# Patient Record
Sex: FEMALE | Race: BLACK OR AFRICAN AMERICAN | NOT HISPANIC OR LATINO | Employment: FULL TIME | URBAN - METROPOLITAN AREA
[De-identification: names, ages, dates, MRNs, and addresses within clinical notes are randomized per-mention and may not be internally consistent; named-entity substitution may affect disease eponyms.]

---

## 2023-04-12 ENCOUNTER — OFFICE VISIT (OUTPATIENT)
Dept: PRIMARY CARE | Facility: CLINIC | Age: 23
End: 2023-04-12
Payer: COMMERCIAL

## 2023-04-12 VITALS
TEMPERATURE: 97.7 F | HEART RATE: 76 BPM | OXYGEN SATURATION: 98 % | DIASTOLIC BLOOD PRESSURE: 70 MMHG | BODY MASS INDEX: 24.07 KG/M2 | SYSTOLIC BLOOD PRESSURE: 110 MMHG | HEIGHT: 64 IN | WEIGHT: 141 LBS | RESPIRATION RATE: 17 BRPM

## 2023-04-12 DIAGNOSIS — F41.9 ANXIETY: ICD-10-CM

## 2023-04-12 DIAGNOSIS — B37.31 VAGINAL YEAST INFECTION: ICD-10-CM

## 2023-04-12 DIAGNOSIS — J45.20 MILD INTERMITTENT ASTHMA, UNSPECIFIED WHETHER COMPLICATED (HHS-HCC): ICD-10-CM

## 2023-04-12 DIAGNOSIS — Z11.3 ROUTINE SCREENING FOR STI (SEXUALLY TRANSMITTED INFECTION): ICD-10-CM

## 2023-04-12 DIAGNOSIS — N63.20 MASS OF LEFT BREAST, UNSPECIFIED QUADRANT: ICD-10-CM

## 2023-04-12 DIAGNOSIS — D64.9 ANEMIA, UNSPECIFIED TYPE: Primary | ICD-10-CM

## 2023-04-12 DIAGNOSIS — E55.9 MILD VITAMIN D DEFICIENCY: ICD-10-CM

## 2023-04-12 DIAGNOSIS — J30.2 SEASONAL ALLERGIES: ICD-10-CM

## 2023-04-12 DIAGNOSIS — R10.84 GENERALIZED ABDOMINAL PAIN: ICD-10-CM

## 2023-04-12 PROBLEM — L70.9 ADULT ACNE: Status: ACTIVE | Noted: 2023-04-12

## 2023-04-12 PROBLEM — K58.9 IRRITABLE BOWEL SYNDROME: Status: ACTIVE | Noted: 2023-04-12

## 2023-04-12 PROBLEM — K21.9 GERD (GASTROESOPHAGEAL REFLUX DISEASE): Status: ACTIVE | Noted: 2023-04-12

## 2023-04-12 PROBLEM — K59.00 CONSTIPATION: Status: ACTIVE | Noted: 2023-04-12

## 2023-04-12 PROBLEM — R51.9 HEADACHE: Status: ACTIVE | Noted: 2023-04-12

## 2023-04-12 PROBLEM — J45.909 ASTHMA (HHS-HCC): Status: ACTIVE | Noted: 2023-04-12

## 2023-04-12 PROBLEM — M62.89 PELVIC FLOOR DYSFUNCTION: Status: ACTIVE | Noted: 2023-04-12

## 2023-04-12 PROBLEM — N92.6 IRREGULAR MENSES: Status: ACTIVE | Noted: 2023-04-12

## 2023-04-12 PROBLEM — N63.0 BREAST NODULE: Status: ACTIVE | Noted: 2023-04-12

## 2023-04-12 PROBLEM — H66.92 LEFT OTITIS MEDIA: Status: ACTIVE | Noted: 2023-04-12

## 2023-04-12 PROBLEM — K59.02 DYSSYNERGIC DEFECATION: Status: ACTIVE | Noted: 2023-04-12

## 2023-04-12 PROBLEM — R11.10 VOMITING: Status: ACTIVE | Noted: 2023-04-12

## 2023-04-12 PROBLEM — N94.10 DYSPAREUNIA, FEMALE: Status: ACTIVE | Noted: 2023-04-12

## 2023-04-12 PROBLEM — N94.6 ADOLESCENT DYSMENORRHEA: Status: ACTIVE | Noted: 2023-04-12

## 2023-04-12 PROBLEM — M54.9 BACK PAIN: Status: ACTIVE | Noted: 2023-04-12

## 2023-04-12 PROBLEM — R10.9 ABDOMINAL PAIN: Status: ACTIVE | Noted: 2023-04-12

## 2023-04-12 PROBLEM — R11.0 DAILY NAUSEA: Status: ACTIVE | Noted: 2023-04-12

## 2023-04-12 LAB
CALCIDIOL (25 OH VITAMIN D3) (NG/ML) IN SER/PLAS: 17 NG/ML
ERYTHROCYTE DISTRIBUTION WIDTH (RATIO) BY AUTOMATED COUNT: 13.7 % (ref 11.5–14.5)
ERYTHROCYTE MEAN CORPUSCULAR HEMOGLOBIN CONCENTRATION (G/DL) BY AUTOMATED: 30.2 G/DL (ref 32–36)
ERYTHROCYTE MEAN CORPUSCULAR VOLUME (FL) BY AUTOMATED COUNT: 86 FL (ref 80–100)
ERYTHROCYTES (10*6/UL) IN BLOOD BY AUTOMATED COUNT: 4.12 X10E12/L (ref 4–5.2)
FERRITIN (UG/LL) IN SER/PLAS: 18 UG/L (ref 8–150)
HEMATOCRIT (%) IN BLOOD BY AUTOMATED COUNT: 35.4 % (ref 36–46)
HEMOGLOBIN (G/DL) IN BLOOD: 10.7 G/DL (ref 12–16)
HIV 1/ 2 AG/AB SCREEN: NONREACTIVE
IRON (UG/DL) IN SER/PLAS: 37 UG/DL (ref 35–150)
IRON BINDING CAPACITY (UG/DL) IN SER/PLAS: 441 UG/DL (ref 240–445)
IRON SATURATION (%) IN SER/PLAS: 8 % (ref 25–45)
LEUKOCYTES (10*3/UL) IN BLOOD BY AUTOMATED COUNT: 7.3 X10E9/L (ref 4.4–11.3)
NRBC (PER 100 WBCS) BY AUTOMATED COUNT: 0 /100 WBC (ref 0–0)
PLATELETS (10*3/UL) IN BLOOD AUTOMATED COUNT: 383 X10E9/L (ref 150–450)
SEDIMENTATION RATE, ERYTHROCYTE: 13 MM/H (ref 0–20)
SYPHILIS TOTAL AB: NONREACTIVE

## 2023-04-12 PROCEDURE — 87491 CHLMYD TRACH DNA AMP PROBE: CPT

## 2023-04-12 PROCEDURE — 99395 PREV VISIT EST AGE 18-39: CPT | Performed by: STUDENT IN AN ORGANIZED HEALTH CARE EDUCATION/TRAINING PROGRAM

## 2023-04-12 PROCEDURE — 85652 RBC SED RATE AUTOMATED: CPT

## 2023-04-12 PROCEDURE — 87389 HIV-1 AG W/HIV-1&-2 AB AG IA: CPT

## 2023-04-12 PROCEDURE — 86780 TREPONEMA PALLIDUM: CPT

## 2023-04-12 PROCEDURE — 87661 TRICHOMONAS VAGINALIS AMPLIF: CPT

## 2023-04-12 PROCEDURE — 83550 IRON BINDING TEST: CPT

## 2023-04-12 PROCEDURE — 82728 ASSAY OF FERRITIN: CPT

## 2023-04-12 PROCEDURE — 36415 COLL VENOUS BLD VENIPUNCTURE: CPT | Performed by: STUDENT IN AN ORGANIZED HEALTH CARE EDUCATION/TRAINING PROGRAM

## 2023-04-12 PROCEDURE — 82306 VITAMIN D 25 HYDROXY: CPT

## 2023-04-12 PROCEDURE — 1036F TOBACCO NON-USER: CPT | Performed by: STUDENT IN AN ORGANIZED HEALTH CARE EDUCATION/TRAINING PROGRAM

## 2023-04-12 PROCEDURE — 87591 N.GONORRHOEAE DNA AMP PROB: CPT

## 2023-04-12 PROCEDURE — 83540 ASSAY OF IRON: CPT

## 2023-04-12 PROCEDURE — 85027 COMPLETE CBC AUTOMATED: CPT

## 2023-04-12 RX ORDER — ONDANSETRON 8 MG/1
8 TABLET, ORALLY DISINTEGRATING ORAL EVERY 4 HOURS PRN
COMMUNITY
Start: 2019-04-12 | End: 2023-12-14

## 2023-04-12 RX ORDER — BENZOYL PEROXIDE 100 MG/ML
LIQUID TOPICAL
COMMUNITY
Start: 2021-08-11 | End: 2023-07-05 | Stop reason: SDUPTHER

## 2023-04-12 RX ORDER — ALBUTEROL SULFATE 90 UG/1
1-2 AEROSOL, METERED RESPIRATORY (INHALATION) EVERY 4 HOURS PRN
COMMUNITY
Start: 2014-10-20 | End: 2023-04-12 | Stop reason: SDUPTHER

## 2023-04-12 RX ORDER — LORATADINE 10 MG/1
1 CAPSULE, LIQUID FILLED ORAL DAILY
COMMUNITY
Start: 2020-06-19 | End: 2023-07-05 | Stop reason: ALTCHOICE

## 2023-04-12 RX ORDER — OMEPRAZOLE 40 MG/1
40 CAPSULE, DELAYED RELEASE ORAL DAILY
COMMUNITY

## 2023-04-12 RX ORDER — VIT C/E/ZN/COPPR/LUTEIN/ZEAXAN 250MG-90MG
25 CAPSULE ORAL DAILY
COMMUNITY
Start: 2020-06-19 | End: 2023-04-12 | Stop reason: ALTCHOICE

## 2023-04-12 RX ORDER — DICYCLOMINE HYDROCHLORIDE 10 MG/1
20 CAPSULE ORAL 3 TIMES DAILY
COMMUNITY

## 2023-04-12 RX ORDER — CHOLECALCIFEROL (VITAMIN D3) 50 MCG
50 TABLET ORAL DAILY
Qty: 90 TABLET | Refills: 3 | Status: SHIPPED | OUTPATIENT
Start: 2023-04-12 | End: 2024-04-06

## 2023-04-12 RX ORDER — FLUTICASONE PROPIONATE 50 MCG
1 SPRAY, SUSPENSION (ML) NASAL NIGHTLY
Qty: 16 G | Refills: 2 | Status: SHIPPED | OUTPATIENT
Start: 2023-04-12 | End: 2023-07-05 | Stop reason: SDUPTHER

## 2023-04-12 RX ORDER — MULTIVITAMIN WITH IRON
1 TABLET ORAL DAILY
Qty: 90 EACH | Refills: 3 | Status: SHIPPED | OUTPATIENT
Start: 2023-04-12

## 2023-04-12 RX ORDER — FLUTICASONE PROPIONATE 50 MCG
1 SPRAY, SUSPENSION (ML) NASAL NIGHTLY
COMMUNITY
Start: 2020-06-19 | End: 2023-04-12 | Stop reason: SDUPTHER

## 2023-04-12 RX ORDER — SYRING-NEEDL,DISP,INSUL,0.3 ML 29 G X1/2"
SYRINGE, EMPTY DISPOSABLE MISCELLANEOUS
COMMUNITY
Start: 2022-06-26

## 2023-04-12 RX ORDER — ALBUTEROL SULFATE 90 UG/1
1-2 AEROSOL, METERED RESPIRATORY (INHALATION) EVERY 4 HOURS PRN
Qty: 18 G | Refills: 2 | Status: SHIPPED | OUTPATIENT
Start: 2023-04-12 | End: 2023-07-05 | Stop reason: SDUPTHER

## 2023-04-12 RX ORDER — ADAPALENE 1 MG/G
GEL TOPICAL
COMMUNITY
Start: 2022-08-31 | End: 2023-07-05 | Stop reason: SDUPTHER

## 2023-04-12 RX ORDER — HYDROXYZINE HYDROCHLORIDE 25 MG/1
25 TABLET, FILM COATED ORAL EVERY 8 HOURS PRN
Qty: 60 TABLET | Refills: 1 | Status: SHIPPED | OUTPATIENT
Start: 2023-04-12 | End: 2023-07-18 | Stop reason: ALTCHOICE

## 2023-04-12 RX ORDER — POLYETHYLENE GLYCOL 3350 17 G/17G
POWDER, FOR SOLUTION ORAL
COMMUNITY
Start: 2020-05-04

## 2023-04-12 RX ORDER — FLUCONAZOLE 150 MG/1
150 TABLET ORAL ONCE
Qty: 1 TABLET | Refills: 0 | Status: SHIPPED | OUTPATIENT
Start: 2023-04-12 | End: 2023-04-12

## 2023-04-12 NOTE — PATIENT INSTRUCTIONS
Thank you for coming in to see us today!    Please get your lab work done and we will be in touch with results.     Follow up with GI and counseling.     I have sent your refills to the pharmacy today, along with the new medication we can try for your anxiety/panic attacks.     Thank you,  Dr. Bryan

## 2023-04-12 NOTE — PROGRESS NOTES
Sejal Duff is a 22 y.o. female seen in Clinic at /[facility] by Dr. Cody Bryan on 04/12/23 for routine care, as well as for management of the following chronic medical conditions: constipation/IBS-C, breast mass. Patient with recent death of young cousin in MVC, has been having increasing anxiety and panic attacks. Seen in ED, given PRN benzo but does not like the way it makes her feel. Has mental health counseling set up for later this month. Will trial PRN hydroxyzine, may consider transition to SSRI long term pending evolution of symptoms/mental health care. Still has not followed up with breast surgery for breast mass seen on imaging in 2021, referral again placed today. Extensive discussion regarding constipation and ongoing GI concerns which are currently predominately managed by pediatric GI team.     #Constipation   - Longstanding history   - Managed by Peds GI at [facility]  - Current regimen includes 4 caps Miralax daily, 10mg Dulcolax daily   - Recently started on Biofeedback therapy--continues   - Per chart review, issues with medication non-compliance     #Breast Mass  - f/u with breast surgery pending; referral again placed     #Acne  - Topical agents prescribed and refilled    #Anemia  - repeat CBC today  - previously prescribed MVI with iron given constipation    #STI Screening   - labs today     #Asthma, intermittent  - Albuterol PRN, refill     Past Medical History: IBS-C, irregular periods, asthma (intermittent)   Past Medical History:   Diagnosis Date    Encounter for screening for infections with a predominantly sexual mode of transmission 03/02/2020    Screening for STD (sexually transmitted disease)    Melena 04/12/2019    Blood in stool    Other specified health status     No pertinent past medical history    Other specified health status     No pertinent past surgical history    Personal history of other (healed) physical injury and trauma     History of sprain of ankle    Unspecified condition  associated with female genital organs and menstrual cycle 03/02/2020    Cervical motion tenderness     Subspecialty Medical Care: GI (peds), GYN, breast surgery   Identified Adult Providers: Dr. Cody Bryan    Past Surgical History: endoscopy/colonoscopy   Past Surgical History:   Procedure Laterality Date    OTHER SURGICAL HISTORY  01/18/2019    No history of surgery     Medications:   Constipation: Miralax, Dulcolax, Magnesium; Linzess (has not started)   Abdominal Pain: Bentyl, Cyclobenzaprine   Nausea: Zofran     Current Outpatient Medications:     adapalene (Differin) 0.1 % gel, APPLY SPARINGLY TO AFFECTED AREA EVERY DAY, Disp: , Rfl:     albuterol 90 mcg/actuation inhaler, Inhale 1-2 puffs every 4 hours if needed for wheezing or shortness of breath., Disp: 18 g, Rfl: 2    benzoyl peroxide (Benzac AC) 10 % external wash, USE WASH TWICE DAILY AS DIRECTED., Disp: , Rfl:     cholecalciferol (Vitamin D3) 50 MCG (2000 UT) tablet, Take 1 tablet (50 mcg) by mouth once daily., Disp: 90 tablet, Rfl: 3    dicyclomine (Bentyl) 10 mg capsule, Take 2 capsules (20 mg) by mouth in the morning and 2 capsules (20 mg) in the evening and 2 capsules (20 mg) before bedtime., Disp: , Rfl:     fluconazole (Diflucan) 150 mg tablet, Take 1 tablet (150 mg) by mouth 1 time for 1 dose., Disp: 1 tablet, Rfl: 0    fluticasone (Flonase) 50 mcg/actuation nasal spray, Administer 1 spray into each nostril once daily at bedtime., Disp: 16 g, Rfl: 2    hydrOXYzine HCL (Atarax) 25 mg tablet, Take 1 tablet (25 mg) by mouth every 8 hours if needed for anxiety., Disp: 60 tablet, Rfl: 1    Lacto no.76-Dopjel-IXV-larch 25B cell-25B cell-50 mg capsule, Take 1 capsule by mouth once daily., Disp: 90 capsule, Rfl: 3    lactose-reduced food (ENSURE PLUS ORAL), Take by mouth., Disp: , Rfl:     loratadine (Claritin Liqui-Gel) 10 mg capsule, Take 1 capsule by mouth once daily., Disp: , Rfl:     magnesium citrate solution, Take by mouth., Disp: , Rfl:      "multivitamin with iron (Daily Multiple Vitamins/Iron) tablet, Take 1 tablet by mouth once daily., Disp: 90 each, Rfl: 3    omeprazole (PriLOSEC) 40 mg DR capsule, Take 1 capsule (40 mg) by mouth once daily., Disp: , Rfl:     ondansetron ODT (Zofran-ODT) 8 mg disintegrating tablet, Take 1 tablet (8 mg) by mouth every 4 hours if needed for nausea or vomiting., Disp: , Rfl:     polyethylene glycol (Glycolax) 17 gram/dose powder, Mix 17gm in 6-8 ounces of Gatorade and administer by mouth.  Repeat every 15 minutes for a total of 10 doses., Disp: , Rfl:   Pharmacy: [facility]     Allergies:   Allergies   Allergen Reactions    House Dust Unknown    Morphine Dizziness    Pollen Extracts Unknown     Immunizations: Tdap 2022-->due 2032 or during each pregnancy; not vaccinated against COVID and refuses flu shot today     Family History: grandmother and grandmother with luminal GI malignancy; grandfather with breast cancer   Family History   Problem Relation Name Age of Onset    Asthma Mother      Colon cancer Mother       Social History:   Home/Living Situation: lives at home alone; feels safe   Education: nursing school   Employment/Work/Vocational: CNA  Activities: works four jobs; used to work out frequently, recommend trying again   Drug Use: wine social drinking; social MJ use; recent increase in alcohol ues   Diet: dietary concerns as above   Sexuality/Contraception/Menstrual History: STD testing today   Suicide/Depression/Anxiety: \"mood always everywhere\" but denies depression, recent anxiety/panic attacks as avbove   Sleep: trouble sleeping     Transition Processes:  Financial/Health Insurance: [facility]  Transportation: [employer]  Voting: encouraged to register to vote   Legal/Guardian: emergency contact Mom [phone]  Contact Information: [phone]    Visit Vitals  /70 (BP Location: Left arm, Patient Position: Sitting)   Pulse 76   Temp 36.5 °C (97.7 °F)   Resp 17   Ht 1.626 m (5' 4\")   Wt 64 kg (141 lb) "   SpO2 98%   BMI 24.20 kg/m²   Smoking Status Never   BSA 1.7 m²      PHYSICAL EXAM:   General: well appearing AA female, AD, pleasant and engaged in encounter    HEENT: NCAT, MMM  CV: RRR, no m/r/g  PULM: CTAB, non-labored respirations   ABD: soft, mild distended, no rebound/guarding, hypoactive bowel sounds   : no suprapubic or CVA tenderness   EXT: WWP, no significant edema   SKIN: no rashes noted   NEURO: A&Ox4, symmetric facies, no gross motor or sensory deficits, normal gait  PSYCH: pleasant mood, appropriate affect     Assessment/Plan    Sejal Duff is a 22 y.o. female seen in Clinic at /[facility] by Dr. Cody Bryan on 04/12/23 for routine care, as well as for management of the following chronic medical conditions: constipation/IBS-C, breast mass. Patient with recent death of young cousin in MVC, has been having increasing anxiety and panic attacks. Seen in ED, given PRN benzo but does not like the way it makes her feel. Has mental health counseling set up for later this month. Will trial PRN hydroxyzine, may consider transition to SSRI long term pending evolution of symptoms/mental health care. Still has not followed up with breast surgery for breast mass seen on imaging in 2021, referral again placed today. Extensive discussion regarding constipation and ongoing GI concerns which are currently predominately managed by pediatric GI team.     #Constipation   - Longstanding history   - Managed by Peds GI at [facility]  - Current regimen includes 4 caps Miralax daily, 10mg Dulcolax daily   - Recently started on Biofeedback therapy--continues   - Per chart review, issues with medication non-compliance     #Breast Mass  - f/u with breast surgery pending; referral again placed     #Acne  - Topical agents prescribed and refilled    #Anemia  - repeat CBC today  - previously prescribed MVI with iron given constipation    #STI Screening   - labs today     #Asthma, intermittent  - Albuterol PRN, refill     #Health  Maintenance   - Counseling regarding alcohol/tobacco/drug use: MJ use, alcohol use   - Depression screen: denies but +anxiety and recent life stressors   - BMI, Lipid, A1C screening and nutritional/exercise counseling: lab screening at last CPE   - Blood Pressure: WNL   - Safe Sexual Practices, STI, HIV screening: no contraception, repeat STI screening today   - Pap Smear (21 years and above): negative testing in fall 2022    #Transition of Care/Young Adult Care  - Health Literacy Assessment: acceptable   - Medications: Active medications reviewed and updated  - Allergies: Reviewed and updated   - Immunizations: Tdap 2022-->2032; patient defers COVID immunization  - Identified Adult or Subspecialty Providers: breast surgery again, labs, medication refills     Cody Bryan MD  Internal Medicine-Pediatrics   [facility] [address], [address]  P: [phone], F: [phone]

## 2023-04-14 LAB
CHLAMYDIA TRACH., AMPLIFIED: NEGATIVE
N. GONORRHEA, AMPLIFIED: NEGATIVE
TRICHOMONAS VAGINALIS: NEGATIVE

## 2023-07-05 ENCOUNTER — OFFICE VISIT (OUTPATIENT)
Dept: PRIMARY CARE | Facility: CLINIC | Age: 23
End: 2023-07-05
Payer: COMMERCIAL

## 2023-07-05 VITALS
BODY MASS INDEX: 24.69 KG/M2 | WEIGHT: 139 LBS | DIASTOLIC BLOOD PRESSURE: 70 MMHG | SYSTOLIC BLOOD PRESSURE: 108 MMHG | OXYGEN SATURATION: 98 % | HEART RATE: 55 BPM

## 2023-07-05 DIAGNOSIS — L70.9 ACNE, UNSPECIFIED ACNE TYPE: ICD-10-CM

## 2023-07-05 DIAGNOSIS — J45.20 MILD INTERMITTENT ASTHMA, UNSPECIFIED WHETHER COMPLICATED (HHS-HCC): ICD-10-CM

## 2023-07-05 DIAGNOSIS — M54.50 LOW BACK PAIN, UNSPECIFIED BACK PAIN LATERALITY, UNSPECIFIED CHRONICITY, UNSPECIFIED WHETHER SCIATICA PRESENT: ICD-10-CM

## 2023-07-05 DIAGNOSIS — D64.9 ANEMIA, UNSPECIFIED TYPE: Primary | ICD-10-CM

## 2023-07-05 DIAGNOSIS — J30.2 SEASONAL ALLERGIES: ICD-10-CM

## 2023-07-05 PROCEDURE — 99213 OFFICE O/P EST LOW 20 MIN: CPT | Performed by: STUDENT IN AN ORGANIZED HEALTH CARE EDUCATION/TRAINING PROGRAM

## 2023-07-05 PROCEDURE — 1036F TOBACCO NON-USER: CPT | Performed by: STUDENT IN AN ORGANIZED HEALTH CARE EDUCATION/TRAINING PROGRAM

## 2023-07-05 RX ORDER — BENZOYL PEROXIDE 100 MG/ML
LIQUID TOPICAL 2 TIMES DAILY
Qty: 142 G | Refills: 3 | Status: SHIPPED | OUTPATIENT
Start: 2023-07-05 | End: 2023-08-04

## 2023-07-05 RX ORDER — TIZANIDINE 2 MG/1
2 TABLET ORAL EVERY 6 HOURS PRN
Qty: 30 TABLET | Refills: 0 | Status: SHIPPED | OUTPATIENT
Start: 2023-07-05 | End: 2024-02-13 | Stop reason: SDUPTHER

## 2023-07-05 RX ORDER — PRUCALOPRIDE 2 MG/1
1 TABLET, FILM COATED ORAL DAILY
COMMUNITY
Start: 2022-11-08 | End: 2024-04-26 | Stop reason: SDUPTHER

## 2023-07-05 RX ORDER — ALBUTEROL SULFATE 90 UG/1
1-2 AEROSOL, METERED RESPIRATORY (INHALATION) EVERY 4 HOURS PRN
Qty: 18 G | Refills: 2 | Status: SHIPPED | OUTPATIENT
Start: 2023-07-05 | End: 2024-04-26 | Stop reason: SDUPTHER

## 2023-07-05 RX ORDER — ADAPALENE 1 MG/G
GEL TOPICAL NIGHTLY
Qty: 15 G | Refills: 3 | Status: SHIPPED | OUTPATIENT
Start: 2023-07-05 | End: 2023-08-04

## 2023-07-05 RX ORDER — LORATADINE 10 MG/1
10 TABLET ORAL DAILY
Qty: 90 TABLET | Refills: 1 | Status: SHIPPED | OUTPATIENT
Start: 2023-07-05 | End: 2024-04-26 | Stop reason: SDUPTHER

## 2023-07-05 RX ORDER — FLUTICASONE PROPIONATE 50 MCG
1 SPRAY, SUSPENSION (ML) NASAL 2 TIMES DAILY
Qty: 16 G | Refills: 2 | Status: SHIPPED | OUTPATIENT
Start: 2023-07-05 | End: 2024-02-28

## 2023-07-05 RX ORDER — MULTIVIT/IRON SULF/FOLIC ACID 15MG-0.4MG
1 TABLET ORAL DAILY
COMMUNITY
Start: 2023-04-12 | End: 2024-02-01 | Stop reason: ALTCHOICE

## 2023-07-05 RX ORDER — SODIUM PHOSPHATE,MONO-DIBASIC 19G-7G/118
ENEMA (ML) RECTAL
COMMUNITY
Start: 2023-04-20

## 2023-07-05 NOTE — PROGRESS NOTES
"Sejal Duff is a 23 y.o. female seen in Clinic at /[facility] by Dr. Cody Bryan on 07/05/23 for routine care, as well as for management of the following chronic medical conditions: constipation/IBS-C, breast mass. Patient presents today with concern for recent back strain and anemia follow up. She continues to struggle with constipation and follows with peds GI. For breast mass, she did follow through recommendations for biopsy which was completed in May 2023. 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 She continues to have a persistent, mild anemia with iron studies from 04/2023 showing this is likely contributor (Ferritin 18, TIBC 441, Iron 37). She is resistant/unwilling to take any formulation of PO iron due to concern for worsening of constipation. Discussed possible IV iron, would like to discuss with hematology. Referral placed today.     #Back Strain  - no point tenderness  - preserved reflexes, strength, sensation   - chronic constipation, no acute changes in bowel/bladder habits  - supportive care; no indication for imaging at this time   - low dose muscle relaxant for supportive care in short term     #Constipation   - Longstanding history   - Managed by Peds GI at [facility]  - Current regimen includes 4 caps Miralax daily, 10mg Dulcolax daily   - Recently started on Biofeedback therapy--continues   - Per chart review, issues with medication non-compliance     #Breast Mass  - f/u with breast surgery post-biopsy   [  ] pursuing excision of lesion in September due to persistent pain     #Acne  - Topical agents prescribed and refilled    #Anemia  - persistent, mild anemia   - refuses any form of PO iron  - last iron studies in 04/2023   - defers hormonal contraception and has in the past; follows with GYN   [  ] heme referral today to discuss IV iron candidacy     #Asthma, " intermittent  - Albuterol PRN, refill     Past Medical History: IBS-C, irregular periods, asthma (intermittent)   Past Medical History:   Diagnosis Date    Encounter for screening for infections with a predominantly sexual mode of transmission 03/02/2020    Screening for STD (sexually transmitted disease)    Melena 04/12/2019    Blood in stool    Other specified health status     No pertinent past medical history    Other specified health status     No pertinent past surgical history    Personal history of other (healed) physical injury and trauma     History of sprain of ankle    Unspecified condition associated with female genital organs and menstrual cycle 03/02/2020    Cervical motion tenderness     Subspecialty Medical Care: GI (peds), GYN, breast surgery   Identified Adult Providers: Dr. Cody Bryan    Past Surgical History: endoscopy/colonoscopy   Past Surgical History:   Procedure Laterality Date    OTHER SURGICAL HISTORY  01/18/2019    No history of surgery     Medications:   Constipation: Miralax, Dulcolax, Magnesium; Linzess (has not started)   Abdominal Pain: Bentyl, Cyclobenzaprine   Nausea: Zofran   Current Outpatient Medications:     L. acidophilus/Bifid. animalis 32 billion cell capsule, Take 1 capsule by mouth once daily., Disp: , Rfl:     linaCLOtide (Linzess) 290 mcg capsule, Take by mouth once daily., Disp: , Rfl:     prucalopride (Motegrity) 2 mg tablet, Take 1 tablet by mouth once daily., Disp: , Rfl:     Ready-To-Use Enema 19-7 gram/118 mL enema enema, ONE ENEMA EVERY OTHER DAY, Disp: , Rfl:     Tab-A-Sonia Multivitamin w-iron 15 mg iron- 400 mcg tablet, Take 1 tablet by mouth once daily., Disp: , Rfl:     adapalene (Differin) 0.1 % gel, Apply topically once daily at bedtime. APPLY SPARINGLY TO AFFECTED AREA EVERY DAY, Disp: 15 g, Rfl: 3    albuterol 90 mcg/actuation inhaler, Inhale 1-2 puffs every 4 hours if needed for wheezing or shortness of breath., Disp: 18 g, Rfl: 2    benzoyl  peroxide (Benzac AC) 10 % external wash, Apply topically 2 times a day. USE WASH TWICE DAILY AS DIRECTED., Disp: 142 g, Rfl: 3    cholecalciferol (Vitamin D3) 50 MCG (2000 UT) tablet, Take 1 tablet (50 mcg) by mouth once daily., Disp: 90 tablet, Rfl: 3    dicyclomine (Bentyl) 10 mg capsule, Take 2 capsules (20 mg) by mouth in the morning and 2 capsules (20 mg) in the evening and 2 capsules (20 mg) before bedtime., Disp: , Rfl:     fluticasone (Flonase) 50 mcg/actuation nasal spray, Administer 1 spray into each nostril 2 times a day., Disp: 16 g, Rfl: 2    Lacto no.23-Hjlomw-WJW-larch 25B cell-25B cell-50 mg capsule, Take 1 capsule by mouth once daily., Disp: 90 capsule, Rfl: 3    lactose-reduced food (ENSURE PLUS ORAL), Take by mouth., Disp: , Rfl:     loratadine (Claritin) 10 mg tablet, Take 1 tablet (10 mg) by mouth once daily., Disp: 90 tablet, Rfl: 1    magnesium citrate solution, Take by mouth., Disp: , Rfl:     multivitamin with iron (Daily Multiple Vitamins/Iron) tablet, Take 1 tablet by mouth once daily., Disp: 90 each, Rfl: 3    omeprazole (PriLOSEC) 40 mg DR capsule, Take 1 capsule (40 mg) by mouth once daily., Disp: , Rfl:     ondansetron ODT (Zofran-ODT) 8 mg disintegrating tablet, Take 1 tablet (8 mg) by mouth every 4 hours if needed for nausea or vomiting., Disp: , Rfl:     polyethylene glycol (Glycolax) 17 gram/dose powder, Mix 17gm in 6-8 ounces of Gatorade and administer by mouth.  Repeat every 15 minutes for a total of 10 doses., Disp: , Rfl:     tiZANidine (Zanaflex) 2 mg tablet, Take 1 tablet (2 mg) by mouth every 6 hours if needed for muscle spasms for up to 10 days., Disp: 30 tablet, Rfl: 0  [facility]     Allergies:   Allergies   Allergen Reactions    Morphine Dizziness    Pollen Extracts Runny nose    House Dust Runny nose     Immunizations: Tdap 2022-->due 2032 or during each pregnancy; not vaccinated against COVID    Family History: grandmother and grandmother with luminal GI  "malignancy; grandfather with breast cancer   Family History   Problem Relation Name Age of Onset    Asthma Mother      Colon cancer Mother       Social History:   Home/Living Situation: lives at home alone; feels safe   Education: nursing school   Employment/Work/Vocational: CNA  Activities: works four jobs; used to work out frequently, recommend trying again   Drug Use: wine social drinking; social MJ use; recent increase in alcohol use   Diet: dietary concerns as above   Sexuality/Contraception/Menstrual History: STD testing 04/2023 negative    Suicide/Depression/Anxiety: \"mood always everywhere\" but denies depression, recent anxiety/panic attack concerns   Sleep: trouble sleeping     Transition Processes:  Financial[facility]Health [facility]: [email or web address]  Transportation: [employer]  Voting: encouraged to register to vote   Legal/Guardian: emergency contact Mom [phone]  Contact Information: [phone]    Visit Vitals  /70   Pulse 55   Wt 63 kg (139 lb)   SpO2 98%   BMI 24.69 kg/m²   Smoking Status Never   BSA 1.67 m²      PHYSICAL EXAM:   General: well appearing AA female, AD, pleasant and engaged in encounter    HEENT: NCAT, MMM  CV: RRR, no m/r/g  PULM: CTAB, non-labored respirations   ABD: soft, mild distended, no rebound/guarding, hypoactive bowel sounds   : no suprapubic or CVA tenderness   EXT: WWP, no significant edema   SKIN: no rashes noted   NEURO: A&Ox4, symmetric facies, no gross motor or sensory deficits, normal gait  PSYCH: pleasant mood, appropriate affect     Assessment/Plan    Sejal Duff is a 23 y.o. female seen in Clinic at /[facility] by Dr. Cody Bryan on 07/05/23 for routine care, as well as for management of the following chronic medical conditions: constipation/IBS-C, breast mass. Patient presents today with concern for recent back strain and anemia follow up. She continues to struggle with constipation and follows with peds GI. For breast mass, she did follow through recommendations for " "biopsy which was completed in May 2023. 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 She continues to have a persistent, mild anemia with iron studies from 04/2023 showing this is likely contributor (Ferritin 18, TIBC 441, Iron 37). She is resistant/unwilling to take any formulation of PO iron due to concern for worsening of constipation. Discussed possible IV iron, would like to discuss with hematology. Referral placed today.     #Back Strain  - no point tenderness  - preserved reflexes, strength, sensation   - chronic constipation, no acute changes in bowel/bladder habits  - supportive care; no indication for imaging at this time   - low dose muscle relaxant for supportive care in short term     #Constipation   - Longstanding history   - Managed by Peds GI at [facility]  - Current regimen includes 4 caps Miralax daily, 10mg Dulcolax daily   - Recently started on Biofeedback therapy--continues   - Per chart review, issues with medication non-compliance     #Breast Mass  - f/u with breast surgery post-biopsy   [  ] pursuing excision of lesion in September due to persistent pain     #Acne  - Topical agents prescribed and refilled    #Anemia  - persistent, mild anemia   - refuses any form of PO iron  - last iron studies in 04/2023   - defers hormonal contraception and has in the past; follows with GYN   [  ] heme referral today to discuss IV iron candidacy     #Asthma, intermittent  - Albuterol PRN, refill     #Health Maintenance   - Counseling regarding alcohol/tobacco/drug use: MJ use, alcohol use   - Depression screen: denies but +anxiety and recent life stressors   - BMI, Lipid, A1C screening and nutritional/exercise counseling: reassuring lipid panel 08/2022  - Blood Pressure: WNL   - Safe Sexual Practices, STI, HIV screening: no contraception, negative testing 04/2023  - Pap Smear (21 " years and above): negative testing in fall 2022 (GYN)    #Transition of Care/Young Adult Care  - Health Literacy Assessment: acceptable   - Medications: Active medications reviewed and updated  - Allergies: Reviewed and updated   - Immunizations: Tdap 2022-->2032; patient defers COVID immunization    Hematology referral today  Medication refills  Follow up with breast surgery   Continue to follow with GI for severe/refractory constipation     Cody Bryan MD  Internal Medicine-Pediatrics   [facility] [address], [address]  P: [phone], F: [phone]

## 2023-07-18 PROBLEM — H10.9 CONJUNCTIVITIS: Status: ACTIVE | Noted: 2023-07-18

## 2023-07-18 PROBLEM — D64.9 ANEMIA: Status: ACTIVE | Noted: 2023-07-18

## 2023-07-18 PROBLEM — K31.84 GASTROPARESIS: Status: ACTIVE | Noted: 2023-07-18

## 2023-07-18 PROBLEM — H11.30 SUBCONJUNCTIVAL HEMORRHAGE: Status: ACTIVE | Noted: 2023-07-18

## 2023-08-03 LAB
CLUE CELLS: PRESENT
DEHYDROEPIANDROSTERONE SULFATE (DHEA-S) (UG/DL) IN SER/: 107 UG/DL (ref 65–395)
ESTIMATED AVERAGE GLUCOSE FOR HBA1C: 105 MG/DL
HEMOGLOBIN A1C/HEMOGLOBIN TOTAL IN BLOOD: 5.3 %
HEPATITIS A VIRUS IGM AB PRESENCE IN SER/PLAS BY IMMUNOASSAY: NONREACTIVE
HEPATITIS B VIRUS CORE IGM AB PRESENCE IN SER/PLAS BY IMMUNOASSY: NONREACTIVE
HEPATITIS B VIRUS SURFACE AG PRESENCE IN SERUM: NONREACTIVE
HEPATITIS C VIRUS AB PRESENCE IN SERUM: NONREACTIVE
HIV 1/ 2 AG/AB SCREEN: NONREACTIVE
NUGENT SCORE: 10
PROLACTIN (UG/L) IN SER/PLAS: 30.8 UG/L (ref 3–20)
SYPHILIS TOTAL AB: NONREACTIVE
TESTOSTERONE FREE (CHAN): NORMAL
TESTOSTERONE,TOTAL,LC-MS/MS: NORMAL
THYROTROPIN (MIU/L) IN SER/PLAS BY DETECTION LIMIT <= 0.05 MIU/L: 0.81 MIU/L (ref 0.44–3.98)
YEAST: ABNORMAL

## 2023-08-17 LAB
TESTOSTERONE FREE (CHAN): 6.7 PG/ML (ref 0.1–6.4)
TESTOSTERONE,TOTAL,LC-MS/MS: 24 NG/DL (ref 2–45)

## 2023-08-22 PROBLEM — R10.84 GENERALIZED ABDOMINAL PAIN: Status: ACTIVE | Noted: 2023-04-12

## 2023-08-22 PROBLEM — N76.0 BACTERIAL VAGINOSIS: Status: ACTIVE | Noted: 2023-08-22

## 2023-08-22 PROBLEM — D24.9 FIBROADENOMA OF BREAST: Status: ACTIVE | Noted: 2023-08-22

## 2023-08-22 PROBLEM — B96.89 BACTERIAL VAGINOSIS: Status: ACTIVE | Noted: 2023-08-22

## 2023-08-22 PROBLEM — N92.0 MENORRHAGIA WITH REGULAR CYCLE: Status: ACTIVE | Noted: 2023-08-22

## 2023-08-22 PROBLEM — R14.0 ABDOMINAL BLOATING: Status: ACTIVE | Noted: 2023-08-22

## 2023-08-22 PROBLEM — K59.09 CHRONIC CONSTIPATION: Status: ACTIVE | Noted: 2023-04-12

## 2023-08-22 RX ORDER — ONDANSETRON 4 MG/1
4 TABLET, ORALLY DISINTEGRATING ORAL EVERY 8 HOURS PRN
COMMUNITY
Start: 2023-08-14

## 2023-09-13 ENCOUNTER — HOSPITAL ENCOUNTER (OUTPATIENT)
Dept: DATA CONVERSION | Facility: HOSPITAL | Age: 23
End: 2023-09-13
Attending: SURGERY | Admitting: SURGERY
Payer: COMMERCIAL

## 2023-09-13 DIAGNOSIS — N62 HYPERTROPHY OF BREAST: ICD-10-CM

## 2023-09-13 DIAGNOSIS — Z80.3 FAMILY HISTORY OF MALIGNANT NEOPLASM OF BREAST: ICD-10-CM

## 2023-09-13 DIAGNOSIS — D24.2 BENIGN NEOPLASM OF LEFT BREAST: ICD-10-CM

## 2023-09-13 DIAGNOSIS — N61.0 MASTITIS WITHOUT ABSCESS: ICD-10-CM

## 2023-09-13 DIAGNOSIS — N64.89 OTHER SPECIFIED DISORDERS OF BREAST: ICD-10-CM

## 2023-09-18 LAB
COMPLETE PATHOLOGY REPORT: NORMAL
CONVERTED CLINICAL DIAGNOSIS-HISTORY: NORMAL
CONVERTED FINAL DIAGNOSIS: NORMAL
CONVERTED FINAL REPORT PDF LINK TO COPY AND PASTE: NORMAL
CONVERTED GROSS DESCRIPTION: NORMAL

## 2023-09-29 VITALS
HEIGHT: 64 IN | BODY MASS INDEX: 25.41 KG/M2 | SYSTOLIC BLOOD PRESSURE: 111 MMHG | RESPIRATION RATE: 16 BRPM | DIASTOLIC BLOOD PRESSURE: 73 MMHG | HEART RATE: 91 BPM | WEIGHT: 148.81 LBS | TEMPERATURE: 97.7 F

## 2023-09-30 NOTE — H&P
"    History of Present Illness:   Pregnant/Lactating:  ·  Are You Pregnant no (1)   ·  Are You Currently Breastfeeding no (1)     History Present Illness:  Reason for surgery: Fibroadenoma   HPI:    Per her office visit:  \"Sejal Duff is a very pleasant 22 year old AA female referred by Berta Bowden CNP for a palpable left breast fibroadenoma, discussion of surgical excision. She first noticed the mass in October 2020. She reports intermittent shooting pain at the  palpable area of concern. She was originally seen in October 2021 and recommended to have an ultrasound guided biopsy. She did not proceed with the biopsy at that time, but after repeat imaging in April 2023 core needle biopsy was performed, yielding  a fibroadenoma. She has family history of breast cancer in her maternal great grandfather, no genetic testing.\"    Patient denies any changes in her health    Allergies:        Allergies:  ·  morphine : Other    Home Medication Review:   Home Medications Reviewed: yes     Impression/Procedure:   ·  Impression and Planned Procedure: left breast palpation guided excisional biopsy       ERAS (Enhanced Recovery After Surgery):  ·  ERAS Patient: no       Vital Signs:  Temperature C: 36.5 degrees C   Temperature F: 97.7 degrees F   Heart Rate: 91 beats per minute   Respiratory Rate: 16 breath per minute   Blood Pressure Systolic: 111 mm/Hg   Blood Pressure Diastolic: 73 mm/Hg     Physical Exam by System:    Constitutional: awake, alert,   Eyes: EOMI, no scleral icterus   Respiratory/Thorax: nonlabored breathing on room  air, no conversational dyspnea, symmetric chest rise   Cardiovascular: hemodynamically stable   Gastrointestinal: abdomen soft, nondistended, non  tender   Musculoskeletal: moving all extremities   Extremities: no peripheral edema present   Neurological: motor and sensation grossly intact   Breast: mobile breast mass at approximately 3:00  position 1 cm from the nipple   Psychological: calm " "mood with congruent behavior   Skin: no rashes or lesions noted     Consent:   COVID-19 Consent:  ·  COVID-19 Risk Consent Surgeon has reviewed key risks related to the risk of [place] COVID-19 and if they contract COVID-19 what the risks are.     Attestation:   Note Completion:  I am a:  Resident/Fellow   Attending Attestation I saw and evaluated the patient.  I personally obtained the key and critical portions of the history and physical exam or was physically present for key and  critical portions performed by the resident/fellow. I reviewed the resident/fellow?s documentation and discussed the patient with the resident/fellow.  I agree with the resident/fellow?s medical decision making as documented in the note.     I personally evaluated the patient on 13-Sep-2023         Electronic Signatures:  Deborah Patterson)  (Signed 15-Sep-2023 13:00)   Authored: Impression/Procedure, Note Completion   Co-Signer: History of Present Illness, Allergies, Home Medication Review, Impression/Procedure, ERAS, Physical Exam, Consent, Note Completion  Simerlink, Mackenzie (MD (Resident))  (Signed 13-Sep-2023 13:41)   Authored: History of Present Illness, Allergies, Home  Medication Review, Impression/Procedure, ERAS, Physical Exam, Consent, Note Completion      Last Updated: 15-Sep-2023 13:00 by Deborah Patterson)    References:  1.  Data Referenced From \"Patient Profile - Preop v3\" 13-Sep-2023 10:46   "

## 2023-10-01 NOTE — OP NOTE
PROCEDURE DETAILS    Preoperative Diagnosis:  Left breast fibroadenoma  Postoperative Diagnosis:  Left breast fibroadenoma  Surgeon: MD Leonardo  Resident/Fellow/Other Assistant: Simerlink, MD PGY3    Procedure:  Palpation guided left breast excisional biopsy   Anesthesia: Kemar  Estimated Blood Loss: 5 cc  Findings: left breast mass consistent with fibroadenoma, excised completely  Specimens(s) Collected: yes,  left breast mass   Complications: none  IV Fluids: 600 cc  Patient Returned To/Condition: PACU/stable         Operative Report:   INDICATIONS FOR PROCEDURE:    Sejal Duff is a 23-year-old female who presented with a palpable mass in her left breast. Diagnostic work up included an ultrasound and core needle biopsy, which yielded a fibroadenoma. We met in surgical consultation, and due to size and associated  symptoms, we agreed on left breast excisional biopsy. Following a detailed discussion regarding risks, benefits, and alternatives, informed consent was obtained, and we agreed to proceed.      DESCRIPTION OF PROCEDURE:    In the preoperative waiting area, the patient and I both identified the palpable left breast mass at 3 o'clock approximately 1 cm from the nipple, and the overlying skin was marked.  The patient was brought to the operating room and positioned supine on the OR table.  Following patient identification and a time-out procedure, SCDs were applied, and antibiotics were administered.  Monitored anesthesia care was initiated.     The operative field was prepped and draped in a standard sterile fashion.  A periareolar incision was chosen to hide the surgical scar in a cosmetic location. 1% lidocaine was injected  subcutaneously.  The skin was incised sharply.  Dissection was carried down through the skin and subcutaneous tissues. Soft tissue flaps were then raised in all directions: superior, inferior, medial, and lateral.  The lesion was located deep to our incision;  therefore, a  tunneling technique was used. After dissection within the breast parenchyma, the palpable mass was isolated. It was well-circumscribed with a fibrous capsule. A marking stitch was placed within the mass for dissection purposes. It was then  widely and circumferentially dissected from the surrounding tissues using electrocautery.  The specimen was excised and labeled as left breast mass. It was marked with a double short stitch superior, double long stitch lateral, and a single stitch anterior.   The specimen was sent to Pathology for permanent section.  The excision cavity was copiously irrigated with warm sterile saline solution.  Hemostasis was achieved.    The specimen measured 4.0 cm x 3.0 cm x 4.0 cm. The tissue surrounding the excision cavity was advanced and secured to each other with a 3-0 Vicryl suture in an interrupted fashion to close the defect. Hemostasis was reconfirmed.  The more superficial  layer of tissue that had been mobilized at the beginning of the procedure was similarly closed in order to create a cosmetic effect. 0.5% Marcaine was injected subcutaneously for analgesia.   The incision was then closed in layers with an interrupted  3-0 Vicryl in the deep dermis followed by a running subcuticular 4-0 Monocryl for the skin. Skin glue, fluffs, and a surgical bra were then applied.      The patient tolerated the procedure well.  There were no immediate complications.  All counts were correct.  Estimated blood loss was 5 cc.  I was present for and performed the entire procedure. The patient was then awakened and transported to the PACU  in stable condition.    Deborah Patterson MD  Breast Surgical Oncology   pager [phone]    Note Recipients:   Cody Bryan MD                        Attestation:   Note Completion:  Attending Attestation I was present for the entire procedure    I am a: Resident/Fellow         Electronic Signatures:  Deborah Patterson)  (Signed 13-Sep-2023 16:09)   Authored:  Post-Operative Note, Chart Review, Note Completion   Co-Signer: Post-Operative Note, Chart Review, Note Completion  Simerlink, Mackenzie (MD (Resident))  (Signed 13-Sep-2023 15:00)   Authored: Post-Operative Note, Chart Review, Note Completion      Last Updated: 13-Sep-2023 16:09 by Deborah Patterson)

## 2023-10-03 ENCOUNTER — APPOINTMENT (OUTPATIENT)
Dept: PEDIATRIC GASTROENTEROLOGY | Facility: CLINIC | Age: 23
End: 2023-10-03
Payer: COMMERCIAL

## 2023-10-03 ENCOUNTER — OFFICE VISIT (OUTPATIENT)
Dept: SURGICAL ONCOLOGY | Facility: HOSPITAL | Age: 23
End: 2023-10-03
Payer: COMMERCIAL

## 2023-10-03 DIAGNOSIS — D24.2 FIBROADENOMA OF LEFT BREAST: Primary | ICD-10-CM

## 2023-10-03 DIAGNOSIS — Z98.890 STATUS POST EXCISIONAL BIOPSY: ICD-10-CM

## 2023-10-03 DIAGNOSIS — N63.20 MASS OF LEFT BREAST, UNSPECIFIED QUADRANT: ICD-10-CM

## 2023-10-03 NOTE — PROGRESS NOTES
Subjective   Patient ID: Sejal Duff is a 23 y.o. female s/p left breast excisional biopsy 9/14/23 for biopsy proven fibroadenoma. Surgical pathology showed fibroadenoma with PASH.     She recovered well from surgery. No current complaints. No signs of  infection.     Objective   General: Otherwise healthy appearing. No acute distress.     HEENT: Conjunctiva well-colored, sclera non-icteric. Neck supple, trachea midline. No lymphadenopathy or thyromegaly.     Cardiovascular: Regular rate and rhythm.    Respiratory: Clear to auscultation bilaterally.     Breast: Exam performed in the sitting and supine positions. Right breast: No obvious abnormalities palpable. No skin or nipple changes. Left breast: 1:00-4:00 well healing periareolar incision.     Abdomen: Soft, non-tender, non-distended.    Extremities: Atraumatic, no edema.     Neurologic: Motor and sensory grossly intact. Alert and oriented.     Lymphatic: No axillary, supraclavicular, or infraclavicular lymphadenopathy bilaterally.       Assessment/Plan   There are no diagnoses linked to this encounter.    You are recovering very well from surgery and your incision is healing well.     Instructions:   1. No activity restrictions. Gradually increase activity with your surgical arm.   2. Please continue to use Tylenol and/or ice as needed for mild discomfort.   3. Scar massage with vitamin E oil or Mederma.     We reviewed your pathology report. This showed fibroadenoma.     All questions were answered, and we are in agreement with the following plan:     1. Please return for annual screening mammograms at age 40.     If you have any questions, concerns, or changes in your breast exam, please call the office. Our breast care team looks forward to seeing you again soon.     Deborah Patterson MD   Breast Surgical Oncology Department of Surgery   [facility]   Office: [phone] (option #2)   Fax: [phone]    DISCLAIMER: Speech  recognition software was used to create portions of this document. While an attempt at proofreading has been made, minor errors in transcription may be present.    Scribe Attestation  By signing my name below, I, Melodie Kerr , Scrdenae   attest that this documentation has been prepared under the direction and in the presence of Deborah Patterson MD.

## 2023-12-14 DIAGNOSIS — R11.0 NAUSEA: ICD-10-CM

## 2023-12-14 RX ORDER — ONDANSETRON 8 MG/1
TABLET, ORALLY DISINTEGRATING ORAL
Qty: 30 TABLET | Refills: 1 | Status: SHIPPED | OUTPATIENT
Start: 2023-12-14 | End: 2024-04-26 | Stop reason: SDUPTHER

## 2024-02-01 ENCOUNTER — OFFICE VISIT (OUTPATIENT)
Dept: OBSTETRICS AND GYNECOLOGY | Facility: CLINIC | Age: 24
End: 2024-02-01
Payer: COMMERCIAL

## 2024-02-01 VITALS
WEIGHT: 141 LBS | DIASTOLIC BLOOD PRESSURE: 73 MMHG | HEIGHT: 63 IN | SYSTOLIC BLOOD PRESSURE: 107 MMHG | BODY MASS INDEX: 24.98 KG/M2

## 2024-02-01 DIAGNOSIS — E28.2 PCOS (POLYCYSTIC OVARIAN SYNDROME): Primary | ICD-10-CM

## 2024-02-01 PROCEDURE — 1036F TOBACCO NON-USER: CPT | Performed by: OBSTETRICS & GYNECOLOGY

## 2024-02-01 PROCEDURE — 99214 OFFICE O/P EST MOD 30 MIN: CPT | Performed by: OBSTETRICS & GYNECOLOGY

## 2024-02-01 RX ORDER — METFORMIN HYDROCHLORIDE 500 MG/1
500 TABLET, EXTENDED RELEASE ORAL
Qty: 30 TABLET | Refills: 11 | Status: SHIPPED | OUTPATIENT
Start: 2024-02-01 | End: 2025-01-31

## 2024-02-01 NOTE — PROGRESS NOTES
Sejal Duff is a 23 y.o. female who presents with a chief complaint of Follow-up (Family planning and medication follow up)      SUBJECTIVE  Patient presents to follow-up her metformin for PCOS.  She is continuing to try to get pregnant but has not been able to.  Her periods are very heavy and irregular.  She was on metformin for this but she ran out of it and never got it refilled.  She and I discussed this at length    Past Medical History:   Diagnosis Date    Encounter for screening for infections with a predominantly sexual mode of transmission 03/02/2020    Screening for STD (sexually transmitted disease)    Melena 04/12/2019    Blood in stool    Other specified health status     No pertinent past medical history    Other specified health status     No pertinent past surgical history    Personal history of other (healed) physical injury and trauma     History of sprain of ankle    Unspecified condition associated with female genital organs and menstrual cycle 03/02/2020    Cervical motion tenderness     Past Surgical History:   Procedure Laterality Date    BREAST SURGERY Left     Mass removal    OTHER SURGICAL HISTORY  01/18/2019    No history of surgery     Social History     Socioeconomic History    Marital status: Single     Spouse name: None    Number of children: None    Years of education: None    Highest education level: None   Occupational History    None   Tobacco Use    Smoking status: Never    Smokeless tobacco: Never   Vaping Use    Vaping Use: Never used   Substance and Sexual Activity    Alcohol use: Yes     Comment: social    Drug use: Never    Sexual activity: Yes   Other Topics Concern    None   Social History Narrative    None     Social Determinants of Health     Financial Resource Strain: Not on file   Food Insecurity: Not on file   Transportation Needs: Not on file   Physical Activity: Not on file   Stress: Not on file   Social Connections: Not on file   Intimate Partner Violence: Not on  "file   Housing Stability: Not on file     Family History   Problem Relation Name Age of Onset    Asthma Mother      Colon cancer Mother         OB History    Para Term  AB Living   0 0 0 0 0 0   SAB IAB Ectopic Multiple Live Births   0 0 0 0 0       OBJECTIVE  Allergies   Allergen Reactions    Morphine Dizziness    Pollen Extracts Runny nose    House Dust Runny nose      (Not in a hospital admission)       Review of Systems  History obtained from the patient  General ROS: negative  Psychological ROS: negative  Gastrointestinal ROS: negative  Musculoskeletal ROS: negative  Physical Exam  General Appearance: awake, alert, oriented, in no acute distress, well developed, well nourished, and in no acute distress  Skin: there are no suspicious lesions or rashes of concern, skin color, texture, turgor are normal; there are no bruises, rashes or lesions.  Head/Face: NCAT  Eyes: No gross abnormalities., PERRL, and EOMI  Neck: neck- supple, no mass, non-tender  Back: no pain to palpation  Abdomen: Soft, non-tender, normal bowel sounds; no bruits, organomegaly or masses.  Extremities: Extremities warm to touch, pink, with no edema.  Musculoskeletal: negative    /73   Ht 1.6 m (5' 3\")   Wt 64 kg (141 lb)   LMP 01/15/2024 (Exact Date)   BMI 24.98 kg/m²    Problem List Items Addressed This Visit    None  Visit Diagnoses       PCOS (polycystic ovarian syndrome)    -  Primary    Relevant Medications    metFORMIN XR (Glucophage-XR) 500 mg 24 hr tablet         Refer to repro endo      "

## 2024-02-07 ENCOUNTER — OFFICE VISIT (OUTPATIENT)
Dept: PEDIATRIC GASTROENTEROLOGY | Facility: CLINIC | Age: 24
End: 2024-02-07
Payer: COMMERCIAL

## 2024-02-07 VITALS
HEART RATE: 99 BPM | RESPIRATION RATE: 18 BRPM | DIASTOLIC BLOOD PRESSURE: 74 MMHG | WEIGHT: 139.4 LBS | HEIGHT: 63 IN | BODY MASS INDEX: 24.7 KG/M2 | SYSTOLIC BLOOD PRESSURE: 109 MMHG

## 2024-02-07 DIAGNOSIS — K63.8219 SMALL INTESTINAL BACTERIAL OVERGROWTH (SIBO): Primary | ICD-10-CM

## 2024-02-07 PROCEDURE — 99214 OFFICE O/P EST MOD 30 MIN: CPT | Performed by: NURSE PRACTITIONER

## 2024-02-07 PROCEDURE — 1036F TOBACCO NON-USER: CPT | Performed by: NURSE PRACTITIONER

## 2024-02-07 NOTE — PATIENT INSTRUCTIONS
IMPRESSION AND PLAN:    Colonic irrigation - we will work on system plus hydrotherapy  Linzess for now, motegrity  Treatment for SIBO (Rifaximin or Metronidazole)  I will look into your new meds        Follow up in 3 months.       CONTACT:  Division of Pediatric Gastroenterology, Hepatology and Nutrition  All results will be on line on My Chart.  Make sure sure you have signed up for My Chart.     Office phone   Office fax   Email [email or web address]     Please note:  After hours and on call [phone] and ask for Pediatric Gastroenterology Fellow on Call  Office visit Scheduling   Radiology Scheduling      I am in clinic M, T, W and may not be able to return call until Thursday.   Phone calls and email to our office are returned by one of our nurses within 48 business hours.  Please call for prescription renewals when you have one week of medication remaining.   Please call if you have trouble with insurance company coverage of any medications we prescribe.      This note was created using voice recognition software. I have made every reasonable attempts to avoid incorrect errors, but this document may contain errors not identified before proof reading and finalizing the document. If the errors change the accuracy of the document, I would appreciate being brought to my attention. Thanks

## 2024-02-07 NOTE — PROGRESS NOTES
Pediatric Gastroenterology Follow Up Office Visit      HPI  Sejal Duffand her caregiver were seen in the [facility]astroenter[facility] Hepatology & Nutrition [facility] in follow-up on 2/7/2024. Sejal Duff is a 23 y.o. year-old  who is being followed by Pediatric Gastroenterology for   Chief Complaint   Patient presents with    Follow-up    Irritable Bowel Syndrome   .  Sejal has irritable bowel syndrome with constipation dominance.  I have known her for several years although it has been sometime since I had seen her.  She did see an adult GI provider since our last visit but is returning to our pediatric clinic today.    She continues to suffer from chronic constipation, abdominal bloating, and GI distress.  At 1 point we discussed anal irrigation using the Peristeen her StayNTouch home systems.  She was unable to get this system due to insurance issues.  She would like to pursue this if it is an option.    In the meantime she has been managing her constipation with Linzess-alternating 145 with to 290.  She feels the 145 dose works better.  She is also using Motegrity 2 mg/day.  She feels this combination has been helpful but she still has not achieved an optimal bowel regimen.    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  One of her coworkers suggested Adipex (phentremin) which she was able to get from a friend.  She is taking this medication once a day.  She states that this has helped dramatically with her bloating, constipation, and GI distress.  Although she does not like the way this medication makes her feel, she would like to continue this medicine because she feels it is helpful.    On this medication she has lost over 25 pounds in the last month.  She states that her last bowel movement was 2 to 3 weeks ago.    Her last cleanout was prior to that.      Per Sejal:  189 pounds  on January 2, 2024   Today she weighs 139 on February 7,2024  This would be a 60  pound decrease in the span of 4 weeks.    Abdominal Pain -yes, frequently.  Nausea - none  Vomiting - none  Reflux/Regurgitation - none  Dysphagia  - none    BM frequency -very infrequent.  She states her last bowel movement was 3 weeks ago.  BM quality BSC  -can be liquid or formed feels like she has to go to the bathroom but is unable to pass stool.  After cleanout her abdominal distention decreases significantly.  BM soiling - none  BM Hematochezia - no  BM Nocturnal - no  Urinary Symptoms - none    No Changes to Family Medical History      Allergies   Allergen Reactions    Morphine Dizziness    Pollen Extracts Runny nose    House Dust Runny nose         Current Outpatient Medications on File Prior to Visit   Medication Sig Dispense Refill    albuterol 90 mcg/actuation inhaler Inhale 1-2 puffs every 4 hours if needed for wheezing or shortness of breath. 18 g 2    cholecalciferol (Vitamin D3) 50 MCG (2000 UT) tablet Take 1 tablet (50 mcg) by mouth once daily. 90 tablet 3    dicyclomine (Bentyl) 10 mg capsule Take 2 capsules (20 mg) by mouth 3 times a day.      fluticasone (Flonase) 50 mcg/actuation nasal spray Administer 1 spray into each nostril 2 times a day. 16 g 2    L. acidophilus/Bifid. animalis 32 billion cell capsule Take 1 capsule by mouth once daily.      Lacto no.93-Thdcep-YXZ-larch 25B cell-25B cell-50 mg capsule Take 1 capsule by mouth once daily. 90 capsule 3    lactose-reduced food (ENSURE PLUS ORAL) Take by mouth.      linaCLOtide (Linzess) 290 mcg capsule Take by mouth once daily.      loratadine (Claritin) 10 mg tablet Take 1 tablet (10 mg) by mouth once daily. 90 tablet 1    magnesium citrate solution Take by mouth.      metFORMIN XR (Glucophage-XR) 500 mg 24 hr tablet Take 1 tablet (500 mg) by mouth once daily with breakfast. Do not crush, chew, or split. 30 tablet 11    multivitamin with iron (Daily Multiple Vitamins/Iron) tablet Take 1 tablet by mouth once daily. 90 each 3    omeprazole  "(PriLOSEC) 40 mg DR capsule Take 1 capsule (40 mg) by mouth once daily.      ondansetron ODT (Zofran-ODT) 4 mg disintegrating tablet Take 1 tablet (4 mg) by mouth every 8 hours if needed for nausea.      ondansetron ODT (Zofran-ODT) 8 mg disintegrating tablet DISSOLVE 1 TABLET BY MOUTH THREE TIMES DAILY AS NEEDED FOR NAUSEA 30 tablet 1    polyethylene glycol (Glycolax) 17 gram/dose powder Mix 17gm in 6-8 ounces of Gatorade and administer by mouth.  Repeat every 15 minutes for a total of 10 doses.      prucalopride (Motegrity) 2 mg tablet Take 1 tablet (2 mg) by mouth once daily.      Ready-To-Use Enema 19-7 gram/118 mL enema enema ONE ENEMA EVERY OTHER DAY      tiZANidine (Zanaflex) 2 mg tablet Take 1 tablet (2 mg) by mouth every 6 hours if needed for muscle spasms for up to 10 days. 30 tablet 0    [DISCONTINUED] Tab-A-Sonia Multivitamin w-iron 15 mg iron- 400 mcg tablet Take 1 tablet by mouth once daily.       No current facility-administered medications on file prior to visit.           PHYSICAL EXAMINATION:  Vital signs : /74 (BP Location: Right arm, Patient Position: Sitting)   Pulse 99   Resp 18   Ht 1.595 m (5' 2.8\")   Wt 63.2 kg (139 lb 6.4 oz)   LMP 01/15/2024 (Exact Date)   BMI 24.86 kg/m²  Facility age limit for growth %nba is 20 years.    Physical Exam  Constitutional:       General: Appear well.   HENT:      Head: Normocephalic.      Right Ear: External ear normal.      Left Ear: External ear normal.      Nose: Nose normal.      Mouth/Throat:      Mouth: Mucous membranes are moist.   Eyes:      Extraocular Movements: Extraocular movements intact.      Conjunctiva/sclera: Conjunctivae normal.   Cardiovascular:      Rate and Rhythm: Normal rate and regular rhythm.      Heart sounds: Normal heart sounds.      Capillary Refill: Capillary refill takes less than 2 seconds.   Pulmonary:      Effort: Respiratory effort is normal.      Breath sounds: Normal breath sounds.   Abdominal:      General: " Abdomen is flat. Bowel sounds are normal. There is minimal distension. There are no masses.      Palpations: Abdomen is soft.      Tenderness: There is some abdominal tenderness.      Gastrostomy tubes: N/A  Anal Rectal:     Not examined   Musculoskeletal:         General: Normal range of motion of all extremities.     Joints: no selling or redness.  Skin:     General: Skin is warm and dry.      No rashes  Neurological:      General: No focal deficit present.      Mental Status: Alert  Psychiatric:         Mood and Affect: Mood normal.               IMPRESSION AND PLAN:  Sejal has a longstanding history of chronic constipation which is likely irritable bowel syndrome with constipation dominance.  She may have an underlying colonic motility disorder which contributes to the extended time in between bowel movements.  She is having some success with Linzess and Motegrity although she has not achieved a regular bowel pattern.  For a while she was using colonic hydrotherapy and this was very helpful.  She has not done this in over a year but would like to return to this method of irrigation until she can get her anal irrigation system.    She would like to try the medication Adipex as additional therapy for her GI distress.  This is not a medication I am familiar with or have ever prescribed.  I will reach out to one of my colleagues who has more experience using this medication.    Colonic irrigation - we will work on system plus hydrotherapy  Linzess for now, motegrity  Treatment for SIBO (Rifaximin or Metronidazole)  I will look into your new meds        CONTACT:  Division of Pediatric Gastroenterology, Hepatology and Nutrition  All results will be on line on My Chart.  Make sure sure you have signed up for My Chart.     Office phone   Office fax   Email [email or web address]     Please note:  After hours and on call [phone] and ask for Pediatric Gastroenterology Fellow on Call  Office  visit Scheduling   Radiology Scheduling      I am in clinic M, T, W and may not be able to return call until Thursday.   Phone calls and email to our office are returned by one of our nurses within 48 business hours.  Please call for prescription renewals when you have one week of medication remaining.   Please call if you have trouble with insurance company coverage of any medications we prescribe.      This note was created using voice recognition software. I have made every reasonable attempts to avoid incorrect errors, but this document may contain errors not identified before proof reading and finalizing the document. If the errors change the accuracy of the document, I would appreciate being brought to my attention. Thanks

## 2024-02-07 NOTE — Clinical Note
She states that she never got her anal irrigation system.  She only got the hand .  This was several years ago and I am not sure what the status of her order is or if it is even still valid.  Is there a way that you can check on this and if it needs to be reordered please reorder it?  Thank you so much.

## 2024-02-07 NOTE — Clinical Note
I also need a printed prescription for colonic hydrotherapy.  I prescribed this for her in the past.  I would like her to have hydrotherapy once per month  until we can get the irrigation system.  You know how to put this in a prescription form

## 2024-02-08 ENCOUNTER — TELEPHONE (OUTPATIENT)
Dept: PEDIATRIC GASTROENTEROLOGY | Facility: HOSPITAL | Age: 24
End: 2024-02-08
Payer: COMMERCIAL

## 2024-02-13 DIAGNOSIS — M54.50 LOW BACK PAIN, UNSPECIFIED BACK PAIN LATERALITY, UNSPECIFIED CHRONICITY, UNSPECIFIED WHETHER SCIATICA PRESENT: ICD-10-CM

## 2024-02-13 RX ORDER — TIZANIDINE 2 MG/1
2 TABLET ORAL EVERY 6 HOURS PRN
Qty: 30 TABLET | Refills: 0 | Status: SHIPPED | OUTPATIENT
Start: 2024-02-13 | End: 2024-04-26 | Stop reason: SDUPTHER

## 2024-02-28 ENCOUNTER — TELEPHONE (OUTPATIENT)
Dept: PEDIATRIC GASTROENTEROLOGY | Facility: HOSPITAL | Age: 24
End: 2024-02-28
Payer: COMMERCIAL

## 2024-02-28 DIAGNOSIS — J30.2 SEASONAL ALLERGIES: ICD-10-CM

## 2024-02-28 RX ORDER — FLUTICASONE PROPIONATE 50 MCG
SPRAY, SUSPENSION (ML) NASAL
Qty: 16 ML | Refills: 2 | Status: SHIPPED | OUTPATIENT
Start: 2024-02-28

## 2024-02-28 NOTE — TELEPHONE ENCOUNTER
Today's Date: 2/28/2024    Procedure to be performed: FBT    Procedure date: 3/6/24    Procedure location: [facility]    Arrival time: 0800    Spoke with: mother       Sick/Covid in the last six weeks: No    Procedure prep: Yes - Via Email    NPO status:     Solids: 3/5/24 after 07:30PM  Clears: 2400 3/6/24    Instruction email sent: Yes

## 2024-03-05 ENCOUNTER — TELEPHONE (OUTPATIENT)
Dept: OPERATING ROOM | Facility: HOSPITAL | Age: 24
End: 2024-03-05
Payer: COMMERCIAL

## 2024-03-05 ENCOUNTER — TELEPHONE (OUTPATIENT)
Dept: PEDIATRIC GASTROENTEROLOGY | Facility: HOSPITAL | Age: 24
End: 2024-03-05
Payer: COMMERCIAL

## 2024-03-05 NOTE — TELEPHONE ENCOUNTER
Patient states she's having a procedure tomorrow and she has additional questions. Please call when available.

## 2024-03-05 NOTE — TELEPHONE ENCOUNTER
24 Hour Appointment Reminder    Today's date: 3/5/2024    Procedure to be performed: FBT    Procedure date: 3/6/24    Procedure location: [facility]    Arrival time: 0800    Patient sick: No    Prep received: Yes - Via Email    NPO status:    Solids: 3/5/24 after 0730  Clears: 2400 3/6/24    Appointment confirmed with: mother

## 2024-03-06 ENCOUNTER — HOSPITAL ENCOUNTER (OUTPATIENT)
Dept: PEDIATRIC GASTROENTEROLOGY | Facility: HOSPITAL | Age: 24
Discharge: HOME | End: 2024-03-06
Payer: COMMERCIAL

## 2024-03-06 DIAGNOSIS — K63.8219 SMALL INTESTINAL BACTERIAL OVERGROWTH (SIBO): ICD-10-CM

## 2024-03-06 PROCEDURE — 91065 BREATH HYDROGEN/METHANE TEST: CPT | Performed by: PEDIATRICS

## 2024-03-06 PROCEDURE — 91065 BREATH HYDROGEN/METHANE TEST: CPT

## 2024-03-06 NOTE — NURSING NOTE
Hydrogen Breath Analysis Consultation Sheet    Referring Provider: Dipika JENSEN Salas, APRN-CNP  [address] [place] [address]    Indication: SIBO (K63.8219)     Symptoms: abdominal pain @ 0815    Age: 23 y.o.  Weight: There were no vitals filed for this visit.  Substrate: Fructose  Dose: 25g    Last Meal: chicken broth and ice chips @ 1830 on 3/5/24  Recent Antibiotics: none    RESULTS:   Time PPM (H2) APPM* (CH4) CO2 Correction   Baseline #1 0810 2 2     Baseline #2        *Challenge Dose Sugar:   15'        30' 0845 3 1     45'        60' 0915 13 3     75'        90' 0945 17 3     105'        120' 1015 8 2     135'        150' 1045 5 2     165'        180' 1115 3 0       Impression:   Late rise in breath hydrogen following ingestion of fructose.  Consistent with dietary fructose intolerance.

## 2024-03-07 ENCOUNTER — TELEPHONE (OUTPATIENT)
Dept: PEDIATRIC GASTROENTEROLOGY | Facility: CLINIC | Age: 24
End: 2024-03-07
Payer: COMMERCIAL

## 2024-03-07 NOTE — TELEPHONE ENCOUNTER
Shanelle from [facility] called about the Peristeen, she needs verification that she has had training when you call hit option 5 then option 1 or you can email her at [email or web address]

## 2024-03-12 ENCOUNTER — TELEPHONE (OUTPATIENT)
Dept: PEDIATRIC GASTROENTEROLOGY | Facility: HOSPITAL | Age: 24
End: 2024-03-12
Payer: COMMERCIAL

## 2024-04-15 ENCOUNTER — NUTRITION (OUTPATIENT)
Dept: PEDIATRIC GASTROENTEROLOGY | Facility: CLINIC | Age: 24
End: 2024-04-15
Payer: COMMERCIAL

## 2024-04-15 VITALS
HEART RATE: 96 BPM | RESPIRATION RATE: 19 BRPM | WEIGHT: 141 LBS | BODY MASS INDEX: 25.14 KG/M2 | DIASTOLIC BLOOD PRESSURE: 75 MMHG | OXYGEN SATURATION: 98 % | TEMPERATURE: 97 F | SYSTOLIC BLOOD PRESSURE: 111 MMHG

## 2024-04-15 NOTE — PROGRESS NOTES
"Nutrition Therapy Education Session.    Review of Nutrition, GI concerns and Elimination per Caregiver(s):  Current diet:  Regular, low lactose  Eliminated- red meat, pork, red-sauces to help with GI upset.   Difficulties with feeding/meals? No.   Current stooling frequency/concerns? Yes. Constipation and backed-up since 8yo   Other GI complaints? Gas and bloating.     Additional Information Discussed:  Recent fructose breath test.  Recent, doing all fruit and water diet.  Took adipex and immediately felt better with bloating, gas ans stooling.  Lost 40# in a few weeks.  Wondering if can do regular colonic visits at  facility?- [facility] and   Still pending peristeen approval. Received items but, device was missing in box.  Yes taking Fe with probiotic and yes taking Vit D with another probiotic  Boost Daily 2-3. Sometimes bothers stomach.  Drinks 1 gallon water daily and regularly uses rtd Pedialyte.    Growth:  Wt Readings from Last 6 Encounters:   04/15/24 64 kg (141 lb)   02/07/24 63.2 kg (139 lb 6.4 oz)   02/01/24 64 kg (141 lb)   08/29/23 67.6 kg (149 lb)   07/13/23 66 kg (145 lb 7 oz)   07/05/23 63 kg (139 lb)      Ht Readings from Last 6 Encounters:   02/07/24 1.595 m (5' 2.8\")   02/01/24 1.6 m (5' 3\")   08/29/23 1.6 m (5' 3\")   07/13/23 1.598 m (5' 2.91\")   04/20/23 1.598 m (5' 2.91\")   04/12/23 1.626 m (5' 4\")     BMI Readings from Last 6 Encounters:   04/15/24 25.14 kg/m²   02/07/24 24.86 kg/m²   02/01/24 24.98 kg/m²   08/29/23 26.39 kg/m²   07/13/23 25.84 kg/m²   07/05/23 24.69 kg/m²        Nutrition Focused Physical Exam:  Deferred today  Lab Results   Component Value Date    VITD25 17 (A) 04/12/2023    FERRITIN 18 04/12/2023      Lab Results   Component Value Date    TIBC 441 04/12/2023    IRON 37 04/12/2023    IRONSAT 8 (L) 04/12/2023     Lab Results   Component Value Date    WBC 6.6 08/14/2023    HGB 10.6 (L) 08/14/2023    HCT 35.1 (L) 08/14/2023    MCV 84 08/14/2023     " 08/14/2023      NUTRITIONALLY SIGNIFICANT MEDICATIONS  Sejal has a current medication list which includes the following prescription(s): albuterol, dicyclomine, fluticasone, l. acidophilus/bifid. animalis, lacto no.75-emqblm-cau-larch, lactose-reduced food, linaclotide, magnesium citrate, metformin xr, multivitamin with iron, omeprazole, ondansetron odt, ondansetron odt, polyethylene glycol, motegrity, loratadine, ready-to-use enema, and tizanidine.     Nutrition Diagnosis:  Food and nutrition related knowledge deficit regarding meeting nutrition needs will following a reduced frutose intake    Nutrition Intervention/Plan:  Diet Instruction Provided & Material/Literature provided: 1. Add dextrose before meals with fructose before modifying diet further. If diet modification becomes necessary- remember the education pc today discusses low not NO intake.  AND fructose intolerance guide provided- list food recommended in each food group and foods to limit (1serving/meal) if triggers symptoms.  Can consider trying OWYN nutrition shakes (not Rx; it is OTC)- dairy and fructose free.  RDN to reach out to Neel re: your questions about continued tx options/plan to help with stooling.  Evaluation of Parent/Caregiver/Patient: Verbalizes understanding. Family was receptive.  Frequency of Care: Follow up not needed at this time. Please call the office with nutrition questions or concerns.

## 2024-04-26 DIAGNOSIS — J45.20 MILD INTERMITTENT ASTHMA, UNSPECIFIED WHETHER COMPLICATED (HHS-HCC): ICD-10-CM

## 2024-04-26 DIAGNOSIS — M54.50 LOW BACK PAIN, UNSPECIFIED BACK PAIN LATERALITY, UNSPECIFIED CHRONICITY, UNSPECIFIED WHETHER SCIATICA PRESENT: ICD-10-CM

## 2024-04-26 DIAGNOSIS — R11.0 NAUSEA: ICD-10-CM

## 2024-04-26 DIAGNOSIS — K59.09 CHRONIC CONSTIPATION: ICD-10-CM

## 2024-04-26 DIAGNOSIS — J30.2 SEASONAL ALLERGIES: ICD-10-CM

## 2024-04-26 RX ORDER — LORATADINE 10 MG/1
10 TABLET ORAL DAILY
Qty: 90 TABLET | Refills: 1 | Status: SHIPPED | OUTPATIENT
Start: 2024-04-26 | End: 2024-10-23

## 2024-04-26 RX ORDER — ONDANSETRON 8 MG/1
8 TABLET, ORALLY DISINTEGRATING ORAL EVERY 8 HOURS PRN
Qty: 30 TABLET | Refills: 0 | Status: SHIPPED | OUTPATIENT
Start: 2024-04-26

## 2024-04-26 RX ORDER — PRUCALOPRIDE 2 MG/1
1 TABLET, FILM COATED ORAL DAILY
Qty: 30 TABLET | Refills: 3 | Status: SHIPPED | OUTPATIENT
Start: 2024-04-26

## 2024-04-26 RX ORDER — ALBUTEROL SULFATE 90 UG/1
1-2 AEROSOL, METERED RESPIRATORY (INHALATION) EVERY 4 HOURS PRN
Qty: 18 G | Refills: 2 | Status: SHIPPED | OUTPATIENT
Start: 2024-04-26

## 2024-04-26 RX ORDER — TIZANIDINE 2 MG/1
2 TABLET ORAL EVERY 6 HOURS PRN
Qty: 30 TABLET | Refills: 0 | Status: SHIPPED | OUTPATIENT
Start: 2024-04-26 | End: 2024-05-06

## 2024-05-26 DIAGNOSIS — L70.9 ACNE, UNSPECIFIED ACNE TYPE: Primary | ICD-10-CM

## 2024-05-28 RX ORDER — BENZOYL PEROXIDE 100 MG/ML
LIQUID TOPICAL
Qty: 148 ML | Refills: 3 | Status: SHIPPED | OUTPATIENT
Start: 2024-05-28

## 2024-06-26 DIAGNOSIS — M54.50 LOW BACK PAIN, UNSPECIFIED BACK PAIN LATERALITY, UNSPECIFIED CHRONICITY, UNSPECIFIED WHETHER SCIATICA PRESENT: ICD-10-CM

## 2024-06-26 RX ORDER — TIZANIDINE 2 MG/1
2 TABLET ORAL EVERY 6 HOURS PRN
Qty: 30 TABLET | Refills: 0 | Status: SHIPPED | OUTPATIENT
Start: 2024-06-26 | End: 2024-07-06

## 2024-07-08 ENCOUNTER — APPOINTMENT (OUTPATIENT)
Dept: OBSTETRICS AND GYNECOLOGY | Facility: CLINIC | Age: 24
End: 2024-07-08
Payer: COMMERCIAL

## 2024-07-08 ENCOUNTER — APPOINTMENT (OUTPATIENT)
Dept: PEDIATRIC GASTROENTEROLOGY | Facility: CLINIC | Age: 24
End: 2024-07-08
Payer: COMMERCIAL

## 2024-07-08 ENCOUNTER — LAB (OUTPATIENT)
Dept: LAB | Facility: LAB | Age: 24
End: 2024-07-08
Payer: COMMERCIAL

## 2024-07-08 VITALS
DIASTOLIC BLOOD PRESSURE: 76 MMHG | RESPIRATION RATE: 20 BRPM | SYSTOLIC BLOOD PRESSURE: 120 MMHG | HEIGHT: 64 IN | WEIGHT: 147.93 LBS | HEART RATE: 108 BPM | BODY MASS INDEX: 25.25 KG/M2

## 2024-07-08 VITALS
WEIGHT: 147.8 LBS | SYSTOLIC BLOOD PRESSURE: 118 MMHG | BODY MASS INDEX: 26.19 KG/M2 | HEIGHT: 63 IN | DIASTOLIC BLOOD PRESSURE: 72 MMHG

## 2024-07-08 DIAGNOSIS — E28.2 PCOS (POLYCYSTIC OVARIAN SYNDROME): Primary | ICD-10-CM

## 2024-07-08 DIAGNOSIS — Z71.1 CONCERN ABOUT STD IN FEMALE WITHOUT DIAGNOSIS: ICD-10-CM

## 2024-07-08 DIAGNOSIS — N89.8 VAGINAL DISCHARGE: ICD-10-CM

## 2024-07-08 DIAGNOSIS — K59.09 CHRONIC CONSTIPATION: Primary | ICD-10-CM

## 2024-07-08 LAB
HAV IGM SER QL: NONREACTIVE
HBV CORE IGM SER QL: NONREACTIVE
HBV SURFACE AG SERPL QL IA: NONREACTIVE
HCV AB SER QL: NONREACTIVE
HIV 1+2 AB+HIV1 P24 AG SERPL QL IA: NONREACTIVE
TREPONEMA PALLIDUM IGG+IGM AB [PRESENCE] IN SERUM OR PLASMA BY IMMUNOASSAY: NONREACTIVE

## 2024-07-08 PROCEDURE — 1036F TOBACCO NON-USER: CPT | Performed by: STUDENT IN AN ORGANIZED HEALTH CARE EDUCATION/TRAINING PROGRAM

## 2024-07-08 PROCEDURE — 87661 TRICHOMONAS VAGINALIS AMPLIF: CPT

## 2024-07-08 PROCEDURE — 87491 CHLMYD TRACH DNA AMP PROBE: CPT

## 2024-07-08 PROCEDURE — 87389 HIV-1 AG W/HIV-1&-2 AB AG IA: CPT

## 2024-07-08 PROCEDURE — 80074 ACUTE HEPATITIS PANEL: CPT

## 2024-07-08 PROCEDURE — 36415 COLL VENOUS BLD VENIPUNCTURE: CPT

## 2024-07-08 PROCEDURE — 99214 OFFICE O/P EST MOD 30 MIN: CPT | Performed by: OBSTETRICS & GYNECOLOGY

## 2024-07-08 PROCEDURE — 87591 N.GONORRHOEAE DNA AMP PROB: CPT

## 2024-07-08 PROCEDURE — 86780 TREPONEMA PALLIDUM: CPT

## 2024-07-08 PROCEDURE — 1036F TOBACCO NON-USER: CPT | Performed by: OBSTETRICS & GYNECOLOGY

## 2024-07-08 PROCEDURE — 99214 OFFICE O/P EST MOD 30 MIN: CPT | Performed by: STUDENT IN AN ORGANIZED HEALTH CARE EDUCATION/TRAINING PROGRAM

## 2024-07-08 PROCEDURE — 87205 SMEAR GRAM STAIN: CPT

## 2024-07-08 NOTE — PROGRESS NOTES
"  Pediatric Gastroenterology, Hepatology & Nutrition  Follow Up Visit  Date: 07/08/24    Historian: Sejal    Chief Complaint:   Chief Complaint   Patient presents with    Follow-up     HPI:  Sejal Duff is a 24 y.o. presenting with IBS-C. Pt has followed with LINDA Marina in the past. March 2024 breath test confirmed fructose intolerance. Pt has also had extensive workup including multiple scopes, inpatient cleanout admission and  medication management for her constipation. She has been refractory to constipation medications.    She most recently was put on motegrity plus linzess about 1.5 years ago. This was helpful at the beginning but \"Now its like I'm immuned to it\"    Dipika Arias and pt have been trying to get peristeen for home irrigations. Insurance did cover it but pt reports only receiving tubes and gloves and not the machine. She called [employer] and they then said they would sent it to the office.     Pt report feeling lots of abdominal pain. Stomach \"is a rock\"    She reports last stool was 2 weeks ago.    She expresses her frustration with not having regular bowel movements and getting the care she needs. She reports the adult GI side was \"not helpful\" and is struggling to find a solution.     Pt also reports she received a letter reporting Dipika Arias NP \"is no longer seeing pt and is retiring.\" We discussed how this is unusual and that Dipika Arias is not retiring at this time and is able to see her.    Review of Systems:  Consitutional: No fever or chills  HENT: No rhinorrhea or sore throat  Respiratory: No cough or wheezing  Cardiovascular: No dizziness or heart palpitations  Gastrointestinal: No n/v/d   Genitourinary: No pain with urination   Musculoskeletal: No body aches or joint swelling  Immunological: Not immunocompromised   Psychiatric: No recent change in mood.    Medications:  albuterol  benzoyl peroxide  dicyclomine  ENSURE PLUS ORAL  fluticasone  L. acidophilus/Bifid. animalis " "capsule  Lacto no.36-Gyfwkt-YSF-larch capsule  linaCLOtide  loratadine  magnesium citrate solution  metFORMIN XR  Motegrity tablet  multivitamin with iron tablet  omeprazole  ondansetron ODT  polyethylene glycol  Ready-To-Use Enema enema  tiZANidine    Allergies:  Allergies   Allergen Reactions    Morphine Dizziness    Pollen Extracts Runny nose    House Dust Runny nose       Histories:  Family History   Problem Relation Name Age of Onset    Asthma Mother      Colon cancer Mother       Past Surgical History:   Procedure Laterality Date    BREAST SURGERY Left     Mass removal    OTHER SURGICAL HISTORY  01/18/2019    No history of surgery      Past Medical History:   Diagnosis Date    Encounter for screening for infections with a predominantly sexual mode of transmission 03/02/2020    Screening for STD (sexually transmitted disease)    Melena 04/12/2019    Blood in stool    Other specified health status     No pertinent past medical history    Other specified health status     No pertinent past surgical history    Personal history of other (healed) physical injury and trauma     History of sprain of ankle    Unspecified condition associated with female genital organs and menstrual cycle 03/02/2020    Cervical motion tenderness      Social History     Tobacco Use    Smoking status: Never    Smokeless tobacco: Never   Vaping Use    Vaping status: Never Used   Substance Use Topics    Alcohol use: Yes     Comment: social    Drug use: Never       Visit Vitals  /76 (BP Location: Right arm, Patient Position: Sitting, BP Cuff Size: Adult)   Pulse 108   Resp 20   Ht 1.62 m (5' 3.78\")   Wt 67.1 kg (147 lb 14.9 oz)   BMI 25.57 kg/m²   OB Status Having periods   Smoking Status Never   BSA 1.74 m²     Physical Exam   Labs & Imaging:    Assessment:  Sejal Duff is a 24 y.o. presenting with IBS-C. Pt has followed with LINDA Marina in the past. March 2024 breath test confirmed fructose intolerance. Pt has also had extensive " "workup including multiple scopes, inpatient cleanout admission and  medication management for her constipation. She has been refractory to constipation medications.    () Pt has not had a stool in 2 weeks. Continues on 145mg linzess and 2mg motegrity. Pt has been trying to get peristeen for home irrigations. Insurance did cover it but pt reports only receiving tubes and gloves and not the machine. She called [facility] and they then said they would sent it to the office.  Pt report feeling lots of abdominal pain. Stomach \"is a rock. She reports last stool was 2 weeks ago.    We discussed obtaining an abdominal xray to assess stool burden and then we will decided next steps or if inpatient admission is needed.    Diagnosis:  1. Chronic constipation        Plan:  - Abdominal xray today  - Continue motegrity and linzess daily  - Start 400mg magnesium daily  - Try  x1 rectal enema    Follow up:  - with Dipika Marina NP    Contact:  - Please mychart or call the pediatric GI office at [facility] if you have any questions or concerns.   - Main [facility] GI Administrative Office: [phone] (my nurse is Lilia, for medical questions or medication refills)  - Fax number: [phone]   - Main Central [phone]  - After Hours/Weekend Phone: [phone]  - [facility]: [phone] (For appointment related questions or formula  ONLY)  - [facility]: [phone] (For appointment related questions or formula  ONLY)    Paula Donahue MD  Pediatric Gastroenterology, Hepatology & Nutrition    "

## 2024-07-08 NOTE — PATIENT INSTRUCTIONS
- abdominal xray today  - Continue motegrity and linzess daily  - Start 400mg magnesium daily  - try x1 rectal enema  - I will call in the next day with plan

## 2024-07-08 NOTE — PROGRESS NOTES
Sejal Duff is a 24 y.o. female who presents with a chief complaint of STI Screening (Full std screening ) and Consult (Would like to talk about referral that was placed for fertility )      SUBJECTIVE  Patient presents complaining of a vaginal discharge.  Its thick and its sticky pressure the patient.  She has a little irritation but it does not itch.  She does not have any odor.  She would like STD testing.  She also has a history of PCOS and now wants to be referred for infertility.  We discussed this at length    Past Medical History:   Diagnosis Date    Encounter for screening for infections with a predominantly sexual mode of transmission 03/02/2020    Screening for STD (sexually transmitted disease)    Melena 04/12/2019    Blood in stool    Other specified health status     No pertinent past medical history    Other specified health status     No pertinent past surgical history    Personal history of other (healed) physical injury and trauma     History of sprain of ankle    Unspecified condition associated with female genital organs and menstrual cycle 03/02/2020    Cervical motion tenderness     Past Surgical History:   Procedure Laterality Date    BREAST SURGERY Left     Mass removal    OTHER SURGICAL HISTORY  01/18/2019    No history of surgery     Social History     Socioeconomic History    Marital status: Single     Spouse name: None    Number of children: None    Years of education: None    Highest education level: None   Occupational History    None   Tobacco Use    Smoking status: Never    Smokeless tobacco: Never   Vaping Use    Vaping status: Never Used   Substance and Sexual Activity    Alcohol use: Yes     Comment: social    Drug use: Never    Sexual activity: Yes   Other Topics Concern    None   Social History Narrative    None     Social Determinants of Health     Financial Resource Strain: Not on File (5/15/2024)    Received from [employer]    Financial Resource Strain     Financial Resource  Strain: 0   Food Insecurity: Not on File (5/15/2024)    Received from [employer]    Food Insecurity     Food: 0   Transportation Needs: Not on File (5/15/2024)    Received from [employer]    Transportation Needs     Transportation: 0   Physical Activity: Not on File (5/15/2024)    Received from [employer]    Physical Activity     Physical Activity: 0   Stress: Not on File (5/15/2024)    Received from [employer]    Stress     Stress: 0   Social Connections: Not on File (5/15/2024)    Received from [employer]    Social Connections     Social Connections and Isolation: 0   Intimate Partner Violence: Not on file   Housing Stability: Not on File (5/15/2024)    Received from [employer]    Housing Stability     Housin     Family History   Problem Relation Name Age of Onset    Asthma Mother      Colon cancer Mother         OB History    Para Term  AB Living   0 0 0 0 0 0   SAB IAB Ectopic Multiple Live Births   0 0 0 0 0       OBJECTIVE  Allergies   Allergen Reactions    Morphine Dizziness    Pollen Extracts Runny nose    House Dust Runny nose      (Not in a hospital admission)       Review of Systems  History obtained from the patient  General ROS: negative  Psychological ROS: negative  Gastrointestinal ROS: negative  Musculoskeletal ROS: negative  Physical Exam  General Appearance: awake, alert, oriented, in no acute distress, well developed, well nourished, and in no acute distress  Skin: there are no suspicious lesions or rashes of concern, skin color, texture, turgor are normal; there are no bruises, rashes or lesions.  Head/Face: NCAT  Eyes: No gross abnormalities., PERRL, and EOMI  Neck: neck- supple, no mass, non-tender  Back: no pain to palpation  Abdomen: Soft, non-tender, normal bowel sounds; no bruits, organomegaly or masses.  Extremities: Extremities warm to touch, pink, with no edema.  Musculoskeletal: negative  Urogen: External genitalia: Normal, Vagina: Normal, Cervix: Normal, and Bimanual exam: Normal    /72    "Ht 1.6 m (5' 3\")   Wt 67 kg (147 lb 12.8 oz)   BMI 26.18 kg/m²    Problem List Items Addressed This Visit    None  Visit Diagnoses       PCOS (polycystic ovarian syndrome)    -  Primary    Relevant Orders    Referral to Reproductive Endocrinology    Concern about STD in female without diagnosis        Relevant Orders    C. Trachomatis / N. Gonorrhoeae, Amplified Detection    Hepatitis Panel, Acute    HIV 1/2 Antigen/Antibody Screen with Reflex to Confirmation    Syphilis Screen with Reflex    Trichomonas vaginalis, Nucleic Acid Detection    Vaginal discharge        Relevant Orders    Vaginitis Gram Stain For Bacterial Vaginosis + Yeast               "

## 2024-07-09 ENCOUNTER — HOSPITAL ENCOUNTER (OUTPATIENT)
Dept: RADIOLOGY | Facility: HOSPITAL | Age: 24
Discharge: HOME | End: 2024-07-09
Payer: COMMERCIAL

## 2024-07-09 DIAGNOSIS — K59.09 CHRONIC CONSTIPATION: ICD-10-CM

## 2024-07-09 LAB
C TRACH RRNA SPEC QL NAA+PROBE: NEGATIVE
N GONORRHOEA DNA SPEC QL PROBE+SIG AMP: NEGATIVE
T VAGINALIS RRNA SPEC QL NAA+PROBE: NEGATIVE

## 2024-07-09 PROCEDURE — 74018 RADEX ABDOMEN 1 VIEW: CPT | Performed by: RADIOLOGY

## 2024-07-09 PROCEDURE — 74018 RADEX ABDOMEN 1 VIEW: CPT

## 2024-07-10 LAB
CLUE CELLS VAG LPF-#/AREA: PRESENT /[LPF]
NUGENT SCORE: 7
YEAST VAG WET PREP-#/AREA: ABNORMAL

## 2024-07-10 RX ORDER — CLINDAMYCIN PHOSPHATE 20 MG/G
1 CREAM VAGINAL NIGHTLY
Qty: 40 G | Refills: 0 | Status: SHIPPED | OUTPATIENT
Start: 2024-07-10 | End: 2024-07-13

## 2024-07-12 ENCOUNTER — TELEPHONE (OUTPATIENT)
Dept: PEDIATRIC GASTROENTEROLOGY | Facility: HOSPITAL | Age: 24
End: 2024-07-12
Payer: COMMERCIAL

## 2024-07-31 ENCOUNTER — APPOINTMENT (OUTPATIENT)
Dept: PEDIATRIC GASTROENTEROLOGY | Facility: CLINIC | Age: 24
End: 2024-07-31
Payer: COMMERCIAL

## 2024-07-31 VITALS
RESPIRATION RATE: 20 BRPM | DIASTOLIC BLOOD PRESSURE: 83 MMHG | SYSTOLIC BLOOD PRESSURE: 115 MMHG | BODY MASS INDEX: 26.6 KG/M2 | HEART RATE: 94 BPM | HEIGHT: 63 IN | WEIGHT: 150.13 LBS

## 2024-07-31 DIAGNOSIS — R10.9 CHRONIC ABDOMINAL PAIN: Primary | ICD-10-CM

## 2024-07-31 DIAGNOSIS — K58.1 IRRITABLE BOWEL SYNDROME WITH CONSTIPATION: ICD-10-CM

## 2024-07-31 DIAGNOSIS — K59.02 DYSSYNERGIC DEFECATION: ICD-10-CM

## 2024-07-31 DIAGNOSIS — K59.09 CONSTIPATION, CHRONIC: ICD-10-CM

## 2024-07-31 DIAGNOSIS — G89.29 CHRONIC ABDOMINAL PAIN: Primary | ICD-10-CM

## 2024-07-31 PROCEDURE — 99214 OFFICE O/P EST MOD 30 MIN: CPT | Performed by: NURSE PRACTITIONER

## 2024-07-31 PROCEDURE — 3008F BODY MASS INDEX DOCD: CPT | Performed by: NURSE PRACTITIONER

## 2024-07-31 NOTE — PROGRESS NOTES
Pediatric Gastroenterology Follow Up Office Visit      HPI  Sejal Duff seen in the [facility] Pediatric Gastroenterology, Hepatology & Nutrition Clinic in follow-up on 7/31/2024. Sejal Duff is a 24 y.o. year-old  who is being followed by Pediatric Gastroenterology for   Chief Complaint   Patient presents with    Follow-up   .  Sejal has irritable bowel syndrome with constipation dominance.    She has not had any BM in several weeks.   She states she has not had a BM since her xray on July 9, 2024. She saw Dr. Paula Donahue on that day (was interested in a weight loss medicine I was not familiar with).     Today she states that her medications have stopped working.   Linzess 145 mcg (she states this strength works better than 290 mcg) and Motegrity 2 mg. These meds had worked for a while and then stopped working.     Main complaints -   She continues to suffer from chronic constipation, abdominal bloating, and GI distress.      Treatment History of   NG Clean outs at [facility]  [place] colonic therapy (outside of [facility])  Peristeen ordered but never received.   4.  Tried anal Rectal biofeedback (had ARM with normal RAIR but Dyssynergic Defecation).   5.  Senna, Bisacodyl, MiraLAX, golytely. Has tried enemas but fluid is not able to go in because of stool.   6.  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  One of her coworkers suggested Adipex (phentremin) which she was able to get from another physician. She stopped taking because she thought it might be making her more constipation.     She is frustrated with her weight (gains a lot of weight with stool).   Body Image   Frustrated there is no answer and the longevity of the situation.     She does have a therapist that she sees regularly to assist with coping strategies.     Abdominal Pain -yes, frequently.  Nausea - none  Vomiting -  none  Reflux/Regurgitation - none  Dysphagia  - none    BM frequency -very infrequent.  She states her last bowel movement was > 4 weeks ago.  BM quality BSC  -can be liquid or formed feels like she has to go to the bathroom but is unable to pass stool.  After cleanout her abdominal distention decreases significantly. She looses pounds.   BM soiling - none  BM Hematochezia - no  BM Nocturnal - no  Urinary Symptoms - none    No Changes to Family Medical History      Allergies   Allergen Reactions    Morphine Dizziness    Pollen Extracts Runny nose    House Dust Runny nose         Current Outpatient Medications on File Prior to Visit   Medication Sig Dispense Refill    albuterol 90 mcg/actuation inhaler Inhale 1-2 puffs every 4 hours if needed for wheezing or shortness of breath. 18 g 2    benzoyl peroxide (Benzac AC) 10 % external wash APPLY TOPICALLY 2 TIMES A DAY. WASH TWICE DAILY AS DIRECTED 148 mL 3    dicyclomine (Bentyl) 10 mg capsule Take 2 capsules (20 mg) by mouth 3 times a day.      fluticasone (Flonase) 50 mcg/actuation nasal spray ADMINISTER 1 SPRAY INTO EACH NOSTRIL 2 TIMES PER DAY 16 mL 2    L. acidophilus/Bifid. animalis 32 billion cell capsule Take 1 capsule by mouth once daily.      Lacto no.73-Larmgm-EOG-larch 25B cell-25B cell-50 mg capsule Take 1 capsule by mouth once daily. 90 capsule 3    lactose-reduced food (ENSURE PLUS ORAL) Take by mouth.      linaCLOtide (Linzess) 145 mcg capsule Take 1 capsule (145 mcg) by mouth once daily in the morning. Take before meals. Do not crush or chew. 30 capsule 3    linaCLOtide (Linzess) 290 mcg capsule Take by mouth once daily.      loratadine (Claritin) 10 mg tablet Take 1 tablet (10 mg) by mouth once daily. 90 tablet 1    magnesium citrate solution Take by mouth.      metFORMIN XR (Glucophage-XR) 500 mg 24 hr tablet Take 1 tablet (500 mg) by mouth once daily with breakfast. Do not crush, chew, or split. 30 tablet 11    multivitamin with iron (Daily Multiple  "Vitamins/Iron) tablet Take 1 tablet by mouth once daily. 90 each 3    omeprazole (PriLOSEC) 40 mg DR capsule Take 1 capsule (40 mg) by mouth once daily.      ondansetron ODT (Zofran-ODT) 8 mg disintegrating tablet Take 1 tablet (8 mg) by mouth every 8 hours if needed for nausea or vomiting. 30 tablet 0    polyethylene glycol (Glycolax) 17 gram/dose powder Mix 17gm in 6-8 ounces of Gatorade and administer by mouth.  Repeat every 15 minutes for a total of 10 doses.      prucalopride (Motegrity) 2 mg tablet Take 1 tablet (2 mg) by mouth once daily. 30 tablet 3    Ready-To-Use Enema 19-7 gram/118 mL enema enema ONE ENEMA EVERY OTHER DAY      tiZANidine (Zanaflex) 2 mg tablet TAKE 1 TABLET (2 MG) BY MOUTH EVERY 6 HOURS IF NEEDED FOR MUSCLE SPASMS FOR UP TO 10 DAYS. 30 tablet 0     No current facility-administered medications on file prior to visit.           PHYSICAL EXAMINATION:  Vital signs : /83 (BP Location: Right arm, Patient Position: Sitting, BP Cuff Size: Adult)   Pulse 94   Resp 20   Ht 1.593 m (5' 2.72\")   Wt 68.1 kg (150 lb 2.1 oz)   BMI 26.84 kg/m²  Facility age limit for growth %nba is 20 years.    Physical Exam  Constitutional:       General: Appear well.   HENT:      Head: Normocephalic.      Right Ear: External ear normal.      Left Ear: External ear normal.      Nose: Nose normal.      Mouth/Throat:      Mouth: Mucous membranes are moist.   Eyes:      Extraocular Movements: Extraocular movements intact.      Conjunctiva/sclera: Conjunctivae normal.   Cardiovascular:      Rate and Rhythm: Normal rate and regular rhythm.      Heart sounds: Normal heart sounds.      Capillary Refill: Capillary refill takes less than 2 seconds.   Pulmonary:      Effort: Respiratory effort is normal.      Breath sounds: Normal breath sounds.   Abdominal:      General: Abdomen is distended a bit. Bowel sounds are normal. There is  distension. There are no masses. There is generalized fullness x 4 quadrants.     " "Palpations: Abdomen is soft.      Tenderness: There is some abdominal tenderness.      Gastrostomy tubes: N/A  Anal Rectal:     Anus appears normal.   Soft stool in rectal vault.   Normal tone, able to \"bear down\" and exert appropriate pressure.   Musculoskeletal:         General: Normal range of motion of all extremities.     Joints: no selling or redness.  Skin:     General: Skin is warm and dry.      No rashes  Neurological:      General: No focal deficit present.      Mental Status: Alert  Psychiatric:         Mood and Affect: Mood normal.         IMPRESSION AND PLAN:  Sejal has a longstanding history of chronic constipation which is likely irritable bowel syndrome with constipation dominance.  She may have an underlying colonic motility disorder which contributes to the extended time in between bowel movements.  She  previously had some success with Linzess and Motegrity although she has not achieved a regular bowel pattern and now it seems to have stopped working.   For a while she was using colonic hydrotherapy and this was very helpful but is no longer available to her. She has not started the Peristeen anal irrigation system.       Moving forward   Needs to get cleaned out.   Sejal states that the only thing that has worked in the past is Linzess 290 mcg twice a day along with some extra MiraLAX. She will try this. She is open to using an enema but she does not think this has been helpful in the past.   Suggested adding Bisacodyl 15 mg twice a day for clean out.   Clear liquids  Has golytely - can use this although she states she has been drinking large quantities of this (several cups) a day without results.   5. Consider admission to . She would have to go to the adult side because of her age. I can reach out to provider who might be able to assist or possibly see her (since most of her care has been through pediatrics).   6. Xray after clean out.     After clean out  Will get Ova and Parasite " test  Refer to [facility] center for formal motility testing  Refer to Dr. Patterson at [facility] (she is interested in GI mapping and other therapies since she seems to be refractory to meds).   Daily irrigation with Peristeen (I gave her number to call). It is approved.   Consider surgical consult for zimmerman appendicectomy.     Follow up in 3-6 months. Reach out sooner with updates.     CONTACT:  Division of Pediatric Gastroenterology, Hepatology and Nutrition  All results will be on line on My Chart.  Make sure sure you have signed up for My Chart.     Office phone   Office fax   Email [email or web address]     Please note:  After hours and on call [phone] and ask for Pediatric Gastroenterology Fellow on Call  Office visit Scheduling   Radiology Scheduling      I am in clinic M, T, W and may not be able to return call until Thursday.   Phone calls and email to our office are returned by one of our nurses within 48 business hours.  Please call for prescription renewals when you have one week of medication remaining.   Please call if you have trouble with insurance company coverage of any medications we prescribe.      This note was created using voice recognition software. I have made every reasonable attempts to avoid incorrect errors, but this document may contain errors not identified before proof reading and finalizing the document. If the errors change the accuracy of the document, I would appreciate being brought to my attention. Thanks

## 2024-07-31 NOTE — PATIENT INSTRUCTIONS
IMPRESSION AND PLAN:  Sejal has a longstanding history of chronic constipation which is likely irritable bowel syndrome with constipation dominance.  She may have an underlying colonic motility disorder which contributes to the extended time in between bowel movements.  She  previously had some success with Linzess and Motegrity although she has not achieved a regular bowel pattern and now it seems to have stopped working.   For a while she was using colonic hydrotherapy and this was very helpful but is no longer available to her. She has not started the Peristeen anal irrigation system.       Moving forward   Needs to get cleaned out.   Sejal states that the only thing that has worked in the past is Linzess 290 mcg twice a day along with some extra MiraLAX. She will try this. She is open to using an enema but she does not think this has been helpful in the past.   Suggested adding Bisacodyl 15 mg twice a day for clean out.   Clear liquids  Has golytely - can use this although she states she has been drinking large quantities of this (several cups) a day without results.   5. Consider admission to . She would have to go to the adult side because of her age. I can reach out to provider who might be able to assist or possibly see her (since most of her care has been through pediatrics).   6. Xray after clean out.     After clean out  Will get Ova and Parasite test  Refer to [facility] for formal motility testing  Refer to Dr. Patterson at [facility] (she is interested in GI mapping and other therapies since she seems to be refractory to meds).   Daily irrigation with Peristeen (I gave her number to call). It is approved.   Consider surgical consult for zimmerman appendicectomy.     Follow up in 3-6 months. Reach out sooner with updates.     CONTACT:  Division of Pediatric Gastroenterology, Hepatology and Nutrition  All results will be on line on My Chart.  Make sure sure you have signed up  for My Chart.     Office phone   Office fax   Email [email or web address]     Please note:  After hours and on call [phone] and ask for Pediatric Gastroenterology Fellow on Call  Office visit Scheduling   Radiology Scheduling      I am in clinic M, T, W and may not be able to return call until Thursday.   Phone calls and email to our office are returned by one of our nurses within 48 business hours.  Please call for prescription renewals when you have one week of medication remaining.   Please call if you have trouble with insurance company coverage of any medications we prescribe.      This note was created using voice recognition software. I have made every reasonable attempts to avoid incorrect errors, but this document may contain errors not identified before proof reading and finalizing the document. If the errors change the accuracy of the document, I would appreciate being brought to my attention. Thanks

## 2024-08-01 ENCOUNTER — TELEPHONE (OUTPATIENT)
Dept: PEDIATRIC GASTROENTEROLOGY | Facility: HOSPITAL | Age: 24
End: 2024-08-01
Payer: COMMERCIAL

## 2024-08-02 ENCOUNTER — LAB (OUTPATIENT)
Dept: LAB | Facility: LAB | Age: 24
End: 2024-08-02
Payer: COMMERCIAL

## 2024-08-02 ENCOUNTER — HOSPITAL ENCOUNTER (OUTPATIENT)
Dept: RADIOLOGY | Facility: HOSPITAL | Age: 24
Discharge: HOME | End: 2024-08-02
Payer: COMMERCIAL

## 2024-08-02 DIAGNOSIS — K59.09 CONSTIPATION, CHRONIC: ICD-10-CM

## 2024-08-02 DIAGNOSIS — G89.29 CHRONIC ABDOMINAL PAIN: ICD-10-CM

## 2024-08-02 DIAGNOSIS — R10.9 CHRONIC ABDOMINAL PAIN: ICD-10-CM

## 2024-08-02 PROCEDURE — 87329 GIARDIA AG IA: CPT

## 2024-08-02 PROCEDURE — 74018 RADEX ABDOMEN 1 VIEW: CPT

## 2024-08-02 PROCEDURE — 87328 CRYPTOSPORIDIUM AG IA: CPT

## 2024-08-06 LAB
CRYPTOSP AG STL QL IA: NEGATIVE
G LAMBLIA AG STL QL IA: NEGATIVE

## 2024-08-07 LAB — O+P STL MICRO: NEGATIVE

## 2024-08-19 DIAGNOSIS — J30.2 SEASONAL ALLERGIES: ICD-10-CM

## 2024-08-19 DIAGNOSIS — M54.50 LOW BACK PAIN, UNSPECIFIED BACK PAIN LATERALITY, UNSPECIFIED CHRONICITY, UNSPECIFIED WHETHER SCIATICA PRESENT: ICD-10-CM

## 2024-08-19 RX ORDER — TIZANIDINE 2 MG/1
2 TABLET ORAL EVERY 6 HOURS PRN
Qty: 30 TABLET | Refills: 0 | Status: SHIPPED | OUTPATIENT
Start: 2024-08-19 | End: 2024-08-29

## 2024-08-19 RX ORDER — LORATADINE 10 MG/1
10 TABLET ORAL DAILY
Qty: 90 TABLET | Refills: 1 | Status: SHIPPED | OUTPATIENT
Start: 2024-08-19 | End: 2025-02-15

## 2024-09-09 ENCOUNTER — APPOINTMENT (OUTPATIENT)
Dept: PEDIATRIC GASTROENTEROLOGY | Facility: CLINIC | Age: 24
End: 2024-09-09
Payer: COMMERCIAL

## 2024-09-24 ENCOUNTER — APPOINTMENT (OUTPATIENT)
Dept: INTEGRATIVE MEDICINE | Facility: CLINIC | Age: 24
End: 2024-09-24
Payer: COMMERCIAL

## 2024-09-24 DIAGNOSIS — E55.9 VITAMIN D DEFICIENCY: Primary | ICD-10-CM

## 2024-09-24 DIAGNOSIS — R53.83 OTHER FATIGUE: ICD-10-CM

## 2024-09-24 DIAGNOSIS — R10.84 GENERALIZED ABDOMINAL PAIN: ICD-10-CM

## 2024-09-24 DIAGNOSIS — R14.0 ABDOMINAL BLOATING: ICD-10-CM

## 2024-09-24 DIAGNOSIS — R11.0 DAILY NAUSEA: ICD-10-CM

## 2024-09-24 DIAGNOSIS — K59.09 CHRONIC CONSTIPATION: ICD-10-CM

## 2024-09-24 DIAGNOSIS — K58.1 IRRITABLE BOWEL SYNDROME WITH CONSTIPATION: ICD-10-CM

## 2024-09-24 DIAGNOSIS — K21.9 GASTROESOPHAGEAL REFLUX DISEASE WITHOUT ESOPHAGITIS: ICD-10-CM

## 2024-09-24 DIAGNOSIS — N94.6 DYSMENORRHEA: ICD-10-CM

## 2024-09-24 DIAGNOSIS — D64.9 ANEMIA, UNSPECIFIED TYPE: ICD-10-CM

## 2024-09-24 DIAGNOSIS — K59.00 CONSTIPATION, UNSPECIFIED CONSTIPATION TYPE: ICD-10-CM

## 2024-09-24 DIAGNOSIS — R10.13 EPIGASTRIC PAIN: ICD-10-CM

## 2024-09-24 PROCEDURE — 99215 OFFICE O/P EST HI 40 MIN: CPT | Performed by: PEDIATRICS

## 2024-09-24 RX ORDER — ASPIRIN 325 MG
50000 TABLET, DELAYED RELEASE (ENTERIC COATED) ORAL WEEKLY
Qty: 4 CAPSULE | Refills: 11 | Status: SHIPPED | OUTPATIENT
Start: 2024-09-24 | End: 2025-09-24

## 2024-09-24 NOTE — ASSESSMENT & PLAN NOTE
1.  Obtain blood testing as ordered today.    2.  Obtain stool testing was ordered today.    3.  We will consider the GI map from [facility] but this is an out of pocket test.    4.  Hold the multivitamin with iron for now.    5.  Begin the Chinese herbal product Jad Don at 3 pills, 3x/day.  These were ordered from [employer] today.   Please  at least 3 bottles.    6.  Please obtain this digestive enzyme from Mercedez Zavaleta MD: [email or web address].  These do need to be taken with meals.  They should be taken as you are eating the foods that they are sitting in the stomach with the meal.  Take 1 pill with small meals and 2 pills with bigger meals.    7.  This colon hydrotherapy provider got rave reviews from another family:  [email or web address]

## 2024-09-24 NOTE — PROGRESS NOTES
Subjective   Patient ID:     I had the pleasure of meeting Susan today who is a 24-year-old young woman presenting on her own.  She has had problems with severe constipation since she was about 7 years old.  She notes digestion was generally normal in the first 5 years of life.  Between 5 and 7 years of age she began to get episodes of rapid stooling and diarrhea after eating or even drinking water.  Around 7 years of age this pattern changed and became a chronic constipation.  She has had a number of hospital admissions for this and notes having tried every mainstream laxative that is available.  She is relatively insensitive to MiraLAX overall.  She has tried magnesium citrate, Dulcolax, and other similar medications.  She has also tried enemas and colon hydrotherapy.  She has adapted her diet considerably and generally eats a very clean diet with no beef, no pork, plenty of fruits and vegetables, 1 to 2 gallons of water per day, no junk food, and minimal to no dairy.  She has tried a probiotic supplement in the past that is a lactobacillus product around 30 billion, but most recently has been using a gummy from [employer].    More recently, she has been tried on Linzess and Motegrity which has been the most successful thing she has found over the past year.  She is experiencing problems, however, and that if she uses it too frequently it seems to lose its efficacy.  She is worried about overusing it as she does not want to lose the 1 tool that has been effective.  A motility study was attempted in the past she notes with a camera, but the camera was never identified and overall no data from that was obtained.  She has had a number of different tests over the years as well which have been unrevealing, however has had a persistently low amylase up until recently.    We reviewed other domains of health and most significantly identify dysmenorrhea with menorrhagia as another prominent feature.  This is also been going on  since her periods have started.  It is possible that hormonal disruption could being playing a role in the bowel motility, although there does not sound to be a cyclic quality to the constipation.  She notes as well that at this point she feels like she has no identifiable signs that she needs to go to the bathroom.  She does not experience the typical rectal cramping that might occur.  Her stomach will get extremely full and she may not go for 1 to 2 weeks at a time without medication intervention.  When it gets extreme, she will have back up into the throat and even has had episodes of vomiting that included blood in stool like substance.  There is no family history of similar to this extreme although she notes her father has many medical issues.    For supplements she uses a vitamin D supplement and a multivitamin with iron.  We discussed holding the multivitamin for now given that iron can sometimes be constipating.    We discussed a plan that involves further inquiry utilizing blood test and stool test, a Chinese herbal strategy with the formula that is for clearing blockage through the system, and the addition of a digestive enzyme with each meal to help break down food.  We will take the steps and connect with gastroenterology as well to inquire about further motility testing.  She has had CTs scans and x-rays done previously which would have ruled out a mass.    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  She often works long shifts but notes she really enjoys it.      Objective   Physical Exam  Constitutional:       General: She is not in acute distress.     Appearance: Normal appearance.   HENT:      Head: Normocephalic and atraumatic.      Nose: Nose normal.   Eyes:      Pupils: Pupils are equal, round, and reactive to light.   Cardiovascular:      Rate and Rhythm: Normal rate and regular rhythm.      Heart sounds: No murmur heard.     No friction rub. No gallop.   Pulmonary:      Effort:  Pulmonary effort is normal.      Breath sounds: Normal breath sounds. No wheezing or rales.   Abdominal:      General: Abdomen is protuberant. Bowel sounds are normal.      Palpations: Abdomen is soft. There is no mass.      Tenderness: There is abdominal tenderness in the epigastric area. There is no rebound. Negative signs include Rodríguez's sign and McBurney's sign.          Comments: The area in red represents an area of feeling especially full and blocked.   Musculoskeletal:         General: Normal range of motion.      Cervical back: Normal range of motion and neck supple.   Skin:     General: Skin is warm and dry.   Neurological:      General: No focal deficit present.      Mental Status: She is alert.      Cranial Nerves: No cranial nerve deficit.      Motor: No weakness.      Gait: Gait normal.   Psychiatric:         Behavior: Behavior normal.         Thought Content: Thought content normal.     Procedure: An introductory acupuncture session was performed today as well but not billed separately.  Points stomach 36, Santiago 14, Santiago 10, Santiago 6, stomach 25, LI 4, yin chaparro were placed with heat lamp to the abdomen.    Assessment/Plan   Problem List Items Addressed This Visit             ICD-10-CM    Generalized abdominal pain R10.84    Chronic constipation K59.09     1.  Obtain blood testing as ordered today.    2.  Obtain stool testing was ordered today.    3.  We will consider the GI map from [facility] but this is an out of pocket test.    4.  Hold the multivitamin with iron for now.    5.  Begin the Chinese herbal product Jad Don at 3 pills, 3x/day.  These were ordered from [employer] today.   Please  at least 3 bottles.    6.  Please obtain this digestive enzyme from Mercedez Zavaleta MD: [email or web address].  These do need to be taken with meals.  They should be taken as you are eating the foods that they are sitting in the stomach with the meal.  Take 1 pill  with small meals and 2 pills with bigger meals.    7.  This colon hydrotherapy provider got rave reviews from another family:  [email or web address]         Daily nausea R11.0    GERD (gastroesophageal reflux disease) K21.9    Irritable bowel syndrome K58.9    Vitamin D deficiency - Primary E55.9    Relevant Medications    cholecalciferol (Vitamin D-3) 50,000 unit capsule    Other Relevant Orders    Vitamin D 25-Hydroxy,Total (for eval of Vitamin D levels)    Anemia D64.9    Relevant Orders    Calprotectin Stool    Comprehensive Metabolic Panel    Celiac Panel    Abdominal bloating R14.0     Other Visit Diagnoses         Codes    Epigastric pain     R10.13    Relevant Orders    Calprotectin Stool    H. pylori antigen, stool    Pancreatic Elastase, Fecal    Comprehensive Metabolic Panel    Amylase    Lipase    Copper, serum    Celiac Panel    Constipation, unspecified constipation type     K59.00    Relevant Orders    Calprotectin Stool    Pancreatic Elastase, Fecal    Amylase    Lipase    Copper, serum    Celiac Panel    Other fatigue     R53.83    Relevant Orders    Ferritin    CBC and Auto Differential    Iron and TIBC    Comprehensive Metabolic Panel    TSH with reflex to Free T4 if abnormal    T3, free    Copper, serum    Dysmenorrhea     N94.6    Relevant Orders    Prolactin level    Estrogens, Total    Progesterone                 Jimmy Patterson MD, LAc 09/24/24 10:31 AM

## 2024-09-26 DIAGNOSIS — K59.09 CHRONIC CONSTIPATION: ICD-10-CM

## 2024-09-26 DIAGNOSIS — K59.09 CONSTIPATION, CHRONIC: Primary | ICD-10-CM

## 2024-10-07 ENCOUNTER — LAB (OUTPATIENT)
Dept: LAB | Facility: LAB | Age: 24
End: 2024-10-07
Payer: COMMERCIAL

## 2024-10-07 DIAGNOSIS — N94.6 DYSMENORRHEA: ICD-10-CM

## 2024-10-07 DIAGNOSIS — E55.9 VITAMIN D DEFICIENCY: ICD-10-CM

## 2024-10-07 DIAGNOSIS — R10.13 EPIGASTRIC PAIN: ICD-10-CM

## 2024-10-07 DIAGNOSIS — K59.00 CONSTIPATION, UNSPECIFIED CONSTIPATION TYPE: ICD-10-CM

## 2024-10-07 DIAGNOSIS — D64.9 ANEMIA, UNSPECIFIED TYPE: ICD-10-CM

## 2024-10-07 DIAGNOSIS — R53.83 OTHER FATIGUE: ICD-10-CM

## 2024-10-07 LAB
ALBUMIN SERPL BCP-MCNC: 4.9 G/DL (ref 3.4–5)
ALP SERPL-CCNC: 68 U/L (ref 33–110)
ALT SERPL W P-5'-P-CCNC: 8 U/L (ref 7–45)
AMYLASE SERPL-CCNC: 51 U/L (ref 29–103)
ANION GAP SERPL CALC-SCNC: 12 MMOL/L (ref 10–20)
AST SERPL W P-5'-P-CCNC: 15 U/L (ref 9–39)
BASOPHILS # BLD AUTO: 0.02 X10*3/UL (ref 0–0.1)
BASOPHILS NFR BLD AUTO: 0.3 %
BILIRUB SERPL-MCNC: 0.4 MG/DL (ref 0–1.2)
BUN SERPL-MCNC: 12 MG/DL (ref 6–23)
CALCIUM SERPL-MCNC: 9.5 MG/DL (ref 8.6–10.3)
CHLORIDE SERPL-SCNC: 102 MMOL/L (ref 98–107)
CO2 SERPL-SCNC: 26 MMOL/L (ref 21–32)
CREAT SERPL-MCNC: 0.67 MG/DL (ref 0.5–1.05)
EGFRCR SERPLBLD CKD-EPI 2021: >90 ML/MIN/1.73M*2
EOSINOPHIL # BLD AUTO: 0.2 X10*3/UL (ref 0–0.7)
EOSINOPHIL NFR BLD AUTO: 2.9 %
ERYTHROCYTE [DISTWIDTH] IN BLOOD BY AUTOMATED COUNT: 13.4 % (ref 11.5–14.5)
GLUCOSE SERPL-MCNC: 84 MG/DL (ref 74–99)
HCT VFR BLD AUTO: 38.4 % (ref 36–46)
HGB BLD-MCNC: 11.9 G/DL (ref 12–16)
IMM GRANULOCYTES # BLD AUTO: 0.01 X10*3/UL (ref 0–0.7)
IMM GRANULOCYTES NFR BLD AUTO: 0.1 % (ref 0–0.9)
IRON SATN MFR SERPL: 18 % (ref 25–45)
IRON SERPL-MCNC: 79 UG/DL (ref 35–150)
LIPASE SERPL-CCNC: 7 U/L (ref 9–82)
LYMPHOCYTES # BLD AUTO: 2.38 X10*3/UL (ref 1.2–4.8)
LYMPHOCYTES NFR BLD AUTO: 34.4 %
MCH RBC QN AUTO: 25.7 PG (ref 26–34)
MCHC RBC AUTO-ENTMCNC: 31 G/DL (ref 32–36)
MCV RBC AUTO: 83 FL (ref 80–100)
MONOCYTES # BLD AUTO: 0.6 X10*3/UL (ref 0.1–1)
MONOCYTES NFR BLD AUTO: 8.7 %
NEUTROPHILS # BLD AUTO: 3.71 X10*3/UL (ref 1.2–7.7)
NEUTROPHILS NFR BLD AUTO: 53.6 %
NRBC BLD-RTO: 0 /100 WBCS (ref 0–0)
PLATELET # BLD AUTO: 403 X10*3/UL (ref 150–450)
POTASSIUM SERPL-SCNC: 4 MMOL/L (ref 3.5–5.3)
PROGEST SERPL-MCNC: 0.9 NG/ML
PROT SERPL-MCNC: 7.8 G/DL (ref 6.4–8.2)
RBC # BLD AUTO: 4.63 X10*6/UL (ref 4–5.2)
SODIUM SERPL-SCNC: 136 MMOL/L (ref 136–145)
TIBC SERPL-MCNC: 431 UG/DL (ref 240–445)
TSH SERPL-ACNC: 1.23 MIU/L (ref 0.44–3.98)
UIBC SERPL-MCNC: 352 UG/DL (ref 110–370)
WBC # BLD AUTO: 6.9 X10*3/UL (ref 4.4–11.3)

## 2024-10-07 PROCEDURE — 84144 ASSAY OF PROGESTERONE: CPT

## 2024-10-07 PROCEDURE — 83690 ASSAY OF LIPASE: CPT

## 2024-10-07 PROCEDURE — 87449 NOS EACH ORGANISM AG IA: CPT

## 2024-10-07 PROCEDURE — 82672 ASSAY OF ESTROGEN: CPT

## 2024-10-07 PROCEDURE — 84481 FREE ASSAY (FT-3): CPT

## 2024-10-07 PROCEDURE — 85025 COMPLETE CBC W/AUTO DIFF WBC: CPT

## 2024-10-07 PROCEDURE — 82150 ASSAY OF AMYLASE: CPT

## 2024-10-07 PROCEDURE — 83516 IMMUNOASSAY NONANTIBODY: CPT

## 2024-10-07 PROCEDURE — 83993 ASSAY FOR CALPROTECTIN FECAL: CPT

## 2024-10-07 PROCEDURE — 82306 VITAMIN D 25 HYDROXY: CPT

## 2024-10-07 PROCEDURE — 84146 ASSAY OF PROLACTIN: CPT

## 2024-10-07 PROCEDURE — 83550 IRON BINDING TEST: CPT

## 2024-10-07 PROCEDURE — 82653 EL-1 FECAL QUANTITATIVE: CPT

## 2024-10-07 PROCEDURE — 80053 COMPREHEN METABOLIC PANEL: CPT

## 2024-10-07 PROCEDURE — 84443 ASSAY THYROID STIM HORMONE: CPT

## 2024-10-07 PROCEDURE — 82728 ASSAY OF FERRITIN: CPT

## 2024-10-07 PROCEDURE — 83540 ASSAY OF IRON: CPT

## 2024-10-07 PROCEDURE — 36415 COLL VENOUS BLD VENIPUNCTURE: CPT

## 2024-10-07 PROCEDURE — 82525 ASSAY OF COPPER: CPT

## 2024-10-08 LAB
25(OH)D3 SERPL-MCNC: 21 NG/ML (ref 30–100)
FERRITIN SERPL-MCNC: 29 NG/ML (ref 8–150)
GLIADIN PEPTIDE IGA SER IA-ACNC: 8.7 U/ML
PROLACTIN SERPL-MCNC: 25.6 UG/L (ref 3–20)
T3FREE SERPL-MCNC: 3.4 PG/ML (ref 2.3–4.2)
TTG IGA SER IA-ACNC: <1 U/ML

## 2024-10-09 LAB
CALPROTECTIN STL-MCNT: 102 UG/G
COPPER SERPL-MCNC: 151.2 UG/DL (ref 80–155)
ELASTASE PANC STL-MCNT: >800 UG/G
GLIADIN PEPTIDE IGG SER IA-ACNC: <0.56 FLU (ref 0–4.99)
H PYLORI AG STL QL IA: NEGATIVE
TTG IGG SER IA-ACNC: <0.82 FLU (ref 0–4.99)

## 2024-10-12 LAB — ESTROGEN SERPL-MCNC: 80 PG/ML

## 2024-10-13 ENCOUNTER — APPOINTMENT (OUTPATIENT)
Dept: RADIOLOGY | Facility: HOSPITAL | Age: 24
End: 2024-10-13
Payer: COMMERCIAL

## 2024-10-13 ENCOUNTER — HOSPITAL ENCOUNTER (EMERGENCY)
Facility: HOSPITAL | Age: 24
Discharge: OTHER INSTITUTION | End: 2024-10-13
Attending: EMERGENCY MEDICINE
Payer: COMMERCIAL

## 2024-10-13 VITALS
HEART RATE: 80 BPM | TEMPERATURE: 97.3 F | DIASTOLIC BLOOD PRESSURE: 77 MMHG | SYSTOLIC BLOOD PRESSURE: 120 MMHG | HEIGHT: 63 IN | BODY MASS INDEX: 25.27 KG/M2 | RESPIRATION RATE: 16 BRPM | OXYGEN SATURATION: 96 % | WEIGHT: 142.64 LBS

## 2024-10-13 DIAGNOSIS — G93.89 BRAIN MASS: Primary | ICD-10-CM

## 2024-10-13 PROCEDURE — 2550000001 HC RX 255 CONTRASTS: Performed by: EMERGENCY MEDICINE

## 2024-10-13 PROCEDURE — 99285 EMERGENCY DEPT VISIT HI MDM: CPT

## 2024-10-13 PROCEDURE — 70450 CT HEAD/BRAIN W/O DYE: CPT | Performed by: RADIOLOGY

## 2024-10-13 PROCEDURE — 2500000004 HC RX 250 GENERAL PHARMACY W/ HCPCS (ALT 636 FOR OP/ED): Performed by: EMERGENCY MEDICINE

## 2024-10-13 PROCEDURE — 70553 MRI BRAIN STEM W/O & W/DYE: CPT | Performed by: RADIOLOGY

## 2024-10-13 PROCEDURE — A9575 INJ GADOTERATE MEGLUMI 0.1ML: HCPCS | Performed by: EMERGENCY MEDICINE

## 2024-10-13 PROCEDURE — 99285 EMERGENCY DEPT VISIT HI MDM: CPT | Performed by: EMERGENCY MEDICINE

## 2024-10-13 PROCEDURE — 96361 HYDRATE IV INFUSION ADD-ON: CPT

## 2024-10-13 PROCEDURE — 70450 CT HEAD/BRAIN W/O DYE: CPT

## 2024-10-13 PROCEDURE — 96374 THER/PROPH/DIAG INJ IV PUSH: CPT

## 2024-10-13 PROCEDURE — 70553 MRI BRAIN STEM W/O & W/DYE: CPT

## 2024-10-13 RX ORDER — KETOROLAC TROMETHAMINE 30 MG/ML
30 INJECTION, SOLUTION INTRAMUSCULAR; INTRAVENOUS ONCE
Status: COMPLETED | OUTPATIENT
Start: 2024-10-13 | End: 2024-10-13

## 2024-10-13 RX ORDER — LORAZEPAM 2 MG/ML
2 INJECTION INTRAMUSCULAR ONCE
Status: DISCONTINUED | OUTPATIENT
Start: 2024-10-13 | End: 2024-10-13 | Stop reason: HOSPADM

## 2024-10-13 RX ORDER — KETOROLAC TROMETHAMINE 30 MG/ML
15 INJECTION, SOLUTION INTRAMUSCULAR; INTRAVENOUS ONCE
Status: COMPLETED | OUTPATIENT
Start: 2024-10-13 | End: 2024-10-13

## 2024-10-13 RX ORDER — METOCLOPRAMIDE HYDROCHLORIDE 5 MG/ML
10 INJECTION INTRAMUSCULAR; INTRAVENOUS ONCE
Status: DISCONTINUED | OUTPATIENT
Start: 2024-10-13 | End: 2024-10-13 | Stop reason: HOSPADM

## 2024-10-13 RX ORDER — GADOTERATE MEGLUMINE 376.9 MG/ML
12 INJECTION INTRAVENOUS
Status: COMPLETED | OUTPATIENT
Start: 2024-10-13 | End: 2024-10-13

## 2024-10-13 RX ORDER — DIPHENHYDRAMINE HYDROCHLORIDE 50 MG/ML
25 INJECTION INTRAMUSCULAR; INTRAVENOUS ONCE
Status: DISCONTINUED | OUTPATIENT
Start: 2024-10-13 | End: 2024-10-13 | Stop reason: HOSPADM

## 2024-10-13 ASSESSMENT — COLUMBIA-SUICIDE SEVERITY RATING SCALE - C-SSRS

## 2024-10-13 ASSESSMENT — PAIN DESCRIPTION - LOCATION: LOCATION: HEAD

## 2024-10-13 ASSESSMENT — PAIN SCALES - GENERAL
PAINLEVEL_OUTOF10: 7
PAINLEVEL_OUTOF10: 5 - MODERATE PAIN
PAINLEVEL_OUTOF10: 5 - MODERATE PAIN

## 2024-10-13 ASSESSMENT — PAIN - FUNCTIONAL ASSESSMENT
PAIN_FUNCTIONAL_ASSESSMENT: 0-10
PAIN_FUNCTIONAL_ASSESSMENT: 0-10

## 2024-10-13 NOTE — ED PROVIDER NOTES
HPI   Chief Complaint   Patient presents with    Headache     C/o headache x4 days. States she is lightheaded, +light sensitivity. Denies fever or chills. States she is feeling dizzy       HPI    This is a 24-year-old female presents with severe migraine for the past few days.  She says that pain is located behind her eyes, across her forehead, and radiates down the side of her head towards her neck bilaterally.  She says this is associated with lightheadedness, nausea and sensitivity to light.  She also says that she has a history of migraines that typically do not last this long and are not as severe as her current migraine.  She went to urgent care gave her a shot of Toradol and recommended presenting to the ED for CT of her head.  She denies vomiting, fever, chills, neck rigidity.    Patient History   Past Medical History:   Diagnosis Date    Encounter for screening for infections with a predominantly sexual mode of transmission 03/02/2020    Screening for STD (sexually transmitted disease)    Melena 04/12/2019    Blood in stool    Other specified health status     No pertinent past medical history    Other specified health status     No pertinent past surgical history    Personal history of other (healed) physical injury and trauma     History of sprain of ankle    Unspecified condition associated with female genital organs and menstrual cycle 03/02/2020    Cervical motion tenderness     Past Surgical History:   Procedure Laterality Date    BREAST SURGERY Left     Mass removal    OTHER SURGICAL HISTORY  01/18/2019    No history of surgery     Family History   Problem Relation Name Age of Onset    Asthma Mother      Colon cancer Mother       Social History     Tobacco Use    Smoking status: Never    Smokeless tobacco: Never   Vaping Use    Vaping status: Never Used   Substance Use Topics    Alcohol use: Yes     Comment: social    Drug use: Yes     Types: Marijuana       Physical Exam   ED Triage Vitals [10/13/24  1306]   Temperature Heart Rate Respirations BP   36.3 °C (97.3 °F) (!) 103 18 124/71      Pulse Ox Temp src Heart Rate Source Patient Position   96 % -- Monitor Sitting      BP Location FiO2 (%)     Right arm --       Physical Exam  General:  Pleasant and cooperative. No apparent distress.  HEENT:  Normocephalic, atraumatic  Chest:  Clear to auscultation bilaterally. No wheezes, rales, or rhonchi.  CV:  Regular rate and rhythm. No murmurs    Abdomen: Abdomen is soft, non-tender, non-distended. BS +   Extremities:  No lower extremity edema or cyanosis.   Neurological:  AAOx3. No focal deficits.  Skin:  Warm and dry.    ED Course & MDM   ED Course as of 10/13/24 1941   Sun Oct 13, 2024   1429 Patient presents emergency department headache.  She has been going on for few days.  She is not meningitic or encephalopathic.  Her examination is reassuring.  Migraine abortive medications were ordered.  Patient received Toradol and then demanded that her IV be taken out.  She underwent CT imaging.  Plan will be for reevaluation and likely discharge. [MK]   1523 CT does demonstrate a mass.  I did review this with radiology.  Plan will be for MRI. [MK]      ED Course User Index  [MK] Den Rand MD           No data recorded     Erlinda Coma Scale Score: 15 (10/13/24 1421 : Anant Robertson RN)                   Medical Decision Making    The ED patient's upper only remarkable for mildly elevated heart rate of 103, otherwise normal.  She was given a 1 L bolus of normal saline, Toradol. She refused Reglan and benadryl. CTH showed Midline masses measuring 16 mm. MRI was ordered STAT which showed Cystic/necrotic rim enhancing lesion centered within the hypothalamus  measuring up the 2.4 cm.  Additional nonenhancing FLAIR hyperintense lesion within the medial left temporal lobe measuring 1.1 x 0.9 cm. Given multifocal appearance, findings may represent primary neoplasm such as an optic pathway glioma or less likely  metastatic disease.  Neurosurgery was consulted who recommended transfer to [facility] for workup of new brain mass          Keenan Candelario DO  Resident  10/13/24 1958

## 2024-10-13 NOTE — ED TRIAGE NOTES
C/o headache x4 days. States she is lightheaded, +light sensitivity. Denies fever or chills. States she is feeling dizzy

## 2024-10-14 ENCOUNTER — CLINICAL SUPPORT (OUTPATIENT)
Dept: EMERGENCY MEDICINE | Facility: HOSPITAL | Age: 24
End: 2024-10-14
Payer: COMMERCIAL

## 2024-10-14 ENCOUNTER — APPOINTMENT (OUTPATIENT)
Dept: RADIOLOGY | Facility: HOSPITAL | Age: 24
End: 2024-10-14
Payer: COMMERCIAL

## 2024-10-14 ENCOUNTER — HOSPITAL ENCOUNTER (INPATIENT)
Facility: HOSPITAL | Age: 24
LOS: 5 days | Discharge: HOME | End: 2024-10-19
Attending: EMERGENCY MEDICINE | Admitting: NEUROLOGICAL SURGERY
Payer: COMMERCIAL

## 2024-10-14 DIAGNOSIS — K59.09 CHRONIC CONSTIPATION: ICD-10-CM

## 2024-10-14 DIAGNOSIS — G93.89 BRAIN MASS: Primary | ICD-10-CM

## 2024-10-14 DIAGNOSIS — G89.18 POSTOPERATIVE PAIN: ICD-10-CM

## 2024-10-14 DIAGNOSIS — R51.9 GENERALIZED HEADACHES: ICD-10-CM

## 2024-10-14 DIAGNOSIS — R11.0 NAUSEA: ICD-10-CM

## 2024-10-14 DIAGNOSIS — J30.2 SEASONAL ALLERGIES: ICD-10-CM

## 2024-10-14 LAB
ABO GROUP (TYPE) IN BLOOD: NORMAL
ALBUMIN SERPL BCP-MCNC: 3.6 G/DL (ref 3.4–5)
ALP SERPL-CCNC: 57 U/L (ref 33–110)
ALT SERPL W P-5'-P-CCNC: 6 U/L (ref 7–45)
ANION GAP SERPL CALC-SCNC: 9 MMOL/L (ref 10–20)
ANTIBODY SCREEN: NORMAL
APPEARANCE UR: CLEAR
AST SERPL W P-5'-P-CCNC: 12 U/L (ref 9–39)
BASOPHILS # BLD AUTO: 0.04 X10*3/UL (ref 0–0.1)
BASOPHILS NFR BLD AUTO: 0.6 %
BILIRUB SERPL-MCNC: 0.3 MG/DL (ref 0–1.2)
BILIRUB UR STRIP.AUTO-MCNC: NEGATIVE MG/DL
BUN SERPL-MCNC: 10 MG/DL (ref 6–23)
CALCIUM SERPL-MCNC: 8.8 MG/DL (ref 8.6–10.6)
CHLORIDE SERPL-SCNC: 110 MMOL/L (ref 98–107)
CO2 SERPL-SCNC: 25 MMOL/L (ref 21–32)
COLOR UR: NORMAL
CREAT SERPL-MCNC: 0.52 MG/DL (ref 0.5–1.05)
EGFRCR SERPLBLD CKD-EPI 2021: >90 ML/MIN/1.73M*2
EOSINOPHIL # BLD AUTO: 0.38 X10*3/UL (ref 0–0.7)
EOSINOPHIL NFR BLD AUTO: 5.3 %
ERYTHROCYTE [DISTWIDTH] IN BLOOD BY AUTOMATED COUNT: 13.2 % (ref 11.5–14.5)
EST. AVERAGE GLUCOSE BLD GHB EST-MCNC: 103 MG/DL
FSH SERPL-ACNC: 5.9 IU/L
GLUCOSE SERPL-MCNC: 115 MG/DL (ref 74–99)
GLUCOSE UR STRIP.AUTO-MCNC: NORMAL MG/DL
HBA1C MFR BLD: 5.2 %
HCT VFR BLD AUTO: 33.4 % (ref 36–46)
HGB BLD-MCNC: 10.2 G/DL (ref 12–16)
HOLD SPECIMEN: NORMAL
IMM GRANULOCYTES # BLD AUTO: 0.02 X10*3/UL (ref 0–0.7)
IMM GRANULOCYTES NFR BLD AUTO: 0.3 % (ref 0–0.9)
INR PPP: 1.1 (ref 0.9–1.1)
KETONES UR STRIP.AUTO-MCNC: NEGATIVE MG/DL
LEUKOCYTE ESTERASE UR QL STRIP.AUTO: NEGATIVE
LH SERPL-ACNC: 7.7 IU/L
LYMPHOCYTES # BLD AUTO: 2.85 X10*3/UL (ref 1.2–4.8)
LYMPHOCYTES NFR BLD AUTO: 39.5 %
MCH RBC QN AUTO: 26 PG (ref 26–34)
MCHC RBC AUTO-ENTMCNC: 30.5 G/DL (ref 32–36)
MCV RBC AUTO: 85 FL (ref 80–100)
MONOCYTES # BLD AUTO: 0.7 X10*3/UL (ref 0.1–1)
MONOCYTES NFR BLD AUTO: 9.7 %
MUCOUS THREADS #/AREA URNS AUTO: NORMAL /LPF
NEUTROPHILS # BLD AUTO: 3.23 X10*3/UL (ref 1.2–7.7)
NEUTROPHILS NFR BLD AUTO: 44.6 %
NITRITE UR QL STRIP.AUTO: NEGATIVE
NRBC BLD-RTO: 0 /100 WBCS (ref 0–0)
PH UR STRIP.AUTO: 6.5 [PH]
PLATELET # BLD AUTO: 319 X10*3/UL (ref 150–450)
POTASSIUM SERPL-SCNC: 3.6 MMOL/L (ref 3.5–5.3)
PREGNANCY TEST URINE, POC: NEGATIVE
PROLACTIN SERPL-MCNC: 34.1 UG/L (ref 3–20)
PROT SERPL-MCNC: 6.2 G/DL (ref 6.4–8.2)
PROT UR STRIP.AUTO-MCNC: NORMAL MG/DL
PROTHROMBIN TIME: 12.1 SECONDS (ref 9.8–12.8)
RBC # BLD AUTO: 3.93 X10*6/UL (ref 4–5.2)
RBC # UR STRIP.AUTO: NEGATIVE /UL
RBC #/AREA URNS AUTO: NORMAL /HPF
RH FACTOR (ANTIGEN D): NORMAL
SODIUM SERPL-SCNC: 140 MMOL/L (ref 136–145)
SP GR UR STRIP.AUTO: 1.03
SQUAMOUS #/AREA URNS AUTO: NORMAL /HPF
TSH SERPL-ACNC: 1.2 MIU/L (ref 0.44–3.98)
UROBILINOGEN UR STRIP.AUTO-MCNC: NORMAL MG/DL
WBC # BLD AUTO: 7.2 X10*3/UL (ref 4.4–11.3)
WBC #/AREA URNS AUTO: NORMAL /HPF

## 2024-10-14 PROCEDURE — 71045 X-RAY EXAM CHEST 1 VIEW: CPT

## 2024-10-14 PROCEDURE — 36415 COLL VENOUS BLD VENIPUNCTURE: CPT | Performed by: EMERGENCY MEDICINE

## 2024-10-14 PROCEDURE — 81001 URINALYSIS AUTO W/SCOPE: CPT

## 2024-10-14 PROCEDURE — 85610 PROTHROMBIN TIME: CPT | Performed by: EMERGENCY MEDICINE

## 2024-10-14 PROCEDURE — 83036 HEMOGLOBIN GLYCOSYLATED A1C: CPT | Performed by: STUDENT IN AN ORGANIZED HEALTH CARE EDUCATION/TRAINING PROGRAM

## 2024-10-14 PROCEDURE — 36415 COLL VENOUS BLD VENIPUNCTURE: CPT | Performed by: STUDENT IN AN ORGANIZED HEALTH CARE EDUCATION/TRAINING PROGRAM

## 2024-10-14 PROCEDURE — 84305 ASSAY OF SOMATOMEDIN: CPT | Performed by: STUDENT IN AN ORGANIZED HEALTH CARE EDUCATION/TRAINING PROGRAM

## 2024-10-14 PROCEDURE — 1200000002 HC GENERAL ROOM WITH TELEMETRY DAILY

## 2024-10-14 PROCEDURE — 83001 ASSAY OF GONADOTROPIN (FSH): CPT | Performed by: STUDENT IN AN ORGANIZED HEALTH CARE EDUCATION/TRAINING PROGRAM

## 2024-10-14 PROCEDURE — 85025 COMPLETE CBC W/AUTO DIFF WBC: CPT | Performed by: EMERGENCY MEDICINE

## 2024-10-14 PROCEDURE — 86850 RBC ANTIBODY SCREEN: CPT | Performed by: EMERGENCY MEDICINE

## 2024-10-14 PROCEDURE — 71045 X-RAY EXAM CHEST 1 VIEW: CPT | Performed by: RADIOLOGY

## 2024-10-14 PROCEDURE — 93005 ELECTROCARDIOGRAM TRACING: CPT

## 2024-10-14 PROCEDURE — 84443 ASSAY THYROID STIM HORMONE: CPT | Performed by: STUDENT IN AN ORGANIZED HEALTH CARE EDUCATION/TRAINING PROGRAM

## 2024-10-14 PROCEDURE — 99223 1ST HOSP IP/OBS HIGH 75: CPT | Performed by: NEUROLOGICAL SURGERY

## 2024-10-14 PROCEDURE — 82105 ALPHA-FETOPROTEIN SERUM: CPT | Performed by: STUDENT IN AN ORGANIZED HEALTH CARE EDUCATION/TRAINING PROGRAM

## 2024-10-14 PROCEDURE — 82533 TOTAL CORTISOL: CPT | Performed by: STUDENT IN AN ORGANIZED HEALTH CARE EDUCATION/TRAINING PROGRAM

## 2024-10-14 PROCEDURE — 80053 COMPREHEN METABOLIC PANEL: CPT | Performed by: EMERGENCY MEDICINE

## 2024-10-14 PROCEDURE — 2500000004 HC RX 250 GENERAL PHARMACY W/ HCPCS (ALT 636 FOR OP/ED): Performed by: EMERGENCY MEDICINE

## 2024-10-14 PROCEDURE — 84146 ASSAY OF PROLACTIN: CPT | Performed by: STUDENT IN AN ORGANIZED HEALTH CARE EDUCATION/TRAINING PROGRAM

## 2024-10-14 PROCEDURE — 83002 ASSAY OF GONADOTROPIN (LH): CPT | Performed by: STUDENT IN AN ORGANIZED HEALTH CARE EDUCATION/TRAINING PROGRAM

## 2024-10-14 PROCEDURE — 2500000001 HC RX 250 WO HCPCS SELF ADMINISTERED DRUGS (ALT 637 FOR MEDICARE OP)

## 2024-10-14 PROCEDURE — 2500000004 HC RX 250 GENERAL PHARMACY W/ HCPCS (ALT 636 FOR OP/ED): Performed by: PHYSICIAN ASSISTANT

## 2024-10-14 PROCEDURE — 86901 BLOOD TYPING SEROLOGIC RH(D): CPT | Performed by: EMERGENCY MEDICINE

## 2024-10-14 PROCEDURE — 81025 URINE PREGNANCY TEST: CPT

## 2024-10-14 RX ORDER — HYDROMORPHONE HYDROCHLORIDE 1 MG/ML
0.2 INJECTION, SOLUTION INTRAMUSCULAR; INTRAVENOUS; SUBCUTANEOUS ONCE
Status: COMPLETED | OUTPATIENT
Start: 2024-10-14 | End: 2024-10-14

## 2024-10-14 RX ORDER — ACETAMINOPHEN 325 MG/1
650 TABLET ORAL EVERY 4 HOURS PRN
Status: DISCONTINUED | OUTPATIENT
Start: 2024-10-14 | End: 2024-10-19 | Stop reason: HOSPADM

## 2024-10-14 RX ORDER — ACETAMINOPHEN 650 MG/1
650 SUPPOSITORY RECTAL EVERY 4 HOURS PRN
Status: DISCONTINUED | OUTPATIENT
Start: 2024-10-14 | End: 2024-10-19 | Stop reason: HOSPADM

## 2024-10-14 RX ORDER — PANTOPRAZOLE SODIUM 40 MG/1
40 TABLET, DELAYED RELEASE ORAL
Status: DISCONTINUED | OUTPATIENT
Start: 2024-10-14 | End: 2024-10-19 | Stop reason: HOSPADM

## 2024-10-14 RX ORDER — ALBUTEROL SULFATE 90 UG/1
1-2 INHALANT RESPIRATORY (INHALATION) EVERY 4 HOURS PRN
Status: DISCONTINUED | OUTPATIENT
Start: 2024-10-14 | End: 2024-10-19 | Stop reason: HOSPADM

## 2024-10-14 RX ORDER — HYDROMORPHONE HYDROCHLORIDE 1 MG/ML
0.5 INJECTION, SOLUTION INTRAMUSCULAR; INTRAVENOUS; SUBCUTANEOUS ONCE
Status: COMPLETED | OUTPATIENT
Start: 2024-10-14 | End: 2024-10-14

## 2024-10-14 RX ORDER — POLYETHYLENE GLYCOL 3350 17 G/17G
17 POWDER, FOR SOLUTION ORAL DAILY
Status: DISCONTINUED | OUTPATIENT
Start: 2024-10-14 | End: 2024-10-19 | Stop reason: HOSPADM

## 2024-10-14 RX ORDER — ACETAMINOPHEN 160 MG/5ML
650 SOLUTION ORAL EVERY 4 HOURS PRN
Status: DISCONTINUED | OUTPATIENT
Start: 2024-10-14 | End: 2024-10-19 | Stop reason: HOSPADM

## 2024-10-14 RX ORDER — HYDRALAZINE HYDROCHLORIDE 20 MG/ML
10 INJECTION INTRAMUSCULAR; INTRAVENOUS
Status: DISCONTINUED | OUTPATIENT
Start: 2024-10-14 | End: 2024-10-19 | Stop reason: HOSPADM

## 2024-10-14 RX ORDER — OXYCODONE HYDROCHLORIDE 5 MG/1
5 TABLET ORAL EVERY 6 HOURS PRN
Status: DISCONTINUED | OUTPATIENT
Start: 2024-10-14 | End: 2024-10-15

## 2024-10-14 RX ORDER — OXYCODONE HYDROCHLORIDE 5 MG/1
10 TABLET ORAL EVERY 6 HOURS PRN
Status: DISCONTINUED | OUTPATIENT
Start: 2024-10-14 | End: 2024-10-16

## 2024-10-14 RX ORDER — METRONIDAZOLE 500 MG/1
500 TABLET ORAL 3 TIMES DAILY
COMMUNITY

## 2024-10-14 RX ORDER — CETIRIZINE HYDROCHLORIDE 10 MG/1
10 TABLET ORAL DAILY
Status: DISCONTINUED | OUTPATIENT
Start: 2024-10-14 | End: 2024-10-19 | Stop reason: HOSPADM

## 2024-10-14 RX ORDER — LABETALOL HYDROCHLORIDE 5 MG/ML
10 INJECTION, SOLUTION INTRAVENOUS EVERY 10 MIN PRN
Status: DISCONTINUED | OUTPATIENT
Start: 2024-10-14 | End: 2024-10-19 | Stop reason: HOSPADM

## 2024-10-14 RX ORDER — FLUTICASONE PROPIONATE 50 MCG
1 SPRAY, SUSPENSION (ML) NASAL DAILY
Status: DISCONTINUED | OUTPATIENT
Start: 2024-10-14 | End: 2024-10-19 | Stop reason: HOSPADM

## 2024-10-14 ASSESSMENT — PAIN DESCRIPTION - ORIENTATION
ORIENTATION: MID
ORIENTATION: MID

## 2024-10-14 ASSESSMENT — PAIN - FUNCTIONAL ASSESSMENT
PAIN_FUNCTIONAL_ASSESSMENT: 0-10

## 2024-10-14 ASSESSMENT — PAIN DESCRIPTION - FREQUENCY
FREQUENCY: CONSTANT/CONTINUOUS
FREQUENCY: CONSTANT/CONTINUOUS

## 2024-10-14 ASSESSMENT — PAIN SCALES - GENERAL
PAINLEVEL_OUTOF10: 8
PAINLEVEL_OUTOF10: 0 - NO PAIN
PAINLEVEL_OUTOF10: 5 - MODERATE PAIN
PAINLEVEL_OUTOF10: 5 - MODERATE PAIN
PAINLEVEL_OUTOF10: 9
PAINLEVEL_OUTOF10: 8
PAINLEVEL_OUTOF10: 9

## 2024-10-14 ASSESSMENT — PAIN DESCRIPTION - PROGRESSION: CLINICAL_PROGRESSION: GRADUALLY WORSENING

## 2024-10-14 ASSESSMENT — PAIN DESCRIPTION - LOCATION
LOCATION: HEAD

## 2024-10-14 ASSESSMENT — PAIN DESCRIPTION - DESCRIPTORS
DESCRIPTORS: ACHING
DESCRIPTORS: ACHING

## 2024-10-14 ASSESSMENT — PAIN DESCRIPTION - ONSET
ONSET: GRADUAL
ONSET: GRADUAL

## 2024-10-14 ASSESSMENT — PAIN DESCRIPTION - PAIN TYPE
TYPE: ACUTE PAIN

## 2024-10-14 NOTE — CONSULTS
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 Ophthalmology consulted to rule out optic nerve glioma.     She initially presented to [facility] complaining of migraines. MRI brain w and wo contrast showed necrotic/rim enhancing brain lesion in hypothalamus and FLAIR hyperintense lesion in the medial left temporal lobe which is suspicious for optic pathway glioma.    Patient reports that she does not have any vision complaints at this time. She wears glasses and states left eye has always been worse than her right eye. She endorses intermittent bilateral monocular diplopia and denies sudden vision loss, eye pain, flashes or floaters.     ROS: Patient denies pain, redness, discharge, decreased vision, double vision, blind spots, flashes, or floaters. All other systems have been reviewed and are negative.    PMHx: please refer to admission HPI  Medications: please refer to medication reconciliation  Allergies: please refer to patient allergy list  Past Ocular History: as per above HPI  Family History: reviewed and noncontributory to chief ophthalmic complaint  Orientation: Alert and oriented x3, appropriate mood and behavior    Examination:     Base Eye Exam       Visual Acuity (Snellen - Linear)         Right Left    Near sc 20/20-2 20/25-2 phni              Tonometry (Tonopen, 1:55 PM)         Right Left    Pressure 13 14              Pupils         Dark Light Shape React APD    Right 4 2 Round Brisk None    Left 4 2 Round Brisk None              Visual Fields         Left Right     Full Full              Extraocular Movement         Right Left     Full Full              Dilation       Both eyes: 1% Mydriacyl & 2.5% Panda  @ 1:55 PM                  Additional Tests       Color         Right Left    Ishihara 10/14 0/14                   Slit Lamp and Fundus Exam       External Exam         Right Left    External Normal Normal              Slit Lamp Exam         Right Left    Lids/Lashes Normal Normal    Conjunctiva/Sclera White and quiet White and quiet    Cornea trace inferior SPK trace inferior SPK    Anterior Chamber Deep and quiet Deep and quiet    Iris Round and reactive Round and reactive    Lens Clear Clear    Anterior Vitreous Normal Normal              Fundus Exam         Right Left    Disc slight temporal pallor slight temporal pallor    C/D Ratio 0.1 0.1    Macula Normal Normal    Vessels Normal Normal    Periphery Normal Normal                    Imaging:  10/13/24 MRI brain w and wo:  IMPRESSION:  Cystic/necrotic rim enhancing lesion centered within the hypothalamus  measuring up the 2.4 cm resulting in partial effacement of the neural  foramina and slight prominence of the lateral ventricles.      Additional nonenhancing FLAIR hyperintense lesion within the medial  left temporal lobe measuring 1.1 x 0.9 cm. Given multifocal  appearance, findings may represent primary neoplasm such as an optic  pathway glioma or less likely metastatic disease. Suprasellar lesions  such as craniopharyngioma and germinoma are also thought to be less  likely. Infection can not be ruled out as diffusion-weighted imaging  is nondiagnostic secondary to susceptibility from patient's braces.      10/13/24 CT head wo:  IMPRESSION:  Midline masses described above measuring 16 mm. Differential  diagnosis included. Urgent MRI is warranted.  ADDENDUM:  Given the midline location of this finding, the possibility of a  midline aneurysm from  anterior communicating artery is also a  consideration. Post-contrast study or stat MRI examination would be  of value.    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 Ophthalmology consulted to rule out optic nerve glioma.     The examination is concerning for optic pathway glioma. Her visual acuity and visual fields are reassuring. She does however have significantly decreased color vision of the left side; on fundus exam there is questionable slight pallor temporally in both nerves. There is otherwise no fundus evidence of an optic nerve glioma. We agree with plans for biopsy.     Plan  - Agree with biopsy per neuro-surgery on 10/15/24  - Please page ophthalmology with any acute concerns    Discussed with neuro-ophthalmology attending Dr. Den Fajardo.    Anant Aguilar MD  Ophthalmology PGY-3      Ophthalmology Adult Pager - [phone]  Ophthalmology Pediatrics Pager - [phone]    For adult follow-up appointments, call: [phone]  For pediatric follow-up appointments, call: [phone]      NOTE: This note is not finalized until attending reviews and signs.

## 2024-10-14 NOTE — HOSPITAL COURSE
Sejal Duff is a 24 year old female with a past history of anemia, asthma GERD, IBS, PCOS and breast fibroadenoma s/p L breast excisional biopsy who presented to the [facility] ED 10/14 with HA.   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 Patient admitted to the neurosurgery service for further workup/management.     10/15 s/p R stereotactic brain bx (prelim glial cells w/ luigi fibers vs craniopharyngioma), CT head with postop changes      PT/OT evaluated patient and recommended no needs     On the day of discharge, the pt was tolerating a diet, pain was controlled on PO pain medication, and they were ambulating and voiding spontaneously. They were discharged in stable condition with detailed instructions and scheduled outpatient follow up.

## 2024-10-14 NOTE — PROGRESS NOTES
Pt signed out to me by Dr. Rand pending MRI brain and reassessment.  Pt treated with reglan and additional toradol for her headache.  MRI with necrotic/rim enhancing lesion suspicious for brain mass.  Discussed with NSGY Dr. Wing who recommends transfer to [facility] for NSGY evaluation.  Discussed with ED Dr. Aldrich at [facility] who accepts pt.  Updated pt and mother at bedside about results of MRI and recommendation for transfer and pt agreeable.  Initial plan to transport via ALS but pt then decided she wanted to go by private vehicle (her mother will drive her to [facility]).  Pt remains hemodynamically stable and without focal neurologic deficits.

## 2024-10-14 NOTE — ED TRIAGE NOTES
ANDRES HANSON is a 24y old F who presents from [facility] for an MRI. See details below from [facility]   Expand All Collapse All       HPI       Chief Complaint   Patient presents with    Headache       C/o headache x4 days. States she is lightheaded, +light sensitivity. Denies fever or chills. States she is feeling dizzy         HPI     This is a 24-year-old female presents with severe migraine for the past few days.  She says that pain is located behind her eyes, across her forehead, and radiates down the side of her head towards her neck bilaterally.  She says this is associated with lightheadedness, nausea and sensitivity to light.  She also says that she has a history of migraines that typically do not last this long and are not as severe as her current migraine.  She went to urgent care gave her a shot of Toradol and recommended presenting to the ED for CT of her head.  She denies vomiting, fever, chills, neck rigidity.           Patient History  Medical History        Past Medical History:   Diagnosis Date    Encounter for screening for infections with a predominantly sexual mode of transmission 03/02/2020     Screening for STD (sexually transmitted disease)    Melena 04/12/2019     Blood in stool    Other specified health status       No pertinent past medical history    Other specified health status       No pertinent past surgical history    Personal history of other (healed) physical injury and trauma       History of sprain of ankle    Unspecified condition associated with female genital organs and menstrual cycle 03/02/2020     Cervical motion tenderness         Surgical History         Past Surgical History:   Procedure Laterality Date    BREAST SURGERY Left       Mass removal    OTHER SURGICAL HISTORY   01/18/2019     No history of surgery         Family History          Family History   Problem Relation Name Age of Onset    Asthma Mother        Colon cancer Mother             Social History   Social  History            Tobacco Use    Smoking status: Never    Smokeless tobacco: Never   Vaping Use    Vaping status: Never Used   Substance Use Topics    Alcohol use: Yes       Comment: social    Drug use: Yes       Types: Marijuana                  Physical Exam        ED Triage Vitals [10/13/24 1306]   Temperature Heart Rate Respirations BP   36.3 °C (97.3 °F) (!) 103 18 124/71       Pulse Ox Temp src Heart Rate Source Patient Position   96 % -- Monitor Sitting       BP Location FiO2 (%)       Right arm --          Physical Exam  General:  Pleasant and cooperative. No apparent distress.  HEENT:  Normocephalic, atraumatic  Chest:  Clear to auscultation bilaterally. No wheezes, rales, or rhonchi.  CV:  Regular rate and rhythm. No murmurs    Abdomen: Abdomen is soft, non-tender, non-distended. BS +   Extremities:  No lower extremity edema or cyanosis.   Neurological:  AAOx3. No focal deficits.  Skin:  Warm and dry.           ED Course & MDM      ED Course as of 10/13/24 1941   Sun Oct 13, 2024   6769 Patient presents emergency department headache.  She has been going on for few days.  She is not meningitic or encephalopathic.  Her examination is reassuring.  Migraine abortive medications were ordered.  Patient received Toradol and then demanded that her IV be taken out.  She underwent CT imaging.  Plan will be for reevaluation and likely discharge. [MK]   1523 CT does demonstrate a mass.  I did review this with radiology.  Plan will be for MRI

## 2024-10-14 NOTE — PROGRESS NOTES
Pharmacy Medication History Review    Sejal Duff is a 24 y.o. female admitted for Brain mass. Pharmacy reviewed the patient's [id number] medications and allergies for accuracy.    Medications ADDED:  Metronidazole 500mg  Medications CHANGED:  none  Medications REMOVED:   Dicyclomine 10mg  Acidophilus   Lactose-reduced food (Ensure)  Magnesium citrate  Polyethylene glycol  Enema    The list below reflects the updated PTA list.   Prior to Admission Medications   Prescriptions Last Dose Informant   Lacto no.37-Euziye-PRB-larch 25B cell-25B cell-50 mg capsule Not Taking Self   Sig: Take 1 capsule by mouth once daily.   Patient not taking: Reported on 10/14/2024   albuterol 90 mcg/actuation inhaler Past Month Self   Sig: Inhale 1-2 puffs every 4 hours if needed for wheezing or shortness of breath.   benzoyl peroxide (Benzac AC) 10 % external wash 10/13/2024 Self   Sig: APPLY TOPICALLY 2 TIMES A DAY. WASH TWICE DAILY AS DIRECTED   cholecalciferol (Vitamin D-3) 50,000 unit capsule Past Week Self   Sig: Take 1 capsule (50,000 Units) by mouth 1 (one) time per week.   fluticasone (Flonase) 50 mcg/actuation nasal spray Not Taking Self   Sig: ADMINISTER 1 SPRAY INTO EACH NOSTRIL 2 TIMES PER DAY   Patient not taking: Reported on 10/14/2024   linaCLOtide (Linzess) 145 mcg capsule 10/14/2024 Self   Sig: Take 1 capsule (145 mcg) by mouth once daily in the morning. Take before meals. Do not crush or chew.   loratadine (Claritin) 10 mg tablet Past Week Self   Sig: Take 1 tablet (10 mg) by mouth once daily.   metFORMIN XR (Glucophage-XR) 500 mg 24 hr tablet Not Taking Self   Sig: Take 1 tablet (500 mg) by mouth once daily with breakfast. Do not crush, chew, or split.   Patient not taking: Reported on 10/14/2024   metroNIDAZOLE (Flagyl) 500 mg tablet  Self   Sig: Take 1 tablet (500 mg) by mouth 3 times a day.  Patient has not started taking yet.   multivitamin with iron (Daily Multiple Vitamins/Iron) tablet Past Week Self  "  Sig: Take 1 tablet by mouth once daily.   omeprazole (PriLOSEC) 40 mg DR capsule Past Month Self   Sig: Take 1 capsule (40 mg) by mouth once daily.   ondansetron ODT (Zofran-ODT) 8 mg disintegrating tablet 10/13/2024 Self   Sig: Take 1 tablet (8 mg) by mouth every 8 hours if needed for nausea or vomiting.   prucalopride (Motegrity) 2 mg tablet 10/14/2024 Self   Sig: Take 1 tablet (2 mg) by mouth once daily.   tiZANidine (Zanaflex) 2 mg tablet Past Month Self   Sig: Take 1 tablet (2 mg) by mouth every 6 hours if needed for muscle spasms for up to 10 days.      Facility-Administered Medications: None        The list below reflects the updated allergy list. Please review each documented allergy for additional clarification and justification.  Allergies  Reviewed by Jacinto Moss on 10/14/2024        Severity Reactions Comments    Morphine High Dizziness     Pollen Extracts Medium Runny nose     House Dust Low Runny nose             Patient declines M2B at discharge.     Sources:   -Patient interview, good historian   -Outpatient pharmacy dispense history  -OARRS    Additional Comments:  none      JACINTO MOSS  Pharmacy Technician  10/14/24     Secure Chat preferred   If no response call [phone] or [employer] \"Med Rec\"     "

## 2024-10-14 NOTE — ED PROVIDER NOTES
Emergency Department Provider Note        History of Present Illness     History provided by: Patient  Limitations to History: None  External Records Reviewed with Brief Summary:  [facility] ED note 10/13/24 where patient presented for severe migraine for a few days. CT demonstrated midline masses. Urgent MRI showed cystic/necrotic rim enhancing lesion centered within the hypothalamus and additional nonenhancing FLAIR hyperintense lesion within the medial left temporal lobe.     HPI:  Sejal Duff is a 24 y.o. female presenting for headache for a few weeks and evaluation by neurosurgery after a mass identified on MRI today. States she was seen today at [facility] for a severe migraine that has been intractable with pain medications. Was told that she has a brain mass on imaging and needs to come see neurosurgery at  [facility] to be evaluated and admitted. Reports she has a constant headache that is not relieved with any pain medications. Moves across her head. Endorses phonophobia and photophobia and some lightheadedness. Also has some blurry vision that she did not have previously. Denies fevers, N/V, abdominal pain, CP, SOB, and neck rigidity.     Physical Exam   Triage vitals:  T 36.8 °C (98.2 °F)  HR 87  /84  RR 18  O2 98 % None (Room air)    General: Awake, alert, in no acute distress  Eyes: Gaze conjugate.  No scleral icterus or injection  HENT: Normo-cephalic, atraumatic. No stridor. PEERL. EOM intact. Sensitive to light on exam.   CV: Regular rate, regular rhythm. Radial pulses 2+ bilaterally  Resp: Breathing non-labored, speaking in full sentences.  Clear to auscultation bilaterally  GI: Soft, non-distended, non-tender. No rebound or guarding.  MSK/Extremities: No gross bony deformities. Moving all extremities  Skin: Warm. Appropriate color  Neuro: Alert. Oriented. Face symmetric. Speech is fluent.  Gross strength and sensation intact in b/l UE and LEs. CNs II-XII intact.   Psych: Appropriate mood  and affect    Medical Decision Making & ED Course   Medical Decision Makin y.o. female presenting with intractable headache and 2 brain lesions noted on MRI on 10/13. Hemodynamically stable and with normal neurologic exam. Consulted neurosurgery for further management of patient including admission. Ordered preop labs including type/screen, CMP, CBC, PT/INR, urinalysis, and urine pregnancy test. UA negative and CMP and CBC with no major electrolyte derangements, stable anemia, and no leukocytosis. Per neurosurgery, will be admitted with tele with plan for OR. Given Dilaudid for pain control.     Given the confirmed finding on MRI, low concern for other causes of headache such as meningitis and migraine. Patient is stable on re-evaluation and is accepted for admission.   ----    Differential diagnoses considered include but are not limited to: primary neoplasm, infection, migraine      Social Determinants of Health which Significantly Impact Care: None identified     Chronic conditions affecting the patient's care: As documented above in [facility]    The patient was discussed with the following consultants/services:  Neurosurgery    Care Considerations: As documented above in [facility]    ED Course:  ED Course as of 10/14/24 0438   Mon Oct 14, 2024   0142 ED Attending Documentation: This patient was seen by the resident physician.  I have seen and examined the patient, agree with the workup, evaluation, management and diagnosis. I reviewed and edited the above documentation where necessary. On my evaluation of the patient: 25yo female who presents to the ED with headache that has been going on for a few weeks, unable to get relief. CT shows mass, MRI shows mass and transferred here for eval. Pt is neuro intact, nsgy to eval.     Marco Benitez MD  EM Attending Physician   [RG]      ED Course User Index  [RG] Marco Benitez MD         Diagnoses as of 10/14/24 0438   Brain mass     Disposition   As a result of their workup,  the patient will require admission to the hospital.  The patient was informed of her diagnosis.  The patient was given the opportunity to ask questions and I answered them. The patient agreed to be admitted to the hospital.    Procedures   Procedures    Patient seen and discussed with ED attending physician.    Juana Song, MS4  Emergency Medicine      Juana Song  10/14/24 0738       Juana Song  10/14/24 0739

## 2024-10-14 NOTE — H&P
History Of Present Illness  Sejal Duff is a 24 y.o. 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    Patient had headache for a few weeks that was intractable despite OTC therapies. Reports some blurry vision.      Patient's imaging was personally reviewed and notable for MRI 2.4cm peripherally enhancing third ventricular necrotic mass w/ nonenhancing FLAIR hyperintense lesion in medial L temporal lobe    Past Medical History  She has a past medical history of Encounter for screening for infections with a predominantly sexual mode of transmission (03/02/2020), Melena (04/12/2019), Other specified health status, Other specified health status, Personal history of other (healed) physical injury and trauma, and Unspecified condition associated with female genital organs and menstrual cycle (03/02/2020).    Surgical History  She has a past surgical history that includes Other surgical history (01/18/2019) and Breast surgery (Left).     Social History  She reports that she has never smoked. She has never used smokeless tobacco. She reports current alcohol use. She reports current drug use. Drug: Marijuana.     Allergies  Morphine, Pollen extracts, and House dust    Medications  (Not in a hospital admission)      Review of Systems  10 point ROS is obtained and negative except the ones mentioned in the HPI      Physical Exam  HENT:      Head: Normocephalic.      Nose: Nose normal.   Eyes:      Pupils: Pupils are equal, round, and reactive to light.   Cardiovascular:      Pulses: Normal pulses.   Pulmonary:      Effort: Pulmonary effort is normal.   Abdominal:      General: Abdomen is flat.   Musculoskeletal:         General: No swelling.      Cervical back: Normal range of motion.   Skin:     General: Skin is warm.   Neurological:      Mental Status: She is alert.      Comments: A&Ox3  Cranial Nerves II-XII: PERRL,  "EOMI, Face symmetric, Facial SILT, Palate/Tongue midline and symmetric, shoulder shrugs symmetric, hearing intact to finger rubs bilaterally  Motor: BUE 5/5 BLE 5/5  Sensation: SILT throughout all extremities   Psychiatric:         Mood and Affect: Mood normal.       Last Recorded Vitals  Blood pressure 120/74, pulse 80, temperature 36.3 °C (97.4 °F), resp. rate 15, height 1.6 m (5' 3\"), weight 65.8 kg (145 lb), last menstrual period 09/29/2024, SpO2 100%.    Relevant Results  Lab Results   Component Value Date    WBC 7.2 10/14/2024    HGB 10.2 (L) 10/14/2024    HCT 33.4 (L) 10/14/2024    MCV 85 10/14/2024     10/14/2024     Lab Results   Component Value Date    GLUCOSE 115 (H) 10/14/2024    CALCIUM 8.8 10/14/2024     10/14/2024    K 3.6 10/14/2024    CO2 25 10/14/2024     (H) 10/14/2024    BUN 10 10/14/2024    CREATININE 0.52 10/14/2024     Assessment/Plan   Assessment & Plan  Brain mass      Sejal Duff is a 24 y.o. 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

## 2024-10-15 ENCOUNTER — ANESTHESIA EVENT (OUTPATIENT)
Dept: OPERATING ROOM | Facility: HOSPITAL | Age: 24
End: 2024-10-15
Payer: COMMERCIAL

## 2024-10-15 ENCOUNTER — ANESTHESIA (OUTPATIENT)
Dept: OPERATING ROOM | Facility: HOSPITAL | Age: 24
End: 2024-10-15
Payer: COMMERCIAL

## 2024-10-15 LAB
APPEARANCE CSF: CLEAR
ATRIAL RATE: 85 BPM
BASOPHILS NFR CSF MANUAL: 0 %
BLASTS CSF MANUAL: 0 %
COLOR CSF: COLORLESS
COLOR SPUN CSF: COLORLESS
EOSINOPHIL NFR CSF MANUAL: 0 %
GLUCOSE BLD MANUAL STRIP-MCNC: 103 MG/DL (ref 74–99)
GLUCOSE CSF-MCNC: 61 MG/DL (ref 40–70)
IGF-I SERPL-MCNC: 224 NG/ML (ref 102–317)
IGF-I Z-SCORE SERPL: 0.4
IMM GRANULOCYTES NFR CSF: 0 %
LYMPHOCYTES NFR CSF MANUAL: 53 % (ref 28–96)
MONOS+MACROS NFR CSF MANUAL: 0 % (ref 16–56)
NEUTS SEG NFR CSF MANUAL: 47 % (ref 0–5)
OTHER CELLS NFR CSF MANUAL: 0 %
P AXIS: 73 DEGREES
P OFFSET: 196 MS
P ONSET: 147 MS
PLASMA CELLS NFR CSF MICRO: 0 %
PR INTERVAL: 146 MS
PROT CSF-MCNC: 17 MG/DL (ref 15–45)
Q ONSET: 220 MS
QRS COUNT: 14 BEATS
QRS DURATION: 82 MS
QT INTERVAL: 348 MS
QTC CALCULATION(BAZETT): 414 MS
QTC FREDERICIA: 390 MS
R AXIS: 74 DEGREES
RBC # CSF AUTO: 2000 /UL (ref 0–5)
T AXIS: 68 DEGREES
T OFFSET: 394 MS
TOTAL CELLS COUNTED CSF: 17
TUBE # CSF: ABNORMAL
VENTRICULAR RATE: 85 BPM
WBC # CSF AUTO: 2 /UL (ref 1–5)

## 2024-10-15 PROCEDURE — S0109 METHADONE ORAL 5MG: HCPCS

## 2024-10-15 PROCEDURE — 88112 CYTOPATH CELL ENHANCE TECH: CPT | Performed by: STUDENT IN AN ORGANIZED HEALTH CARE EDUCATION/TRAINING PROGRAM

## 2024-10-15 PROCEDURE — 88341 IMHCHEM/IMCYTCHM EA ADD ANTB: CPT | Performed by: PATHOLOGY

## 2024-10-15 PROCEDURE — 89051 BODY FLUID CELL COUNT: CPT | Performed by: STUDENT IN AN ORGANIZED HEALTH CARE EDUCATION/TRAINING PROGRAM

## 2024-10-15 PROCEDURE — 7100000002 HC RECOVERY ROOM TIME - EACH INCREMENTAL 1 MINUTE: Performed by: NEUROLOGICAL SURGERY

## 2024-10-15 PROCEDURE — 88307 TISSUE EXAM BY PATHOLOGIST: CPT | Mod: TC,SUR | Performed by: EMERGENCY MEDICINE

## 2024-10-15 PROCEDURE — 3700000002 HC GENERAL ANESTHESIA TIME - EACH INCREMENTAL 1 MINUTE: Performed by: NEUROLOGICAL SURGERY

## 2024-10-15 PROCEDURE — 2500000001 HC RX 250 WO HCPCS SELF ADMINISTERED DRUGS (ALT 637 FOR MEDICARE OP)

## 2024-10-15 PROCEDURE — 84157 ASSAY OF PROTEIN OTHER: CPT | Performed by: STUDENT IN AN ORGANIZED HEALTH CARE EDUCATION/TRAINING PROGRAM

## 2024-10-15 PROCEDURE — 2500000002 HC RX 250 W HCPCS SELF ADMINISTERED DRUGS (ALT 637 FOR MEDICARE OP, ALT 636 FOR OP/ED)

## 2024-10-15 PROCEDURE — 2500000004 HC RX 250 GENERAL PHARMACY W/ HCPCS (ALT 636 FOR OP/ED): Performed by: NEUROLOGICAL SURGERY

## 2024-10-15 PROCEDURE — 88342 IMHCHEM/IMCYTCHM 1ST ANTB: CPT | Performed by: PATHOLOGY

## 2024-10-15 PROCEDURE — 3700000001 HC GENERAL ANESTHESIA TIME - INITIAL BASE CHARGE: Performed by: NEUROLOGICAL SURGERY

## 2024-10-15 PROCEDURE — 88331 PATH CONSLTJ SURG 1 BLK 1SPC: CPT | Mod: TC,SUR | Performed by: PATHOLOGY

## 2024-10-15 PROCEDURE — 82945 GLUCOSE OTHER FLUID: CPT | Performed by: STUDENT IN AN ORGANIZED HEALTH CARE EDUCATION/TRAINING PROGRAM

## 2024-10-15 PROCEDURE — 2020000001 HC ICU ROOM DAILY

## 2024-10-15 PROCEDURE — 3600000010 HC OR TIME - EACH INCREMENTAL 1 MINUTE - PROCEDURE LEVEL FIVE: Performed by: NEUROLOGICAL SURGERY

## 2024-10-15 PROCEDURE — 2500000001 HC RX 250 WO HCPCS SELF ADMINISTERED DRUGS (ALT 637 FOR MEDICARE OP): Performed by: NEUROLOGICAL SURGERY

## 2024-10-15 PROCEDURE — 00B63ZX EXCISION OF CEREBRAL VENTRICLE, PERCUTANEOUS APPROACH, DIAGNOSTIC: ICD-10-PCS | Performed by: NEUROLOGICAL SURGERY

## 2024-10-15 PROCEDURE — 2500000004 HC RX 250 GENERAL PHARMACY W/ HCPCS (ALT 636 FOR OP/ED)

## 2024-10-15 PROCEDURE — 7100000001 HC RECOVERY ROOM TIME - INITIAL BASE CHARGE: Performed by: NEUROLOGICAL SURGERY

## 2024-10-15 PROCEDURE — 3600000005 HC OR TIME - INITIAL BASE CHARGE - PROCEDURE LEVEL FIVE: Performed by: NEUROLOGICAL SURGERY

## 2024-10-15 PROCEDURE — 2780000003 HC OR 278 NO HCPCS: Performed by: NEUROLOGICAL SURGERY

## 2024-10-15 PROCEDURE — 8E09XBZ COMPUTER ASSISTED PROCEDURE OF HEAD AND NECK REGION: ICD-10-PCS | Performed by: NEUROLOGICAL SURGERY

## 2024-10-15 PROCEDURE — 88112 CYTOPATH CELL ENHANCE TECH: CPT | Mod: TC,MCY | Performed by: STUDENT IN AN ORGANIZED HEALTH CARE EDUCATION/TRAINING PROGRAM

## 2024-10-15 PROCEDURE — 88307 TISSUE EXAM BY PATHOLOGIST: CPT | Performed by: PATHOLOGY

## 2024-10-15 PROCEDURE — 00963ZX DRAINAGE OF CEREBRAL VENTRICLE, PERCUTANEOUS APPROACH, DIAGNOSTIC: ICD-10-PCS | Performed by: NEUROLOGICAL SURGERY

## 2024-10-15 PROCEDURE — 2500000004 HC RX 250 GENERAL PHARMACY W/ HCPCS (ALT 636 FOR OP/ED): Performed by: STUDENT IN AN ORGANIZED HEALTH CARE EDUCATION/TRAINING PROGRAM

## 2024-10-15 PROCEDURE — 2500000005 HC RX 250 GENERAL PHARMACY W/O HCPCS: Performed by: NEUROLOGICAL SURGERY

## 2024-10-15 PROCEDURE — 82947 ASSAY GLUCOSE BLOOD QUANT: CPT

## 2024-10-15 PROCEDURE — 61750 INCISE SKULL/BRAIN BIOPSY: CPT | Performed by: NEUROLOGICAL SURGERY

## 2024-10-15 PROCEDURE — 2720000007 HC OR 272 NO HCPCS: Performed by: NEUROLOGICAL SURGERY

## 2024-10-15 RX ORDER — ALBUTEROL SULFATE 0.83 MG/ML
2.5 SOLUTION RESPIRATORY (INHALATION) ONCE AS NEEDED
Status: DISCONTINUED | OUTPATIENT
Start: 2024-10-15 | End: 2024-10-15 | Stop reason: HOSPADM

## 2024-10-15 RX ORDER — OXYCODONE HYDROCHLORIDE 5 MG/1
5 TABLET ORAL EVERY 4 HOURS PRN
Status: DISCONTINUED | OUTPATIENT
Start: 2024-10-15 | End: 2024-10-19 | Stop reason: HOSPADM

## 2024-10-15 RX ORDER — DROPERIDOL 2.5 MG/ML
0.62 INJECTION, SOLUTION INTRAMUSCULAR; INTRAVENOUS ONCE AS NEEDED
Status: DISCONTINUED | OUTPATIENT
Start: 2024-10-15 | End: 2024-10-15 | Stop reason: HOSPADM

## 2024-10-15 RX ORDER — METOPROLOL TARTRATE 1 MG/ML
INJECTION, SOLUTION INTRAVENOUS AS NEEDED
Status: DISCONTINUED | OUTPATIENT
Start: 2024-10-15 | End: 2024-10-15

## 2024-10-15 RX ORDER — METHADONE HYDROCHLORIDE 10 MG/1
TABLET ORAL AS NEEDED
Status: DISCONTINUED | OUTPATIENT
Start: 2024-10-15 | End: 2024-10-15

## 2024-10-15 RX ORDER — SODIUM CHLORIDE, SODIUM LACTATE, POTASSIUM CHLORIDE, CALCIUM CHLORIDE 600; 310; 30; 20 MG/100ML; MG/100ML; MG/100ML; MG/100ML
100 INJECTION, SOLUTION INTRAVENOUS CONTINUOUS
Status: DISCONTINUED | OUTPATIENT
Start: 2024-10-15 | End: 2024-10-15 | Stop reason: HOSPADM

## 2024-10-15 RX ORDER — BACITRACIN 500 [USP'U]/G
OINTMENT TOPICAL AS NEEDED
Status: DISCONTINUED | OUTPATIENT
Start: 2024-10-15 | End: 2024-10-15 | Stop reason: HOSPADM

## 2024-10-15 RX ORDER — ONDANSETRON HYDROCHLORIDE 2 MG/ML
4 INJECTION, SOLUTION INTRAVENOUS ONCE AS NEEDED
Status: DISCONTINUED | OUTPATIENT
Start: 2024-10-15 | End: 2024-10-15 | Stop reason: HOSPADM

## 2024-10-15 RX ORDER — HYDROMORPHONE HYDROCHLORIDE 1 MG/ML
0.5 INJECTION, SOLUTION INTRAMUSCULAR; INTRAVENOUS; SUBCUTANEOUS EVERY 5 MIN PRN
Status: DISCONTINUED | OUTPATIENT
Start: 2024-10-15 | End: 2024-10-15 | Stop reason: HOSPADM

## 2024-10-15 RX ORDER — ACETAMINOPHEN 325 MG/1
TABLET ORAL AS NEEDED
Status: DISCONTINUED | OUTPATIENT
Start: 2024-10-15 | End: 2024-10-15

## 2024-10-15 RX ORDER — MIDAZOLAM HYDROCHLORIDE 1 MG/ML
INJECTION INTRAMUSCULAR; INTRAVENOUS AS NEEDED
Status: DISCONTINUED | OUTPATIENT
Start: 2024-10-15 | End: 2024-10-15

## 2024-10-15 RX ORDER — LIDOCAINE HYDROCHLORIDE AND EPINEPHRINE 5; 5 MG/ML; UG/ML
INJECTION, SOLUTION INFILTRATION; PERINEURAL AS NEEDED
Status: DISCONTINUED | OUTPATIENT
Start: 2024-10-15 | End: 2024-10-15 | Stop reason: HOSPADM

## 2024-10-15 RX ORDER — TALC
6 POWDER (GRAM) TOPICAL NIGHTLY PRN
Status: DISCONTINUED | OUTPATIENT
Start: 2024-10-15 | End: 2024-10-19 | Stop reason: HOSPADM

## 2024-10-15 RX ORDER — ESMOLOL HYDROCHLORIDE 10 MG/ML
INJECTION INTRAVENOUS AS NEEDED
Status: DISCONTINUED | OUTPATIENT
Start: 2024-10-15 | End: 2024-10-15

## 2024-10-15 RX ORDER — ACETAMINOPHEN 325 MG/1
650 TABLET ORAL EVERY 4 HOURS PRN
Status: DISCONTINUED | OUTPATIENT
Start: 2024-10-15 | End: 2024-10-15 | Stop reason: HOSPADM

## 2024-10-15 RX ORDER — CEFAZOLIN 1 G/1
INJECTION, POWDER, FOR SOLUTION INTRAVENOUS AS NEEDED
Status: DISCONTINUED | OUTPATIENT
Start: 2024-10-15 | End: 2024-10-15

## 2024-10-15 RX ORDER — ROCURONIUM BROMIDE 10 MG/ML
INJECTION, SOLUTION INTRAVENOUS AS NEEDED
Status: DISCONTINUED | OUTPATIENT
Start: 2024-10-15 | End: 2024-10-15

## 2024-10-15 RX ORDER — LIDOCAINE HCL/PF 100 MG/5ML
SYRINGE (ML) INTRAVENOUS AS NEEDED
Status: DISCONTINUED | OUTPATIENT
Start: 2024-10-15 | End: 2024-10-15

## 2024-10-15 RX ORDER — FENTANYL CITRATE 50 UG/ML
INJECTION, SOLUTION INTRAMUSCULAR; INTRAVENOUS AS NEEDED
Status: DISCONTINUED | OUTPATIENT
Start: 2024-10-15 | End: 2024-10-15

## 2024-10-15 RX ORDER — OXYCODONE HYDROCHLORIDE 5 MG/1
10 TABLET ORAL EVERY 4 HOURS PRN
Status: DISCONTINUED | OUTPATIENT
Start: 2024-10-15 | End: 2024-10-15 | Stop reason: HOSPADM

## 2024-10-15 RX ORDER — GABAPENTIN 300 MG/1
CAPSULE ORAL AS NEEDED
Status: DISCONTINUED | OUTPATIENT
Start: 2024-10-15 | End: 2024-10-15

## 2024-10-15 RX ORDER — ONDANSETRON HYDROCHLORIDE 2 MG/ML
INJECTION, SOLUTION INTRAVENOUS AS NEEDED
Status: DISCONTINUED | OUTPATIENT
Start: 2024-10-15 | End: 2024-10-15

## 2024-10-15 RX ORDER — OXYCODONE HYDROCHLORIDE 5 MG/1
5 TABLET ORAL EVERY 4 HOURS PRN
Status: DISCONTINUED | OUTPATIENT
Start: 2024-10-15 | End: 2024-10-15 | Stop reason: HOSPADM

## 2024-10-15 RX ORDER — HYDROMORPHONE HYDROCHLORIDE 1 MG/ML
0.2 INJECTION, SOLUTION INTRAMUSCULAR; INTRAVENOUS; SUBCUTANEOUS EVERY 5 MIN PRN
Status: DISCONTINUED | OUTPATIENT
Start: 2024-10-15 | End: 2024-10-15 | Stop reason: HOSPADM

## 2024-10-15 RX ORDER — SODIUM CHLORIDE, SODIUM LACTATE, POTASSIUM CHLORIDE, CALCIUM CHLORIDE 600; 310; 30; 20 MG/100ML; MG/100ML; MG/100ML; MG/100ML
INJECTION, SOLUTION INTRAVENOUS CONTINUOUS PRN
Status: DISCONTINUED | OUTPATIENT
Start: 2024-10-15 | End: 2024-10-15

## 2024-10-15 RX ORDER — PROPOFOL 10 MG/ML
INJECTION, EMULSION INTRAVENOUS CONTINUOUS PRN
Status: DISCONTINUED | OUTPATIENT
Start: 2024-10-15 | End: 2024-10-15

## 2024-10-15 SDOH — SOCIAL STABILITY: SOCIAL INSECURITY: HAVE YOU HAD ANY THOUGHTS OF HARMING ANYONE ELSE?: NO

## 2024-10-15 SDOH — SOCIAL STABILITY: SOCIAL INSECURITY: DOES ANYONE TRY TO KEEP YOU FROM HAVING/CONTACTING OTHER FRIENDS OR DOING THINGS OUTSIDE YOUR HOME?: NO

## 2024-10-15 SDOH — SOCIAL STABILITY: SOCIAL INSECURITY: ABUSE: ADULT

## 2024-10-15 SDOH — SOCIAL STABILITY: SOCIAL INSECURITY: HAS ANYONE EVER THREATENED TO HURT YOUR FAMILY OR YOUR PETS?: NO

## 2024-10-15 SDOH — SOCIAL STABILITY: SOCIAL INSECURITY: ARE YOU OR HAVE YOU BEEN THREATENED OR ABUSED PHYSICALLY, EMOTIONALLY, OR SEXUALLY BY ANYONE?: NO

## 2024-10-15 SDOH — SOCIAL STABILITY: SOCIAL INSECURITY: DO YOU FEEL UNSAFE GOING BACK TO THE PLACE WHERE YOU ARE LIVING?: NO

## 2024-10-15 SDOH — SOCIAL STABILITY: SOCIAL INSECURITY: HAVE YOU HAD THOUGHTS OF HARMING ANYONE ELSE?: NO

## 2024-10-15 SDOH — SOCIAL STABILITY: SOCIAL INSECURITY: DO YOU FEEL ANYONE HAS EXPLOITED OR TAKEN ADVANTAGE OF YOU FINANCIALLY OR OF YOUR PERSONAL PROPERTY?: NO

## 2024-10-15 SDOH — SOCIAL STABILITY: SOCIAL INSECURITY: ARE THERE ANY APPARENT SIGNS OF INJURIES/BEHAVIORS THAT COULD BE RELATED TO ABUSE/NEGLECT?: NO

## 2024-10-15 ASSESSMENT — ACTIVITIES OF DAILY LIVING (ADL)
FEEDING YOURSELF: INDEPENDENT
WALKS IN HOME: INDEPENDENT
DRESSING YOURSELF: INDEPENDENT
ADEQUATE_TO_COMPLETE_ADL: YES
BATHING: INDEPENDENT
GROOMING: INDEPENDENT
HEARING - LEFT EAR: FUNCTIONAL
TOILETING: INDEPENDENT
PATIENT'S MEMORY ADEQUATE TO SAFELY COMPLETE DAILY ACTIVITIES?: YES
JUDGMENT_ADEQUATE_SAFELY_COMPLETE_DAILY_ACTIVITIES: YES
HEARING - RIGHT EAR: FUNCTIONAL

## 2024-10-15 ASSESSMENT — COGNITIVE AND FUNCTIONAL STATUS - GENERAL
DAILY ACTIVITIY SCORE: 24
PATIENT BASELINE BEDBOUND: NO
DAILY ACTIVITIY SCORE: 24
MOBILITY SCORE: 24
MOBILITY SCORE: 24

## 2024-10-15 ASSESSMENT — PATIENT HEALTH QUESTIONNAIRE - PHQ9
2. FEELING DOWN, DEPRESSED OR HOPELESS: NOT AT ALL
1. LITTLE INTEREST OR PLEASURE IN DOING THINGS: NOT AT ALL
SUM OF ALL RESPONSES TO PHQ9 QUESTIONS 1 & 2: 0

## 2024-10-15 ASSESSMENT — LIFESTYLE VARIABLES
PRESCIPTION_ABUSE_PAST_12_MONTHS: NO
HOW MANY STANDARD DRINKS CONTAINING ALCOHOL DO YOU HAVE ON A TYPICAL DAY: PATIENT DOES NOT DRINK
HOW OFTEN DO YOU HAVE 6 OR MORE DRINKS ON ONE OCCASION: NEVER
HOW OFTEN DO YOU HAVE A DRINK CONTAINING ALCOHOL: NEVER
SUBSTANCE_ABUSE_PAST_12_MONTHS: NO
AUDIT-C TOTAL SCORE: 0
SKIP TO QUESTIONS 9-10: 1
AUDIT-C TOTAL SCORE: 0

## 2024-10-15 ASSESSMENT — PAIN - FUNCTIONAL ASSESSMENT
PAIN_FUNCTIONAL_ASSESSMENT: 0-10

## 2024-10-15 ASSESSMENT — PAIN SCALES - GENERAL
PAINLEVEL_OUTOF10: 10 - WORST POSSIBLE PAIN
PAINLEVEL_OUTOF10: 0 - NO PAIN
PAIN_LEVEL: 0
PAINLEVEL_OUTOF10: 8
PAINLEVEL_OUTOF10: 8
PAINLEVEL_OUTOF10: 0 - NO PAIN
PAINLEVEL_OUTOF10: 0 - NO PAIN
PAINLEVEL_OUTOF10: 10 - WORST POSSIBLE PAIN

## 2024-10-15 ASSESSMENT — PAIN DESCRIPTION - LOCATION: LOCATION: HEAD

## 2024-10-15 NOTE — ANESTHESIA PROCEDURE NOTES
Peripheral IV  Date/Time: 10/15/2024 2:50 PM      Placement  Needle size: 18 G  Laterality: left  Location: wrist  Local anesthetic: none  Site prep: alcohol  Technique: anatomical landmarks  Attempts: 1

## 2024-10-15 NOTE — ASSESSMENT & PLAN NOTE
Sejal Duff is a 24 y.o. 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

## 2024-10-15 NOTE — ANESTHESIA POSTPROCEDURE EVALUATION
Patient: Sejal Duff    Procedure Summary       Date: 10/15/24 Room / Location: [facility] OR 24 / Virtual [facility] OR    Anesthesia Start: 1434 Anesthesia Stop: 1712    Procedure: Stereotactic biopsy of third ventricular mass Diagnosis:       Brain mass      (Brain mass [G93.89])    Surgeons: Den Segal MD Responsible Provider: Domenic Garcia MD    Anesthesia Type: general ASA Status: 3            Anesthesia Type: general    Vitals Value Taken Time   /67 10/15/24 1845   Temp 36 °C (96.8 °F) 10/15/24 1714   Pulse 94 10/15/24 1858   Resp 13 10/15/24 1858   SpO2 93 % 10/15/24 1858   Vitals shown include unfiled device data.    Anesthesia Post Evaluation    Patient location during evaluation: PACU  Patient participation: complete - patient participated  Level of consciousness: awake  Pain score: 0  Pain management: adequate  Airway patency: patent  Cardiovascular status: acceptable  Respiratory status: acceptable  Hydration status: acceptable  Postoperative Nausea and Vomiting: none        There were no known notable events for this encounter.

## 2024-10-15 NOTE — PROGRESS NOTES
"Sejal Duff is a 24 y.o. female on day 1 of admission presenting with Brain mass.    Subjective   No acute events overnight, patient nervous about surgery       Objective     Physical Exam  Awake, oriented x 3  5/5 x4    Last Recorded Vitals  Blood pressure 109/72, pulse 101, temperature 36.7 °C (98.1 °F), resp. rate 18, height 1.6 m (5' 3\"), weight 65.8 kg (145 lb), last menstrual period 09/29/2024, SpO2 99%.  Intake/Output last 3 Shifts:  No intake/output data recorded.    Relevant Results                              Assessment/Plan   Assessment & Plan  Brain mass  Sejal Duff is a 24 y.o. 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

## 2024-10-15 NOTE — ANESTHESIA PROCEDURE NOTES
Airway  Date/Time: 10/15/2024 2:47 PM  Urgency: elective    Airway not difficult    Staffing  Performed: CRNA   Authorized by: Den Alvarez MD PhD    Performed by: JASVIR Haddad-CRNA, ESTIVEN  Patient location during procedure: OR    Indications and Patient Condition  Indications for airway management: anesthesia and airway protection  Spontaneous Ventilation: absent  Sedation level: deep  Preoxygenated: yes  Patient position: sniffing  Mask difficulty assessment: 1 - vent by mask  Planned trial extubation    Final Airway Details  Final airway type: endotracheal airway      Successful airway: ETT  Cuffed: yes   Successful intubation technique: direct laryngoscopy  Facilitating devices/methods: intubating stylet  Endotracheal tube insertion site: oral  Blade: Farzaneh  Blade size: #3  ETT size (mm): 7.0  Cormack-Lehane Classification: grade IIa - partial view of glottis  Placement verified by: chest auscultation and capnometry   Cuff volume (mL): 7  Measured from: lips  ETT to lips (cm): 22  Number of attempts at approach: 1

## 2024-10-15 NOTE — OP NOTE
Stereotactic biopsy of third ventricular mass Operative Note     Date: 10/14/2024 - 10/15/2024  OR Location: [facility] OR    Name: Sejal Duff, : 2000, Age: 24 y.o., MRN: [id number], Sex: female    Diagnosis  Pre-op Diagnosis      * Brain mass [G93.89] Post-op Diagnosis     * Brain mass [G93.89]     Procedures  Right stereotactic biopsy of third ventricular mass  Aspiration of cerebrospinal fluid from ventricular puncture  Use of neuronavigation/stereotaxy    Surgeons      * Den Segal - Primary    Resident/Fellow/Other Assistant:  Surgeons and Role:     * Angie Wright MD - Resident - Assisting     * Jose Matthews MD - Resident - Assisting    Procedure Summary  Anesthesia: Anesthesia type not filed in the log.  ASA: III  Anesthesia Staff: Anesthesiologist: Domenic Garcia MD; Den Alvarez MD PhD  CRNA: JASVIR Haddad-CRNA, DNP  Estimated Blood Loss: 5mL  Intra-op Medications:   Administrations occurring from 1250 to 1555 on 10/15/24:   Medication Name Total Dose   albuterol 90 mcg/actuation inhaler 1-2 puff Cannot be calculated   cetirizine (ZyrTEC) tablet 10 mg Cannot be calculated   fluticasone (Flonase) nasal spray 1 spray Cannot be calculated   hydrALAZINE (Apresoline) injection 10 mg Cannot be calculated   labetaloL (Normodyne,Trandate) injection 10 mg Cannot be calculated   melatonin tablet 6 mg Cannot be calculated   oxyCODONE (Roxicodone) immediate release tablet 10 mg Cannot be calculated   oxyCODONE (Roxicodone) immediate release tablet 5 mg Cannot be calculated   pantoprazole (ProtoNix) EC tablet 40 mg Cannot be calculated   polyethylene glycol (Glycolax, Miralax) packet 17 g Cannot be calculated              Anesthesia Record               Intraprocedure I/O Totals       None           Specimen:   ID Type Source Tests Collected by Time   1 : 1) ventricular biopsy Tissue BRAIN BIOPSY SURGICAL PATHOLOGY EXAM Den Segal MD 10/15/2024 1553   2 : superior Tissue BRAIN  BIOPSY SURGICAL PATHOLOGY EXAM Den Segal MD 10/15/2024 6386   A : CSF Fluid CEREBROSPINAL FLUID STERILE FLUID CULTURE/SMEAR Den Segal MD 10/15/2024 8999        Staff:   Circulator: Braeden Young Person: Pepper      Findings: preliminary pathology: hypercellularity with luigi fibers    Indications: Sejal Duff is an 24 y.o. female who is having surgery for Brain mass [G93.89].     The patient was seen in the preoperative area. The risks, benefits, complications, treatment options, non-operative alternatives, expected recovery and outcomes were discussed with the patient. The possibilities of reaction to medication, pulmonary aspiration, injury to surrounding structures, bleeding, recurrent infection, the need for additional procedures, failure to diagnose a condition, and creating a complication requiring transfusion or operation were discussed with the patient. The patient concurred with the proposed plan, giving informed consent.  The site of surgery was properly noted/marked if necessary per policy. The patient has been actively warmed in preoperative area. Preoperative antibiotics have been ordered and given within 1 hours of incision. Venous thrombosis prophylaxis have been ordered including bilateral sequential compression devices    Procedure Details:     The patient was brought back from preop to OR. A safety huddle was performed and anesthesia was induced. Patient was pinned and neuronavigation was registered with acceptable range of error. The right scalp was cleansed, prepped and draped in usual sterile fashion. Local anesthetic was used to infiltrate skin over incision.    The biopsy arm was attached to the argueta head singh and was set to the proper trajectory planned prior to incision.  A 0.5cm incision was made down to skull using a skin knife and bovey electrocautery over the entry point for planned trajectory. The Vertex arm ([employer]) was set to the appropriate location, a  reducing tube was placed and the drill was used to perforate skull. The dura was coagulated and opened with bovey stick.    The navigation-guided biopsy needle (Colatris) was passed to the pre-measured depth, and multiple samples were collected.    These were sent for both frozen and permanent pathology. Preliminary results showed Kaya fibers with mild hypercellularity. After frozen pathology was resulted, we collected 3 more total cores at a more superficial aspect of the lesion.    Finally, given that the biopsy was transventricular, we retracted our stereotactic needle until it was intraventricular; we collected 10cc of CSF.    Copious irrigation was used in biopsy needle and scalp incision. Skin was closed with absorbable monofilament.    Patient was turned over to anesthesia for extubation with no immediate complications. All counts were correct at the end of the case.    Complications:  None; patient tolerated the procedure well.    Disposition: ICU - extubated and stable.  Condition: stable       Additional Details: none    Attending Attestation: I was present for the critical portions of the procedure.    Den Segal  Phone Number: [phone]

## 2024-10-15 NOTE — CARE PLAN
The patient's goals for the shift include      The clinical goals for the shift include pain control    Problem: Pain - Adult  Goal: Verbalizes/displays adequate comfort level or baseline comfort level  Outcome: Progressing     Problem: Safety - Adult  Goal: Free from fall injury  Outcome: Progressing     Problem: Discharge Planning  Goal: Discharge to home or other facility with appropriate resources  Outcome: Progressing     Problem: Chronic Conditions and Co-morbidities  Goal: Patient's chronic conditions and co-morbidity symptoms are monitored and maintained or improved  Outcome: Progressing

## 2024-10-15 NOTE — ANESTHESIA PREPROCEDURE EVALUATION
Patient: Sejal Duff    Procedure Information       Date/Time: 10/15/24 1250    Procedure: Stereotactic biopsy of third ventricular mass - ASPIRATOR SONOPET    Location: [facility] OR 24 / [facility] OR    Surgeons: Den Segal MD            Relevant Problems   Anesthesia (within normal limits)      Pulmonary   (+) Asthma      GI   (+) GERD (gastroesophageal reflux disease)   (+) Irritable bowel syndrome      Hematology   (+) Anemia      ID   (+) Bacterial vaginosis      GYN   (+) Menorrhagia with regular cycle       Clinical information reviewed:   Tobacco  Allergies  Meds  Problems  Med Hx  Surg Hx   Fam Hx  Soc   Hx        NPO Detail:  NPO/Void Status  Date of Last Liquid: 10/14/24  Date of Last Solid: 10/14/24  Last Intake Type: Food         Physical Exam    Airway  Mallampati: II  TM distance: >3 FB  Neck ROM: full     Cardiovascular - normal exam     Dental - normal exam     Pulmonary - normal exam  Breath sounds clear to auscultation     Abdominal - normal exam             Anesthesia Plan    History of general anesthesia?: yes  History of complications of general anesthesia?: no    ASA 3     general     intravenous induction   Postoperative administration of opioids is intended.  Trial extubation is planned.  Anesthetic plan and risks discussed with patient.    Plan discussed with CRNA.

## 2024-10-15 NOTE — PROGRESS NOTES
Attempted to  meet patient but she was in the OR. I will follow up with patient tomorrow. Carline Leonardo RN

## 2024-10-16 ENCOUNTER — APPOINTMENT (OUTPATIENT)
Dept: RADIOLOGY | Facility: HOSPITAL | Age: 24
End: 2024-10-16
Payer: COMMERCIAL

## 2024-10-16 LAB
AFP SERPL-MCNC: <4 NG/ML (ref 0–9)
ALBUMIN SERPL BCP-MCNC: 4.6 G/DL (ref 3.4–5)
ANION GAP SERPL CALC-SCNC: 13 MMOL/L (ref 10–20)
BASOPHILS # BLD AUTO: 0.01 X10*3/UL (ref 0–0.1)
BASOPHILS NFR BLD AUTO: 0.1 %
BUN SERPL-MCNC: 10 MG/DL (ref 6–23)
CA-I BLD-SCNC: 1.25 MMOL/L (ref 1.1–1.33)
CALCIUM SERPL-MCNC: 9.6 MG/DL (ref 8.6–10.6)
CHLORIDE SERPL-SCNC: 103 MMOL/L (ref 98–107)
CO2 SERPL-SCNC: 27 MMOL/L (ref 21–32)
CORTIS SERPL-MCNC: 7.4 UG/DL (ref 2.5–20)
CREAT SERPL-MCNC: 0.54 MG/DL (ref 0.5–1.05)
EGFRCR SERPLBLD CKD-EPI 2021: >90 ML/MIN/1.73M*2
EOSINOPHIL # BLD AUTO: 0 X10*3/UL (ref 0–0.7)
EOSINOPHIL NFR BLD AUTO: 0 %
ERYTHROCYTE [DISTWIDTH] IN BLOOD BY AUTOMATED COUNT: 13.3 % (ref 11.5–14.5)
GLUCOSE SERPL-MCNC: 111 MG/DL (ref 74–99)
HCT VFR BLD AUTO: 39.1 % (ref 36–46)
HGB BLD-MCNC: 11.8 G/DL (ref 12–16)
IMM GRANULOCYTES # BLD AUTO: 0.04 X10*3/UL (ref 0–0.7)
IMM GRANULOCYTES NFR BLD AUTO: 0.4 % (ref 0–0.9)
LABORATORY COMMENT REPORT: NORMAL
LABORATORY COMMENT REPORT: NORMAL
LDH SERPL L TO P-CCNC: 190 U/L (ref 84–246)
LYMPHOCYTES # BLD AUTO: 0.94 X10*3/UL (ref 1.2–4.8)
LYMPHOCYTES NFR BLD AUTO: 9.4 %
MAGNESIUM SERPL-MCNC: 2.01 MG/DL (ref 1.6–2.4)
MCH RBC QN AUTO: 25.3 PG (ref 26–34)
MCHC RBC AUTO-ENTMCNC: 30.2 G/DL (ref 32–36)
MCV RBC AUTO: 84 FL (ref 80–100)
MONOCYTES # BLD AUTO: 0.3 X10*3/UL (ref 0.1–1)
MONOCYTES NFR BLD AUTO: 3 %
NEUTROPHILS # BLD AUTO: 8.72 X10*3/UL (ref 1.2–7.7)
NEUTROPHILS NFR BLD AUTO: 87.1 %
NRBC BLD-RTO: 0 /100 WBCS (ref 0–0)
PATH REPORT.FINAL DX SPEC: NORMAL
PATH REPORT.GROSS SPEC: NORMAL
PATH REPORT.RELEVANT HX SPEC: NORMAL
PATH REPORT.TOTAL CANCER: NORMAL
PHOSPHATE SERPL-MCNC: 4.8 MG/DL (ref 2.5–4.9)
PLATELET # BLD AUTO: 423 X10*3/UL (ref 150–450)
POTASSIUM SERPL-SCNC: 4.7 MMOL/L (ref 3.5–5.3)
RBC # BLD AUTO: 4.66 X10*6/UL (ref 4–5.2)
SODIUM SERPL-SCNC: 138 MMOL/L (ref 136–145)
WBC # BLD AUTO: 10 X10*3/UL (ref 4.4–11.3)

## 2024-10-16 PROCEDURE — 2500000004 HC RX 250 GENERAL PHARMACY W/ HCPCS (ALT 636 FOR OP/ED): Performed by: NURSE PRACTITIONER

## 2024-10-16 PROCEDURE — 1100000001 HC PRIVATE ROOM DAILY

## 2024-10-16 PROCEDURE — 85025 COMPLETE CBC W/AUTO DIFF WBC: CPT | Performed by: REGISTERED NURSE

## 2024-10-16 PROCEDURE — 80069 RENAL FUNCTION PANEL: CPT | Performed by: REGISTERED NURSE

## 2024-10-16 PROCEDURE — 70450 CT HEAD/BRAIN W/O DYE: CPT | Performed by: RADIOLOGY

## 2024-10-16 PROCEDURE — 2500000001 HC RX 250 WO HCPCS SELF ADMINISTERED DRUGS (ALT 637 FOR MEDICARE OP)

## 2024-10-16 PROCEDURE — 2500000001 HC RX 250 WO HCPCS SELF ADMINISTERED DRUGS (ALT 637 FOR MEDICARE OP): Performed by: REGISTERED NURSE

## 2024-10-16 PROCEDURE — 2500000004 HC RX 250 GENERAL PHARMACY W/ HCPCS (ALT 636 FOR OP/ED): Performed by: REGISTERED NURSE

## 2024-10-16 PROCEDURE — 99252 IP/OBS CONSLTJ NEW/EST SF 35: CPT | Performed by: PSYCHIATRY & NEUROLOGY

## 2024-10-16 PROCEDURE — 70450 CT HEAD/BRAIN W/O DYE: CPT

## 2024-10-16 PROCEDURE — 36415 COLL VENOUS BLD VENIPUNCTURE: CPT | Performed by: REGISTERED NURSE

## 2024-10-16 PROCEDURE — 82330 ASSAY OF CALCIUM: CPT | Performed by: REGISTERED NURSE

## 2024-10-16 PROCEDURE — 83615 LACTATE (LD) (LDH) ENZYME: CPT | Performed by: STUDENT IN AN ORGANIZED HEALTH CARE EDUCATION/TRAINING PROGRAM

## 2024-10-16 PROCEDURE — 83735 ASSAY OF MAGNESIUM: CPT | Performed by: REGISTERED NURSE

## 2024-10-16 PROCEDURE — 2500000004 HC RX 250 GENERAL PHARMACY W/ HCPCS (ALT 636 FOR OP/ED)

## 2024-10-16 RX ORDER — ACETAMINOPHEN 325 MG/1
650 TABLET ORAL EVERY 4 HOURS PRN
Status: CANCELLED
Start: 2024-10-16

## 2024-10-16 RX ORDER — ONDANSETRON HYDROCHLORIDE 2 MG/ML
4 INJECTION, SOLUTION INTRAVENOUS EVERY 8 HOURS PRN
Status: DISCONTINUED | OUTPATIENT
Start: 2024-10-16 | End: 2024-10-19 | Stop reason: HOSPADM

## 2024-10-16 RX ORDER — ONDANSETRON 4 MG/1
4 TABLET, FILM COATED ORAL EVERY 8 HOURS PRN
Status: DISCONTINUED | OUTPATIENT
Start: 2024-10-16 | End: 2024-10-19 | Stop reason: HOSPADM

## 2024-10-16 RX ORDER — HEPARIN SODIUM 5000 [USP'U]/ML
5000 INJECTION, SOLUTION INTRAVENOUS; SUBCUTANEOUS EVERY 8 HOURS
Status: DISCONTINUED | OUTPATIENT
Start: 2024-10-17 | End: 2024-10-19 | Stop reason: HOSPADM

## 2024-10-16 RX ORDER — ONDANSETRON HYDROCHLORIDE 2 MG/ML
INJECTION, SOLUTION INTRAVENOUS
Status: COMPLETED
Start: 2024-10-16 | End: 2024-10-16

## 2024-10-16 RX ORDER — HYDROMORPHONE HYDROCHLORIDE 1 MG/ML
0.2 INJECTION, SOLUTION INTRAMUSCULAR; INTRAVENOUS; SUBCUTANEOUS
Status: DISCONTINUED | OUTPATIENT
Start: 2024-10-16 | End: 2024-10-19 | Stop reason: HOSPADM

## 2024-10-16 RX ORDER — OXYCODONE HYDROCHLORIDE 5 MG/1
10 TABLET ORAL EVERY 4 HOURS PRN
Status: DISCONTINUED | OUTPATIENT
Start: 2024-10-16 | End: 2024-10-19 | Stop reason: HOSPADM

## 2024-10-16 ASSESSMENT — PAIN - FUNCTIONAL ASSESSMENT
PAIN_FUNCTIONAL_ASSESSMENT: 0-10
PAIN_FUNCTIONAL_ASSESSMENT: 0-10

## 2024-10-16 ASSESSMENT — PAIN DESCRIPTION - LOCATION
LOCATION: HEAD

## 2024-10-16 ASSESSMENT — COGNITIVE AND FUNCTIONAL STATUS - GENERAL
MOVING TO AND FROM BED TO CHAIR: A LITTLE
STANDING UP FROM CHAIR USING ARMS: A LITTLE
WALKING IN HOSPITAL ROOM: A LITTLE
CLIMB 3 TO 5 STEPS WITH RAILING: A LITTLE
MOBILITY SCORE: 20

## 2024-10-16 ASSESSMENT — PAIN SCALES - GENERAL
PAINLEVEL_OUTOF10: 10 - WORST POSSIBLE PAIN
PAINLEVEL_OUTOF10: 7
PAINLEVEL_OUTOF10: 4
PAINLEVEL_OUTOF10: 8
PAINLEVEL_OUTOF10: 9
PAINLEVEL_OUTOF10: 7
PAINLEVEL_OUTOF10: 10 - WORST POSSIBLE PAIN
PAINLEVEL_OUTOF10: 8
PAINLEVEL_OUTOF10: 8

## 2024-10-16 NOTE — CONSULTS
"Name: Sejal Duff  MRN: [id number]  Encounter Date: 10/16/2024  PCP: Melba Graham MD  Heme-Onc: None    Reason for consult: brain mass  Attending Provider: Dr. Segal    Hematology/ Oncology Consult Note      History of Present Illness   Sejal Duff is a 24 y.o. 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     Patient seen with aunt at bedside. She notes continued severe headache and nausea. She states she developed a severe headache 3-4 days prior to presentation but was having mild headaches over the past month. She denies FH of brain cancer but does note \"cancer runs in the family\".     Past Medical history     Past Medical History:   Diagnosis Date    Encounter for screening for infections with a predominantly sexual mode of transmission 03/02/2020    Screening for STD (sexually transmitted disease)    Melena 04/12/2019    Blood in stool    Other specified health status     No pertinent past medical history    Other specified health status     No pertinent past surgical history    Personal history of other (healed) physical injury and trauma     History of sprain of ankle    Unspecified condition associated with female genital organs and menstrual cycle 03/02/2020    Cervical motion tenderness         Past Surgical History     Past Surgical History:   Procedure Laterality Date    BREAST SURGERY Left     Mass removal    OTHER SURGICAL HISTORY  01/18/2019    No history of surgery         Family History      Family History   Problem Relation Name Age of Onset    Asthma Mother      Colon cancer Mother           Social History     Social History     Socioeconomic History    Marital status: Single   Tobacco Use    Smoking status: Never    Smokeless tobacco: Never   Vaping Use    Vaping status: " "Never Used   Substance and Sexual Activity    Alcohol use: Yes     Comment: social    Drug use: Yes     Types: Marijuana    Sexual activity: Yes     Social Drivers of Health     Financial Resource Strain: Not on File (5/15/2024)    Received from [facility]    Financial Resource Strain     Financial Resource Strain: 0   Food Insecurity: Not on File (5/15/2024)    Received from [facility]    Food Insecurity     Food: 0   Transportation Needs: Not on File (5/15/2024)    Received from [facility]    Transportation Needs     Transportation: 0   Physical Activity: Not on File (5/15/2024)    Received from [facility]    Physical Activity     Physical Activity: 0   Stress: Not on File (5/15/2024)    Received from [facility]    Stress     Stress: 0   Social Connections: Not on File (5/15/2024)    Received from [facility]    Social Connections     Social Connections and Isolation: 0   Housing Stability: Not on File (5/15/2024)    Received from [facility]    Housing Stability     Housin         Allergies     Allergies   Allergen Reactions    Morphine Dizziness    Pollen Extracts Runny nose    House Dust Runny nose       Medications   cetirizine, 10 mg, Daily  fluticasone, 1 spray, Daily  pantoprazole, 40 mg, Daily before breakfast  polyethylene glycol, 17 g, Daily         acetaminophen, 650 mg, q4h PRN   Or  acetaminophen, 650 mg, q4h PRN   Or  acetaminophen, 650 mg, q4h PRN  albuterol, 1-2 puff, q4h PRN  hydrALAZINE, 10 mg, q20 min PRN  HYDROmorphone, 0.2 mg, q3h PRN  labetaloL, 10 mg, q10 min PRN  melatonin, 6 mg, Nightly PRN  ondansetron, 4 mg, q8h PRN   Or  ondansetron, 4 mg, q8h PRN  oxyCODONE, 10 mg, q4h PRN  oxyCODONE, 5 mg, q4h PRN        Review of Systems   Review of Systems   10 pt ROS reviewed and negative aside from above    Physical Exam   Blood pressure 126/85, pulse (!) 114, temperature 36.7 °C (98.1 °F), temperature source Temporal, resp. rate 20, height 1.6 m (5' 3\"), weight 65.8 kg (145 lb), " last menstrual period 2024, SpO2 99%.    ECO    Detailed exam deferred given severe headache     Gen: awake, uncomfortable from headache   CV: sinus tachycardia on telemetry   Pulm: no dyspnea or hypoxia   Skin: hyperpigmented lesion on right antecubital fossa   Neuro: A&Ox3, moves all 4 extremities spontaneously, no dysarthria     Labs     Lab Results   Component Value Date    GLUCOSE 111 (H) 10/16/2024    CALCIUM 9.6 10/16/2024     10/16/2024    K 4.7 10/16/2024    CO2 27 10/16/2024     10/16/2024    BUN 10 10/16/2024    CREATININE 0.54 10/16/2024       Lab Results   Component Value Date    WBC 10.0 10/16/2024    HGB 11.8 (L) 10/16/2024    HCT 39.1 10/16/2024    MCV 84 10/16/2024     10/16/2024       Lab Results   Component Value Date    ALT 6 (L) 10/14/2024    AST 12 10/14/2024    ALKPHOS 57 10/14/2024    BILITOT 0.3 10/14/2024       LH/FSH normal  Estrogen/progesterone normal  Prolactin elevated 34.1  IGF 1 normal  A1c 5.2  Urine beta HCG negative     10/15/24:  A. CEREBROSPINAL FLUID- CSF:  - No malignant cells identified.  - Few scattered lymphocytes, neutrophils and red cells present, suggestive of peripheral blood contamination.    Imaging   MRI brain 10/13/24:   IMPRESSION:  Cystic/necrotic rim enhancing lesion centered within the hypothalamus  measuring up the 2.4 cm resulting in partial effacement of the neural  foramina and slight prominence of the lateral ventricles.      Additional nonenhancing FLAIR hyperintense lesion within the medial  left temporal lobe measuring 1.1 x 0.9 cm. Given multifocal  appearance, findings may represent primary neoplasm such as an optic  pathway glioma or less likely metastatic disease. Suprasellar lesions  such as craniopharyngioma and germinoma are also thought to be less  likely. Infection can not be ruled out as diffusion-weighted imaging  is nondiagnostic secondary to susceptibility from patient's braces.    Assessment/Plan     Sejal  Omega is a 24 y.o. 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     Recommendations:  - f/u final pathology  - please send LDH and AFP as germ cell tumour is on the differential (ordered)   - please send AM and random cortisol to complete hypopituitarism work-up (ordered)  - will arrange f/u outpatient to discuss final pathology and next steps     Thank you for this consult, we will sign-off. Patient seen and discussed with attending physician, Dr. Aguilar, who agrees with the above.     Andrew Clayton MD  Hematology-Oncology Fellow, PGY5  Hematology Consult Pager: [phone]  Oncology Consult Pager: [phone]         ATTENDING ATTESTATION     I have seen and examined the patient with Dr. Clayton, reviewed labs and imaging and discussed plan of care.   Ms. Duff developed progressively worsening headaches and was found to have a hypothalamic mass. She is status post biopsy. Final pathology pending.     Limited exam as she is still having quite bad headaches. Will defer to primary team for management. Discussed that we will fu in neuro-oncology clinic after discharge to discuss final pathology and next steps for treatment.       Migue Aguilar MD  Neuro-Oncology

## 2024-10-16 NOTE — PROGRESS NOTES
[facility]  NEUROSCIENCE INTENSIVE CARE UNIT  DAILY PROGRESS NOTE       Patient Name: Sejal Duff   MRN: [id number]     Admit Date: 10/14/2024     : 2000 AGE: 24 y.o. GENDER: female        Subjective    24 y.o. female with PMH anemia, asthma, GERD, IBS, PCOS, 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 Admitted 10/14/2024 with Brain mass after presenting with headache.  CTH with third ventricular mass w/ calcifications. MRI with 2.4cm peripherally enhancing third ventricular mass with nonenhancing FLAIR hyperintense lesion in medial L temporal lobe. 10/15 s/p stereotactic biopsy of third ventricular mass. Admitted to NSU for close neuro monitoring.          Objective   VITALS (24H):  Temp:  [36 °C (96.8 °F)-36.7 °C (98.1 °F)] 36.7 °C (98.1 °F)  Heart Rate:  [] 95  Resp:  [12-24] 24  BP: (102-134)/(65-88) 127/83  INTAKE/OUTPUT:  Intake/Output Summary (Last 24 hours) at 10/15/2024 2144  Last data filed at 10/15/2024 1800  Gross per 24 hour   Intake 810 ml   Output 5 ml   Net 805 ml     VENT SETTINGS:        PHYSICAL EXAM:  NEURO:  - Awake, Alert, oriented x4, follows commands  - EOS, PERRL, EOMI, VFF  - GILLIL[place] 5/5 strength, no drift, no ataxia  - SILT   CV:  - RRR on telemetry, NSR  RESP:  - Regular, unlabored  - On RA  :  - Female external cath  GI:  - Abdomen NT/ND, soft  SKIN:  - Intact    MEDICATIONS:  Scheduled: PRN: Continuous:   cetirizine, 10 mg, oral, Daily  fluticasone, 1 spray, Each Nostril, Daily  pantoprazole, 40 mg, oral, Daily before breakfast  polyethylene glycol, 17 g, oral, Daily     PRN medications: acetaminophen **OR** acetaminophen **OR** acetaminophen, albuterol, hydrALAZINE, labetaloL, melatonin, oxyCODONE, oxyCODONE       LAB RESULTS:  Results from last 72 hours   Lab Units 10/14/24  0331   GLUCOSE mg/dL 115*   SODIUM mmol/L 140   POTASSIUM mmol/L 3.6   CHLORIDE mmol/L 110*   CO2 mmol/L 25   ANION GAP mmol/L 9*   BUN mg/dL 10   CREATININE mg/dL 0.52   EGFR  mL/min/1.73m*2 >90   CALCIUM mg/dL 8.8   ALBUMIN g/dL 3.6      Results from last 72 hours   Lab Units 10/14/24  0331   WBC AUTO x10*3/uL 7.2   NRBC AUTO /100 WBCs 0.0   RBC AUTO x10*6/uL 3.93*   HEMOGLOBIN g/dL 10.2*   HEMATOCRIT % 33.4*   MCV fL 85   MCH pg 26.0   MCHC g/dL 30.5*   RDW % 13.2   PLATELETS AUTO x10*3/uL 319      Results from last 72 hours   Lab Units 10/14/24  0331   WBC AUTO x10*3/uL 7.2   NRBC AUTO /100 WBCs 0.0   RBC AUTO x10*6/uL 3.93*   HEMOGLOBIN g/dL 10.2*   HEMATOCRIT % 33.4*   MCV fL 85   MCH pg 26.0   MCHC g/dL 30.5*   RDW % 13.2   PLATELETS AUTO x10*3/uL 319   NEUTROS PCT AUTO % 44.6   IG PCT AUTO % 0.3   LYMPHS PCT AUTO % 39.5   MONOS PCT AUTO % 9.7   EOS PCT AUTO % 5.3   BASOS PCT AUTO % 0.6   NEUTROS ABS x10*3/uL 3.23   IG AUTO x10*3/uL 0.02   LYMPHS ABS AUTO x10*3/uL 2.85   MONOS ABS AUTO x10*3/uL 0.70   EOS ABS AUTO x10*3/uL 0.38   BASOS ABS AUTO x10*3/uL 0.04      Results from last 72 hours   Lab Units 10/14/24  0331   PROTIME seconds 12.1   INR  1.1        IMAGING RESULTS:  XR chest 1 view   Final Result   1. No acute cardiopulmonary process.        MACRO:   None.        Signed by: Yue Vanessa 10/14/2024 12:43 PM   Dictation workstation:   [id number]      MR brain presurgical perfusion and fingerprinting    (Results Pending)         Assessment/Plan    NEURO:  #third ventricular mass s/p stereotactic biopsy of third ventricular mass  Assessment:  - Neurologically:   - Awake, Alert, oriented x4, follows commands  - EOS, PERRL, EOMI, VFF  - GILLIL[place] 5/5 strength, no drift, no ataxia  - SILT   Plan:  - NSU  - Neuro Checks: Q1H  - Pain: acetaminophen PRN and oxycodone PRN  - Nausea: ondansetron  - PT/OT/SLP    CARDIOVASCULAR:  Assessment:  - SR  Plan:  - Continue to monitor on telemetry  - BP goal: SBP < 160    --> PRN: Labetalol and Hydralazine     RESPIRATORY:  #Asthma  Assessment:  - On RA  Plan:  - Continuous pulse oximetry   - O2 PRN to maintain SpO2 > 94%, wean as tolerated  -  Incentive spirometry while awake  - Home inhalers as needed     RENAL/:  Assessment:  Results from last 72 hours   Lab Units 10/14/24  0331   BUN mg/dL 10   CREATININE mg/dL 0.52   Plan:  - Monitor with daily RFP  -  Monitor UOP    FEN/GI:  #GERD  Assessment:  - Last BM: PTA  Plan:  - Monitor and replace electrolytes per protocol  - Diet: Adult diet Regular    - Bowel Regimen: Miralax QD  - c/w home PPI    ENDOCRINE:  Assessment:  Results from last 7 days   Lab Units 10/15/24  1933 10/14/24  0331   POCT GLUCOSE mg/dL 103*  --    GLUCOSE mg/dL  --  115*   Plan:  - Accuchecks & ISS PRN   - Hypoglycemia protocol    HEME/ONC:  #Anemia, breast fibroadenoma s/p L breast excisional biopsy  Assessment:  Results from last 7 days   Lab Units 10/14/24  0331   HEMOGLOBIN g/dL 10.2*   HEMATOCRIT % 33.4*   PLATELETS AUTO x10*3/uL 319   Plan:  - Continue to monitor with daily CBC and Coag panel    INFECTIOUS DISEASE:  Assessment:  Results from last 7 days   Lab Units 10/14/24  0331   WBC AUTO x10*3/uL 7.2    - Temp (24hrs), Av.2 °C (97.2 °F), Min:36 °C (96.8 °F), Max:36.7 °C (98.1 °F)  Plan:  - Continue to monitor for s/sx of infection  - Pan culture for temperature > 38.4 C    MUSCULOSKELETAL:  - No acute issues    SKIN:  - No acute issues  - Turns and skin care per NSU protocol    ACCESS:  - PIVs    PROPHYLAXIS:  - DVT Ppx: SCDs and chem ppx POD2  - GI Ppx: Pantoprazole    RESTRAINTS:  Not indicated/Patient does not meet criteria for restraints    Yen Amaya APRN-CNP  Neuroscience Intensive Care       Total critical care time of 60 minutes, with > 50% of time spent in direct contact with patient/family for education, counseling and coordination of care.

## 2024-10-16 NOTE — PROGRESS NOTES
"Sejal Duff is a 24 y.o. female on day 2 of admission presenting with Brain mass.    Subjective   No acute events overnight    Objective     Physical Exam  Awake, oriented x 3  EOMI, FS, TM  BUE 5/5  BLE 5/5  SILT  Incision c/d/i    Last Recorded Vitals  Blood pressure 112/67, pulse 97, temperature 36.7 °C (98.1 °F), temperature source Temporal, resp. rate 10, height 1.6 m (5' 3\"), weight 65.8 kg (145 lb), last menstrual period 09/29/2024, SpO2 97%.  Intake/Output last 3 Shifts:  I/O last 3 completed shifts:  In: 810 (12.3 mL/kg) [P.O.:10; I.V.:800 (12.2 mL/kg)]  Out: 5 (0.1 mL/kg) [Blood:5]  Weight: 65.8 kg     Relevant Results  Lab Results   Component Value Date    WBC 10.0 10/16/2024    HGB 11.8 (L) 10/16/2024    HCT 39.1 10/16/2024    MCV 84 10/16/2024     10/16/2024     Lab Results   Component Value Date    GLUCOSE 111 (H) 10/16/2024    CALCIUM 9.6 10/16/2024     10/16/2024    K 4.7 10/16/2024    CO2 27 10/16/2024     10/16/2024    BUN 10 10/16/2024    CREATININE 0.54 10/16/2024       Assessment/Plan   Assessment & Plan  Brain mass  Sejal Duff is a 24 y.o. female h/o anemia, asthma GERD, IBS, PCOS, breast fibroadenoma s/p L breast excisional biopsy, p/w HAx 3d, CTH third ventricular mass w/ calcifications, MRI 2.4cm peripherally enhancing third ventricular necrotic mass w/ nonenhancing FLAIR hyperintense lesion in medial L temporal lobe   10/16 s/p R stereotactic brain bx (prelim glial cells w/ luigi fibers vs craniopharyngioma), CTH POC    floor   Will reach out to ophthalmology for homegoing recs and follow up  Will arrange neuro-onc follow up  Will arrange 2 week outpatient follow up   PTOT  SCD, hold dvt ppx      Eliseo Obrien MD      "

## 2024-10-17 LAB
ALBUMIN SERPL BCP-MCNC: 4.8 G/DL (ref 3.4–5)
ANION GAP SERPL CALC-SCNC: 14 MMOL/L (ref 10–20)
BASOPHILS # BLD AUTO: 0.02 X10*3/UL (ref 0–0.1)
BASOPHILS NFR BLD AUTO: 0.2 %
BUN SERPL-MCNC: 14 MG/DL (ref 6–23)
CA-I BLD-SCNC: 1.23 MMOL/L (ref 1.1–1.33)
CALCIUM SERPL-MCNC: 10.3 MG/DL (ref 8.6–10.6)
CHLORIDE SERPL-SCNC: 103 MMOL/L (ref 98–107)
CO2 SERPL-SCNC: 26 MMOL/L (ref 21–32)
CORTIS AM PEAK SERPL-MSCNC: 3.2 UG/DL (ref 5–20)
CREAT SERPL-MCNC: 0.69 MG/DL (ref 0.5–1.05)
EGFRCR SERPLBLD CKD-EPI 2021: >90 ML/MIN/1.73M*2
EOSINOPHIL # BLD AUTO: 0 X10*3/UL (ref 0–0.7)
EOSINOPHIL NFR BLD AUTO: 0 %
ERYTHROCYTE [DISTWIDTH] IN BLOOD BY AUTOMATED COUNT: 13.6 % (ref 11.5–14.5)
GLUCOSE SERPL-MCNC: 99 MG/DL (ref 74–99)
HCT VFR BLD AUTO: 38.5 % (ref 36–46)
HGB BLD-MCNC: 11.7 G/DL (ref 12–16)
IMM GRANULOCYTES # BLD AUTO: 0.06 X10*3/UL (ref 0–0.7)
IMM GRANULOCYTES NFR BLD AUTO: 0.7 % (ref 0–0.9)
LYMPHOCYTES # BLD AUTO: 1.16 X10*3/UL (ref 1.2–4.8)
LYMPHOCYTES NFR BLD AUTO: 13.2 %
MAGNESIUM SERPL-MCNC: 2.07 MG/DL (ref 1.6–2.4)
MCH RBC QN AUTO: 25.5 PG (ref 26–34)
MCHC RBC AUTO-ENTMCNC: 30.4 G/DL (ref 32–36)
MCV RBC AUTO: 84 FL (ref 80–100)
MONOCYTES # BLD AUTO: 0.25 X10*3/UL (ref 0.1–1)
MONOCYTES NFR BLD AUTO: 2.9 %
NEUTROPHILS # BLD AUTO: 7.28 X10*3/UL (ref 1.2–7.7)
NEUTROPHILS NFR BLD AUTO: 83 %
NRBC BLD-RTO: 0 /100 WBCS (ref 0–0)
PHOSPHATE SERPL-MCNC: 4.2 MG/DL (ref 2.5–4.9)
PLATELET # BLD AUTO: 387 X10*3/UL (ref 150–450)
POTASSIUM SERPL-SCNC: 4.1 MMOL/L (ref 3.5–5.3)
RBC # BLD AUTO: 4.58 X10*6/UL (ref 4–5.2)
SODIUM SERPL-SCNC: 139 MMOL/L (ref 136–145)
WBC # BLD AUTO: 8.8 X10*3/UL (ref 4.4–11.3)

## 2024-10-17 PROCEDURE — 2500000001 HC RX 250 WO HCPCS SELF ADMINISTERED DRUGS (ALT 637 FOR MEDICARE OP)

## 2024-10-17 PROCEDURE — 36415 COLL VENOUS BLD VENIPUNCTURE: CPT

## 2024-10-17 PROCEDURE — 82330 ASSAY OF CALCIUM: CPT

## 2024-10-17 PROCEDURE — 2500000004 HC RX 250 GENERAL PHARMACY W/ HCPCS (ALT 636 FOR OP/ED)

## 2024-10-17 PROCEDURE — 83735 ASSAY OF MAGNESIUM: CPT

## 2024-10-17 PROCEDURE — 97530 THERAPEUTIC ACTIVITIES: CPT | Mod: GP

## 2024-10-17 PROCEDURE — 80069 RENAL FUNCTION PANEL: CPT

## 2024-10-17 PROCEDURE — 2500000001 HC RX 250 WO HCPCS SELF ADMINISTERED DRUGS (ALT 637 FOR MEDICARE OP): Performed by: STUDENT IN AN ORGANIZED HEALTH CARE EDUCATION/TRAINING PROGRAM

## 2024-10-17 PROCEDURE — 97161 PT EVAL LOW COMPLEX 20 MIN: CPT | Mod: GP

## 2024-10-17 PROCEDURE — 85025 COMPLETE CBC W/AUTO DIFF WBC: CPT

## 2024-10-17 PROCEDURE — 1100000001 HC PRIVATE ROOM DAILY

## 2024-10-17 PROCEDURE — 82533 TOTAL CORTISOL: CPT

## 2024-10-17 PROCEDURE — 2500000004 HC RX 250 GENERAL PHARMACY W/ HCPCS (ALT 636 FOR OP/ED): Performed by: NURSE PRACTITIONER

## 2024-10-17 PROCEDURE — 97165 OT EVAL LOW COMPLEX 30 MIN: CPT | Mod: GO

## 2024-10-17 RX ORDER — CALCIUM CARBONATE 200(500)MG
500 TABLET,CHEWABLE ORAL 4 TIMES DAILY PRN
Status: DISCONTINUED | OUTPATIENT
Start: 2024-10-17 | End: 2024-10-19 | Stop reason: HOSPADM

## 2024-10-17 RX ORDER — KETOROLAC TROMETHAMINE 30 MG/ML
30 INJECTION, SOLUTION INTRAMUSCULAR; INTRAVENOUS ONCE
Status: COMPLETED | OUTPATIENT
Start: 2024-10-17 | End: 2024-10-17

## 2024-10-17 RX ORDER — MAGNESIUM SULFATE 1 G/100ML
1 INJECTION INTRAVENOUS ONCE
Status: COMPLETED | OUTPATIENT
Start: 2024-10-18 | End: 2024-10-18

## 2024-10-17 RX ORDER — MAGNESIUM SULFATE 1 G/100ML
1 INJECTION INTRAVENOUS ONCE
Status: DISCONTINUED | OUTPATIENT
Start: 2024-10-17 | End: 2024-10-17

## 2024-10-17 RX ORDER — KETOROLAC TROMETHAMINE 30 MG/ML
15 INJECTION, SOLUTION INTRAMUSCULAR; INTRAVENOUS ONCE
Status: COMPLETED | OUTPATIENT
Start: 2024-10-17 | End: 2024-10-17

## 2024-10-17 RX ORDER — DIPHENHYDRAMINE HYDROCHLORIDE 50 MG/ML
25 INJECTION INTRAMUSCULAR; INTRAVENOUS ONCE
Status: COMPLETED | OUTPATIENT
Start: 2024-10-17 | End: 2024-10-17

## 2024-10-17 RX ORDER — DIPHENHYDRAMINE HYDROCHLORIDE 50 MG/ML
12.5 INJECTION INTRAMUSCULAR; INTRAVENOUS ONCE
Status: COMPLETED | OUTPATIENT
Start: 2024-10-17 | End: 2024-10-17

## 2024-10-17 ASSESSMENT — COGNITIVE AND FUNCTIONAL STATUS - GENERAL
MOBILITY SCORE: 24
DAILY ACTIVITIY SCORE: 24
MOBILITY SCORE: 24
DAILY ACTIVITIY SCORE: 24

## 2024-10-17 ASSESSMENT — PAIN SCALES - GENERAL
PAINLEVEL_OUTOF10: 8
PAINLEVEL_OUTOF10: 7
PAINLEVEL_OUTOF10: 9
PAINLEVEL_OUTOF10: 9
PAINLEVEL_OUTOF10: 10 - WORST POSSIBLE PAIN
PAINLEVEL_OUTOF10: 7
PAINLEVEL_OUTOF10: 10 - WORST POSSIBLE PAIN
PAINLEVEL_OUTOF10: 10 - WORST POSSIBLE PAIN

## 2024-10-17 ASSESSMENT — PAIN DESCRIPTION - LOCATION
LOCATION: HEAD

## 2024-10-17 ASSESSMENT — PAIN SCALES - PAIN ASSESSMENT IN ADVANCED DEMENTIA (PAINAD)
TOTALSCORE: MEDICATION (SEE MAR)
TOTALSCORE: MEDICATION (SEE MAR)

## 2024-10-17 ASSESSMENT — PAIN - FUNCTIONAL ASSESSMENT
PAIN_FUNCTIONAL_ASSESSMENT: 0-10

## 2024-10-17 ASSESSMENT — PAIN DESCRIPTION - ORIENTATION
ORIENTATION: RIGHT;LEFT
ORIENTATION: RIGHT;LEFT

## 2024-10-17 NOTE — SIGNIFICANT EVENT
.Rapid Response Nurse Note: [] Rapid Response [x] RADAR alert: Score 6    Pager time: [phone]  Arrival time: 755  Event end time: 820  Location: T4    Rapid response initiated by:  [] Rapid response RN [] Family [] Nursing Supervisor [] Physician   [x] RADAR auto page [] Sepsis auto-page [] RN [] RT   [] NP/PA [] Other:     Primary reason for call:   [] BAT [] New CPAP/BiPAP [] Bleeding [] Change in mental status   [] Chest pain [] Code blue [] FiO2 >/= 50% [] HR </= 40 bpm   [] HR >/= 130 bpm [] Hyperglycemia [] Hypoglycemia [x] RADAR    [] RR </= 8 bpm [] RR >/= 30 bpm [] SBP </= 90 mmHg [] SpO2 < 90%   [] Seizure [] Sepsis [] Shorness of breath  [] Staff concern: see comments     Interventions:  [x] None [] ABG/VBG [] Assist w/ICU transfer [] BAT paged    [] Bag mask [] Blood [] Cardioversion [] Code Blue   [] Code blue for intubation [] Code status changed [] Chest x-ray [] EKG   [] IV fluid/bolus [] KUB x-ray [] Labs/cultures [] Medication   [] Nebulizer treatment [] NIPPV (CPAP/BiPAP) [] Oxygen [] Oral airway   [] Peripheral IV [] Palliative care consult [] CT/MRI [] Sepsis protocol    [] Suctioned [] Other:     Outcome:  [] Coded and  [] Code blue for intubation [] Coded and transferred to ICU []  on division   [] Remained on division (no change) [] Remained on division + additional monitoring [] Remained in ED [] Transferred to ED   [] Transferred to ICU [] Transferred to inpatient status [] Transferred for interventions (procedure) [] Transferred to ICU stepdown    [] Transferred to surgery [] Transferred to telemetry [] Sepsis protocol [] STEMI protocol   [] Stroke protocol       Additional Comments: RADAR received: 30.2 20 96/65 94%. Patient resting in bed; temp rechecked 37.0.  Notified nurse of temp recheck and RADAR VS; discussed patient.  No concerns at this time. Encouraged to call with concerns.

## 2024-10-17 NOTE — CARE PLAN
The patient's goals for the shift include  pain management    The clinical goals for the shift include Patient will remain safe and free from injury overnight.    Over the shift, the patient was safe and had a stable exam. Patient continues to endorse high pain levels.  Problem: Pain - Adult  Goal: Verbalizes/displays adequate comfort level or baseline comfort level  Outcome: Progressing     Problem: Safety - Adult  Goal: Free from fall injury  Outcome: Progressing     Problem: Discharge Planning  Goal: Discharge to home or other facility with appropriate resources  Outcome: Progressing     Problem: Chronic Conditions and Co-morbidities  Goal: Patient's chronic conditions and co-morbidity symptoms are monitored and maintained or improved  Outcome: Progressing

## 2024-10-17 NOTE — PROGRESS NOTES
Care Transitions Progress Note:  Plan per medical team: pod 2 Brain Biopsy PT:OT NN  Team Members Present: NP: MD: TCC: SCOTTN: PIPER  Status: inpatient  Payor: [employer]  Barriers: none  Adod: 1-2 days

## 2024-10-17 NOTE — PROGRESS NOTES
Occupational Therapy    Evaluation    Patient Name: Sejal Duff  MRN: [id number]  Today's Date: 10/17/2024  Time Calculation  Start Time: 0941  Stop Time: 0949  Time Calculation (min): 8 min    Assessment  IP OT Assessment  OT Assessment: Pt presented with good static and dynamic standing balance, pt completed functional ambulation and transfers independently, B UE strenght and AROM appeared WFL. No visual deficits noted. No skille OT services needed upon discharge.  Prognosis: Good  Barriers to Discharge: None  Evaluation/Treatment Tolerance: Patient tolerated treatment well  Medical Staff Made Aware: Yes  End of Session Communication: Bedside nurse  End of Session Patient Position:  (sitting at the side of the bed at the start and end of the session.)  Plan:  No Skilled OT: At baseline function  OT Frequency: OT eval only  OT Discharge Recommendations: No further acute OT, No OT needed after discharge  OT Recommended Transfer Status: Independent  OT - OK to Discharge: Yes    Subjective   Current Problem:  1. Brain mass  Case Request Operating Room: Stereotactic biopsy of third ventricular mass    Case Request Operating Room: Stereotactic biopsy of third ventricular mass    Surgical Pathology Exam    Surgical Pathology Exam    Glucose, CSF    Protein, CSF    Non-gynecologic cytology    Tumor Board Request    Referral to Hematology and Oncology    CANCELED: Case Request Operating Room: Right Craniotomy for tumor biopsy, resection with possible external ventricular drain placement    CANCELED: Case Request Operating Room: Right Craniotomy for tumor biopsy, resection with possible external ventricular drain placement    CANCELED: Case Request Operating Room: Stereotactic brain biopsy of third ventricular mass    CANCELED: Case Request Operating Room: Stereotactic brain biopsy of third ventricular mass        General:  Reason for Referral: 3rd ventricle mass s/p biopsy  Past Medical History Relevant to Rehab: Anemia,  asthma GERD, IBS, PCOS, 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 Presenting with several days of HAs  Prior to Session Communication: Bedside nurse  Patient Position Received:  (sitting at the side of the bed upon OT arrival.)  Family/Caregiver Present: Yes  Caregiver Feedback: mother present   Precautions:  Medical Precautions: Fall precautions    Pain:  Pain Assessment  Pain Assessment: 0-10  0-10 (Numeric) Pain Score: 10 - Worst possible pain  Pain Type: Acute pain  Pain Location: Head  Pain Interventions:  (RN was notified)        Objective   Cognition:  Overall Cognitive Status: Within Functional Limits  Arousal/Alertness: Appropriate responses to stimuli  Orientation Level: Oriented X4  Insight: Within function limits           Home Living:  Type of Home: House  Lives With: Parent(s)  Home Adaptive Equipment: None  Home Layout: Multi-level, Bed/bath upstairs, Stairs to alternate level with rails  Alternate Level Stairs-Number of Steps: 12  Home Access: Stairs to enter with rails  Entrance Stairs-Number of Steps: 4   Prior Function:  Level of [place]: Independent with ADLs and functional transfers  Ambulatory Assistance: Independent  Vocational:  (works as a STNA)  Hand Dominance: Right     ADL:  Eating Assistance: Independent  Grooming Assistance: Independent  UE Dressing Assistance: Independent  UE Dressing Deficit:  (anticipate)  LE Dressing Assistance: Independent  Toileting Assistance with Device: Independent  Toileting Deficit:  (anticipate)  Activity Tolerance:  Endurance: Endurance does not limit participation in activity  Balance:  Dynamic Sitting Balance  Dynamic Sitting-Balance Support: No upper extremity supported  Dynamic Sitting-Level of Assistance: Independent  Dynamic Standing Balance  Dynamic Standing-Balance Support: No upper extremity supported  Dynamic Standing-Level of Assistance: Independent  Static Sitting Balance  Static Sitting-Balance Support: No upper extremity  supported  Static Sitting-Level of Assistance: Independent  Static Standing Balance  Static Standing-Balance Support: No upper extremity supported  Static Standing-Level of Assistance: Independent  Bed Mobility/Transfers: Bed Mobility  Bed Mobility: No   and Transfers  Transfer: Yes  Transfer 1  Transfer From 1: Bed to  Transfer to 1: Stand  Technique 1: Sit to stand  Transfer Level of Assistance 1: Independent  Transfers 2  Transfer From 2: Stand to  Transfer to 2: Bed  Technique 2: Stand to sit  Transfer Level of Assistance 2: Independent     Vision: Vision - Basic Assessment  Current Vision: No visual deficits   and Vision - Complex Assessment  Ocular Range of Motion: Within Functional Limits  Sensation:  Light Touch: No apparent deficits    Perception:  Inattention/Neglect: Appears intact  Coordination:  Movements are Fluid and Coordinated: Yes   Hand Function:  Hand Function  Gross Grasp: Functional  Coordination: Functional  Extremities: RUE   RUE : Within Functional Limits, LUE   LUE: Within Functional Limits,  , and      Outcome Measures: [facility] Daily Activity  Putting on and taking off regular lower body clothing: None  Bathing (including washing, rinsing, drying): None  Putting on and taking off regular upper body clothing: None  Toileting, which includes using toilet, bedpan or urinal: None  Taking care of personal grooming such as brushing teeth: None  Eating Meals: None  Daily Activity - Total Score: 24         ,     OT Adult Other Outcome Measures  4AT: negative    Education Documentation  Body Mechanics, taught by Rober Giron OT at 10/17/2024  3:17 PM.  Learner: Patient  Readiness: Acceptance  Method: Explanation  Response: Verbalizes Understanding    ADL Training, taught by Rober Giron OT at 10/17/2024  3:17 PM.  Learner: Patient  Readiness: Acceptance  Method: Explanation  Response: Verbalizes Understanding    Education Comments  No comments found.        10/17/24 at 3:18 PM   Rober Giron  OTR/OTD  Rehab Office: [phone]

## 2024-10-17 NOTE — PROGRESS NOTES
Physical Therapy    Physical Therapy Evaluation & Treatment    Patient Name: Sejal Duff  MRN: [id number]  Department: [place] [id number]  Room: [address] [place] [address]  Today's Date: 10/17/2024   Time Calculation  Start Time: 0840  Stop Time: 0906  Time Calculation (min): 26 min    Assessment/Plan   PT Assessment  PT Assessment Results:  (Ind with mobility)  Evaluation/Treatment Tolerance: Patient tolerated treatment well  Medical Staff Made Aware: Yes  Strengths: Attitude of self, Premorbid level of function  End of Session Communication: Bedside nurse  Assessment Comment: 25 yo female typ Ind with mobility.  Pt is s/p biopsy of 3rd ventricla mass.  Pt progressing from close supv to Ind during session for community distance ambulation and stair navigation.  Does not have continued PT needs while in hospital.  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 Presenting with several days of HAs  Family/Caregiver Present: Yes  Caregiver Feedback: mother present  Prior to Session Communication: Bedside nurse  Patient Position Received: Bed, 3 rail up, Alarm off, not on at start of session  General Comment: Supine.  Pt pleasant, cooperative and agreeable to PT.  Needing to use restroom and then agreeable to ambulate in hallway.  Able to ambulate in [place] and navigate stairs without difficulty.  Tolerated well  Home Living:  Home Living  Type of Home: House  Lives  With: Parent(s) (Mother and 16 yo brother)  Home Adaptive Equipment: None  Home Layout: Multi-level, Bed/bath upstairs, Stairs to alternate level with rails  Alternate Level Stairs-Number of Steps: 12  Home Access: Stairs to enter with rails  Entrance Stairs-Number of Steps: 4  Prior Level of Function:  Prior Function Per Pt/Caregiver Report  Level of [place]: Independent with ADLs and functional transfers  Ambulatory Assistance: Independent (community ambulator with no AD)  Vocational:  (STNA)  Precautions:  Precautions  Medical Precautions: Fall precautions    Objective   Pain:  Pain Assessment  Pain Assessment:  (No reports of pain during visit)  Cognition:  Cognition  Overall Cognitive Status: Within Functional Limits  Arousal/Alertness: Appropriate responses to stimuli  Orientation Level: Oriented X4  Following Commands: Follows all commands and directions without difficulty  Safety Judgment: Good awareness of safety precautions  Impulsive: Within functional limits    General Assessments:    Activity Tolerance  Endurance: Endurance does not limit participation in activity    Sensation  Light Touch: No apparent deficits    Strength  Strength Comments: BLE 5/5  Strength  Strength Comments: BLE 5/5    Perception  Inattention/Neglect: Appears intact      Coordination  Movements are Fluid and Coordinated: Yes    Postural Control  Postural Control: Within Functional Limits    Static Sitting Balance  Static Sitting-Level of Assistance: Independent  Dynamic Sitting Balance  Dynamic Sitting-Level of Assistance: Independent    Static Standing Balance  Static Standing-Level of Assistance: Independent  Dynamic Standing Balance  Dynamic Standing-Level of Assistance: Independent  Functional Assessments:     Bed Mobility  Bed Mobility: Yes  Bed Mobility 1  Bed Mobility 1: Supine to sitting, Sitting to supine  Level of Assistance 1: Independent    Transfers  Transfer: Yes  Transfer 1  Transfer From 1: Sit to, Stand  to  Transfer to 1: Sit  Transfer Level of Assistance 1: Independent  Transfers 2  Transfer From 2: Stand to, Toilet to  Transfer to 2: Stand  Transfer Level of Assistance 2: Independent    Ambulation/Gait Training  Ambulation/Gait Training Performed: Yes  Ambulation/Gait Training 1  Surface 1: Level tile  Device 1: No device  Assistance 1: Independent (Close supv with VCs progressing to Ind)  Quality of Gait 1:  (Minimal lateral sway and path deviation to start, and then WFL)  Comments/Distance (ft) 1: 15 feet, 2x 150 feet, seated breaks    Stairs  Stairs: Yes  Stairs  Rails 1: Left  Device 1: Railing  Assistance 1: Independent (First time close supv, 2nd time Ind)  Comment/Number of Steps 1: up/dn 4 stairs, 2 trials, reciprocal    Extremity/Trunk Assessments:    RLE   RLE : Within Functional Limits  LLE   LLE : Within Functional Limits  Treatments:    Bed Mobility  Bed Mobility: Yes  Bed Mobility 1  Bed Mobility 1: Supine to sitting, Sitting to supine  Level of Assistance 1: Independent    Ambulation/Gait Training  Ambulation/Gait Training Performed: Yes  Ambulation/Gait Training 1  Surface 1: Level tile  Device 1: No device  Assistance 1: Independent (Close supv with VCs progressing to Ind)  Quality of Gait 1:  (Minimal lateral sway and path deviation to start, and then WFL)  Comments/Distance (ft) 1: 15 feet, 2x 150 feet, seated breaks  Transfers  Transfer: Yes  Transfer 1  Transfer From 1: Sit to, Stand to  Transfer to 1: Sit  Transfer Level of Assistance 1: Independent  Transfers 2  Transfer From 2: Stand to, Toilet to  Transfer to 2: Stand  Transfer Level of Assistance 2: Independent    Stairs  Stairs: Yes  Stairs  Rails 1: Left  Device 1: Railing  Assistance 1: Independent (First time close supv, 2nd time Ind)  Comment/Number of Steps 1: up/dn 4 stairs, 2 trials, reciprocal    Outcome Measures:    [facility] Basic Mobility  Turning from your back to your side while in a flat bed without using bedrails:  None  Moving from lying on your back to sitting on the side of a flat bed without using bedrails: None  Moving to and from bed to chair (including a wheelchair): None  Standing up from a chair using your arms (e.g. wheelchair or bedside chair): None  To walk in hospital room: None  Climbing 3-5 steps with railing: None  Basic Mobility - Total Score: 24    Encounter Problems       Encounter Problems (Active)       Pain - Adult              Education Documentation  Mobility Training, taught by Den Hoskins PT at 10/17/2024 10:08 AM.  Learner: Patient  Readiness: Eager  Method: Explanation  Response: Verbalizes Understanding, Demonstrated Understanding    Education Comments  No comments found.

## 2024-10-17 NOTE — DOCUMENTATION CLARIFICATION NOTE
"    PATIENT:               ANDRES HANSON  ACCT #:                  [id number]  MRN:                       [id number]  :                       2000  ADMIT DATE:       10/14/2024 12:43 AM  DISCH DATE:  RESPONDING PROVIDER #:        [id number]          PROVIDER RESPONSE TEXT:    Midline shift or mass effect without brain compression    CDI QUERY TEXT:    Clarification    Instruction:    Based on your assessment of the patient and the clinical information, please provide the requested documentation by clicking on the appropriate radio button and enter any additional information if prompted.    Question: Please further clarify if there is a diagnosis related to the clinical information    When answering this query, please exercise your independent professional judgment. The fact that a question is being asked, does not imply that any particular answer is desired or expected.    The patient's clinical indicators include:  Clinical Information: 24 y.o. female presenting for headache found to have a brain mass  PMHX Gerd, IBS. PCOS    Clinical Indicators: 10/13 1839 MRI \"This demonstrates intrinsic T2 hyperintensity which slightly saturates on FLAIR  imaging. This results in partial effacement of the foramina Miranda.\"  \"Cystic/necrotic rim enhancing lesion centered within the hypothalamus  measuring up the 2.4 cm resulting in partial effacement of the neural  foramina and slight prominence of the lateral ventricles.\"    10/14 HP by Dr. 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

## 2024-10-17 NOTE — PROGRESS NOTES
"Sejal Duff is a 24 y.o. female on day 3 of admission presenting with Brain mass.    Subjective   No acute events overnight, continues to have headache, unchanged from day prior, some nausea, ordered migraine cocktail    Objective     Physical Exam  Awake, oriented x 3  EOMI, FS, TM  BUE 5/5  BLE 5/5  SILT  Incision c/d/i    Last Recorded Vitals  Blood pressure 115/80, pulse 106, temperature 36.6 °C (97.9 °F), resp. rate 20, height 1.6 m (5' 3\"), weight 65.8 kg (145 lb), last menstrual period 09/29/2024, SpO2 98%.  Intake/Output last 3 Shifts:  I/O last 3 completed shifts:  In: 810 (12.3 mL/kg) [P.O.:10; I.V.:800 (12.2 mL/kg)]  Out: 1405 (21.4 mL/kg) [Urine:1400 (0.6 mL/kg/hr); Blood:5]  Weight: 65.8 kg     Relevant Results  Lab Results   Component Value Date    WBC 10.0 10/16/2024    HGB 11.8 (L) 10/16/2024    HCT 39.1 10/16/2024    MCV 84 10/16/2024     10/16/2024     Lab Results   Component Value Date    GLUCOSE 111 (H) 10/16/2024    CALCIUM 9.6 10/16/2024     10/16/2024    K 4.7 10/16/2024    CO2 27 10/16/2024     10/16/2024    BUN 10 10/16/2024    CREATININE 0.54 10/16/2024       Assessment/Plan   Assessment & Plan  Brain mass  Sejal Duff is a 24 y.o. 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

## 2024-10-18 PROCEDURE — 2500000001 HC RX 250 WO HCPCS SELF ADMINISTERED DRUGS (ALT 637 FOR MEDICARE OP): Performed by: NURSE PRACTITIONER

## 2024-10-18 PROCEDURE — 2500000001 HC RX 250 WO HCPCS SELF ADMINISTERED DRUGS (ALT 637 FOR MEDICARE OP)

## 2024-10-18 PROCEDURE — 99239 HOSP IP/OBS DSCHRG MGMT >30: CPT | Performed by: NURSE PRACTITIONER

## 2024-10-18 PROCEDURE — 1100000001 HC PRIVATE ROOM DAILY

## 2024-10-18 PROCEDURE — 2500000001 HC RX 250 WO HCPCS SELF ADMINISTERED DRUGS (ALT 637 FOR MEDICARE OP): Performed by: STUDENT IN AN ORGANIZED HEALTH CARE EDUCATION/TRAINING PROGRAM

## 2024-10-18 PROCEDURE — 2500000004 HC RX 250 GENERAL PHARMACY W/ HCPCS (ALT 636 FOR OP/ED)

## 2024-10-18 PROCEDURE — 2500000004 HC RX 250 GENERAL PHARMACY W/ HCPCS (ALT 636 FOR OP/ED): Performed by: NURSE PRACTITIONER

## 2024-10-18 RX ORDER — CAFFEINE 200 MG
200 TABLET ORAL EVERY 6 HOURS PRN
Qty: 30 TABLET | Refills: 0 | Status: SHIPPED | OUTPATIENT
Start: 2024-10-18

## 2024-10-18 RX ORDER — CAFFEINE 200 MG
200 TABLET ORAL ONCE
Status: COMPLETED | OUTPATIENT
Start: 2024-10-18 | End: 2024-10-18

## 2024-10-18 RX ORDER — DEXAMETHASONE 1 MG/1
2 TABLET ORAL 2 TIMES DAILY
Qty: 56 TABLET | Refills: 0 | Status: SHIPPED | OUTPATIENT
Start: 2024-10-18 | End: 2024-11-01

## 2024-10-18 RX ORDER — ACETAMINOPHEN 325 MG/1
650 TABLET ORAL EVERY 4 HOURS PRN
Start: 2024-10-18

## 2024-10-18 RX ORDER — KETOROLAC TROMETHAMINE 30 MG/ML
15 INJECTION, SOLUTION INTRAMUSCULAR; INTRAVENOUS ONCE
Status: COMPLETED | OUTPATIENT
Start: 2024-10-18 | End: 2024-10-18

## 2024-10-18 RX ORDER — ONDANSETRON 4 MG/1
4 TABLET, FILM COATED ORAL EVERY 6 HOURS PRN
Qty: 30 TABLET | Refills: 1 | Status: SHIPPED | OUTPATIENT
Start: 2024-10-18

## 2024-10-18 RX ORDER — MAGNESIUM SULFATE 1 G/100ML
1 INJECTION INTRAVENOUS ONCE
Status: COMPLETED | OUTPATIENT
Start: 2024-10-18 | End: 2024-10-18

## 2024-10-18 RX ORDER — AMOXICILLIN 250 MG
1 CAPSULE ORAL DAILY
Qty: 7 TABLET | Refills: 0 | Status: SHIPPED | OUTPATIENT
Start: 2024-10-18 | End: 2024-10-25

## 2024-10-18 RX ORDER — OXYCODONE HYDROCHLORIDE 5 MG/1
5 TABLET ORAL EVERY 6 HOURS PRN
Qty: 28 TABLET | Refills: 0 | Status: SHIPPED | OUTPATIENT
Start: 2024-10-18 | End: 2024-10-25

## 2024-10-18 RX ORDER — DIPHENHYDRAMINE HYDROCHLORIDE 50 MG/ML
12.5 INJECTION INTRAMUSCULAR; INTRAVENOUS ONCE
Status: COMPLETED | OUTPATIENT
Start: 2024-10-18 | End: 2024-10-18

## 2024-10-18 RX ORDER — DIPHENHYDRAMINE HYDROCHLORIDE 50 MG/ML
25 INJECTION INTRAMUSCULAR; INTRAVENOUS ONCE
Status: COMPLETED | OUTPATIENT
Start: 2024-10-18 | End: 2024-10-18

## 2024-10-18 ASSESSMENT — COGNITIVE AND FUNCTIONAL STATUS - GENERAL
MOBILITY SCORE: 24
DAILY ACTIVITIY SCORE: 24

## 2024-10-18 ASSESSMENT — PAIN SCALES - GENERAL
PAINLEVEL_OUTOF10: 9
PAINLEVEL_OUTOF10: 9
PAINLEVEL_OUTOF10: 10 - WORST POSSIBLE PAIN
PAINLEVEL_OUTOF10: 9
PAINLEVEL_OUTOF10: 8

## 2024-10-18 ASSESSMENT — PAIN DESCRIPTION - LOCATION: LOCATION: HEAD

## 2024-10-18 ASSESSMENT — PAIN DESCRIPTION - ORIENTATION: ORIENTATION: RIGHT;LEFT

## 2024-10-18 NOTE — DISCHARGE SUMMARY
Discharge Diagnosis  Brain mass    Issues Requiring Follow-Up  Pathology results    Test Results Pending At Discharge  Pending Labs       Order Current Status    Surgical Pathology Exam In process            Hospital Course  Sejal Duff is a 24 year old female with a past history of anemia, asthma GERD, IBS, PCOS and breast fibroadenoma s/p L breast excisional biopsy who presented to the [facility] ED 10/14 with HA.   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 Patient admitted to the neurosurgery service for further workup/management.     10/15 s/p R stereotactic brain bx (prelim glial cells w/ luigi fibers vs craniopharyngioma), CT head with postop changes      PT/OT evaluated patient and recommended no needs     On the day of discharge, the pt was tolerating a diet, pain was controlled on PO pain medication, and they were ambulating and voiding spontaneously. They were discharged in stable condition with detailed instructions and scheduled outpatient follow up.      Pertinent Physical Exam At Time of Discharge  Physical Exam  General: in bed, awake, no distress.  C/O head pressure/headache   HEENT:  right frontal scalp biopsy site clean, dry, intact with absorbable suture; MAREN; EOMI; tongue midline  Neuro: Alert and oriented x3; GILLILAND's; extremities 5/5 strength to all extremities; sensation intact  Cardiac: regular rate and rhythm  Resp: respirations easy and regular  Abd: BS +x4, soft, non-tender, non-distended  Extr: no edema; pulses palpable  Skin: warm, dry, intact.  Incision CDI   Psych: appropriate and cooperative   Home Medications     Medication List      START taking these medications     acetaminophen 325 mg tablet; Commonly known as: Tylenol; Take 2 tablets   (650 mg) by mouth every 4 hours if needed for mild pain (1 - 3) or   headaches.   caffeine 200 mg; Take 1 tablet (200 mg) by mouth  every 6 hours if needed   (For Heaqdaches).   dexAMETHasone 1 mg tablet; Commonly known as: Decadron; Take 2 tablets   (2 mg) by mouth 2 times a day for 14 days.   ondansetron 4 mg tablet; Commonly known as: Zofran; Take 1 tablet (4 mg)   by mouth every 6 hours if needed for nausea or vomiting.   oxyCODONE 5 mg immediate release tablet; Commonly known as: Roxicodone;   Take 1 tablet (5 mg) by mouth every 6 hours if needed for moderate pain (4   - 6) or severe pain (7 - 10) for up to 7 days.   sennosides-docusate sodium 8.6-50 mg tablet; Commonly known as:   Mariola-Colace; Take 1 tablet by mouth once daily for 7 days. Take while   taking pain medication oxycodone to prevent constipation     CONTINUE taking these medications     albuterol 90 mcg/actuation inhaler; Inhale 1-2 puffs every 4 hours if   needed for wheezing or shortness of breath.   benzoyl peroxide 10 % external wash; Commonly known as: Benzac AC; APPLY   TOPICALLY 2 TIMES A DAY. WASH TWICE DAILY AS DIRECTED   cholecalciferol 50,000 unit capsule; Commonly known as: Vitamin D-3;   Take 1 capsule (50,000 Units) by mouth 1 (one) time per week.   linaCLOtide 145 mcg capsule; Commonly known as: Linzess; Take 1 capsule   (145 mcg) by mouth once daily in the morning. Take before meals. Do not   crush or chew.   loratadine 10 mg tablet; Commonly known as: Claritin; Take 1 tablet (10   mg) by mouth once daily.   metroNIDAZOLE 500 mg tablet; Commonly known as: Flagyl   Motegrity 2 mg tablet; Generic drug: prucalopride; Take 1 tablet (2 mg)   by mouth once daily.   multivitamin with iron tablet; Commonly known as: Daily Multiple   Vitamins/Iron; Take 1 tablet by mouth once daily.   omeprazole 40 mg DR capsule; Commonly known as: PriLOSEC     STOP taking these medications     fluticasone 50 mcg/actuation nasal spray; Commonly known as: Flonase   ondansetron ODT 8 mg disintegrating tablet; Commonly known as:   Zofran-ODT   tiZANidine 2 mg tablet; Commonly known as:  Zanaflex     ASK your doctor about these medications     Lacto no.60-Lrrxai-ARS-larch 25B cell-25B cell-50 mg capsule; Take 1   capsule by mouth once daily.   metFORMIN  mg 24 hr tablet; Commonly known as: Glucophage-XR; Take   1 tablet (500 mg) by mouth once daily with breakfast. Do not crush, chew,   or split.   I spent a total of 35 minutes with the patient and family and greater than 50% was spent in counseling and coordination of care.     Outpatient Follow-Up  Future Appointments   Date Time Provider Department Center   10/22/2024 11:00 AM Migue Aguilar MD [id number] [facility]   10/30/2024  2:00 PM Den Segal MD [id number] Academic   11/11/2024  7:45 AM Munira Porter APRN-CNP [id number] [facility]       Nori Ramirez APRN-CNP

## 2024-10-18 NOTE — CARE PLAN
Transitional Care Coordinator Note: Patient discussed with medical team, per medical team patient is not medically ready. Discharge dispo: [facility]. ADOD 1-2 Days  Cassie Wick RN  Transitional Care Coordinator

## 2024-10-19 VITALS
BODY MASS INDEX: 25.69 KG/M2 | OXYGEN SATURATION: 98 % | SYSTOLIC BLOOD PRESSURE: 116 MMHG | HEIGHT: 63 IN | WEIGHT: 145 LBS | RESPIRATION RATE: 18 BRPM | HEART RATE: 80 BPM | DIASTOLIC BLOOD PRESSURE: 79 MMHG | TEMPERATURE: 96.4 F

## 2024-10-19 PROCEDURE — 2500000002 HC RX 250 W HCPCS SELF ADMINISTERED DRUGS (ALT 637 FOR MEDICARE OP, ALT 636 FOR OP/ED)

## 2024-10-19 PROCEDURE — 2500000001 HC RX 250 WO HCPCS SELF ADMINISTERED DRUGS (ALT 637 FOR MEDICARE OP)

## 2024-10-19 PROCEDURE — 2500000004 HC RX 250 GENERAL PHARMACY W/ HCPCS (ALT 636 FOR OP/ED)

## 2024-10-19 ASSESSMENT — PAIN DESCRIPTION - ORIENTATION
ORIENTATION: RIGHT;LEFT

## 2024-10-19 ASSESSMENT — PAIN DESCRIPTION - LOCATION
LOCATION: HEAD

## 2024-10-19 ASSESSMENT — PAIN SCALES - GENERAL
PAINLEVEL_OUTOF10: 8
PAINLEVEL_OUTOF10: 8
PAINLEVEL_OUTOF10: 0 - NO PAIN
PAINLEVEL_OUTOF10: 10 - WORST POSSIBLE PAIN
PAINLEVEL_OUTOF10: 8

## 2024-10-19 ASSESSMENT — COGNITIVE AND FUNCTIONAL STATUS - GENERAL
MOBILITY SCORE: 24
DAILY ACTIVITIY SCORE: 24

## 2024-10-19 ASSESSMENT — PAIN - FUNCTIONAL ASSESSMENT: PAIN_FUNCTIONAL_ASSESSMENT: 0-10

## 2024-10-19 NOTE — PROGRESS NOTES
"Sejal Duff is a 24 y.o. female on day 5 of admission presenting with Brain mass.    Subjective   No acute events overnight, continues to have headache, again requested migraine cocktail. Is nervous about being discharged and then having to come back because of severe headache and dizziness on feet    Objective     Physical Exam  Awake, oriented x 3  EOMI, FS, TM  BUE 5/5  BLE 5/5  SILT  Incision c/d/i    Last Recorded Vitals  Blood pressure 114/74, pulse 82, temperature 36.2 °C (97.2 °F), temperature source Temporal, resp. rate 18, height 1.6 m (5' 3\"), weight 65.8 kg (145 lb), last menstrual period 09/29/2024, SpO2 97%.  Intake/Output last 3 Shifts:  No intake/output data recorded.    Relevant Results  Lab Results   Component Value Date    WBC 8.8 10/17/2024    HGB 11.7 (L) 10/17/2024    HCT 38.5 10/17/2024    MCV 84 10/17/2024     10/17/2024     Lab Results   Component Value Date    GLUCOSE 99 10/17/2024    CALCIUM 10.3 10/17/2024     10/17/2024    K 4.1 10/17/2024    CO2 26 10/17/2024     10/17/2024    BUN 14 10/17/2024    CREATININE 0.69 10/17/2024       Assessment/Plan   Assessment & Plan  Brain mass  Sejal Duff is a 24 y.o. 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

## 2024-10-19 NOTE — DISCHARGE SUMMARY
Discharge Diagnosis  Brain mass    Issues Requiring Follow-Up  Post-op follow up    Test Results Pending At Discharge  Pending Labs       Order Current Status    Surgical Pathology Exam In process            Hospital Course  Sejal Duff is a 24 year old female with a past history of anemia, asthma GERD, IBS, PCOS and breast fibroadenoma s/p L breast excisional biopsy who presented to the [facility] ED 10/14 with HA.   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 Patient admitted to the neurosurgery service for further workup/management.     10/15 s/p R stereotactic brain bx (prelim glial cells w/ luigi fibers vs craniopharyngioma), CT head with postop changes      PT/OT evaluated patient and recommended no needs     On the day of discharge, the pt was tolerating a diet, pain was controlled on PO pain medication, and they were ambulating and voiding spontaneously. They were discharged in stable condition with detailed instructions and scheduled outpatient follow up.      Pertinent Physical Exam At Time of Discharge  Physical Exam  Awake, oriented x 3  EOMI, FS, TM  BUE 5/5  BLE 5/5  SILT  Incision c/d/I    Home Medications     Medication List      START taking these medications     acetaminophen 325 mg tablet; Commonly known as: Tylenol; Take 2 tablets   (650 mg) by mouth every 4 hours if needed for mild pain (1 - 3) or   headaches.   caffeine 200 mg; Take 1 tablet (200 mg) by mouth every 6 hours if needed   (For Heaqdaches).   dexAMETHasone 1 mg tablet; Commonly known as: Decadron; Take 2 tablets   (2 mg) by mouth 2 times a day for 14 days.   ondansetron 4 mg tablet; Commonly known as: Zofran; Take 1 tablet (4 mg)   by mouth every 6 hours if needed for nausea or vomiting.   oxyCODONE 5 mg immediate release tablet; Commonly known as: Roxicodone;   Take 1 tablet (5 mg) by mouth every 6 hours if needed  for moderate pain (4   - 6) or severe pain (7 - 10) for up to 7 days.   sennosides-docusate sodium 8.6-50 mg tablet; Commonly known as:   Mariola-Colace; Take 1 tablet by mouth once daily for 7 days. Take while   taking pain medication oxycodone to prevent constipation     CONTINUE taking these medications     albuterol 90 mcg/actuation inhaler; Inhale 1-2 puffs every 4 hours if   needed for wheezing or shortness of breath.   benzoyl peroxide 10 % external wash; Commonly known as: Benzac AC; APPLY   TOPICALLY 2 TIMES A DAY. WASH TWICE DAILY AS DIRECTED   cholecalciferol 50,000 unit capsule; Commonly known as: Vitamin D-3;   Take 1 capsule (50,000 Units) by mouth 1 (one) time per week.   linaCLOtide 145 mcg capsule; Commonly known as: Linzess; Take 1 capsule   (145 mcg) by mouth once daily in the morning. Take before meals. Do not   crush or chew.   loratadine 10 mg tablet; Commonly known as: Claritin; Take 1 tablet (10   mg) by mouth once daily.   metroNIDAZOLE 500 mg tablet; Commonly known as: Flagyl   Motegrity 2 mg tablet; Generic drug: prucalopride; Take 1 tablet (2 mg)   by mouth once daily.   multivitamin with iron tablet; Commonly known as: Daily Multiple   Vitamins/Iron; Take 1 tablet by mouth once daily.   omeprazole 40 mg DR capsule; Commonly known as: PriLOSEC     STOP taking these medications     fluticasone 50 mcg/actuation nasal spray; Commonly known as: Flonase   ondansetron ODT 8 mg disintegrating tablet; Commonly known as:   Zofran-ODT   tiZANidine 2 mg tablet; Commonly known as: Zanaflex     ASK your doctor about these medications     Lacto no.61-Nwiwet-LZK-larch 25B cell-25B cell-50 mg capsule; Take 1   capsule by mouth once daily.   metFORMIN  mg 24 hr tablet; Commonly known as: Glucophage-XR; Take   1 tablet (500 mg) by mouth once daily with breakfast. Do not crush, chew,   or split.       Outpatient Follow-Up  Future Appointments   Date Time Provider Department Center   10/22/2024 11:00 AM  Migue Aguilar MD [id number] Academic   10/30/2024  2:00 PM Den Segal MD [id number] Academic   11/11/2024  7:45 AM JASVIR [facility] [id number] [facility]       Angie Wright MD

## 2024-10-19 NOTE — CARE PLAN
The patient's goals for the shift include      The clinical goals for the shift include Patient will remainfee from falls and injury.    Over the shift, the patient did make progress toward the following goals.

## 2024-10-19 NOTE — NURSING NOTE
Ms. Duff discharged at this time.  All belonging are with patient. Discharge instructions reviewed and IV taken out. Ride will be here at 315p.

## 2024-10-21 ENCOUNTER — PATIENT OUTREACH (OUTPATIENT)
Dept: HEMATOLOGY/ONCOLOGY | Facility: HOSPITAL | Age: 24
End: 2024-10-21
Payer: COMMERCIAL

## 2024-10-21 ENCOUNTER — PATIENT OUTREACH (OUTPATIENT)
Dept: CARE COORDINATION | Facility: CLINIC | Age: 24
End: 2024-10-21
Payer: COMMERCIAL

## 2024-10-21 DIAGNOSIS — G93.89 BRAIN MASS: Primary | ICD-10-CM

## 2024-10-21 NOTE — PROGRESS NOTES
Outreach call to patient to support a smooth transition of care from recent admission.  Phone disconnected.   No Cherry RN [facility]  [phone]

## 2024-10-21 NOTE — PROGRESS NOTES
Rescheduling NPV per Dr. Aguilar.   Informed Sejal that Dr. Aguilar is waiting for her pathology results and would like to move her appointment to next week.   Sejal is agreeable to an appointment on Tuesday Oct 29th at 9am.

## 2024-10-22 ENCOUNTER — APPOINTMENT (OUTPATIENT)
Dept: HEMATOLOGY/ONCOLOGY | Facility: HOSPITAL | Age: 24
End: 2024-10-22
Payer: COMMERCIAL

## 2024-10-23 ENCOUNTER — APPOINTMENT (OUTPATIENT)
Dept: HEMATOLOGY/ONCOLOGY | Facility: HOSPITAL | Age: 24
End: 2024-10-23
Payer: COMMERCIAL

## 2024-10-23 NOTE — TUMOR BOARD NOTE
CNS Tumor Board Recommendations       Patient was presented by Dr. Den Segal at our CNS Tumor Board on 10/23/24 which included representatives from Radiation oncology, Surgical oncology, Neuro-oncology, Pathology, Radiology, Research, Neurosurgery, Social Work (Neurosurgery).     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     Procedure: R stereotactic brain biopsy 10/15/2024   Pathology: Final pending; likely to be a glioma, areas of necrosis noted.      The CNS Tumor Board tumor board considered available treatment options and made the following recommendations: Radiation oncology referral. Add back to CNS TB once pathology is completed.     Clinical Trial Status: N/A     National site-specific guidelines were discussed with respect to the case.

## 2024-10-28 ENCOUNTER — TELEPHONE (OUTPATIENT)
Dept: HEMATOLOGY/ONCOLOGY | Facility: HOSPITAL | Age: 24
End: 2024-10-28
Payer: COMMERCIAL

## 2024-10-28 DIAGNOSIS — E55.9 VITAMIN D DEFICIENCY: Primary | ICD-10-CM

## 2024-10-28 RX ORDER — ERGOCALCIFEROL 1.25 MG/1
1.25 CAPSULE ORAL WEEKLY
Qty: 4 CAPSULE | Refills: 11 | Status: SHIPPED | OUTPATIENT
Start: 2024-10-28 | End: 2025-10-28

## 2024-10-29 ENCOUNTER — SOCIAL WORK (OUTPATIENT)
Dept: CASE MANAGEMENT | Facility: HOSPITAL | Age: 24
End: 2024-10-29

## 2024-10-29 ENCOUNTER — OFFICE VISIT (OUTPATIENT)
Dept: HEMATOLOGY/ONCOLOGY | Facility: HOSPITAL | Age: 24
End: 2024-10-29
Payer: COMMERCIAL

## 2024-10-29 VITALS
SYSTOLIC BLOOD PRESSURE: 111 MMHG | OXYGEN SATURATION: 100 % | DIASTOLIC BLOOD PRESSURE: 77 MMHG | RESPIRATION RATE: 16 BRPM | BODY MASS INDEX: 25.58 KG/M2 | WEIGHT: 144.4 LBS | HEART RATE: 104 BPM | TEMPERATURE: 96.1 F

## 2024-10-29 DIAGNOSIS — R51.9 NONINTRACTABLE HEADACHE, UNSPECIFIED CHRONICITY PATTERN, UNSPECIFIED HEADACHE TYPE: ICD-10-CM

## 2024-10-29 DIAGNOSIS — G93.89 BRAIN MASS: Primary | ICD-10-CM

## 2024-10-29 DIAGNOSIS — K58.1 IRRITABLE BOWEL SYNDROME WITH CONSTIPATION: ICD-10-CM

## 2024-10-29 DIAGNOSIS — R11.2 NAUSEA AND VOMITING, UNSPECIFIED VOMITING TYPE: ICD-10-CM

## 2024-10-29 PROCEDURE — 99215 OFFICE O/P EST HI 40 MIN: CPT | Performed by: PSYCHIATRY & NEUROLOGY

## 2024-10-29 PROCEDURE — 99417 PROLNG OP E/M EACH 15 MIN: CPT | Performed by: PSYCHIATRY & NEUROLOGY

## 2024-10-29 ASSESSMENT — PAIN SCALES - GENERAL: PAINLEVEL_OUTOF10: 0-NO PAIN

## 2024-10-30 ENCOUNTER — OFFICE VISIT (OUTPATIENT)
Dept: NEUROSURGERY | Facility: HOSPITAL | Age: 24
End: 2024-10-30
Payer: COMMERCIAL

## 2024-10-30 ENCOUNTER — APPOINTMENT (OUTPATIENT)
Dept: NEUROSURGERY | Facility: HOSPITAL | Age: 24
End: 2024-10-30
Payer: COMMERCIAL

## 2024-10-30 VITALS
HEART RATE: 104 BPM | TEMPERATURE: 97.9 F | SYSTOLIC BLOOD PRESSURE: 129 MMHG | DIASTOLIC BLOOD PRESSURE: 66 MMHG | OXYGEN SATURATION: 100 % | BODY MASS INDEX: 26.13 KG/M2 | WEIGHT: 147.5 LBS

## 2024-10-30 DIAGNOSIS — G93.89 BRAIN MASS: Primary | ICD-10-CM

## 2024-10-30 PROCEDURE — 99211 OFF/OP EST MAY X REQ PHY/QHP: CPT | Performed by: NEUROLOGICAL SURGERY

## 2024-10-30 ASSESSMENT — PAIN SCALES - GENERAL: PAINLEVEL_OUTOF10: 0-NO PAIN

## 2024-11-04 ENCOUNTER — TELEPHONE (OUTPATIENT)
Dept: PEDIATRIC GASTROENTEROLOGY | Facility: CLINIC | Age: 24
End: 2024-11-04
Payer: COMMERCIAL

## 2024-11-04 ENCOUNTER — PATIENT OUTREACH (OUTPATIENT)
Dept: CARE COORDINATION | Facility: CLINIC | Age: 24
End: 2024-11-04
Payer: COMMERCIAL

## 2024-11-04 DIAGNOSIS — G93.89 BRAIN MASS: Primary | ICD-10-CM

## 2024-11-04 NOTE — PROGRESS NOTES
Outreach call to fu on PCP visit after dc, phone disconnected.  No Cherry RN [facility]  [phone]

## 2024-11-05 LAB
LAB AP ASR DISCLAIMER: NORMAL
LABORATORY COMMENT REPORT: NORMAL
Lab: NORMAL
PATH REPORT.FINAL DX SPEC: NORMAL
PATH REPORT.GROSS SPEC: NORMAL
PATH REPORT.RELEVANT HX SPEC: NORMAL
PATH REPORT.TOTAL CANCER: NORMAL

## 2024-11-06 ENCOUNTER — APPOINTMENT (OUTPATIENT)
Dept: OPHTHALMOLOGY | Facility: CLINIC | Age: 24
End: 2024-11-06
Payer: COMMERCIAL

## 2024-11-06 ENCOUNTER — TUMOR BOARD CONFERENCE (OUTPATIENT)
Dept: HEMATOLOGY/ONCOLOGY | Facility: HOSPITAL | Age: 24
End: 2024-11-06
Payer: COMMERCIAL

## 2024-11-06 ENCOUNTER — APPOINTMENT (OUTPATIENT)
Dept: NEUROSURGERY | Facility: HOSPITAL | Age: 24
End: 2024-11-06
Payer: COMMERCIAL

## 2024-11-06 DIAGNOSIS — G93.89 MASS OF HYPOTHALAMUS: Primary | ICD-10-CM

## 2024-11-06 PROCEDURE — 92133 CPTRZD OPH DX IMG PST SGM ON: CPT | Performed by: PSYCHIATRY & NEUROLOGY

## 2024-11-06 PROCEDURE — 99205 OFFICE O/P NEW HI 60 MIN: CPT | Performed by: PSYCHIATRY & NEUROLOGY

## 2024-11-06 PROCEDURE — 92083 EXTENDED VISUAL FIELD XM: CPT | Performed by: PSYCHIATRY & NEUROLOGY

## 2024-11-06 ASSESSMENT — ENCOUNTER SYMPTOMS
ALLERGIC/IMMUNOLOGIC NEGATIVE: 0
CONSTITUTIONAL NEGATIVE: 0
EYES NEGATIVE: 1
ENDOCRINE NEGATIVE: 0
NEUROLOGICAL NEGATIVE: 0
HEMATOLOGIC/LYMPHATIC NEGATIVE: 0
GASTROINTESTINAL NEGATIVE: 0
CARDIOVASCULAR NEGATIVE: 0
RESPIRATORY NEGATIVE: 0
MUSCULOSKELETAL NEGATIVE: 0
PSYCHIATRIC NEGATIVE: 0

## 2024-11-06 ASSESSMENT — SLIT LAMP EXAM - LIDS
COMMENTS: NORMAL
COMMENTS: NORMAL

## 2024-11-06 ASSESSMENT — VISUAL ACUITY
OS_PH_SC: 20/60
OS_PH_SC+: +1
OD_SC: 20/25
METHOD: SNELLEN - LINEAR
OD_SC+: -3
OS_SC: 20/70

## 2024-11-06 ASSESSMENT — TONOMETRY
OD_IOP_MMHG: 17
IOP_METHOD: TONOPEN
OS_IOP_MMHG: 20

## 2024-11-06 ASSESSMENT — CUP TO DISC RATIO
OS_RATIO: 0.1
OD_RATIO: 0.1

## 2024-11-06 ASSESSMENT — EXTERNAL EXAM - RIGHT EYE: OD_EXAM: NORMAL

## 2024-11-06 ASSESSMENT — EXTERNAL EXAM - LEFT EYE: OS_EXAM: NORMAL

## 2024-11-06 NOTE — TUMOR BOARD NOTE
CNS Tumor Board Recommendations       Patient was presented by Dr. Migue Aguilar at our CNS Tumor Board on 11/06/2024 which included representatives from Radiation oncology, Surgical oncology, Neuro-oncology, Pathology, Radiology, Research, Neurosurgery, Social Work (Neurosurgery).     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 CSF analysis negative.      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 Sellar imaging. Continue following with neuro-oncology. Radiation oncology referral.     Clinical Trial Status: N/A     National site-specific guidelines were discussed with respect to the case.

## 2024-11-06 NOTE — PROGRESS NOTES
"Assessment and Plan    10/16/2024 CT head without contrast, which I personally reviewed, shows a small amount of air in the right frontal horn.  10/13/2024 MRI brain with contrast, which I personally reviewed, shows a T2 hyperintense mass at the hypothalamus with peripheral enhancement.  10/13/2024 CT head without contrast, which I personally reviewed, shows a hypodense mass of the hypothalamus.    10/15/2024 pathology \" A.  B.  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  See note\"    10/15/2024 cerebrospinal fluid (CSF) RBC 2000, WBC 2, protein 17 & glucose 61. Cytology negative.    Lab Results   Component Value Date/Time    SEDRATE 13 04/12/2023 0958    SEDRATE 35 (H) 08/31/2022 0957    SEDRATE 11 09/06/2019 1450    SEDRATE 27 (H) 07/31/2019 1939    CRP 0.30 08/31/2022 0957    CRP 0.32 02/11/2020 0158    CRP 0.56 09/06/2019 1450    CRP 0.29 07/31/2019 1939    CRP 0.40 01/18/2019 1651     Lab Results   Component Value Date/Time    COPPER 151.2 10/07/2024 1729     07/11/2018 syphilis IgG non-reactive (NR).    11/06/2024 OCT RNFL OD 74 with T thinning & OS 65 with T & borderline N & S thinning.    11/06/2024 HVF 24-2 left junctional scotoma OD fovea 35, FL 17/18, FP 1%, FN 9%, temporal depression MD -4.62 & OS fovea 29, FL 15/17, FP 7%, FN 5%, temporal and central depression MD -3.86.    This 24 year-old woman with a history of third ventricular / hypothalamus mass status post biopsy 10/15/2024, acne, PCOS, IBS, GERD presents for evaluation of visual fields.    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 Treatment is directed toward the cause, the mass. Full pathology is pending. I will monitor visual fields as an assessment of tumor burden and possibly of treatment response.    Plan    Treatment with neurosurgery and neuro-oncology.    Follow up in 2-3 months with HVF & OCT. (Dilated 11/6/2024)  "

## 2024-11-08 ENCOUNTER — OFFICE VISIT (OUTPATIENT)
Dept: HEMATOLOGY/ONCOLOGY | Facility: CLINIC | Age: 24
End: 2024-11-08
Payer: COMMERCIAL

## 2024-11-08 VITALS
HEART RATE: 89 BPM | HEIGHT: 63 IN | TEMPERATURE: 96.6 F | RESPIRATION RATE: 16 BRPM | DIASTOLIC BLOOD PRESSURE: 73 MMHG | OXYGEN SATURATION: 98 % | WEIGHT: 148.1 LBS | BODY MASS INDEX: 26.24 KG/M2 | SYSTOLIC BLOOD PRESSURE: 106 MMHG

## 2024-11-08 DIAGNOSIS — G93.89 BRAIN MASS: ICD-10-CM

## 2024-11-08 DIAGNOSIS — C71.9 PILOCYTIC ASTROCYTOMA (MULTI): Primary | ICD-10-CM

## 2024-11-08 PROCEDURE — 99417 PROLNG OP E/M EACH 15 MIN: CPT | Performed by: PSYCHIATRY & NEUROLOGY

## 2024-11-08 PROCEDURE — 3008F BODY MASS INDEX DOCD: CPT | Performed by: PSYCHIATRY & NEUROLOGY

## 2024-11-08 PROCEDURE — 99215 OFFICE O/P EST HI 40 MIN: CPT | Performed by: PSYCHIATRY & NEUROLOGY

## 2024-11-08 ASSESSMENT — PAIN SCALES - GENERAL: PAINLEVEL_OUTOF10: 0-NO PAIN

## 2024-11-08 NOTE — PROGRESS NOTES
[facility]  Neuro-Oncology Clinic        Patient ID: Sejal Duff  Referring Physician: Migue Aguilar MD  [id number] [place] [address]  Primary Care Provider: Melba Graham MD      Subjective      HPI  Sejal Duff is a 24 y.o. 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     Interval History:  10/29/24: Today she reports some slight headaches but not worsening.  She is currently on dexamethasone 2 mg twice daily.  She takes the morning dose around 9 or 10 AM and the evening dose before bedtime.  She takes ondansetron about 1 hour prior to the dexamethasone.  She endorses significant issues with vomiting after taking the dexamethasone as well as decreased appetite.  She is able to drink enough fluids.  She states that she tried taking 8 mg of ondansetron prior to the dexamethasone but this did not help either.  She states that the dexamethasone makes her very jittery.  She has not taken dexamethasone for the last 2 days.  She continues to have constipation which is her baseline where she does not have a bowel movement for weeks.  She has been referred by her gastroenterologist to integrative medicine for further help with bowel movements.  Otherwise denies any worsening of vision or new gait or balance issues.  11/8/24: Continues to have some headaches but not worse. She has decreased dexamethasone to 1mg bid on her own and she denies any worsening of her headaches. Denies new balance or strength issues.     Oncology History   Pilocytic astrocytoma (Multi)   10/15/2024 Surgery    Biopsy of hypothalamic lesion by Dr. Segal at [facility]      10/15/2024 Pathology     A.  B.  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  See note     Note:  Microscopic examination for parts A and B reveal a low-grade glial neoplasm immunoreactive with OLIG-2 and GFAP.  Neurofilament immunohistochemical stain highlights neuropil and scattered neuronal bodies.  ATRX and [id number] immunohistochemical stains are retained.  IDH1 and BRAF immunohistochemical stains are negative.  GLIOSEQ studies reveal a BRAF-V600E mutation.  This finding in conjunction with the morphology are most consistent with a pilocytic astrocytoma.  However, a ganglioglioma among other low-grade glial/glial neuronal neoplasms, cannot be completely ruled out.  Clinical correlation is recommended         Review of Systems - Oncology   10 point ROS is negative except for that mentioned in the HPI    Family History   Problem Relation Name Age of Onset    Asthma Mother      Colon cancer Mother         Sejal Duff  reports that she has never smoked. She has never used smokeless tobacco.  She  reports that she does not currently use alcohol.  She  reports current drug use. Drug: Marijuana.    PHYSICAL EXAM  Objective   BSA: 1.73 meters squared  /73 (BP Location: Left arm, Patient Position: Sitting)   Pulse 89   Temp 35.9 °C (96.6 °F) (Temporal)   Resp 16   Ht 1.6 m (5' 2.99\")   Wt 67.2 kg (148 lb 1.6 oz)   LMP 09/29/2024   SpO2 98%   BMI 26.24 kg/m²   Karnofsky Score: 90 - Able to carry on normal activity; minor signs or symptoms of disease     Gen: Awake and alert; in no acute distress  CVS/Pulm: No dyspnea noted  Abdomen: Soft nontender nondistended    Neuro  Mental Status: Awake and alert.  Oriented to self and situation.  Able to recall history well.  CN: Extraocular muscles intact.  Visual fields intact.  No facial asymmetry noted.  Midline palate elevation and tongue protrusion.  Motor: No pronator drift noted.  5/5 strength throughout  Sensation: Intact to light touch  Gait: Normal gait.  No ataxia noted.  " Tandem gait intact    LABS  CSF Cytology 10/15/24  A. CEREBROSPINAL FLUID- CSF:  - No malignant cells identified.  - Few scattered lymphocytes, neutrophils and red cells present, suggestive of peripheral blood contamination.        IMAGING  === 10/13/24 ===    MR BRAIN W AND WO CONTRAST    - Impression -  Cystic/necrotic rim enhancing lesion centered within the hypothalamus  measuring up the 2.4 cm resulting in partial effacement of the neural  foramina and slight prominence of the lateral ventricles.    Additional nonenhancing FLAIR hyperintense lesion within the medial  left temporal lobe measuring 1.1 x 0.9 cm. Given multifocal  appearance, findings may represent primary neoplasm such as an optic  pathway glioma or less likely metastatic disease. Suprasellar lesions  such as craniopharyngioma and germinoma are also thought to be less  likely. Infection can not be ruled out as diffusion-weighted imaging  is nondiagnostic secondary to susceptibility from patient's braces.    Signed by: Waleska Tamayo 10/13/2024 7:08 PM  Dictation workstation:   [id number]      ASSESSMENT  Sejal Duff is a 24 y.o. 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  This is WHO grade 1 tumor and any other circumstances of that have been gross total resection we would have simply monitored.  However due to his location the hypothalamus that is causing some neurological symptoms and I suspect even some endocrinological symptoms.  Due to it having a BRAF V600 E mutation, we can try and attempt to decrease the size through medications targeting this pathway.  - Would recommend dabrafenib and  trametinib.  - Sejal wanted to know if there was any way that this could be surgically removed so she would not have to go through any sort of treatment.  Discussed that due to the location I suspect that the question is not that it can be done but but she will have to deal with postoperatively.  There is a high likelihood she might need to go on complete hormone replacement therapy.  However if she has these questions I am more than happy to have her see Dr. Segal, her neurosurgeon, to personally discuss the risks and benefits of surgery.  - Sejal also wanted to know how long the treatment would be for.  Discussed that the medications targeting this pathway are not to kill the cells but to stop the pathway and so if we were to stop the medications completely there is always a chance it could regrow.  As such I would focus initially on trying to make the lesion smaller and I suspect this might take 6 months to a year.  -Will also plan on MRI of the whole spine to assess for any drop metastasis  -scheduled to see АЛЕКСАНДР in the coming weeks     #Cerebral edema/steroid  - Is having nausea and vomiting issues after taking the dexamethasone.   -Is able to take dexamethasone 1 mg twice daily but feels is not really improving the situation  - Discussed that ideally I do not want her to go off of the steroids to prevent worsening of her headaches but if she is not getting any benefit we can try weaning off but would recommend going back on if her headaches worsen  - For now decrease dexamethasone to 1 mg daily for 5 days and then stop completely so long as she is not having worsening of her headaches    #Nausea/vomiting  - Could be due to dexamethasone but feel that her IBS is also playing a role.  - Instructed to take ondansetron 8 mg about 1 hour prior to taking dexamethasone doses.  - If this does not help the vomiting then we will consider prescribing Phenergan  - Will also coordinate with GI and integrative medicine  colleagues were already seeing her to see if there is other suggestions for her nausea and vomiting episodes.    #IBS  -continue fu with GI and integrative medicine teams     #Social  - She would like to return to work if possible.  Instructed her to discuss this with neurosurgery team regarding restrictions for returning to work  - She works as a nurse assistant    I personally spent 60 minutes today, exclusive of procedures, providing care for this patient, including preparation, face to face time, documentation and other services such as review of medical records, diagnostic result, patient education, counseling, coordination of care as specified in the encounter.     Migue Aguilar MD  Neuro-Oncology

## 2024-11-08 NOTE — PATIENT INSTRUCTIONS
Please follow up with neuro-surgery to discuss further risks for surgery.  We have ordered an MRI of your spine to be completed in 2 weeks.  You will follow up with Dr. Aguilar a few days after the MRI is complete so we have the results back to review with you.  Please review information on the medication that was discussed today in your visit to try and shrink the tumor called Dabrafenib (Tafinlar).   Any questions please call our office at [phone].  Please decrease your dexamethasone to 1 mg daily for 5 days and then stop completely per Dr. Aguilar.

## 2024-11-11 ENCOUNTER — CONSULT (OUTPATIENT)
Dept: ENDOCRINOLOGY | Facility: CLINIC | Age: 24
End: 2024-11-11
Payer: COMMERCIAL

## 2024-11-11 VITALS
BODY MASS INDEX: 26.07 KG/M2 | DIASTOLIC BLOOD PRESSURE: 81 MMHG | SYSTOLIC BLOOD PRESSURE: 116 MMHG | HEART RATE: 102 BPM | HEIGHT: 63 IN | WEIGHT: 147.13 LBS

## 2024-11-11 DIAGNOSIS — Z31.41 FERTILITY TESTING: ICD-10-CM

## 2024-11-11 DIAGNOSIS — Z31.62 ENCOUNTER FOR FERTILITY PRESERVATION COUNSELING PRIOR TO CANCER THERAPY: Primary | ICD-10-CM

## 2024-11-11 PROCEDURE — 99214 OFFICE O/P EST MOD 30 MIN: CPT

## 2024-11-11 RX ORDER — PHENTERMINE HYDROCHLORIDE 37.5 MG/1
37.5 TABLET ORAL
COMMUNITY

## 2024-11-11 RX ORDER — VILAZODONE HYDROCHLORIDE 10 MG/1
10 TABLET ORAL DAILY
COMMUNITY

## 2024-11-11 ASSESSMENT — COLUMBIA-SUICIDE SEVERITY RATING SCALE - C-SSRS
1. IN THE PAST MONTH, HAVE YOU WISHED YOU WERE DEAD OR WISHED YOU COULD GO TO SLEEP AND NOT WAKE UP?: NO
2. HAVE YOU ACTUALLY HAD ANY THOUGHTS OF KILLING YOURSELF?: NO
6. HAVE YOU EVER DONE ANYTHING, STARTED TO DO ANYTHING, OR PREPARED TO DO ANYTHING TO END YOUR LIFE?: NO

## 2024-11-11 ASSESSMENT — PATIENT HEALTH QUESTIONNAIRE - PHQ9
SUM OF ALL RESPONSES TO PHQ9 QUESTIONS 1 AND 2: 0
1. LITTLE INTEREST OR PLEASURE IN DOING THINGS: NOT AT ALL
2. FEELING DOWN, DEPRESSED OR HOPELESS: NOT AT ALL

## 2024-11-11 ASSESSMENT — PAIN SCALES - GENERAL: PAINLEVEL_OUTOF10: 0-NO PAIN

## 2024-11-11 NOTE — PROGRESS NOTES
Visit Type: In Person    NEW FERTILITY PATIENT VISIT    Referred by:      Accompanied today by:       Sejal Duff is a 24 y.o.  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 Needs to start oncology treatment asap,  needs MRI of your spine to be completed in next week, 2024. Plan: meds to shrink the tumor called Dabrafenib (Tafinlar) & awaiting follow up with surgeon to discuss chemotherapy, radiation, or surgery.    Oncology PLAN     #Hypothalamic lesion  - Final pathology pending.  Awaiting NGS and potentially methylation results.  This will determine next treatment steps.  -scheduled to see АЛЕКСАНДР in the coming weeks      #Cerebral edema/steroid  - Is having nausea and vomiting issues after taking the dexamethasone.  Has not taken dexamethasone for the past 2 days.  - Instructed to take 1 mg dexamethasone at 9 AM and 1 mg at noon.  If there is no worsening of headaches then should decrease to 1 mg in a.m. and 1 week and if continues to remain asymptomatic stop 1 week after that.     #Nausea/vomiting  - Could be due to dexamethasone but feel that her IBS is also playing a role.  - Instructed to take ondansetron 8 mg about 1 hour prior to taking dexamethasone doses.  - If this does not help the vomiting then we will consider prescribing Phenergan  - Will also coordinate with GI and integrative medicine colleagues were already seeing her to see if there is other suggestions for her nausea and vomiting episodes.     #IBS  -continue fu with GI and integrative medicine teams      #Social  - She would like to return to work if possible.  Instructed her to discuss this with neurosurgery team regarding restrictions for returning to work  - She  works as a nurse assistant      CNS Tumor Board Recommendations         Patient was presented by Dr. Migue Aguilar at our CNS Tumor Board on 11/06/2024 which included representatives from Radiation oncology, Surgical oncology, Neuro-oncology, Pathology, Radiology, Research, Neurosurgery, Social Work (Neurosurgery).     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 CSF analysis negative.      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 Sellar imaging. Continue following with neuro-oncology. Radiation oncology referral.      Clinical Trial Status: N/A      National site-specific guidelines were discussed with respect to the case.      IMAGING  === 10/13/24 ===     MR BRAIN W AND WO CONTRAST     - Impression -  Cystic/necrotic rim enhancing lesion centered within the hypothalamus  measuring up the 2.4 cm resulting in partial effacement of the neural  foramina and slight prominence of the lateral ventricles.     Additional nonenhancing FLAIR hyperintense lesion within the medial  left temporal lobe measuring 1.1 x 0.9 cm. Given multifocal  appearance, findings may represent primary neoplasm such as an optic  pathway glioma or less likely metastatic disease. Suprasellar lesions  such as craniopharyngioma and germinoma are also thought to be less  likely. Infection can not be ruled out as diffusion-weighted imaging  is nondiagnostic secondary to susceptibility from patient's braces.       Have you had any concerns about your fertility treatments so far?     What are you  goals for today's visit?     What causes of infertility have been identified on your workup so far?     Past Infertility Treatments:      Please summarize your fertility treatments to date.       As far as you are aware, do you have insurance coverage for fertility diagnostic testing and/or fertility treatments?      PRIOR EVALUATION / TREATMENT: none    Hysterosalpingogram: no  Saline Infused Sonography: no  GYN Pelvic Ultrasound: no      Prior Labs  Lab Results    Date Done      AMH: No results found for requested labs within last 1825 days. No results found for requested labs within last 1825 days.   TSH: 1.20 (Ref range: 0.44 - 3.98 mIU/L) 10/14/2024   PRL: 34.1 (H; Ref range: 3.0 - 20.0 ug/L) 10/14/2024   Testosterone: No results found for requested labs within last 1825 days. No results found for requested labs within last 1825 days.   DHEAS: 107 (Ref range: 65 - 395 ug/dL) 8/3/2023   FSH: 5.9 (Ref range: IU/L) 10/14/2024   17 OHP: No results found for requested labs within last 1825 days. No results found for requested labs within last 1825 days.   HgbA1c: 5.2 (Ref range: See comment %) 10/14/2024   Hepatitis B surface antigen: Nonreactive (Ref range: Nonreactive) 7/8/2024   Hepatitis C antibody: Nonreactive (Ref range: Nonreactive) 7/8/2024   HIV ½ Antigen Antibody screen with reflex: Nonreactive (Ref range: Nonreactive) 7/8/2024   Syphilis screening with reflex: No results found for requested labs within last 1825 days. 7/8/2024   GC: Negative (Ref range: Negative) 7/8/2024   CT: Negative (Ref range: Negative) 7/8/2024   Type and Screen: O 10/14/2024   Rh: POS 10/14/2024   Antibody: NEG (Ref range: ) 10-   Rubella: No results found for requested labs within last 1825 days. No results found for requested labs within last 1825 days.   Varicella: No results found for requested labs within last 1825 days. No results found for requested labs within last 1825 days.   Hemoglobin: No results found for  requested labs within last 1825 days. No results found for requested labs within last 1825 days.   Hematocrit: No results found for requested labs within last 1825 days. No results found for requested labs within last 1825 days.   Creatinine: 0.69 (Ref range: 0.50 - 1.05 mg/dL) 10/17/2024   AST:12 (Ref range: 9 - 39 U/L) 10/14/2024   ALT:6 (L; Ref range: 7 - 45 U/L): 10/14/2024     Relationship Status:  single    Have you ever been pregnant?  no  How many times have you been pregnant?    Have you ever had a miscarriage?    How many times have you had a miscarriage?       OB Hx:      OB History          0    Para   0    Term   0       0    AB   0    Living   0         SAB   0    IAB   0    Ectopic   0    Multiple   0    Live Births   0                 GYN HISTORY   Have you ever been diagnosed with a sexually transmitted disease? no   Have you ever had Pelvic Inflammatory Disease?  no  Have you had an abnormal PAP smear?    Date & Result of last PAP smear: 10-5-2022- negative       Lab Results   Component Value Date    FINALINTERP  10/15/2024       A. CEREBROSPINAL FLUID- CSF:  - No malignant cells identified.  - Few scattered lymphocytes, neutrophils and red cells present, suggestive of peripheral blood contamination.                Have you ever had an abnormal Mammogram?  NA  Date & result of your last mammogram:  -  Do you have pelvic pain?  no  How many times per week do you have intercourse?  NA  Do you have pain with intercourse?  no  Do you use lubricants with intercourse?    Do you have pain with bowel movements?      no        Do you have pain with a full bladder?  no  MENSTRUAL HISTORY  LMP:  2024  Menarche:  12  Contraception:  OCPs & Depo provera, nexplanon  Cycle length:  irregular, 30-60 days  Describe your bleedin-5 days  Dysmenorrhea:  yes     ENDOCRINE/INFERTILITY HISTORY  Duration of infertility:  NA  Coital Activity/week:  NA  Nipple Discharge:  left breast mass removed  1-2 yrs ago- benign   Vision changes:  yes  Headaches:  yes  Excess hair growth:  no  Excessive hair loss:  yes  Acne:  yes  Oily skin:  yes  Recent weight change  Weight gain:  yes  Weight loss: no   Exercise more than 3 times a week:  yes    PMH  Past Medical History:   Diagnosis Date    Encounter for screening for infections with a predominantly sexual mode of transmission 03/02/2020    Screening for STD (sexually transmitted disease)    Melena 04/12/2019    Blood in stool    Other specified health status     No pertinent past medical history    Other specified health status     No pertinent past surgical history    Personal history of other (healed) physical injury and trauma     History of sprain of ankle    Unspecified condition associated with female genital organs and menstrual cycle 03/02/2020    Cervical motion tenderness        MEDICATIONS  Current Outpatient Medications on File Prior to Visit   Medication Sig Dispense Refill    acetaminophen (Tylenol) 325 mg tablet Take 2 tablets (650 mg) by mouth every 4 hours if needed for mild pain (1 - 3) or headaches.      albuterol 90 mcg/actuation inhaler Inhale 1-2 puffs every 4 hours if needed for wheezing or shortness of breath. 18 g 2    benzoyl peroxide (Benzac AC) 10 % external wash APPLY TOPICALLY 2 TIMES A DAY. WASH TWICE DAILY AS DIRECTED 148 mL 3    caffeine 200 mg Take 1 tablet (200 mg) by mouth every 6 hours if needed (For [place]). 30 tablet 0    cholecalciferol (Vitamin D-3) 50,000 unit capsule Take 1 capsule (50,000 Units) by mouth 1 (one) time per week. 4 capsule 11    ergocalciferol (Vitamin D-2) 1.25 MG (70305 UT) capsule Take 1 capsule (1,250 mcg) by mouth 1 (one) time per week. 4 capsule 11    Lacto no.28-Cmvzwk-AIV-larch 25B cell-25B cell-50 mg capsule Take 1 capsule by mouth once daily. 90 capsule 3    linaCLOtide (Linzess) 145 mcg capsule Take 1 capsule (145 mcg) by mouth once daily in the morning. Take before meals. Do not crush or chew.  30 capsule 3    loratadine (Claritin) 10 mg tablet Take 1 tablet (10 mg) by mouth once daily. 90 tablet 1    metFORMIN XR (Glucophage-XR) 500 mg 24 hr tablet Take 1 tablet (500 mg) by mouth once daily with breakfast. Do not crush, chew, or split. 30 tablet 11    metroNIDAZOLE (Flagyl) 500 mg tablet Take 1 tablet (500 mg) by mouth 3 times a day.      multivitamin with iron (Daily Multiple Vitamins/Iron) tablet Take 1 tablet by mouth once daily. 90 each 3    omeprazole (PriLOSEC) 40 mg DR capsule Take 1 capsule (40 mg) by mouth once daily.      ondansetron (Zofran) 4 mg tablet Take 1 tablet (4 mg) by mouth every 6 hours if needed for nausea or vomiting. 30 tablet 1    prucalopride (Motegrity) 2 mg tablet Take 1 tablet (2 mg) by mouth once daily. 30 tablet 3    dexAMETHasone (Decadron) 1 mg tablet Take 2 tablets (2 mg) by mouth 2 times a day for 14 days. 56 tablet 0     No current facility-administered medications on file prior to visit.        PSH  Past Surgical History:   Procedure Laterality Date    BREAST SURGERY Left     Mass removal    OTHER SURGICAL HISTORY  2019    No history of surgery        PSYCH HISTORY  Have you ever been diagnosed with a mental health Issue?  yes  Have you ever been hospitalized for a mental health disorder?  no     SOCIAL HISTORY  Social History     Tobacco Use    Smoking status: Never     Passive exposure: Never    Smokeless tobacco: Never   Vaping Use    Vaping status: Never Used   Substance Use Topics    Alcohol use: Yes     Comment: social    Drug use: Yes     Types: Marijuana     Occupation:  [employer]  Have you ever been incarcerated?    Do you have a history of domestic violence? no    Do you feel safe at home?  yes  Do you have a history of any negative sexual experience such as incest or rape? no      PARTNER HISTORY  Partner Name:  NA  Partner :    Partner email:    Occupation:    Prior fertility history:    PMH:    PSH:    Smoking:   Alcohol Use:    Drug Use:   "  Medications:    Injuries:    STD:    Please select all that are applicable:    SA:    SA Results:      FAMILY HISTORY  Family History   Problem Relation Name Age of Onset    Asthma Mother      Colon cancer Mother         CANCER HISTORY    Breast: Mat GG grandfather  Ovarian:  no  Colon:  Mat GF  Endometrial:  no    FAMILY VTE HISTORY  Family History of Blood Clots:  no    GENETIC HISTORY  Ethnic Background  Patient:    Partner:  no  Genetic Disease in Family  Patient:  no  Partner:  NA  Birth Defects in Family  Patient:  no  Partner:  NA    Genetic screening performed previously:  none     BMI:   BMI Readings from Last 1 Encounters:   24 26.06 kg/m²     VITALS:  /81   Pulse 102   Ht 1.6 m (5' 3\")   Wt 66.7 kg (147 lb 2 oz)   LMP 2024   BMI 26.06 kg/m²     ASSESSMENT   24 y.o.  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    Partner SA: NA    COUNSELING  We discussed causes of infertility including hormonal, egg quality issues, structural problems such as endometriosis, adhesions, or tubal problems, uterine factors such as polyps or fibroids, and sperm issues. Reviewed evaluation of such as well. We discussed various methods for achieving pregnancy in some detail including, ovulation induction, insemination, superovulation and IVF.    We discussed AMH testing and the patient's AMH level, if applicable.  AMH is considered favorable if >1 however this test has many limitations including poor predictive rates of pregnancy. In randomized control trials such as \"Time to conceive\" pts with low AMH levels (<0.7) has similar " "cumulative pregnancy rates compared with women that had normal AMH levels.  In the \"AMIGOS\" study, AMH did not predict pregnancy rates following OS/IUI for unexplained infertility. However,  AMH is a marker that is helpful to predict how a patient may respond or oocyte yields with FSH and ART treatments, but again there is conflicting data about how this affects pregnancy rates with ART.    We discussed options for fertility preservation including oocyte cryopreservation and embryo cryopreservation, and discussed advances in cryopreservation specifically the optimization of vitrification to enhance oocyte freezing and enhance the likelihood of pregnancies after thaw.  Discussed with patient the clinical data that currently exists about efficacy of oocyte cryopreservation as a strategy for fertility preservation and that this is an evolving area. At present several concepts have been demonstrated. Increased maternal age likely impacts total egg yield and likelihood of pregnancy after thaw. Offspring outcomes appear to be comparable for children conceived after IVF and oocyte freezing/thaw/IVF but research in this area is evolving and much more outcomes data exists for children conceived after embryo cryo than oocyte cryo. An upper limit on duration of time that oocytes can be frozen and still result in a pregnancy has not been defined. There is no guarantee for a pregnancy with any amount of oocytes cryopreserved. Reviewed nature of stimulation, injectable hormones used, and monitoring process and the process of oocyte retrieval as an outpatient surgical procedure to harvest oocytes. Risks of this procedure include ovarian hyperstimulation syndrome, anesthesia risks during egg retrieval. Reviewed need to consult with financial specialists in our office to delineate coverage and costs.    Routine Testing  Fertility Center  STDs Within 1 year   Genetic carrier Waiver/Completed   T&S Within 1 year   AMH Within 1 year "   TSH Within 1 year   Rubella/Varicella Within 5 years     PLAN  AMH  Pelvic ultrasound  T&S done   STDs done 7-2024  Schedule egg preservation onco fertility Consults  Chart to primary nurse for care coordination and patient check list/education  Enroll in Engaged MD  Take prenatal vitamins, vitamin D 2000 IUs daily  Discussed that pap and mammogram must be updated per ACOG guidelines before treatment can begin  Discussed that treatment cannot proceed until checklist items are complete   Additional testing for BMI < 18 or > 40: NA    MD Completion:  Ectopic Risk: No  Medically Complex: Yes      Munira Porter CNP 11/11/24 8:50 AM

## 2024-11-12 NOTE — PROGRESS NOTES
[facility]   Neurosurgery    Diagnosis  Diagnoses and all orders for this visit:  Brain mass      Patient Discussion/Summary  Sejal previously presented with severe headaches and was found to have a third ventricular mass.  We performed a stereotactic biopsy, and preliminary pathology was consistent with glial cells and Kaya fibers.  She was then discharged pending a final diagnosis.    She was seen in the interim, at which time she was neurologically and systemically well.    Her pathology did take some time to return, but is consistent with a low-grade neoplasm with a BRAF-V600E mutation.  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 Fortunately, this means that she will likely have a good response to a BRAF inhibitor to shrink the tumor, which can then be followed by radiation therapy.    Sejal was interested in discussing more definitive treatment in the form of surgical resection.  We explained that, although this is certainly feasible, the location of this tumor within the third ventricle and in close proximity to the hypothalamus would likely mean that we could transiently or permanently damage her hypothalamic function, leading to lifelong hormone replacement therapy.  As such, we would not recommend this as a first-line therapy.    After thorough discussion, she agreed with our reasoning.  We will reach out to the oncology team to inform them of her decision.    We will follow her progress closely in our tumor board discussions.      History of Present Illness  Chief Complaint: No chief complaint on file.        HPI: Sejal Duff is a 24 y.o. female with a past history of anemia, asthma GERD, IBS, PCOS and breast fibroadenoma s/p L breast excisional biopsy who presented to the [facility] ED 10/14 with HA. CTH parasellar mass with calcifications, MRI 2.4cm peripherally enhancing necrotic mass with non-enhancing FLAIR hyperintense lesion in medial L temporal lobe. 10/15 s/p  R stereotactic brain bx preliminary pathology was consistent with glial cells and Kaya fibers. At our last office visit her final pathology was not available. Patient presents today to discuss final pathology and discuss surgery plans.       ROS: As noted in HPI.      Previous History  Past Medical History:   Diagnosis Date    Encounter for screening for infections with a predominantly sexual mode of transmission 03/02/2020    Screening for STD (sexually transmitted disease)    Melena 04/12/2019    Blood in stool    Other specified health status     No pertinent past medical history    Other specified health status     No pertinent past surgical history    Personal history of other (healed) physical injury and trauma     History of sprain of ankle    Unspecified condition associated with female genital organs and menstrual cycle 03/02/2020    Cervical motion tenderness     Past Surgical History:   Procedure Laterality Date    BREAST SURGERY Left     Mass removal    OTHER SURGICAL HISTORY  01/18/2019    No history of surgery     Social History     Tobacco Use    Smoking status: Never     Passive exposure: Never    Smokeless tobacco: Never   Vaping Use    Vaping status: Never Used   Substance Use Topics    Alcohol use: Yes     Comment: social    Drug use: Yes     Types: Marijuana     Family History   Problem Relation Name Age of Onset    Asthma Mother      Colon cancer Mother       Allergies   Allergen Reactions    Morphine Dizziness    Pollen Extracts Runny nose    House Dust Runny nose     Current Outpatient Medications   Medication Instructions    acetaminophen (TYLENOL) 650 mg, oral, Every 4 hours PRN    albuterol 90 mcg/actuation inhaler 1-2 puffs, inhalation, Every 4 hours PRN    benzoyl peroxide (Benzac AC) 10 % external wash APPLY TOPICALLY 2 TIMES A DAY. WASH TWICE DAILY AS DIRECTED    caffeine 200 mg, oral, Every 6 hours PRN    cholecalciferol (VITAMIN D-3) 50,000 Units, oral, Weekly    dexAMETHasone  (DECADRON) 2 mg, oral, 2 times daily    ergocalciferol (VITAMIN D-2) 1,250 mcg, oral, Weekly    Lacto no.08-[place]-RNE-larch 25B cell-25B cell-50 mg capsule 1 capsule, oral, Daily    linaCLOtide (LINZESS) 145 mcg, oral, Daily before breakfast, Do not crush or chew.    loratadine (CLARITIN) 10 mg, oral, Daily    metFORMIN XR (GLUCOPHAGE-XR) 500 mg, oral, Daily with breakfast, Do not crush, chew, or split.    metroNIDAZOLE (FLAGYL) 500 mg, 3 times daily    Motegrity 2 mg, oral, Daily    multivitamin with iron (Daily Multiple Vitamins/Iron) tablet 1 tablet, oral, Daily    omeprazole (PRILOSEC) 40 mg, Daily    ondansetron (ZOFRAN) 4 mg, oral, Every 6 hours PRN    phentermine (ADIPEX-P) 37.5 mg, oral, Daily before breakfast    vilazodone (VIIBRYD) 10 mg, oral, Daily         Vitals  There were no vitals taken for this visit.

## 2024-11-13 ENCOUNTER — TELEMEDICINE (OUTPATIENT)
Dept: NEUROSURGERY | Facility: HOSPITAL | Age: 24
End: 2024-11-13
Payer: COMMERCIAL

## 2024-11-13 DIAGNOSIS — G93.89 BRAIN MASS: Primary | ICD-10-CM

## 2024-11-13 PROCEDURE — 99211 OFF/OP EST MAY X REQ PHY/QHP: CPT | Performed by: NEUROLOGICAL SURGERY

## 2024-11-14 ENCOUNTER — ANCILLARY PROCEDURE (OUTPATIENT)
Dept: ENDOCRINOLOGY | Facility: CLINIC | Age: 24
End: 2024-11-14
Payer: COMMERCIAL

## 2024-11-14 ENCOUNTER — OFFICE VISIT (OUTPATIENT)
Dept: ENDOCRINOLOGY | Facility: CLINIC | Age: 24
End: 2024-11-14
Payer: COMMERCIAL

## 2024-11-14 VITALS
HEIGHT: 63 IN | WEIGHT: 149.13 LBS | DIASTOLIC BLOOD PRESSURE: 80 MMHG | SYSTOLIC BLOOD PRESSURE: 121 MMHG | BODY MASS INDEX: 26.42 KG/M2 | HEART RATE: 97 BPM

## 2024-11-14 DIAGNOSIS — Z31.41 FERTILITY TESTING: ICD-10-CM

## 2024-11-14 DIAGNOSIS — N91.4 SECONDARY OLIGOMENORRHEA: ICD-10-CM

## 2024-11-14 DIAGNOSIS — F41.9 ANXIETY: Primary | ICD-10-CM

## 2024-11-14 PROCEDURE — 99213 OFFICE O/P EST LOW 20 MIN: CPT | Performed by: OBSTETRICS & GYNECOLOGY

## 2024-11-14 PROCEDURE — 76830 TRANSVAGINAL US NON-OB: CPT | Performed by: STUDENT IN AN ORGANIZED HEALTH CARE EDUCATION/TRAINING PROGRAM

## 2024-11-14 PROCEDURE — 76830 TRANSVAGINAL US NON-OB: CPT

## 2024-11-14 PROCEDURE — 83516 IMMUNOASSAY NONANTIBODY: CPT

## 2024-11-14 RX ORDER — ALPRAZOLAM 0.5 MG/1
0.5 TABLET ORAL 3 TIMES DAILY PRN
Qty: 5 TABLET | Refills: 0 | Status: SHIPPED | OUTPATIENT
Start: 2024-11-14 | End: 2024-11-14 | Stop reason: WASHOUT

## 2024-11-14 ASSESSMENT — PAIN SCALES - GENERAL: PAINLEVEL_OUTOF10: 0-NO PAIN

## 2024-11-14 NOTE — PROGRESS NOTES
Visit Type: In Person    Interval history: Here to discuss egg freezing +/- possible diagnosis of PCOS (made by Dr Yang before her tumor was diagnosed).   + acne, hair changes, weight gain, issues with her bowels  Has concerns about hormone exposure and the development of her tumor.   She does not want to delay treatment of her tumor. They are recommending not to have surgery.   Her periods have always been irregular and difficult.     2023  ORDERING CLINICIAN:  DORITA YANG     TECHNIQUE:  Transabdominal and transvaginal grayscale, color and spectral Doppler  ultrasound of the pelvis.     FINDINGS:  UTERUS:  The uterus is normal size, measuring 6.2 x 3.6 x 4.3 cm. There are no  leiomyomas.     ENDOMETRIUM:  Normal thickness, measuring 16 mm.     RIGHT ADNEXA:  Normal size, measuring 2.9 x 1.5 x 1.8 cm. Normal arterial and venous  spectral Doppler waveforms.     LEFT ADNEXA:  Normal size, measuring 2.1 x 1.5 x 1.8 cm. Normal arterial and venous  spectral Doppler waveforms.     CUL DE SAC:  No pelvic free fluid.      Impression   No acute pelvic pathology.       Past History Reviewed and Affirmed Below:  Visit Type: In Person    NEW FERTILITY PATIENT VISIT    Referred by:      Accompanied today by:       Sejal Duff is a 24 y.o.  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 Needs to start oncology treatment asap,  needs MRI of your spine to be completed in next week, 2024. Plan: meds to shrink the tumor called Dabrafenib (Tafinlar) & awaiting follow up with surgeon to discuss chemotherapy, radiation, or surgery.    Oncology PLAN     #Hypothalamic lesion  - Final pathology pending.  Awaiting NGS and potentially methylation  results.  This will determine next treatment steps.  -scheduled to see АЛЕКСАНДР in the coming weeks      #Cerebral edema/steroid  - Is having nausea and vomiting issues after taking the dexamethasone.  Has not taken dexamethasone for the past 2 days.  - Instructed to take 1 mg dexamethasone at 9 AM and 1 mg at noon.  If there is no worsening of headaches then should decrease to 1 mg in a.m. and 1 week and if continues to remain asymptomatic stop 1 week after that.     #Nausea/vomiting  - Could be due to dexamethasone but feel that her IBS is also playing a role.  - Instructed to take ondansetron 8 mg about 1 hour prior to taking dexamethasone doses.  - If this does not help the vomiting then we will consider prescribing Phenergan  - Will also coordinate with GI and integrative medicine colleagues were already seeing her to see if there is other suggestions for her nausea and vomiting episodes.     #IBS  -continue fu with GI and integrative medicine teams      #Social  - She would like to return to work if possible.  Instructed her to discuss this with neurosurgery team regarding restrictions for returning to work  - She works as a nurse assistant      CNS Tumor Board Recommendations         Patient was presented by Dr. Migue Aguilar at our CNS Tumor Board on 11/06/2024 which included representatives from Radiation oncology, Surgical oncology, Neuro-oncology, Pathology, Radiology, Research, Neurosurgery, Social Work (Neurosurgery).     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 CSF analysis negative.      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 Sellar imaging. Continue following with neuro-oncology. Radiation oncology referral.      Clinical Trial Status: N/A      National site-specific guidelines were discussed with respect to the case.      IMAGING  === 10/13/24 ===     MR BRAIN W AND WO CONTRAST     - Impression -  Cystic/necrotic rim enhancing lesion centered within the hypothalamus  measuring up the 2.4 cm resulting in partial effacement of the neural  foramina and slight prominence of the lateral ventricles.     Additional nonenhancing FLAIR hyperintense lesion within the medial  left temporal lobe measuring 1.1 x 0.9 cm. Given multifocal  appearance, findings may represent primary neoplasm such as an optic  pathway glioma or less likely metastatic disease. Suprasellar lesions  such as craniopharyngioma and germinoma are also thought to be less  likely. Infection can not be ruled out as diffusion-weighted imaging  is nondiagnostic secondary to susceptibility from patient's braces.       Have you had any concerns about your fertility treatments so far?     What are you goals for today's visit?     What causes of infertility have been identified on your workup so far?     Past Infertility Treatments:      Please summarize your fertility treatments to date.       As far as you are aware, do you have insurance coverage for fertility diagnostic testing and/or fertility treatments?      PRIOR EVALUATION / TREATMENT: none    Hysterosalpingogram: no  Saline Infused Sonography: no  GYN Pelvic Ultrasound: no      Prior Labs  Lab Results    Date Done      AMH: No results found for requested labs within last 1825 days. No results found for requested labs within last 1825 days.   TSH: 1.20 (Ref range: 0.44 - 3.98 mIU/L) 10/14/2024   PRL: 34.1 (H; Ref range: 3.0 - 20.0 ug/L) 10/14/2024   Testosterone: No results found for requested labs within last 1825 days. No results found for  requested labs within last 1825 days.   DHEAS: 107 (Ref range: 65 - 395 ug/dL) 8/3/2023   FSH: 5.9 (Ref range: IU/L) 10/14/2024   17 OHP: No results found for requested labs within last 1825 days. No results found for requested labs within last 1825 days.   HgbA1c: 5.2 (Ref range: See comment %) 10/14/2024   Hepatitis B surface antigen: Nonreactive (Ref range: Nonreactive) 2024   Hepatitis C antibody: Nonreactive (Ref range: Nonreactive) 2024   HIV ½ Antigen Antibody screen with reflex: Nonreactive (Ref range: Nonreactive) 2024   Syphilis screening with reflex: No results found for requested labs within last 1825 days. 2024   GC: Negative (Ref range: Negative) 2024   CT: Negative (Ref range: Negative) 2024   Type and Screen: O 10/14/2024   Rh: POS 10/14/2024   Antibody: NEG (Ref range: ) 10-   Rubella: No results found for requested labs within last 1825 days. No results found for requested labs within last 1825 days.   Varicella: No results found for requested labs within last 1825 days. No results found for requested labs within last 1825 days.   Hemoglobin: No results found for requested labs within last 1825 days. No results found for requested labs within last 1825 days.   Hematocrit: No results found for requested labs within last 1825 days. No results found for requested labs within last 1825 days.   Creatinine: 0.69 (Ref range: 0.50 - 1.05 mg/dL) 10/17/2024   AST:12 (Ref range: 9 - 39 U/L) 10/14/2024   ALT:6 (L; Ref range: 7 - 45 U/L): 10/14/2024     Relationship Status:  single    Have you ever been pregnant?  no  How many times have you been pregnant?    Have you ever had a miscarriage?    How many times have you had a miscarriage?       OB Hx:      OB History          0    Para   0    Term   0       0    AB   0    Living   0         SAB   0    IAB   0    Ectopic   0    Multiple   0    Live Births   0                 GYN HISTORY   Have you ever been  diagnosed with a sexually transmitted disease? no   Have you ever had Pelvic Inflammatory Disease?  no  Have you had an abnormal PAP smear?    Date & Result of last PAP smear: 10-5-2022- negative       Lab Results   Component Value Date    FINALINTERP  10/15/2024       A. CEREBROSPINAL FLUID- CSF:  - No malignant cells identified.  - Few scattered lymphocytes, neutrophils and red cells present, suggestive of peripheral blood contamination.                Have you ever had an abnormal Mammogram?  NA  Date & result of your last mammogram:  -  Do you have pelvic pain?  no  How many times per week do you have intercourse?  NA  Do you have pain with intercourse?  no  Do you use lubricants with intercourse?    Do you have pain with bowel movements?      no        Do you have pain with a full bladder?  no  MENSTRUAL HISTORY  LMP:  2024  Menarche:  12  Contraception:  OCPs & Depo provera, nexplanon  Cycle length:  irregular, 30-60 days  Describe your bleedin-5 days  Dysmenorrhea:  yes     ENDOCRINE/INFERTILITY HISTORY  Duration of infertility:  NA  Coital Activity/week:  NA  Nipple Discharge:  left breast mass removed 1-2 yrs ago- benign   Vision changes:  yes  Headaches:  yes  Excess hair growth:  no  Excessive hair loss:  yes  Acne:  yes  Oily skin:  yes  Recent weight change  Weight gain:  yes  Weight loss: no   Exercise more than 3 times a week:  yes    PMH  Past Medical History:   Diagnosis Date    Encounter for screening for infections with a predominantly sexual mode of transmission 2020    Screening for STD (sexually transmitted disease)    Melena 2019    Blood in stool    Other specified health status     No pertinent past medical history    Other specified health status     No pertinent past surgical history    Personal history of other (healed) physical injury and trauma     History of sprain of ankle    Unspecified condition associated with female genital organs and menstrual cycle  03/02/2020    Cervical motion tenderness        MEDICATIONS  Current Outpatient Medications on File Prior to Visit   Medication Sig Dispense Refill    acetaminophen (Tylenol) 325 mg tablet Take 2 tablets (650 mg) by mouth every 4 hours if needed for mild pain (1 - 3) or headaches.      albuterol 90 mcg/actuation inhaler Inhale 1-2 puffs every 4 hours if needed for wheezing or shortness of breath. 18 g 2    benzoyl peroxide (Benzac AC) 10 % external wash APPLY TOPICALLY 2 TIMES A DAY. WASH TWICE DAILY AS DIRECTED 148 mL 3    caffeine 200 mg Take 1 tablet (200 mg) by mouth every 6 hours if needed (For [place]). 30 tablet 0    cholecalciferol (Vitamin D-3) 50,000 unit capsule Take 1 capsule (50,000 Units) by mouth 1 (one) time per week. 4 capsule 11    dexAMETHasone (Decadron) 1 mg tablet Take 2 tablets (2 mg) by mouth 2 times a day for 14 days. 56 tablet 0    ergocalciferol (Vitamin D-2) 1.25 MG (68129 UT) capsule Take 1 capsule (1,250 mcg) by mouth 1 (one) time per week. 4 capsule 11    Lacto no.61-Opmmet-XPE-larch 25B cell-25B cell-50 mg capsule Take 1 capsule by mouth once daily. 90 capsule 3    linaCLOtide (Linzess) 145 mcg capsule Take 1 capsule (145 mcg) by mouth once daily in the morning. Take before meals. Do not crush or chew. 30 capsule 3    loratadine (Claritin) 10 mg tablet Take 1 tablet (10 mg) by mouth once daily. 90 tablet 1    metFORMIN XR (Glucophage-XR) 500 mg 24 hr tablet Take 1 tablet (500 mg) by mouth once daily with breakfast. Do not crush, chew, or split. 30 tablet 11    metroNIDAZOLE (Flagyl) 500 mg tablet Take 1 tablet (500 mg) by mouth 3 times a day.      multivitamin with iron (Daily Multiple Vitamins/Iron) tablet Take 1 tablet by mouth once daily. 90 each 3    omeprazole (PriLOSEC) 40 mg DR capsule Take 1 capsule (40 mg) by mouth once daily.      ondansetron (Zofran) 4 mg tablet Take 1 tablet (4 mg) by mouth every 6 hours if needed for nausea or vomiting. 30 tablet 1    phentermine  (Adipex-P) 37.5 mg tablet Take 1 tablet (37.5 mg) by mouth once daily in the morning. Take before meals.      prucalopride (Motegrity) 2 mg tablet Take 1 tablet (2 mg) by mouth once daily. 30 tablet 3    vilazodone (Viibryd) 10 mg tablet Take 1 tablet (10 mg) by mouth once daily.       No current facility-administered medications on file prior to visit.        PSH  Past Surgical History:   Procedure Laterality Date    BREAST SURGERY Left     Mass removal    OTHER SURGICAL HISTORY  2019    No history of surgery        PSYCH HISTORY  Have you ever been diagnosed with a mental health Issue?  yes  Have you ever been hospitalized for a mental health disorder?  no     SOCIAL HISTORY  Social History     Tobacco Use    Smoking status: Never     Passive exposure: Never    Smokeless tobacco: Never   Vaping Use    Vaping status: Never Used   Substance Use Topics    Alcohol use: Yes     Comment: social    Drug use: Yes     Types: Marijuana     Occupation:  [employer]  Have you ever been incarcerated?    Do you have a history of domestic violence? no    Do you feel safe at home?  yes  Do you have a history of any negative sexual experience such as incest or rape? no      PARTNER HISTORY  Partner Name:  NA  Partner :    Partner email:    Occupation:    Prior fertility history:    PMH:    PSH:    Smoking:   Alcohol Use:    Drug Use:    Medications:    Injuries:    STD:    Please select all that are applicable:    SA:    SA Results:      FAMILY HISTORY  Family History   Problem Relation Name Age of Onset    Asthma Mother      Colon cancer Mother         CANCER HISTORY    Breast: Mat GG grandfather  Ovarian:  no  Colon:  Mat GF  Endometrial:  no    FAMILY VTE HISTORY  Family History of Blood Clots:  no    GENETIC HISTORY  Ethnic Background  Patient:    Partner:  no  Genetic Disease in Family  Patient:  no  Partner:  NA  Birth Defects in Family  Patient:  no  Partner:  NA    Genetic screening performed previously:   "none     BMI:   BMI Readings from Last 1 Encounters:   24 26.42 kg/m²     VITALS:  /80   Pulse 97   Ht 1.6 m (5' 3\")   Wt 67.6 kg (149 lb 2 oz)   BMI 26.42 kg/m²     ASSESSMENT   24 y.o.  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    Partner SA: NA    COUNSELING  We discussed causes of infertility including hormonal, egg quality issues, structural problems such as endometriosis, adhesions, or tubal problems, uterine factors such as polyps or fibroids, and sperm issues. Reviewed evaluation of such as well. We discussed various methods for achieving pregnancy in some detail including, ovulation induction, insemination, superovulation and IVF.    We discussed AMH testing and the patient's AMH level, if applicable.  AMH is considered favorable if >1 however this test has many limitations including poor predictive rates of pregnancy. In randomized control trials such as \"Time to conceive\" pts with low AMH levels (<0.7) has similar cumulative pregnancy rates compared with women that had normal AMH levels.  In the \"AMIGOS\" study, AMH did not predict pregnancy rates following OS/IUI for unexplained infertility. However,  AMH is a marker that is helpful to predict how a patient may respond or oocyte yields with FSH and ART treatments, but again there is conflicting data about how this affects pregnancy rates with ART.    We discussed options for fertility preservation including oocyte cryopreservation and embryo cryopreservation, and discussed advances in cryopreservation specifically the optimization of vitrification to enhance oocyte freezing and " enhance the likelihood of pregnancies after thaw.  Discussed with patient the clinical data that currently exists about efficacy of oocyte cryopreservation as a strategy for fertility preservation and that this is an evolving area. At present several concepts have been demonstrated. Increased maternal age likely impacts total egg yield and likelihood of pregnancy after thaw. Offspring outcomes appear to be comparable for children conceived after IVF and oocyte freezing/thaw/IVF but research in this area is evolving and much more outcomes data exists for children conceived after embryo cryo than oocyte cryo. An upper limit on duration of time that oocytes can be frozen and still result in a pregnancy has not been defined. There is no guarantee for a pregnancy with any amount of oocytes cryopreserved. Reviewed nature of stimulation, injectable hormones used, and monitoring process and the process of oocyte retrieval as an outpatient surgical procedure to harvest oocytes. Risks of this procedure include ovarian hyperstimulation syndrome, anesthesia risks during egg retrieval. Reviewed need to consult with financial specialists in our office to delineate coverage and costs.    Routine Testing  Fertility Center  STDs Within 1 year   Genetic carrier Waiver/Completed   T&S Within 1 year   AMH Within 1 year   TSH Within 1 year   Rubella/Varicella Within 5 years     PLAN  AMH  Pelvic ultrasound  T&S done   STDs done 7-2024  Schedule egg preservation onco fertility Consults  Chart to primary nurse for care coordination and patient check list/education  Enroll in Engaged MD  Take prenatal vitamins, vitamin D 2000 IUs daily  Discussed that pap and mammogram must be updated per ACOG guidelines before treatment can begin  Discussed that treatment cannot proceed until checklist items are complete   Additional testing for BMI < 18 or > 40: NA    MD Completion:  Ectopic Risk: No  Medically Complex: Yes  Munira Porter, ROSA  11/11/24 8:50 AM       FOLLOW UP   Ultrasound ordered- ovaries appear PCOS, lining is 16mm indicating estrogen exposure  AMH ordered  Will touch base with her oncologists to review planned agents and whether egg freezing is needed.   Apryl Cheatham 11/14/24 2:25 PM     Addendum: Spoke with her neuro-oncologist Dr Freeman regarding planned therapy with dabrafenib and trametinib. Data on ovarian reserve and these agents is limited. She should not conceive on these agents and a washout period will be needed prior to conception. Given her urgent desire to pursue therapy, we can start these right away. Dr. Freeman would like assessment of her HPO axis given location of the tumor- will order FSH, LH, E2 and prog. Called patient to review the above.   Apryl Cheatham 11/15/24 12:03 PM

## 2024-11-15 ENCOUNTER — TELEPHONE (OUTPATIENT)
Dept: HEMATOLOGY/ONCOLOGY | Facility: CLINIC | Age: 24
End: 2024-11-15
Payer: COMMERCIAL

## 2024-11-15 NOTE — TELEPHONE ENCOUNTER
Attempted to call patient to see if she could do a telehealth visit on Monday 11/18 afternoon with Dr. Aguilar to discuss future plans, no answer, voice message left to call our office back

## 2024-11-18 ENCOUNTER — PATIENT OUTREACH (OUTPATIENT)
Dept: CARE COORDINATION | Facility: CLINIC | Age: 24
End: 2024-11-18
Payer: COMMERCIAL

## 2024-11-18 NOTE — PROGRESS NOTES
Outreach call to patient to follow up after 30 days of hospital discharge. Spoke with the pt she said she is doing ok and she has a fu appt tomorrow.  No Cherry RN [facility] [place]  [phone]

## 2024-11-19 ENCOUNTER — OFFICE VISIT (OUTPATIENT)
Dept: HEMATOLOGY/ONCOLOGY | Facility: HOSPITAL | Age: 24
End: 2024-11-19
Payer: COMMERCIAL

## 2024-11-19 VITALS
TEMPERATURE: 97.3 F | DIASTOLIC BLOOD PRESSURE: 71 MMHG | BODY MASS INDEX: 27.62 KG/M2 | HEART RATE: 92 BPM | WEIGHT: 155.9 LBS | RESPIRATION RATE: 18 BRPM | SYSTOLIC BLOOD PRESSURE: 130 MMHG | OXYGEN SATURATION: 100 %

## 2024-11-19 DIAGNOSIS — C71.9 PILOCYTIC ASTROCYTOMA (MULTI): Primary | ICD-10-CM

## 2024-11-19 DIAGNOSIS — R11.0 NAUSEA: ICD-10-CM

## 2024-11-19 LAB — MIS SERPL-MCNC: 5.21 NG/ML (ref 0.4–16.02)

## 2024-11-19 PROCEDURE — 99215 OFFICE O/P EST HI 40 MIN: CPT | Performed by: PSYCHIATRY & NEUROLOGY

## 2024-11-19 PROCEDURE — 99417 PROLNG OP E/M EACH 15 MIN: CPT | Performed by: PSYCHIATRY & NEUROLOGY

## 2024-11-19 RX ORDER — DEXAMETHASONE 1 MG/1
1 TABLET ORAL 2 TIMES DAILY
Qty: 60 TABLET | Refills: 2 | Status: SHIPPED | OUTPATIENT
Start: 2024-11-19 | End: 2024-11-29

## 2024-11-19 RX ORDER — ONDANSETRON 4 MG/1
4 TABLET, FILM COATED ORAL EVERY 6 HOURS PRN
Qty: 30 TABLET | Refills: 1 | Status: SHIPPED | OUTPATIENT
Start: 2024-11-19 | End: 2024-12-19

## 2024-11-19 ASSESSMENT — PAIN SCALES - GENERAL: PAINLEVEL_OUTOF10: 8

## 2024-11-19 NOTE — PATIENT INSTRUCTIONS
Complete echocardiogram and ophthalmology visits. A consult was also placed to onco-fertility/young adult.    Follow up with Dr. Aguilar 2 weeks after starting your medication, obtain labs prior to this visit.

## 2024-11-19 NOTE — Clinical Note
Date: 2024  RE:  Sejal Duff  :  2000      To Whom It May Concern:    Our patient, Sejal, has been under our care and now may return back to work without restrictions.    Their return to work date is:  ***    If you have questions concerning this patient's immediate care, please feel free to contact our office at [phone].    Sincerely,      Malini Diaz RN  Supervising Provider: {[facility] Providers:[id number]}

## 2024-11-19 NOTE — PROGRESS NOTES
Red Chemo bag provided to patient and contents reviewed. Education reviewed for dabrafenib oral chemo. Pt verbalized understanding, no further needs at this time.

## 2024-11-19 NOTE — LETTER
To Whom it May Concern:     Darby Duff accompanied her daughter during her hospital stay from 10/13/24-10/19/24.     Thank you,      Migue Aguilar MD

## 2024-11-20 ENCOUNTER — TELEPHONE (OUTPATIENT)
Dept: HEMATOLOGY/ONCOLOGY | Facility: HOSPITAL | Age: 24
End: 2024-11-20
Payer: COMMERCIAL

## 2024-11-20 ENCOUNTER — TELEPHONE (OUTPATIENT)
Dept: HEMATOLOGY/ONCOLOGY | Facility: CLINIC | Age: 24
End: 2024-11-20
Payer: COMMERCIAL

## 2024-11-20 ENCOUNTER — SPECIALTY PHARMACY (OUTPATIENT)
Dept: PHARMACY | Facility: CLINIC | Age: 24
End: 2024-11-20

## 2024-11-20 LAB
AP SUMMARY REPORT: NORMAL
SCAN RESULT: NORMAL

## 2024-11-20 NOTE — TELEPHONE ENCOUNTER
Called patient to discuss new targeted oral medication ordered by Dr. Aguilar, we discussed possible side effects especially fevers/nausea/vomiting, we discussed using zofran for nausea, tylenol for fevers, if these are not helping patient should call the office, we discussed her echocardiogram that is scheduled for next Friday, patient instructed to call the office when she receives her medications via mail delivery, patient was mailed information about dabrafenib and trametinib as well as the  Specialty pharmacy contact information, patient has complaints of a headache, advised I will discuss with Dr. Aguilar, patient verbalizes understanding, patient concerned her medications from her gastric bypass might interfere with her new medications, Felipe PAVON pharmacist reviewed and did not find any significant interactions

## 2024-11-20 NOTE — TELEPHONE ENCOUNTER
Completed [id number] for insurance approval of patient's spine MRIs on 11/22/24. 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 MRIs approved.    Authorization valid for the following MRIs through 1/21/2025  Lumbar: [id number]  Cervical: [id number]  Thoracic: [id number]    17 minutes spent on call.

## 2024-11-21 ENCOUNTER — SPECIALTY PHARMACY (OUTPATIENT)
Dept: PHARMACY | Facility: CLINIC | Age: 24
End: 2024-11-21

## 2024-11-22 ENCOUNTER — APPOINTMENT (OUTPATIENT)
Dept: RADIOLOGY | Facility: CLINIC | Age: 24
End: 2024-11-22
Payer: COMMERCIAL

## 2024-11-25 ENCOUNTER — TELEPHONE (OUTPATIENT)
Dept: HEMATOLOGY/ONCOLOGY | Facility: HOSPITAL | Age: 24
End: 2024-11-25

## 2024-11-25 ENCOUNTER — TELEMEDICINE (OUTPATIENT)
Dept: HEMATOLOGY/ONCOLOGY | Facility: HOSPITAL | Age: 24
End: 2024-11-25
Payer: COMMERCIAL

## 2024-11-25 DIAGNOSIS — K58.1 IRRITABLE BOWEL SYNDROME WITH CONSTIPATION: ICD-10-CM

## 2024-11-25 DIAGNOSIS — C71.9 PILOCYTIC ASTROCYTOMA (MULTI): Primary | ICD-10-CM

## 2024-11-25 DIAGNOSIS — Z91.89 AT RISK FOR INFERTILITY: ICD-10-CM

## 2024-11-25 DIAGNOSIS — Z51.5 ENCOUNTER FOR PALLIATIVE CARE: ICD-10-CM

## 2024-11-25 PROCEDURE — 99205 OFFICE O/P NEW HI 60 MIN: CPT | Performed by: NURSE PRACTITIONER

## 2024-11-25 ASSESSMENT — ENCOUNTER SYMPTOMS
ABDOMINAL DISTENTION: 1
SLEEP DISTURBANCE: 0
HEADACHES: 1
NERVOUS/ANXIOUS: 0
FATIGUE: 0
DEPRESSION: 0
CONSTIPATION: 1
NAUSEA: 1
DECREASED CONCENTRATION: 0
APPETITE CHANGE: 1

## 2024-11-25 NOTE — PROGRESS NOTES
Patient ID: Sejal Duff is a 24 y.o. female.  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     Current treatment plan: curative intent trametinib+dabrafenib oral therapies followed by radiation therapy    Virtual or Telephone Consent  An interactive audio and video telecommunication system which permits real time communications between the patient (HOME) and provider at [facility] was utilized to provide this telehealth service.   Verbal consent was requested and obtained from patient on this date, 11/25/24  for a telehealth visit.  Subjective    Sejal Duff is a 24 y.o. 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     Presents today for initial young adult oncology consultation. Relays above history of cancer and plan to treat with oral chemotherapies beginning upon receipt from specialty pharmacy. Relays her goal to have surgery in the future to remove cancer if chemotherapy has reduced its size.      Relays understanding of diagnosis, treatment plan and goal of therapy. Treating oncology team has recommended 8 months of oral therapy followed by repeat imaging and possible radiation therapy for disease control/cure. She is interested in undergoing surgical removal rather than radiation therapy as definitive treatment for her cancer.     Feels adjusted to her  cancer diagnosis - denies feeling overwhelmed by information. Relays that her cancer diagnosis and outcome is out of her control and she feels very pragmatic in her approach. Denies history of anxiety or depression, denies feeling of excessive worry, denies decrease in focus or concentration, still finding enjoyment in activities. Has large family that is very supportive.     Physically, she reports ongoing side effects related to IBS and chronic constipation. Currently following with peds GI and on two agents linzess and motegrity without great relief. Has been dealing with chronic constipation since age 7, and continues to go weeks to months without bowel movement, at times so severe she must have NG suction d/t vomiting stool. Notes decreased PO intake, abdominal fullness, and weight gain d/t stool retention. Does impact quality of life. Does eat well-balanced diet, has omitted meat, eats fruits/vegetables and whole grains. Does exercise at gym 2-3x/day - mainly abdominal exercises aimed at eliminating feces.    She has never been pregnant, is uncertain about future family building. She has met with [facility] regarding fertility preservation and she is not interested in freezing oocytes. She relays long history of irregular menses, experiences a period every 1-2 months. She also reports acne prone skin, but denies hirsutism, or significant weight changes. Has been told that she has PCOS but doesn't know what that means other than she may not be able to conceive on her own. She is currently sexually active but not in a romantic relationship. She currently does not use anything to prevent pregnancy.     Currently the patient is working light duty d/t recent diagnosis. Per patient she does not have any work restrictions from neuro-surgery. 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 Does not qualify for short term disability at work. Is interested in social security - did meet  with a  from [facility] but did not understand options. Concerned about finances - rent is $1300/mo and car payment is $300/mo, she has SNAP benefits for food assistance. Per  her SSI would only be $600/mo.     Strong family history, colon cancer MGM, and MGF, MGGF breast cancer, MGGM, cancer of unk type. Per patient she also has several great uncles and aunts with cancer on her mothers side.   Social History: Grew up in [place]. Has 20 1/2 siblings from her father, and 1 1/2 sibling from her mother. Currently lives alone in [place]. Has worked as a [employer] since age 15, currently works full-time in an inpatient/rehab unit. She enjoys shopping. Never smoker, does not vape. Does use ETOH and marijuana.     Review of Systems   Constitutional:  Positive for appetite change. Negative for fatigue.   Gastrointestinal:  Positive for abdominal distention, constipation and nausea.   Genitourinary:  Positive for menstrual problem. Negative for vaginal bleeding.    Neurological:  Positive for headaches.   Psychiatric/Behavioral:  Negative for decreased concentration, depression and sleep disturbance. The patient is not nervous/anxious.      Objective   BSA: There is no height or weight on file to calculate BSA.  There were no vitals taken for this visit. No VS or weight recorded due to the telehealth nature of this visit.     Family History   Problem Relation Name Age of Onset    Asthma Mother      Colon cancer Mother       Oncology History   Pilocytic astrocytoma (Multi)   11/8/2024 Initial Diagnosis    Pilocytic astrocytoma (Multi)     12/3/2024 -  Chemotherapy    Dabrafenib / Trametinib, 28 Day Cycles       Sejal Duff  reports that she has never smoked. She has never been exposed to tobacco smoke. She has never used smokeless tobacco.  She  reports current alcohol use.  She  reports current drug use. Drug: Marijuana.    Physical Exam  Constitutional:       General: She is not in acute distress.      Appearance: Normal appearance. She is not ill-appearing.   Neurological:      Mental Status: She is alert.   Psychiatric:         Mood and Affect: Mood normal.         Behavior: Behavior normal.         Thought Content: Thought content normal.         Judgment: Judgment normal.     Performance Status:  Asymptomatic    Assessment/Plan    Sejal Duff is a 24 y.o. F with a recent diagnosis of astrocytoma with future plans for oral systemic therapy and possible radiation therapy.     #Psychosocial: young adult with cancer  - Discussed resources available including psychiatry, psychology, social work, spiritual care, and expressive therapies  - Connected to young adult oncology program, will receive monthly social programming invitations  - Consider providing community resources for peer support/connection in future  - Will provide patient with website to apply for social security benefits if interested  - Will look into additional financial grants/assistance for patient - specifically as it relates to rent/transportation assistance  - Has SNAP benefits - identified food insecurity, referral to [employer]  - Connected to [facility] - will reconnect    #Sexual dysfunction/contraception:   - We discussed the seriousness of getting pregnant while receiving oral chemotherapy due to the harmful effects this could have on the  fetus, and on her cancer treatment given the decreased availability of medical  services.   - She is not interested in use of hormonal or non hormonal contraceptives - including IUDs out of concern for their role in developing brain cancer  - Discussed that she must prevent pregnancy with use of barrier method every time she engages in penetrative intercourse  - We discussed that small amounts of chemotherapy may be found in her body secretions including vaginal secretions and she should use a barrier form of contraception such as a male or female condom to protect her partner  from chemotherapy exposure.    #Risk for infertility:  - Has previously been seen by colleagues in [facility]  - Discussed natural age related decline in fertility and menopause that occurs as a result of ovarian aging, and that cancer treatments can accellerate that progression and diminish ovarian reserve.  - Individuals may experience varying degrees of short or long term ovarian dysfunction and amenorrhea, and that this is dependent upon type of cancer treatment, cumulative dose, and patients age.   - Loss of ovarian function may be permanent or temporary.  - Risk to fertility is UNKNOWN based on cancer treatment of Dabrafenib/Trametinib - there is little research about the effects of these agents on ovarian reserve  - Declined fertility preservation prior to initiation of chemotherapy  - Reviewed baseline hormone function testing AMH 5.2 on 11/14/24, E2, FSH/LH WNL  - Discussed a plan to monitor hormone function for s/s of hypogonadism    #Polycystic ovary syndrome PCOS: with features of oligomenorrhea, acne, +10 <10mm follicles on bilateral ovaries per US, and elevated AMH  - Will discuss pathophysiology with patient at future visit  - Consider referral to Dr. Griffiths for comprehensive PCOS management     #Chronic constipation/IBS: chronic, lifelong constipation with minimal improvement despite multiple interventions  - Follows with pediatric gastroenterology  - Has previously had NG clean outs at [facility], hydrocolonic therapy, rectal biofeedback, senna, bisacodyl, miralax, golytely, enemas, suppositories, probiotics, and acupuncture  - Currently on linzess and motegrity without significant improvement  - Has been seen in past by adult GI while admitted for consultation but never followed up outpatient - consider re-referral    #Diet/Exercise/Healthy Lifestyle:  - Discussed research demonstrating consumption of a healthy, plant based diet not only decreases cancer risk, but also has been found to improve survival in  cancer patients who partake in a healthy diet.   - We reviewed the American Cancer Society guidelines for a healthy diet including mostly plant based foods  with incorporation of plant/lean animal proteins and healthy fats such as the mediterranean diet.   - Patient provided with a copy of ACS guidelines for healthy diet  - Discussed research that demonstrates decreased cancer risk and improved survival in cancer patients who exercise and stay active throughout diagnosis and treatment.  - Encouraged patient to continue gentle exercise as tolerated  - Encouraged patient to continue to abstain from alcohol, tobacco, and recreational drugs    #Genetic Risk: young adult with cancer and personal family history of cancer  - Discussed rationale for genetic risk consultation consider placing referral in future    The amount of time that I spent with the patient:  Prep: 10  Time spend directly with patient: 45  Coordinating care: 5  Documentation: 10  Other time:    Total time spent on encounter: 70                   JASVIR Corado-CNP

## 2024-11-25 NOTE — TELEPHONE ENCOUNTER
Rn calling to see if Sejal would be available for an appointment with Dr. Aguilar tomorrow 11/26 to discuss alternative treatment options. She is able to see him at 1030 am.   Appointment is scheduled.

## 2024-11-26 ENCOUNTER — LAB (OUTPATIENT)
Dept: LAB | Facility: HOSPITAL | Age: 24
End: 2024-11-26
Payer: COMMERCIAL

## 2024-11-26 ENCOUNTER — OFFICE VISIT (OUTPATIENT)
Dept: HEMATOLOGY/ONCOLOGY | Facility: HOSPITAL | Age: 24
End: 2024-11-26
Payer: COMMERCIAL

## 2024-11-26 ENCOUNTER — TELEPHONE (OUTPATIENT)
Dept: PALLIATIVE MEDICINE | Facility: HOSPITAL | Age: 24
End: 2024-11-26
Payer: COMMERCIAL

## 2024-11-26 VITALS
HEART RATE: 111 BPM | SYSTOLIC BLOOD PRESSURE: 132 MMHG | BODY MASS INDEX: 27.28 KG/M2 | RESPIRATION RATE: 16 BRPM | OXYGEN SATURATION: 100 % | TEMPERATURE: 97.2 F | DIASTOLIC BLOOD PRESSURE: 76 MMHG | WEIGHT: 154 LBS

## 2024-11-26 DIAGNOSIS — K59.09 CHRONIC CONSTIPATION: ICD-10-CM

## 2024-11-26 DIAGNOSIS — G93.89 BRAIN MASS: Primary | ICD-10-CM

## 2024-11-26 DIAGNOSIS — C71.9 PILOCYTIC ASTROCYTOMA (MULTI): ICD-10-CM

## 2024-11-26 DIAGNOSIS — N91.4 SECONDARY OLIGOMENORRHEA: ICD-10-CM

## 2024-11-26 DIAGNOSIS — G93.89 BRAIN MASS: ICD-10-CM

## 2024-11-26 LAB
ALBUMIN SERPL BCP-MCNC: 4.4 G/DL (ref 3.4–5)
ALP SERPL-CCNC: 62 U/L (ref 33–110)
ALT SERPL W P-5'-P-CCNC: 10 U/L (ref 7–45)
ANION GAP SERPL CALC-SCNC: 10 MMOL/L (ref 10–20)
AST SERPL W P-5'-P-CCNC: 14 U/L (ref 9–39)
B-HCG SERPL-ACNC: <3 MIU/ML
BASOPHILS # BLD AUTO: 0.03 X10*3/UL (ref 0–0.1)
BASOPHILS NFR BLD AUTO: 0.4 %
BILIRUB SERPL-MCNC: 0.4 MG/DL (ref 0–1.2)
BUN SERPL-MCNC: 11 MG/DL (ref 6–23)
CALCIUM SERPL-MCNC: 9.7 MG/DL (ref 8.6–10.3)
CHLORIDE SERPL-SCNC: 104 MMOL/L (ref 98–107)
CO2 SERPL-SCNC: 30 MMOL/L (ref 21–32)
CREAT SERPL-MCNC: 0.61 MG/DL (ref 0.5–1.05)
EGFRCR SERPLBLD CKD-EPI 2021: >90 ML/MIN/1.73M*2
EOSINOPHIL # BLD AUTO: 0.09 X10*3/UL (ref 0–0.7)
EOSINOPHIL NFR BLD AUTO: 1.1 %
ERYTHROCYTE [DISTWIDTH] IN BLOOD BY AUTOMATED COUNT: 15 % (ref 11.5–14.5)
ESTRADIOL SERPL-MCNC: 90 PG/ML
FSH SERPL-ACNC: 2 IU/L
GLUCOSE SERPL-MCNC: 106 MG/DL (ref 74–99)
HCT VFR BLD AUTO: 39.6 % (ref 36–46)
HGB BLD-MCNC: 12.3 G/DL (ref 12–16)
IMM GRANULOCYTES # BLD AUTO: 0.03 X10*3/UL (ref 0–0.7)
IMM GRANULOCYTES NFR BLD AUTO: 0.4 % (ref 0–0.9)
LH SERPL-ACNC: 1.8 IU/L
LYMPHOCYTES # BLD AUTO: 2.32 X10*3/UL (ref 1.2–4.8)
LYMPHOCYTES NFR BLD AUTO: 27.1 %
MCH RBC QN AUTO: 26.2 PG (ref 26–34)
MCHC RBC AUTO-ENTMCNC: 31.1 G/DL (ref 32–36)
MCV RBC AUTO: 84 FL (ref 80–100)
MONOCYTES # BLD AUTO: 0.67 X10*3/UL (ref 0.1–1)
MONOCYTES NFR BLD AUTO: 7.8 %
NEUTROPHILS # BLD AUTO: 5.43 X10*3/UL (ref 1.2–7.7)
NEUTROPHILS NFR BLD AUTO: 63.2 %
NRBC BLD-RTO: 0 /100 WBCS (ref 0–0)
PLATELET # BLD AUTO: 390 X10*3/UL (ref 150–450)
POTASSIUM SERPL-SCNC: 4 MMOL/L (ref 3.5–5.3)
PROGEST SERPL-MCNC: 3.2 NG/ML
PROT SERPL-MCNC: 8 G/DL (ref 6.4–8.2)
RBC # BLD AUTO: 4.69 X10*6/UL (ref 4–5.2)
SODIUM SERPL-SCNC: 140 MMOL/L (ref 136–145)
WBC # BLD AUTO: 8.6 X10*3/UL (ref 4.4–11.3)

## 2024-11-26 PROCEDURE — 84702 CHORIONIC GONADOTROPIN TEST: CPT

## 2024-11-26 PROCEDURE — 82670 ASSAY OF TOTAL ESTRADIOL: CPT

## 2024-11-26 PROCEDURE — 84144 ASSAY OF PROGESTERONE: CPT

## 2024-11-26 PROCEDURE — 85025 COMPLETE CBC W/AUTO DIFF WBC: CPT

## 2024-11-26 PROCEDURE — 99215 OFFICE O/P EST HI 40 MIN: CPT | Performed by: PSYCHIATRY & NEUROLOGY

## 2024-11-26 PROCEDURE — 84075 ASSAY ALKALINE PHOSPHATASE: CPT

## 2024-11-26 PROCEDURE — 83002 ASSAY OF GONADOTROPIN (LH): CPT

## 2024-11-26 PROCEDURE — 83001 ASSAY OF GONADOTROPIN (FSH): CPT

## 2024-11-26 PROCEDURE — 36415 COLL VENOUS BLD VENIPUNCTURE: CPT

## 2024-11-26 RX ORDER — ONDANSETRON 4 MG/1
4 TABLET, ORALLY DISINTEGRATING ORAL EVERY 8 HOURS PRN
Qty: 30 TABLET | Refills: 0 | Status: SHIPPED | OUTPATIENT
Start: 2024-11-26 | End: 2024-12-26

## 2024-11-26 RX ORDER — OXYCODONE HYDROCHLORIDE 5 MG/1
5 TABLET ORAL EVERY 6 HOURS PRN
Qty: 15 TABLET | Refills: 0 | Status: SHIPPED | OUTPATIENT
Start: 2024-11-26 | End: 2024-12-03

## 2024-11-26 ASSESSMENT — PAIN SCALES - GENERAL: PAINLEVEL_OUTOF10: 10-WORST PAIN EVER

## 2024-11-26 NOTE — TELEPHONE ENCOUNTER
Patient contacted after referral made to supportive oncology by Deborah DOWD.  Patient has a history of pilocytic astrocytoma.  Patient agreed to an appointment with Amy Calloway NP on 12/11 at 10 am at [facility].

## 2024-11-26 NOTE — PROGRESS NOTES
Dr. Aguilar discussed another treatment option, tovorafenib, today with patient. Goal of treatment to shrink tumor, improve symptoms. Patient agreed to proceed with tovorafenib. Discontinued dabrafenib/trametinib Treatment Plan in Epic -- patient never started this, never received the medication.    Built Tovorafenib Treatment Plan. Dr. Aguilar signed/released cycle 1 supply to [facility]. Patient was advised to NOT start the chemo until we instruct her to do so. [facility] team will provide chemo education to patient. Once we know chemo delivery date, we will arrange labs, follow-up, etc.    Tovorafenib treatment:  Recommended dosage based on BSA is 380 mg/m2  Max recommended dosage is 600 mg PO once weekly w/ or w/o food until disease progression or intolerable toxicity.  For this patient: 380 mg/m2 x 1.76 m2 = 668.8 mg, so her dose will be 600 mg once weekly  Length of treatment proposed to patient: 6 months  Labs prior to treatment initiation, 2 weeks after treatment starts, and then with the start of each cycle to monitor for toxicities  MRI brain w/wo contrast every 2 months/after 2 cycles to assess treatment response    Additionally, sent oxycodone and ODT Zofran to patient's pharmacy. Placed urgent supportive oncology referral for ongoing supportive therapy management.

## 2024-11-28 ENCOUNTER — HOSPITAL ENCOUNTER (INPATIENT)
Facility: HOSPITAL | Age: 24
End: 2024-11-28
Attending: EMERGENCY MEDICINE | Admitting: NEUROLOGICAL SURGERY
Payer: COMMERCIAL

## 2024-11-28 ENCOUNTER — APPOINTMENT (OUTPATIENT)
Dept: RADIOLOGY | Facility: HOSPITAL | Age: 24
End: 2024-11-28
Payer: COMMERCIAL

## 2024-11-28 DIAGNOSIS — G91.1 OBSTRUCTIVE HYDROCEPHALUS (MULTI): ICD-10-CM

## 2024-11-28 DIAGNOSIS — Z98.2 S/P VP SHUNT: ICD-10-CM

## 2024-11-28 DIAGNOSIS — Z74.09 IMPAIRED FUNCTIONAL MOBILITY AND ENDURANCE: ICD-10-CM

## 2024-11-28 DIAGNOSIS — G89.18 ACUTE POST-OPERATIVE PAIN: ICD-10-CM

## 2024-11-28 DIAGNOSIS — C71.9 PILOCYTIC ASTROCYTOMA (MULTI): ICD-10-CM

## 2024-11-28 DIAGNOSIS — G89.18 POSTOPERATIVE PAIN: ICD-10-CM

## 2024-11-28 DIAGNOSIS — Z74.09 IMPAIRED MOBILITY: ICD-10-CM

## 2024-11-28 DIAGNOSIS — G43.919 INTRACTABLE MIGRAINE WITHOUT STATUS MIGRAINOSUS, UNSPECIFIED MIGRAINE TYPE: ICD-10-CM

## 2024-11-28 DIAGNOSIS — G91.9 HYDROCEPHALUS: Primary | ICD-10-CM

## 2024-11-28 LAB
ABO GROUP (TYPE) IN BLOOD: NORMAL
ALBUMIN SERPL BCP-MCNC: 4.3 G/DL (ref 3.4–5)
ALP SERPL-CCNC: 58 U/L (ref 33–110)
ALT SERPL W P-5'-P-CCNC: 12 U/L (ref 7–45)
ANION GAP SERPL CALC-SCNC: 12 MMOL/L (ref 10–20)
ANTIBODY SCREEN: NORMAL
APPEARANCE UR: CLEAR
APTT PPP: 32 SECONDS (ref 27–38)
AST SERPL W P-5'-P-CCNC: 14 U/L (ref 9–39)
BASOPHILS # BLD AUTO: 0.02 X10*3/UL (ref 0–0.1)
BASOPHILS NFR BLD AUTO: 0.3 %
BILIRUB SERPL-MCNC: 0.4 MG/DL (ref 0–1.2)
BILIRUB UR STRIP.AUTO-MCNC: NEGATIVE MG/DL
BUN SERPL-MCNC: 8 MG/DL (ref 6–23)
CALCIUM SERPL-MCNC: 9.3 MG/DL (ref 8.6–10.6)
CHLORIDE SERPL-SCNC: 103 MMOL/L (ref 98–107)
CO2 SERPL-SCNC: 26 MMOL/L (ref 21–32)
COLOR UR: NORMAL
CREAT SERPL-MCNC: 0.6 MG/DL (ref 0.5–1.05)
EGFRCR SERPLBLD CKD-EPI 2021: >90 ML/MIN/1.73M*2
EOSINOPHIL # BLD AUTO: 0.09 X10*3/UL (ref 0–0.7)
EOSINOPHIL NFR BLD AUTO: 1.1 %
ERYTHROCYTE [DISTWIDTH] IN BLOOD BY AUTOMATED COUNT: 14.6 % (ref 11.5–14.5)
GLUCOSE SERPL-MCNC: 95 MG/DL (ref 74–99)
GLUCOSE UR STRIP.AUTO-MCNC: NORMAL MG/DL
HCT VFR BLD AUTO: 35.1 % (ref 36–46)
HGB BLD-MCNC: 11.4 G/DL (ref 12–16)
IMM GRANULOCYTES # BLD AUTO: 0.02 X10*3/UL (ref 0–0.7)
IMM GRANULOCYTES NFR BLD AUTO: 0.3 % (ref 0–0.9)
INR PPP: 1.1 (ref 0.9–1.1)
KETONES UR STRIP.AUTO-MCNC: NEGATIVE MG/DL
LEUKOCYTE ESTERASE UR QL STRIP.AUTO: NEGATIVE
LYMPHOCYTES # BLD AUTO: 2.17 X10*3/UL (ref 1.2–4.8)
LYMPHOCYTES NFR BLD AUTO: 27.7 %
MCH RBC QN AUTO: 25.9 PG (ref 26–34)
MCHC RBC AUTO-ENTMCNC: 32.5 G/DL (ref 32–36)
MCV RBC AUTO: 80 FL (ref 80–100)
MONOCYTES # BLD AUTO: 0.69 X10*3/UL (ref 0.1–1)
MONOCYTES NFR BLD AUTO: 8.8 %
NEUTROPHILS # BLD AUTO: 4.85 X10*3/UL (ref 1.2–7.7)
NEUTROPHILS NFR BLD AUTO: 61.8 %
NITRITE UR QL STRIP.AUTO: NEGATIVE
NRBC BLD-RTO: 0 /100 WBCS (ref 0–0)
PH UR STRIP.AUTO: 6.5 [PH]
PLATELET # BLD AUTO: 326 X10*3/UL (ref 150–450)
POTASSIUM SERPL-SCNC: 3.6 MMOL/L (ref 3.5–5.3)
PROT SERPL-MCNC: 7.1 G/DL (ref 6.4–8.2)
PROT UR STRIP.AUTO-MCNC: NEGATIVE MG/DL
PROTHROMBIN TIME: 12.1 SECONDS (ref 9.8–12.8)
RBC # BLD AUTO: 4.41 X10*6/UL (ref 4–5.2)
RBC # UR STRIP.AUTO: NEGATIVE /UL
RH FACTOR (ANTIGEN D): NORMAL
SODIUM SERPL-SCNC: 137 MMOL/L (ref 136–145)
SP GR UR STRIP.AUTO: 1.01
UROBILINOGEN UR STRIP.AUTO-MCNC: NORMAL MG/DL
WBC # BLD AUTO: 7.8 X10*3/UL (ref 4.4–11.3)

## 2024-11-28 PROCEDURE — 85730 THROMBOPLASTIN TIME PARTIAL: CPT

## 2024-11-28 PROCEDURE — 99285 EMERGENCY DEPT VISIT HI MDM: CPT | Mod: 25

## 2024-11-28 PROCEDURE — 70450 CT HEAD/BRAIN W/O DYE: CPT

## 2024-11-28 PROCEDURE — 96376 TX/PRO/DX INJ SAME DRUG ADON: CPT

## 2024-11-28 PROCEDURE — 36415 COLL VENOUS BLD VENIPUNCTURE: CPT

## 2024-11-28 PROCEDURE — 2500000004 HC RX 250 GENERAL PHARMACY W/ HCPCS (ALT 636 FOR OP/ED): Mod: SE

## 2024-11-28 PROCEDURE — 81003 URINALYSIS AUTO W/O SCOPE: CPT

## 2024-11-28 PROCEDURE — 70553 MRI BRAIN STEM W/O & W/DYE: CPT | Performed by: STUDENT IN AN ORGANIZED HEALTH CARE EDUCATION/TRAINING PROGRAM

## 2024-11-28 PROCEDURE — 2500000001 HC RX 250 WO HCPCS SELF ADMINISTERED DRUGS (ALT 637 FOR MEDICARE OP)

## 2024-11-28 PROCEDURE — 71045 X-RAY EXAM CHEST 1 VIEW: CPT

## 2024-11-28 PROCEDURE — 80053 COMPREHEN METABOLIC PANEL: CPT

## 2024-11-28 PROCEDURE — 86901 BLOOD TYPING SEROLOGIC RH(D): CPT

## 2024-11-28 PROCEDURE — 2500000004 HC RX 250 GENERAL PHARMACY W/ HCPCS (ALT 636 FOR OP/ED): Mod: SE | Performed by: EMERGENCY MEDICINE

## 2024-11-28 PROCEDURE — 99285 EMERGENCY DEPT VISIT HI MDM: CPT | Performed by: EMERGENCY MEDICINE

## 2024-11-28 PROCEDURE — A9575 INJ GADOTERATE MEGLUMI 0.1ML: HCPCS | Mod: SE | Performed by: EMERGENCY MEDICINE

## 2024-11-28 PROCEDURE — 2550000001 HC RX 255 CONTRASTS: Mod: SE | Performed by: EMERGENCY MEDICINE

## 2024-11-28 PROCEDURE — 2060000001 HC INTERMEDIATE ICU ROOM DAILY

## 2024-11-28 PROCEDURE — 85025 COMPLETE CBC W/AUTO DIFF WBC: CPT

## 2024-11-28 PROCEDURE — 80069 RENAL FUNCTION PANEL: CPT | Mod: CCI

## 2024-11-28 PROCEDURE — 96374 THER/PROPH/DIAG INJ IV PUSH: CPT

## 2024-11-28 PROCEDURE — 96375 TX/PRO/DX INJ NEW DRUG ADDON: CPT

## 2024-11-28 PROCEDURE — 93005 ELECTROCARDIOGRAM TRACING: CPT

## 2024-11-28 PROCEDURE — 2500000005 HC RX 250 GENERAL PHARMACY W/O HCPCS

## 2024-11-28 PROCEDURE — 2500000004 HC RX 250 GENERAL PHARMACY W/ HCPCS (ALT 636 FOR OP/ED)

## 2024-11-28 PROCEDURE — 84702 CHORIONIC GONADOTROPIN TEST: CPT

## 2024-11-28 PROCEDURE — 70553 MRI BRAIN STEM W/O & W/DYE: CPT

## 2024-11-28 RX ORDER — CETIRIZINE HYDROCHLORIDE 10 MG/1
10 TABLET ORAL DAILY
Status: DISCONTINUED | OUTPATIENT
Start: 2024-11-28 | End: 2024-12-02

## 2024-11-28 RX ORDER — LORAZEPAM 2 MG/ML
1 INJECTION INTRAMUSCULAR ONCE
Status: COMPLETED | OUTPATIENT
Start: 2024-11-28 | End: 2024-11-28

## 2024-11-28 RX ORDER — GADOTERATE MEGLUMINE 376.9 MG/ML
14 INJECTION INTRAVENOUS
Status: COMPLETED | OUTPATIENT
Start: 2024-11-28 | End: 2024-11-28

## 2024-11-28 RX ORDER — TALC
6 POWDER (GRAM) TOPICAL NIGHTLY PRN
Status: DISCONTINUED | OUTPATIENT
Start: 2024-11-28 | End: 2024-12-06 | Stop reason: HOSPADM

## 2024-11-28 RX ORDER — ALBUTEROL SULFATE 90 UG/1
1-2 INHALANT RESPIRATORY (INHALATION) EVERY 4 HOURS PRN
Status: DISCONTINUED | OUTPATIENT
Start: 2024-11-28 | End: 2024-12-06 | Stop reason: HOSPADM

## 2024-11-28 RX ORDER — ACETAMINOPHEN 325 MG/1
650 TABLET ORAL EVERY 6 HOURS SCHEDULED
Status: DISCONTINUED | OUTPATIENT
Start: 2024-11-28 | End: 2024-12-03

## 2024-11-28 RX ORDER — HEPARIN SODIUM 5000 [USP'U]/ML
5000 INJECTION, SOLUTION INTRAVENOUS; SUBCUTANEOUS EVERY 8 HOURS SCHEDULED
Status: DISPENSED | OUTPATIENT
Start: 2024-11-29 | End: 2024-11-30

## 2024-11-28 RX ORDER — SODIUM CHLORIDE 9 MG/ML
75 INJECTION, SOLUTION INTRAVENOUS CONTINUOUS
Status: DISCONTINUED | OUTPATIENT
Start: 2024-11-28 | End: 2024-12-02

## 2024-11-28 RX ORDER — POLYETHYLENE GLYCOL 3350 17 G/17G
17 POWDER, FOR SOLUTION ORAL 2 TIMES DAILY
Status: DISCONTINUED | OUTPATIENT
Start: 2024-11-28 | End: 2024-12-06 | Stop reason: HOSPADM

## 2024-11-28 RX ORDER — OXYCODONE HYDROCHLORIDE 5 MG/1
5 TABLET ORAL EVERY 4 HOURS PRN
Status: DISCONTINUED | OUTPATIENT
Start: 2024-11-28 | End: 2024-12-03

## 2024-11-28 RX ORDER — ONDANSETRON HYDROCHLORIDE 2 MG/ML
4 INJECTION, SOLUTION INTRAVENOUS EVERY 8 HOURS PRN
Status: DISCONTINUED | OUTPATIENT
Start: 2024-11-28 | End: 2024-12-05

## 2024-11-28 RX ORDER — METOCLOPRAMIDE HYDROCHLORIDE 5 MG/ML
10 INJECTION INTRAMUSCULAR; INTRAVENOUS ONCE
Status: DISCONTINUED | OUTPATIENT
Start: 2024-11-28 | End: 2024-11-28

## 2024-11-28 RX ORDER — DEXAMETHASONE 1 MG/1
1 TABLET ORAL 2 TIMES DAILY
Status: DISCONTINUED | OUTPATIENT
Start: 2024-11-28 | End: 2024-12-06 | Stop reason: HOSPADM

## 2024-11-28 RX ORDER — LORAZEPAM 0.5 MG/1
1 TABLET ORAL ONCE
Status: DISCONTINUED | OUTPATIENT
Start: 2024-11-28 | End: 2024-11-28

## 2024-11-28 RX ORDER — PANTOPRAZOLE SODIUM 40 MG/1
40 TABLET, DELAYED RELEASE ORAL
Status: DISCONTINUED | OUTPATIENT
Start: 2024-11-29 | End: 2024-12-06 | Stop reason: HOSPADM

## 2024-11-28 RX ORDER — HYDROMORPHONE HYDROCHLORIDE 1 MG/ML
0.5 INJECTION, SOLUTION INTRAMUSCULAR; INTRAVENOUS; SUBCUTANEOUS ONCE
Status: COMPLETED | OUTPATIENT
Start: 2024-11-28 | End: 2024-11-28

## 2024-11-28 RX ORDER — HYDROMORPHONE HYDROCHLORIDE 1 MG/ML
0.2 INJECTION, SOLUTION INTRAMUSCULAR; INTRAVENOUS; SUBCUTANEOUS
Status: DISCONTINUED | OUTPATIENT
Start: 2024-11-28 | End: 2024-12-02

## 2024-11-28 RX ORDER — HYDROMORPHONE HYDROCHLORIDE 1 MG/ML
0.5 INJECTION, SOLUTION INTRAMUSCULAR; INTRAVENOUS; SUBCUTANEOUS ONCE
Status: DISCONTINUED | OUTPATIENT
Start: 2024-11-28 | End: 2024-11-28

## 2024-11-28 RX ORDER — ONDANSETRON HYDROCHLORIDE 2 MG/ML
4 INJECTION, SOLUTION INTRAVENOUS ONCE
Status: COMPLETED | OUTPATIENT
Start: 2024-11-28 | End: 2024-11-28

## 2024-11-28 RX ORDER — AMOXICILLIN 250 MG
2 CAPSULE ORAL 2 TIMES DAILY
Status: DISCONTINUED | OUTPATIENT
Start: 2024-11-28 | End: 2024-12-06 | Stop reason: HOSPADM

## 2024-11-28 RX ORDER — NALOXONE HYDROCHLORIDE 0.4 MG/ML
0.2 INJECTION, SOLUTION INTRAMUSCULAR; INTRAVENOUS; SUBCUTANEOUS EVERY 5 MIN PRN
Status: DISCONTINUED | OUTPATIENT
Start: 2024-11-28 | End: 2024-12-06 | Stop reason: HOSPADM

## 2024-11-28 RX ORDER — HYDROMORPHONE HYDROCHLORIDE 1 MG/ML
1 INJECTION, SOLUTION INTRAMUSCULAR; INTRAVENOUS; SUBCUTANEOUS ONCE
Status: COMPLETED | OUTPATIENT
Start: 2024-11-28 | End: 2024-11-28

## 2024-11-28 RX ORDER — ONDANSETRON 4 MG/1
4 TABLET, FILM COATED ORAL EVERY 8 HOURS PRN
Status: DISCONTINUED | OUTPATIENT
Start: 2024-11-28 | End: 2024-12-06 | Stop reason: HOSPADM

## 2024-11-28 RX ORDER — DIPHENHYDRAMINE HYDROCHLORIDE 50 MG/ML
25 INJECTION INTRAMUSCULAR; INTRAVENOUS ONCE
Status: DISCONTINUED | OUTPATIENT
Start: 2024-11-28 | End: 2024-11-28

## 2024-11-28 RX ORDER — OXYCODONE HYDROCHLORIDE 5 MG/1
10 TABLET ORAL EVERY 4 HOURS PRN
Status: DISCONTINUED | OUTPATIENT
Start: 2024-11-28 | End: 2024-12-03

## 2024-11-28 RX ADMIN — OXYCODONE HYDROCHLORIDE 10 MG: 5 TABLET ORAL at 20:16

## 2024-11-28 RX ADMIN — GADOTERATE MEGLUMINE 14 ML: 376.9 INJECTION INTRAVENOUS at 14:38

## 2024-11-28 RX ADMIN — DEXAMETHASONE 1 MG: 2 TABLET ORAL at 20:16

## 2024-11-28 RX ADMIN — SENNOSIDES AND DOCUSATE SODIUM 2 TABLET: 50; 8.6 TABLET ORAL at 20:16

## 2024-11-28 RX ADMIN — HYDROMORPHONE HYDROCHLORIDE 0.2 MG: 1 INJECTION, SOLUTION INTRAMUSCULAR; INTRAVENOUS; SUBCUTANEOUS at 21:33

## 2024-11-28 RX ADMIN — LORAZEPAM 1 MG: 2 INJECTION INTRAMUSCULAR; INTRAVENOUS at 13:59

## 2024-11-28 RX ADMIN — ACETAMINOPHEN 650 MG: 325 TABLET, FILM COATED ORAL at 20:16

## 2024-11-28 RX ADMIN — MELATONIN 6 MG: 3 TAB ORAL at 21:33

## 2024-11-28 RX ADMIN — HYDROMORPHONE HYDROCHLORIDE 0.5 MG: 1 INJECTION, SOLUTION INTRAMUSCULAR; INTRAVENOUS; SUBCUTANEOUS at 14:13

## 2024-11-28 RX ADMIN — POLYETHYLENE GLYCOL 3350 17 G: 17 POWDER, FOR SOLUTION ORAL at 20:16

## 2024-11-28 RX ADMIN — ONDANSETRON 4 MG: 2 INJECTION INTRAMUSCULAR; INTRAVENOUS at 17:29

## 2024-11-28 RX ADMIN — SODIUM CHLORIDE 75 ML/HR: 9 INJECTION, SOLUTION INTRAVENOUS at 23:00

## 2024-11-28 RX ADMIN — HYDROMORPHONE HYDROCHLORIDE 1 MG: 1 INJECTION, SOLUTION INTRAMUSCULAR; INTRAVENOUS; SUBCUTANEOUS at 17:29

## 2024-11-28 ASSESSMENT — PAIN DESCRIPTION - LOCATION
LOCATION: HEAD

## 2024-11-28 ASSESSMENT — PAIN DESCRIPTION - PROGRESSION
CLINICAL_PROGRESSION: NOT CHANGED
CLINICAL_PROGRESSION: NOT CHANGED

## 2024-11-28 ASSESSMENT — PAIN SCALES - GENERAL
PAINLEVEL_OUTOF10: 10 - WORST POSSIBLE PAIN
PAINLEVEL_OUTOF10: 5 - MODERATE PAIN
PAINLEVEL_OUTOF10: 10 - WORST POSSIBLE PAIN
PAINLEVEL_OUTOF10: 5 - MODERATE PAIN
PAINLEVEL_OUTOF10: 10 - WORST POSSIBLE PAIN
PAINLEVEL_OUTOF10: 10 - WORST POSSIBLE PAIN
PAINLEVEL_OUTOF10: 5 - MODERATE PAIN

## 2024-11-28 ASSESSMENT — COLUMBIA-SUICIDE SEVERITY RATING SCALE - C-SSRS
2. HAVE YOU ACTUALLY HAD ANY THOUGHTS OF KILLING YOURSELF?: NO
1. IN THE PAST MONTH, HAVE YOU WISHED YOU WERE DEAD OR WISHED YOU COULD GO TO SLEEP AND NOT WAKE UP?: NO
6. HAVE YOU EVER DONE ANYTHING, STARTED TO DO ANYTHING, OR PREPARED TO DO ANYTHING TO END YOUR LIFE?: NO

## 2024-11-28 ASSESSMENT — LIFESTYLE VARIABLES
TOTAL SCORE: 0
HAVE YOU EVER FELT YOU SHOULD CUT DOWN ON YOUR DRINKING: NO
EVER HAD A DRINK FIRST THING IN THE MORNING TO STEADY YOUR NERVES TO GET RID OF A HANGOVER: NO
HAVE PEOPLE ANNOYED YOU BY CRITICIZING YOUR DRINKING: NO
EVER FELT BAD OR GUILTY ABOUT YOUR DRINKING: NO

## 2024-11-28 ASSESSMENT — PAIN - FUNCTIONAL ASSESSMENT
PAIN_FUNCTIONAL_ASSESSMENT: 0-10
PAIN_FUNCTIONAL_ASSESSMENT: 0-10

## 2024-11-28 NOTE — ED TRIAGE NOTES
Pt arrived from home via EMS. C/O headache, palpitations, and sensitivity to light.  Pt reports hx of brain tumor and stated she has been taking her oxycodone without relief of headache. Pt is A/O x4, VSS, Ambulatory, NAD.

## 2024-11-28 NOTE — H&P
History Of Present Illness  Sejal Duff is a 24 y.o. female Sejal Duff is a 24 y.o. female h/o anemia, asthma GERD, IBS, PCOS, breast fibroadenoma s/p L breast excisional biopsy, intially p/w HAx 3d, CTH third ventricular mass w/ calcifications, MRI 2.4cm peripherally enhancing third ventricular necrotic mass w/ nonenhancing FLAIR hyperintense lesion in medial L temporal lobe,10/16 s/p R stereotactic brain bx (prelim glial cells w/ liugi fibers vs craniopharyngioma), 11/28 p/w worsening HA, light sensitivity. Patient reports worsening headaches over the last 3 nights which have made it impossible to sleep. She has been taking oxycodone and tylenol without improvement. She has been weaning of the dexamethasone but states it never helped her headache. She has not noticed significant changes in her vision in the past 3 days more than her typical blurry vision.      Patient's imaging was personally reviewed and notable for CTH incr vents, MRI incr ventricular mass 2.7x2.5cm, ventriculomegaly, trace R ventricular GRE    Past Medical History  She has a past medical history of Encounter for screening for infections with a predominantly sexual mode of transmission (03/02/2020), Melena (04/12/2019), Other specified health status, Other specified health status, Personal history of other (healed) physical injury and trauma, and Unspecified condition associated with female genital organs and menstrual cycle (03/02/2020).    Surgical History  She has a past surgical history that includes Other surgical history (01/18/2019) and Breast surgery (Left).     Social History  She reports that she has never smoked. She has never been exposed to tobacco smoke. She has never used smokeless tobacco. She reports current alcohol use. She reports current drug use. Drug: Marijuana.     Allergies  Morphine, Pollen extracts, and House dust    Medications  (Not in a hospital admission)      Review of Systems  10 point ROS is obtained and  "negative except the ones mentioned in the HPI    Physical Exam  Constitutional:       General: She is not in acute distress.     Appearance: She is not toxic-appearing.   HENT:      Head: Normocephalic.      Nose: Nose normal.   Eyes:      Pupils: Pupils are equal, round, and reactive to light.   Cardiovascular:      Pulses: Normal pulses.   Pulmonary:      Effort: Pulmonary effort is normal.   Abdominal:      General: Abdomen is flat.   Musculoskeletal:         General: No swelling.      Cervical back: Normal range of motion.   Skin:     General: Skin is warm.      Comments: R small cranial incision c/d/I    Neurological:      Comments: A&Ox3  Cranial Nerves II-XII: OU3R, EOMI, Face symmetric, Facial SILT, Palate/Tongue midline and symmetric, shoulder shrugs symmetric, hearing intact to finger rubs bilaterally  Motor: 5/5, no dysmetria on finger to nose, no pronator drift  Sensation: SILT throughout all extremities  DTRS: 2+ Throughout, No Hoffmans or Clonus   Psychiatric:         Mood and Affect: Mood normal.         Last Recorded Vitals  Blood pressure 113/76, pulse 91, temperature 37 °C (98.6 °F), temperature source Oral, resp. rate 16, height 1.6 m (5' 3\"), weight 70.3 kg (155 lb), SpO2 97%.    Relevant Results  CT head wo IV contrast 10/16/2024    Narrative  Interpreted By:  Will Baker and Sheng Max  STUDY:  CT HEAD WO IV CONTRAST;  10/16/2024 5:35 am    INDICATION:  Signs/Symptoms:s/p brain biopsy.      COMPARISON:  CT head and MR brain 10/13/2024    ACCESSION NUMBER(S):  [id number]    ORDERING CLINICIAN:  LOUANN STEPHENS    TECHNIQUE:  Noncontrast axial CT scan of head was performed. Angled reformats in  brain and bone windows were generated. The images were reviewed in  bone, brain, blood and soft tissue windows.    FINDINGS:  There is similar appearance of hypodense lesion centered within the  hypothalamus approximating 2.3 x 1.8 x 1.4 cm in size (series 2,  image 15) (series 7, image 47). There is " a right frontal approach  biopsy tract with expected blood products, trace edema, and  pneumocephalus extending to this lesion. There is interval  development of small focus of pneumocephalus within the right lateral  ventricle frontal horn. Increased density within the periphery of  this mass correlates to regions of susceptibility artifact on  gradient echo T2 sequences on MRI which can represent blood products  versus calcification. This mass partially effaces the 3rd ventricle  with similar mild disproportionate enlargement of portions of the  lateral ventricles. The grey-white differentiation is otherwise  intact. No midline shift. The basal cisterns are within normal  limits. No extra-axial fluid collection. Visualized paranasal sinuses  and mastoids are clear.    Impression  1. Expected postoperative change status post biopsy of hypothalamic  lesion. No significant intracranial hemorrhage, interval mass effect,  or CT apparent acute infarct.    I personally reviewed the images/study and I agree with the findings  as stated by Dr. Zachary Brizuela. This study was interpreted at [place], [place].    MACRO:  None    Signed by: Will Baker 10/16/2024 9:52 AM  Dictation workstation:   [id number]      Assessment/Plan   Assessment & Plan      Sejal Duff is a 24 y.o. female h/o anemia, asthma GERD, IBS, PCOS, breast fibroadenoma s/p L breast excisional biopsy, intially p/w HAx 3d, CTH third ventricular mass w/ calcifications, MRI 2.4cm peripherally enhancing third ventricular necrotic mass w/ nonenhancing FLAIR hyperintense lesion in medial L temporal lobe,10/16 s/p R stereotactic brain bx (prelim glial cells w/ luigi fibers vs craniopharyngioma), 11/28 p/w worsening HA, light sensitivity, CTH incr vents, MRI incr ventricular mass 2.7x2.5cm, ventriculomegaly, trace R ventricular GRE    Admit to floor under neurosurgery  Ophthalmology consult to evaluate for  papilledema  Obtain CBC, RFP, coag, T&S, UA, EKG, CXR  OR planning  Restart home meds  PTOT  SCDs, SQH      Eliseo Obrien MD  Note authored by resident on neurosurgery team, with all questions or to contact team please page at [phone]

## 2024-11-28 NOTE — ED PROVIDER NOTES
Emergency Department Provider Note        History of Present Illness     History provided by: Patient  Limitations to History: None  External Records Reviewed with Brief Summary: Discharge Summary from 10/19/24 which showed patient scented to the ED with a headache, found to have a brain mass on CT head and MRI.  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  She was discharged with outpatient scheduled.    HPI:  Sejal Duff is a 24 y.o. female past medical history significant for dysmenorrhea, asthma, anemia, chronic constipation, GERD, gastroparesis, recent diagnosis of pilocytic astrocytoma last month, presenting to the ED with worsening headaches.  Patient reports she has been having headaches over the past few months, prior to her brain mass diagnosis, however in the past 3 days the pain has been unbearable.  She has been using her oxycodone she has extreme sensitivity to light.  Patient describes the pain as worse across her forehead and behind her eyes.  She endorses associated nausea, however denies emesis.  Patient has been having subjective fevers and chills.  No chest pain or shortness of breath.  No abdominal pain.  No urinary symptoms or diarrhea constipation.    Physical Exam   Triage vitals:  T 37 °C (98.6 °F)  HR 97  /82  RR 16  O2 96 % None (Room air)    General: Awake, alert, wearing an eye mask to block the light.  Eyes: Gaze conjugate.  No scleral icterus or injection  HENT: Normo-cephalic, atraumatic. No stridor  CV: Regular rate, regular rhythm. Radial pulses 2+ bilaterally  Resp: Breathing non-labored, speaking in full sentences.  Clear to auscultation bilaterally  GI: Soft, non-distended, non-tender. No rebound or guarding.  MSK/Extremities: No gross bony deformities. Moving all extremities  Skin: Warm. Appropriate color  Neuro: Alert. Oriented. Face symmetric. Speech is fluent.  5/5 strength in bilateral upper and lower extremities.  Gross   sensation intact in b/l UE and Les.  Psych: Appropriate mood and affect    Medical Decision Making & ED Course   Medical Decision Makin y.o. female with past medical history is notable Compazine in the ED with worsening headache.  Vital signs are stable on arrival.  On exam, patient is wearing an eye mask due to her light sensitivity.  Otherwise, her neuroexam is grossly unremarkable.  At this point, there is concern for worsening mass with possible mass effect.  Will obtain MRI for further evaluation.  Contacted neurosurgery, they requested a CT in addition to the MRI.  Pain was treated.    MRI revealing new obstructive hydrocephalus as a result of enlargement of the known mass.  Ophthalmology was consulted.    Patient was admitted to neurosurgery service in stable condition, pending ophthalmology recs.    ----      Differential diagnoses considered include but are not limited to: Enlarging tumor, intracranial hemorrhage, migraine     Social Determinants of Health which Significantly Impact Care: None identified     EKG Independent Interpretation: EKG not obtained    Independent Result Review and Interpretation: Relevant laboratory and radiographic results were reviewed and independently interpreted by myself.  As necessary, they are commented on in the ED Course.    Chronic conditions affecting the patient's care: As documented above in [facility]    The patient was discussed with the following consultants/services:  Neurosurgery    Care Considerations: As documented above in [facility]    ED Course:  ED Course as of 24 1846   Thu 2024   1448 Comprehensive metabolic panel  Unremarkable. [NM]   1448 CBC and Auto Differential(!)  No leukocytosis, CBC unremarkable. [NM]   1518 MR brain w and wo IV contrast      IMPRESSION:  1. Interval enlargement hypothalamic-based necrotic mass (2.7 x 2.6 x  2.5 cm vs 2.3 x 2 x 2.4 cm) resulting in new acute obstructive  hydrocephalus at the level of the foramina of [place]ro  causing  significantly increased dilatation of the lateral ventricles (right >  left) and new interstitial edema in the periventricular white matter.  Urgent neurosurgical consultation recommended.      2. New diffuse bilateral sulcal effacement and significantly  increased downward mass effect on the optic chiasm, bilateral ventral  medial thalami, and to a lesser extent the ventral midbrain.      3. Slight increased size of nonenhancing T2/FLAIR hyperintense lesion  in the medial left temporal lobe measuring 1.3 cm x 0.8 cm.       [NM]   1741 CT head wo IV contrast volumetric surgical planning      FINDINGS:  The current noncontrast CT study again demonstrates a large nodular  mass centered along the midline extending caudally into the  suprasellar region and effacing a majority of the 3rd ventricle with  partial effacement of the adjacent inferior lateral ventricles left  greater than right better defined on the recent MRI of the brain with  and without gadolinium dated 11/20/2024. There are small  calcifications noted along the margin of the midline mass lesion.      There is again evidence of abnormal dilatation of the lateral  ventricles with effacement of the surrounding sulci of the cerebral  hemispheres as well as partial effacement of the basilar cisterns  concerning for underlying obstructive hydrocephalus. There are mild  periventricular white matter changes again noted surrounding the  lateral ventricles suspicious for a component of transependymal edema.      The 4th ventricle remains nondilated.      A small caliber right frontal jeffrey hole is again noted.      There is a minimal mucosal thickening noted within the inferior left  maxillary sinus. The remaining paranasal sinuses and mastoid air  cells are clear.   [NM]      ED Course User Index  [NM] Keila Reyes DO     Disposition   As a result of their workup, the patient will require admission to the hospital.  The patient was informed of her  diagnosis.  The patient was given the opportunity to ask questions and I answered them. The patient agreed to be admitted to the hospital.    Procedures   Procedures    This was a shared visit with an ED attending.  The patient was seen and discussed with the ED attending    Keila Reyes DO  Emergency Medicine     Keila Reyes DO  Resident  11/28/24 3917

## 2024-11-29 ENCOUNTER — APPOINTMENT (OUTPATIENT)
Dept: CARDIOLOGY | Facility: HOSPITAL | Age: 24
End: 2024-11-29
Payer: COMMERCIAL

## 2024-11-29 ENCOUNTER — CLINICAL SUPPORT (OUTPATIENT)
Dept: EMERGENCY MEDICINE | Facility: HOSPITAL | Age: 24
End: 2024-11-29
Payer: COMMERCIAL

## 2024-11-29 LAB
ALBUMIN SERPL BCP-MCNC: 4.1 G/DL (ref 3.4–5)
ANION GAP SERPL CALC-SCNC: 16 MMOL/L (ref 10–20)
ATRIAL RATE: 102 BPM
B-HCG SERPL-ACNC: <3 MIU/ML
BUN SERPL-MCNC: 8 MG/DL (ref 6–23)
CALCIUM SERPL-MCNC: 9.3 MG/DL (ref 8.6–10.6)
CHLORIDE SERPL-SCNC: 105 MMOL/L (ref 98–107)
CO2 SERPL-SCNC: 22 MMOL/L (ref 21–32)
CREAT SERPL-MCNC: 0.56 MG/DL (ref 0.5–1.05)
EGFRCR SERPLBLD CKD-EPI 2021: >90 ML/MIN/1.73M*2
ERYTHROCYTE [DISTWIDTH] IN BLOOD BY AUTOMATED COUNT: 14.8 % (ref 11.5–14.5)
GLUCOSE BLD MANUAL STRIP-MCNC: 118 MG/DL (ref 74–99)
GLUCOSE BLD MANUAL STRIP-MCNC: 124 MG/DL (ref 74–99)
GLUCOSE SERPL-MCNC: 91 MG/DL (ref 74–99)
HCT VFR BLD AUTO: 32.9 % (ref 36–46)
HGB BLD-MCNC: 10.2 G/DL (ref 12–16)
MCH RBC QN AUTO: 26.4 PG (ref 26–34)
MCHC RBC AUTO-ENTMCNC: 31 G/DL (ref 32–36)
MCV RBC AUTO: 85 FL (ref 80–100)
NRBC BLD-RTO: 0 /100 WBCS (ref 0–0)
P AXIS: 61 DEGREES
P OFFSET: 196 MS
P ONSET: 149 MS
PHOSPHATE SERPL-MCNC: 4.1 MG/DL (ref 2.5–4.9)
PLATELET # BLD AUTO: 312 X10*3/UL (ref 150–450)
POTASSIUM SERPL-SCNC: 4 MMOL/L (ref 3.5–5.3)
PR INTERVAL: 142 MS
Q ONSET: 220 MS
QRS COUNT: 17 BEATS
QRS DURATION: 74 MS
QT INTERVAL: 326 MS
QTC CALCULATION(BAZETT): 424 MS
QTC FREDERICIA: 389 MS
R AXIS: 76 DEGREES
RBC # BLD AUTO: 3.87 X10*6/UL (ref 4–5.2)
SODIUM SERPL-SCNC: 139 MMOL/L (ref 136–145)
T AXIS: 31 DEGREES
T OFFSET: 383 MS
VENTRICULAR RATE: 102 BPM
WBC # BLD AUTO: 8.3 X10*3/UL (ref 4.4–11.3)

## 2024-11-29 PROCEDURE — 2500000004 HC RX 250 GENERAL PHARMACY W/ HCPCS (ALT 636 FOR OP/ED)

## 2024-11-29 PROCEDURE — 36415 COLL VENOUS BLD VENIPUNCTURE: CPT

## 2024-11-29 PROCEDURE — 2020000001 HC ICU ROOM DAILY

## 2024-11-29 PROCEDURE — 82947 ASSAY GLUCOSE BLOOD QUANT: CPT

## 2024-11-29 PROCEDURE — 93010 ELECTROCARDIOGRAM REPORT: CPT | Performed by: INTERNAL MEDICINE

## 2024-11-29 PROCEDURE — 93005 ELECTROCARDIOGRAM TRACING: CPT

## 2024-11-29 PROCEDURE — 2500000004 HC RX 250 GENERAL PHARMACY W/ HCPCS (ALT 636 FOR OP/ED): Performed by: STUDENT IN AN ORGANIZED HEALTH CARE EDUCATION/TRAINING PROGRAM

## 2024-11-29 PROCEDURE — 85027 COMPLETE CBC AUTOMATED: CPT

## 2024-11-29 PROCEDURE — 2500000005 HC RX 250 GENERAL PHARMACY W/O HCPCS

## 2024-11-29 PROCEDURE — 2500000001 HC RX 250 WO HCPCS SELF ADMINISTERED DRUGS (ALT 637 FOR MEDICARE OP)

## 2024-11-29 RX ORDER — DIPHENHYDRAMINE HYDROCHLORIDE 50 MG/ML
25 INJECTION INTRAMUSCULAR; INTRAVENOUS EVERY 6 HOURS PRN
Status: COMPLETED | OUTPATIENT
Start: 2024-11-29 | End: 2024-11-30

## 2024-11-29 RX ORDER — DIPHENHYDRAMINE HYDROCHLORIDE 50 MG/ML
25 INJECTION INTRAMUSCULAR; INTRAVENOUS ONCE
Status: COMPLETED | OUTPATIENT
Start: 2024-11-29 | End: 2024-11-29

## 2024-11-29 RX ORDER — ACETAMINOPHEN 325 MG/1
975 TABLET ORAL EVERY 6 HOURS PRN
Status: COMPLETED | OUTPATIENT
Start: 2024-11-29 | End: 2024-11-30

## 2024-11-29 RX ORDER — MAGNESIUM SULFATE HEPTAHYDRATE 40 MG/ML
2 INJECTION, SOLUTION INTRAVENOUS EVERY 6 HOURS PRN
Status: COMPLETED | OUTPATIENT
Start: 2024-11-29 | End: 2024-11-30

## 2024-11-29 RX ADMIN — OXYCODONE HYDROCHLORIDE 10 MG: 5 TABLET ORAL at 20:16

## 2024-11-29 RX ADMIN — MAGNESIUM SULFATE HEPTAHYDRATE 2 G: 40 INJECTION, SOLUTION INTRAVENOUS at 21:45

## 2024-11-29 RX ADMIN — ONDANSETRON 4 MG: 2 INJECTION INTRAMUSCULAR; INTRAVENOUS at 18:17

## 2024-11-29 RX ADMIN — PROMETHAZINE HYDROCHLORIDE 25 MG: 25 INJECTION INTRAMUSCULAR; INTRAVENOUS at 21:45

## 2024-11-29 RX ADMIN — MELATONIN 6 MG: 3 TAB ORAL at 22:04

## 2024-11-29 RX ADMIN — OXYCODONE HYDROCHLORIDE 10 MG: 5 TABLET ORAL at 13:59

## 2024-11-29 RX ADMIN — OXYCODONE HYDROCHLORIDE 10 MG: 5 TABLET ORAL at 05:19

## 2024-11-29 RX ADMIN — ONDANSETRON HYDROCHLORIDE 4 MG: 4 TABLET, FILM COATED ORAL at 01:17

## 2024-11-29 RX ADMIN — DEXAMETHASONE 1 MG: 2 TABLET ORAL at 08:56

## 2024-11-29 RX ADMIN — DEXAMETHASONE SODIUM PHOSPHATE 4 MG: 4 INJECTION, SOLUTION INTRA-ARTICULAR; INTRALESIONAL; INTRAMUSCULAR; INTRAVENOUS; SOFT TISSUE at 03:20

## 2024-11-29 RX ADMIN — ACETAMINOPHEN 650 MG: 325 TABLET, FILM COATED ORAL at 01:17

## 2024-11-29 RX ADMIN — SODIUM CHLORIDE 75 ML/HR: 9 INJECTION, SOLUTION INTRAVENOUS at 04:46

## 2024-11-29 RX ADMIN — DEXAMETHASONE 1 MG: 2 TABLET ORAL at 21:44

## 2024-11-29 RX ADMIN — DIPHENHYDRAMINE HYDROCHLORIDE 25 MG: 50 INJECTION INTRAMUSCULAR; INTRAVENOUS at 21:46

## 2024-11-29 RX ADMIN — SODIUM CHLORIDE 1000 ML: 9 INJECTION, SOLUTION INTRAVENOUS at 03:20

## 2024-11-29 RX ADMIN — ACETAMINOPHEN 650 MG: 325 TABLET, FILM COATED ORAL at 20:15

## 2024-11-29 RX ADMIN — PANTOPRAZOLE SODIUM 40 MG: 40 TABLET, DELAYED RELEASE ORAL at 08:56

## 2024-11-29 RX ADMIN — ACETAMINOPHEN 650 MG: 325 TABLET, FILM COATED ORAL at 08:56

## 2024-11-29 RX ADMIN — MELATONIN 6 MG: 3 TAB ORAL at 03:29

## 2024-11-29 RX ADMIN — OXYCODONE HYDROCHLORIDE 10 MG: 5 TABLET ORAL at 08:59

## 2024-11-29 RX ADMIN — ACETAMINOPHEN 650 MG: 325 TABLET, FILM COATED ORAL at 13:58

## 2024-11-29 RX ADMIN — DIPHENHYDRAMINE HYDROCHLORIDE 25 MG: 50 INJECTION INTRAMUSCULAR; INTRAVENOUS at 03:20

## 2024-11-29 RX ADMIN — OXYCODONE HYDROCHLORIDE 10 MG: 5 TABLET ORAL at 01:17

## 2024-11-29 ASSESSMENT — PAIN - FUNCTIONAL ASSESSMENT
PAIN_FUNCTIONAL_ASSESSMENT: 0-10

## 2024-11-29 ASSESSMENT — PAIN SCALES - GENERAL
PAINLEVEL_OUTOF10: 10 - WORST POSSIBLE PAIN
PAINLEVEL_OUTOF10: 8
PAINLEVEL_OUTOF10: 10 - WORST POSSIBLE PAIN
PAINLEVEL_OUTOF10: 9
PAINLEVEL_OUTOF10: 9
PAINLEVEL_OUTOF10: 8

## 2024-11-29 ASSESSMENT — PAIN DESCRIPTION - LOCATION
LOCATION: HEAD

## 2024-11-29 NOTE — HOSPITAL COURSE
Sejal Duff is a 24 y.o. 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 Patient returned to the [facility] ED 11/28 with worsening HA, light sensitivity, CTH incr vents, MRI incr ventricular mass 2.7x2.5cm, ventriculomegaly, trace R ventricular GRE. Patient admitted to neurosurgery service for further management.     11/28 Ophthalmology evaluation without papilledema.  12/1 s/p R VPS (certas at 5).   12/2 CTH with catheter in position, decreased vents   12/4 CTH decreased vents but persistent ventriculomegly; Certas dialed to 4  12/5 CTH decreased vents.  Continued headaches Neurology consulted--> likely due to underlying structural mass and recent surgical interventions.  Started on scheduled oral headache cocktail meds of Robaxin, Tylenol, reglan and Benadryl  12/6 Ophthalmology consulted for c/o vision changes--> normal exam, no disc edema  Neurology--> HA's migrainous in nature, has had prior and now worse in setting of underlying structural mass and recent VPS surgery.  Started on preventative therapy with amitriptyline 12.5 mg bedtime for 1 week and increase to 25mg if tolerates and abortive therapy with Rizatriptan PRN    PT/OT eval with no needs    Patient discharged in stable condition with detailed instructions and scheduled outpatient follow up.

## 2024-11-29 NOTE — NURSING NOTE
Patient states wanting to shower.  Messaged team and NSGY who placed order for patient to leave unit for shower.  Nursing supervisor notified and stated patient could shower on 3rd floor shower outside CTICU.    1630  NSGY placed order for patient to shower. RN present with patient for shower providing privacy outside door.    1700- Patient and RN returned to unit.   1706- NSGY upgraded patient to ICU Status.       1711-Patient asking to have at home bowel regimen ordered.  RN noting patient was having trouble with work finding and stuttering.  Patient quickly returned to baseline and RN briefly left to discuss having medications ordered With NSU team.    1715- When this RN returned to NSU bed 3 patient was not in room or bathroom.  Patient was found outside unit in visitor bathroom with door locked.  RN calmly asked patient unlock the door explaining safety concerns with patient not being on ICU monitor and RN not being able to attend to patient if door is locked in the event patient has a fall or LOC.  Patient agreed to unlock.  RN noting patient gown was soiled with emesis however refused to leave hallway bathroom    RN called unit and asked for nursing assistance  and to notify NSGY of patient vomiting in bathroom outside unit.  Patient is now becoming increasingly irritated.  Patient has been calm and cooperative throughout shift.  However family states this is normal behavior for patient.  Patient agreed to Return to room however will not be put on monitor d/t IBS and walking around room.  NSGY aware.  No new orders at this time will continue to monitor.

## 2024-11-29 NOTE — CONSULTS
Reason For Consult  Papilledema rule out    History Of Present Illness  Sejal Duff is a 24 y.o. 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     Patient reports only ocular complaint is photosensitivity accompanied by headaches over the past 3 days. Denies any visual complaints including eye pain, diplopia, flashes, floaters, or sudden vision loss. Her vision is the same in both eyes. No TVOs, pulsatile tinnitus, n/v.      Past Medical History  She has a past medical history of Encounter for screening for infections with a predominantly sexual mode of transmission (03/02/2020), Melena (04/12/2019), Other specified health status, Other specified health status, Personal history of other (healed) physical injury and trauma, and Unspecified condition associated with female genital organs and menstrual cycle (03/02/2020).    Physical Exam  Base Eye Exam       Visual Acuity (Snellen - Linear)         Right Left    Near sc 20/20 20/50 PHNI              Tonometry (Tonopen, 7:41 PM)         Right Left    Pressure 20 19              Pupils         Dark Light Shape React APD    Right 5 3 Round Brisk -    Left 5 3 Round Brisk -              Visual Fields (Counting fingers)         Left Right     Full Full              Extraocular Movement         Right Left     Full, Ortho Full, Ortho              Neuro/Psych       Oriented x3: Yes              Dilation       1% Mydriacyl & 2.5% Panda  @ 7:40 PM                  Additional Tests       Color         Right Left    Ishihara 14/14 1/14                  Slit Lamp and Fundus Exam       External Exam         Right Left    External Normal Normal              Slit Lamp Exam         Right Left    Lids/Lashes Normal Normal    Conjunctiva/Sclera White and quiet White and quiet    Cornea Clear Clear    Anterior  "Chamber Deep and quiet Deep and quiet    Iris Round and reactive Round and reactive    Lens Clear Clear    Anterior Vitreous Normal Normal              Fundus Exam         Right Left    Disc Temporal pallor Temporal pallor    C/D Ratio 0.1 0.1    Macula Normal Normal    Vessels Normal Normal    Periphery Normal Normal                     Last Recorded Vitals  Blood pressure 109/81, pulse (!) 104, temperature 37 °C (98.6 °F), temperature source Oral, resp. rate 16, height 1.6 m (5' 3\"), weight 70.3 kg (155 lb), SpO2 98%.    Relevant Results  11/28/24 MRI brain with and without contrast:  Images personally reviewed. Hypothalamic mass with ventriculomegaly, roughly 2.5x2.5x2.5mm axially, which is an apparent increase from 10/13/24 imaging. There is compression of the optic chiasm which was demonstrated on previous MRI.      Assessment/Plan     Ms. Sejal Duff is a 25 yo female with known history of hypothalamic mass (bx diagnosed low-grade glial neoplasm) and worsening headache and photophobia over the past 3 days. Patient is known to neuro-ophthalmology attending Dr. 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 Patient is being admitted to NSGY service with OR planning for interval mass enlargement seen on imaging this admission.     #Papilledema rule out  #Stable eye exam  #History of optic chiasmal compression  - No papilledema on today's exam, patient does have known history of decreased visual acuity and color vision OS, temporal nerve pallor OU, both of which were documented when she saw Dr. Fajardo 11/6/24 and are from longstanding optic chiasmal compression   - Please note that the absence of papilledema does not rule out elevated ICP  - If warranted, further workup should be performed to evaluate for elevated ICP  - Rest of workup per primary " team (neurosurgery)  - Patient to follow up with Dr. Fajardo as scheduled 2/6/24.    Odin Munson MD  PGY2 - Ophthalmology     Discussed with neuro-ophthalmologist Dr. Fajardo.    Ophthalmology Adult Pager - [phone]  Ophthalmology Pediatrics Pager - [phone]    For adult follow-up appointments, call: [phone]  For pediatric follow-up appointments, call: [phone]    NOTE: This note is not finalized until attending reviews and signs.

## 2024-11-29 NOTE — PROGRESS NOTES
Social Work Discharge Planning note:    -Patient discussed during interdisciplinary rounds.   -Team members present: Fellow, PT and OT, and SW  -Plan per medical team: Pt is not medically ready for discharge. Pt is now ARIANA status, and is planned for  shunt placement this coming Monday.   -Payer: [facility]  -Status: Inpatient  -Discharge disposition: SW met with Pt to further discuss discharge planning. Pt reported that she plans to return home at discharge with her parents to assist as needed. SW will continue to follow for discharge planning  -Support to Pt/family: Pt requested a work excuse letter (which has been provided). Pt reported no other issues or concerns at this time. SW will continue to follow for emotional/incidental support to Pt/family.    -Potential Barriers: none  -Anticipated Date of Discharge:  12/3/24    NIGEL Fuentes, LSW

## 2024-11-29 NOTE — PROGRESS NOTES
"Sejal Duff is a 24 y.o. female on day 1 of admission presenting with Hydrocephalus.    Subjective   Continues to have severe headache    Objective     Physical Exam  Awake Ox3  BUE 5/5  BLE 5/5  SILT     Last Recorded Vitals  Blood pressure 116/77, pulse 87, temperature 36.3 °C (97.3 °F), resp. rate 12, height 1.6 m (5' 3\"), weight 70.3 kg (155 lb), SpO2 100%.  Intake/Output last 3 Shifts:  I/O last 3 completed shifts:  In: 1467.8 (20.9 mL/kg) [I.V.:468.8 (6.7 mL/kg); IV Piggyback:999]  Out: - (0 mL/kg)   Weight: 70.3 kg     Relevant Results  Lab Results   Component Value Date    WBC 8.3 11/29/2024    HGB 10.2 (L) 11/29/2024    HCT 32.9 (L) 11/29/2024    MCV 85 11/29/2024     11/29/2024     Lab Results   Component Value Date    GLUCOSE 95 11/28/2024    GLUCOSE 91 11/28/2024    CALCIUM 9.3 11/28/2024    CALCIUM 9.3 11/28/2024     11/28/2024     11/28/2024    K 3.6 11/28/2024    K 4.0 11/28/2024    CO2 26 11/28/2024    CO2 22 11/28/2024     11/28/2024     11/28/2024    BUN 8 11/28/2024    BUN 8 11/28/2024    CREATININE 0.60 11/28/2024    CREATININE 0.56 11/28/2024     Assessment/Plan   Assessment & Plan  Hydrocephalus    Sejal Duff is a 24 y.o. female h/o anemia, asthma GERD, IBS, PCOS, breast fibroadenoma s/p L breast excisional biopsy, intially p/w HAx 3d, CTH third ventricular mass w/ calcifications, MRI 2.4cm peripherally enhancing third ventricular necrotic mass w/ nonenhancing FLAIR hyperintense lesion in medial L temporal lobe,10/16 s/p R stereotactic brain bx (prelim glial cells w/ luigi fibers vs craniopharyngioma), 11/28 p/w worsening HA, light sensitivity, CTH incr vents, MRI incr ventricular mass 2.7x2.5cm, ventriculomegaly, trace R ventricular GRE     ARIANA  Q2 neuro checks  Appreciate ophtho recs  OR planning for parietal shunt placement  Con't dex 1q12   Pain management   PTOT  SCD, SQH            Eliseo Obrien MD      "

## 2024-11-30 LAB
ALBUMIN SERPL BCP-MCNC: 3.7 G/DL (ref 3.4–5)
ANION GAP SERPL CALC-SCNC: 13 MMOL/L (ref 10–20)
BASOPHILS # BLD AUTO: 0.03 X10*3/UL (ref 0–0.1)
BASOPHILS NFR BLD AUTO: 0.2 %
BUN SERPL-MCNC: 11 MG/DL (ref 6–23)
CA-I BLD-SCNC: 1.18 MMOL/L (ref 1.1–1.33)
CALCIUM SERPL-MCNC: 8.7 MG/DL (ref 8.6–10.6)
CHLORIDE SERPL-SCNC: 109 MMOL/L (ref 98–107)
CO2 SERPL-SCNC: 23 MMOL/L (ref 21–32)
CREAT SERPL-MCNC: 0.64 MG/DL (ref 0.5–1.05)
EGFRCR SERPLBLD CKD-EPI 2021: >90 ML/MIN/1.73M*2
EOSINOPHIL # BLD AUTO: 0.02 X10*3/UL (ref 0–0.7)
EOSINOPHIL NFR BLD AUTO: 0.1 %
ERYTHROCYTE [DISTWIDTH] IN BLOOD BY AUTOMATED COUNT: 14.8 % (ref 11.5–14.5)
GLUCOSE SERPL-MCNC: 125 MG/DL (ref 74–99)
HCT VFR BLD AUTO: 30.6 % (ref 36–46)
HGB BLD-MCNC: 10.2 G/DL (ref 12–16)
IMM GRANULOCYTES # BLD AUTO: 0.05 X10*3/UL (ref 0–0.7)
IMM GRANULOCYTES NFR BLD AUTO: 0.4 % (ref 0–0.9)
LYMPHOCYTES # BLD AUTO: 1.91 X10*3/UL (ref 1.2–4.8)
LYMPHOCYTES NFR BLD AUTO: 14.1 %
MAGNESIUM SERPL-MCNC: 2.31 MG/DL (ref 1.6–2.4)
MCH RBC QN AUTO: 26.6 PG (ref 26–34)
MCHC RBC AUTO-ENTMCNC: 33.3 G/DL (ref 32–36)
MCV RBC AUTO: 80 FL (ref 80–100)
MONOCYTES # BLD AUTO: 0.98 X10*3/UL (ref 0.1–1)
MONOCYTES NFR BLD AUTO: 7.3 %
NEUTROPHILS # BLD AUTO: 10.51 X10*3/UL (ref 1.2–7.7)
NEUTROPHILS NFR BLD AUTO: 77.9 %
NRBC BLD-RTO: 0 /100 WBCS (ref 0–0)
PHOSPHATE SERPL-MCNC: 3.7 MG/DL (ref 2.5–4.9)
PLATELET # BLD AUTO: 272 X10*3/UL (ref 150–450)
POTASSIUM SERPL-SCNC: 4.1 MMOL/L (ref 3.5–5.3)
RBC # BLD AUTO: 3.83 X10*6/UL (ref 4–5.2)
SODIUM SERPL-SCNC: 141 MMOL/L (ref 136–145)
WBC # BLD AUTO: 13.5 X10*3/UL (ref 4.4–11.3)

## 2024-11-30 PROCEDURE — 2500000005 HC RX 250 GENERAL PHARMACY W/O HCPCS

## 2024-11-30 PROCEDURE — 2500000004 HC RX 250 GENERAL PHARMACY W/ HCPCS (ALT 636 FOR OP/ED)

## 2024-11-30 PROCEDURE — 2020000001 HC ICU ROOM DAILY

## 2024-11-30 PROCEDURE — 86900 BLOOD TYPING SEROLOGIC ABO: CPT

## 2024-11-30 PROCEDURE — 2500000001 HC RX 250 WO HCPCS SELF ADMINISTERED DRUGS (ALT 637 FOR MEDICARE OP)

## 2024-11-30 PROCEDURE — 36415 COLL VENOUS BLD VENIPUNCTURE: CPT | Performed by: STUDENT IN AN ORGANIZED HEALTH CARE EDUCATION/TRAINING PROGRAM

## 2024-11-30 PROCEDURE — 82330 ASSAY OF CALCIUM: CPT | Performed by: STUDENT IN AN ORGANIZED HEALTH CARE EDUCATION/TRAINING PROGRAM

## 2024-11-30 PROCEDURE — 83735 ASSAY OF MAGNESIUM: CPT | Performed by: STUDENT IN AN ORGANIZED HEALTH CARE EDUCATION/TRAINING PROGRAM

## 2024-11-30 PROCEDURE — 85025 COMPLETE CBC W/AUTO DIFF WBC: CPT | Performed by: STUDENT IN AN ORGANIZED HEALTH CARE EDUCATION/TRAINING PROGRAM

## 2024-11-30 PROCEDURE — 2500000004 HC RX 250 GENERAL PHARMACY W/ HCPCS (ALT 636 FOR OP/ED): Performed by: STUDENT IN AN ORGANIZED HEALTH CARE EDUCATION/TRAINING PROGRAM

## 2024-11-30 PROCEDURE — 80069 RENAL FUNCTION PANEL: CPT | Performed by: STUDENT IN AN ORGANIZED HEALTH CARE EDUCATION/TRAINING PROGRAM

## 2024-11-30 PROCEDURE — 2500000001 HC RX 250 WO HCPCS SELF ADMINISTERED DRUGS (ALT 637 FOR MEDICARE OP): Performed by: STUDENT IN AN ORGANIZED HEALTH CARE EDUCATION/TRAINING PROGRAM

## 2024-11-30 PROCEDURE — 99291 CRITICAL CARE FIRST HOUR: CPT | Performed by: STUDENT IN AN ORGANIZED HEALTH CARE EDUCATION/TRAINING PROGRAM

## 2024-11-30 PROCEDURE — 85730 THROMBOPLASTIN TIME PARTIAL: CPT

## 2024-11-30 RX ORDER — CALCIUM CARBONATE 200(500)MG
1000 TABLET,CHEWABLE ORAL 3 TIMES DAILY PRN
Status: DISCONTINUED | OUTPATIENT
Start: 2024-11-30 | End: 2024-12-06 | Stop reason: HOSPADM

## 2024-11-30 RX ORDER — TIZANIDINE 4 MG/1
4 TABLET ORAL EVERY 8 HOURS PRN
Status: DISCONTINUED | OUTPATIENT
Start: 2024-11-30 | End: 2024-12-02

## 2024-11-30 RX ADMIN — ACETAMINOPHEN 650 MG: 325 TABLET, FILM COATED ORAL at 20:16

## 2024-11-30 RX ADMIN — SODIUM CHLORIDE 75 ML/HR: 9 INJECTION, SOLUTION INTRAVENOUS at 23:07

## 2024-11-30 RX ADMIN — ACETAMINOPHEN 975 MG: 325 TABLET ORAL at 03:56

## 2024-11-30 RX ADMIN — ACETAMINOPHEN 975 MG: 325 TABLET ORAL at 13:41

## 2024-11-30 RX ADMIN — OXYCODONE HYDROCHLORIDE 10 MG: 5 TABLET ORAL at 03:56

## 2024-11-30 RX ADMIN — OXYCODONE HYDROCHLORIDE 10 MG: 5 TABLET ORAL at 09:19

## 2024-11-30 RX ADMIN — MAGNESIUM SULFATE HEPTAHYDRATE 2 G: 40 INJECTION, SOLUTION INTRAVENOUS at 04:48

## 2024-11-30 RX ADMIN — ACETAMINOPHEN 650 MG: 325 TABLET, FILM COATED ORAL at 09:19

## 2024-11-30 RX ADMIN — MELATONIN 6 MG: 3 TAB ORAL at 22:24

## 2024-11-30 RX ADMIN — POLYETHYLENE GLYCOL 3350 17 G: 17 POWDER, FOR SOLUTION ORAL at 20:20

## 2024-11-30 RX ADMIN — OXYCODONE HYDROCHLORIDE 10 MG: 5 TABLET ORAL at 20:16

## 2024-11-30 RX ADMIN — DIPHENHYDRAMINE HYDROCHLORIDE 25 MG: 50 INJECTION INTRAMUSCULAR; INTRAVENOUS at 04:48

## 2024-11-30 RX ADMIN — OXYCODONE HYDROCHLORIDE 10 MG: 5 TABLET ORAL at 13:41

## 2024-11-30 RX ADMIN — DEXAMETHASONE 1 MG: 2 TABLET ORAL at 11:28

## 2024-11-30 RX ADMIN — LINACLOTIDE 145 MCG: 145 CAPSULE, GELATIN COATED ORAL at 09:20

## 2024-11-30 RX ADMIN — CALCIUM CARBONATE (ANTACID) CHEW TAB 500 MG 1000 MG: 500 CHEW TAB at 20:32

## 2024-11-30 RX ADMIN — DEXAMETHASONE 1 MG: 2 TABLET ORAL at 20:15

## 2024-11-30 RX ADMIN — HYDROMORPHONE HYDROCHLORIDE 0.2 MG: 1 INJECTION, SOLUTION INTRAMUSCULAR; INTRAVENOUS; SUBCUTANEOUS at 23:08

## 2024-11-30 ASSESSMENT — COGNITIVE AND FUNCTIONAL STATUS - GENERAL
MOBILITY SCORE: 24
DAILY ACTIVITIY SCORE: 24

## 2024-11-30 ASSESSMENT — PAIN SCALES - GENERAL
PAINLEVEL_OUTOF10: 7
PAINLEVEL_OUTOF10: 9
PAINLEVEL_OUTOF10: 10 - WORST POSSIBLE PAIN
PAINLEVEL_OUTOF10: 0 - NO PAIN
PAINLEVEL_OUTOF10: 9
PAINLEVEL_OUTOF10: 9

## 2024-11-30 ASSESSMENT — PAIN DESCRIPTION - LOCATION
LOCATION: HEAD

## 2024-11-30 ASSESSMENT — PAIN - FUNCTIONAL ASSESSMENT
PAIN_FUNCTIONAL_ASSESSMENT: 0-10

## 2024-11-30 ASSESSMENT — PAIN SCALES - PAIN ASSESSMENT IN ADVANCED DEMENTIA (PAINAD): TOTALSCORE: MEDICATION (SEE MAR)

## 2024-11-30 NOTE — NURSING NOTE
Pt is refusing to go on cardiac monitor due to frequent trips to the bathroom. Pt agreeable once she is ready to go to sleep. Will try again later.

## 2024-11-30 NOTE — PROGRESS NOTES
"Sejal Duff is a 24 y.o. female on day 2 of admission presenting with Hydrocephalus.    Subjective   No acute events overnight       Objective     Physical Exam  Physical Exam  Awake Ox3  BUE 5/5  BLE 5/5  SILT   Last Recorded Vitals  Blood pressure 109/73, pulse 86, temperature 36.5 °C (97.7 °F), temperature source Temporal, resp. rate 17, height 1.6 m (5' 3\"), weight 70.3 kg (155 lb), SpO2 99%.  Intake/Output last 3 Shifts:  I/O last 3 completed shifts:  In: 2274 (32.3 mL/kg) [I.V.:1275 (18.1 mL/kg); IV Piggyback:999]  Out: 5 (0.1 mL/kg) [Urine:5 (0 mL/kg/hr)]  Weight: 70.3 kg     Relevant Results            This patient currently has cardiac telemetry ordered; if you would like to modify or discontinue the telemetry order, click here to go to the orders activity to modify/discontinue the order.                 Assessment/Plan   Assessment & Plan  Hydrocephalus    Sejal Duff is a 24 y.o. female h/o anemia, asthma GERD, IBS, PCOS, breast fibroadenoma s/p L breast excisional biopsy, intially p/w HAx 3d, CTH third ventricular mass w/ calcifications, MRI 2.4cm peripherally enhancing third ventricular necrotic mass w/ nonenhancing FLAIR hyperintense lesion in medial L temporal lobe,10/16 s/p R stereotactic brain bx (prelim glial cells w/ luigi fibers vs craniopharyngioma), 11/28 p/w worsening HA, light sensitivity, CTH incr vents, MRI incr ventricular mass 2.7x2.5cm, ventriculomegaly, trace R ventricular GRE      NSU through weekend  OR Sun 12/1 for  shunt placement  Npo at midnight  Appreciate ophtho recs- no papilledema  Con't dex 1q12   Pain management   PTOT  SCD, SQH POD 2           Yany Verdugo MD      "

## 2024-11-30 NOTE — CARE PLAN
Problem: Skin  Goal: Participates in plan/prevention/treatment measures  Outcome: Progressing  Flowsheets (Taken 11/30/2024 0022)  Participates in plan/prevention/treatment measures: Discuss with provider PT/OT consult  Goal: Prevent/manage excess moisture  Outcome: Progressing  Flowsheets (Taken 11/30/2024 0022)  Prevent/manage excess moisture:   Monitor for/manage infection if present   Cleanse incontinence/protect with barrier cream  Goal: Prevent/minimize sheer/friction injuries  Outcome: Progressing  Flowsheets (Taken 11/30/2024 0022)  Prevent/minimize sheer/friction injuries:   Complete micro-shifts as needed if patient unable. Adjust patient position to relieve pressure points, not a full turn   Increase activity/out of bed for meals   HOB 30 degrees or less   Turn/reposition every 2 hours/use positioning/transfer devices  Goal: Promote/optimize nutrition  Outcome: Progressing  Flowsheets (Taken 11/30/2024 0022)  Promote/optimize nutrition: Monitor/record intake including meals  Goal: Promote skin healing  Outcome: Progressing  Flowsheets (Taken 11/30/2024 0022)  Promote skin healing:   Assess skin/pad under line(s)/device(s)   Turn/reposition every 2 hours/use positioning/transfer devices   Ensure correct size (line/device) and apply per  instructions     Problem: Pain - Adult  Goal: Verbalizes/displays adequate comfort level or baseline comfort level  Outcome: Progressing     Problem: Safety - Adult  Goal: Free from fall injury  Outcome: Progressing     Problem: Discharge Planning  Goal: Discharge to home or other facility with appropriate resources  Outcome: Progressing     Problem: Chronic Conditions and Co-morbidities  Goal: Patient's chronic conditions and co-morbidity symptoms are monitored and maintained or improved  Outcome: Progressing

## 2024-11-30 NOTE — PROGRESS NOTES
[facility]  NEUROSCIENCE INTENSIVE CARE UNIT  DAILY PROGRESS NOTE       Patient Name: Sejal Duff   MRN: [id number]     Admit Date: 2024     : 2000 AGE: 24 y.o. GENDER: female        Subjective      Significant Events:  - arrived on 2024  - admitted to [facility]  -  patient with behavior change and ?decreased responsiveness that resolved by time she was in NSU    Interval Events: patient remains stable     Objective   VITALS (24H):  Temp:  [36.3 °C (97.3 °F)-36.9 °C (98.4 °F)] 36.5 °C (97.7 °F)  Heart Rate:  [] 86  Resp:  [12-22] 17  BP: (109-125)/(73-85) 109/73  INTAKE/OUTPUT:  Intake/Output Summary (Last 24 hours) at 2024 0259  Last data filed at 2024 2145  Gross per 24 hour   Intake 2324 ml   Output 5 ml   Net 2319 ml     VENT SETTINGS:        PHYSICAL EXAM:  NEURO:  - Alert, oriented x3 follows commands. Conversational. mildly depressed mood. No acute distress  - EOS, PERRL, EOMI, VFF  - GILL[place] 5/5 strength, no drift, no ataxia  CV:  - RRR on telemetry, NSR  RESP:  - No signs of resp distress and Lungs clear to ascultation  - Oxygen: RA    :  - External catheter in place  GI:  - Abdomen NT/ND, soft  SKIN:  - Intact    MEDICATIONS:  Scheduled: PRN: Continuous:   acetaminophen, 650 mg, oral, q6h [facility]  cetirizine, 10 mg, oral, Daily  dexAMETHasone, 1 mg, oral, BID  heparin (porcine), 5,000 Units, subcutaneous, q8h [facility]  linaCLOtide, 145 mcg, oral, Daily before breakfast  pantoprazole, 40 mg, oral, Daily before breakfast  polyethylene glycol, 17 g, oral, BID  sennosides-docusate sodium, 2 tablet, oral, BID  sodium chloride, 1,000 mL, intravenous, Once  [Held by provider] tovorafenib, 600 mg, oral, Every      PRN medications: magnesium sulfate **AND** promethazine **AND** acetaminophen **AND** diphenhydrAMINE **AND** sodium chloride, albuterol, HYDROmorphone, melatonin, naloxone, ondansetron **OR** ondansetron, oxyCODONE, oxyCODONE, prucalopride sodium chloride  0.9%, 75 mL/hr, Last Rate: 75 mL/hr (11/29/24 0515)         LAB RESULTS:  Results from last 72 hours   Lab Units 11/28/24  1345   GLUCOSE mg/dL 91  95   SODIUM mmol/L 139  137   POTASSIUM mmol/L 4.0  3.6   CHLORIDE mmol/L 105  103   CO2 mmol/L 22  26   ANION GAP mmol/L 16  12   BUN mg/dL 8  8   CREATININE mg/dL 0.56  0.60   EGFR mL/min/1.73m*2 >90  >90   CALCIUM mg/dL 9.3  9.3   PHOSPHORUS mg/dL 4.1   ALBUMIN g/dL 4.1  4.3      Results from last 72 hours   Lab Units 11/29/24  0449 11/28/24  1345   WBC AUTO x10*3/uL 8.3 7.8   NRBC AUTO /100 WBCs 0.0 0.0   RBC AUTO x10*6/uL 3.87* 4.41   HEMOGLOBIN g/dL 10.2* 11.4*   HEMATOCRIT % 32.9* 35.1*   MCV fL 85 80   MCH pg 26.4 25.9*   MCHC g/dL 31.0* 32.5   RDW % 14.8* 14.6*   PLATELETS AUTO x10*3/uL 312 326        IMAGING RESULTS:  XR chest 1 view   Final Result   1.  No evidence of acute cardiopulmonary process.        I personally reviewed the images/study and I agree with the findings   as stated by Daniela Molina DO, PGY-3. This study was interpreted   at [facility], [place], [place].        MACRO:   None        Signed by: Evan Finkelstein 11/29/2024 12:37 AM   Dictation workstation:   [id number]      CT head wo IV contrast volumetric surgical planning   Final Result   Noncontrast volumetric CT performed for surgical planning purposes   with findings as described above.        MACRO:   None.        Signed by: Den Parry 11/28/2024 5:24 PM   Dictation workstation:   [id number]      MR brain w and wo IV contrast   Final Result   Addendum (preliminary) 1 of 1   Interpreted By:  Saravanan Flores,    ADDENDUM:   As described in the body of the report, there also new susceptibility   artifact in the dependent portion of the right lateral ventricle   concerning for blood products.        Signed by: Saravanan Flores 11/28/2024 3:18 PM        -------- ORIGINAL REPORT --------   Dictation workstation:   [id number]      Final    1. Interval enlargement hypothalamic-based necrotic mass (2.7 x 2.6 x   2.5 cm vs 2.3 x 2 x 2.4 cm) resulting in new acute obstructive   hydrocephalus at the level of the foramina of [place]ro causing   significantly increased dilatation of the lateral ventricles (right >   left) and new interstitial edema in the periventricular white matter.   Urgent neurosurgical consultation recommended.        2. New diffuse bilateral sulcal effacement and significantly   increased downward mass effect on the optic chiasm, bilateral ventral   medial thalami, and to a lesser extent the ventral midbrain.        3. Slight increased size of nonenhancing T2/FLAIR hyperintense lesion   in the medial left temporal lobe measuring 1.3 cm x 0.8 cm.        MACRO:   Saravanan Flores discussed the significance and urgency of this   critical finding by telephone with EVONNE ANGELES and DR RADER   (neurosurgery) on 11/28/2024 at 3:09 pm.  (**-RCF-**) Findings:  See   findings.        Signed by: Saravanan Flores 11/28/2024 3:15 PM   Dictation workstation:   [id number]             Assessment/Plan    Sejal Duff is a 24 y.o. female arrived on 11/28/2024  w/ pmx anemia, asthma GERD, IBS, PCOS, breast fibroadenoma s/p L breast excisional biopsy, intially p/w HAx 3d, CTH third ventricular mass w/ calcifications. MRI 10/2024 showed 2.4cm peripherally enhancing third ventricular necrotic mass w/ nonenhancing FLAIR hyperintense lesion in medial L temporal lobe,10/16 s/p R stereotactic brain bx (prelim glial cells w/ luigi fibers vs craniopharyngioma).     Patient returned to the hospital on 11/28 p/w worsening HA, light sensitivity. 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  Assessment:  -Neurologically intact though some question of odd behaviors/disinhibition  -Worsening  intracranial mass leading to obstructive hydrocephalus now requiring a shunt  -Admitted to NSU for frequent neurochecks and evaluation for EVD  Plan:  - NSU  - Neuro Checks: Q1H  - Headache cocktail PRN  -- Tylenol, Benadryl, Phenergan, magnesium, IV fluid  -- already on steroids  - Nausea: ondansetron and metoclopramide    CARDIOVASCULAR:  #Tachycardia, mild  Assessment:  - reactive In setting of acute pain    Plan:  - Continue to monitor on telemetry  - BP goal: SBP < 160     RESPIRATORY:  #no acute concerns  Assessment:  -on RA  Plan:  - Continuous pulse oximetry   - O2 PRN to maintain SpO2 > 94%, wean as tolerated    RENAL/:  #No acute concerns  Assessment:  - Baseline BUN/Cr: 0.6  Results from last 72 hours   Lab Units 24  1345   BUN mg/dL 8  8   CREATININE mg/dL 0.56  0.60       Plan:  - Monitor with daily RFP    FEN/GI:  #No acute concerns  Assessment:  - Last BM Date: 24    Plan:  - Monitor and replace electrolytes per protocol  - IVF: NS @ 75 mL/hr - will discontinue once taking adequate PO  - Diet: regular   - Bowel Regimen: Docusate-Senna PRN    ENDOCRINE:  #No acute concerns  Assessment:  Results from last 7 days   Lab Units 24  0800 24  0402 24  1345 24  1201   POCT GLUCOSE mg/dL 118* 124*  --   --    GLUCOSE mg/dL  --   --  91  95 106*   SODIUM mmol/L  --   --  139  137 140        Plan:  - Accuchecks & ISS PRN     HEMATOLOGY:  #anemia, normocytic, chronic  Assessment:  - Baseline Hgb: 10.5  - Baseline Plts: ~350  Results from last 7 days   Lab Units 24  0449 24  1345 24  1201   HEMOGLOBIN g/dL 10.2* 11.4* 12.3   HEMATOCRIT % 32.9* 35.1* 39.6   PLATELETS AUTO x10*3/uL 312 326 390     Plan:  - Continue to monitor with daily CBC and Coag panel    INFECTIOUS DISEASE:  #No acute concerns  Assessment:  Results from last 7 days   Lab Units 24  0449 24  1345 24  1201   WBC AUTO x10*3/uL 8.3 7.8 8.6    - Temp (24hrs), Av.5 °C  (97.7 °F), Min:36.3 °C (97.3 °F), Max:36.9 °C (98.4 °F)     Plan:  - Continue to monitor for s/sx of infection  - Pan culture for temperature > 38.4 C    MUSCULOSKELETAL:  - No acute issues    SKIN:  - No acute issues  - Turns and skin care per NSU protocol    ACCESS:  -PIV    PROPHYLAXIS:  - DVT Ppx: SCDs and SQH  - GI Ppx: Pantoprazole - while on steroid    RESTRAINTS:  Not indicated/Patient does not meet criteria for restraints    Raoul Thorpe MD  Neuroscience Intensive Care       Plan of care to be discussed with neurocritical care attending

## 2024-12-01 ENCOUNTER — ANESTHESIA (OUTPATIENT)
Dept: OPERATING ROOM | Facility: HOSPITAL | Age: 24
End: 2024-12-01
Payer: COMMERCIAL

## 2024-12-01 ENCOUNTER — ANESTHESIA EVENT (OUTPATIENT)
Dept: OPERATING ROOM | Facility: HOSPITAL | Age: 24
End: 2024-12-01
Payer: COMMERCIAL

## 2024-12-01 VITALS
WEIGHT: 170.19 LBS | HEIGHT: 63 IN | OXYGEN SATURATION: 100 % | DIASTOLIC BLOOD PRESSURE: 75 MMHG | RESPIRATION RATE: 14 BRPM | BODY MASS INDEX: 30.16 KG/M2 | SYSTOLIC BLOOD PRESSURE: 111 MMHG | HEART RATE: 96 BPM | TEMPERATURE: 96.4 F

## 2024-12-01 LAB
ABO GROUP (TYPE) IN BLOOD: NORMAL
ALBUMIN SERPL BCP-MCNC: 3.7 G/DL (ref 3.4–5)
ANION GAP SERPL CALC-SCNC: 10 MMOL/L (ref 10–20)
ANTIBODY SCREEN: NORMAL
APTT PPP: 30 SECONDS (ref 27–38)
BASOPHILS # BLD AUTO: 0.05 X10*3/UL (ref 0–0.1)
BASOPHILS NFR BLD AUTO: 0.5 %
BUN SERPL-MCNC: 15 MG/DL (ref 6–23)
CA-I BLD-SCNC: 1.22 MMOL/L (ref 1.1–1.33)
CALCIUM SERPL-MCNC: 8.8 MG/DL (ref 8.6–10.6)
CHLORIDE SERPL-SCNC: 108 MMOL/L (ref 98–107)
CO2 SERPL-SCNC: 27 MMOL/L (ref 21–32)
CREAT SERPL-MCNC: 0.58 MG/DL (ref 0.5–1.05)
EGFRCR SERPLBLD CKD-EPI 2021: >90 ML/MIN/1.73M*2
EOSINOPHIL # BLD AUTO: 0.1 X10*3/UL (ref 0–0.7)
EOSINOPHIL NFR BLD AUTO: 0.9 %
ERYTHROCYTE [DISTWIDTH] IN BLOOD BY AUTOMATED COUNT: 15.1 % (ref 11.5–14.5)
GLUCOSE SERPL-MCNC: 108 MG/DL (ref 74–99)
HCT VFR BLD AUTO: 32 % (ref 36–46)
HGB BLD-MCNC: 9.7 G/DL (ref 12–16)
HOLD SPECIMEN: NORMAL
IMM GRANULOCYTES # BLD AUTO: 0.05 X10*3/UL (ref 0–0.7)
IMM GRANULOCYTES NFR BLD AUTO: 0.5 % (ref 0–0.9)
INR PPP: 1.1 (ref 0.9–1.1)
LYMPHOCYTES # BLD AUTO: 2.31 X10*3/UL (ref 1.2–4.8)
LYMPHOCYTES NFR BLD AUTO: 21.7 %
MAGNESIUM SERPL-MCNC: 2.02 MG/DL (ref 1.6–2.4)
MCH RBC QN AUTO: 25.9 PG (ref 26–34)
MCHC RBC AUTO-ENTMCNC: 30.3 G/DL (ref 32–36)
MCV RBC AUTO: 85 FL (ref 80–100)
MONOCYTES # BLD AUTO: 0.92 X10*3/UL (ref 0.1–1)
MONOCYTES NFR BLD AUTO: 8.6 %
NEUTROPHILS # BLD AUTO: 7.21 X10*3/UL (ref 1.2–7.7)
NEUTROPHILS NFR BLD AUTO: 67.8 %
NRBC BLD-RTO: 0 /100 WBCS (ref 0–0)
PHOSPHATE SERPL-MCNC: 3.7 MG/DL (ref 2.5–4.9)
PLATELET # BLD AUTO: 332 X10*3/UL (ref 150–450)
POTASSIUM SERPL-SCNC: 4 MMOL/L (ref 3.5–5.3)
PROTHROMBIN TIME: 12.3 SECONDS (ref 9.8–12.8)
RBC # BLD AUTO: 3.75 X10*6/UL (ref 4–5.2)
RH FACTOR (ANTIGEN D): NORMAL
SODIUM SERPL-SCNC: 141 MMOL/L (ref 136–145)
WBC # BLD AUTO: 10.6 X10*3/UL (ref 4.4–11.3)

## 2024-12-01 PROCEDURE — 2720000007 HC OR 272 NO HCPCS: Performed by: NEUROLOGICAL SURGERY

## 2024-12-01 PROCEDURE — A62223 PR CREATE SHUNT:VENTRIC-PERITONEAL: Performed by: ANESTHESIOLOGY

## 2024-12-01 PROCEDURE — 2780000003 HC OR 278 NO HCPCS: Performed by: NEUROLOGICAL SURGERY

## 2024-12-01 PROCEDURE — 2500000004 HC RX 250 GENERAL PHARMACY W/ HCPCS (ALT 636 FOR OP/ED): Performed by: ANESTHESIOLOGY

## 2024-12-01 PROCEDURE — 2500000004 HC RX 250 GENERAL PHARMACY W/ HCPCS (ALT 636 FOR OP/ED): Performed by: STUDENT IN AN ORGANIZED HEALTH CARE EDUCATION/TRAINING PROGRAM

## 2024-12-01 PROCEDURE — C1889 IMPLANT/INSERT DEVICE, NOC: HCPCS | Performed by: NEUROLOGICAL SURGERY

## 2024-12-01 PROCEDURE — 2500000004 HC RX 250 GENERAL PHARMACY W/ HCPCS (ALT 636 FOR OP/ED)

## 2024-12-01 PROCEDURE — 3600000009 HC OR TIME - EACH INCREMENTAL 1 MINUTE - PROCEDURE LEVEL FOUR: Performed by: NEUROLOGICAL SURGERY

## 2024-12-01 PROCEDURE — 3600000004 HC OR TIME - INITIAL BASE CHARGE - PROCEDURE LEVEL FOUR: Performed by: NEUROLOGICAL SURGERY

## 2024-12-01 PROCEDURE — 2500000001 HC RX 250 WO HCPCS SELF ADMINISTERED DRUGS (ALT 637 FOR MEDICARE OP)

## 2024-12-01 PROCEDURE — 00163J6 BYPASS CEREBRAL VENTRICLE TO PERITONEAL CAVITY WITH SYNTHETIC SUBSTITUTE, PERCUTANEOUS APPROACH: ICD-10-PCS | Performed by: NEUROLOGICAL SURGERY

## 2024-12-01 PROCEDURE — 3700000001 HC GENERAL ANESTHESIA TIME - INITIAL BASE CHARGE: Performed by: NEUROLOGICAL SURGERY

## 2024-12-01 PROCEDURE — A62223 PR CREATE SHUNT:VENTRIC-PERITONEAL

## 2024-12-01 PROCEDURE — 2500000001 HC RX 250 WO HCPCS SELF ADMINISTERED DRUGS (ALT 637 FOR MEDICARE OP): Performed by: STUDENT IN AN ORGANIZED HEALTH CARE EDUCATION/TRAINING PROGRAM

## 2024-12-01 PROCEDURE — 2500000004 HC RX 250 GENERAL PHARMACY W/ HCPCS (ALT 636 FOR OP/ED): Performed by: NEUROLOGICAL SURGERY

## 2024-12-01 PROCEDURE — C1894 INTRO/SHEATH, NON-LASER: HCPCS | Performed by: NEUROLOGICAL SURGERY

## 2024-12-01 PROCEDURE — 62223 ESTABLISH BRAIN CAVITY SHUNT: CPT | Performed by: NEUROLOGICAL SURGERY

## 2024-12-01 PROCEDURE — 0WJG4ZZ INSPECTION OF PERITONEAL CAVITY, PERCUTANEOUS ENDOSCOPIC APPROACH: ICD-10-PCS | Performed by: NEUROLOGICAL SURGERY

## 2024-12-01 PROCEDURE — 2500000005 HC RX 250 GENERAL PHARMACY W/O HCPCS: Performed by: STUDENT IN AN ORGANIZED HEALTH CARE EDUCATION/TRAINING PROGRAM

## 2024-12-01 PROCEDURE — 1200000002 HC GENERAL ROOM WITH TELEMETRY DAILY

## 2024-12-01 PROCEDURE — 61781 SCAN PROC CRANIAL INTRA: CPT | Performed by: NEUROLOGICAL SURGERY

## 2024-12-01 PROCEDURE — 3700000002 HC GENERAL ANESTHESIA TIME - EACH INCREMENTAL 1 MINUTE: Performed by: NEUROLOGICAL SURGERY

## 2024-12-01 PROCEDURE — 2500000005 HC RX 250 GENERAL PHARMACY W/O HCPCS: Performed by: NEUROLOGICAL SURGERY

## 2024-12-01 PROCEDURE — 2500000002 HC RX 250 W HCPCS SELF ADMINISTERED DRUGS (ALT 637 FOR MEDICARE OP, ALT 636 FOR OP/ED): Performed by: STUDENT IN AN ORGANIZED HEALTH CARE EDUCATION/TRAINING PROGRAM

## 2024-12-01 DEVICE — RESERVOIR, RICKHAM, LARGE: Type: IMPLANTABLE DEVICE | Site: SKULL | Status: FUNCTIONAL

## 2024-12-01 DEVICE — IMPLANTABLE DEVICE: Type: IMPLANTABLE DEVICE | Site: SKULL | Status: FUNCTIONAL

## 2024-12-01 DEVICE — VALVE, CERTAS PLUS, W/O SIPHONGUARD: Type: IMPLANTABLE DEVICE | Site: SKULL | Status: FUNCTIONAL

## 2024-12-01 RX ORDER — SODIUM CHLORIDE, SODIUM LACTATE, POTASSIUM CHLORIDE, CALCIUM CHLORIDE 600; 310; 30; 20 MG/100ML; MG/100ML; MG/100ML; MG/100ML
75 INJECTION, SOLUTION INTRAVENOUS CONTINUOUS
Status: DISCONTINUED | OUTPATIENT
Start: 2024-12-01 | End: 2024-12-02

## 2024-12-01 RX ORDER — POLYMYXIN B 500000 [USP'U]/1
INJECTION, POWDER, LYOPHILIZED, FOR SOLUTION INTRAMUSCULAR; INTRATHECAL; INTRAVENOUS; OPHTHALMIC AS NEEDED
Status: DISCONTINUED | OUTPATIENT
Start: 2024-12-01 | End: 2024-12-01 | Stop reason: HOSPADM

## 2024-12-01 RX ORDER — DEXAMETHASONE 2 MG/1
1 TABLET ORAL ONCE
Status: COMPLETED | OUTPATIENT
Start: 2024-12-01 | End: 2024-12-01

## 2024-12-01 RX ORDER — PROPOFOL 10 MG/ML
INJECTION, EMULSION INTRAVENOUS AS NEEDED
Status: DISCONTINUED | OUTPATIENT
Start: 2024-12-01 | End: 2024-12-01

## 2024-12-01 RX ORDER — FENTANYL CITRATE 50 UG/ML
50 INJECTION, SOLUTION INTRAMUSCULAR; INTRAVENOUS EVERY 5 MIN PRN
Status: DISCONTINUED | OUTPATIENT
Start: 2024-12-01 | End: 2024-12-02

## 2024-12-01 RX ORDER — MAGNESIUM SULFATE HEPTAHYDRATE 40 MG/ML
2 INJECTION, SOLUTION INTRAVENOUS EVERY 6 HOURS PRN
Status: COMPLETED | OUTPATIENT
Start: 2024-12-01 | End: 2024-12-02

## 2024-12-01 RX ORDER — MIDAZOLAM HYDROCHLORIDE 1 MG/ML
INJECTION INTRAMUSCULAR; INTRAVENOUS AS NEEDED
Status: DISCONTINUED | OUTPATIENT
Start: 2024-12-01 | End: 2024-12-01

## 2024-12-01 RX ORDER — ACETAMINOPHEN 325 MG/1
975 TABLET ORAL EVERY 6 HOURS PRN
Status: COMPLETED | OUTPATIENT
Start: 2024-12-01 | End: 2024-12-02

## 2024-12-01 RX ORDER — ONDANSETRON HYDROCHLORIDE 2 MG/ML
4 INJECTION, SOLUTION INTRAVENOUS ONCE AS NEEDED
Status: DISCONTINUED | OUTPATIENT
Start: 2024-12-01 | End: 2024-12-06 | Stop reason: HOSPADM

## 2024-12-01 RX ORDER — ONDANSETRON HYDROCHLORIDE 2 MG/ML
INJECTION, SOLUTION INTRAVENOUS AS NEEDED
Status: DISCONTINUED | OUTPATIENT
Start: 2024-12-01 | End: 2024-12-01

## 2024-12-01 RX ORDER — CYCLOBENZAPRINE HCL 10 MG
10 TABLET ORAL 3 TIMES DAILY PRN
Status: DISCONTINUED | OUTPATIENT
Start: 2024-12-01 | End: 2024-12-02

## 2024-12-01 RX ORDER — ESMOLOL HYDROCHLORIDE 10 MG/ML
INJECTION INTRAVENOUS AS NEEDED
Status: DISCONTINUED | OUTPATIENT
Start: 2024-12-01 | End: 2024-12-01

## 2024-12-01 RX ORDER — CEFAZOLIN 1 G/1
INJECTION, POWDER, FOR SOLUTION INTRAVENOUS AS NEEDED
Status: DISCONTINUED | OUTPATIENT
Start: 2024-12-01 | End: 2024-12-01

## 2024-12-01 RX ORDER — LABETALOL HYDROCHLORIDE 5 MG/ML
5 INJECTION, SOLUTION INTRAVENOUS ONCE AS NEEDED
Status: DISCONTINUED | OUTPATIENT
Start: 2024-12-01 | End: 2024-12-06 | Stop reason: HOSPADM

## 2024-12-01 RX ORDER — MEPERIDINE HYDROCHLORIDE 25 MG/ML
12.5 INJECTION INTRAMUSCULAR; INTRAVENOUS; SUBCUTANEOUS EVERY 10 MIN PRN
Status: DISCONTINUED | OUTPATIENT
Start: 2024-12-01 | End: 2024-12-06 | Stop reason: HOSPADM

## 2024-12-01 RX ORDER — FENTANYL CITRATE 50 UG/ML
INJECTION, SOLUTION INTRAMUSCULAR; INTRAVENOUS AS NEEDED
Status: DISCONTINUED | OUTPATIENT
Start: 2024-12-01 | End: 2024-12-01

## 2024-12-01 RX ORDER — LIDOCAINE HYDROCHLORIDE 20 MG/ML
INJECTION, SOLUTION INFILTRATION; PERINEURAL AS NEEDED
Status: DISCONTINUED | OUTPATIENT
Start: 2024-12-01 | End: 2024-12-01

## 2024-12-01 RX ORDER — HYDROMORPHONE HYDROCHLORIDE 1 MG/ML
INJECTION, SOLUTION INTRAMUSCULAR; INTRAVENOUS; SUBCUTANEOUS AS NEEDED
Status: DISCONTINUED | OUTPATIENT
Start: 2024-12-01 | End: 2024-12-01

## 2024-12-01 RX ORDER — LIDOCAINE HYDROCHLORIDE 10 MG/ML
0.1 INJECTION, SOLUTION INFILTRATION; PERINEURAL ONCE
Status: DISCONTINUED | OUTPATIENT
Start: 2024-12-01 | End: 2024-12-02

## 2024-12-01 RX ORDER — ACETAMINOPHEN 325 MG/1
650 TABLET ORAL EVERY 4 HOURS PRN
Status: DISCONTINUED | OUTPATIENT
Start: 2024-12-01 | End: 2024-12-03

## 2024-12-01 RX ORDER — DIPHENHYDRAMINE HYDROCHLORIDE 50 MG/ML
25 INJECTION INTRAMUSCULAR; INTRAVENOUS EVERY 6 HOURS PRN
Status: COMPLETED | OUTPATIENT
Start: 2024-12-01 | End: 2024-12-02

## 2024-12-01 RX ORDER — ROCURONIUM BROMIDE 10 MG/ML
INJECTION, SOLUTION INTRAVENOUS AS NEEDED
Status: DISCONTINUED | OUTPATIENT
Start: 2024-12-01 | End: 2024-12-01

## 2024-12-01 RX ORDER — GENTAMICIN 40 MG/ML
INJECTION, SOLUTION INTRAMUSCULAR; INTRAVENOUS AS NEEDED
Status: DISCONTINUED | OUTPATIENT
Start: 2024-12-01 | End: 2024-12-01 | Stop reason: HOSPADM

## 2024-12-01 RX ORDER — HYDROMORPHONE HYDROCHLORIDE 1 MG/ML
0.2 INJECTION, SOLUTION INTRAMUSCULAR; INTRAVENOUS; SUBCUTANEOUS EVERY 5 MIN PRN
Status: DISCONTINUED | OUTPATIENT
Start: 2024-12-01 | End: 2024-12-02

## 2024-12-01 RX ORDER — DIPHENHYDRAMINE HYDROCHLORIDE 50 MG/ML
25 INJECTION INTRAMUSCULAR; INTRAVENOUS ONCE
Status: DISCONTINUED | OUTPATIENT
Start: 2024-12-01 | End: 2024-12-01

## 2024-12-01 RX ORDER — METOCLOPRAMIDE HYDROCHLORIDE 5 MG/ML
10 INJECTION INTRAMUSCULAR; INTRAVENOUS ONCE AS NEEDED
Status: DISCONTINUED | OUTPATIENT
Start: 2024-12-01 | End: 2024-12-05

## 2024-12-01 RX ORDER — LIDOCAINE HYDROCHLORIDE AND EPINEPHRINE 5; 5 MG/ML; UG/ML
INJECTION, SOLUTION INFILTRATION; PERINEURAL AS NEEDED
Status: DISCONTINUED | OUTPATIENT
Start: 2024-12-01 | End: 2024-12-01 | Stop reason: HOSPADM

## 2024-12-01 RX ORDER — SODIUM CHLORIDE 0.9 G/100ML
IRRIGANT IRRIGATION AS NEEDED
Status: DISCONTINUED | OUTPATIENT
Start: 2024-12-01 | End: 2024-12-01 | Stop reason: HOSPADM

## 2024-12-01 RX ORDER — LIDOCAINE 560 MG/1
1 PATCH PERCUTANEOUS; TOPICAL; TRANSDERMAL DAILY
Status: DISCONTINUED | OUTPATIENT
Start: 2024-12-01 | End: 2024-12-06 | Stop reason: HOSPADM

## 2024-12-01 RX ADMIN — ESMOLOL HYDROCHLORIDE 20 MG: 10 INJECTION, SOLUTION INTRAVENOUS at 09:51

## 2024-12-01 RX ADMIN — ACETAMINOPHEN 650 MG: 325 TABLET, FILM COATED ORAL at 14:37

## 2024-12-01 RX ADMIN — DIPHENHYDRAMINE HYDROCHLORIDE 25 MG: 50 INJECTION INTRAMUSCULAR; INTRAVENOUS at 20:36

## 2024-12-01 RX ADMIN — OXYCODONE HYDROCHLORIDE 10 MG: 5 TABLET ORAL at 01:17

## 2024-12-01 RX ADMIN — HYDROMORPHONE HYDROCHLORIDE 0.2 MG: 1 INJECTION, SOLUTION INTRAMUSCULAR; INTRAVENOUS; SUBCUTANEOUS at 11:55

## 2024-12-01 RX ADMIN — SUGAMMADEX 200 MG: 100 INJECTION, SOLUTION INTRAVENOUS at 10:26

## 2024-12-01 RX ADMIN — OXYCODONE HYDROCHLORIDE 10 MG: 5 TABLET ORAL at 18:23

## 2024-12-01 RX ADMIN — ROCURONIUM BROMIDE 10 MG: 10 INJECTION INTRAVENOUS at 09:52

## 2024-12-01 RX ADMIN — ESMOLOL HYDROCHLORIDE 30 MG: 10 INJECTION, SOLUTION INTRAVENOUS at 09:39

## 2024-12-01 RX ADMIN — PROMETHAZINE HYDROCHLORIDE 25 MG: 25 INJECTION INTRAMUSCULAR; INTRAVENOUS at 20:37

## 2024-12-01 RX ADMIN — LIDOCAINE 1 PATCH: 4 PATCH TOPICAL at 15:23

## 2024-12-01 RX ADMIN — PROPOFOL 100 MG: 10 INJECTION, EMULSION INTRAVENOUS at 10:18

## 2024-12-01 RX ADMIN — PROPOFOL 50 MG: 10 INJECTION, EMULSION INTRAVENOUS at 09:34

## 2024-12-01 RX ADMIN — HYDROMORPHONE HYDROCHLORIDE 0.2 MG: 1 INJECTION, SOLUTION INTRAMUSCULAR; INTRAVENOUS; SUBCUTANEOUS at 10:00

## 2024-12-01 RX ADMIN — ROCURONIUM BROMIDE 10 MG: 10 INJECTION INTRAVENOUS at 09:10

## 2024-12-01 RX ADMIN — DEXAMETHASONE 1 MG: 2 TABLET ORAL at 20:40

## 2024-12-01 RX ADMIN — SODIUM CHLORIDE 1000 ML: 9 INJECTION, SOLUTION INTRAVENOUS at 20:35

## 2024-12-01 RX ADMIN — HYDROMORPHONE HYDROCHLORIDE 0.2 MG: 1 INJECTION, SOLUTION INTRAMUSCULAR; INTRAVENOUS; SUBCUTANEOUS at 11:15

## 2024-12-01 RX ADMIN — FENTANYL CITRATE 50 MCG: 50 INJECTION, SOLUTION INTRAMUSCULAR; INTRAVENOUS at 09:26

## 2024-12-01 RX ADMIN — SODIUM CHLORIDE 75 ML/HR: 9 INJECTION, SOLUTION INTRAVENOUS at 15:36

## 2024-12-01 RX ADMIN — CEFAZOLIN 2 G: 1 INJECTION, POWDER, FOR SOLUTION INTRAMUSCULAR; INTRAVENOUS at 08:42

## 2024-12-01 RX ADMIN — DEXAMETHASONE 1 MG: 2 TABLET ORAL at 14:37

## 2024-12-01 RX ADMIN — HYDROMORPHONE HYDROCHLORIDE 0.2 MG: 1 INJECTION, SOLUTION INTRAMUSCULAR; INTRAVENOUS; SUBCUTANEOUS at 05:07

## 2024-12-01 RX ADMIN — FENTANYL CITRATE 50 MCG: 50 INJECTION, SOLUTION INTRAMUSCULAR; INTRAVENOUS at 08:15

## 2024-12-01 RX ADMIN — HYDROMORPHONE HYDROCHLORIDE 0.2 MG: 1 INJECTION, SOLUTION INTRAMUSCULAR; INTRAVENOUS; SUBCUTANEOUS at 15:23

## 2024-12-01 RX ADMIN — LIDOCAINE HYDROCHLORIDE 80 MG: 20 INJECTION, SOLUTION INFILTRATION; PERINEURAL at 08:15

## 2024-12-01 RX ADMIN — ACETAMINOPHEN 975 MG: 325 TABLET ORAL at 20:36

## 2024-12-01 RX ADMIN — CYCLOBENZAPRINE 10 MG: 10 TABLET, FILM COATED ORAL at 15:23

## 2024-12-01 RX ADMIN — ACETAMINOPHEN 650 MG: 325 TABLET, FILM COATED ORAL at 01:17

## 2024-12-01 RX ADMIN — DEXAMETHASONE SODIUM PHOSPHATE 4 MG: 4 INJECTION, SOLUTION INTRA-ARTICULAR; INTRALESIONAL; INTRAMUSCULAR; INTRAVENOUS; SOFT TISSUE at 08:15

## 2024-12-01 RX ADMIN — HYDROMORPHONE HYDROCHLORIDE 0.2 MG: 1 INJECTION, SOLUTION INTRAMUSCULAR; INTRAVENOUS; SUBCUTANEOUS at 10:17

## 2024-12-01 RX ADMIN — MIDAZOLAM HYDROCHLORIDE 2 MG: 1 INJECTION, SOLUTION INTRAMUSCULAR; INTRAVENOUS at 08:10

## 2024-12-01 RX ADMIN — ONDANSETRON 4 MG: 2 INJECTION, SOLUTION INTRAMUSCULAR; INTRAVENOUS at 10:11

## 2024-12-01 RX ADMIN — PROPOFOL 150 MG: 10 INJECTION, EMULSION INTRAVENOUS at 08:15

## 2024-12-01 RX ADMIN — TIZANIDINE 4 MG: 4 TABLET ORAL at 01:17

## 2024-12-01 RX ADMIN — PROPOFOL 50 MG: 10 INJECTION, EMULSION INTRAVENOUS at 09:51

## 2024-12-01 RX ADMIN — ROCURONIUM BROMIDE 10 MG: 10 INJECTION INTRAVENOUS at 09:33

## 2024-12-01 RX ADMIN — SODIUM CHLORIDE: 0.9 INJECTION, SOLUTION INTRAVENOUS at 08:06

## 2024-12-01 RX ADMIN — OXYCODONE HYDROCHLORIDE 10 MG: 5 TABLET ORAL at 12:32

## 2024-12-01 RX ADMIN — MAGNESIUM SULFATE HEPTAHYDRATE 2 G: 40 INJECTION, SOLUTION INTRAVENOUS at 20:36

## 2024-12-01 RX ADMIN — ROCURONIUM BROMIDE 50 MG: 10 INJECTION INTRAVENOUS at 08:15

## 2024-12-01 ASSESSMENT — PAIN SCALES - GENERAL
PAINLEVEL_OUTOF10: 0 - NO PAIN
PAIN_LEVEL: 2
PAINLEVEL_OUTOF10: 10 - WORST POSSIBLE PAIN
PAINLEVEL_OUTOF10: 9
PAINLEVEL_OUTOF10: 7
PAINLEVEL_OUTOF10: 3
PAINLEVEL_OUTOF10: 8
PAINLEVEL_OUTOF10: 9
PAINLEVEL_OUTOF10: 10 - WORST POSSIBLE PAIN
PAINLEVEL_OUTOF10: 5 - MODERATE PAIN
PAINLEVEL_OUTOF10: 8
PAINLEVEL_OUTOF10: 10 - WORST POSSIBLE PAIN

## 2024-12-01 ASSESSMENT — PAIN DESCRIPTION - DESCRIPTORS
DESCRIPTORS: ACHING

## 2024-12-01 ASSESSMENT — PAIN - FUNCTIONAL ASSESSMENT
PAIN_FUNCTIONAL_ASSESSMENT: 0-10

## 2024-12-01 ASSESSMENT — PAIN DESCRIPTION - LOCATION: LOCATION: HEAD

## 2024-12-01 NOTE — ANESTHESIA POSTPROCEDURE EVALUATION
Patient: Sejal Duff    Procedure Summary       Date: 12/01/24 Room / Location: [facility] OR 24 / Virtual [facility] [place] OR    Anesthesia Start: 0806 Anesthesia Stop: 1053    Procedure: Creation Shunt Ventricular Peritoneal (Right) Diagnosis:       Hydrocephalus      (Hydrocephalus [G91.9])    Surgeons: Paras Yap MD Responsible Provider: Mervin Cloud MD    Anesthesia Type: general ASA Status: 2            Anesthesia Type: general    Vitals Value Taken Time   /58 12/01/24 1055   Temp 36.5 12/01/24 1055   Pulse 92 12/01/24 1055   Resp 21 12/01/24 1055   SpO2 100 % 12/01/24 1055   Vitals shown include unfiled device data.    Anesthesia Post Evaluation    Patient location during evaluation: ICU  Patient participation: complete - patient participated  Level of consciousness: responsive to light touch  Pain score: 2  Pain management: adequate  Airway patency: patent  Cardiovascular status: acceptable  Respiratory status: acceptable  Hydration status: acceptable  Postoperative Nausea and Vomiting: none      There were no known notable events for this encounter.

## 2024-12-01 NOTE — ANESTHESIA PROCEDURE NOTES
Peripheral IV  Date/Time: 12/1/2024 8:35 AM  Inserted by: Mervin Cloud MD    Placement  Needle size: 18 G  Laterality: left  Location: forearm  Local anesthetic: injectable  Site prep: alcohol  Technique: anatomical landmarks  Attempts: 1

## 2024-12-01 NOTE — ANESTHESIA PREPROCEDURE EVALUATION
Patient: Sejal Duff    Procedure Information       Anesthesia Start Date/Time: 12/01/24 0806    Procedure: Creation Shunt Ventricular Peritoneal (Right)    Location: [facility] OR 24 / Virtual [facility] OR    Surgeons: Paras Yap MD            Relevant Problems   Pulmonary   (+) Asthma      GI   (+) GERD (gastroesophageal reflux disease)   (+) Irritable bowel syndrome      Hematology   (+) Anemia      ID   (+) Bacterial vaginosis      GYN   (+) Menorrhagia with regular cycle       Clinical information reviewed:    Allergies                NPO Detail:  No data recorded     Physical Exam    Airway  Mallampati: II  TM distance: >3 FB     Cardiovascular - normal exam  Rhythm: regular  Rate: normal     Dental - normal exam     Pulmonary - normal exam  Breath sounds clear to auscultation     Abdominal - normal exam         Anesthesia Plan    History of general anesthesia?: yes  History of complications of general anesthesia?: no    ASA 2     general     intravenous and inhalational induction   Postoperative administration of opioids is intended.  Trial extubation is planned.  Anesthetic plan and risks discussed with patient.  Use of blood products discussed with patient who consented to blood products.    Plan discussed with CRNA.

## 2024-12-01 NOTE — ANESTHESIA PROCEDURE NOTES
Airway  Date/Time: 12/1/2024 8:18 AM  Urgency: elective    Airway not difficult    Staffing  Performed: CRNA   Authorized by: Mervin Cloud MD    Performed by: JASVIR Figueroa-KAYCEE  Patient location during procedure: OR    Indications and Patient Condition  Indications for airway management: anesthesia  Sedation level: deep  Preoxygenated: yes  Patient position: sniffing  Mask difficulty assessment: 1 - vent by mask    Final Airway Details  Final airway type: endotracheal airway      Successful airway: ETT     Successful intubation technique: direct laryngoscopy  Endotracheal tube insertion site: oral  Blade: Farzaneh  Blade size: #3  ETT size (mm): 7.0  Cormack-Lehane Classification: grade I - full view of glottis  Placement verified by: chest auscultation and capnometry   Measured from: lips  ETT to lips (cm): 22  Number of attempts at approach: 1

## 2024-12-01 NOTE — PROGRESS NOTES
"Sejal Duff is a 24 y.o. female on day 3 of admission presenting with Hydrocephalus.    Subjective   No acute events overnight       Objective     Physical Exam  Physical Exam  Awake Ox3  BUE 5/5  BLE 5/5  SILT   Last Recorded Vitals  Blood pressure 104/71, pulse 69, temperature 36.8 °C (98.2 °F), temperature source Temporal, resp. rate 17, height 1.6 m (5' 3\"), weight 77.2 kg (170 lb 3.1 oz), SpO2 100%.  Intake/Output last 3 Shifts:  I/O last 3 completed shifts:  In: 2406.3 (31.2 mL/kg) [P.O.:1500; I.V.:906.3 (11.7 mL/kg)]  Out: 305 (4 mL/kg) [Urine:305 (0.1 mL/kg/hr)]  Weight: 77.2 kg     Relevant Results            This patient currently has cardiac telemetry ordered; if you would like to modify or discontinue the telemetry order, click here to go to the orders activity to modify/discontinue the order.    Results from last 72 hours   Lab Units 11/30/24 2258 11/30/24 0444   GLUCOSE mg/dL 108* 125*   SODIUM mmol/L 141 141   POTASSIUM mmol/L 4.0 4.1   CHLORIDE mmol/L 108* 109*   CO2 mmol/L 27 23   ANION GAP mmol/L 10 13   BUN mg/dL 15 11   CREATININE mg/dL 0.58 0.64   EGFR mL/min/1.73m*2 >90 >90   CALCIUM mg/dL 8.8 8.7   PHOSPHORUS mg/dL 3.7 3.7   ALBUMIN g/dL 3.7 3.7   MAGNESIUM mg/dL 2.02 2.31   POCT CALCIUM IONIZED (MMOL/L) IN BLOOD mmol/L 1.22 1.18      Results from last 72 hours   Lab Units 11/30/24 2258 11/30/24  0444   WBC AUTO x10*3/uL 10.6 13.5*   NRBC AUTO /100 WBCs 0.0 0.0   RBC AUTO x10*6/uL 3.75* 3.83*   HEMOGLOBIN g/dL 9.7* 10.2*   HEMATOCRIT % 32.0* 30.6*   MCV fL 85 80   MCH pg 25.9* 26.6   MCHC g/dL 30.3* 33.3   RDW % 15.1* 14.8*   PLATELETS AUTO x10*3/uL 332 272                   Assessment/Plan   Assessment & Plan  Hydrocephalus    Sejal Duff is a 24 y.o. female h/o anemia, asthma GERD, IBS, PCOS, breast fibroadenoma s/p L breast excisional biopsy, intially p/w HAx 3d, CTH third ventricular mass w/ calcifications, MRI 2.4cm peripherally enhancing third ventricular necrotic mass w/ " nonenhancing FLAIR hyperintense lesion in medial L temporal lobe,10/16 s/p R stereotactic brain bx (prelim glial cells w/ luigi fibers vs craniopharyngioma), 11/28 p/w worsening HA, light sensitivity, CTH incr vents, MRI incr ventricular mass 2.7x2.5cm, ventriculomegaly, trace R ventricular GRE      NSU through weekend  OR Sun 12/1 for  shunt placement  Npo at midnight  Appreciate ophtho recs- no papilledema  Con't dex 1q12   Pain management   PTOT  SCD, ([facility])        Archie Van MD

## 2024-12-02 ENCOUNTER — APPOINTMENT (OUTPATIENT)
Dept: RADIOLOGY | Facility: HOSPITAL | Age: 24
End: 2024-12-02
Payer: COMMERCIAL

## 2024-12-02 LAB
ALBUMIN SERPL BCP-MCNC: 3.4 G/DL (ref 3.4–5)
ANION GAP SERPL CALC-SCNC: 10 MMOL/L (ref 10–20)
BASOPHILS # BLD AUTO: 0.02 X10*3/UL (ref 0–0.1)
BASOPHILS NFR BLD AUTO: 0.2 %
BUN SERPL-MCNC: 8 MG/DL (ref 6–23)
CA-I BLD-SCNC: 1.2 MMOL/L (ref 1.1–1.33)
CALCIUM SERPL-MCNC: 8 MG/DL (ref 8.6–10.6)
CHLORIDE SERPL-SCNC: 110 MMOL/L (ref 98–107)
CO2 SERPL-SCNC: 24 MMOL/L (ref 21–32)
CREAT SERPL-MCNC: 0.41 MG/DL (ref 0.5–1.05)
EGFRCR SERPLBLD CKD-EPI 2021: >90 ML/MIN/1.73M*2
EOSINOPHIL # BLD AUTO: 0.06 X10*3/UL (ref 0–0.7)
EOSINOPHIL NFR BLD AUTO: 0.5 %
ERYTHROCYTE [DISTWIDTH] IN BLOOD BY AUTOMATED COUNT: 15.1 % (ref 11.5–14.5)
GLUCOSE SERPL-MCNC: 100 MG/DL (ref 74–99)
HCT VFR BLD AUTO: 29.1 % (ref 36–46)
HGB BLD-MCNC: 9.1 G/DL (ref 12–16)
IMM GRANULOCYTES # BLD AUTO: 0.02 X10*3/UL (ref 0–0.7)
IMM GRANULOCYTES NFR BLD AUTO: 0.2 % (ref 0–0.9)
LYMPHOCYTES # BLD AUTO: 1.88 X10*3/UL (ref 1.2–4.8)
LYMPHOCYTES NFR BLD AUTO: 17 %
MAGNESIUM SERPL-MCNC: 2.05 MG/DL (ref 1.6–2.4)
MCH RBC QN AUTO: 26.8 PG (ref 26–34)
MCHC RBC AUTO-ENTMCNC: 31.3 G/DL (ref 32–36)
MCV RBC AUTO: 86 FL (ref 80–100)
MONOCYTES # BLD AUTO: 1.03 X10*3/UL (ref 0.1–1)
MONOCYTES NFR BLD AUTO: 9.3 %
NEUTROPHILS # BLD AUTO: 8.04 X10*3/UL (ref 1.2–7.7)
NEUTROPHILS NFR BLD AUTO: 72.8 %
NRBC BLD-RTO: 0 /100 WBCS (ref 0–0)
PHOSPHATE SERPL-MCNC: 4 MG/DL (ref 2.5–4.9)
PLATELET # BLD AUTO: 294 X10*3/UL (ref 150–450)
POTASSIUM SERPL-SCNC: 3.9 MMOL/L (ref 3.5–5.3)
RBC # BLD AUTO: 3.39 X10*6/UL (ref 4–5.2)
SODIUM SERPL-SCNC: 140 MMOL/L (ref 136–145)
WBC # BLD AUTO: 11.1 X10*3/UL (ref 4.4–11.3)

## 2024-12-02 PROCEDURE — 36415 COLL VENOUS BLD VENIPUNCTURE: CPT | Performed by: STUDENT IN AN ORGANIZED HEALTH CARE EDUCATION/TRAINING PROGRAM

## 2024-12-02 PROCEDURE — 71045 X-RAY EXAM CHEST 1 VIEW: CPT

## 2024-12-02 PROCEDURE — 82330 ASSAY OF CALCIUM: CPT | Performed by: STUDENT IN AN ORGANIZED HEALTH CARE EDUCATION/TRAINING PROGRAM

## 2024-12-02 PROCEDURE — 70450 CT HEAD/BRAIN W/O DYE: CPT | Performed by: RADIOLOGY

## 2024-12-02 PROCEDURE — 2500000001 HC RX 250 WO HCPCS SELF ADMINISTERED DRUGS (ALT 637 FOR MEDICARE OP): Performed by: STUDENT IN AN ORGANIZED HEALTH CARE EDUCATION/TRAINING PROGRAM

## 2024-12-02 PROCEDURE — 2500000001 HC RX 250 WO HCPCS SELF ADMINISTERED DRUGS (ALT 637 FOR MEDICARE OP): Performed by: PHYSICIAN ASSISTANT

## 2024-12-02 PROCEDURE — 71045 X-RAY EXAM CHEST 1 VIEW: CPT | Performed by: RADIOLOGY

## 2024-12-02 PROCEDURE — 85025 COMPLETE CBC W/AUTO DIFF WBC: CPT | Performed by: STUDENT IN AN ORGANIZED HEALTH CARE EDUCATION/TRAINING PROGRAM

## 2024-12-02 PROCEDURE — 97161 PT EVAL LOW COMPLEX 20 MIN: CPT | Mod: GP

## 2024-12-02 PROCEDURE — 80069 RENAL FUNCTION PANEL: CPT | Performed by: STUDENT IN AN ORGANIZED HEALTH CARE EDUCATION/TRAINING PROGRAM

## 2024-12-02 PROCEDURE — 2500000004 HC RX 250 GENERAL PHARMACY W/ HCPCS (ALT 636 FOR OP/ED)

## 2024-12-02 PROCEDURE — 83735 ASSAY OF MAGNESIUM: CPT | Performed by: STUDENT IN AN ORGANIZED HEALTH CARE EDUCATION/TRAINING PROGRAM

## 2024-12-02 PROCEDURE — 74018 RADEX ABDOMEN 1 VIEW: CPT | Performed by: RADIOLOGY

## 2024-12-02 PROCEDURE — 2500000002 HC RX 250 W HCPCS SELF ADMINISTERED DRUGS (ALT 637 FOR MEDICARE OP, ALT 636 FOR OP/ED): Performed by: STUDENT IN AN ORGANIZED HEALTH CARE EDUCATION/TRAINING PROGRAM

## 2024-12-02 PROCEDURE — 74018 RADEX ABDOMEN 1 VIEW: CPT

## 2024-12-02 PROCEDURE — 2500000004 HC RX 250 GENERAL PHARMACY W/ HCPCS (ALT 636 FOR OP/ED): Performed by: STUDENT IN AN ORGANIZED HEALTH CARE EDUCATION/TRAINING PROGRAM

## 2024-12-02 PROCEDURE — 2500000001 HC RX 250 WO HCPCS SELF ADMINISTERED DRUGS (ALT 637 FOR MEDICARE OP)

## 2024-12-02 PROCEDURE — 97165 OT EVAL LOW COMPLEX 30 MIN: CPT | Mod: GO | Performed by: OCCUPATIONAL THERAPIST

## 2024-12-02 PROCEDURE — 1100000001 HC PRIVATE ROOM DAILY

## 2024-12-02 PROCEDURE — 70450 CT HEAD/BRAIN W/O DYE: CPT

## 2024-12-02 PROCEDURE — 2500000005 HC RX 250 GENERAL PHARMACY W/O HCPCS: Performed by: STUDENT IN AN ORGANIZED HEALTH CARE EDUCATION/TRAINING PROGRAM

## 2024-12-02 RX ORDER — POLYETHYLENE GLYCOL 3350 17 G/17G
17 POWDER, FOR SOLUTION ORAL 2 TIMES DAILY
Qty: 14 PACKET | Refills: 0 | Status: CANCELLED | OUTPATIENT
Start: 2024-12-02 | End: 2024-12-09

## 2024-12-02 RX ORDER — CYCLOBENZAPRINE HCL 5 MG
5 TABLET ORAL 3 TIMES DAILY PRN
Qty: 21 TABLET | Refills: 0 | Status: CANCELLED | OUTPATIENT
Start: 2024-12-02 | End: 2024-12-09

## 2024-12-02 RX ORDER — CETIRIZINE HYDROCHLORIDE 10 MG/1
10 TABLET ORAL DAILY PRN
Status: DISCONTINUED | OUTPATIENT
Start: 2024-12-02 | End: 2024-12-06 | Stop reason: HOSPADM

## 2024-12-02 RX ORDER — ACETAMINOPHEN 325 MG/1
650 TABLET ORAL EVERY 6 HOURS PRN
Qty: 30 TABLET | Refills: 0 | Status: CANCELLED | OUTPATIENT
Start: 2024-12-02 | End: 2024-12-09

## 2024-12-02 RX ORDER — CYCLOBENZAPRINE HCL 10 MG
5 TABLET ORAL 3 TIMES DAILY
Status: DISCONTINUED | OUTPATIENT
Start: 2024-12-02 | End: 2024-12-03

## 2024-12-02 RX ADMIN — MAGNESIUM SULFATE HEPTAHYDRATE 2 G: 40 INJECTION, SOLUTION INTRAVENOUS at 14:13

## 2024-12-02 RX ADMIN — DEXAMETHASONE 1 MG: 2 TABLET ORAL at 20:39

## 2024-12-02 RX ADMIN — ACETAMINOPHEN 650 MG: 325 TABLET, FILM COATED ORAL at 08:01

## 2024-12-02 RX ADMIN — OXYCODONE HYDROCHLORIDE 10 MG: 5 TABLET ORAL at 20:39

## 2024-12-02 RX ADMIN — ACETAMINOPHEN 650 MG: 325 TABLET, FILM COATED ORAL at 01:22

## 2024-12-02 RX ADMIN — OXYCODONE HYDROCHLORIDE 10 MG: 5 TABLET ORAL at 08:01

## 2024-12-02 RX ADMIN — DIPHENHYDRAMINE HYDROCHLORIDE 25 MG: 50 INJECTION INTRAMUSCULAR; INTRAVENOUS at 14:12

## 2024-12-02 RX ADMIN — OXYCODONE HYDROCHLORIDE 10 MG: 5 TABLET ORAL at 00:10

## 2024-12-02 RX ADMIN — ACETAMINOPHEN 975 MG: 325 TABLET ORAL at 14:12

## 2024-12-02 RX ADMIN — LIDOCAINE 1 PATCH: 4 PATCH TOPICAL at 08:03

## 2024-12-02 RX ADMIN — DEXAMETHASONE 1 MG: 2 TABLET ORAL at 08:02

## 2024-12-02 RX ADMIN — CYCLOBENZAPRINE 10 MG: 10 TABLET, FILM COATED ORAL at 00:10

## 2024-12-02 RX ADMIN — CYCLOBENZAPRINE 5 MG: 10 TABLET, FILM COATED ORAL at 20:39

## 2024-12-02 RX ADMIN — ACETAMINOPHEN 650 MG: 325 TABLET, FILM COATED ORAL at 20:39

## 2024-12-02 RX ADMIN — PANTOPRAZOLE SODIUM 40 MG: 40 TABLET, DELAYED RELEASE ORAL at 08:12

## 2024-12-02 RX ADMIN — CYCLOBENZAPRINE 5 MG: 10 TABLET, FILM COATED ORAL at 14:54

## 2024-12-02 RX ADMIN — OXYCODONE HYDROCHLORIDE 10 MG: 5 TABLET ORAL at 13:18

## 2024-12-02 RX ADMIN — CYCLOBENZAPRINE 10 MG: 10 TABLET, FILM COATED ORAL at 08:02

## 2024-12-02 RX ADMIN — HYDROMORPHONE HYDROCHLORIDE 0.2 MG: 1 INJECTION, SOLUTION INTRAMUSCULAR; INTRAVENOUS; SUBCUTANEOUS at 01:22

## 2024-12-02 RX ADMIN — PROMETHAZINE HYDROCHLORIDE 25 MG: 25 INJECTION INTRAMUSCULAR; INTRAVENOUS at 14:14

## 2024-12-02 RX ADMIN — TIZANIDINE 4 MG: 4 TABLET ORAL at 01:23

## 2024-12-02 ASSESSMENT — PAIN SCALES - GENERAL
PAINLEVEL_OUTOF10: 8
PAINLEVEL_OUTOF10: 9
PAINLEVEL_OUTOF10: 10 - WORST POSSIBLE PAIN
PAINLEVEL_OUTOF10: 10 - WORST POSSIBLE PAIN
PAINLEVEL_OUTOF10: 9
PAINLEVEL_OUTOF10: 8
PAINLEVEL_OUTOF10: 9
PAINLEVEL_OUTOF10: 9
PAINLEVEL_OUTOF10: 10 - WORST POSSIBLE PAIN

## 2024-12-02 ASSESSMENT — PAIN - FUNCTIONAL ASSESSMENT
PAIN_FUNCTIONAL_ASSESSMENT: 0-10

## 2024-12-02 ASSESSMENT — COGNITIVE AND FUNCTIONAL STATUS - GENERAL
TOILETING: A LITTLE
TURNING FROM BACK TO SIDE WHILE IN FLAT BAD: A LITTLE
CLIMB 3 TO 5 STEPS WITH RAILING: A LITTLE
WALKING IN HOSPITAL ROOM: A LITTLE
DAILY ACTIVITIY SCORE: 19
STANDING UP FROM CHAIR USING ARMS: A LITTLE
PERSONAL GROOMING: A LITTLE
MOVING TO AND FROM BED TO CHAIR: A LITTLE
DRESSING REGULAR UPPER BODY CLOTHING: A LITTLE
HELP NEEDED FOR BATHING: A LITTLE
DRESSING REGULAR LOWER BODY CLOTHING: A LITTLE
MOBILITY SCORE: 19

## 2024-12-02 ASSESSMENT — ACTIVITIES OF DAILY LIVING (ADL)
ADL_ASSISTANCE: INDEPENDENT
ADL_ASSISTANCE: INDEPENDENT
ADLS_ADDRESSED: NO
BATHING_ASSISTANCE: STAND BY

## 2024-12-02 ASSESSMENT — PAIN DESCRIPTION - DESCRIPTORS: DESCRIPTORS: ACHING;DISCOMFORT;HEADACHE

## 2024-12-02 ASSESSMENT — PAIN DESCRIPTION - LOCATION
LOCATION: HEAD

## 2024-12-02 NOTE — PROGRESS NOTES
[facility]  Neuro-Oncology Clinic        Patient ID: Sejal Duff  Referring Physician: No referring provider defined for this encounter.  Primary Care Provider: Melba Graham MD      Subjective      HPI  Sejal Duff is a 24 y.o. 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     Interval History:  10/29/24: Today she reports some slight headaches but not worsening.  She is currently on dexamethasone 2 mg twice daily.  She takes the morning dose around 9 or 10 AM and the evening dose before bedtime.  She takes ondansetron about 1 hour prior to the dexamethasone.  She endorses significant issues with vomiting after taking the dexamethasone as well as decreased appetite.  She is able to drink enough fluids.  She states that she tried taking 8 mg of ondansetron prior to the dexamethasone but this did not help either.  She states that the dexamethasone makes her very jittery.  She has not taken dexamethasone for the last 2 days.  She continues to have constipation which is her baseline where she does not have a bowel movement for weeks.  She has been referred by her gastroenterologist to integrative medicine for further help with bowel movements.  Otherwise denies any worsening of vision or new gait or balance issues.  11/8/24: Continues to have some headaches but not worse. She has decreased dexamethasone to 1mg bid on her own and she denies any worsening of her headaches. Denies new balance or strength issues.   11/19/2024: Her headaches are about the same intensity.  Denies new nausea/vomiting episodes.  Denies new gait or balance changes.  Denies new vision changes.   11/26/2024: Sejal states her headaches are worse.  She has not been able to sleep much.   Denies new vision problems.  Denies new weakness or gait balance/issues.  Denies new episodes of nausea or vomiting.  She is upset at the MRI spine being canceled recently.    Oncology History   Pilocytic astrocytoma (Multi)   10/15/2024 Surgery    Biopsy of hypothalamic lesion by Dr. Segal at [facility]      10/15/2024 Pathology     A.  B.  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  See note     Note:  Microscopic examination for parts A and B reveal a low-grade glial neoplasm immunoreactive with OLIG-2 and GFAP.  Neurofilament immunohistochemical stain highlights neuropil and scattered neuronal bodies.  ATRX and [id number] immunohistochemical stains are retained.  IDH1 and BRAF immunohistochemical stains are negative.  GLIOSEQ studies reveal a BRAF-V600E mutation.  This finding in conjunction with the morphology are most consistent with a pilocytic astrocytoma.  However, a ganglioglioma among other low-grade glial/glial neuronal neoplasms, cannot be completely ruled out.  Clinical correlation is recommended         Review of Systems - Oncology   10 point ROS is negative except for that mentioned in the HPI    Family History   Problem Relation Name Age of Onset    Asthma Mother      Colon cancer Mother         Sejal Duff  reports that she has never smoked. She has never been exposed to tobacco smoke. She has never used smokeless tobacco.  She  reports current alcohol use.  She  reports current drug use. Drug: Marijuana.    PHYSICAL EXAM  Objective   BSA: 1.76 meters squared  /76 (BP Location: Left arm, Patient Position: Sitting, BP Cuff Size: Adult)   Pulse (!) 111   Temp 36.2 °C (97.2 °F) (Temporal)   Resp 16   Wt 69.9 kg (154 lb)   SpO2 100%   BMI 27.28 kg/m²   Karnofsky Score: 90 - Able to carry on normal activity; minor signs or symptoms of disease     Gen: Awake and alert; in no acute distress  CVS/Pulm: No dyspnea noted  Abdomen:  Soft nontender nondistended    Neuro  Mental Status: Awake and alert.  Oriented to self and situation.  Able to recall history well.  CN: Extraocular muscles intact.  Visual fields intact.  No facial asymmetry noted.  Midline palate elevation and tongue protrusion.  Motor: No pronator drift noted.  5/5 strength throughout  Sensation: Intact to light touch  Gait: Normal gait.  No ataxia noted.  Tandem gait intact    LABS  CSF Cytology 10/15/24  A. CEREBROSPINAL FLUID- CSF:  - No malignant cells identified.  - Few scattered lymphocytes, neutrophils and red cells present, suggestive of peripheral blood contamination.        IMAGING  === 10/13/24 ===    MR BRAIN W AND WO CONTRAST    - Impression -  Cystic/necrotic rim enhancing lesion centered within the hypothalamus  measuring up the 2.4 cm resulting in partial effacement of the neural  foramina and slight prominence of the lateral ventricles.    Additional nonenhancing FLAIR hyperintense lesion within the medial  left temporal lobe measuring 1.1 x 0.9 cm. Given multifocal  appearance, findings may represent primary neoplasm such as an optic  pathway glioma or less likely metastatic disease. Suprasellar lesions  such as craniopharyngioma and germinoma are also thought to be less  likely. Infection can not be ruled out as diffusion-weighted imaging  is nondiagnostic secondary to susceptibility from patient's braces.    Signed by: Waleska Tamayo 10/13/2024 7:08 PM  Dictation workstation:   [id number]      ASSESSMENT  Sejal Duff is a 24 y.o. 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  This is WHO grade 1 tumor and  any other circumstances of that have been gross total resection we would have simply monitored.  However due to his location the hypothalamus that is causing some neurological symptoms and I suspect even some endocrinological symptoms.  Due to it having a BRAF V600 E mutation, we can try and attempt to decrease the size through medications targeting this pathway.  - Sejal wanted to know if there was any way that this could be surgically removed so she would not have to go through any sort of treatment.  Discussed that due to the location I suspect that the question is not that it can be done but but she will have to deal with postoperatively.  There is a high likelihood she might need to go on complete hormone replacement therapy.    She met with Dr. Segal (neurosurgery) to discuss the possibility of surgical removal of this lesion.  They discussed the risks and benefits involved including the possible postsurgical consequences discussed above.  As such we will proceed with medical intervention initially.  -Apologized for the cancellation of the MRI spine.  We had obtained authorization from the insurance company but scheduling team did cancel it nonetheless.  We have reached out to the scheduling team to inform them of this error and to try to get her rescheduled as soon as possible.    -We had initially discussed starting dabrafenib/trametinib to target the lesion since it has a BRAF mutation.  Though we can start this at the recent neuro-oncology conference results were presented on the use of tovorafenib in pediatric and young adult patients with low-grade glioma who have the JUAN CARLOS mutation.  This showed a faster decrease in tumor size than with other agents.  As such I would like to recommend trying to get this for her since we had discussed her trying to come off of this medication as soon as possible and considering a smaller radiation field posttreatment.  Will also discussed with the neurosurgery team if  surgical intervention will become safer with a smaller lesion.  Additionally, having the tumor shrink faster and being on the medications for a shorter period of time will hopefully minimize side effects and maybe improve her current symptoms; especially her headaches.  - Patient gives consent to trying the new medication.  Will work on getting approval as soon as possible.      #Cerebral edema/steroid/headaches  - Is having nausea and vomiting issues after taking the dexamethasone.   - Initially plan was to decrease dexamethasone to 1 mg daily for 5 days and then stop completely so long as she is not having worsening of her headaches but she states she is continuing the dexamethasone for now.  -I feel that her worsening of headaches could be from increased edema or even increase in tumor size.  Until we can get the medication started I feel the best way to control this would be to increase her dexamethasone dose to a much higher level.  I recommend increasing it to at least 6 mg once and then 4 mg daily and going higher if there is only a small improvement.  - Sejal feels that it will just make her sick and she is not open to trying this increase.  Discussed that if the headaches do get worse and especially if she starts having nausea and vomiting she should come to the emergency room as another option would be to try to admit her and do IV dexamethasone which she might be able to tolerate better.  -For now we will prescribe oxycodone 5 mg immediate release for the next few days  -Referral placed to supportive oncology for assistance with getting her headaches under better control and also finding better ways to refer her to tolerate the dexamethasone at higher doses.    #Nausea/vomiting  - Could be due to dexamethasone but feel that her IBS is also playing a role.  - Instructed to take ondansetron 8 mg about 1 hour prior to taking dexamethasone doses.  - If this does not help the vomiting then we will consider  prescribing Phenergan  - Will also coordinate with GI and integrative medicine colleagues were already seeing her to see if there is other suggestions for her nausea and vomiting episodes.    #IBS  -continue fu with GI and integrative medicine teams     #Social  - She would like to return to work if possible.  Instructed her to discuss this with neurosurgery team regarding restrictions for returning to work  - She works as a nurse assistant  -Referral also placed to onco-fertility/young adult clinic.  Discussed with Sejal that I feel she would really benefit from trying to process through everything she is going through currently in this clinic is a great resource for that.  -Has met with [facility] colleagues regarding fertility preservation.  At this time she is not interested and on discussion with Dr. Cheatham, with this type of medication there is a lower risk of fertility issues.    I personally spent 50 minutes today, exclusive of procedures, providing care for this patient, including preparation, face to face time, documentation and other services such as review of medical records, diagnostic result, patient education, counseling, coordination of care as specified in the encounter.     Migue Aguilar MD  Neuro-Oncology

## 2024-12-02 NOTE — PROGRESS NOTES
Occupational Therapy    Occupational Therapy    Evaluation    Patient Name: Sejal Duff  MRN: [id number]  Today's Date: 12/2/2024  Room: 03/03  Time Calculation  Start Time: 1558  Stop Time: 1613  Time Calculation (min): 15 min    Assessment  IP OT Assessment  OT Assessment: Patient is a 24 year old female admitted to hospital with worsening headache and light sensitivity.  Patient persents with impaired ADL, functional transfer, bed mobility, functional mobility, activity tolerance, balance, light sensitivity and mild cognitive deficits. Patient would benefit from skilled OT serivces while in hospital and post discharge.  Prognosis: Good  Barriers to Discharge: Inaccessible home environment  Medical Staff Made Aware: Yes  End of Session Communication: Bedside nurse  End of Session Patient Position: Bed, 3 rail up, Alarm off, not on at start of session  Plan:  Inpatient Plan  Treatment Interventions: ADL retraining, Functional transfer training, UE strengthening/ROM, Endurance training, Cognitive reorientation, Patient/family training, Compensatory technique education (functional mobiity training, functional bed mobiity training)  OT Frequency: 3 times per week  OT Discharge Recommendations: Low intensity level of continued care  OT Recommended Transfer Status: Stand by assist, Assist of 1  OT - OK to Discharge: Yes  OT Assessment  Prognosis: Good  Barriers to Discharge: Inaccessible home environment  Medical Staff Made Aware: Yes  Strengths: Ability to acquire knowledge, Premorbid level of function    Subjective   Current Problem:  1. Hydrocephalus  Case Request Operating Room: Creation Shunt Ventricular Peritoneal    Case Request Operating Room: Creation Shunt Ventricular Peritoneal      2. Pilocytic astrocytoma (Multi)  Case Request Operating Room: Right parietal,  Shunt Ventricular Peritoneal    Case Request Operating Room: Right parietal,  Shunt Ventricular Peritoneal      3. Obstructive hydrocephalus (Multi)  [G91.1]        4. Impaired mobility          General:  Reason for Referral: 11/28 p/w worsening HA, light sensitivity, CTH incr vents, MRI incr ventricular mass 2.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 10/16 s/p R stereotactic brain biopsy  Patient Position Received: Bed, 3 rail up, Alarm off, not on at start of session  Caregiver Feedback: Patient's father present and sleeping on cot.   Precautions:  Hearing/Visual Limitations: WFL  Medical Precautions: Fall precautions  Precautions Comment: SBP <180  Vital Signs:  Vital Signs (Past 2hrs)        Date/Time Vitals Session Patient Position Pulse Resp SpO2 BP MAP (mmHg)    12/02/24 1558 Pre OT  --  101  19  100 %  114/78  --     12/02/24 1613 Post OT  --  103  17  99 %  101/84  --                   Pain:  Pain Assessment  Pain Assessment: 0-10  0-10 (Numeric) Pain Score: 9  Pain Type: Acute pain  Pain Location: Head  Pain Interventions:  (repositioning, nurse notified)  Response to Interventions: No change in pain           Objective   Cognition:  Overall Cognitive Status: Within Functional Limits  Orientation Level: Oriented X4  Following Commands: Follows all commands and directions without difficulty  Attention: Exceptions to WFL  Sustained Attention: Impaired (mild)  Memory: Exceptions to WFL  Working Memory: Impaired (mild)     4AT  Alertness    Normal (fully alert, but not agitated, throughout assessment) 0          xMild sleepiness for <10 seconds after waking, then normal 0  Clearly abnormal 4  Score: 0    AMT4  Age, date of birth, place (name of the hospital or building), current year  xNo mistakes 0  1 mistake 1  2 or more mistakes/untestable 2  Score: 0    Attention  Months of the year backwards Achieves   7 months or  more correctly 0  xStarts but scores <7 months / refuses to start 1  Untestable (cannot start because unwell, drowsy, inattentive) 2  Score: 1    Acute change or fluctuating course  Evidence of significant change or fluctuation in: alertness, cognition, other mental function  (eg. paranoia, hallucinations) arising over the last 2 weeks and still evident in last 24hrs  xNo 0  Yes 4  Score: 0    Total score: 1  4 or above: possible delirium +/- cognitive impairment  1-3: possible cognitive impairment  0: delirium or severe cognitive impairment unlikely (but  delirium still possible if [4] information incomplete)       Home Living:  Type of Home: House  Lives With: Alone (Brother and mother are nearby and can assist of needed.)  Home Adaptive Equipment: None  Home Layout: Stairs to alternate level with rails  Alternate Level Stairs-Number of Steps: 12  Home Access: Stairs to enter with rails  Entrance Stairs-Number of Steps: 4  Bathroom Shower/Tub: Tub/shower unit  Bathroom Equipment: None   Prior Function:  Level of Knott: Independent with ADLs and functional transfers, Independent with homemaking with ambulation  ADL Assistance: Independent  Homemaking Assistance: Independent  Ambulatory Assistance: Independent  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.)       ADL:  Eating Assistance: Independent  Grooming Assistance: Stand by (in standing)  Bathing Assistance: Stand by (anticipated)  UE Dressing Assistance: Stand by (anticipated)  LE Dressing Assistance: Stand by  Toileting Assistance with Device: Stand by  Activity Tolerance:  Endurance: Tolerates 10 - 20 min exercise with multiple rests  Early Mobility/Exercise Safety Screen: Proceed with mobilization - No exclusion criteria met  Balance:  Dynamic Sitting Balance  Dynamic Sitting-Level of Assistance: Close supervision  Dynamic Standing Balance  Dynamic Standing-Level of Assistance: Close supervision  Static Sitting  Balance  Static Sitting-Level of Assistance: Close supervision  Static Standing Balance  Static Standing-Level of Assistance: Close supervision  Bed Mobility/Transfers: Bed Mobility  Bed Mobility: Yes  Bed Mobility 1  Bed Mobility 1: Supine to sitting, Sitting to supine  Level of Assistance 1: Close supervision  Bed Mobility Comments 1: elevated HOB   and Transfers  Transfer: Yes  Transfer 1  Transfer From 1: Sit to, Stand to  Transfer to 1: Stand, Sit  Transfer Device 1:  (no device)  Transfer Level of Assistance 1: Close supervision  Transfers 2  Transfer From 2: Bed to, Toilet to  Transfer to 2: Toilet, Bed  Transfer Device 2:  (no device)  Transfer Level of Assistance 2: Close supervision       Vision: Vision - Basic Assessment  Current Vision: Wears glasses all the time    Sensation:  Light Touch: No apparent deficits       Coordination:  Movements are Fluid and Coordinated: Yes     Extremities: RUE   RUE : Within Functional Limits, LUE   LUE: Within Functional Limits,  , and      Outcome Measures: [facility] Daily Activity  Putting on and taking off regular lower body clothing: A little  Bathing (including washing, rinsing, drying): A little  Putting on and taking off regular upper body clothing: A little  Toileting, which includes using toilet, bedpan or urinal: A little  Taking care of personal grooming such as brushing teeth: A little  Eating Meals: None  Daily Activity - Total Score: 19        ICU Mobility Screen  Early Mobility/Exercise Safety Screen: Proceed with mobilization - No exclusion criteria met,          Education Documentation  Body Mechanics, taught by Mari Talavera OT at 12/2/2024  5:03 PM.  Learner: Patient  Readiness: Acceptance  Method: Explanation  Response: Verbalizes Understanding, Needs Reinforcement    Precautions, taught by Mari Talavera OT at 12/2/2024  5:03 PM.  Learner: Patient  Readiness: Acceptance  Method: Explanation  Response: Verbalizes Understanding, Needs  Reinforcement    Education Comments  No comments found.        Goals:     Encounter Problems       Encounter Problems (Active)       ADLs       Patient will perform UB and LB bathing  with independent level of assistance  (Progressing)       Start:  12/02/24    Expected End:  12/16/24            Patient with complete upper body dressing with independent level of assistance donning and doffing all UE clothes with no adaptive equipment while edge of bed  (Progressing)       Start:  12/02/24    Expected End:  12/16/24            Patient with complete lower body dressing with independent level of assistance donning and doffing all LE clothes  with no adaptive equipment while edge of bed  (Progressing)       Start:  12/02/24    Expected End:  12/16/24            Patient will complete daily grooming tasks  with independent level of assistance and PRN adaptive equipment while standing. (Progressing)       Start:  12/02/24    Expected End:  12/16/24            Patient will complete toileting including hygiene clothing management/hygiene with modified independent level of assistance and grab bars. (Progressing)       Start:  12/02/24    Expected End:  12/16/24               COGNITION/SAFETY       patient will demonstrate improved working memory, attention and independent use of compensatory tools to allow improved ADL performance.  (Progressing)       Start:  12/02/24    Expected End:  12/16/24               MOBILITY       Patient will perform Functional mobility mod  Household distances with independent level of assistance and no device in order to improve safety and functional mobility. (Progressing)       Start:  12/02/24    Expected End:  12/16/24               TRANSFERS       Patient will perform bed mobility independent level of assistance  in order to improve safety and independence with mobility (Progressing)       Start:  12/02/24    Expected End:  12/16/24            Patient will complete functional transfer to all  surfaces with no device with independent level of assistance. (Progressing)       Start:  12/02/24    Expected End:  12/16/24 12/02/24 at 5:04 PM   Mari Talavera, OT   Rehab Office: [phone]

## 2024-12-02 NOTE — PROGRESS NOTES
"Sejal Duff is a 24 y.o. female on day 4 of admission presenting with Hydrocephalus.    Subjective   No acute events overnight       Objective     Physical Exam  Physical Exam  Awake Ox3  BUE 5/5  BLE 5/5  SILT   Last Recorded Vitals  Blood pressure 106/71, pulse 91, temperature 36.5 °C (97.7 °F), temperature source Temporal, resp. rate 18, height 1.6 m (5' 3\"), weight 77.2 kg (170 lb 3.1 oz), SpO2 99%.  Intake/Output last 3 Shifts:  I/O last 3 completed shifts:  In: 3616.3 (46.8 mL/kg) [P.O.:1500; I.V.:1416.3 (18.3 mL/kg); IV Piggyback:700]  Out: 401 (5.2 mL/kg) [Urine:401 (0.1 mL/kg/hr)]  Weight: 77.2 kg     Relevant Results            This patient currently has cardiac telemetry ordered; if you would like to modify or discontinue the telemetry order, click here to go to the orders activity to modify/discontinue the order.    Results from last 72 hours   Lab Units 12/02/24 0135 11/30/24  2258   GLUCOSE mg/dL 100* 108*   SODIUM mmol/L 140 141   POTASSIUM mmol/L 3.9 4.0   CHLORIDE mmol/L 110* 108*   CO2 mmol/L 24 27   ANION GAP mmol/L 10 10   BUN mg/dL 8 15   CREATININE mg/dL 0.41* 0.58   EGFR mL/min/1.73m*2 >90 >90   CALCIUM mg/dL 8.0* 8.8   PHOSPHORUS mg/dL 4.0 3.7   ALBUMIN g/dL 3.4 3.7   MAGNESIUM mg/dL 2.05 2.02   POCT CALCIUM IONIZED (MMOL/L) IN BLOOD mmol/L 1.20 1.22      Results from last 72 hours   Lab Units 12/02/24  0135 11/30/24  2258   WBC AUTO x10*3/uL 11.1 10.6   NRBC AUTO /100 WBCs 0.0 0.0   RBC AUTO x10*6/uL 3.39* 3.75*   HEMOGLOBIN g/dL 9.1* 9.7*   HEMATOCRIT % 29.1* 32.0*   MCV fL 86 85   MCH pg 26.8 25.9*   MCHC g/dL 31.3* 30.3*   RDW % 15.1* 15.1*   PLATELETS AUTO x10*3/uL 294 332                   Assessment/Plan   Assessment & Plan  Hydrocephalus    Sejal Duff is a 24 y.o. female h/o anemia, asthma GERD, IBS, PCOS, breast fibroadenoma s/p L breast excisional biopsy, intially p/w HAx 3d, CTH third ventricular mass w/ calcifications, MRI 2.4cm peripherally enhancing third ventricular " necrotic mass w/ nonenhancing FLAIR hyperintense lesion in medial L temporal lobe,10/16 s/p R stereotactic brain bx (prelim glial cells w/ luigi fibers vs craniopharyngioma), 11/28 p/w worsening HA, light sensitivity, CTH incr vents, MRI incr ventricular mass 2.7x2.5cm, ventriculomegaly, trace R ventricular GRE   12/1 s/p R VPS (Certas at 5)     floor  Con't dex 1q12   Pain management   PTOT  SCD, ([facility])  Dispo when able        Natanael Valenzuela MD

## 2024-12-02 NOTE — CARE PLAN
Problem: Skin  Goal: Promote/optimize nutrition  Outcome: Progressing     Problem: Pain  Goal: Performs ADL's with improved pain control throughout shift  Outcome: Progressing     Problem: Fall/Injury  Goal: Not fall by end of shift  Outcome: Progressing

## 2024-12-02 NOTE — PROGRESS NOTES
Physical Therapy    Physical Therapy Evaluation    Patient Name: Sejal Duff  MRN: [id number]  Today's Date: 12/2/2024   Room: 03/03  Time Calculation  Start Time: 1248  Stop Time: 1314  Time Calculation (min): 26 min    Assessment/Plan   PT Assessment  PT Assessment Results: Decreased endurance, Decreased mobility, Pain  Rehab Prognosis: Excellent  Barriers to Discharge: Pain  Evaluation/Treatment Tolerance: Patient limited by pain  Medical Staff Made Aware: Yes  Strengths: Premorbid level of function  End of Session Communication: Bedside nurse  Assessment Comment: Pt to benefit from ongoing PT services while in acute care to address the above limitations and to prepare pt for timely return to prior level of function. Anticipate no skilled PT aneeds at time of discharge.  End of Session Patient Position: Bed, 3 rail up, Alarm off, not on at start of session  IP OR SWING BED PT PLAN  Inpatient or Swing Bed: Inpatient  PT Plan  Treatment/Interventions: Bed mobility, Transfer training, Gait training, Stair training, Balance training, Neuromuscular re-education, Strengthening, Endurance training, Therapeutic exercise, Therapeutic activity, Home exercise program, Postural re-education  PT Plan: Ongoing PT  PT Frequency: 2 times per week  PT Discharge Recommendations: No PT needed after discharge  Equipment Recommended upon Discharge:  (No equipment needs anticipated)  PT Recommended Transfer Status: Assist x1  PT - OK to Discharge: Yes (When medically ready)      Subjective   General Visit Information:  Reason for Referral: 11/28 p/w worsening HA, light sensitivity, CTH incr vents, MRI incr ventricular mass 2.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 10/16 s/p R stereotactic brain  biopsy  Patient Position Received: Bed, 3 rail up, Alarm off, not on at start of session  Family/Caregiver Present: Yes  Caregiver Feedback: Pt's father asleep in room throughout session.     Home Living:  Home Living  Type of Home: House  Lives With: Alone (Brother and mother are nearby and can assist of needed.)  Home Adaptive Equipment: None  Home Layout: Stairs to alternate level with rails  Alternate Level Stairs-Number of Steps: 12  Home Access: Stairs to enter with rails  Entrance Stairs-Number of Steps: 4  Bathroom Shower/Tub: Tub/shower unit  Bathroom Equipment: None    Prior Level of Function:  Prior Function Per Pt/Caregiver Report  Level of [place]: Independent with ADLs and functional transfers, Independent with homemaking with ambulation  ADL Assistance: Independent  Homemaking Assistance: Independent  Ambulatory Assistance: Independent  Vocational: Full time employment ([employer])    Precautions:  Precautions  Hearing/Visual Limitations: WFL  Medical Precautions: Fall precautions  Precautions Comment: SBP <180    Vital Signs:  Vital Signs (Past 2hrs)        Date/Time Vitals Session Patient Position Pulse Resp SpO2 BP MAP (mmHg)    12/02/24 1200 --  --  85  15  100 %  115/77  89     12/02/24 1248 Pre PT  Lying  93  --  100 %  107/73  84     12/02/24 1252 --  --  --  --  --  --  --     12/02/24 1258 During PT  Sitting  139  --  100 %  113/77  88     12/02/24 1314 Post PT  Lying  94  --  100 %  127/77  89                     Objective   Lines/Tubes/Drains: Tele       Continuous Medications/Drips: n/a       Oxygen: Room air     Pain:  Pain Assessment  Pain Assessment: 0-10  0-10 (Numeric) Pain Score: 10 - Worst possible pain  Pain Type: Acute pain  Pain Location:  (Head + low back/abdomen/menstrual cramps)  Pain Interventions: Heat applied (RN notified)  Response to Interventions: No change in pain    Cognition:  Cognition  Overall Cognitive Status: Within Functional Limits  Orientation Level: Oriented  X4  Insight: Within function limits  Impulsive: Within functional limits  Processing Speed: Within funtional limits      Extremity/Trunk Assessments:  Strength:       RUE   RUE : Within Functional Limits    LUE   LUE: Within Functional Limits    RLE   RLE : Exceptions to WFL  Strength RLE  R Hip Flexion: 3/5  R Knee Flexion: 5/5  R Ankle Dorsiflexion: 5/5  R Ankle Plantar Flexion: 5/5    LLE   LLE : Exceptions to WFL  Strength LLE  L Hip Flexion: 3/5  L Knee Flexion: 5/5  L Ankle Dorsiflexion: 5/5  L Ankle Plantar Flexion: 5/5    General Assessments:         Activity Tolerance  Endurance: Tolerates 10 - 20 min exercise with multiple rests  Early Mobility/Exercise Safety Screen: Proceed with mobilization - No exclusion criteria met  Sensation  Light Touch: No apparent deficits  Coordination  Movements are Fluid and Coordinated: Yes  Finger to Nose: Intact  Heel to Shin: Intact  Static Sitting Balance  Static Sitting-Balance Support: Bilateral upper extremity supported, Feet supported  Static Sitting-Level of Assistance: Distant supervision  Dynamic Sitting Balance  Dynamic Sitting-Balance Support: Bilateral upper extremity supported, Feet supported  Dynamic Sitting-Level of Assistance: Distant supervision  Static Standing Balance  Static Standing-Balance Support: No upper extremity supported  Static Standing-Level of Assistance:  (CGA progressing to close sup)  Dynamic Standing Balance  Dynamic Standing-Balance Support: No upper extremity supported  Dynamic Standing-Level of Assistance:  (CGA progressing to close sup)  Dynamic Standing-Balance: Turning (Single leg stance to don undergarments)    Functional Assessments:  Bed Mobility  Bed Mobility: Yes  Bed Mobility 1  Bed Mobility 1: Supine to sitting  Level of Assistance 1: Minimum assistance (x1)  Bed Mobility Comments 1: HOB 20 deg  Bed Mobility 2  Bed Mobility  2: Sitting to supine  Level of Assistance 2: Close supervision  Bed Mobility Comments 2: HOB 20  deg    Transfers  Transfer: Yes  Transfer 1  Technique 1: Sit to stand, Stand to sit  Transfer Device 1:  (No device)  Transfer Level of Assistance 1:  (CGA progressing to close sup)  Trials/Comments 1: 2x from low surfaces. Cues for pacing.    Ambulation/Gait Training  Ambulation/Gait Training Performed: Yes  Ambulation/Gait Training 1  Surface 1: Level tile  Device 1: No device  Assistance 1:  (CGA progressing to close sup)  Quality of Gait 1: Narrow base of support, Diminished heel strike, Antalgic (No overt sway or LOB)  Comments/Distance (ft) 1: 15 ft + 15 ft in room    Stairs  Stairs: No         Outcome Measures:  [facility] Basic Mobility  Turning from your back to your side while in a flat bed without using bedrails: None  Moving from lying on your back to sitting on the side of a flat bed without using bedrails: A little  Moving to and from bed to chair (including a wheelchair): A little  Standing up from a chair using your arms (e.g. wheelchair or bedside chair): A little  To walk in hospital room: A little  Climbing 3-5 steps with railing: A little  Basic Mobility - Total Score: 19                   FSS-ICU  Ambulation: Walks <50 feet with any assistance x1 or walks any distance with assistance x2 people  Rolling: Complete independence  Sitting: Supervision or set-up only  Transfer Sit-to-Stand: Supervision or set-up only  Transfer Supine-to-Sit: Supervision or set-up only  Total Score: 23    ICU Mobility Screen  Early Mobility/Exercise Safety Screen: Proceed with mobilization - No exclusion criteria met  ICU Mobility Scale: Walking with assistance of 1 person                        Encounter Problems       Encounter Problems (Active)       PT Problem       Patient will perform bed mobility IND to reduce risk of developing decubitus ulcers.        Start:  12/02/24    Expected End:  12/16/24            Patient will ambulate at least 250  ft. MOD-I with LRD to improve tolerance of community distances.        Start:  12/02/24    Expected End:  12/16/24            Patient will perform sit to stand and stand to sit transfers MOD-I with LRD to increase functional strength.         Start:  12/02/24    Expected End:  12/16/24            Patient will ascend and descend >/= 10 steps MOD-I with railing  to facilitate safe navigation of stairs in the home.           Start:  12/02/24    Expected End:  12/16/24            Patient will score >22/30 points on the Functional Gait Assessment to indicate decreased risk of falling.        Start:  12/02/24    Expected End:  12/16/24               Pain - Adult              Education Documentation  Precautions, taught by Sharmin Daniel, PT at 12/2/2024  1:30 PM.  Learner: Patient  Readiness: Acceptance  Method: Explanation  Response: Verbalizes Understanding    Body Mechanics, taught by Sharmin Daniel PT at 12/2/2024  1:30 PM.  Learner: Patient  Readiness: Acceptance  Method: Explanation  Response: Verbalizes Understanding    Mobility Training, taught by Sharmin Daniel PT at 12/2/2024  1:30 PM.  Learner: Patient  Readiness: Acceptance  Method: Explanation  Response: Verbalizes Understanding    Education Comments  No comments found.            12/02/24 at 1:30 PM   Sharmin Daniel, PT   Rehab Office: [phone]

## 2024-12-02 NOTE — PROGRESS NOTES
[facility]  Neuro-Oncology Clinic        Patient ID: Sejal Duff  Referring Physician: No referring provider defined for this encounter.  Primary Care Provider: Melba Graham MD      Subjective      HPI  Sejal Duff is a 24 y.o. 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     Interval History:  10/29/24: Today she reports some slight headaches but not worsening.  She is currently on dexamethasone 2 mg twice daily.  She takes the morning dose around 9 or 10 AM and the evening dose before bedtime.  She takes ondansetron about 1 hour prior to the dexamethasone.  She endorses significant issues with vomiting after taking the dexamethasone as well as decreased appetite.  She is able to drink enough fluids.  She states that she tried taking 8 mg of ondansetron prior to the dexamethasone but this did not help either.  She states that the dexamethasone makes her very jittery.  She has not taken dexamethasone for the last 2 days.  She continues to have constipation which is her baseline where she does not have a bowel movement for weeks.  She has been referred by her gastroenterologist to integrative medicine for further help with bowel movements.  Otherwise denies any worsening of vision or new gait or balance issues.  11/8/24: Continues to have some headaches but not worse. She has decreased dexamethasone to 1mg bid on her own and she denies any worsening of her headaches. Denies new balance or strength issues.   11/19/2024: Her headaches are about the same intensity.  Denies new nausea/vomiting episodes.  Denies new gait or balance changes.  Denies new vision changes.    Oncology History   Pilocytic astrocytoma (Multi)   10/15/2024 Surgery    Biopsy of  hypothalamic lesion by Dr. Segal at [facility]      10/15/2024 Pathology     A.  B.  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  See note     Note:  Microscopic examination for parts A and B reveal a low-grade glial neoplasm immunoreactive with OLIG-2 and GFAP.  Neurofilament immunohistochemical stain highlights neuropil and scattered neuronal bodies.  ATRX and T8A92ml immunohistochemical stains are retained.  IDH1 and BRAF immunohistochemical stains are negative.  GLIOSEQ studies reveal a BRAF-V600E mutation.  This finding in conjunction with the morphology are most consistent with a pilocytic astrocytoma.  However, a ganglioglioma among other low-grade glial/glial neuronal neoplasms, cannot be completely ruled out.  Clinical correlation is recommended         Review of Systems - Oncology   10 point ROS is negative except for that mentioned in the HPI    Family History   Problem Relation Name Age of Onset    Asthma Mother      Colon cancer Mother         Sejal Duff  reports that she has never smoked. She has never been exposed to tobacco smoke. She has never used smokeless tobacco.  She  reports current alcohol use.  She  reports current drug use. Drug: Marijuana.    PHYSICAL EXAM  Objective   BSA: 1.77 meters squared  /71 (BP Location: Left arm, Patient Position: Sitting, BP Cuff Size: Adult)   Pulse 92   Temp 36.3 °C (97.3 °F) (Temporal)   Resp 18   Wt 70.7 kg (155 lb 14.4 oz)   SpO2 100%   BMI 27.62 kg/m²   Karnofsky Score: 90 - Able to carry on normal activity; minor signs or symptoms of disease     Gen: Awake and alert; in no acute distress  CVS/Pulm: No dyspnea noted  Abdomen: Soft nontender nondistended    Neuro  Mental Status: Awake and alert.  Oriented to self and situation.  Able to recall history well.  CN: Extraocular muscles intact.  Visual fields intact.  No facial asymmetry noted.  Midline palate elevation and tongue  protrusion.  Motor: No pronator drift noted.  5/5 strength throughout  Sensation: Intact to light touch  Gait: Normal gait.  No ataxia noted.  Tandem gait intact    LABS  CSF Cytology 10/15/24  A. CEREBROSPINAL FLUID- CSF:  - No malignant cells identified.  - Few scattered lymphocytes, neutrophils and red cells present, suggestive of peripheral blood contamination.        IMAGING  === 10/13/24 ===    MR BRAIN W AND WO CONTRAST    - Impression -  Cystic/necrotic rim enhancing lesion centered within the hypothalamus  measuring up the 2.4 cm resulting in partial effacement of the neural  foramina and slight prominence of the lateral ventricles.    Additional nonenhancing FLAIR hyperintense lesion within the medial  left temporal lobe measuring 1.1 x 0.9 cm. Given multifocal  appearance, findings may represent primary neoplasm such as an optic  pathway glioma or less likely metastatic disease. Suprasellar lesions  such as craniopharyngioma and germinoma are also thought to be less  likely. Infection can not be ruled out as diffusion-weighted imaging  is nondiagnostic secondary to susceptibility from patient's braces.    Signed by: Waleska Tamayo 10/13/2024 7:08 PM  Dictation workstation:   [id number]      ASSESSMENT  Sejal Duff is a 24 y.o. 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  This is WHO grade 1 tumor and any other circumstances of that have been gross total resection we would have simply monitored.  However due to his location the hypothalamus that is causing some neurological symptoms and I suspect even some endocrinological symptoms.  Due to it having  a BRAF V600 E mutation, we can try and attempt to decrease the size through medications targeting this pathway.  - Would recommend dabrafenib and trametinib.  - Sejal wanted to know if there was any way that this could be surgically removed so she would not have to go through any sort of treatment.  Discussed that due to the location I suspect that the question is not that it can be done but but she will have to deal with postoperatively.  There is a high likelihood she might need to go on complete hormone replacement therapy.    She met with Dr. Segal (neurosurgery) to discuss the possibility of surgical removal of this lesion.  They discussed the risks and benefits involved including the possible postsurgical consequences discussed above.  As such we will proceed with medical intervention initially.  -Will obtain echocardiogram for baseline due to the risk associated with with cardiac side effects with trametinib  - Sejal also wanted to know how long the treatment would be for.  Discussed that the medications targeting this pathway are not to kill the cells but to stop the pathway and so if we were to stop the medications completely there is always a chance it could regrow.  As such I would focus initially on trying to make the lesion smaller and I suspect this might take 6 months to a year.  -Will also plan on MRI of the whole spine to assess for any drop metastasis  -Has met with [facility] colleagues regarding fertility preservation.  At this time she is not interested and on discussion with Dr. Cheatham, with this type of medication there is a lower risk of fertility issues.    #Cerebral edema/steroid  - Is having nausea and vomiting issues after taking the dexamethasone.   -Is able to take dexamethasone 1 mg twice daily but feels is not really improving the situation  - Discussed that ideally I do not want her to go off of the steroids to prevent worsening of her headaches but if she is not getting any benefit we can  try weaning off but would recommend going back on if her headaches worsen  - Initially plan was to decrease dexamethasone to 1 mg daily for 5 days and then stop completely so long as she is not having worsening of her headaches but she states she is continuing the dexamethasone for now.    #Nausea/vomiting  - Could be due to dexamethasone but feel that her IBS is also playing a role.  - Instructed to take ondansetron 8 mg about 1 hour prior to taking dexamethasone doses.  - If this does not help the vomiting then we will consider prescribing Phenergan  - Will also coordinate with GI and integrative medicine colleagues were already seeing her to see if there is other suggestions for her nausea and vomiting episodes.    #IBS  -continue fu with GI and integrative medicine teams     #Social  - She would like to return to work if possible.  Instructed her to discuss this with neurosurgery team regarding restrictions for returning to work  - She works as a nurse assistant  -Referral also placed to onco-fertility/young adult clinic.  Discussed with Sejal that I feel she would really benefit from trying to process through everything she is going through currently in this clinic is a great resource for that.    I personally spent 60 minutes today, exclusive of procedures, providing care for this patient, including preparation, face to face time, documentation and other services such as review of medical records, diagnostic result, patient education, counseling, coordination of care as specified in the encounter.     Migue Aguilar MD  Neuro-Oncology

## 2024-12-02 NOTE — OP NOTE
Creation Shunt Ventricular Peritoneal (R) Operative Note     Date: 2024  OR Location: [facility] OR    Name: Sejal Duff, : 2000, Age: 24 y.o., MRN: [id number], Sex: female    Diagnosis  Pre-op Diagnosis      * Hydrocephalus [G91.9] Post-op Diagnosis     * Hydrocephalus [G91.9]     Procedures  Creation Shunt Ventricular Peritoneal  84390 - NV CRTJ SHUNT [id number] TERMINUS      Surgeons      * Paras Yap - Primary    Resident/Fellow/Other Assistant:  Surgeons and Role:     * Prasanth Guerra MD - Resident - Assisting     * Telly Robles MD - Resident - Assisting    Staff:   Circulator: Evie Young Person: Vivian    Anesthesia Staff: Anesthesiologist: Mervin Cloud MD  CRNA: JASVIR [id number]    Procedure Summary  Anesthesia: General  ASA: II  Estimated Blood Loss: 50 mL  Intra-op Medications:   Administrations occurring from 0720 to 1030 on 24:   Medication Name Total Dose   lidocaine-epinephrine (Xylocaine W/EPI) 0.5 %-1:200,000 injection 13 mL   thrombin-recombinant (Recothrom) 5,000 unit topical solution 10,000 Units   polymyxin B injection 500,000 Units   gentamicin (Garamycin) injection 80 mg   sodium chloride 0.9 % irrigation solution 1,000 mL   acetaminophen (Tylenol) tablet 650 mg Cannot be calculated   albuterol 90 mcg/actuation inhaler 1-2 puff Cannot be calculated   calcium carbonate (Tums) chewable tablet 1,000 mg Cannot be calculated   cetirizine (ZyrTEC) tablet 10 mg Cannot be calculated   dexAMETHasone (Decadron) tablet 1 mg Cannot be calculated   HYDROmorphone (Dilaudid) injection 0.2 mg Cannot be calculated   linaCLOtide (Linzess) capsule 145 mcg Cannot be calculated   melatonin tablet 6 mg Cannot be calculated   naloxone (Narcan) injection 0.2 mg Cannot be calculated   oxyCODONE (Roxicodone) immediate release tablet 10 mg Cannot be calculated   oxyCODONE (Roxicodone) immediate release tablet 5 mg Cannot be calculated   pantoprazole (ProtoNix) EC  tablet 40 mg Cannot be calculated   polyethylene glycol (Glycolax, Miralax) packet 17 g Cannot be calculated   prucalopride tablet 2 mg Cannot be calculated   psyllium (Metamucil) 3.4 gram packet 1 packet Cannot be calculated   sennosides-docusate sodium (Mariola-Colace) 8.6-50 mg per tablet 2 tablet Cannot be calculated   tiZANidine (Zanaflex) tablet 4 mg Cannot be calculated   ceFAZolin (Ancef) vial 1 g 2 g   dexAMETHasone (Decadron) injection 4 mg/mL 4 mg   esmolol 10 mg/mL 50 mg   fentaNYL (Sublimaze) injection 50 mcg/mL 100 mcg   HYDROmorphone (Dilaudid) injection 1 mg/mL 0.4 mg   lidocaine (Xylocaine) injection 2 % 80 mg   midazolam PF (Versed) injection 1 mg/mL 2 mg   ondansetron 2 mg/mL 4 mg   propofol (Diprivan) injection 10 mg/mL 350 mg   rocuronium (ZeMuron) 50 mg/5 mL injection 80 mg   NaCl 0.9 % bolus Cannot be calculated   sugammadex (Bridion) 200 mg/2 mL injection 200 mg              Anesthesia Record               Intraprocedure I/O Totals          Intake    NaCl 0.9 % bolus 700.00 mL    Total Intake 700 mL          Specimen: No specimens collected              Drains and/or Catheters: * None in log *    Tourniquet Times:         Implants:  Implants       Type Name Action Serial No.      Neuro Interventional Implant VALVE, CERTAS PLUS, W/O SIPHONGUARD - [id number] Implanted      Neuro Interventional Implant RESERVOIR, RICKHAM, LARGE - [id number] Implanted      Drainage Catheter And Supply PERITONEAL CATHETER Implanted               Findings: good flow of CSF visualized in abdomen    Indications: Sejal Duff is an 24 y.o. female who is having surgery for Hydrocephalus [G91.9].     The patient was seen in the preoperative area. The risks, benefits, complications, treatment options, non-operative alternatives, expected recovery and outcomes were discussed with the patient. The possibilities of reaction to medication, pulmonary aspiration, injury to surrounding structures, bleeding, recurrent infection,  the need for additional procedures, failure to diagnose a condition, and creating a complication requiring transfusion or operation were discussed with the patient. The patient concurred with the proposed plan, giving informed consent.  The site of surgery was properly noted/marked if necessary per policy. The patient has been actively warmed in preoperative area. Preoperative antibiotics have been ordered and given within 1 hours of incision. Venous thrombosis prophylaxis have been ordered including bilateral sequential compression devices    Procedure Details: The patient was brought back from preop to OR. A safety huddle was performed and anesthesia was induced. The right side of the head and abdomen were cleansed, prepped and draped in usual sterile fashion. Local anesthetic was used to infiltrate skin over incision.     A C-shaped incision was placed over the occipital region on the right side of the head. A burrhole was made with acorn; dura was coagulated with bipolar electrocautery.  A pocket was made under the scalp for the shunt valve. Then, cruciate incision was made in dura, and dural leaflets and vijaya were coagulated. Ventricular catheter was passed into ventricle with good return of CSF. Rickham reservoir was connected to proximal catheter.    Then, abdominal access was made with laparoscopic trochar; abdomen was insufflated and intraperitoneal placement was confirmed with laparoscopy. A second abdominal site was accessed with a sharp beveled needle; Guide wire and peel-away sheath was placed in second incision with intraperitoneal placement again confirmed.    Shunt tunneler was passed from cranial incision to abdominal incision; trochar was removed and tubing was passed through the retained tunneling sheath. Valve was attached to this A-tubing and sheath was removed. Rickham was attached to valve. Attachment sites were secured with silk ties; flow into distal tubing was confirmed by pumping shunt  reservoir.    Distal A-tubing was then passed through peel-away sheath and sheath was removed after tubing was confirmed to be intra-abdominal on laparoscopy.    Scalp and postauricular incisions were closed with 2-0 vicryls followed by running 4-0 monocryl. Abdominal incisions closed with inverted 2-0 vicryls and dermabond. All counts were correct at end of case and patient was turned over to anesthesia for extubation.    The use of abdominal laparoscopy for placement of the peritoneal portion of the shunt increased the difficulty of the case by 33%     Complications:  None; patient tolerated the procedure well.    Disposition: PACU - hemodynamically stable.  Condition: stable                 Additional Details: none    Attending Attestation:     Paras Yap  Phone Number: [phone]

## 2024-12-03 DIAGNOSIS — G89.18 ACUTE POST-OPERATIVE PAIN: ICD-10-CM

## 2024-12-03 DIAGNOSIS — Z98.2 S/P VP SHUNT: ICD-10-CM

## 2024-12-03 LAB
ALBUMIN SERPL BCP-MCNC: 3.5 G/DL (ref 3.4–5)
ANION GAP SERPL CALC-SCNC: 9 MMOL/L (ref 10–20)
BASOPHILS # BLD AUTO: 0.02 X10*3/UL (ref 0–0.1)
BASOPHILS NFR BLD AUTO: 0.2 %
BUN SERPL-MCNC: 9 MG/DL (ref 6–23)
CA-I BLD-SCNC: 1.25 MMOL/L (ref 1.1–1.33)
CALCIUM SERPL-MCNC: 8.7 MG/DL (ref 8.6–10.6)
CHLORIDE SERPL-SCNC: 109 MMOL/L (ref 98–107)
CO2 SERPL-SCNC: 28 MMOL/L (ref 21–32)
CREAT SERPL-MCNC: 0.52 MG/DL (ref 0.5–1.05)
EGFRCR SERPLBLD CKD-EPI 2021: >90 ML/MIN/1.73M*2
EOSINOPHIL # BLD AUTO: 0.08 X10*3/UL (ref 0–0.7)
EOSINOPHIL NFR BLD AUTO: 0.9 %
ERYTHROCYTE [DISTWIDTH] IN BLOOD BY AUTOMATED COUNT: 15.1 % (ref 11.5–14.5)
GLUCOSE SERPL-MCNC: 80 MG/DL (ref 74–99)
HCT VFR BLD AUTO: 27.8 % (ref 36–46)
HGB BLD-MCNC: 9 G/DL (ref 12–16)
IMM GRANULOCYTES # BLD AUTO: 0.03 X10*3/UL (ref 0–0.7)
IMM GRANULOCYTES NFR BLD AUTO: 0.3 % (ref 0–0.9)
LYMPHOCYTES # BLD AUTO: 2.56 X10*3/UL (ref 1.2–4.8)
LYMPHOCYTES NFR BLD AUTO: 28.2 %
MAGNESIUM SERPL-MCNC: 1.94 MG/DL (ref 1.6–2.4)
MCH RBC QN AUTO: 26.5 PG (ref 26–34)
MCHC RBC AUTO-ENTMCNC: 32.4 G/DL (ref 32–36)
MCV RBC AUTO: 82 FL (ref 80–100)
MONOCYTES # BLD AUTO: 0.99 X10*3/UL (ref 0.1–1)
MONOCYTES NFR BLD AUTO: 10.9 %
NEUTROPHILS # BLD AUTO: 5.4 X10*3/UL (ref 1.2–7.7)
NEUTROPHILS NFR BLD AUTO: 59.5 %
NRBC BLD-RTO: 0 /100 WBCS (ref 0–0)
PHOSPHATE SERPL-MCNC: 3.8 MG/DL (ref 2.5–4.9)
PLATELET # BLD AUTO: 299 X10*3/UL (ref 150–450)
POTASSIUM SERPL-SCNC: 3.8 MMOL/L (ref 3.5–5.3)
RBC # BLD AUTO: 3.39 X10*6/UL (ref 4–5.2)
SODIUM SERPL-SCNC: 142 MMOL/L (ref 136–145)
WBC # BLD AUTO: 9.1 X10*3/UL (ref 4.4–11.3)

## 2024-12-03 PROCEDURE — 2500000005 HC RX 250 GENERAL PHARMACY W/O HCPCS: Performed by: STUDENT IN AN ORGANIZED HEALTH CARE EDUCATION/TRAINING PROGRAM

## 2024-12-03 PROCEDURE — 1100000001 HC PRIVATE ROOM DAILY

## 2024-12-03 PROCEDURE — 99239 HOSP IP/OBS DSCHRG MGMT >30: CPT | Performed by: PHYSICIAN ASSISTANT

## 2024-12-03 PROCEDURE — 2500000004 HC RX 250 GENERAL PHARMACY W/ HCPCS (ALT 636 FOR OP/ED): Performed by: STUDENT IN AN ORGANIZED HEALTH CARE EDUCATION/TRAINING PROGRAM

## 2024-12-03 PROCEDURE — 2500000001 HC RX 250 WO HCPCS SELF ADMINISTERED DRUGS (ALT 637 FOR MEDICARE OP): Performed by: PHYSICIAN ASSISTANT

## 2024-12-03 PROCEDURE — 2500000001 HC RX 250 WO HCPCS SELF ADMINISTERED DRUGS (ALT 637 FOR MEDICARE OP): Performed by: STUDENT IN AN ORGANIZED HEALTH CARE EDUCATION/TRAINING PROGRAM

## 2024-12-03 PROCEDURE — 83735 ASSAY OF MAGNESIUM: CPT | Performed by: STUDENT IN AN ORGANIZED HEALTH CARE EDUCATION/TRAINING PROGRAM

## 2024-12-03 PROCEDURE — 80069 RENAL FUNCTION PANEL: CPT | Performed by: STUDENT IN AN ORGANIZED HEALTH CARE EDUCATION/TRAINING PROGRAM

## 2024-12-03 PROCEDURE — 82330 ASSAY OF CALCIUM: CPT | Performed by: STUDENT IN AN ORGANIZED HEALTH CARE EDUCATION/TRAINING PROGRAM

## 2024-12-03 PROCEDURE — 2500000004 HC RX 250 GENERAL PHARMACY W/ HCPCS (ALT 636 FOR OP/ED): Performed by: PHYSICIAN ASSISTANT

## 2024-12-03 PROCEDURE — 85025 COMPLETE CBC W/AUTO DIFF WBC: CPT | Performed by: STUDENT IN AN ORGANIZED HEALTH CARE EDUCATION/TRAINING PROGRAM

## 2024-12-03 PROCEDURE — 36415 COLL VENOUS BLD VENIPUNCTURE: CPT | Performed by: STUDENT IN AN ORGANIZED HEALTH CARE EDUCATION/TRAINING PROGRAM

## 2024-12-03 RX ORDER — HYDROCODONE BITARTRATE AND ACETAMINOPHEN 5; 325 MG/1; MG/1
1 TABLET ORAL EVERY 6 HOURS PRN
Status: DISCONTINUED | OUTPATIENT
Start: 2024-12-03 | End: 2024-12-04

## 2024-12-03 RX ORDER — HYDROCODONE BITARTRATE AND ACETAMINOPHEN 10; 325 MG/1; MG/1
1 TABLET ORAL EVERY 6 HOURS PRN
Status: DISCONTINUED | OUTPATIENT
Start: 2024-12-03 | End: 2024-12-04

## 2024-12-03 RX ORDER — POLYETHYLENE GLYCOL 3350 17 G/17G
17 POWDER, FOR SOLUTION ORAL DAILY
Qty: 238 G | Refills: 0 | Status: SHIPPED | OUTPATIENT
Start: 2024-12-03

## 2024-12-03 RX ORDER — DIPHENHYDRAMINE HYDROCHLORIDE 50 MG/ML
25 INJECTION INTRAMUSCULAR; INTRAVENOUS EVERY 6 HOURS PRN
Status: COMPLETED | OUTPATIENT
Start: 2024-12-03 | End: 2024-12-03

## 2024-12-03 RX ORDER — POLYETHYLENE GLYCOL 3350 17 G/17G
17 POWDER, FOR SOLUTION ORAL 2 TIMES DAILY
Qty: 14 PACKET | Refills: 0 | Status: SHIPPED | OUTPATIENT
Start: 2024-12-03 | End: 2024-12-03

## 2024-12-03 RX ORDER — ACETAMINOPHEN 325 MG/1
975 TABLET ORAL EVERY 6 HOURS PRN
Status: COMPLETED | OUTPATIENT
Start: 2024-12-03 | End: 2024-12-04

## 2024-12-03 RX ORDER — ENOXAPARIN SODIUM 100 MG/ML
40 INJECTION SUBCUTANEOUS EVERY 24 HOURS
Status: DISCONTINUED | OUTPATIENT
Start: 2024-12-03 | End: 2024-12-06 | Stop reason: HOSPADM

## 2024-12-03 RX ORDER — MAGNESIUM SULFATE HEPTAHYDRATE 40 MG/ML
2 INJECTION, SOLUTION INTRAVENOUS EVERY 6 HOURS PRN
Status: DISCONTINUED | OUTPATIENT
Start: 2024-12-03 | End: 2024-12-05

## 2024-12-03 RX ORDER — CYCLOBENZAPRINE HCL 5 MG
5 TABLET ORAL 3 TIMES DAILY PRN
Qty: 21 TABLET | Refills: 0 | Status: SHIPPED | OUTPATIENT
Start: 2024-12-03 | End: 2024-12-10

## 2024-12-03 RX ORDER — METHOCARBAMOL 100 MG/ML
1000 INJECTION, SOLUTION INTRAMUSCULAR; INTRAVENOUS EVERY 8 HOURS
Status: DISCONTINUED | OUTPATIENT
Start: 2024-12-03 | End: 2024-12-05

## 2024-12-03 RX ADMIN — DIPHENHYDRAMINE HYDROCHLORIDE 25 MG: 50 INJECTION INTRAMUSCULAR; INTRAVENOUS at 06:38

## 2024-12-03 RX ADMIN — PROMETHAZINE HYDROCHLORIDE 25 MG: 25 INJECTION INTRAMUSCULAR; INTRAVENOUS at 06:38

## 2024-12-03 RX ADMIN — DEXAMETHASONE 1 MG: 2 TABLET ORAL at 09:40

## 2024-12-03 RX ADMIN — LINACLOTIDE 145 MCG: 145 CAPSULE, GELATIN COATED ORAL at 09:40

## 2024-12-03 RX ADMIN — METHOCARBAMOL 1000 MG: 100 INJECTION INTRAMUSCULAR; INTRAVENOUS at 14:02

## 2024-12-03 RX ADMIN — DIPHENHYDRAMINE HYDROCHLORIDE 25 MG: 50 INJECTION INTRAMUSCULAR; INTRAVENOUS at 15:47

## 2024-12-03 RX ADMIN — PANTOPRAZOLE SODIUM 40 MG: 40 TABLET, DELAYED RELEASE ORAL at 06:40

## 2024-12-03 RX ADMIN — DEXAMETHASONE 1 MG: 2 TABLET ORAL at 20:18

## 2024-12-03 RX ADMIN — HYDROCODONE BITARTRATE AND ACETAMINOPHEN 1 TABLET: 5; 325 TABLET ORAL at 20:18

## 2024-12-03 RX ADMIN — PROMETHAZINE HYDROCHLORIDE 25 MG: 25 INJECTION INTRAMUSCULAR; INTRAVENOUS at 15:47

## 2024-12-03 RX ADMIN — ACETAMINOPHEN 650 MG: 325 TABLET, FILM COATED ORAL at 00:59

## 2024-12-03 RX ADMIN — ACETAMINOPHEN 975 MG: 325 TABLET ORAL at 06:39

## 2024-12-03 RX ADMIN — POLYETHYLENE GLYCOL 3350 17 G: 17 POWDER, FOR SOLUTION ORAL at 20:18

## 2024-12-03 RX ADMIN — ACETAMINOPHEN 650 MG: 325 TABLET, FILM COATED ORAL at 09:40

## 2024-12-03 RX ADMIN — MAGNESIUM SULFATE IN WATER 2 G: 40 INJECTION, SOLUTION INTRAVENOUS at 15:47

## 2024-12-03 RX ADMIN — HYDROCODONE BITARTRATE AND ACETAMINOPHEN 1 TABLET: 10; 325 TABLET ORAL at 14:02

## 2024-12-03 RX ADMIN — OXYCODONE HYDROCHLORIDE 10 MG: 5 TABLET ORAL at 00:57

## 2024-12-03 RX ADMIN — OXYCODONE HYDROCHLORIDE 10 MG: 5 TABLET ORAL at 12:17

## 2024-12-03 RX ADMIN — LIDOCAINE 1 PATCH: 4 PATCH TOPICAL at 09:40

## 2024-12-03 RX ADMIN — OXYCODONE HYDROCHLORIDE 10 MG: 5 TABLET ORAL at 06:39

## 2024-12-03 RX ADMIN — CYCLOBENZAPRINE 5 MG: 10 TABLET, FILM COATED ORAL at 09:40

## 2024-12-03 RX ADMIN — CALCIUM CARBONATE (ANTACID) CHEW TAB 500 MG 1000 MG: 500 CHEW TAB at 00:57

## 2024-12-03 RX ADMIN — MELATONIN 6 MG: 3 TAB ORAL at 00:57

## 2024-12-03 RX ADMIN — METHOCARBAMOL 1000 MG: 100 INJECTION INTRAMUSCULAR; INTRAVENOUS at 20:18

## 2024-12-03 ASSESSMENT — PAIN SCALES - GENERAL
PAINLEVEL_OUTOF10: 8
PAINLEVEL_OUTOF10: 7
PAINLEVEL_OUTOF10: 10 - WORST POSSIBLE PAIN
PAINLEVEL_OUTOF10: 7
PAINLEVEL_OUTOF10: 8
PAINLEVEL_OUTOF10: 10 - WORST POSSIBLE PAIN

## 2024-12-03 ASSESSMENT — PAIN - FUNCTIONAL ASSESSMENT
PAIN_FUNCTIONAL_ASSESSMENT: 0-10

## 2024-12-03 ASSESSMENT — ACTIVITIES OF DAILY LIVING (ADL): LACK_OF_TRANSPORTATION: NO

## 2024-12-03 NOTE — PROGRESS NOTES
12/03/24 1200   Discharge Planning   Living Arrangements Alone   Support Systems Parent;Family members   Assistance Needed no   Type of Residence Private residence   Do you have animals or pets at home? No   Who is requesting discharge planning? Provider   Home or Post Acute Services None   Expected Discharge Disposition Home   Does the patient need discharge transport arranged? No   Financial Resource Strain   How hard is it for you to pay for the very basics like food, housing, medical care, and heating? Not hard   Housing Stability   In the last 12 months, was there a time when you were not able to pay the mortgage or rent on time? N   In the past 12 months, how many times have you moved where you were living? 0   At any time in the past 12 months, were you homeless or living in a shelter (including now)? N   Transportation Needs   In the past 12 months, has lack of transportation kept you from medical appointments or from getting medications? no   In the past 12 months, has lack of transportation kept you from meetings, work, or from getting things needed for daily living? No   Patient Choice   Provider Choice list and CMS website (https://medicare.gov/care-compare#search) for post-acute Quality and Resource Measure Data were provided and reviewed with: Patient   Patient / Family choosing to utilize agency / facility established prior to hospitalization No   Stroke Family Assessment   Stroke Family Assessment Needed No   Intensity of Service   Intensity of Service 0-30 min     Assessment Note:  Met with patient and Introduced myself as care coordinator and member of the Care Transitions team for discharge planning. Patient lives at home alone.  Transportation: Patient has ride or can call [employer] her insurance for a ride at time of discharge.  Pharmacy: [facility] in [place].  DME: No  Previous home care: No  Falls: No  PCP: Melba Graham MD  Dialysis: No    Confirmed patients address, phone  number and emergency contact. All questions and concerns addressed. Patient going home no needs.    Transitional Care Coordination Progress Note:  Patient discussed during interdisciplinary rounds.   Team members present: MD and TCC  Plan per Medical/Surgical team:  Treating Hydrocephalus. May be medically ready for discharge today.  Payor: [facility]  Discharge disposition: Home no needs  Potential Barriers: none  ADOD: 12/03/24

## 2024-12-03 NOTE — PROGRESS NOTES
"Sejal Duff is a 24 y.o. female on day 5 of admission presenting with Hydrocephalus.    Subjective   No acute events overnight       Objective     Physical Exam  Physical Exam  Awake Ox3  BUE 5/5  BLE 5/5  SILT   Incision c/d/I    Last Recorded Vitals  Blood pressure 111/70, pulse 100, temperature 36.6 °C (97.9 °F), temperature source Temporal, resp. rate 18, height 1.6 m (5' 3\"), weight 77.2 kg (170 lb 3.1 oz), SpO2 98%.  Intake/Output last 3 Shifts:  I/O last 3 completed shifts:  In: 4278.9 (55.4 mL/kg) [P.O.:320; I.V.:2076.7 (26.9 mL/kg); IV Piggyback:1882.3]  Out: 401 (5.2 mL/kg) [Urine:401 (0.1 mL/kg/hr)]  Weight: 77.2 kg     Relevant Results                 Results from last 72 hours   Lab Units 12/02/24  0135 11/30/24  2258   GLUCOSE mg/dL 100* 108*   SODIUM mmol/L 140 141   POTASSIUM mmol/L 3.9 4.0   CHLORIDE mmol/L 110* 108*   CO2 mmol/L 24 27   ANION GAP mmol/L 10 10   BUN mg/dL 8 15   CREATININE mg/dL 0.41* 0.58   EGFR mL/min/1.73m*2 >90 >90   CALCIUM mg/dL 8.0* 8.8   PHOSPHORUS mg/dL 4.0 3.7   ALBUMIN g/dL 3.4 3.7   MAGNESIUM mg/dL 2.05 2.02   POCT CALCIUM IONIZED (MMOL/L) IN BLOOD mmol/L 1.20 1.22      Results from last 72 hours   Lab Units 12/02/24  0135 11/30/24  2258   WBC AUTO x10*3/uL 11.1 10.6   NRBC AUTO /100 WBCs 0.0 0.0   RBC AUTO x10*6/uL 3.39* 3.75*   HEMOGLOBIN g/dL 9.1* 9.7*   HEMATOCRIT % 29.1* 32.0*   MCV fL 86 85   MCH pg 26.8 25.9*   MCHC g/dL 31.3* 30.3*   RDW % 15.1* 15.1*   PLATELETS AUTO x10*3/uL 294 332                   Assessment/Plan   Assessment & Plan  Hydrocephalus    Sejal Duff is a 24 y.o. 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

## 2024-12-03 NOTE — CARE PLAN
Problem: Skin  Goal: Prevent/manage excess moisture  Outcome: Progressing  Goal: Promote/optimize nutrition  Outcome: Progressing  Goal: Promote skin healing  Outcome: Progressing     Problem: Chronic Conditions and Co-morbidities  Goal: Patient's chronic conditions and co-morbidity symptoms are monitored and maintained or improved  Outcome: Progressing     Problem: Pain  Goal: Turns in bed with improved pain control throughout the shift  Outcome: Progressing  Goal: Free from acute confusion related to pain meds throughout the shift  Outcome: Progressing

## 2024-12-03 NOTE — CARE PLAN
The patient's goals for the shift include      Problem: Skin  Goal: Participates in plan/prevention/treatment measures  Outcome: Progressing  Goal: Prevent/manage excess moisture  Outcome: Progressing  Goal: Prevent/minimize sheer/friction injuries  Outcome: Progressing  Goal: Promote/optimize nutrition  Outcome: Progressing  Goal: Promote skin healing  Outcome: Progressing     Problem: Pain - Adult  Goal: Verbalizes/displays adequate comfort level or baseline comfort level  Outcome: Progressing     Problem: Safety - Adult  Goal: Free from fall injury  Outcome: Progressing     Problem: Discharge Planning  Goal: Discharge to home or other facility with appropriate resources  Outcome: Progressing     Problem: Chronic Conditions and Co-morbidities  Goal: Patient's chronic conditions and co-morbidity symptoms are monitored and maintained or improved  Outcome: Progressing     Problem: Pain  Goal: Takes deep breaths with improved pain control throughout the shift  Outcome: Progressing  Goal: Turns in bed with improved pain control throughout the shift  Outcome: Progressing  Goal: Walks with improved pain control throughout the shift  Outcome: Progressing  Goal: Performs ADL's with improved pain control throughout shift  Outcome: Progressing  Goal: Participates in PT with improved pain control throughout the shift  Outcome: Progressing  Goal: Free from opioid side effects throughout the shift  Outcome: Progressing  Goal: Free from acute confusion related to pain meds throughout the shift  Outcome: Progressing     Problem: Fall/Injury  Goal: Not fall by end of shift  Outcome: Progressing  Goal: Be free from injury by end of the shift  Outcome: Progressing  Goal: Verbalize understanding of personal risk factors for fall in the hospital  Outcome: Progressing  Goal: Verbalize understanding of risk factor reduction measures to prevent injury from fall in the home  Outcome: Progressing  Goal: Use assistive devices by end of the  shift  Outcome: Progressing  Goal: Pace activities to prevent fatigue by end of the shift  Outcome: Progressing     The clinical goals for the shift include pt will remain hemodynamically stable throughout shift

## 2024-12-03 NOTE — DISCHARGE SUMMARY
Discharge Diagnosis  Hydrocephalus    Test Results Pending At Discharge  Pending Labs       No current pending labs.          Hospital Course  Sejal Duff is a 24 y.o. 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 Patient returned to the [facility] ED 11/28 with worsening HA, light sensitivity, CTH incr vents, MRI incr ventricular mass 2.7x2.5cm, ventriculomegaly, trace R ventricular GRE. Patient admitted to neurosurgery service for further management.     11/28 Ophthalmology evaluation without papilledema.  12/1 s/p R VPS (certas at 5).   12/2 CTH with catheter in position.     PT/OT evaluated patient and PT recommended no further therapy needs at discharge and OT recommended low intensity level of continued care for which patient provided with outpatient prescription.     Patient discharged in stable condition with detailed instructions and scheduled outpatient follow up.    Pertinent Physical Exam At Time of Discharge  Constitutional: A&Ox3, calm and cooperative, NAD.  Eyes: PERRL, clear sclera .  ENMT: Moist mucous membranes, no apparent injuries or lesions.  Head/Neck: Cranial incision C/D/I.   Cardiovascular: Normal rate and regular rhythm. 2+ equal pulses of the distal extremities.  Respiratory/Thorax: CTAB, regular respirations on RA. Good symmetric chest expansion.   Gastrointestinal: Abdomen soft, non tender. Abdominal incision C/D/I.   Extremities: BUE 5/5. BLE 5/5. SILT.   Neurological: A&Ox3.   Psychological: Appropriate mood and behavior.   Skin: Warm and dry.     Home Medications     Medication List      START  taking these medications     cyclobenzaprine 5 mg tablet; Commonly known as: Flexeril; Take 1 tablet   (5 mg) by mouth 3 times a day as needed for muscle spasms for up to 7   days.   polyethylene glycol 17 gram packet; Commonly known as: Glycolax,   Miralax; Take 17 g by mouth 2 times a day for 7 days.     CHANGE how you take these medications     tovorafenib 600 mg/week (100 mg x 6) tablet; Take 600 mg by mouth 1   (one) time per week. (Six 100 mg tablets = 600 mg.) Swallow tablets whole   with water. Do not chew, cut, or crush. Tablets may be taken with or   without food.  DO NOT RESUME UNTIL 1 WEEK POST OPERATIVELY (12/8/2024);   Start taking on: December 8, 2024; What changed: additional instructions     CONTINUE taking these medications     albuterol 90 mcg/actuation inhaler; Inhale 1-2 puffs every 4 hours if   needed for wheezing or shortness of breath.   benzoyl peroxide 10 % external wash; Commonly known as: Benzac AC; APPLY   TOPICALLY 2 TIMES A DAY. WASH TWICE DAILY AS DIRECTED   caffeine 200 mg; Take 1 tablet (200 mg) by mouth every 6 hours if needed   (For [place]).   cholecalciferol 50,000 unit capsule; Commonly known as: Vitamin D-3;   Take 1 capsule (50,000 Units) by mouth 1 (one) time per week.   dexAMETHasone 1 mg tablet; Commonly known as: Decadron; Take 1 tablet (1   mg) by mouth 2 times a day for 10 days.   ergocalciferol 1.25 MG (23100 UT) capsule; Commonly known as: Vitamin   D-2; Take 1 capsule (1,250 mcg) by mouth 1 (one) time per week.   Lacto no.37-Cnxzkw-EZB-larch 25B cell-25B cell-50 mg capsule; Take 1   capsule by mouth once daily.   linaCLOtide 145 mcg capsule; Commonly known as: Linzess; Take 1 capsule   (145 mcg) by mouth once daily in the morning. Take before meals. Do not   crush or chew.   loratadine 10 mg tablet; Commonly known as: Claritin; Take 1 tablet (10   mg) by mouth once daily.   metroNIDAZOLE 500 mg tablet; Commonly known as: Flagyl   Motegrity 2 mg tablet; Generic  drug: prucalopride; Take 1 tablet (2 mg)   by mouth once daily.   multivitamin with iron tablet; Commonly known as: Daily Multiple   Vitamins/Iron; Take 1 tablet by mouth once daily.   omeprazole 40 mg DR capsule; Commonly known as: PriLOSEC   ondansetron 4 mg tablet; Commonly known as: Zofran; Take 1 tablet (4 mg)   by mouth every 6 hours if needed for nausea or vomiting.   ondansetron ODT 4 mg disintegrating tablet; Commonly known as:   Zofran-ODT; Dissolve 1 tablet (4 mg) in the mouth every 8 hours if needed   for nausea or vomiting.   oxyCODONE 5 mg immediate release tablet; Commonly known as: Roxicodone;   Take 1 tablet (5 mg) by mouth every 6 hours if needed for severe pain (7 -   10) for up to 7 days.   phentermine 37.5 mg tablet; Commonly known as: Adipex-P   Viibryd 10 mg tablet; Generic drug: vilazodone     STOP taking these medications     acetaminophen 325 mg tablet; Commonly known as: Tylenol   metFORMIN  mg 24 hr tablet; Commonly known as: Glucophage-XR     Outpatient Follow-Up  Future Appointments   Date Time Provider Department Center   12/6/2024  2:50 PM MIN ECHO [id number] [facility]   12/11/2024 10:00 AM JASVIR Macias-CNP [id number] Academic   12/16/2024 10:00 AM Den Segal MD [id number] Academic   2/4/2025  1:30 PM CHLOE CoradoCNP [id number] Academic   2/6/2025  2:30 PM Den Fajardo MD PhD [id number] Academic       Brie Yeboah PA-C    Total face to face time spent with patient/family of >30 minutes, with >50% of the time spent discussing plan of care/management, counseling/educating on disease processes, explaining results of diagnostic testing.

## 2024-12-04 ENCOUNTER — APPOINTMENT (OUTPATIENT)
Dept: RADIOLOGY | Facility: HOSPITAL | Age: 24
End: 2024-12-04
Payer: COMMERCIAL

## 2024-12-04 LAB
ALBUMIN SERPL BCP-MCNC: 3.7 G/DL (ref 3.4–5)
ANION GAP SERPL CALC-SCNC: 10 MMOL/L (ref 10–20)
BASOPHILS # BLD AUTO: 0.03 X10*3/UL (ref 0–0.1)
BASOPHILS NFR BLD AUTO: 0.3 %
BUN SERPL-MCNC: 11 MG/DL (ref 6–23)
CA-I BLD-SCNC: 1.25 MMOL/L (ref 1.1–1.33)
CALCIUM SERPL-MCNC: 9.1 MG/DL (ref 8.6–10.6)
CHLORIDE SERPL-SCNC: 109 MMOL/L (ref 98–107)
CO2 SERPL-SCNC: 28 MMOL/L (ref 21–32)
CREAT SERPL-MCNC: 0.45 MG/DL (ref 0.5–1.05)
EGFRCR SERPLBLD CKD-EPI 2021: >90 ML/MIN/1.73M*2
EOSINOPHIL # BLD AUTO: 0.07 X10*3/UL (ref 0–0.7)
EOSINOPHIL NFR BLD AUTO: 0.8 %
ERYTHROCYTE [DISTWIDTH] IN BLOOD BY AUTOMATED COUNT: 15.3 % (ref 11.5–14.5)
GLUCOSE SERPL-MCNC: 96 MG/DL (ref 74–99)
HCT VFR BLD AUTO: 31 % (ref 36–46)
HGB BLD-MCNC: 9.4 G/DL (ref 12–16)
IMM GRANULOCYTES # BLD AUTO: 0.02 X10*3/UL (ref 0–0.7)
IMM GRANULOCYTES NFR BLD AUTO: 0.2 % (ref 0–0.9)
LYMPHOCYTES # BLD AUTO: 2.29 X10*3/UL (ref 1.2–4.8)
LYMPHOCYTES NFR BLD AUTO: 26.7 %
MAGNESIUM SERPL-MCNC: 2.13 MG/DL (ref 1.6–2.4)
MCH RBC QN AUTO: 26.1 PG (ref 26–34)
MCHC RBC AUTO-ENTMCNC: 30.3 G/DL (ref 32–36)
MCV RBC AUTO: 86 FL (ref 80–100)
MONOCYTES # BLD AUTO: 0.7 X10*3/UL (ref 0.1–1)
MONOCYTES NFR BLD AUTO: 8.2 %
NEUTROPHILS # BLD AUTO: 5.47 X10*3/UL (ref 1.2–7.7)
NEUTROPHILS NFR BLD AUTO: 63.8 %
NRBC BLD-RTO: 0 /100 WBCS (ref 0–0)
PHOSPHATE SERPL-MCNC: 4.2 MG/DL (ref 2.5–4.9)
PLATELET # BLD AUTO: 307 X10*3/UL (ref 150–450)
POTASSIUM SERPL-SCNC: 3.9 MMOL/L (ref 3.5–5.3)
RBC # BLD AUTO: 3.6 X10*6/UL (ref 4–5.2)
SODIUM SERPL-SCNC: 143 MMOL/L (ref 136–145)
WBC # BLD AUTO: 8.6 X10*3/UL (ref 4.4–11.3)

## 2024-12-04 PROCEDURE — 2500000004 HC RX 250 GENERAL PHARMACY W/ HCPCS (ALT 636 FOR OP/ED): Performed by: STUDENT IN AN ORGANIZED HEALTH CARE EDUCATION/TRAINING PROGRAM

## 2024-12-04 PROCEDURE — 70450 CT HEAD/BRAIN W/O DYE: CPT | Performed by: RADIOLOGY

## 2024-12-04 PROCEDURE — 2500000001 HC RX 250 WO HCPCS SELF ADMINISTERED DRUGS (ALT 637 FOR MEDICARE OP): Performed by: STUDENT IN AN ORGANIZED HEALTH CARE EDUCATION/TRAINING PROGRAM

## 2024-12-04 PROCEDURE — 2500000004 HC RX 250 GENERAL PHARMACY W/ HCPCS (ALT 636 FOR OP/ED): Performed by: PHYSICIAN ASSISTANT

## 2024-12-04 PROCEDURE — 97530 THERAPEUTIC ACTIVITIES: CPT | Mod: GP,CQ

## 2024-12-04 PROCEDURE — 1100000001 HC PRIVATE ROOM DAILY

## 2024-12-04 PROCEDURE — 82330 ASSAY OF CALCIUM: CPT | Performed by: STUDENT IN AN ORGANIZED HEALTH CARE EDUCATION/TRAINING PROGRAM

## 2024-12-04 PROCEDURE — 2500000005 HC RX 250 GENERAL PHARMACY W/O HCPCS: Performed by: STUDENT IN AN ORGANIZED HEALTH CARE EDUCATION/TRAINING PROGRAM

## 2024-12-04 PROCEDURE — 70450 CT HEAD/BRAIN W/O DYE: CPT

## 2024-12-04 PROCEDURE — 80069 RENAL FUNCTION PANEL: CPT | Performed by: STUDENT IN AN ORGANIZED HEALTH CARE EDUCATION/TRAINING PROGRAM

## 2024-12-04 PROCEDURE — 2500000001 HC RX 250 WO HCPCS SELF ADMINISTERED DRUGS (ALT 637 FOR MEDICARE OP): Performed by: NURSE PRACTITIONER

## 2024-12-04 PROCEDURE — 36415 COLL VENOUS BLD VENIPUNCTURE: CPT | Performed by: STUDENT IN AN ORGANIZED HEALTH CARE EDUCATION/TRAINING PROGRAM

## 2024-12-04 PROCEDURE — 83735 ASSAY OF MAGNESIUM: CPT | Performed by: STUDENT IN AN ORGANIZED HEALTH CARE EDUCATION/TRAINING PROGRAM

## 2024-12-04 PROCEDURE — 85025 COMPLETE CBC W/AUTO DIFF WBC: CPT | Performed by: STUDENT IN AN ORGANIZED HEALTH CARE EDUCATION/TRAINING PROGRAM

## 2024-12-04 PROCEDURE — 2500000001 HC RX 250 WO HCPCS SELF ADMINISTERED DRUGS (ALT 637 FOR MEDICARE OP): Performed by: PHYSICIAN ASSISTANT

## 2024-12-04 RX ORDER — OXYCODONE HYDROCHLORIDE 5 MG/1
10 TABLET ORAL EVERY 6 HOURS PRN
Status: DISCONTINUED | OUTPATIENT
Start: 2024-12-04 | End: 2024-12-06 | Stop reason: HOSPADM

## 2024-12-04 RX ORDER — METOCLOPRAMIDE HYDROCHLORIDE 5 MG/ML
10 INJECTION INTRAMUSCULAR; INTRAVENOUS ONCE
Status: COMPLETED | OUTPATIENT
Start: 2024-12-04 | End: 2024-12-04

## 2024-12-04 RX ORDER — OXYCODONE HYDROCHLORIDE 5 MG/1
5 TABLET ORAL EVERY 6 HOURS PRN
Status: DISCONTINUED | OUTPATIENT
Start: 2024-12-04 | End: 2024-12-06 | Stop reason: HOSPADM

## 2024-12-04 RX ORDER — DIPHENHYDRAMINE HCL 25 MG
25 CAPSULE ORAL EVERY 6 HOURS PRN
Status: DISCONTINUED | OUTPATIENT
Start: 2024-12-04 | End: 2024-12-05

## 2024-12-04 RX ORDER — DIPHENHYDRAMINE HYDROCHLORIDE 50 MG/ML
25 INJECTION INTRAMUSCULAR; INTRAVENOUS ONCE
Status: COMPLETED | OUTPATIENT
Start: 2024-12-04 | End: 2024-12-04

## 2024-12-04 RX ADMIN — OXYCODONE HYDROCHLORIDE 10 MG: 5 TABLET ORAL at 11:59

## 2024-12-04 RX ADMIN — POLYETHYLENE GLYCOL 3350 17 G: 17 POWDER, FOR SOLUTION ORAL at 09:03

## 2024-12-04 RX ADMIN — DIPHENHYDRAMINE HYDROCHLORIDE 25 MG: 50 INJECTION INTRAMUSCULAR; INTRAVENOUS at 20:58

## 2024-12-04 RX ADMIN — LIDOCAINE 1 PATCH: 4 PATCH TOPICAL at 09:03

## 2024-12-04 RX ADMIN — SENNOSIDES AND DOCUSATE SODIUM 2 TABLET: 50; 8.6 TABLET ORAL at 09:03

## 2024-12-04 RX ADMIN — ACETAMINOPHEN 975 MG: 325 TABLET ORAL at 14:54

## 2024-12-04 RX ADMIN — METHOCARBAMOL 1000 MG: 100 INJECTION INTRAMUSCULAR; INTRAVENOUS at 20:58

## 2024-12-04 RX ADMIN — OXYCODONE HYDROCHLORIDE 10 MG: 5 TABLET ORAL at 17:29

## 2024-12-04 RX ADMIN — PROMETHAZINE HYDROCHLORIDE 25 MG: 25 INJECTION INTRAMUSCULAR; INTRAVENOUS at 04:33

## 2024-12-04 RX ADMIN — DEXAMETHASONE 1 MG: 2 TABLET ORAL at 09:04

## 2024-12-04 RX ADMIN — METOCLOPRAMIDE 10 MG: 5 INJECTION, SOLUTION INTRAMUSCULAR; INTRAVENOUS at 20:57

## 2024-12-04 RX ADMIN — HYDROCODONE BITARTRATE AND ACETAMINOPHEN 1 TABLET: 10; 325 TABLET ORAL at 03:02

## 2024-12-04 RX ADMIN — MELATONIN 6 MG: 3 TAB ORAL at 06:21

## 2024-12-04 RX ADMIN — HYDROCODONE BITARTRATE AND ACETAMINOPHEN 1 TABLET: 5; 325 TABLET ORAL at 06:21

## 2024-12-04 RX ADMIN — PSYLLIUM HUSK 1 PACKET: 3.4 POWDER ORAL at 09:04

## 2024-12-04 RX ADMIN — LINACLOTIDE 145 MCG: 145 CAPSULE, GELATIN COATED ORAL at 09:04

## 2024-12-04 RX ADMIN — DEXAMETHASONE 1 MG: 2 TABLET ORAL at 22:02

## 2024-12-04 RX ADMIN — MELATONIN 6 MG: 3 TAB ORAL at 21:12

## 2024-12-04 RX ADMIN — CALCIUM CARBONATE (ANTACID) CHEW TAB 500 MG 1000 MG: 500 CHEW TAB at 03:02

## 2024-12-04 RX ADMIN — DIPHENHYDRAMINE HYDROCHLORIDE 25 MG: 25 CAPSULE ORAL at 03:02

## 2024-12-04 RX ADMIN — SODIUM CHLORIDE 1000 ML: 9 INJECTION, SOLUTION INTRAVENOUS at 20:58

## 2024-12-04 ASSESSMENT — COGNITIVE AND FUNCTIONAL STATUS - GENERAL
MOBILITY SCORE: 24
DAILY ACTIVITIY SCORE: 24
MOVING TO AND FROM BED TO CHAIR: A LITTLE
DAILY ACTIVITIY SCORE: 24
MOBILITY SCORE: 18
STANDING UP FROM CHAIR USING ARMS: A LITTLE
CLIMB 3 TO 5 STEPS WITH RAILING: A LITTLE
MOVING FROM LYING ON BACK TO SITTING ON SIDE OF FLAT BED WITH BEDRAILS: A LITTLE
WALKING IN HOSPITAL ROOM: A LITTLE
TURNING FROM BACK TO SIDE WHILE IN FLAT BAD: A LITTLE
DAILY ACTIVITIY SCORE: 24
MOBILITY SCORE: 24

## 2024-12-04 ASSESSMENT — PAIN SCALES - GENERAL
PAINLEVEL_OUTOF10: 10 - WORST POSSIBLE PAIN
PAINLEVEL_OUTOF10: 10 - WORST POSSIBLE PAIN
PAINLEVEL_OUTOF10: 6
PAINLEVEL_OUTOF10: 8
PAINLEVEL_OUTOF10: 10 - WORST POSSIBLE PAIN
PAINLEVEL_OUTOF10: 8
PAINLEVEL_OUTOF10: 7

## 2024-12-04 ASSESSMENT — PAIN SCALES - PAIN ASSESSMENT IN ADVANCED DEMENTIA (PAINAD): TOTALSCORE: MEDICATION (SEE MAR)

## 2024-12-04 ASSESSMENT — PAIN - FUNCTIONAL ASSESSMENT
PAIN_FUNCTIONAL_ASSESSMENT: 0-10

## 2024-12-04 NOTE — PROGRESS NOTES
Physical Therapy    Physical Therapy Treatment    Patient Name: Sejal Duff  MRN: [id number]  Department: [place] [address]  Room: 60[facility]76-A  Today's Date: 12/4/2024  Time Calculation  Start Time: 1132  Stop Time: 1149  Time Calculation (min): 17 min         Assessment/Plan   PT Assessment  PT Assessment Results: Pain  Rehab Prognosis: Excellent  Barriers to Discharge: medical  Evaluation/Treatment Tolerance: Patient limited by pain  End of Session Communication: Bedside nurse  Assessment Comment: Pt. is functionally sound - no LOB however slightly limited by pain Pt was willing to do any activity however pt. holding her head in pain. RN aware and addressing.  End of Session Patient Position:  (Sat EOB and requested steffany obregon -)  PT Plan  Inpatient/Swing Bed or Outpatient: Inpatient  PT Plan  Treatment/Interventions: Bed mobility, Transfer training, Gait training, Stair training, Balance training, Neuromuscular re-education, Strengthening, Endurance training, Therapeutic exercise, Therapeutic activity, Home exercise program, Postural re-education  PT Plan: Ongoing PT  PT Frequency: 2 times per week  PT Discharge Recommendations: No PT needed after discharge  Equipment Recommended upon Discharge:  (No equipment needs anticipated)  PT Recommended Transfer Status: Stand by assist  PT - OK to Discharge: Yes (When medically ready)      General Visit Information:   PT  Visit  PT Received On: 12/04/24  General  Reason for Referral: 11/28 p/w worsening HA, light sensitivity, CTH incr vents, MRI incr ventricular mass 2.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 10/16 s/p R stereotactic brain biopsy  Family/Caregiver Present: Yes  Caregiver Feedback: Father in room.  Patient Position  "Received: Bed, 3 rail up, Alarm off, not on at start of session  General Comment: Pt. supine in bed upon arrival. Pt. states that she has pain but willing to participate regardless. Spoke with RN who stated that shej will be getting pt. medication as soon as the MD's put in the orders.    Subjective   Precautions:  Precautions  Hearing/Visual Limitations: WFL  Medical Precautions: Fall precautions    Vital Signs (Past 2hrs)                 Objective   Pain:  Pain Assessment  Pain Assessment: 0-10  0-10 (Numeric) Pain Score:  (Pt states \"im alright\" but holding her head in discomfort especially with bright lights and loud noises.)  Cognition:  Cognition  Orientation Level: Oriented X4  Following Commands: Follows all commands and directions without difficulty  Coordination:  Movements are Fluid and Coordinated: Yes  Finger to Nose: Intact  Postural Control:  Static Sitting Balance  Static Sitting-Balance Support: Feet supported  Static Sitting-Level of Assistance:  (supervision)  Static Standing Balance  Static Standing-Balance Support: No upper extremity supported  Static Standing-Level of Assistance:  (Close sba)  Extremity/Trunk Assessments:                      Activity Tolerance:     Treatments:  Therapeutic Exercise  Therapeutic Exercise Performed: Yes  Therapeutic Exercise Activity 1: Pt. performed standing bilat le ex MArches, Heel Raises x15  and Abd x12         Bed Mobility  Bed Mobility: Yes  Bed Mobility 1  Bed Mobility 1: Supine to sitting  Level of Assistance 1: Close supervision    Ambulation/Gait Training  Ambulation/Gait Training Performed: Yes  Ambulation/Gait Training 1  Surface 1:  (Pt. amb. 200' no device and close sba - Cues to allow bilat ue's to move naturally by sides Pt. complained of increased pain with bright light and loud noises.)       Stairs  Stairs: Yes  Stairs  Rails 1:  (Pt. ascended and descended 3 steps with rail on right ascending and descending with close sba)    Outcome " Measures:  [facility] Basic Mobility  Turning from your back to your side while in a flat bed without using bedrails: A little  Moving from lying on your back to sitting on the side of a flat bed without using bedrails: A little  Moving to and from bed to chair (including a wheelchair): A little  Standing up from a chair using your arms (e.g. wheelchair or bedside chair): A little  To walk in hospital room: A little  Climbing 3-5 steps with railing: A little  Basic Mobility - Total Score: 18    Education Documentation  Mobility Training, taught by Kathy Ac PTA at 12/4/2024 12:01 PM.  Learner: Patient  Readiness: Acceptance  Method: Explanation, Demonstration  Response: Needs Reinforcement    Education Comments  No comments found.        OP EDUCATION:       Encounter Problems       Encounter Problems (Active)       PT Problem       Patient will perform bed mobility IND to reduce risk of developing decubitus ulcers.  (Progressing)       Start:  12/02/24    Expected End:  12/16/24            Patient will ambulate at least 250  ft. MOD-I with LRD to improve tolerance of community distances. (Progressing)       Start:  12/02/24    Expected End:  12/16/24            Patient will perform sit to stand and stand to sit transfers MOD-I with LRD to increase functional strength.   (Progressing)       Start:  12/02/24    Expected End:  12/16/24            Patient will ascend and descend >/= 10 steps MOD-I with railing  to facilitate safe navigation of stairs in the home.     (Progressing)       Start:  12/02/24    Expected End:  12/16/24            Patient will score >22/30 points on the Functional Gait Assessment to indicate decreased risk of falling.        Start:  12/02/24    Expected End:  12/16/24               Pain - Adult

## 2024-12-04 NOTE — CARE PLAN
Problem: Skin  Goal: Participates in plan/prevention/treatment measures  Outcome: Progressing     Problem: Skin  Goal: Prevent/manage excess moisture  Outcome: Progressing   The patient's goals for the shift include      The clinical goals for the shift include pt will maintain hemodynamic stability throughout this shift    No acute events. Shunt settings adjusted per NSGY. Patient reporting headaches throughout shift, medicated as needed. NSGY aware. Currently resting at present.

## 2024-12-04 NOTE — CARE PLAN
The clinical goals for the shift include pt will maintain hemodynamic stability throughout this shift    Over the shift, the patient did not make progress toward the following goals.       Problem: Skin  Goal: Participates in plan/prevention/treatment measures  Outcome: Progressing  Goal: Prevent/manage excess moisture  Outcome: Progressing  Goal: Prevent/minimize sheer/friction injuries  Outcome: Progressing  Goal: Promote/optimize nutrition  Outcome: Progressing  Goal: Promote skin healing  Outcome: Progressing     Problem: Pain - Adult  Goal: Verbalizes/displays adequate comfort level or baseline comfort level  Outcome: Progressing     Problem: Safety - Adult  Goal: Free from fall injury  Outcome: Progressing     Problem: Discharge Planning  Goal: Discharge to home or other facility with appropriate resources  Outcome: Progressing     Problem: Chronic Conditions and Co-morbidities  Goal: Patient's chronic conditions and co-morbidity symptoms are monitored and maintained or improved  Outcome: Progressing     Problem: Pain  Goal: Takes deep breaths with improved pain control throughout the shift  Outcome: Progressing  Goal: Turns in bed with improved pain control throughout the shift  Outcome: Progressing  Goal: Walks with improved pain control throughout the shift  Outcome: Progressing  Goal: Performs ADL's with improved pain control throughout shift  Outcome: Progressing  Goal: Participates in PT with improved pain control throughout the shift  Outcome: Progressing  Goal: Free from opioid side effects throughout the shift  Outcome: Progressing  Goal: Free from acute confusion related to pain meds throughout the shift  Outcome: Progressing     Problem: Fall/Injury  Goal: Not fall by end of shift  Outcome: Progressing  Goal: Be free from injury by end of the shift  Outcome: Progressing  Goal: Verbalize understanding of personal risk factors for fall in the hospital  Outcome: Progressing  Goal: Verbalize  understanding of risk factor reduction measures to prevent injury from fall in the home  Outcome: Progressing  Goal: Use assistive devices by end of the shift  Outcome: Progressing  Goal: Pace activities to prevent fatigue by end of the shift  Outcome: Progressing

## 2024-12-04 NOTE — PROGRESS NOTES
"Sejal Duff is a 24 y.o. female on day 6 of admission presenting with Hydrocephalus.    Subjective   No acute events overnight, patient with persistent headache, stable to preop       Objective     Physical Exam  Physical Exam  Awake Ox3  BUE 5/5  BLE 5/5  SILT   Incision c/d/I    Last Recorded Vitals  Blood pressure 115/71, pulse 96, temperature 36.1 °C (97 °F), temperature source Temporal, resp. rate 16, height 1.6 m (5' 3\"), weight 77.2 kg (170 lb 3.1 oz), SpO2 99%.  Intake/Output last 3 Shifts:  I/O last 3 completed shifts:  In: 646.7 (8.4 mL/kg) [P.O.:320; I.V.:276.7 (3.6 mL/kg); IV Piggyback:50]  Out: - (0 mL/kg)   Weight: 77.2 kg     Relevant Results                 Results from last 72 hours   Lab Units 12/03/24  0716 12/02/24  0135   GLUCOSE mg/dL 80 100*   SODIUM mmol/L 142 140   POTASSIUM mmol/L 3.8 3.9   CHLORIDE mmol/L 109* 110*   CO2 mmol/L 28 24   ANION GAP mmol/L 9* 10   BUN mg/dL 9 8   CREATININE mg/dL 0.52 0.41*   EGFR mL/min/1.73m*2 >90 >90   CALCIUM mg/dL 8.7 8.0*   PHOSPHORUS mg/dL 3.8 4.0   ALBUMIN g/dL 3.5 3.4   MAGNESIUM mg/dL 1.94 2.05   POCT CALCIUM IONIZED (MMOL/L) IN BLOOD mmol/L 1.25 1.20      Results from last 72 hours   Lab Units 12/03/24  0716 12/02/24  0135   WBC AUTO x10*3/uL 9.1 11.1   NRBC AUTO /100 WBCs 0.0 0.0   RBC AUTO x10*6/uL 3.39* 3.39*   HEMOGLOBIN g/dL 9.0* 9.1*   HEMATOCRIT % 27.8* 29.1*   MCV fL 82 86   MCH pg 26.5 26.8   MCHC g/dL 32.4 31.3*   RDW % 15.1* 15.1*   PLATELETS AUTO x10*3/uL 299 294                   Assessment/Plan   Assessment & Plan  Hydrocephalus    Sejal Duff is a 24 y.o. 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

## 2024-12-05 ENCOUNTER — APPOINTMENT (OUTPATIENT)
Dept: RADIOLOGY | Facility: HOSPITAL | Age: 24
End: 2024-12-05
Payer: COMMERCIAL

## 2024-12-05 LAB
ALBUMIN SERPL BCP-MCNC: 3.9 G/DL (ref 3.4–5)
ANION GAP SERPL CALC-SCNC: 13 MMOL/L (ref 10–20)
BASOPHILS # BLD AUTO: 0.02 X10*3/UL (ref 0–0.1)
BASOPHILS NFR BLD AUTO: 0.2 %
BUN SERPL-MCNC: 15 MG/DL (ref 6–23)
CA-I BLD-SCNC: 1.27 MMOL/L (ref 1.1–1.33)
CALCIUM SERPL-MCNC: 9.4 MG/DL (ref 8.6–10.6)
CHLORIDE SERPL-SCNC: 107 MMOL/L (ref 98–107)
CO2 SERPL-SCNC: 25 MMOL/L (ref 21–32)
CREAT SERPL-MCNC: 0.54 MG/DL (ref 0.5–1.05)
EGFRCR SERPLBLD CKD-EPI 2021: >90 ML/MIN/1.73M*2
EOSINOPHIL # BLD AUTO: 0.12 X10*3/UL (ref 0–0.7)
EOSINOPHIL NFR BLD AUTO: 1.4 %
ERYTHROCYTE [DISTWIDTH] IN BLOOD BY AUTOMATED COUNT: 15.2 % (ref 11.5–14.5)
GLUCOSE SERPL-MCNC: 96 MG/DL (ref 74–99)
HCT VFR BLD AUTO: 30.8 % (ref 36–46)
HGB BLD-MCNC: 9.4 G/DL (ref 12–16)
IMM GRANULOCYTES # BLD AUTO: 0.04 X10*3/UL (ref 0–0.7)
IMM GRANULOCYTES NFR BLD AUTO: 0.5 % (ref 0–0.9)
LYMPHOCYTES # BLD AUTO: 2.25 X10*3/UL (ref 1.2–4.8)
LYMPHOCYTES NFR BLD AUTO: 26.1 %
MAGNESIUM SERPL-MCNC: 1.88 MG/DL (ref 1.6–2.4)
MCH RBC QN AUTO: 26.5 PG (ref 26–34)
MCHC RBC AUTO-ENTMCNC: 30.5 G/DL (ref 32–36)
MCV RBC AUTO: 87 FL (ref 80–100)
MONOCYTES # BLD AUTO: 0.58 X10*3/UL (ref 0.1–1)
MONOCYTES NFR BLD AUTO: 6.7 %
NEUTROPHILS # BLD AUTO: 5.62 X10*3/UL (ref 1.2–7.7)
NEUTROPHILS NFR BLD AUTO: 65.1 %
NRBC BLD-RTO: 0 /100 WBCS (ref 0–0)
PHOSPHATE SERPL-MCNC: 4.5 MG/DL (ref 2.5–4.9)
PLATELET # BLD AUTO: 323 X10*3/UL (ref 150–450)
POTASSIUM SERPL-SCNC: 4.2 MMOL/L (ref 3.5–5.3)
RBC # BLD AUTO: 3.55 X10*6/UL (ref 4–5.2)
SODIUM SERPL-SCNC: 141 MMOL/L (ref 136–145)
WBC # BLD AUTO: 8.6 X10*3/UL (ref 4.4–11.3)

## 2024-12-05 PROCEDURE — 2500000001 HC RX 250 WO HCPCS SELF ADMINISTERED DRUGS (ALT 637 FOR MEDICARE OP): Performed by: NURSE PRACTITIONER

## 2024-12-05 PROCEDURE — 2500000004 HC RX 250 GENERAL PHARMACY W/ HCPCS (ALT 636 FOR OP/ED): Performed by: PHYSICIAN ASSISTANT

## 2024-12-05 PROCEDURE — 82330 ASSAY OF CALCIUM: CPT | Performed by: STUDENT IN AN ORGANIZED HEALTH CARE EDUCATION/TRAINING PROGRAM

## 2024-12-05 PROCEDURE — 2500000004 HC RX 250 GENERAL PHARMACY W/ HCPCS (ALT 636 FOR OP/ED): Performed by: STUDENT IN AN ORGANIZED HEALTH CARE EDUCATION/TRAINING PROGRAM

## 2024-12-05 PROCEDURE — 2500000005 HC RX 250 GENERAL PHARMACY W/O HCPCS: Performed by: STUDENT IN AN ORGANIZED HEALTH CARE EDUCATION/TRAINING PROGRAM

## 2024-12-05 PROCEDURE — 70450 CT HEAD/BRAIN W/O DYE: CPT

## 2024-12-05 PROCEDURE — 36415 COLL VENOUS BLD VENIPUNCTURE: CPT | Performed by: STUDENT IN AN ORGANIZED HEALTH CARE EDUCATION/TRAINING PROGRAM

## 2024-12-05 PROCEDURE — 2500000004 HC RX 250 GENERAL PHARMACY W/ HCPCS (ALT 636 FOR OP/ED): Performed by: NURSE PRACTITIONER

## 2024-12-05 PROCEDURE — 2500000001 HC RX 250 WO HCPCS SELF ADMINISTERED DRUGS (ALT 637 FOR MEDICARE OP): Performed by: STUDENT IN AN ORGANIZED HEALTH CARE EDUCATION/TRAINING PROGRAM

## 2024-12-05 PROCEDURE — 85025 COMPLETE CBC W/AUTO DIFF WBC: CPT | Performed by: STUDENT IN AN ORGANIZED HEALTH CARE EDUCATION/TRAINING PROGRAM

## 2024-12-05 PROCEDURE — 70450 CT HEAD/BRAIN W/O DYE: CPT | Performed by: RADIOLOGY

## 2024-12-05 PROCEDURE — 83735 ASSAY OF MAGNESIUM: CPT | Performed by: STUDENT IN AN ORGANIZED HEALTH CARE EDUCATION/TRAINING PROGRAM

## 2024-12-05 PROCEDURE — 1100000001 HC PRIVATE ROOM DAILY

## 2024-12-05 PROCEDURE — 80069 RENAL FUNCTION PANEL: CPT | Performed by: STUDENT IN AN ORGANIZED HEALTH CARE EDUCATION/TRAINING PROGRAM

## 2024-12-05 RX ORDER — ACETAMINOPHEN 325 MG/1
650 TABLET ORAL EVERY 4 HOURS PRN
Status: DISCONTINUED | OUTPATIENT
Start: 2024-12-05 | End: 2024-12-05

## 2024-12-05 RX ORDER — BISACODYL 10 MG/1
10 SUPPOSITORY RECTAL DAILY
Status: DISCONTINUED | OUTPATIENT
Start: 2024-12-05 | End: 2024-12-06 | Stop reason: HOSPADM

## 2024-12-05 RX ORDER — DIPHENHYDRAMINE HCL 25 MG
25 CAPSULE ORAL EVERY 6 HOURS
Status: DISCONTINUED | OUTPATIENT
Start: 2024-12-05 | End: 2024-12-06 | Stop reason: HOSPADM

## 2024-12-05 RX ORDER — MAGNESIUM SULFATE HEPTAHYDRATE 40 MG/ML
2 INJECTION, SOLUTION INTRAVENOUS ONCE
Status: DISCONTINUED | OUTPATIENT
Start: 2024-12-05 | End: 2024-12-05

## 2024-12-05 RX ORDER — ADHESIVE BANDAGE
30 BANDAGE TOPICAL DAILY PRN
Status: DISCONTINUED | OUTPATIENT
Start: 2024-12-05 | End: 2024-12-06 | Stop reason: HOSPADM

## 2024-12-05 RX ORDER — METOCLOPRAMIDE 10 MG/1
10 TABLET ORAL EVERY 6 HOURS SCHEDULED
Status: DISCONTINUED | OUTPATIENT
Start: 2024-12-05 | End: 2024-12-06 | Stop reason: HOSPADM

## 2024-12-05 RX ORDER — BISACODYL 5 MG
5 TABLET, DELAYED RELEASE (ENTERIC COATED) ORAL DAILY PRN
Status: DISCONTINUED | OUTPATIENT
Start: 2024-12-05 | End: 2024-12-06 | Stop reason: HOSPADM

## 2024-12-05 RX ORDER — OXYCODONE HYDROCHLORIDE 5 MG/1
5 TABLET ORAL EVERY 6 HOURS
Status: DISCONTINUED | OUTPATIENT
Start: 2024-12-05 | End: 2024-12-05

## 2024-12-05 RX ORDER — ACETAMINOPHEN 160 MG/5ML
650 SOLUTION ORAL EVERY 4 HOURS PRN
Status: DISCONTINUED | OUTPATIENT
Start: 2024-12-05 | End: 2024-12-05

## 2024-12-05 RX ORDER — ACETAMINOPHEN 325 MG/1
650 TABLET ORAL EVERY 6 HOURS
Status: DISCONTINUED | OUTPATIENT
Start: 2024-12-05 | End: 2024-12-06 | Stop reason: HOSPADM

## 2024-12-05 RX ORDER — METHOCARBAMOL 500 MG/1
500 TABLET, FILM COATED ORAL EVERY 6 HOURS SCHEDULED
Status: DISCONTINUED | OUTPATIENT
Start: 2024-12-05 | End: 2024-12-06 | Stop reason: HOSPADM

## 2024-12-05 RX ORDER — LANOLIN ALCOHOL/MO/W.PET/CERES
400 CREAM (GRAM) TOPICAL DAILY
Status: DISCONTINUED | OUTPATIENT
Start: 2024-12-05 | End: 2024-12-06 | Stop reason: HOSPADM

## 2024-12-05 RX ADMIN — DEXAMETHASONE 1 MG: 2 TABLET ORAL at 08:47

## 2024-12-05 RX ADMIN — ACETAMINOPHEN 650 MG: 325 TABLET ORAL at 20:52

## 2024-12-05 RX ADMIN — OXYCODONE HYDROCHLORIDE 10 MG: 5 TABLET ORAL at 08:48

## 2024-12-05 RX ADMIN — DEXAMETHASONE 1 MG: 2 TABLET ORAL at 20:52

## 2024-12-05 RX ADMIN — DIPHENHYDRAMINE HYDROCHLORIDE 25 MG: 25 CAPSULE ORAL at 20:52

## 2024-12-05 RX ADMIN — PANTOPRAZOLE SODIUM 40 MG: 40 TABLET, DELAYED RELEASE ORAL at 06:18

## 2024-12-05 RX ADMIN — POLYETHYLENE GLYCOL 3350 17 G: 17 POWDER, FOR SOLUTION ORAL at 20:54

## 2024-12-05 RX ADMIN — DIPHENHYDRAMINE HYDROCHLORIDE 25 MG: 25 CAPSULE ORAL at 16:05

## 2024-12-05 RX ADMIN — METHOCARBAMOL TABLETS 500 MG: 500 TABLET, COATED ORAL at 23:34

## 2024-12-05 RX ADMIN — METOCLOPRAMIDE 10 MG: 10 TABLET ORAL at 23:34

## 2024-12-05 RX ADMIN — ACETAMINOPHEN 650 MG: 325 TABLET ORAL at 16:05

## 2024-12-05 RX ADMIN — ACETAMINOPHEN 650 MG: 325 TABLET ORAL at 02:05

## 2024-12-05 RX ADMIN — LINACLOTIDE 145 MCG: 145 CAPSULE, GELATIN COATED ORAL at 06:17

## 2024-12-05 RX ADMIN — MELATONIN 6 MG: 3 TAB ORAL at 23:34

## 2024-12-05 RX ADMIN — LIDOCAINE 1 PATCH: 4 PATCH TOPICAL at 08:48

## 2024-12-05 RX ADMIN — METOCLOPRAMIDE 10 MG: 10 TABLET ORAL at 16:05

## 2024-12-05 RX ADMIN — METHOCARBAMOL TABLETS 500 MG: 500 TABLET, COATED ORAL at 16:04

## 2024-12-05 RX ADMIN — MAGNESIUM OXIDE TAB 400 MG (241.3 MG ELEMENTAL MG) 400 MG: 400 (241.3 MG) TAB at 11:19

## 2024-12-05 SDOH — SOCIAL STABILITY: SOCIAL INSECURITY: HAVE YOU HAD THOUGHTS OF HARMING ANYONE ELSE?: NO

## 2024-12-05 SDOH — ECONOMIC STABILITY: HOUSING INSECURITY: IN THE LAST 12 MONTHS, WAS THERE A TIME WHEN YOU WERE NOT ABLE TO PAY THE MORTGAGE OR RENT ON TIME?: NO

## 2024-12-05 SDOH — SOCIAL STABILITY: SOCIAL INSECURITY: DO YOU FEEL UNSAFE GOING BACK TO THE PLACE WHERE YOU ARE LIVING?: NO

## 2024-12-05 SDOH — ECONOMIC STABILITY: HOUSING INSECURITY: AT ANY TIME IN THE PAST 12 MONTHS, WERE YOU HOMELESS OR LIVING IN A SHELTER (INCLUDING NOW)?: NO

## 2024-12-05 SDOH — SOCIAL STABILITY: SOCIAL INSECURITY: WITHIN THE LAST YEAR, HAVE YOU BEEN AFRAID OF YOUR PARTNER OR EX-PARTNER?: NO

## 2024-12-05 SDOH — ECONOMIC STABILITY: FOOD INSECURITY: WITHIN THE PAST 12 MONTHS, YOU WORRIED THAT YOUR FOOD WOULD RUN OUT BEFORE YOU GOT THE MONEY TO BUY MORE.: NEVER TRUE

## 2024-12-05 SDOH — SOCIAL STABILITY: SOCIAL INSECURITY
WITHIN THE LAST YEAR, HAVE YOU BEEN RAPED OR FORCED TO HAVE ANY KIND OF SEXUAL ACTIVITY BY YOUR PARTNER OR EX-PARTNER?: NO

## 2024-12-05 SDOH — SOCIAL STABILITY: SOCIAL INSECURITY: DO YOU FEEL ANYONE HAS EXPLOITED OR TAKEN ADVANTAGE OF YOU FINANCIALLY OR OF YOUR PERSONAL PROPERTY?: NO

## 2024-12-05 SDOH — ECONOMIC STABILITY: FOOD INSECURITY: HOW HARD IS IT FOR YOU TO PAY FOR THE VERY BASICS LIKE FOOD, HOUSING, MEDICAL CARE, AND HEATING?: NOT HARD AT ALL

## 2024-12-05 SDOH — SOCIAL STABILITY: SOCIAL INSECURITY: ABUSE: ADULT

## 2024-12-05 SDOH — SOCIAL STABILITY: SOCIAL INSECURITY: WITHIN THE LAST YEAR, HAVE YOU BEEN HUMILIATED OR EMOTIONALLY ABUSED IN OTHER WAYS BY YOUR PARTNER OR EX-PARTNER?: NO

## 2024-12-05 SDOH — SOCIAL STABILITY: SOCIAL INSECURITY: ARE THERE ANY APPARENT SIGNS OF INJURIES/BEHAVIORS THAT COULD BE RELATED TO ABUSE/NEGLECT?: NO

## 2024-12-05 SDOH — SOCIAL STABILITY: SOCIAL INSECURITY: HAS ANYONE EVER THREATENED TO HURT YOUR FAMILY OR YOUR PETS?: NO

## 2024-12-05 SDOH — SOCIAL STABILITY: SOCIAL INSECURITY
WITHIN THE LAST YEAR, HAVE YOU BEEN KICKED, HIT, SLAPPED, OR OTHERWISE PHYSICALLY HURT BY YOUR PARTNER OR EX-PARTNER?: NO

## 2024-12-05 SDOH — ECONOMIC STABILITY: INCOME INSECURITY: IN THE PAST 12 MONTHS HAS THE ELECTRIC, GAS, OIL, OR WATER COMPANY THREATENED TO SHUT OFF SERVICES IN YOUR HOME?: NO

## 2024-12-05 SDOH — SOCIAL STABILITY: SOCIAL INSECURITY: HAVE YOU HAD ANY THOUGHTS OF HARMING ANYONE ELSE?: NO

## 2024-12-05 SDOH — ECONOMIC STABILITY: TRANSPORTATION INSECURITY: IN THE PAST 12 MONTHS, HAS LACK OF TRANSPORTATION KEPT YOU FROM MEDICAL APPOINTMENTS OR FROM GETTING MEDICATIONS?: NO

## 2024-12-05 SDOH — ECONOMIC STABILITY: HOUSING INSECURITY: IN THE PAST 12 MONTHS, HOW MANY TIMES HAVE YOU MOVED WHERE YOU WERE LIVING?: 0

## 2024-12-05 SDOH — SOCIAL STABILITY: SOCIAL INSECURITY: ARE YOU OR HAVE YOU BEEN THREATENED OR ABUSED PHYSICALLY, EMOTIONALLY, OR SEXUALLY BY ANYONE?: NO

## 2024-12-05 SDOH — SOCIAL STABILITY: SOCIAL INSECURITY: DOES ANYONE TRY TO KEEP YOU FROM HAVING/CONTACTING OTHER FRIENDS OR DOING THINGS OUTSIDE YOUR HOME?: NO

## 2024-12-05 SDOH — SOCIAL STABILITY: SOCIAL INSECURITY: WERE YOU ABLE TO COMPLETE ALL THE BEHAVIORAL HEALTH SCREENINGS?: YES

## 2024-12-05 SDOH — ECONOMIC STABILITY: FOOD INSECURITY: WITHIN THE PAST 12 MONTHS, THE FOOD YOU BOUGHT JUST DIDN'T LAST AND YOU DIDN'T HAVE MONEY TO GET MORE.: NEVER TRUE

## 2024-12-05 ASSESSMENT — COGNITIVE AND FUNCTIONAL STATUS - GENERAL
PATIENT BASELINE BEDBOUND: NO
MOBILITY SCORE: 24
DAILY ACTIVITIY SCORE: 24
DAILY ACTIVITIY SCORE: 24
MOBILITY SCORE: 24

## 2024-12-05 ASSESSMENT — ACTIVITIES OF DAILY LIVING (ADL)
JUDGMENT_ADEQUATE_SAFELY_COMPLETE_DAILY_ACTIVITIES: YES
ADEQUATE_TO_COMPLETE_ADL: YES
GROOMING: INDEPENDENT
FEEDING YOURSELF: INDEPENDENT
LACK_OF_TRANSPORTATION: NO
DRESSING YOURSELF: INDEPENDENT
HEARING - LEFT EAR: FUNCTIONAL
HEARING - RIGHT EAR: FUNCTIONAL
TOILETING: INDEPENDENT
BATHING: INDEPENDENT
WALKS IN HOME: INDEPENDENT
PATIENT'S MEMORY ADEQUATE TO SAFELY COMPLETE DAILY ACTIVITIES?: YES

## 2024-12-05 ASSESSMENT — PAIN SCALES - GENERAL
PAINLEVEL_OUTOF10: 9
PAINLEVEL_OUTOF10: 10 - WORST POSSIBLE PAIN
PAINLEVEL_OUTOF10: 7
PAINLEVEL_OUTOF10: 7
PAINLEVEL_OUTOF10: 9

## 2024-12-05 ASSESSMENT — PAIN - FUNCTIONAL ASSESSMENT: PAIN_FUNCTIONAL_ASSESSMENT: 0-10

## 2024-12-05 ASSESSMENT — PATIENT HEALTH QUESTIONNAIRE - PHQ9
SUM OF ALL RESPONSES TO PHQ9 QUESTIONS 1 & 2: 0
1. LITTLE INTEREST OR PLEASURE IN DOING THINGS: NOT AT ALL
2. FEELING DOWN, DEPRESSED OR HOPELESS: NOT AT ALL

## 2024-12-05 ASSESSMENT — PAIN DESCRIPTION - LOCATION: LOCATION: HEAD

## 2024-12-05 ASSESSMENT — LIFESTYLE VARIABLES
HOW OFTEN DO YOU HAVE A DRINK CONTAINING ALCOHOL: NEVER
HOW MANY STANDARD DRINKS CONTAINING ALCOHOL DO YOU HAVE ON A TYPICAL DAY: PATIENT DOES NOT DRINK
AUDIT-C TOTAL SCORE: 0
HOW OFTEN DO YOU HAVE 6 OR MORE DRINKS ON ONE OCCASION: NEVER
PRESCIPTION_ABUSE_PAST_12_MONTHS: NO
SKIP TO QUESTIONS 9-10: 1
AUDIT-C TOTAL SCORE: 0
SUBSTANCE_ABUSE_PAST_12_MONTHS: YES

## 2024-12-05 NOTE — PROGRESS NOTES
Sejal Duff is a 24 y.o. female on day 7 of admission presenting with Hydrocephalus.  Transitional Care Coordination Progress Note:  Patient discussed during interdisciplinary rounds.   Team members present: MD and TCC  Plan per Medical/Surgical team: Patient received  shunt placement, still having headaches, Neurology consulted.  Payor: [facility]  Discharge disposition: Home no needs  Potential Barriers: None  ADOD: 12/07/24    STANLEY MONROY RN

## 2024-12-05 NOTE — CARE PLAN
"  Problem: Skin  Goal: Participates in plan/prevention/treatment measures  Outcome: Progressing     Problem: Skin  Goal: Prevent/manage excess moisture  Outcome: Progressing   The patient's goals for the shift include No goal per patient.    The clinical goals for the shift include safety    No acute events. Medicated atc for headaches. Patient refusing bowel regimen, states it is \"normal for her with IBS to not go to the bathroom often.\" Voiding without difficulty. Incision intact. Resting between care.    "

## 2024-12-05 NOTE — CONSULTS
"GENERAL CONSULT NOTE    History of Present Illness: Sejal Duff is a 24 y.o. 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     HPI: Pt with headaches which started around 19yo. These headaches were located behind pt's eyes bilat with radiation into her temples, and describes as pressure pain, \"like the sensation after someone was to look into the sun\". Baseline HA about 5/10 pain, made worse with light, dark room and sleep helped. Pt attributed these headaches to stress, not eating, and birth control (depo inj and OCP). Pt frequently nauseous, but attributes this to IBS and not necessarily related to headaches. States the headaches happen 3/4 times a week historically, never saw neurology for the headaches,     Pt initially admitted mid October with increase in HA severity. 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  Pt sent home on dexamethasone taper.     Since then, headaches were more consistent with baseline prior to her brain mass diagnosis, stable.   However in the past 3 days prior to admission the pain has been unbearable. Pt states current headache started suddenly when she was at a water park and went from a hot tub inside to a cooler pool outside to cool off. Pt states her headaches have started like this in the past with weather changes, but given they did not go away and got worse she came for further evaluation. Besides the increase in pain severity (has been around 8/10) and the fact headache is persistent, this headache is similar to baseline headaches in that it is located in same area behind the eyes and " is characterized as a pressure sensation. Minimal relief with oxycodone and migraine cocktail, last one received 12/4 evening. Better with laying down and being in dark room. Sitting up makes HA worse. Still with photophobia which is increased, denies phonophobia. Denies teraing, eye drooping, no radiation of pain to neck. Nausea has been on and off. Eating okay. No trauma, inciting event besides mentioned above, no sick contacts. In terms of recent medication changes, pt states she started adipex for weight loss about 2 months ago, and there was confusion with her dexamethasone taper. The initial plan was to wean the steroid, however pt reports seeing her oncologist who recommend pt keep taking steroid which pt reports doing prior to admission, but with no relief to headache. Pt reports decreased sensation and numbness on R side of head, no other weakness or sensory issues. Not worst headache of life, no issues with head/neck movement. No vision changes from baseline blurry vision. Patient has been having subjective fevers and chills. Pt reports that since her shunt surgery, the headache has been even worse and is uncomfortable with the pain, pressure, and pt worries how she will function at home.     Axox4, HDS, on arrival. Neuro exam nonfocal besides decreased sensation to light touch on R side of head. Optho consulted for papilledema, which was not present. MRI revealing new obstructive hydrocephalus as a result of enlargement of the known mass with ventriculomegaly.  CT with increase in ventricle size. R  shunt placement done 12/1 with certas 5, 12/2 CTH with catheter in position, anticipated decrease in ventricles post op. Neurosurgery dialed down VPS (certas 5 to 4) to see if headache improves with increased CSF drainage. Repeat CTH 12/5 am:  catheter still in position, with decrease in bifrontal diameter compared to CTH 12/4. No change in previously known calcified mass with mass effect.     ROS: All  systems reviewed and were negative except as above    Home Medications:    Scheduled medications  bisacodyl, 10 mg, rectal, Daily  dexAMETHasone, 1 mg, oral, BID  enoxaparin, 40 mg, subcutaneous, q24h  lidocaine, 1 patch, transdermal, Daily  linaCLOtide, 145 mcg, oral, Daily before breakfast  magnesium sulfate, 2 g, intravenous, Once  methocarbamol, 1,000 mg, intravenous, q8h  pantoprazole, 40 mg, oral, Daily before breakfast  polyethylene glycol, 17 g, oral, BID  psyllium, 1 packet, oral, Daily  sennosides-docusate sodium, 2 tablet, oral, BID  [Held by provider] tovorafenib, 600 mg, oral, Every Sunday      Continuous medications     PRN medications  PRN medications: acetaminophen **OR** acetaminophen, albuterol, bisacodyl, calcium carbonate, cetirizine, diphenhydrAMINE, labetaloL, magnesium hydroxide, magnesium sulfate **AND** [COMPLETED] promethazine **AND** [COMPLETED] acetaminophen **AND** [COMPLETED] diphenhydrAMINE, melatonin, meperidine, metoclopramide, naloxone, ondansetron **OR** ondansetron, ondansetron, oxyCODONE, oxyCODONE, promethazine, prucalopride        Past Medical History:    has a past medical history of Encounter for screening for infections with a predominantly sexual mode of transmission (03/02/2020), Melena (04/12/2019), Other specified health status, Other specified health status, Personal history of other (healed) physical injury and trauma, and Unspecified condition associated with female genital organs and menstrual cycle (03/02/2020).    Past Surgical History:    has a past surgical history that includes Other surgical history (01/18/2019) and Breast surgery (Left).    Allergies:   Allergies   Allergen Reactions    Morphine Dizziness    Pollen Extracts Runny nose    House Dust Runny nose       Family History:   Family History   Problem Relation Name Age of Onset    Asthma Mother      Colon cancer Mother         Past Social History:   Social History     Tobacco Use    Smoking status: Never      Passive exposure: Never    Smokeless tobacco: Never   Vaping Use    Vaping status: Never Used   Substance Use Topics    Alcohol use: Yes     Comment: social    Drug use: Yes     Types: Marijuana       Vitals:   Temp:  [36.2 °C (97.2 °F)-36.7 °C (98.1 °F)] 36.7 °C (98.1 °F)  Heart Rate:  [89-99] 91  Resp:  [16-18] 18  BP: (108-119)/(68-80) 113/80    Physical Exam:   GENERAL: Resting comfortably, some acute distress with loud noises and lights which make headache worse.     MENTAL STATE: Orientation was normal to person, place, situation, date. Recent and remote memory was intact. Attention span and concentration were normal. Language testing was fluent to conversation.  General fund of knowledge was intact.      CRANIAL NERVES:   CN 2 Visual fields full to confrontation.   CN 3, 4, 6 Pupils round, 4 mm in diameter, equally reactive to light. Lids symmetric; no ptosis. EOMs normal alignment, full range with normal saccades, pursuit and convergence. No nystagmus.   CN 5 Facial sensation intact bilaterally to light touch, but with some numbness and decreased sensation on R side of head   CN 7 Normal and symmetric facial strength. Nasolabial folds symmetric.   CN 8 Hearing intact to conversation.  CN 9 Palate elevates symmetrically.   CN 11 Normal strength of shoulder shrug.  CN 12 Tongue midline, with normal bulk and strength; no fasciculations.     MOTOR: Muscle bulk was normal and tone was normal in both upper and lower extremities. No adventitious movements.                     R L  Delt           5 5  Bicep         5 5  Tricep        5 5   Wrist Flex  5 5   Wrist Ext  5 5             5 5    Hip Flex      5 5  Hip Add      5 5  Leg Ext      5 5  Leg Flex     5 5  DF             5 5  PF             5 5     Reflexes: Right/ Left:  Biceps 2/2, brachioradialis 2/2, triceps 2/2, patellar 3/3, ankle 2/2, toes downgoing to plantar stimulation. No clonus, frontal release signs or other pathologic reflexes present.  "    *bilateral cross adductor reflexes and suprapatellar reflexes present      Babinski: toes downgoing to plantar stimulation. Negative Harris. No clonus, frontal release signs or other pathologic reflexes present.     SENSORY: Sensory exam was normal. In both upper and lower extremities, sensation was intact to light touch; sharp/dull.    COORDINATION: Coordination exam was normal. In both upper extremities, finger-nose-finger was intact without dysmetria or overshoot. In both lower extremities, heel-to-shin was intact.      GAIT: deferred     Labs:   Results from last 72 hours   Lab Units 12/05/24  0703 12/04/24  0715 12/03/24  0716   WBC AUTO x10*3/uL 8.6 8.6 9.1   HEMOGLOBIN g/dL 9.4* 9.4* 9.0*   HEMATOCRIT % 30.8* 31.0* 27.8*   PLATELETS AUTO x10*3/uL 323 307 299      Results from last 72 hours   Lab Units 12/05/24  0703 12/04/24  0715 12/03/24  0716   SODIUM mmol/L 141 143 142   POTASSIUM mmol/L 4.2 3.9 3.8   CHLORIDE mmol/L 107 109* 109*   CO2 mmol/L 25 28 28   BUN mg/dL 15 11 9   CREATININE mg/dL 0.54 0.45* 0.52   MAGNESIUM mg/dL 1.88 2.13 1.94   PHOSPHORUS mg/dL 4.5 4.2 3.8   GLUCOSE mg/dL 96 96 80                    No results found for: \"BNP\"    Lab Results   Component Value Date    GLUCOSEU Normal 11/28/2024    BLOODU NEGATIVE 11/28/2024    PROTUR NEGATIVE 11/28/2024    NITRITEU NEGATIVE 11/28/2024    LEUKOCYTESU NEGATIVE 11/28/2024    WBCU 1-5 10/14/2024    RBCU 1-2 10/14/2024         Imaging:  MR brain w and wo IV contrast    Addendum Date: 11/28/2024    Interpreted By:  Saravanan Flores, ADDENDUM: As described in the body of the report, there also new susceptibility artifact in the dependent portion of the right lateral ventricle concerning for blood products.   Signed by: Saravanan Flores 11/28/2024 3:18 PM   -------- ORIGINAL REPORT -------- Dictation workstation:   [id number]    Result Date: 11/28/2024  1. Interval enlargement hypothalamic-based necrotic mass (2.7 x 2.6 x 2.5 cm vs 2.3 x 2 x " 2.4 cm) resulting in new acute obstructive hydrocephalus at the level of the foramina of [place] causing significantly increased dilatation of the lateral ventricles (right > left) and new interstitial edema in the periventricular white matter. Urgent neurosurgical consultation recommended.   2. New diffuse bilateral sulcal effacement and significantly increased downward mass effect on the optic chiasm, bilateral ventral medial thalami, and to a lesser extent the ventral midbrain.   3. Slight increased size of nonenhancing T2/FLAIR hyperintense lesion in the medial left temporal lobe measuring 1.3 cm x 0.8 cm.   MACRO: Saravanan Flores discussed the significance and urgency of this critical finding by telephone with EVONNE ANGELES and DR RADER (neurosurgery) on 11/28/2024 at 3:09 pm.  (**-RCF-**) Findings:  See findings.   Signed by: Saravanan Flores 11/28/2024 3:15 PM Dictation workstation:   [id number]   CT head wo IV contrast    Result Date: 12/5/2024    1. Redemonstration of right posterior parietal approach ventriculostomy shunt catheter. There has been some interval decrease in the bifrontal diameter compared with December 4, 2024. 2. Redemonstration of a predominantly hypodense and partially calcified mass centered in the region of the hypothalamus with associated mass effect, not appreciably changed from the prior study.     MACRO: None   Signed by: Berta Gross 12/5/2024 7:27 AM Dictation workstation:   [id number]    CT head wo IV contrast    Result Date: 12/4/2024  1. Stable positioning of a right parietal approach ventriculostomy catheter with improved symmetry of the lateral ventricles when compared with the prior exam. Otherwise stable bifrontal diameter of 4.2 cm and unchanged partial 3rd ventricular effacement. 2. Unchanged appearance of a hypodense and peripherally hyperdense lesion in the midline at the level of the hypothalamus with local mass effect.   I personally reviewed the  image(s)/study and resident interpretation as stated by Dr. Sis Stevens MD. I agree with the findings as stated. This study was interpreted at [facility], [place], [place].   MACRO: None   Signed by: Arline Munoz 12/4/2024 8:18 AM Dictation workstation:   [id number]    CT head wo IV contrast    Result Date: 12/2/2024  1. Postprocedural changes from interval placement of a right posterior ventriculostomy catheter with decrease in size of the lateral ventricles compared to prior with minimal residual dilatation of the left lateral ventricle. 2. Similar size and appearance of hypoattenuating hypothalamic mass with unchanged mass effect and partial effacement of the 3rd ventricle. Similar hyperattenuation along the margins of the mass corresponding to susceptibility artifact on prior MRI and likely representing hemosiderin deposition and/or mineralization. 3. Punctate foci of hyper attenuation within the right frontal horn likely correspond to hemosiderin deposition from prior biopsy.     I personally reviewed the images/study and I agree with Pepper Gutierrez DO's (radiology resident) findings as stated. This study was interpreted at [facility], [place], [place].   MACRO: None   Signed by: Keith Garcia 12/2/2024 10:53 AM Dictation workstation:   [id number]     Assessment/  Sejal Duff is a 24 y.o. 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 Neurology consulted for persistent headaches. MRI Brain 11/28 compared to 10/13 shows enlarging ventricular mass with evidence of obstruction. On exam she has no deficits. Headaches are described as pressure like periorbital not radiating to the neck, with photophobia and phonophobia improves with  laying down, no conjunctival injection or tearing. Responds well to migraine cocktails, last dose on 12/3.  Overall etiology of the headaches are likely 2/2 to underlying structural mass and recent surgical interventions.      Recommendations:    - Please avoid IV Benadryl, okay for PO or IM  - Step 1 (do all of the following):  - ketorolac 30mg IVP or 30-60mg IM OR tylenol 650 mg PRN    - metoclopramide 10mg IVP over 2min OR ondansetron 8mg IVP  - diphenhydramine 25-50mg PO  - IVF    Step 2 (if Step 1 fails, do all of the following):  - dexamethasone 4-8mg IVP  - valproate sodium 500-1000mg over 20min  - magnesium sulfate 1g IV over 1h  - Give all at the same time if possible    Step 3 (if Step 2 fails, choose sumatriptan OR DHE, and choose an anti-emetic):  - sumatriptan 6mg SQ, repeat in 1h if no response (max 12mg in 24h)  - DHE-45: 0.25mg IVP over 1min or SQ, followed by 1mg IVP over 1min or SQ in 1h if needed; AND pro  - prochlorperazine 10mg IVP over 30s q2-4h PRN  - metoclopramide 10mg IVP over 2min  - ondansetron 4-8mg IVP over 30s    Hayden Munoz   PGY2 psychiatry   Neurology service       I personally saw, examined, and discussed the patient above. I have reviewed and agree with the excellent medical student note above. All edits or corrections have been made directly into the note, including the physical exam and assessment/plan.        Fatoumata Ireland MD  Department of Neurology, PGY-3  General [id number]

## 2024-12-05 NOTE — CARE PLAN
The patient's goals for the shift include No goal per patient.    The clinical goals for the shift include Patient's pain will remain within patient's acceptable limits this shift.      Problem: Skin  Goal: Participates in plan/prevention/treatment measures  Outcome: Progressing  Goal: Prevent/manage excess moisture  Outcome: Progressing  Goal: Prevent/minimize sheer/friction injuries  Outcome: Progressing  Goal: Promote/optimize nutrition  Outcome: Progressing  Goal: Promote skin healing  Outcome: Progressing     Problem: Pain - Adult  Goal: Verbalizes/displays adequate comfort level or baseline comfort level  Outcome: Progressing     Problem: Safety - Adult  Goal: Free from fall injury  Outcome: Progressing     Problem: Chronic Conditions and Co-morbidities  Goal: Patient's chronic conditions and co-morbidity symptoms are monitored and maintained or improved  Outcome: Progressing     Problem: Pain  Goal: Takes deep breaths with improved pain control throughout the shift  Outcome: Progressing  Goal: Turns in bed with improved pain control throughout the shift  Outcome: Progressing  Goal: Walks with improved pain control throughout the shift  Outcome: Progressing  Goal: Performs ADL's with improved pain control throughout shift  Outcome: Progressing  Goal: Participates in PT with improved pain control throughout the shift  Outcome: Progressing  Goal: Free from opioid side effects throughout the shift  Outcome: Progressing  Goal: Free from acute confusion related to pain meds throughout the shift  Outcome: Progressing

## 2024-12-06 ENCOUNTER — APPOINTMENT (OUTPATIENT)
Dept: CARDIOLOGY | Facility: CLINIC | Age: 24
End: 2024-12-06
Payer: COMMERCIAL

## 2024-12-06 ENCOUNTER — PHARMACY VISIT (OUTPATIENT)
Dept: PHARMACY | Facility: CLINIC | Age: 24
End: 2024-12-06
Payer: MEDICAID

## 2024-12-06 VITALS
RESPIRATION RATE: 16 BRPM | SYSTOLIC BLOOD PRESSURE: 109 MMHG | TEMPERATURE: 97.3 F | HEART RATE: 93 BPM | WEIGHT: 170.19 LBS | OXYGEN SATURATION: 98 % | BODY MASS INDEX: 30.16 KG/M2 | DIASTOLIC BLOOD PRESSURE: 70 MMHG | HEIGHT: 63 IN

## 2024-12-06 LAB
ALBUMIN SERPL BCP-MCNC: 4.1 G/DL (ref 3.4–5)
ANION GAP SERPL CALC-SCNC: 15 MMOL/L (ref 10–20)
ATRIAL RATE: 93 BPM
BASOPHILS # BLD AUTO: 0.03 X10*3/UL (ref 0–0.1)
BASOPHILS NFR BLD AUTO: 0.3 %
BUN SERPL-MCNC: 13 MG/DL (ref 6–23)
CALCIUM SERPL-MCNC: 9.6 MG/DL (ref 8.6–10.6)
CHLORIDE SERPL-SCNC: 105 MMOL/L (ref 98–107)
CO2 SERPL-SCNC: 27 MMOL/L (ref 21–32)
CREAT SERPL-MCNC: 0.53 MG/DL (ref 0.5–1.05)
EGFRCR SERPLBLD CKD-EPI 2021: >90 ML/MIN/1.73M*2
EOSINOPHIL # BLD AUTO: 0.07 X10*3/UL (ref 0–0.7)
EOSINOPHIL NFR BLD AUTO: 0.6 %
ERYTHROCYTE [DISTWIDTH] IN BLOOD BY AUTOMATED COUNT: 15.3 % (ref 11.5–14.5)
GLUCOSE SERPL-MCNC: 82 MG/DL (ref 74–99)
HCT VFR BLD AUTO: 31.1 % (ref 36–46)
HGB BLD-MCNC: 9.8 G/DL (ref 12–16)
IMM GRANULOCYTES # BLD AUTO: 0.03 X10*3/UL (ref 0–0.7)
IMM GRANULOCYTES NFR BLD AUTO: 0.3 % (ref 0–0.9)
LYMPHOCYTES # BLD AUTO: 2.9 X10*3/UL (ref 1.2–4.8)
LYMPHOCYTES NFR BLD AUTO: 25.6 %
MCH RBC QN AUTO: 26.1 PG (ref 26–34)
MCHC RBC AUTO-ENTMCNC: 31.5 G/DL (ref 32–36)
MCV RBC AUTO: 83 FL (ref 80–100)
MONOCYTES # BLD AUTO: 0.88 X10*3/UL (ref 0.1–1)
MONOCYTES NFR BLD AUTO: 7.8 %
NEUTROPHILS # BLD AUTO: 7.44 X10*3/UL (ref 1.2–7.7)
NEUTROPHILS NFR BLD AUTO: 65.4 %
NRBC BLD-RTO: 0 /100 WBCS (ref 0–0)
P AXIS: 71 DEGREES
P OFFSET: 203 MS
P ONSET: 152 MS
PHOSPHATE SERPL-MCNC: 4.4 MG/DL (ref 2.5–4.9)
PLATELET # BLD AUTO: 359 X10*3/UL (ref 150–450)
POTASSIUM SERPL-SCNC: 3.4 MMOL/L (ref 3.5–5.3)
PR INTERVAL: 136 MS
Q ONSET: 220 MS
QRS COUNT: 15 BEATS
QRS DURATION: 80 MS
QT INTERVAL: 338 MS
QTC CALCULATION(BAZETT): 420 MS
QTC FREDERICIA: 391 MS
R AXIS: 53 DEGREES
RBC # BLD AUTO: 3.76 X10*6/UL (ref 4–5.2)
SODIUM SERPL-SCNC: 144 MMOL/L (ref 136–145)
T AXIS: 20 DEGREES
T OFFSET: 389 MS
VENTRICULAR RATE: 93 BPM
WBC # BLD AUTO: 11.4 X10*3/UL (ref 4.4–11.3)

## 2024-12-06 PROCEDURE — 2500000005 HC RX 250 GENERAL PHARMACY W/O HCPCS: Performed by: STUDENT IN AN ORGANIZED HEALTH CARE EDUCATION/TRAINING PROGRAM

## 2024-12-06 PROCEDURE — 36415 COLL VENOUS BLD VENIPUNCTURE: CPT | Performed by: STUDENT IN AN ORGANIZED HEALTH CARE EDUCATION/TRAINING PROGRAM

## 2024-12-06 PROCEDURE — RXMED WILLOW AMBULATORY MEDICATION CHARGE

## 2024-12-06 PROCEDURE — 2500000001 HC RX 250 WO HCPCS SELF ADMINISTERED DRUGS (ALT 637 FOR MEDICARE OP): Performed by: NURSE PRACTITIONER

## 2024-12-06 PROCEDURE — 2500000001 HC RX 250 WO HCPCS SELF ADMINISTERED DRUGS (ALT 637 FOR MEDICARE OP): Performed by: STUDENT IN AN ORGANIZED HEALTH CARE EDUCATION/TRAINING PROGRAM

## 2024-12-06 PROCEDURE — 2500000004 HC RX 250 GENERAL PHARMACY W/ HCPCS (ALT 636 FOR OP/ED): Performed by: NURSE PRACTITIONER

## 2024-12-06 PROCEDURE — 99239 HOSP IP/OBS DSCHRG MGMT >30: CPT | Performed by: NURSE PRACTITIONER

## 2024-12-06 PROCEDURE — 85025 COMPLETE CBC W/AUTO DIFF WBC: CPT | Performed by: STUDENT IN AN ORGANIZED HEALTH CARE EDUCATION/TRAINING PROGRAM

## 2024-12-06 PROCEDURE — 97116 GAIT TRAINING THERAPY: CPT | Mod: GP

## 2024-12-06 PROCEDURE — 82565 ASSAY OF CREATININE: CPT | Performed by: STUDENT IN AN ORGANIZED HEALTH CARE EDUCATION/TRAINING PROGRAM

## 2024-12-06 PROCEDURE — 2500000004 HC RX 250 GENERAL PHARMACY W/ HCPCS (ALT 636 FOR OP/ED): Performed by: STUDENT IN AN ORGANIZED HEALTH CARE EDUCATION/TRAINING PROGRAM

## 2024-12-06 PROCEDURE — 99254 IP/OBS CNSLTJ NEW/EST MOD 60: CPT

## 2024-12-06 RX ORDER — RIZATRIPTAN BENZOATE 10 MG/1
10 TABLET ORAL ONCE AS NEEDED
Qty: 9 TABLET | Refills: 0 | Status: SHIPPED | OUTPATIENT
Start: 2024-12-06 | End: 2024-12-11 | Stop reason: SDUPTHER

## 2024-12-06 RX ORDER — SUMATRIPTAN 50 MG/1
50 TABLET, FILM COATED ORAL AS NEEDED
Status: DISCONTINUED | OUTPATIENT
Start: 2024-12-06 | End: 2024-12-06 | Stop reason: HOSPADM

## 2024-12-06 RX ORDER — LIDOCAINE 560 MG/1
1 PATCH PERCUTANEOUS; TOPICAL; TRANSDERMAL DAILY
Qty: 7 PATCH | Refills: 0 | Status: SHIPPED | OUTPATIENT
Start: 2024-12-07 | End: 2024-12-14

## 2024-12-06 RX ORDER — ACETAMINOPHEN 325 MG/1
650 TABLET ORAL EVERY 6 HOURS PRN
Start: 2024-12-06 | End: 2024-12-11 | Stop reason: SDUPTHER

## 2024-12-06 RX ORDER — OXYCODONE HYDROCHLORIDE 5 MG/1
5 TABLET ORAL EVERY 6 HOURS PRN
Qty: 28 TABLET | Refills: 0 | Status: SHIPPED | OUTPATIENT
Start: 2024-12-06 | End: 2024-12-11 | Stop reason: SDUPTHER

## 2024-12-06 RX ORDER — AMITRIPTYLINE HYDROCHLORIDE 25 MG/1
TABLET, FILM COATED ORAL
Qty: 34 TABLET | Refills: 0 | Status: SHIPPED | OUTPATIENT
Start: 2024-12-06 | End: 2025-01-12

## 2024-12-06 RX ORDER — RIZATRIPTAN BENZOATE 5 MG/1
5 TABLET ORAL ONCE AS NEEDED
Qty: 9 TABLET | Refills: 0 | Status: SHIPPED | OUTPATIENT
Start: 2024-12-06 | End: 2024-12-06 | Stop reason: HOSPADM

## 2024-12-06 RX ORDER — AMITRIPTYLINE HYDROCHLORIDE 25 MG/1
12.5 TABLET, FILM COATED ORAL NIGHTLY
Status: DISCONTINUED | OUTPATIENT
Start: 2024-12-06 | End: 2024-12-06 | Stop reason: HOSPADM

## 2024-12-06 RX ORDER — OXYCODONE HYDROCHLORIDE 5 MG/1
10 TABLET ORAL ONCE
Status: COMPLETED | OUTPATIENT
Start: 2024-12-06 | End: 2024-12-06

## 2024-12-06 RX ADMIN — DIPHENHYDRAMINE HYDROCHLORIDE 25 MG: 25 CAPSULE ORAL at 15:16

## 2024-12-06 RX ADMIN — ACETAMINOPHEN 650 MG: 325 TABLET ORAL at 02:59

## 2024-12-06 RX ADMIN — LINACLOTIDE 145 MCG: 145 CAPSULE, GELATIN COATED ORAL at 06:44

## 2024-12-06 RX ADMIN — METHOCARBAMOL TABLETS 500 MG: 500 TABLET, COATED ORAL at 05:15

## 2024-12-06 RX ADMIN — MAGNESIUM OXIDE TAB 400 MG (241.3 MG ELEMENTAL MG) 400 MG: 400 (241.3 MG) TAB at 08:53

## 2024-12-06 RX ADMIN — METHOCARBAMOL TABLETS 500 MG: 500 TABLET, COATED ORAL at 11:13

## 2024-12-06 RX ADMIN — OXYCODONE HYDROCHLORIDE 10 MG: 5 TABLET ORAL at 13:28

## 2024-12-06 RX ADMIN — ACETAMINOPHEN 650 MG: 325 TABLET ORAL at 15:16

## 2024-12-06 RX ADMIN — ACETAMINOPHEN 650 MG: 325 TABLET ORAL at 08:52

## 2024-12-06 RX ADMIN — METOCLOPRAMIDE 10 MG: 10 TABLET ORAL at 11:13

## 2024-12-06 RX ADMIN — DEXAMETHASONE 1 MG: 2 TABLET ORAL at 08:53

## 2024-12-06 RX ADMIN — LIDOCAINE 1 PATCH: 4 PATCH TOPICAL at 08:53

## 2024-12-06 RX ADMIN — OXYCODONE HYDROCHLORIDE 10 MG: 5 TABLET ORAL at 08:59

## 2024-12-06 RX ADMIN — METOCLOPRAMIDE 10 MG: 10 TABLET ORAL at 05:15

## 2024-12-06 RX ADMIN — METOCLOPRAMIDE 10 MG: 10 TABLET ORAL at 16:52

## 2024-12-06 RX ADMIN — PANTOPRAZOLE SODIUM 40 MG: 40 TABLET, DELAYED RELEASE ORAL at 06:44

## 2024-12-06 RX ADMIN — DIPHENHYDRAMINE HYDROCHLORIDE 25 MG: 25 CAPSULE ORAL at 02:59

## 2024-12-06 RX ADMIN — DIPHENHYDRAMINE HYDROCHLORIDE 25 MG: 25 CAPSULE ORAL at 08:52

## 2024-12-06 RX ADMIN — METHOCARBAMOL TABLETS 500 MG: 500 TABLET, COATED ORAL at 16:52

## 2024-12-06 ASSESSMENT — PAIN - FUNCTIONAL ASSESSMENT
PAIN_FUNCTIONAL_ASSESSMENT: 0-10

## 2024-12-06 ASSESSMENT — PAIN SCALES - GENERAL
PAINLEVEL_OUTOF10: 9
PAINLEVEL_OUTOF10: 10 - WORST POSSIBLE PAIN
PAINLEVEL_OUTOF10: 10 - WORST POSSIBLE PAIN
PAINLEVEL_OUTOF10: 8
PAINLEVEL_OUTOF10: 9
PAINLEVEL_OUTOF10: 6
PAINLEVEL_OUTOF10: 8
PAINLEVEL_OUTOF10: 7

## 2024-12-06 ASSESSMENT — COGNITIVE AND FUNCTIONAL STATUS - GENERAL
CLIMB 3 TO 5 STEPS WITH RAILING: A LITTLE
WALKING IN HOSPITAL ROOM: A LITTLE
MOBILITY SCORE: 22

## 2024-12-06 ASSESSMENT — PAIN DESCRIPTION - LOCATION
LOCATION: ABDOMEN
LOCATION: HEAD
LOCATION: NECK
LOCATION: HEAD

## 2024-12-06 ASSESSMENT — PAIN DESCRIPTION - DESCRIPTORS: DESCRIPTORS: ACHING

## 2024-12-06 NOTE — DISCHARGE INSTRUCTIONS
Recommend patient keeps a headache journal to document triggers, frequency and duration of headaches

## 2024-12-06 NOTE — DISCHARGE SUMMARY
Discharge Diagnosis  Hydrocephalus    Issues Requiring Follow-Up  Neurology follow up requested    Test Results Pending At Discharge  Pending Labs       No current pending labs.            Hospital Course  Sejal Duff is a 24 y.o. 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 Patient returned to the [facility] ED 11/28 with worsening HA, light sensitivity, CTH incr vents, MRI incr ventricular mass 2.7x2.5cm, ventriculomegaly, trace R ventricular GRE. Patient admitted to neurosurgery service for further management.     11/28 Ophthalmology evaluation without papilledema.  12/1 s/p R VPS (certas at 5).   12/2 CTH with catheter in position, decreased vents   12/4 CTH decreased vents but persistent ventriculomegly; Certas dialed to 4  12/5 CTH decreased vents.  Continued headaches Neurology consulted--> likely due to underlying structural mass and recent surgical interventions.  Started on scheduled oral headache cocktail meds of Robaxin, Tylenol, reglan and Benadryl  12/6 Ophthalmology consulted for c/o vision changes--> normal exam, no disc edema  Neurology--> HA's migrainous in nature, has had prior and now worse in setting of underlying structural mass and recent VPS surgery.  Started on preventative therapy with amitriptyline 12.5 mg bedtime for 1 week and increase to 25mg if tolerates and abortive therapy with Rizatriptan PRN    PT/OT eval with no needs    Patient discharged in stable condition with detailed instructions and scheduled outpatient follow up.    Pertinent Physical Exam At Time of Discharge  Physical Exam  General:  in bed, awake, c/o headache  HEENT: scalp incision clean, dry, intact; PERRL; EOMI; tongue midline  Neuro: Alert and oriented x3; GILLILAND's; extremities 5/5; sensation intact  Cardiac: regular rate and rhythm  Resp: respirations easy and regular  Abd: BS +x4, soft, non-tender, non-distended; laparoscopic sites clean, dry, intact  Extr: no edema; pulses palpable  Skin: warm, dry, intact.    Psych: appropriate and cooperative   Home Medications     Medication List      START taking these medications     amitriptyline 25 mg tablet; Commonly known as: Elavil; Take 0.5 tablets   (12.5 mg) by mouth once daily at bedtime for 7 days, THEN 1 tablet (25 mg)   once daily at bedtime. Increase to 1 tab (25 mg) at bedtime after 7 days   if tolerates 0.5 tab (12.5 mg).; Start taking on: December 6, 2024   cyclobenzaprine 5 mg tablet; Commonly known as: Flexeril; Take 1 tablet   (5 mg) by mouth 3 times a day as needed for muscle spasms for up to 7   days.   lidocaine 4 % patch; Place 1 patch over 12 hours on the skin once daily   for 7 days. Apply 1 patch to neck Daily for 12 hours, on in morning, off   at bedtime; Start taking on: December 7, 2024   * oxyCODONE 5 mg immediate release tablet; Commonly known as:   Roxicodone; Take 1 tablet (5 mg) by mouth every 6 hours if needed for   moderate pain (4 - 6) or severe pain (7 - 10) for up to 7 days.   polyethylene glycol 17 gram/dose powder; Commonly known as: Glycolax,   Miralax; DISSOLVE 17 GRAMS IN 8 OZ OF FLUID LIQUID DRINK DAILY AS DIRECTED   rizatriptan 10 mg tablet; Commonly known as: Maxalt; Take 1 tablet (10   mg) by mouth 1 time if needed for migraine. May repeat in 2 hours if   unresolved. Do not exceed 30 mg in 24 hours.  * This list has 1 medication(s) that are the same as other medications   prescribed for you. Read the directions carefully, and ask your doctor or   other care provider to review them with you.     CHANGE how you take these medications     acetaminophen 325  mg tablet; Commonly known as: Tylenol; Take 2 tablets   (650 mg) by mouth every 6 hours if needed for mild pain (1 - 3) or   headaches.; What changed: when to take this   tovorafenib 600 mg/week (100 mg x 6) tablet; Take 600 mg by mouth 1   (one) time per week. (Six 100 mg tablets = 600 mg.) Swallow tablets whole   with water. Do not chew, cut, or crush. Tablets may be taken with or   without food.  DO NOT RESUME UNTIL 1 WEEK POST OPERATIVELY (12/8/2024);   Start taking on: December 8, 2024; What changed: additional instructions     CONTINUE taking these medications     albuterol 90 mcg/actuation inhaler; Inhale 1-2 puffs every 4 hours if   needed for wheezing or shortness of breath.   benzoyl peroxide 10 % external wash; Commonly known as: Benzac AC; APPLY   TOPICALLY 2 TIMES A DAY. WASH TWICE DAILY AS DIRECTED   caffeine 200 mg; Take 1 tablet (200 mg) by mouth every 6 hours if needed   (For [place]).   cholecalciferol 50,000 unit capsule; Commonly known as: Vitamin D-3;   Take 1 capsule (50,000 Units) by mouth 1 (one) time per week.   dexAMETHasone 1 mg tablet; Commonly known as: Decadron; Take 1 tablet (1   mg) by mouth 2 times a day for 10 days.   ergocalciferol 1.25 MG (51096 UT) capsule; Commonly known as: Vitamin   D-2; Take 1 capsule (1,250 mcg) by mouth 1 (one) time per week.   Lacto no.26-Wgbkin-LUC-larch 25B cell-25B cell-50 mg capsule; Take 1   capsule by mouth once daily.   linaCLOtide 145 mcg capsule; Commonly known as: Linzess; Take 1 capsule   (145 mcg) by mouth once daily in the morning. Take before meals. Do not   crush or chew.   loratadine 10 mg tablet; Commonly known as: Claritin; Take 1 tablet (10   mg) by mouth once daily.   metroNIDAZOLE 500 mg tablet; Commonly known as: Flagyl   Motegrity 2 mg tablet; Generic drug: prucalopride; Take 1 tablet (2 mg)   by mouth once daily.   multivitamin with iron tablet; Commonly known as: Daily Multiple   Vitamins/Iron; Take 1 tablet by mouth once  daily.   omeprazole 40 mg DR capsule; Commonly known as: PriLOSEC   ondansetron 4 mg tablet; Commonly known as: Zofran; Take 1 tablet (4 mg)   by mouth every 6 hours if needed for nausea or vomiting.   ondansetron ODT 4 mg disintegrating tablet; Commonly known as:   Zofran-ODT; Dissolve 1 tablet (4 mg) in the mouth every 8 hours if needed   for nausea or vomiting.   phentermine 37.5 mg tablet; Commonly known as: Adipex-P   Viibryd 10 mg tablet; Generic drug: vilazodone     STOP taking these medications     metFORMIN  mg 24 hr tablet; Commonly known as: Glucophage-XR     ASK your doctor about these medications     * oxyCODONE 5 mg immediate release tablet; Commonly known as:   Roxicodone; Take 1 tablet (5 mg) by mouth every 6 hours if needed for   severe pain (7 - 10) for up to 7 days.; Ask about: Should I take this   medication?  * This list has 1 medication(s) that are the same as other medications   prescribed for you. Read the directions carefully, and ask your doctor or   other care provider to review them with you.     I spent a total of 35 minutes with the patient and family and greater than 50% was spent in counseling and coordination of care.   Outpatient Follow-Up  Future Appointments   Date Time Provider Department [place]   12/11/2024 10:00 AM CHLOE MaciasCNP [id number] Academic   12/16/2024 10:00 AM Den Segal MD [id number] Academic   2/4/2025  1:30 PM CHLOE CoradoCNP [id number] Academic   2/6/2025  2:30 PM Den Fajardo MD PhD [id number] Academic       JASVIR Harris-CNP

## 2024-12-06 NOTE — CARE PLAN
The patient's goals for the shift include No goal per patient.    The clinical goals for the shift include Patient's pain will remain within patient's acceptable limits this shift.      Problem: Skin  Goal: Participates in plan/prevention/treatment measures  Outcome: Progressing  Goal: Prevent/manage excess moisture  Outcome: Progressing  Goal: Prevent/minimize sheer/friction injuries  Outcome: Progressing  Goal: Promote/optimize nutrition  Outcome: Progressing  Goal: Promote skin healing  Outcome: Progressing     Problem: Pain - Adult  Goal: Verbalizes/displays adequate comfort level or baseline comfort level  Outcome: Progressing     Problem: Safety - Adult  Goal: Free from fall injury  Outcome: Progressing     Problem: Discharge Planning  Goal: Discharge to home or other facility with appropriate resources  Outcome: Progressing     Problem: Chronic Conditions and Co-morbidities  Goal: Patient's chronic conditions and co-morbidity symptoms are monitored and maintained or improved  Outcome: Progressing

## 2024-12-06 NOTE — SIGNIFICANT EVENT
Neurology Update Note:     Patient seen and staffed with neurology attending. Please see initial consult note form 12/5 for more details.    Assessment:   24 y.o. 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 Neurology consulted for persistent headaches. MRI Brain 11/28 compared to 10/13 shows enlarging ventricular mass with evidence of obstruction. On exam she has no deficits. Headaches are described as pressure like periorbital not radiating to the neck, with photophobia and phonophobia improves with laying down, no conjunctival injection or tearing. Responds well to migraine cocktails, last dose on 12/3.      Her headaches are migrainous in nature and she reports prior similar headaches. Suspect migraines are worse in setting of underlying structural mass and recent VPS surgery and shunt adjustment. She was agreeable to trying preventative and abortive migraine medication which can be titrated at neurology follow up.     Recommendations:  - Start Amitriptyline 12.5 mg (0.5 tablet) nightly for one week. If tolerating, can increase to 25 mg (1 tab) nightly  - Start Rizatriptan PRN for abortive therapy  - Recommend patient keeps a headache journal to document triggers, frequency and duration of headaches  - Follow up in neurology resident clinic    Neurology will sign off.     Anastacia Fleming MD  PGY-4 Neurology  Neurology [id number]

## 2024-12-06 NOTE — PROGRESS NOTES
Physical Therapy    Physical Therapy Treatment    Patient Name: Sejal Duff  MRN: [id number]  Department: [place] [address]  Room: 6076/6076-A  Today's Date: 12/6/2024  Time Calculation  Start Time: 1315  Stop Time: 1327  Time Calculation (min): 12 min         Assessment/Plan   PT Assessment  PT Assessment Results: Pain  Rehab Prognosis: Excellent  Barriers to Discharge: none  Evaluation/Treatment Tolerance: Patient tolerated treatment well  Medical Staff Made Aware: Yes  End of Session Communication: Bedside nurse  Assessment Comment: Patient was independent in all observed functional movement and ambulated >200 feet without a walker nor cane. Patient scored full points on ambulatory balance test (4 item DGI), was able to walk 10 steps with her eyes closed, and was able to multiask with gait without loss of balance (reaching while walking, math while walking). Patient too skilled for inpatient PT services at this time, but educated to reach out to nursing/MD team if functional ability changes while admitted. PT to discontinue orders at this time.  End of Session Patient Position: Bed, 3 rail up, Alarm off, not on at start of session  PT Plan  Inpatient/Swing Bed or Outpatient: Inpatient  PT Plan  Treatment/Interventions: Bed mobility, Transfer training, Gait training, Stair training, Balance training, Neuromuscular re-education, Neurodevelopmental intervention, Strengthening, Endurance training, Range of motion, Therapeutic exercise, Therapeutic activity, Home exercise program  PT Plan: Ongoing PT  PT Frequency:  (discharged from PT 12/6)  PT Discharge Recommendations: No PT needed after discharge  Equipment Recommended upon Discharge:  (No equipment needs anticipated)  PT Recommended Transfer Status: Stand by assist  PT - OK to Discharge: Yes      General Visit Information:   PT  Visit  PT Received On: 12/06/24  Response to Previous Treatment: Patient with no complaints from previous session.  General  Reason for Referral:  11/28 p/w worsening HA, light sensitivity, CTH incr vents, MRI incr ventricular mass 2.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 10/16 s/p R stereotactic brain biopsy  Patient Position Received: Bed, 3 rail up, Alarm off, not on at start of session  General Comment: Patient very motivated to work with PT    Subjective   Precautions:  Precautions  Hearing/Visual Limitations: WFL  Medical Precautions: Fall precautions    Vital Signs (Past 2hrs)        Date/Time Vitals Session Patient Position Pulse Resp SpO2 BP MAP (mmHg)    12/06/24 1210 --  --  91  16  99 %  110/71  --                         Objective   Pain:  Pain Assessment  Pain Assessment: 0-10  0-10 (Numeric) Pain Score: 8  Pain Location: Head  Pain Descriptors: Aching  Pain Interventions: Ambulation/increased activity  Cognition:  Cognition  Overall Cognitive Status: Within Functional Limits  Coordination:     Postural Control:       Activity Tolerance:  Activity Tolerance  Endurance: Tolerates 30+ min exercise without fatigue  Treatments:       Bed Mobility 1  Bed Mobility 1: Supine to sitting  Level of Assistance 1: Independent  Bed Mobility 2  Bed Mobility  2: Sitting to supine  Level of Assistance 2: Independent    Ambulation/Gait Training 1  Surface 1: Level tile  Device 1: No device  Comments/Distance (ft) 1: >200 feet with glasses on. 12/12 on 4-item DGI, walked 10 steps with her eyes closed, able to multitask with gait (reaching, math)  Transfer 1  Transfer From 1: Sit to, Stand to  Transfer to 1: Stand, Sit  Technique 1: Sit to stand, Stand to sit  Transfer Level of Assistance 1: Independent  Trials/Comments 1: no assistance needed    Outcome Measures:  [facility] Basic Mobility  Turning from your back to your side  while in a flat bed without using bedrails: None  Moving from lying on your back to sitting on the side of a flat bed without using bedrails: None  Moving to and from bed to chair (including a wheelchair): None  Standing up from a chair using your arms (e.g. wheelchair or bedside chair): None  To walk in hospital room: A little  Climbing 3-5 steps with railing: A little  Basic Mobility - Total Score: 22    Education Documentation  Precautions, taught by Gaston Mireles PT at 12/6/2024  1:34 PM.  Learner: Patient  Readiness: Acceptance  Method: Demonstration, Explanation  Response: Verbalizes Understanding, Demonstrated Understanding  Comment: Goals of PT, ambulatory balance    Body Mechanics, taught by Gaston Mireles PT at 12/6/2024  1:34 PM.  Learner: Patient  Readiness: Acceptance  Method: Demonstration, Explanation  Response: Verbalizes Understanding, Demonstrated Understanding  Comment: Goals of PT, ambulatory balance    Mobility Training, taught by Gaston Mireles PT at 12/6/2024  1:34 PM.  Learner: Patient  Readiness: Acceptance  Method: Demonstration, Explanation  Response: Verbalizes Understanding, Demonstrated Understanding  Comment: Goals of PT, ambulatory balance    Education Comments  No comments found.        OP EDUCATION:  Outpatient Education  Individual(s) Educated: Patient    Encounter Problems       Encounter Problems (Active)       PT Problem       Patient will perform bed mobility IND to reduce risk of developing decubitus ulcers.  (Met)       Start:  12/02/24    Expected End:  12/16/24    Resolved:  12/06/24         Patient will ambulate at least 250  ft. MOD-I with LRD to improve tolerance of community distances. (Met)       Start:  12/02/24    Expected End:  12/16/24    Resolved:  12/06/24         Patient will perform sit to stand and stand to sit transfers MOD-I with LRD to increase functional strength.   (Met)       Start:  12/02/24    Expected End:  12/16/24    Resolved:  12/06/24          Patient will ascend and descend >/= 10 steps MOD-I with railing  to facilitate safe navigation of stairs in the home.     (Progressing)       Start:  12/02/24    Expected End:  12/16/24            Patient will score >22/30 points on the Functional Gait Assessment to indicate decreased risk of falling.  (Progressing)       Start:  12/02/24    Expected End:  12/16/24               Pain - Adult

## 2024-12-06 NOTE — CONSULTS
Reason For Consult  Blurry vision after VPS placement    History Of Present Illness  Sejal Duff is a 24 y.o. 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 Ophthalmology consulted due to subjective visual changes since surgery.     Patient states that ever since her shunt placement, she has had significant headaches and photophobia in both eyes. She normally has photophobia in the left eye but not both eyes are bothered and affected. She has to have the blinds closed in her room to prevent any significant pain and discomfort. Neurology has recommend headache regimen which has been partially helpful with the photophobia as well. She denies flashes, floaters, loss of vision or dimming of vision.      Past Medical History  She has a past medical history of Encounter for screening for infections with a predominantly sexual mode of transmission (03/02/2020), Melena (04/12/2019), Other specified health status, Other specified health status, Personal history of other (healed) physical injury and trauma, and Unspecified condition associated with female genital organs and menstrual cycle (03/02/2020).    Physical Exam  Base Eye Exam       Visual Acuity (Snellen - Linear)         Right Left    Near sc 20/25+2 20/50              Tonometry (Tonopen, 2:09 PM)         Right Left    Pressure 18 18              Pupils         Dark Light Shape React APD    Right 6 3 Round Brisk None    Left 6 3 Round Brisk None              Visual Fields (Counting fingers)         Left Right     Full Full              Extraocular Movement         Right Left     Full, Ortho Full, Ortho              Neuro/Psych       Oriented x3: Yes                  Additional Tests       Color         Right Left    Ish[place]ara 14/14 1/14                  Slit  "Lamp and Fundus Exam       External Exam         Right Left    External Normal Normal              Slit Lamp Exam         Right Left    Lids/Lashes Normal Normal    Conjunctiva/Sclera White and quiet White and quiet    Cornea Clear Clear    Anterior Chamber Deep and quiet Deep and quiet    Iris Round and reactive Round and reactive    Lens Clear Clear    Anterior Vitreous Normal Normal              Fundus Exam         Right Left    Disc Temporal pallor Temporal pallor    C/D Ratio 0.1 0.1    Macula Normal Normal    Vessels Normal Normal    Periphery Normal Normal                     Last Recorded Vitals  Blood pressure 110/71, pulse 91, temperature 36.5 °C (97.7 °F), temperature source Temporal, resp. rate 16, height 1.6 m (5' 3\"), weight 77.2 kg (170 lb 3.1 oz), SpO2 99%.    Relevant Results  12/04/2024 CT head without contrast, personally reviewed shows ventriculostomy shunt catheter with stable mass     11/28/2024 MRI brain with and without contrast, personally reviewed demonstrates hypothalamic mass with ventriculomegaly, roughly 2.5x2.5x2.5mm axially, which is an apparent increase from 10/13/24 imaging. There is compression of the optic chiasm which was demonstrated on previous MRI. 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 Her exam is reassuring for stable entrance testing and stable appearance of her optic nerves and anterior/posterior segment exam. Her symptoms do not localize to a specific change in her exam from the ophthalmology perspective, and likely are more so associated with headache and her recent surgery. Recommend continued treatment of headache and pain from " neurology/neurosurgery, and opthalmology will continue to keep her upcoming appointment with neuro-ophthalmology to continue to assess for formal visual fields and optic nerve RNFL thickness.    #Visual disturbances  #History of optic chiasmal compression  - Stable entrance testing and examination today from recent and prior exams  - Recommend continued treatment of headache and pain per neurology and neurosurgery  - No indication for further imaging or intervention from ophthalmology standpoint  - Patient to follow up with Dr. Fajardo as scheduled 2/6/24, sooner prn with any issues.      Taran Serra MD  PGY3 - Ophthalmology     Ophthalmology Adult Pager - [phone]  Ophthalmology Pediatrics Pager - [phone]    For adult follow-up appointments, call: [phone]  For pediatric follow-up appointments, call: [phone]    NOTE: This note is not finalized until attending reviews and signs.

## 2024-12-06 NOTE — PROGRESS NOTES
"Sejal Duff is a 24 y.o. female on day 8 of admission presenting with Hydrocephalus.    Subjective   No acute events overnight. Reports worsening light sensitivity that slightly improved after shunt was dialed.     Objective     Physical Exam  Physical Exam  Awake Ox3  BUE 5/5  BLE 5/5  SILT   Incision c/d/I    Last Recorded Vitals  Blood pressure 118/81, pulse 110, temperature 36.6 °C (97.9 °F), temperature source Temporal, resp. rate 16, height 1.6 m (5' 3\"), weight 77.2 kg (170 lb 3.1 oz), SpO2 99%.  Intake/Output last 3 Shifts:  I/O last 3 completed shifts:  In: 1000 (13 mL/kg) [IV Piggyback:1000]  Out: - (0 mL/kg)   Weight: 77.2 kg     Relevant Results                 Results from last 72 hours   Lab Units 12/05/24  0703 12/04/24  0715   GLUCOSE mg/dL 96 96   SODIUM mmol/L 141 143   POTASSIUM mmol/L 4.2 3.9   CHLORIDE mmol/L 107 109*   CO2 mmol/L 25 28   ANION GAP mmol/L 13 10   BUN mg/dL 15 11   CREATININE mg/dL 0.54 0.45*   EGFR mL/min/1.73m*2 >90 >90   CALCIUM mg/dL 9.4 9.1   PHOSPHORUS mg/dL 4.5 4.2   ALBUMIN g/dL 3.9 3.7   MAGNESIUM mg/dL 1.88 2.13   POCT CALCIUM IONIZED (MMOL/L) IN BLOOD mmol/L 1.27 1.25      Results from last 72 hours   Lab Units 12/05/24  0703 12/04/24  0715   WBC AUTO x10*3/uL 8.6 8.6   NRBC AUTO /100 WBCs 0.0 0.0   RBC AUTO x10*6/uL 3.55* 3.60*   HEMOGLOBIN g/dL 9.4* 9.4*   HEMATOCRIT % 30.8* 31.0*   MCV fL 87 86   MCH pg 26.5 26.1   MCHC g/dL 30.5* 30.3*   RDW % 15.2* 15.3*   PLATELETS AUTO x10*3/uL 323 307                   Assessment/Plan   Assessment & Plan  Hydrocephalus    Sejal Duff is a 24 y.o. female h/o anemia, asthma GERD, IBS, PCOS, breast fibroadenoma s/p L breast excisional biopsy, intially p/w HAx 3d, CTH third ventricular mass w/ calcifications, MRI 2.4cm peripherally enhancing third ventricular necrotic mass w/ nonenhancing FLAIR hyperintense lesion in medial L temporal lobe,10/16 s/p R stereotactic brain bx (prelim glial cells w/ luigi fibers vs " craniopharyngioma), 11/28 p/w worsening HA, light sensitivity, CTH incr vents, MRI incr ventricular mass 2.7x2.5cm, ventriculomegaly, trace R ventricular GRE   12/1 s/p RO VPS (Certas at 5 w/o antisiphon), CTH cath in pos'n decr vents, 12/4 CTH decr vents but persistent ventriculomegaly, certas dialed down to 4, 12/5 CTH decr vents     Floor  Con't dex 1q12   Pain management   Ok for chemo post-day 7   Neuro recs - trial HA cocktail, likely 2/2 mass and recent surgery  Ophthalmology recs  PTOT-NN  SCD, LVX     Eliseo Obrien MD

## 2024-12-07 NOTE — CARE PLAN
Problem: Skin  Goal: Participates in plan/prevention/treatment measures  Outcome: Progressing  Goal: Prevent/manage excess moisture  Outcome: Progressing  Goal: Prevent/minimize sheer/friction injuries  Outcome: Progressing  Goal: Promote/optimize nutrition  Outcome: Progressing  Goal: Promote skin healing  Outcome: Progressing     Problem: Pain - Adult  Goal: Verbalizes/displays adequate comfort level or baseline comfort level  Outcome: Progressing     Problem: Fall/Injury  Goal: Not fall by end of shift  Outcome: Progressing  Goal: Be free from injury by end of the shift  Outcome: Progressing  Goal: Verbalize understanding of personal risk factors for fall in the hospital  Outcome: Progressing  Goal: Pace activities to prevent fatigue by end of the shift  Outcome: Progressing    The clinical goals for the shift include Patient's pain controlled to tolerable level. Patient compliant with DVT prophylaxis. Patient remains safe and free from falls.

## 2024-12-07 NOTE — NURSING NOTE
Reviewed discharge instructions with the patient. Reviewed all signs and symptoms to notify her provider of if she develops them under call your provider if you experience. Informed the patient there is no driving, no strenuous activity, she is weight bearing as tolerated, and she may return to her previous diet. Reviewed all instructions listed under other instructions. Reviewed upcoming follow up appointments with the patient. Reviewed correct administration of medications as listed on medication list. Informed the patient to not drive or operate machinery while taking Flexeril and Oxycodone. Patient verbalized understanding to not drive or operate machinery while taking Flexeril and Oxycodone. Educated the patient that Elavil is her preventative migraine medication that she should take daily and that Maxalt is her abortive migraine medication to take at the onset of a migraine and not to exceed 30mg of Maxalt in 24 hours. Patient verbalized understanding of instructions and did not have any questions. Patient's father drove her home.

## 2024-12-09 ENCOUNTER — PATIENT OUTREACH (OUTPATIENT)
Dept: CARE COORDINATION | Facility: CLINIC | Age: 24
End: 2024-12-09
Payer: COMMERCIAL

## 2024-12-09 DIAGNOSIS — C71.9 PILOCYTIC ASTROCYTOMA (MULTI): ICD-10-CM

## 2024-12-09 NOTE — PROGRESS NOTES
Outreach call to patient to support a smooth transition of care from recent admission.  Phone disconnected.   No Cherry RN [facility]  [phone]

## 2024-12-09 NOTE — PROGRESS NOTES
SUPPORTIVE AND PALLIATIVE ONCOLOGY CONSULT - OUTPATIENT      SERVICE DATE: 12/11/2024    Medical Oncologist: Migue Aguilar MD    Radiation Oncologist: No care team member to display  Primary Physician: Melba Graham  [phone]    REASON FOR CONSULT/CHIEF CONSULT COMPLAINT: pain management, other symptom control, and Introduction to Supportive and Palliative Oncology Services    Subjective   HISTORY OF PRESENT ILLNESS: Sejal Duff is a 24 y.o. 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     Patient returned to the [facility] ED 11/28 with worsening HA, light sensitivity, CTH incr vents, MRI incr ventricular mass 2.7x2.5cm, ventriculomegaly, trace R ventricular GRE. Patient admitted to neurosurgery service for further management.      11/28 Ophthalmology evaluation without papilledema.  12/1 s/p R VPS (certas at 5).   12/2 CTH with catheter in position, decreased vents   12/4 CTH decreased vents but persistent ventriculomegly; Certas dialed to 4  12/5 CTH decreased vents.  Continued headaches Neurology consulted--> likely due to underlying structural mass and recent surgical interventions.  Started on scheduled oral headache cocktail meds of Robaxin, Tylenol, reglan and Benadryl  12/6 Ophthalmology consulted for c/o vision changes--> normal exam, no disc edema  Neurology--> HA's migrainous in nature, has had prior and now worse  in setting of underlying structural mass and recent VPS surgery.  Started on preventative therapy with amitriptyline 12.5 mg bedtime for 1 week and increase to 25mg if tolerates and abortive therapy with Rizatriptan PRN    Pain Assessment:  Pain Score:  0  Location:    Description:      Symptom Assessment:  Pain:none  Numbness or Tingling in hands/feet/other: none  Sore Muscles/Spasms: none  Headache: somewhat - patient reports intermittent headaches that have improved some since being in the hospital. She is taking Amitriptyline 25mg at bed. Taking Oxycodone 1x every few days. Has been taking Rizatriptyn in the AM every morning, she did not know this was just an as needed medication for rescue  Dizziness:none  Constipation: a little - IBS hx. Notes chronic issues of constipation, Linzess and Motegrity daily. Last BM at discharge from hospital. Follows with GI.  Diarrhea: none  Nausea: a little - using zofran as needed, every few days, not a daily need  Vomiting: none  Lack of Appetite: a little - appetite is low but forcing self to eat. Had 2 ensures this AM. Notes historically she is not a big eater and generally eats 1 meal a day. She has been on Ensure daily prior to this diagnosis  Weight Loss: none  Taste changes: none  Dry Mouth: none  Pain in Mouth/Swallowing: none  Lack of Energy: somewhat - reports a great amount of fatigue, slept all day yesterday and all night and still with a lot of fatigue this AM. Notes this is not her normal self, states she is normally very energetic and bubbly  Difficulty Sleeping: a little  Worrying: none  Anxiety: none  Depression: none  Shortness of breath: none  Other: none      Information obtained from: chart review and interview of patient  ______________________________________________________________________     Oncology History   Pilocytic astrocytoma (Multi)   10/15/2024 Surgery    Biopsy of hypothalamic lesion by Dr. Segal at [facility]       10/15/2024 Pathology     A.  B.  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  See note     Note:  Microscopic examination for parts A and B reveal a low-grade glial neoplasm immunoreactive with OLIG-2 and GFAP.  Neurofilament immunohistochemical stain highlights neuropil and scattered neuronal bodies.  ATRX and [id number] immunohistochemical stains are retained.  IDH1 and BRAF immunohistochemical stains are negative.  GLIOSEQ studies reveal a BRAF-V600E mutation.  This finding in conjunction with the morphology are most consistent with a pilocytic astrocytoma.  However, a ganglioglioma among other low-grade glial/glial neuronal neoplasms, cannot be completely ruled out.  Clinical correlation is recommended         Past Medical History:   Diagnosis Date    Encounter for screening for infections with a predominantly sexual mode of transmission 03/02/2020    Screening for STD (sexually transmitted disease)    Melena 04/12/2019    Blood in stool    Other specified health status     No pertinent past medical history    Other specified health status     No pertinent past surgical history    Personal history of other (healed) physical injury and trauma     History of sprain of ankle    Unspecified condition associated with female genital organs and menstrual cycle 03/02/2020    Cervical motion tenderness     Past Surgical History:   Procedure Laterality Date    BREAST SURGERY Left     Mass removal    OTHER SURGICAL HISTORY  01/18/2019    No history of surgery     Family History   Problem Relation Name Age of Onset    Asthma Mother      Colon cancer Mother          SOCIAL HISTORY  Social History:  reports that she has never smoked. She has never been exposed to tobacco smoke. She has never used smokeless tobacco. She reports current alcohol use. She reports current drug use. Drug: Marijuana.  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 No children. Single. Primary support is her mom.    REVIEW OF  SYSTEMS  Review of systems negative unless noted in HPI.       Objective     Current Outpatient Medications   Medication Instructions    acetaminophen (TYLENOL) 650 mg, oral, Every 6 hours PRN    albuterol 90 mcg/actuation inhaler 1-2 puffs, inhalation, Every 4 hours PRN    amitriptyline (Elavil) 25 mg tablet Take 0.5 tablets (12.5 mg) by mouth once daily at bedtime for 7 days, THEN 1 tablet (25 mg) once daily at bedtime. Increase to 1 tab (25 mg) at bedtime after 7 days if tolerates 0.5 tab (12.5 mg).    benzoyl peroxide (Benzac AC) 10 % external wash APPLY TOPICALLY 2 TIMES A DAY. WASH TWICE DAILY AS DIRECTED    caffeine 200 mg, oral, Every 6 hours PRN    cholecalciferol (VITAMIN D-3) 50,000 Units, oral, Weekly    cyclobenzaprine (FLEXERIL) 5 mg, oral, 3 times daily PRN    dexAMETHasone (DECADRON) 1 mg, oral, 2 times daily    ergocalciferol (VITAMIN D-2) 1,250 mcg, oral, Weekly    Lacto no.06-Tltuur-SQG-larch 25B cell-25B cell-50 mg capsule 1 capsule, oral, Daily    lidocaine 4 % patch 1 patch, transdermal, Daily, Apply 1 patch to neck Daily for 12 hours, on in morning, off at bedtime    linaCLOtide (LINZESS) 145 mcg, oral, Daily before breakfast, Do not crush or chew.    loratadine (CLARITIN) 10 mg, oral, Daily    metroNIDAZOLE (FLAGYL) 500 mg, 3 times daily    Motegrity 2 mg, oral, Daily    multivitamin with iron (Daily Multiple Vitamins/Iron) tablet 1 tablet, oral, Daily    omeprazole (PRILOSEC) 40 mg, Daily    ondansetron (ZOFRAN) 4 mg, oral, Every 6 hours PRN    ondansetron ODT (ZOFRAN-ODT) 4 mg, oral, Every 8 hours PRN    oxyCODONE (ROXICODONE) 5 mg, oral, Every 6 hours PRN    phentermine (ADIPEX-P) 37.5 mg, Daily before breakfast    polyethylene glycol (GLYCOLAX, MIRALAX) 17 g, oral, Daily, Dissolve into 8 oz of liquid    rizatriptan (MAXALT) 10 mg, oral, Once as needed, May repeat in 2 hours if unresolved. Do not exceed 30 mg in 24 hours.    tovorafenib 600 mg, oral, Once Weekly, (Six 100 mg tablets = 600  mg.) Swallow tablets whole with water. Do not chew, cut, or crush. Tablets may be taken with or without food.    DO NOT RESUME UNTIL 1 WEEK POST OPERATIVELY (12/8/2024)    vilazodone (VIIBRYD) 10 mg, Daily       Allergies:   Allergies   Allergen Reactions    Morphine Dizziness    Pollen Extracts Runny nose    House Dust Runny nose        PHYSICAL EXAMINATION  Vital Signs:   Vital signs reviewed      12/5/2024     7:10 AM 12/5/2024     8:08 PM 12/5/2024    11:44 PM 12/6/2024     7:58 AM 12/6/2024    12:10 PM 12/6/2024     3:47 PM 12/11/2024    10:17 AM   Vitals   Systolic 113 105 118 114 110 109 133   Diastolic 80 69 81 73 71 70 90   BP Location Right arm Right arm Right arm Right arm Right arm Right arm Left arm   Heart Rate 91 108 110 99 91 93 126   Temp 36.7 °C (98.1 °F) 36.4 °C (97.5 °F) 36.6 °C (97.9 °F) 36.3 °C (97.3 °F) 36.5 °C (97.7 °F) 36.3 °C (97.3 °F) 35.5 °C (95.9 °F)   Resp 18 16 16 16 16 16 16   Weight (lb)       154.33   BMI       27.34 kg/m2   BSA (m2)       1.76 m2   Visit Report       Report     Physical Exam  Vitals reviewed.   Constitutional:       Appearance: Normal appearance.   HENT:      Head: Normocephalic.   Cardiovascular:      Rate and Rhythm: Normal rate and regular rhythm.   Pulmonary:      Effort: Pulmonary effort is normal.   Abdominal:      General: Abdomen is flat.      Palpations: Abdomen is soft.   Musculoskeletal:         General: Normal range of motion.   Skin:     General: Skin is warm and dry.   Neurological:      General: No focal deficit present.      Mental Status: She is alert and oriented to person, place, and time. Mental status is at baseline.   Psychiatric:         Mood and Affect: Mood normal.         Behavior: Behavior normal.         Thought Content: Thought content normal.         Judgment: Judgment normal.       ASSESSMENT/PLAN    Pain/Headaches  Pain is: cancer related pain  Type: neuropathic  - Continue Oxycodone 5mg [id number] PRN  - Increase to Acetaminophen  1000mg [id number] PRN - MAX 3000mg/24 hours  - Start Ibuprofen 800mg [id number] PRN  - Continue Amitriptyline 25mg at bed  - Continue Maxalt 10mg once daily PRN - discussed this is a rescue medicine for headaches if other medications to no work. Encouraged patient to not take daily    Opioid Use  Medication Management:   - OARRS report reviewed with no aberrant behavior; consistent with  prescriptions/records and patient history  - MED 10.  Overdose Risk Score 380.   This has been discussed with patient.   - We will continue to closely monitor the patient for signs of prescription misuse including UDS, OARRS review and subjective reports at each visit.  - No concurrent benzodiazepine use   - I am a provider who either is or has consulted and collaborated with a provider certified in Hospice and Palliative Medicine and have conducted a face-face visit and examination for this patient.  - Routine Urine Drug Screen: Pending 12/11/2024  - Controlled Substance Agreement: 12/11/2024  - Specifically discussed that controlled substance prescriptions will only be provided by our group as outlined in the completed agreement  - Prescribed naloxone: patient declined  - Red Flags: NA    Constipation  At risk for constipation related to opioids. Chronic concerns of constipation  - Continue Linzess and Motegrity as prescribed by GI    Nausea/Decreased Appetite  - Continue Omeprazole 40mg daily  - Continue Ondansetron ODT 4mg [id number] PRN  - Encouraged good hydration  - Encouraged small more frequent meals  - Encouraged continued Ensure 1-2x daily  Nutrition consult 12/11/2024    Sleeping Difficulty  - Amitriptyline 25mg at bed may assist    Fatigue  - Start Provigil 100mg daily    Tachycardia  - EKG completed today - sinus tach    Introduction to Supportive and Palliative Oncology:  Spoke with patient   Introduced the role and philosophy of Supportive and Palliative oncology in the evaluation and management of symptoms during cancer  treatment  Palliative care was introduced as a service for patients with serious illness to help with symptoms, assist with goals of care conversations, navigate complex decision making, improve quality of life for patients, and provide support both patients and families.  Patient seemed to appreciate the extra layer of support.    Advance Directives  Existence of Advance Directives:No - not interested  Decision maker: Surrogate decision maker is Darby Duff (mom)  Code Status: Full code    Next Follow-Up Visit:  Return to clinic in 3 weeks virtual    Signature and billing  Thank you for allowing us to participate in the care of this patient. Recommendations will be communicated back to the consulting service by way of shared electronic medical record or face-to-face.    Medical complexity was moderate level due to due to complexity of problems, extensive data review, and high risk of management/treatment.  Time was spent on the following: Prep Time, Time Directly with Patient/Family/Caregiver, Documentation Time. Total time spent: 65      DATA   Diagnostic tests and information reviewed for today's visit:  Most recent labs and imaging results, Medications     Plan of Care discussed with: Patient      SIGNATURE: SANTOSH Macias    Contact information:  Supportive and Palliative Oncology  Monday-Friday 8 AM-5 PM  Phone:  [phone], press option #5, then option #1.   Or Epic Secure Chat

## 2024-12-11 ENCOUNTER — TELEPHONE (OUTPATIENT)
Dept: PALLIATIVE MEDICINE | Facility: HOSPITAL | Age: 24
End: 2024-12-11

## 2024-12-11 ENCOUNTER — OFFICE VISIT (OUTPATIENT)
Dept: PALLIATIVE MEDICINE | Facility: HOSPITAL | Age: 24
End: 2024-12-11
Payer: COMMERCIAL

## 2024-12-11 VITALS
OXYGEN SATURATION: 100 % | RESPIRATION RATE: 16 BRPM | WEIGHT: 154.33 LBS | DIASTOLIC BLOOD PRESSURE: 90 MMHG | HEART RATE: 126 BPM | SYSTOLIC BLOOD PRESSURE: 133 MMHG | BODY MASS INDEX: 27.34 KG/M2 | TEMPERATURE: 95.9 F

## 2024-12-11 DIAGNOSIS — G89.18 POSTOPERATIVE PAIN: ICD-10-CM

## 2024-12-11 DIAGNOSIS — G93.89 BRAIN MASS: ICD-10-CM

## 2024-12-11 DIAGNOSIS — R63.0 LOSS OF APPETITE: ICD-10-CM

## 2024-12-11 DIAGNOSIS — K21.9 GASTROESOPHAGEAL REFLUX DISEASE WITHOUT ESOPHAGITIS: ICD-10-CM

## 2024-12-11 DIAGNOSIS — K58.1 IRRITABLE BOWEL SYNDROME WITH CONSTIPATION: ICD-10-CM

## 2024-12-11 DIAGNOSIS — G43.919 INTRACTABLE MIGRAINE WITHOUT STATUS MIGRAINOSUS, UNSPECIFIED MIGRAINE TYPE: ICD-10-CM

## 2024-12-11 DIAGNOSIS — Z79.891 ENCOUNTER FOR MONITORING OPIOID MAINTENANCE THERAPY: ICD-10-CM

## 2024-12-11 DIAGNOSIS — C71.9 PILOCYTIC ASTROCYTOMA (MULTI): ICD-10-CM

## 2024-12-11 DIAGNOSIS — R00.0 TACHYCARDIA: ICD-10-CM

## 2024-12-11 DIAGNOSIS — R11.0 NAUSEA: ICD-10-CM

## 2024-12-11 DIAGNOSIS — Z51.5 PALLIATIVE CARE ENCOUNTER: Primary | ICD-10-CM

## 2024-12-11 DIAGNOSIS — K31.84 GASTROPARESIS: ICD-10-CM

## 2024-12-11 DIAGNOSIS — Z51.81 ENCOUNTER FOR MONITORING OPIOID MAINTENANCE THERAPY: ICD-10-CM

## 2024-12-11 DIAGNOSIS — R53.0 NEOPLASTIC MALIGNANT RELATED FATIGUE: ICD-10-CM

## 2024-12-11 LAB
AMPHETAMINES UR QL SCN: ABNORMAL
BARBITURATES UR QL SCN: ABNORMAL
BENZODIAZ UR QL SCN: ABNORMAL
BZE UR QL SCN: ABNORMAL
CANNABINOIDS UR QL SCN: ABNORMAL
FENTANYL+NORFENTANYL UR QL SCN: ABNORMAL
METHADONE UR QL SCN: ABNORMAL
OPIATES UR QL SCN: ABNORMAL
OXYCODONE+OXYMORPHONE UR QL SCN: ABNORMAL
PCP UR QL SCN: ABNORMAL

## 2024-12-11 PROCEDURE — 80361 OPIATES 1 OR MORE: CPT | Performed by: NURSE PRACTITIONER

## 2024-12-11 PROCEDURE — 93010 ELECTROCARDIOGRAM REPORT: CPT | Performed by: NURSE PRACTITIONER

## 2024-12-11 PROCEDURE — 99417 PROLNG OP E/M EACH 15 MIN: CPT | Performed by: NURSE PRACTITIONER

## 2024-12-11 PROCEDURE — 80307 DRUG TEST PRSMV CHEM ANLYZR: CPT | Performed by: NURSE PRACTITIONER

## 2024-12-11 PROCEDURE — 99215 OFFICE O/P EST HI 40 MIN: CPT | Mod: 25 | Performed by: NURSE PRACTITIONER

## 2024-12-11 PROCEDURE — 99215 OFFICE O/P EST HI 40 MIN: CPT | Performed by: NURSE PRACTITIONER

## 2024-12-11 RX ORDER — ACETAMINOPHEN 500 MG
1000 TABLET ORAL EVERY 8 HOURS PRN
Qty: 180 TABLET | Refills: 2 | Status: SHIPPED | OUTPATIENT
Start: 2024-12-11 | End: 2025-03-11

## 2024-12-11 RX ORDER — ONDANSETRON 4 MG/1
4 TABLET, ORALLY DISINTEGRATING ORAL EVERY 6 HOURS PRN
Qty: 120 TABLET | Refills: 0 | Status: SHIPPED | OUTPATIENT
Start: 2024-12-11 | End: 2025-01-10

## 2024-12-11 RX ORDER — MODAFINIL 100 MG/1
100 TABLET ORAL DAILY
Qty: 30 TABLET | Refills: 0 | Status: SHIPPED | OUTPATIENT
Start: 2024-12-11 | End: 2025-01-10

## 2024-12-11 RX ORDER — OMEPRAZOLE 40 MG/1
40 CAPSULE, DELAYED RELEASE ORAL DAILY
Qty: 30 CAPSULE | Refills: 2 | Status: SHIPPED | OUTPATIENT
Start: 2024-12-11 | End: 2025-03-11

## 2024-12-11 RX ORDER — OXYCODONE HYDROCHLORIDE 5 MG/1
5 TABLET ORAL EVERY 6 HOURS PRN
Qty: 60 TABLET | Refills: 0 | Status: SHIPPED | OUTPATIENT
Start: 2024-12-11 | End: 2024-12-26

## 2024-12-11 RX ORDER — IBUPROFEN 800 MG/1
800 TABLET ORAL 3 TIMES DAILY PRN
Qty: 90 TABLET | Refills: 2 | Status: SHIPPED | OUTPATIENT
Start: 2024-12-11 | End: 2025-03-11

## 2024-12-11 RX ORDER — RIZATRIPTAN BENZOATE 10 MG/1
10 TABLET ORAL ONCE AS NEEDED
Qty: 15 TABLET | Refills: 0 | Status: SHIPPED | OUTPATIENT
Start: 2024-12-11 | End: 2024-12-26

## 2024-12-11 ASSESSMENT — PAIN SCALES - GENERAL: PAINLEVEL_OUTOF10: 0-NO PAIN

## 2024-12-11 NOTE — TELEPHONE ENCOUNTER
[facility] called to see if any medications prescribed today by Amy Calloway NP with supportive oncology would require a PA.  Voicemail message left for pharmacy to call office back if any PAs would need to be sent.

## 2024-12-13 ENCOUNTER — TELEPHONE (OUTPATIENT)
Dept: HEMATOLOGY/ONCOLOGY | Facility: CLINIC | Age: 24
End: 2024-12-13
Payer: COMMERCIAL

## 2024-12-13 DIAGNOSIS — C71.9 PILOCYTIC ASTROCYTOMA (MULTI): Primary | ICD-10-CM

## 2024-12-13 LAB
6MAM UR CFM-MCNC: <25 NG/ML
CODEINE UR CFM-MCNC: <50 NG/ML
HYDROCODONE CTO UR CFM-MCNC: <25 NG/ML
HYDROMORPHONE UR CFM-MCNC: <25 NG/ML
MORPHINE UR CFM-MCNC: <50 NG/ML
NORHYDROCODONE UR CFM-MCNC: <25 NG/ML
NOROXYCODONE UR CFM-MCNC: 172 NG/ML
OXYCODONE UR CFM-MCNC: 42 NG/ML
OXYMORPHONE UR CFM-MCNC: 528 NG/ML

## 2024-12-13 NOTE — TELEPHONE ENCOUNTER
Called patient, patient is getting her new medication ojemda delivered on Monday, she does not want to start until Friday when she gets off work at 3:00 pm incase she has any side effects so she has the weekend to manage them, advised patient to have blood work completed on Monday 12/16 and then she will need more labs 2 weeks later to make sure she is tolerating the medication, patient verbalizes understanding, we discussed possible side effects that includes but is not limited to nausea, vomiting, fatigue, rash, and hair color changes

## 2024-12-16 ENCOUNTER — OFFICE VISIT (OUTPATIENT)
Dept: NEUROSURGERY | Facility: HOSPITAL | Age: 24
End: 2024-12-16
Payer: COMMERCIAL

## 2024-12-16 VITALS
BODY MASS INDEX: 26.22 KG/M2 | SYSTOLIC BLOOD PRESSURE: 111 MMHG | RESPIRATION RATE: 16 BRPM | WEIGHT: 148 LBS | HEART RATE: 93 BPM | HEIGHT: 63 IN | DIASTOLIC BLOOD PRESSURE: 75 MMHG

## 2024-12-16 DIAGNOSIS — C71.9 LOW GRADE GLIOMA OF BRAIN (MULTI): Primary | ICD-10-CM

## 2024-12-16 DIAGNOSIS — Z98.2 VP (VENTRICULOPERITONEAL) SHUNT STATUS: ICD-10-CM

## 2024-12-16 PROCEDURE — 1036F TOBACCO NON-USER: CPT | Performed by: NEUROLOGICAL SURGERY

## 2024-12-16 PROCEDURE — 3008F BODY MASS INDEX DOCD: CPT | Performed by: NEUROLOGICAL SURGERY

## 2024-12-16 PROCEDURE — 99211 OFF/OP EST MAY X REQ PHY/QHP: CPT | Performed by: NEUROLOGICAL SURGERY

## 2024-12-16 ASSESSMENT — PAIN SCALES - GENERAL: PAINLEVEL_OUTOF10: 7

## 2024-12-16 NOTE — PROGRESS NOTES
[place]   Neurosurgery    Diagnosis  Sejal was seen today for post-op.  Diagnoses and all orders for this visit:  Low grade glioma of brain (Multi)   (ventriculoperitoneal) shunt status      Patient Discussion/Summary  Sejal previously presented with progressive headaches, and was found to have a third ventricular/hypothalamic mass.  We performed a stereotactic biopsy on October 15.  Follow pathology returned as a low-grade neoplasm, with a BRAF-V600E mutation.  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    Due to its location, treatment with chemotherapy and radiation therapy has been favored.    In the interim since starting therapies, she did present with progressive headaches.  She was identified to have ventriculomegaly as well as an increase in the size of this mass.  She underwent implantation of a ventriculoperitoneal shunt with my colleague Dr. Yap on December 1.  She had a short inpatient stay for management of her headaches, and then was discharged home in stable condition.    In follow-up, her headaches have improved, although she has persistent low-grade headaches.  She does have some tightness in her abdomen and chest, possibly consistent with laparoscopic surgery and inflation of her peritoneum.    Otherwise, she had questions regarding her medical management today, and we have redirected her to oncology for further clarification.  It is our hope that this can be treated medically and potentially with radiaiton, as surgical resection will undoubtedly cause some sort of hypothalamic dysfunction.    We will follow her closely in our tumor board.      History of Present Illness  Chief Complaint:   Chief Complaint   Patient presents with    Post-op         HPI: Sejal Duff is a 24 y.o. female with a past medical history of anemia, asthma GERD, IBS, PCOS and breast fibroadenoma s/p L breast excisional biopsy who initially presented with HAx 3d, CTH third ventricular mass  with calcifications, MRI 2.4cm peripherally enhancing third ventricular necrotic mass w/ nonenhancing FLAIR hyperintense lesion in medial L temporal lobe s/p R stereotactic brain bx (prelim glial cells w/ luigi fibers vs craniopharyngioma) on 10/16. Patient returned to the [facility] ED 11/28 with worsening HA, light sensitivity, CTH incr vents, MRI incr ventricular mass 2.7x2.5cm, ventriculomegaly, trace R ventricular GRE. 12/1 s/p R VPS (certas at 5). 12/4 CTH decreased vents but persistent ventriculomegly; Certas dialed to 4. 12/5 CTH decreased vents. Continued headaches Neurology consulted--> likely due to underlying structural mass and recent surgical interventions. Started on scheduled oral headache cocktail meds of Robaxin, Tylenol, reglan and Benadryl. 12/6 Ophthalmology consulted for c/o vision changes--> normal exam, no disc edema. Patient was discharged home in stable condition. Patient presents today for her post op visit for wound check and discuss progress since surgery.     Feels tightness in her chest from shunt tubing. Having spasms in stomach. No drainage, swelling or redness from incision. Still having headache with goes away with meds. Denies visual issues.       ROS: As noted in HPI.      Previous History  Past Medical History:   Diagnosis Date    Encounter for screening for infections with a predominantly sexual mode of transmission 03/02/2020    Screening for STD (sexually transmitted disease)    Melena 04/12/2019    Blood in stool    Other specified health status     No pertinent past medical history    Other specified health status     No pertinent past surgical history    Personal history of other (healed) physical injury and trauma     History of sprain of ankle    Unspecified condition associated with female genital organs and menstrual cycle 03/02/2020    Cervical motion tenderness     Past Surgical History:   Procedure Laterality Date    BREAST SURGERY Left     Mass removal    OTHER  SURGICAL HISTORY  01/18/2019    No history of surgery     Social History     Tobacco Use    Smoking status: Never     Passive exposure: Never    Smokeless tobacco: Never   Vaping Use    Vaping status: Never Used   Substance Use Topics    Alcohol use: Yes     Comment: social    Drug use: Yes     Types: Marijuana     Family History   Problem Relation Name Age of Onset    Asthma Mother      Colon cancer Mother       Allergies   Allergen Reactions    Morphine Dizziness    Pollen Extracts Runny nose    House Dust Runny nose     Current Outpatient Medications   Medication Instructions    acetaminophen (TYLENOL) 1,000 mg, oral, Every 8 hours PRN    albuterol 90 mcg/actuation inhaler 1-2 puffs, inhalation, Every 4 hours PRN    amitriptyline (Elavil) 25 mg tablet Take 0.5 tablets (12.5 mg) by mouth once daily at bedtime for 7 days, THEN 1 tablet (25 mg) once daily at bedtime. Increase to 1 tab (25 mg) at bedtime after 7 days if tolerates 0.5 tab (12.5 mg).    benzoyl peroxide (Benzac AC) 10 % external wash APPLY TOPICALLY 2 TIMES A DAY. WASH TWICE DAILY AS DIRECTED    caffeine 200 mg, oral, Every 6 hours PRN    cholecalciferol (VITAMIN D-3) 50,000 Units, oral, Weekly    cyclobenzaprine (FLEXERIL) 5 mg, oral, 3 times daily PRN    dexAMETHasone (DECADRON) 1 mg, oral, 2 times daily    ergocalciferol (VITAMIN D-2) 1,250 mcg, oral, Weekly    ibuprofen 800 mg, oral, 3 times daily PRN    Lacto no.71-Ugrcua-LZW-larch 25B cell-25B cell-50 mg capsule 1 capsule, oral, Daily    linaCLOtide (LINZESS) 145 mcg, oral, Daily before breakfast, Do not crush or chew.    loratadine (CLARITIN) 10 mg, oral, Daily    metroNIDAZOLE (FLAGYL) 500 mg, 3 times daily    modafinil (PROVIGIL) 100 mg, oral, Daily, For fatigue.    Motegrity 2 mg, oral, Daily    multivitamin with iron (Daily Multiple Vitamins/Iron) tablet 1 tablet, oral, Daily    omeprazole (PRILOSEC) 40 mg, oral, Daily    ondansetron ODT (ZOFRAN-ODT) 4 mg, oral, Every 6 hours PRN     "oxyCODONE (ROXICODONE) 5 mg, oral, Every 6 hours PRN    phentermine (ADIPEX-P) 37.5 mg, Daily before breakfast    polyethylene glycol (GLYCOLAX, MIRALAX) 17 g, oral, Daily, Dissolve into 8 oz of liquid    rizatriptan (MAXALT) 10 mg, oral, Once as needed, May repeat in 2 hours if unresolved. Do not exceed 30 mg in 24 hours.    tovorafenib 600 mg, oral, Once Weekly, (Six 100 mg tablets = 600 mg.) Swallow tablets whole with water. Do not chew, cut, or crush. Tablets may be taken with or without food. DO NOT RESUME UNTIL 1 WEEK POST OPERATIVELY (12/8/2024)    vilazodone (VIIBRYD) 10 mg, Daily         Vitals  /75   Pulse 93   Resp 16   Ht 1.6 m (5' 3\")   Wt 67.1 kg (148 lb)   BMI 26.22 kg/m²       Physical Exam  The incisions were inspected, and these were well-healed.  "

## 2024-12-17 ENCOUNTER — TELEPHONE (OUTPATIENT)
Dept: ADMISSION | Facility: HOSPITAL | Age: 24
End: 2024-12-17
Payer: COMMERCIAL

## 2024-12-17 NOTE — TELEPHONE ENCOUNTER
Patient still has not received her medication and is waiting to hear back about delivery, it was delivered to wrong location.  she is due to start Friday.

## 2024-12-18 NOTE — TELEPHONE ENCOUNTER
"Received email update from Evonne at [email or web address]:    \"It appears the pt missed the first delivery attempt with [employer]. It was then delivered to a [facility] 2.4 miles away as a courtesy hold. We spoke with the patient, and they will  at [facility] at [address] [place] [address] in [place], [place].\"  "

## 2024-12-20 ENCOUNTER — TELEPHONE (OUTPATIENT)
Dept: HEMATOLOGY/ONCOLOGY | Facility: CLINIC | Age: 24
End: 2024-12-20
Payer: COMMERCIAL

## 2024-12-20 DIAGNOSIS — G93.89 BRAIN MASS: ICD-10-CM

## 2024-12-20 DIAGNOSIS — C71.9 PILOCYTIC ASTROCYTOMA (MULTI): Primary | ICD-10-CM

## 2024-12-20 NOTE — TELEPHONE ENCOUNTER
Called patient to discuss labs that were supposed to be drawn on Monday 12/16, patient advised she forgot, patient will have them drawn Monday 12/23, discussed the MRI's of the spine that are needed, she is agreeable, she was not aware they were approved

## 2024-12-21 NOTE — DOCUMENTATION CLARIFICATION NOTE
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    Question: Based on your assessment of the patient and the pathology findings, can the pathology findings be confirmed by providing the requested documentation to include if specimen is benign or malignant, primary or secondary and any metastatic sites.  Please include diagnosis of disease    When answering this query, please exercise your independent professional judgment. The fact that a question is being asked, does not imply that any particular answer is desired or expected.    The patient's clinical indicators include:  Clinical Information:  Patient is a 24 year old female who presented for a biopsy of a brain mass.    Surgical Pathology collected on 10/15 with results on  as, \" Final Diagnosis:  A.  B.  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 \"    Clinical Indicators:  -From the H&P, \" CTH third ventricular mass w/ calcifications \"    -From the PN on 10/16, \"prelim glial cells w/ luigi fibers vs craniopharyngioma \"    Treatment:  R stereotactic brain biopsy on 10/15    Risk Factors:  Migraine, diplopia, third ventricular mass with calcifications  Options provided:  -- I concur with the pathology findings, Please specify additional information below  -- Other - I will add my own diagnosis  -- Refer to Clinical Documentation Reviewer    Query " created by: Jessica Moreno on 12/20/2024 11:14 AM      Electronically signed by:  LOUANN STEPHENS MD 12/21/2024 12:47 PM

## 2024-12-24 ENCOUNTER — PATIENT OUTREACH (OUTPATIENT)
Dept: CARE COORDINATION | Facility: CLINIC | Age: 24
End: 2024-12-24
Payer: COMMERCIAL

## 2024-12-24 ENCOUNTER — TELEPHONE (OUTPATIENT)
Dept: ADMISSION | Facility: HOSPITAL | Age: 24
End: 2024-12-24
Payer: COMMERCIAL

## 2024-12-24 DIAGNOSIS — G89.18 POSTOPERATIVE PAIN: ICD-10-CM

## 2024-12-24 RX ORDER — OXYCODONE HYDROCHLORIDE 5 MG/1
5 TABLET ORAL EVERY 6 HOURS PRN
Qty: 60 TABLET | Refills: 0 | Status: ON HOLD | OUTPATIENT
Start: 2024-12-24 | End: 2025-01-08

## 2024-12-24 NOTE — TELEPHONE ENCOUNTER
Pt's mother calling in for refill   Oxycodone 5mg. 1 tablet every 6 hrs prn  Pharmacy: [facility] [address] in [place]  Last FUV 12/11, next FUV 1/2

## 2024-12-24 NOTE — TELEPHONE ENCOUNTER
OARRS reviewed and no concerns noted. Per last visit with Aym Calloway NP on 12/11/24 patient to continue Oxycodone 5 mg every 6 hours as needed. F/U visit scheduled for 1/2/24. Refill sent to provider for review/approval .

## 2024-12-24 NOTE — PROGRESS NOTES
Outreach call to patient following up on appointment with primary care provider. Phone disconnected.  No [place] [facility] [place]  [phone]

## 2024-12-27 ENCOUNTER — TELEPHONE (OUTPATIENT)
Dept: HEMATOLOGY/ONCOLOGY | Facility: CLINIC | Age: 24
End: 2024-12-27
Payer: COMMERCIAL

## 2024-12-27 NOTE — TELEPHONE ENCOUNTER
Spoke to patient to see how she has been doing since starting the new medication, patient advises she has 10/10 body aches, she is sleeping all day, and has no appetite, per Dr. Aguilar patient should be evaluated in the ER, patient was supposed to get labs on Monday 12/23 but forgot

## 2024-12-28 ENCOUNTER — APPOINTMENT (OUTPATIENT)
Dept: RADIOLOGY | Facility: HOSPITAL | Age: 24
End: 2024-12-28
Payer: COMMERCIAL

## 2024-12-28 ENCOUNTER — CLINICAL SUPPORT (OUTPATIENT)
Dept: EMERGENCY MEDICINE | Facility: HOSPITAL | Age: 24
End: 2024-12-28
Payer: COMMERCIAL

## 2024-12-28 ENCOUNTER — HOSPITAL ENCOUNTER (INPATIENT)
Facility: HOSPITAL | Age: 24
LOS: 3 days | Discharge: HOME | End: 2024-12-31
Attending: STUDENT IN AN ORGANIZED HEALTH CARE EDUCATION/TRAINING PROGRAM | Admitting: NEUROLOGICAL SURGERY
Payer: COMMERCIAL

## 2024-12-28 DIAGNOSIS — T85.618A SHUNT MALFUNCTION, INITIAL ENCOUNTER: Primary | ICD-10-CM

## 2024-12-28 DIAGNOSIS — R53.81 MALAISE AND FATIGUE: ICD-10-CM

## 2024-12-28 DIAGNOSIS — R10.84 GENERALIZED ABDOMINAL PAIN: ICD-10-CM

## 2024-12-28 DIAGNOSIS — Z98.2 S/P VP SHUNT: ICD-10-CM

## 2024-12-28 DIAGNOSIS — C71.9 PILOCYTIC ASTROCYTOMA (MULTI): ICD-10-CM

## 2024-12-28 DIAGNOSIS — G91.1 OBSTRUCTIVE HYDROCEPHALUS (MULTI): ICD-10-CM

## 2024-12-28 DIAGNOSIS — G89.18 ACUTE POST-OPERATIVE PAIN: ICD-10-CM

## 2024-12-28 DIAGNOSIS — R53.83 MALAISE AND FATIGUE: ICD-10-CM

## 2024-12-28 LAB
ALBUMIN SERPL BCP-MCNC: 4.5 G/DL (ref 3.4–5)
ALP SERPL-CCNC: 69 U/L (ref 33–110)
ALT SERPL W P-5'-P-CCNC: 8 U/L (ref 7–45)
ANION GAP BLDV CALCULATED.4IONS-SCNC: 9 MMOL/L (ref 10–25)
ANION GAP SERPL CALC-SCNC: 11 MMOL/L (ref 10–20)
APPEARANCE UR: CLEAR
AST SERPL W P-5'-P-CCNC: 16 U/L (ref 9–39)
BASE EXCESS BLDV CALC-SCNC: 1.2 MMOL/L (ref -2–3)
BASOPHILS # BLD AUTO: 0.03 X10*3/UL (ref 0–0.1)
BASOPHILS NFR BLD AUTO: 0.5 %
BILIRUB SERPL-MCNC: 0.3 MG/DL (ref 0–1.2)
BILIRUB UR STRIP.AUTO-MCNC: NEGATIVE MG/DL
BODY TEMPERATURE: 37 DEGREES CELSIUS
BUN SERPL-MCNC: 11 MG/DL (ref 6–23)
CA-I BLDV-SCNC: 1.2 MMOL/L (ref 1.1–1.33)
CALCIUM SERPL-MCNC: 9.4 MG/DL (ref 8.6–10.6)
CHLORIDE BLDV-SCNC: 102 MMOL/L (ref 98–107)
CHLORIDE SERPL-SCNC: 105 MMOL/L (ref 98–107)
CO2 SERPL-SCNC: 26 MMOL/L (ref 21–32)
COLOR UR: YELLOW
CREAT SERPL-MCNC: 0.57 MG/DL (ref 0.5–1.05)
EGFRCR SERPLBLD CKD-EPI 2021: >90 ML/MIN/1.73M*2
EOSINOPHIL # BLD AUTO: 0.21 X10*3/UL (ref 0–0.7)
EOSINOPHIL NFR BLD AUTO: 3.2 %
ERYTHROCYTE [DISTWIDTH] IN BLOOD BY AUTOMATED COUNT: 13.2 % (ref 11.5–14.5)
FLUAV RNA RESP QL NAA+PROBE: NOT DETECTED
FLUBV RNA RESP QL NAA+PROBE: NOT DETECTED
GLUCOSE BLDV-MCNC: 82 MG/DL (ref 74–99)
GLUCOSE SERPL-MCNC: 70 MG/DL (ref 74–99)
GLUCOSE UR STRIP.AUTO-MCNC: NORMAL MG/DL
HCG UR QL IA.RAPID: NEGATIVE
HCO3 BLDV-SCNC: 27.1 MMOL/L (ref 22–26)
HCT VFR BLD AUTO: 32.2 % (ref 36–46)
HCT VFR BLD EST: 31 % (ref 36–46)
HGB BLD-MCNC: 10.6 G/DL (ref 12–16)
HGB BLDV-MCNC: 10.4 G/DL (ref 12–16)
HOLD SPECIMEN: NORMAL
IMM GRANULOCYTES # BLD AUTO: 0.01 X10*3/UL (ref 0–0.7)
IMM GRANULOCYTES NFR BLD AUTO: 0.2 % (ref 0–0.9)
INHALED O2 CONCENTRATION: 21 %
INR PPP: 1.2 (ref 0.9–1.1)
KETONES UR STRIP.AUTO-MCNC: NEGATIVE MG/DL
LACTATE BLDV-SCNC: 0.6 MMOL/L (ref 0.4–2)
LEUKOCYTE ESTERASE UR QL STRIP.AUTO: NEGATIVE
LYMPHOCYTES # BLD AUTO: 2.79 X10*3/UL (ref 1.2–4.8)
LYMPHOCYTES NFR BLD AUTO: 42.1 %
MAGNESIUM SERPL-MCNC: 2.12 MG/DL (ref 1.6–2.4)
MCH RBC QN AUTO: 26.5 PG (ref 26–34)
MCHC RBC AUTO-ENTMCNC: 32.9 G/DL (ref 32–36)
MCV RBC AUTO: 81 FL (ref 80–100)
MONOCYTES # BLD AUTO: 0.55 X10*3/UL (ref 0.1–1)
MONOCYTES NFR BLD AUTO: 8.3 %
MUCOUS THREADS #/AREA URNS AUTO: NORMAL /LPF
NEUTROPHILS # BLD AUTO: 3.03 X10*3/UL (ref 1.2–7.7)
NEUTROPHILS NFR BLD AUTO: 45.7 %
NITRITE UR QL STRIP.AUTO: NEGATIVE
NRBC BLD-RTO: 0 /100 WBCS (ref 0–0)
OXYHGB MFR BLDV: 30.9 % (ref 45–75)
PCO2 BLDV: 48 MM HG (ref 41–51)
PH BLDV: 7.36 PH (ref 7.33–7.43)
PH UR STRIP.AUTO: 6 [PH]
PLATELET # BLD AUTO: 344 X10*3/UL (ref 150–450)
PO2 BLDV: 23 MM HG (ref 35–45)
POTASSIUM BLDV-SCNC: 3.2 MMOL/L (ref 3.5–5.3)
POTASSIUM SERPL-SCNC: 3.2 MMOL/L (ref 3.5–5.3)
PROT SERPL-MCNC: 7.7 G/DL (ref 6.4–8.2)
PROT UR STRIP.AUTO-MCNC: NORMAL MG/DL
PROTHROMBIN TIME: 13 SECONDS (ref 9.8–12.8)
RBC # BLD AUTO: 4 X10*6/UL (ref 4–5.2)
RBC # UR STRIP.AUTO: NEGATIVE /UL
RBC #/AREA URNS AUTO: NORMAL /HPF
RSV RNA RESP QL NAA+PROBE: NOT DETECTED
SAO2 % BLDV: 31 % (ref 45–75)
SARS-COV-2 RNA RESP QL NAA+PROBE: NOT DETECTED
SODIUM BLDV-SCNC: 135 MMOL/L (ref 136–145)
SODIUM SERPL-SCNC: 139 MMOL/L (ref 136–145)
SP GR UR STRIP.AUTO: 1.02
SQUAMOUS #/AREA URNS AUTO: NORMAL /HPF
UROBILINOGEN UR STRIP.AUTO-MCNC: NORMAL MG/DL
WBC # BLD AUTO: 6.6 X10*3/UL (ref 4.4–11.3)
WBC #/AREA URNS AUTO: NORMAL /HPF

## 2024-12-28 PROCEDURE — 96375 TX/PRO/DX INJ NEW DRUG ADDON: CPT

## 2024-12-28 PROCEDURE — 70450 CT HEAD/BRAIN W/O DYE: CPT

## 2024-12-28 PROCEDURE — 74177 CT ABD & PELVIS W/CONTRAST: CPT | Mod: FOREIGN READ | Performed by: RADIOLOGY

## 2024-12-28 PROCEDURE — 74018 RADEX ABDOMEN 1 VIEW: CPT | Mod: FOREIGN READ | Performed by: RADIOLOGY

## 2024-12-28 PROCEDURE — 99285 EMERGENCY DEPT VISIT HI MDM: CPT | Mod: 25 | Performed by: STUDENT IN AN ORGANIZED HEALTH CARE EDUCATION/TRAINING PROGRAM

## 2024-12-28 PROCEDURE — 2500000001 HC RX 250 WO HCPCS SELF ADMINISTERED DRUGS (ALT 637 FOR MEDICARE OP): Mod: SE | Performed by: STUDENT IN AN ORGANIZED HEALTH CARE EDUCATION/TRAINING PROGRAM

## 2024-12-28 PROCEDURE — 81025 URINE PREGNANCY TEST: CPT

## 2024-12-28 PROCEDURE — 36415 COLL VENOUS BLD VENIPUNCTURE: CPT | Performed by: STUDENT IN AN ORGANIZED HEALTH CARE EDUCATION/TRAINING PROGRAM

## 2024-12-28 PROCEDURE — 96376 TX/PRO/DX INJ SAME DRUG ADON: CPT

## 2024-12-28 PROCEDURE — 70450 CT HEAD/BRAIN W/O DYE: CPT | Performed by: RADIOLOGY

## 2024-12-28 PROCEDURE — 2500000004 HC RX 250 GENERAL PHARMACY W/ HCPCS (ALT 636 FOR OP/ED): Mod: SE | Performed by: STUDENT IN AN ORGANIZED HEALTH CARE EDUCATION/TRAINING PROGRAM

## 2024-12-28 PROCEDURE — 80053 COMPREHEN METABOLIC PANEL: CPT | Performed by: STUDENT IN AN ORGANIZED HEALTH CARE EDUCATION/TRAINING PROGRAM

## 2024-12-28 PROCEDURE — 2500000001 HC RX 250 WO HCPCS SELF ADMINISTERED DRUGS (ALT 637 FOR MEDICARE OP): Mod: SE

## 2024-12-28 PROCEDURE — 74177 CT ABD & PELVIS W/CONTRAST: CPT

## 2024-12-28 PROCEDURE — 2500000004 HC RX 250 GENERAL PHARMACY W/ HCPCS (ALT 636 FOR OP/ED): Mod: SE

## 2024-12-28 PROCEDURE — 2060000001 HC INTERMEDIATE ICU ROOM DAILY

## 2024-12-28 PROCEDURE — 70250 X-RAY EXAM OF SKULL: CPT | Mod: FOREIGN READ | Performed by: RADIOLOGY

## 2024-12-28 PROCEDURE — 93010 ELECTROCARDIOGRAM REPORT: CPT | Performed by: STUDENT IN AN ORGANIZED HEALTH CARE EDUCATION/TRAINING PROGRAM

## 2024-12-28 PROCEDURE — 81001 URINALYSIS AUTO W/SCOPE: CPT | Performed by: STUDENT IN AN ORGANIZED HEALTH CARE EDUCATION/TRAINING PROGRAM

## 2024-12-28 PROCEDURE — 96361 HYDRATE IV INFUSION ADD-ON: CPT

## 2024-12-28 PROCEDURE — 99285 EMERGENCY DEPT VISIT HI MDM: CPT | Performed by: STUDENT IN AN ORGANIZED HEALTH CARE EDUCATION/TRAINING PROGRAM

## 2024-12-28 PROCEDURE — 85025 COMPLETE CBC W/AUTO DIFF WBC: CPT | Performed by: STUDENT IN AN ORGANIZED HEALTH CARE EDUCATION/TRAINING PROGRAM

## 2024-12-28 PROCEDURE — 86901 BLOOD TYPING SEROLOGIC RH(D): CPT

## 2024-12-28 PROCEDURE — 87637 SARSCOV2&INF A&B&RSV AMP PRB: CPT | Performed by: STUDENT IN AN ORGANIZED HEALTH CARE EDUCATION/TRAINING PROGRAM

## 2024-12-28 PROCEDURE — 85025 COMPLETE CBC W/AUTO DIFF WBC: CPT | Performed by: EMERGENCY MEDICINE

## 2024-12-28 PROCEDURE — 2550000001 HC RX 255 CONTRASTS: Mod: SE | Performed by: STUDENT IN AN ORGANIZED HEALTH CARE EDUCATION/TRAINING PROGRAM

## 2024-12-28 PROCEDURE — 70250 X-RAY EXAM OF SKULL: CPT

## 2024-12-28 PROCEDURE — 36415 COLL VENOUS BLD VENIPUNCTURE: CPT | Performed by: EMERGENCY MEDICINE

## 2024-12-28 PROCEDURE — 96374 THER/PROPH/DIAG INJ IV PUSH: CPT

## 2024-12-28 PROCEDURE — 83735 ASSAY OF MAGNESIUM: CPT | Performed by: STUDENT IN AN ORGANIZED HEALTH CARE EDUCATION/TRAINING PROGRAM

## 2024-12-28 PROCEDURE — 85610 PROTHROMBIN TIME: CPT

## 2024-12-28 PROCEDURE — 93005 ELECTROCARDIOGRAM TRACING: CPT

## 2024-12-28 PROCEDURE — 84132 ASSAY OF SERUM POTASSIUM: CPT | Performed by: STUDENT IN AN ORGANIZED HEALTH CARE EDUCATION/TRAINING PROGRAM

## 2024-12-28 PROCEDURE — 84075 ASSAY ALKALINE PHOSPHATASE: CPT | Performed by: EMERGENCY MEDICINE

## 2024-12-28 PROCEDURE — 71045 X-RAY EXAM CHEST 1 VIEW: CPT | Mod: FOREIGN READ | Performed by: RADIOLOGY

## 2024-12-28 RX ORDER — PANTOPRAZOLE SODIUM 40 MG/1
40 TABLET, DELAYED RELEASE ORAL
Status: DISCONTINUED | OUTPATIENT
Start: 2024-12-29 | End: 2024-12-31 | Stop reason: HOSPADM

## 2024-12-28 RX ORDER — OXYCODONE AND ACETAMINOPHEN 5; 325 MG/1; MG/1
1 TABLET ORAL ONCE
Status: DISCONTINUED | OUTPATIENT
Start: 2024-12-28 | End: 2024-12-28

## 2024-12-28 RX ORDER — NALOXONE HYDROCHLORIDE 0.4 MG/ML
0.2 INJECTION, SOLUTION INTRAMUSCULAR; INTRAVENOUS; SUBCUTANEOUS EVERY 5 MIN PRN
Status: DISCONTINUED | OUTPATIENT
Start: 2024-12-28 | End: 2024-12-31 | Stop reason: HOSPADM

## 2024-12-28 RX ORDER — HYDROMORPHONE HYDROCHLORIDE 1 MG/ML
0.5 INJECTION, SOLUTION INTRAMUSCULAR; INTRAVENOUS; SUBCUTANEOUS ONCE
Status: COMPLETED | OUTPATIENT
Start: 2024-12-28 | End: 2024-12-28

## 2024-12-28 RX ORDER — HYDROMORPHONE HYDROCHLORIDE 1 MG/ML
INJECTION, SOLUTION INTRAMUSCULAR; INTRAVENOUS; SUBCUTANEOUS
Status: COMPLETED
Start: 2024-12-28 | End: 2024-12-28

## 2024-12-28 RX ORDER — POLYETHYLENE GLYCOL 3350 17 G/17G
17 POWDER, FOR SOLUTION ORAL 2 TIMES DAILY
Status: DISCONTINUED | OUTPATIENT
Start: 2024-12-28 | End: 2024-12-31 | Stop reason: HOSPADM

## 2024-12-28 RX ORDER — ONDANSETRON HYDROCHLORIDE 2 MG/ML
4 INJECTION, SOLUTION INTRAVENOUS EVERY 8 HOURS PRN
Status: DISCONTINUED | OUTPATIENT
Start: 2024-12-28 | End: 2024-12-31 | Stop reason: HOSPADM

## 2024-12-28 RX ORDER — ONDANSETRON HYDROCHLORIDE 2 MG/ML
4 INJECTION, SOLUTION INTRAVENOUS ONCE
Status: COMPLETED | OUTPATIENT
Start: 2024-12-28 | End: 2024-12-28

## 2024-12-28 RX ORDER — OXYCODONE AND ACETAMINOPHEN 5; 325 MG/1; MG/1
1 TABLET ORAL EVERY 4 HOURS PRN
Status: DISCONTINUED | OUTPATIENT
Start: 2024-12-28 | End: 2024-12-29

## 2024-12-28 RX ORDER — ALBUTEROL SULFATE 90 UG/1
1-2 INHALANT RESPIRATORY (INHALATION) EVERY 4 HOURS PRN
Status: DISCONTINUED | OUTPATIENT
Start: 2024-12-28 | End: 2024-12-31 | Stop reason: HOSPADM

## 2024-12-28 RX ORDER — AMITRIPTYLINE HYDROCHLORIDE 25 MG/1
25 TABLET, FILM COATED ORAL NIGHTLY
Status: DISCONTINUED | OUTPATIENT
Start: 2024-12-28 | End: 2024-12-31 | Stop reason: HOSPADM

## 2024-12-28 RX ORDER — CETIRIZINE HYDROCHLORIDE 10 MG/1
10 TABLET ORAL DAILY
Status: DISCONTINUED | OUTPATIENT
Start: 2024-12-29 | End: 2024-12-31 | Stop reason: HOSPADM

## 2024-12-28 RX ORDER — POTASSIUM CHLORIDE 1.5 G/1.58G
40 POWDER, FOR SOLUTION ORAL ONCE
Status: COMPLETED | OUTPATIENT
Start: 2024-12-28 | End: 2024-12-28

## 2024-12-28 RX ORDER — ONDANSETRON 4 MG/1
4 TABLET, FILM COATED ORAL EVERY 8 HOURS PRN
Status: DISCONTINUED | OUTPATIENT
Start: 2024-12-28 | End: 2024-12-31 | Stop reason: HOSPADM

## 2024-12-28 RX ORDER — VILAZODONE HYDROCHLORIDE 10 MG/1
10 TABLET ORAL DAILY
Status: DISCONTINUED | OUTPATIENT
Start: 2024-12-29 | End: 2024-12-31 | Stop reason: HOSPADM

## 2024-12-28 RX ORDER — SODIUM CHLORIDE 9 MG/ML
75 INJECTION, SOLUTION INTRAVENOUS CONTINUOUS
Status: DISCONTINUED | OUTPATIENT
Start: 2024-12-29 | End: 2024-12-29

## 2024-12-28 RX ORDER — AMOXICILLIN 250 MG
2 CAPSULE ORAL 2 TIMES DAILY
Status: DISCONTINUED | OUTPATIENT
Start: 2024-12-28 | End: 2024-12-31 | Stop reason: HOSPADM

## 2024-12-28 RX ORDER — ACETAMINOPHEN 325 MG/1
650 TABLET ORAL EVERY 6 HOURS SCHEDULED
Status: DISCONTINUED | OUTPATIENT
Start: 2024-12-29 | End: 2024-12-31 | Stop reason: HOSPADM

## 2024-12-28 RX ORDER — PANTOPRAZOLE SODIUM 40 MG/1
40 TABLET, DELAYED RELEASE ORAL ONCE
Status: COMPLETED | OUTPATIENT
Start: 2024-12-28 | End: 2024-12-28

## 2024-12-28 RX ADMIN — HYDROMORPHONE HYDROCHLORIDE 0.5 MG: 1 INJECTION, SOLUTION INTRAMUSCULAR; INTRAVENOUS; SUBCUTANEOUS at 17:21

## 2024-12-28 RX ADMIN — POTASSIUM CHLORIDE 40 MEQ: 1.5 POWDER, FOR SOLUTION ORAL at 21:01

## 2024-12-28 RX ADMIN — SODIUM CHLORIDE 1000 ML: 0.9 INJECTION, SOLUTION INTRAVENOUS at 16:35

## 2024-12-28 RX ADMIN — HYDROMORPHONE HYDROCHLORIDE 0.5 MG: 1 INJECTION, SOLUTION INTRAMUSCULAR; INTRAVENOUS; SUBCUTANEOUS at 21:33

## 2024-12-28 RX ADMIN — PANTOPRAZOLE SODIUM 40 MG: 40 TABLET, DELAYED RELEASE ORAL at 19:52

## 2024-12-28 RX ADMIN — ONDANSETRON 4 MG: 2 INJECTION INTRAMUSCULAR; INTRAVENOUS at 16:35

## 2024-12-28 RX ADMIN — IOHEXOL 75 ML: 350 INJECTION, SOLUTION INTRAVENOUS at 19:37

## 2024-12-28 RX ADMIN — POTASSIUM CHLORIDE 40 MEQ: 1.5 POWDER, FOR SOLUTION ORAL at 16:35

## 2024-12-28 ASSESSMENT — COLUMBIA-SUICIDE SEVERITY RATING SCALE - C-SSRS
6. HAVE YOU EVER DONE ANYTHING, STARTED TO DO ANYTHING, OR PREPARED TO DO ANYTHING TO END YOUR LIFE?: NO
2. HAVE YOU ACTUALLY HAD ANY THOUGHTS OF KILLING YOURSELF?: NO
1. IN THE PAST MONTH, HAVE YOU WISHED YOU WERE DEAD OR WISHED YOU COULD GO TO SLEEP AND NOT WAKE UP?: NO

## 2024-12-28 ASSESSMENT — PAIN DESCRIPTION - PAIN TYPE: TYPE: ACUTE PAIN

## 2024-12-28 ASSESSMENT — PAIN - FUNCTIONAL ASSESSMENT: PAIN_FUNCTIONAL_ASSESSMENT: 0-10

## 2024-12-28 ASSESSMENT — PAIN DESCRIPTION - LOCATION
LOCATION: HEAD
LOCATION: GENERALIZED

## 2024-12-28 ASSESSMENT — PAIN SCALES - GENERAL
PAINLEVEL_OUTOF10: 10 - WORST POSSIBLE PAIN
PAINLEVEL_OUTOF10: 9

## 2024-12-28 NOTE — ED PROVIDER NOTES
"EMERGENCY DEPARTMENT ENCOUNTER      Pt Name: Sejal Duff  MRN: [id number]  Birthdate 2000  Date of evaluation: 12/28/2024  Provider: Araceli Adler MD    CHIEF COMPLAINT       Chief Complaint   Patient presents with    Abnormal Lab         HISTORY OF PRESENT ILLNESS    A 24-year-old female with pmhx of pilocytic astrocytoma, GERD, IBS, gastroparesis, obstructive hydrocephalus, S/P  shunt, who was sent by her heme/Onco provider due to multiple symptoms. Heme/Onco sent a follow-up message yesterday to monitor her since she started new medication last week (Tovorafenib). She replied that she is presenting body aches, fatigue, decreased in appetite and she was recommended to come into  emergency dept. Patient does confirm the previous symptoms reported as well as nausea, intermittent and generalized headache, chest tightness and intermittent abdominal pain 7/10 with no radiation that started \"couple weeks ago\". Patient endorses being around sick people recently. She is taking tylenol, ibuprofen and oxycodone at home with mild improvement of her pain. Denies fever/chills, vomiting, vision changes, diarrhea, rash, cough, shortness of breath, palpitations.      History provided by:  Patient   used: No        Nursing Notes were reviewed.    PAST MEDICAL HISTORY     Past Medical History:   Diagnosis Date    Encounter for screening for infections with a predominantly sexual mode of transmission 03/02/2020    Screening for STD (sexually transmitted disease)    Melena 04/12/2019    Blood in stool    Other specified health status     No pertinent past medical history    Other specified health status     No pertinent past surgical history    Personal history of other (healed) physical injury and trauma     History of sprain of ankle    Unspecified condition associated with female genital organs and menstrual cycle 03/02/2020    Cervical motion tenderness         SURGICAL HISTORY       Past " Surgical History:   Procedure Laterality Date    BREAST SURGERY Left     Mass removal    OTHER SURGICAL HISTORY  01/18/2019    No history of surgery         CURRENT MEDICATIONS       Previous Medications    ACETAMINOPHEN (TYLENOL) 500 MG TABLET    Take 2 tablets (1,000 mg) by mouth every 8 hours if needed for mild pain (1 - 3) or headaches.    ALBUTEROL 90 MCG/ACTUATION INHALER    Inhale 1-2 puffs every 4 hours if needed for wheezing or shortness of breath.    AMITRIPTYLINE (ELAVIL) 25 MG TABLET    Take 0.5 tablets (12.5 mg) by mouth once daily at bedtime for 7 days, THEN 1 tablet (25 mg) once daily at bedtime. Increase to 1 tab (25 mg) at bedtime after 7 days if tolerates 0.5 tab (12.5 mg).    BENZOYL PEROXIDE (BENZAC AC) 10 % EXTERNAL WASH    APPLY TOPICALLY 2 TIMES A DAY. WASH TWICE DAILY AS DIRECTED    CAFFEINE 200 MG    Take 1 tablet (200 mg) by mouth every 6 hours if needed (For [place]).    CHOLECALCIFEROL (VITAMIN D-3) 50,000 UNIT CAPSULE    Take 1 capsule (50,000 Units) by mouth 1 (one) time per week.    CYCLOBENZAPRINE (FLEXERIL) 5 MG TABLET    Take 1 tablet (5 mg) by mouth 3 times a day as needed for muscle spasms for up to 7 days.    DEXAMETHASONE (DECADRON) 1 MG TABLET    Take 1 tablet (1 mg) by mouth 2 times a day for 10 days.    ERGOCALCIFEROL (VITAMIN D-2) 1.25 MG (21783 UT) CAPSULE    Take 1 capsule (1,250 mcg) by mouth 1 (one) time per week.    IBUPROFEN 800 MG TABLET    Take 1 tablet (800 mg) by mouth 3 times a day as needed for mild pain (1 - 3) (pain).    LACTO NO.74-[id number]-LARCH 25B CELL-25B CELL-50 MG CAPSULE    Take 1 capsule by mouth once daily.    LINACLOTIDE (LINZESS) 145 MCG CAPSULE    Take 1 capsule (145 mcg) by mouth once daily in the morning. Take before meals. Do not crush or chew.    LORATADINE (CLARITIN) 10 MG TABLET    Take 1 tablet (10 mg) by mouth once daily.    METRONIDAZOLE (FLAGYL) 500 MG TABLET    Take 1 tablet (500 mg) by mouth 3 times a day.    MODAFINIL (PROVIGIL)  100 MG TABLET    Take 1 tablet (100 mg) by mouth once daily. For fatigue.    MULTIVITAMIN WITH IRON (DAILY MULTIPLE VITAMINS/IRON) TABLET    Take 1 tablet by mouth once daily.    OMEPRAZOLE (PRILOSEC) 40 MG DR CAPSULE    Take 1 capsule (40 mg) by mouth once daily.    ONDANSETRON ODT (ZOFRAN-ODT) 4 MG DISINTEGRATING TABLET    Dissolve 1 tablet (4 mg) in the mouth every 6 hours if needed for nausea or vomiting.    OXYCODONE (ROXICODONE) 5 MG IMMEDIATE RELEASE TABLET    Take 1 tablet (5 mg) by mouth every 6 hours if needed for moderate pain (4 - 6) or severe pain (7 - 10) for up to 15 days.    PHENTERMINE (ADIPEX-P) 37.5 MG TABLET    Take 1 tablet (37.5 mg) by mouth once daily in the morning. Take before meals.    POLYETHYLENE GLYCOL (GLYCOLAX, MIRALAX) 17 GRAM/DOSE POWDER    DISSOLVE 17 GRAMS IN 8 OZ OF FLUID LIQUID DRINK DAILY AS DIRECTED    PRUCALOPRIDE (MOTEGRITY) 2 MG TABLET    Take 1 tablet (2 mg) by mouth once daily.    RIZATRIPTAN (MAXALT) 10 MG TABLET    Take 1 tablet (10 mg) by mouth 1 time if needed for migraine for up to 15 days. May repeat in 2 hours if unresolved. Do not exceed 30 mg in 24 hours.    TOVORAFENIB 600 MG/WEEK (100 MG X 6) TABLET    Take 600 mg by mouth 1 (one) time per week. (Six 100 mg tablets = 600 mg.) Swallow tablets whole with water. Do not chew, cut, or crush. Tablets may be taken with or without food. DO NOT RESUME UNTIL 1 WEEK POST OPERATIVELY (12/8/2024)    VILAZODONE (VIIBRYD) 10 MG TABLET    Take 1 tablet (10 mg) by mouth once daily.       ALLERGIES     Morphine, Pollen extracts, and House dust    FAMILY HISTORY       Family History   Problem Relation Name Age of Onset    Asthma Mother      Colon cancer Mother            SOCIAL HISTORY       Social History     Socioeconomic History    Marital status: Single   Tobacco Use    Smoking status: Never     Passive exposure: Never    Smokeless tobacco: Never   Vaping Use    Vaping status: Never Used   Substance and Sexual Activity     Alcohol use: Yes     Comment: social    Drug use: Yes     Types: Marijuana    Sexual activity: Yes     Social Drivers of Health     Financial Resource Strain: Low Risk  (12/5/2024)    Overall Financial Resource Strain (CARDIA)     Difficulty of Paying Living Expenses: Not hard at all   Food Insecurity: No Food Insecurity (12/5/2024)    Hunger Vital Sign     Worried About Running Out of Food in the Last Year: Never true     Ran Out of Food in the Last Year: Never true   Transportation Needs: No Transportation Needs (12/5/2024)    PRAPARE - Transportation     Lack of Transportation (Medical): No     Lack of Transportation (Non-Medical): No   Physical Activity: Not on File (5/15/2024)    Received from [facility]    Physical Activity     Physical Activity: 0   Stress: Not on File (5/15/2024)    Received from [facility]    Stress     Stress: 0   Social Connections: Not on File (5/15/2024)    Received from [facility]    Social Connections     Social Connections and Isolation: 0   Intimate Partner Violence: Not At Risk (12/5/2024)    Humiliation, Afraid, Rape, and Kick questionnaire     Fear of Current or Ex-Partner: No     Emotionally Abused: No     Physically Abused: No     Sexually Abused: No   Housing Stability: Low Risk  (12/5/2024)    Housing Stability Vital Sign     Unable to Pay for Housing in the Last Year: No     Number of Times Moved in the Last Year: 0     Homeless in the Last Year: No       SCREENINGS                        PHYSICAL EXAM    (up to 7 for level 4, 8 or more for level 5)     ED Triage Vitals [12/28/24 1354]   Temperature Heart Rate Respirations BP   36.5 °C (97.7 °F) (!) 118 16 117/72      Pulse Ox Temp src Heart Rate Source Patient Position   99 % -- -- Sitting      BP Location FiO2 (%)     Left arm --       Physical Exam  Vitals reviewed.   Constitutional:       General: She is not in acute distress.     Appearance: She is normal weight. She is ill-appearing. She is not toxic-appearing or  diaphoretic.   HENT:      Head: Atraumatic.      Comments:  shunt noted on right occipital head     Nose: Nose normal. No congestion or rhinorrhea.      Mouth/Throat:      Mouth: Mucous membranes are moist.      Pharynx: No oropharyngeal exudate or posterior oropharyngeal erythema.   Eyes:      Extraocular Movements: Extraocular movements intact.      Pupils: Pupils are equal, round, and reactive to light.      Comments: photophobia   Cardiovascular:      Rate and Rhythm: Regular rhythm. Tachycardia present.      Pulses: Normal pulses.      Heart sounds: Normal heart sounds.   Pulmonary:      Effort: Pulmonary effort is normal. No respiratory distress.      Breath sounds: No stridor. No wheezing, rhonchi or rales.      Comments: Diminished breath sounds upper lobes bilaterally   Chest:      Chest wall: No tenderness.   Abdominal:      General: Abdomen is flat. Bowel sounds are decreased.      Palpations: Abdomen is soft. There is no mass.      Tenderness: There is generalized abdominal tenderness. There is no guarding or rebound.   Musculoskeletal:         General: No swelling, deformity or signs of injury. Normal range of motion.      Cervical back: Normal range of motion. No rigidity or tenderness.      Right lower leg: No edema.      Left lower leg: No edema.   Skin:     General: Skin is warm.      Findings: No lesion or rash.   Neurological:      General: No focal deficit present.      Mental Status: She is alert and oriented to person, place, and time.   Psychiatric:         Mood and Affect: Mood normal.         Behavior: Behavior normal.          DIAGNOSTIC RESULTS     LABS:  Labs Reviewed   CBC WITH AUTO DIFFERENTIAL - Abnormal       Result Value    WBC 6.6      nRBC 0.0      RBC 4.00      Hemoglobin 10.6 (*)     Hematocrit 32.2 (*)     MCV 81      MCH 26.5      MCHC 32.9      RDW 13.2      Platelets 344      Neutrophils % 45.7      Immature Granulocytes %, Automated 0.2      Lymphocytes % 42.1       Monocytes % 8.3      Eosinophils % 3.2      Basophils % 0.5      Neutrophils Absolute 3.03      Immature Granulocytes Absolute, Automated 0.01      Lymphocytes Absolute 2.79      Monocytes Absolute 0.55      Eosinophils Absolute 0.21      Basophils Absolute 0.03     COMPREHENSIVE METABOLIC PANEL - Abnormal    Glucose 70 (*)     Sodium 139      Potassium 3.2 (*)     Chloride 105      Bicarbonate 26      Anion Gap 11      Urea Nitrogen 11      Creatinine 0.57      eGFR >90      Calcium 9.4      Albumin 4.5      Alkaline Phosphatase 69      Total Protein 7.7      AST 16      Bilirubin, Total 0.3      ALT 8     URINALYSIS WITH REFLEX CULTURE AND MICROSCOPIC    Narrative:     The following orders were created for panel order Urinalysis with Reflex Culture and Microscopic.  Procedure                               Abnormality         Status                     ---------                               -----------         ------                     Urinalysis with Reflex C...[[id number]]                                                 Extra Urine Gray Tube[[id number]]                                                         Please view results for these tests on the individual orders.   BLOOD GAS VENOUS FULL PANEL   URINALYSIS WITH REFLEX CULTURE AND MICROSCOPIC   EXTRA URINE PERRY TUBE   INFLUENZA A AND B PCR   SARS-COV-2 PCR       All other labs were within normal range or not returned as of this dictation.    Imaging  No orders to display        Procedures  Procedures     EMERGENCY DEPARTMENT COURSE/MDM:     ED Course as of 12/28/24 1710   Sat Dec 28, 2024   1706 EKG independently interpreted by me, ED attending, obtained at 1704 sinus rhythm with a heart rate of 91 unremarkable axis, intervals, T wave inversion isolated to 3 and V1 no ST segment elevation noted [PHILIPPE]      ED Course User Index  [PHILIPPE] Ana Callahan MD         Diagnoses as of 12/28/24 1710   S/P  shunt   Malaise and fatigue   Generalized abdominal pain         Medical Decision Making  A 24-year-old female with pmhx of pilocytic astrocytoma, GERD, IBS, gastroparesis, obstructive hydrocephalus, S/P  shunt, who was sent by her heme/Onco provider due to myalgia, general malaise, nausea, headache and generalized abdominal tenderness. On examination, decreased bowel sounds. No rebound or guarding. She is  afebrile, tachycardia but normotensive and no tachypnea. Saturating well on room air. (99%). AA&Ox3. GCS 15. No neurological deficits or wekaness. Denies vomiting or diarrhea. Her last bowel movement was today. Zofran given for nausea. CMP showed mild hypokalemia and low blood sugar. Otherwise no major electrolyte disturbances. K+ was replaced and patient tolerated well crackers in the emergency room. UA and viral swabs are negative. EKG as above.   She was started 1L LR.     On reassessment, patient is AAOx3. GCS 15. Normotensive, no tachycardia, no tachypnea, afebrile and saturating well on room air.     Dilaudid is ordered for pain. She previously tolerated Percocet at home but only with mild improvement of pain.     NSGY team consulted and they recommended to consult ophthalmology as well, to rule out papillary edema.  Ophthalmology team consulted.     Patient is signed out to the oncoming team. Pending: xray shunts series, CT head and CT abdomen. Ophthalmology and NSGY further recommendations.      Patient and or family in agreement and understanding of treatment plan.  All questions answered.      I reviewed the case with the attending ED physician. The attending ED physician agrees with the plan. Patient and/or patient´s representative was counseled regarding labs, imaging, likely diagnosis, and plan. All questions were answered.    ED Medications administered this visit:    Medications   sodium chloride 0.9 % bolus 1,000 mL (has no administration in time range)   ondansetron (Zofran) injection 4 mg (has no administration in time range)       New Prescriptions  from this visit:    New Prescriptions    No medications on file       Follow-up:  No follow-up provider specified.      Final Impression: No diagnosis found.      (Please note that portions of this note were completed with a voice recognition program.  Efforts were made to edit the dictations but occasionally words are mis-transcribed.)     Araceli Adler MD  Resident  12/28/24 1812       Araceli Adler MD  Resident  12/28/24 1814       Araceli Adler MD  Resident  12/28/24 1823

## 2024-12-28 NOTE — ED TRIAGE NOTES
Abnormal lab work. Pt state that she had lab work done 2 weeks ago and yesterday got a phone call and the caller told her that she need to go to ED and be admitted.

## 2024-12-29 ENCOUNTER — APPOINTMENT (OUTPATIENT)
Dept: RADIOLOGY | Facility: HOSPITAL | Age: 24
End: 2024-12-29
Payer: COMMERCIAL

## 2024-12-29 ENCOUNTER — ANESTHESIA EVENT (OUTPATIENT)
Dept: OPERATING ROOM | Facility: HOSPITAL | Age: 24
End: 2024-12-29
Payer: COMMERCIAL

## 2024-12-29 ENCOUNTER — ANESTHESIA (OUTPATIENT)
Dept: OPERATING ROOM | Facility: HOSPITAL | Age: 24
End: 2024-12-29
Payer: COMMERCIAL

## 2024-12-29 LAB
ABO GROUP (TYPE) IN BLOOD: NORMAL
ANTIBODY SCREEN: NORMAL
HOLD SPECIMEN: NORMAL
RH FACTOR (ANTIGEN D): NORMAL

## 2024-12-29 PROCEDURE — 7100000001 HC RECOVERY ROOM TIME - INITIAL BASE CHARGE: Performed by: NEUROLOGICAL SURGERY

## 2024-12-29 PROCEDURE — 2500000004 HC RX 250 GENERAL PHARMACY W/ HCPCS (ALT 636 FOR OP/ED): Performed by: STUDENT IN AN ORGANIZED HEALTH CARE EDUCATION/TRAINING PROGRAM

## 2024-12-29 PROCEDURE — 2500000001 HC RX 250 WO HCPCS SELF ADMINISTERED DRUGS (ALT 637 FOR MEDICARE OP)

## 2024-12-29 PROCEDURE — 2500000001 HC RX 250 WO HCPCS SELF ADMINISTERED DRUGS (ALT 637 FOR MEDICARE OP): Performed by: STUDENT IN AN ORGANIZED HEALTH CARE EDUCATION/TRAINING PROGRAM

## 2024-12-29 PROCEDURE — 3700000001 HC GENERAL ANESTHESIA TIME - INITIAL BASE CHARGE: Performed by: NEUROLOGICAL SURGERY

## 2024-12-29 PROCEDURE — 2500000004 HC RX 250 GENERAL PHARMACY W/ HCPCS (ALT 636 FOR OP/ED): Performed by: NEUROLOGICAL SURGERY

## 2024-12-29 PROCEDURE — 2500000004 HC RX 250 GENERAL PHARMACY W/ HCPCS (ALT 636 FOR OP/ED)

## 2024-12-29 PROCEDURE — 2720000007 HC OR 272 NO HCPCS: Performed by: NEUROLOGICAL SURGERY

## 2024-12-29 PROCEDURE — 3600000004 HC OR TIME - INITIAL BASE CHARGE - PROCEDURE LEVEL FOUR: Performed by: NEUROLOGICAL SURGERY

## 2024-12-29 PROCEDURE — 74018 RADEX ABDOMEN 1 VIEW: CPT

## 2024-12-29 PROCEDURE — 3700000002 HC GENERAL ANESTHESIA TIME - EACH INCREMENTAL 1 MINUTE: Performed by: NEUROLOGICAL SURGERY

## 2024-12-29 PROCEDURE — 70450 CT HEAD/BRAIN W/O DYE: CPT | Performed by: RADIOLOGY

## 2024-12-29 PROCEDURE — 70450 CT HEAD/BRAIN W/O DYE: CPT

## 2024-12-29 PROCEDURE — 74018 RADEX ABDOMEN 1 VIEW: CPT | Performed by: STUDENT IN AN ORGANIZED HEALTH CARE EDUCATION/TRAINING PROGRAM

## 2024-12-29 PROCEDURE — 2060000001 HC INTERMEDIATE ICU ROOM DAILY

## 2024-12-29 PROCEDURE — 84484 ASSAY OF TROPONIN QUANT: CPT | Performed by: STUDENT IN AN ORGANIZED HEALTH CARE EDUCATION/TRAINING PROGRAM

## 2024-12-29 PROCEDURE — A62230 PR REPLACEMENT/REVISION,CSF SHUNT: Performed by: STUDENT IN AN ORGANIZED HEALTH CARE EDUCATION/TRAINING PROGRAM

## 2024-12-29 PROCEDURE — 0JWS0JZ REVISION OF SYNTHETIC SUBSTITUTE IN HEAD AND NECK SUBCUTANEOUS TISSUE AND FASCIA, OPEN APPROACH: ICD-10-PCS | Performed by: NEUROLOGICAL SURGERY

## 2024-12-29 PROCEDURE — 36415 COLL VENOUS BLD VENIPUNCTURE: CPT | Performed by: STUDENT IN AN ORGANIZED HEALTH CARE EDUCATION/TRAINING PROGRAM

## 2024-12-29 PROCEDURE — 93010 ELECTROCARDIOGRAM REPORT: CPT | Performed by: INTERNAL MEDICINE

## 2024-12-29 PROCEDURE — 7100000002 HC RECOVERY ROOM TIME - EACH INCREMENTAL 1 MINUTE: Performed by: NEUROLOGICAL SURGERY

## 2024-12-29 PROCEDURE — 2500000005 HC RX 250 GENERAL PHARMACY W/O HCPCS: Performed by: NEUROLOGICAL SURGERY

## 2024-12-29 PROCEDURE — 62225 REPLACE/IRRIGATE CATHETER: CPT | Performed by: NEUROLOGICAL SURGERY

## 2024-12-29 PROCEDURE — 3600000009 HC OR TIME - EACH INCREMENTAL 1 MINUTE - PROCEDURE LEVEL FOUR: Performed by: NEUROLOGICAL SURGERY

## 2024-12-29 PROCEDURE — C1894 INTRO/SHEATH, NON-LASER: HCPCS | Performed by: NEUROLOGICAL SURGERY

## 2024-12-29 DEVICE — RESERVOIR, RICKHAM, LARGE: Type: IMPLANTABLE DEVICE | Site: CRANIAL | Status: FUNCTIONAL

## 2024-12-29 RX ORDER — HYDROMORPHONE HYDROCHLORIDE 0.2 MG/ML
0.2 INJECTION INTRAMUSCULAR; INTRAVENOUS; SUBCUTANEOUS EVERY 5 MIN PRN
Status: DISCONTINUED | OUTPATIENT
Start: 2024-12-29 | End: 2024-12-29 | Stop reason: HOSPADM

## 2024-12-29 RX ORDER — DIPHENHYDRAMINE HYDROCHLORIDE 50 MG/ML
12.5 INJECTION INTRAMUSCULAR; INTRAVENOUS ONCE AS NEEDED
Status: DISCONTINUED | OUTPATIENT
Start: 2024-12-29 | End: 2024-12-29 | Stop reason: HOSPADM

## 2024-12-29 RX ORDER — ONDANSETRON HYDROCHLORIDE 2 MG/ML
4 INJECTION, SOLUTION INTRAVENOUS ONCE
Status: COMPLETED | OUTPATIENT
Start: 2024-12-29 | End: 2024-12-29

## 2024-12-29 RX ORDER — HYDROMORPHONE HYDROCHLORIDE 1 MG/ML
0.2 INJECTION, SOLUTION INTRAMUSCULAR; INTRAVENOUS; SUBCUTANEOUS
Status: DISCONTINUED | OUTPATIENT
Start: 2024-12-29 | End: 2024-12-31 | Stop reason: HOSPADM

## 2024-12-29 RX ORDER — HYDROMORPHONE HYDROCHLORIDE 1 MG/ML
INJECTION, SOLUTION INTRAMUSCULAR; INTRAVENOUS; SUBCUTANEOUS AS NEEDED
Status: DISCONTINUED | OUTPATIENT
Start: 2024-12-29 | End: 2024-12-29

## 2024-12-29 RX ORDER — FENTANYL CITRATE 50 UG/ML
INJECTION, SOLUTION INTRAMUSCULAR; INTRAVENOUS AS NEEDED
Status: DISCONTINUED | OUTPATIENT
Start: 2024-12-29 | End: 2024-12-29

## 2024-12-29 RX ORDER — MEPERIDINE HYDROCHLORIDE 25 MG/ML
12.5 INJECTION INTRAMUSCULAR; INTRAVENOUS; SUBCUTANEOUS EVERY 10 MIN PRN
Status: DISCONTINUED | OUTPATIENT
Start: 2024-12-29 | End: 2024-12-29 | Stop reason: HOSPADM

## 2024-12-29 RX ORDER — OXYCODONE HYDROCHLORIDE 10 MG/1
10 TABLET ORAL EVERY 4 HOURS PRN
Status: DISCONTINUED | OUTPATIENT
Start: 2024-12-29 | End: 2024-12-31 | Stop reason: HOSPADM

## 2024-12-29 RX ORDER — SODIUM CHLORIDE, SODIUM LACTATE, POTASSIUM CHLORIDE, CALCIUM CHLORIDE 600; 310; 30; 20 MG/100ML; MG/100ML; MG/100ML; MG/100ML
100 INJECTION, SOLUTION INTRAVENOUS CONTINUOUS
Status: DISCONTINUED | OUTPATIENT
Start: 2024-12-29 | End: 2024-12-29 | Stop reason: HOSPADM

## 2024-12-29 RX ORDER — OXYCODONE HYDROCHLORIDE 5 MG/1
10 TABLET ORAL EVERY 4 HOURS PRN
Status: DISCONTINUED | OUTPATIENT
Start: 2024-12-29 | End: 2024-12-29 | Stop reason: HOSPADM

## 2024-12-29 RX ORDER — ALBUTEROL SULFATE 0.83 MG/ML
2.5 SOLUTION RESPIRATORY (INHALATION) ONCE AS NEEDED
Status: DISCONTINUED | OUTPATIENT
Start: 2024-12-29 | End: 2024-12-29 | Stop reason: HOSPADM

## 2024-12-29 RX ORDER — LANOLIN ALCOHOL/MO/W.PET/CERES
400 CREAM (GRAM) TOPICAL DAILY
Status: DISCONTINUED | OUTPATIENT
Start: 2024-12-29 | End: 2024-12-31 | Stop reason: HOSPADM

## 2024-12-29 RX ORDER — OXYCODONE HYDROCHLORIDE 5 MG/1
5 TABLET ORAL EVERY 4 HOURS PRN
Status: DISCONTINUED | OUTPATIENT
Start: 2024-12-29 | End: 2024-12-31 | Stop reason: HOSPADM

## 2024-12-29 RX ORDER — LABETALOL HYDROCHLORIDE 5 MG/ML
5 INJECTION, SOLUTION INTRAVENOUS ONCE AS NEEDED
Status: DISCONTINUED | OUTPATIENT
Start: 2024-12-29 | End: 2024-12-29 | Stop reason: HOSPADM

## 2024-12-29 RX ORDER — LIDOCAINE HYDROCHLORIDE AND EPINEPHRINE 5; 5 MG/ML; UG/ML
INJECTION, SOLUTION INFILTRATION; PERINEURAL AS NEEDED
Status: DISCONTINUED | OUTPATIENT
Start: 2024-12-29 | End: 2024-12-29 | Stop reason: HOSPADM

## 2024-12-29 RX ORDER — ROCURONIUM BROMIDE 10 MG/ML
INJECTION, SOLUTION INTRAVENOUS AS NEEDED
Status: DISCONTINUED | OUTPATIENT
Start: 2024-12-29 | End: 2024-12-29

## 2024-12-29 RX ORDER — OXYCODONE HYDROCHLORIDE 5 MG/1
5 TABLET ORAL EVERY 4 HOURS PRN
Status: DISCONTINUED | OUTPATIENT
Start: 2024-12-29 | End: 2024-12-29 | Stop reason: HOSPADM

## 2024-12-29 RX ORDER — ONDANSETRON HYDROCHLORIDE 2 MG/ML
4 INJECTION, SOLUTION INTRAVENOUS ONCE AS NEEDED
Status: DISCONTINUED | OUTPATIENT
Start: 2024-12-29 | End: 2024-12-29 | Stop reason: HOSPADM

## 2024-12-29 RX ORDER — DROPERIDOL 2.5 MG/ML
0.62 INJECTION, SOLUTION INTRAMUSCULAR; INTRAVENOUS ONCE AS NEEDED
Status: DISCONTINUED | OUTPATIENT
Start: 2024-12-29 | End: 2024-12-29 | Stop reason: HOSPADM

## 2024-12-29 RX ORDER — PROPOFOL 10 MG/ML
INJECTION, EMULSION INTRAVENOUS AS NEEDED
Status: DISCONTINUED | OUTPATIENT
Start: 2024-12-29 | End: 2024-12-29

## 2024-12-29 RX ORDER — HYDRALAZINE HYDROCHLORIDE 20 MG/ML
5 INJECTION INTRAMUSCULAR; INTRAVENOUS EVERY 30 MIN PRN
Status: DISCONTINUED | OUTPATIENT
Start: 2024-12-29 | End: 2024-12-29 | Stop reason: HOSPADM

## 2024-12-29 RX ORDER — CEFAZOLIN 1 G/1
INJECTION, POWDER, FOR SOLUTION INTRAVENOUS AS NEEDED
Status: DISCONTINUED | OUTPATIENT
Start: 2024-12-29 | End: 2024-12-29

## 2024-12-29 RX ORDER — CAFFEINE 200 MG
200 TABLET ORAL ONCE
Status: COMPLETED | OUTPATIENT
Start: 2024-12-29 | End: 2024-12-29

## 2024-12-29 RX ORDER — MIDAZOLAM HYDROCHLORIDE 1 MG/ML
INJECTION INTRAMUSCULAR; INTRAVENOUS AS NEEDED
Status: DISCONTINUED | OUTPATIENT
Start: 2024-12-29 | End: 2024-12-29

## 2024-12-29 RX ORDER — CYCLOBENZAPRINE HCL 10 MG
10 TABLET ORAL 3 TIMES DAILY
Status: DISCONTINUED | OUTPATIENT
Start: 2024-12-29 | End: 2024-12-30

## 2024-12-29 RX ORDER — CALCIUM CARBONATE 200(500)MG
500 TABLET,CHEWABLE ORAL DAILY
Status: DISCONTINUED | OUTPATIENT
Start: 2024-12-29 | End: 2024-12-31 | Stop reason: HOSPADM

## 2024-12-29 RX ADMIN — DEXAMETHASONE SODIUM PHOSPHATE 4 MG: 4 INJECTION INTRA-ARTICULAR; INTRALESIONAL; INTRAMUSCULAR; INTRAVENOUS; SOFT TISSUE at 08:10

## 2024-12-29 RX ADMIN — MAGNESIUM OXIDE TAB 400 MG (241.3 MG ELEMENTAL MG) 400 MG: 400 (241.3 MG) TAB at 19:51

## 2024-12-29 RX ADMIN — ACETAMINOPHEN 650 MG: 325 TABLET, FILM COATED ORAL at 18:40

## 2024-12-29 RX ADMIN — OXYCODONE HYDROCHLORIDE 5 MG: 5 TABLET ORAL at 04:47

## 2024-12-29 RX ADMIN — FENTANYL CITRATE 100 MCG: 50 INJECTION, SOLUTION INTRAMUSCULAR; INTRAVENOUS at 08:02

## 2024-12-29 RX ADMIN — ONDANSETRON 4 MG: 2 INJECTION INTRAMUSCULAR; INTRAVENOUS at 19:51

## 2024-12-29 RX ADMIN — CALCIUM CARBONATE (ANTACID) CHEW TAB 500 MG 500 MG: 500 CHEW TAB at 17:20

## 2024-12-29 RX ADMIN — SODIUM CHLORIDE 1000 ML: 9 INJECTION, SOLUTION INTRAVENOUS at 19:51

## 2024-12-29 RX ADMIN — CYCLOBENZAPRINE 10 MG: 10 TABLET, FILM COATED ORAL at 20:04

## 2024-12-29 RX ADMIN — ACETAMINOPHEN 650 MG: 325 TABLET, FILM COATED ORAL at 06:20

## 2024-12-29 RX ADMIN — CEFAZOLIN 2 G: 1 INJECTION, POWDER, FOR SOLUTION INTRAMUSCULAR; INTRAVENOUS at 08:07

## 2024-12-29 RX ADMIN — PROPOFOL 100 MG: 10 INJECTION, EMULSION INTRAVENOUS at 08:03

## 2024-12-29 RX ADMIN — SODIUM CHLORIDE 75 ML/HR: 0.9 INJECTION, SOLUTION INTRAVENOUS at 00:24

## 2024-12-29 RX ADMIN — ROCURONIUM BROMIDE 50 MG: 10 INJECTION INTRAVENOUS at 08:04

## 2024-12-29 RX ADMIN — MIDAZOLAM HYDROCHLORIDE 2 MG: 1 INJECTION, SOLUTION INTRAMUSCULAR; INTRAVENOUS at 08:02

## 2024-12-29 RX ADMIN — CAFFEINE 200 MG: 200 TABLET ORAL at 19:51

## 2024-12-29 RX ADMIN — HYDROMORPHONE HYDROCHLORIDE 0.2 MG: 1 INJECTION, SOLUTION INTRAMUSCULAR; INTRAVENOUS; SUBCUTANEOUS at 19:28

## 2024-12-29 RX ADMIN — HYDROMORPHONE HYDROCHLORIDE 0.2 MG: 1 INJECTION, SOLUTION INTRAMUSCULAR; INTRAVENOUS; SUBCUTANEOUS at 13:56

## 2024-12-29 RX ADMIN — SODIUM CHLORIDE, SODIUM LACTATE, POTASSIUM CHLORIDE, AND CALCIUM CHLORIDE: 600; 310; 30; 20 INJECTION, SOLUTION INTRAVENOUS at 10:00

## 2024-12-29 RX ADMIN — ACETAMINOPHEN 650 MG: 325 TABLET, FILM COATED ORAL at 00:50

## 2024-12-29 RX ADMIN — HYDROMORPHONE HYDROCHLORIDE 0.2 MG: 1 INJECTION, SOLUTION INTRAMUSCULAR; INTRAVENOUS; SUBCUTANEOUS at 10:09

## 2024-12-29 RX ADMIN — ROCURONIUM BROMIDE 10 MG: 10 INJECTION INTRAVENOUS at 09:03

## 2024-12-29 RX ADMIN — OXYCODONE HYDROCHLORIDE 5 MG: 5 TABLET ORAL at 00:50

## 2024-12-29 RX ADMIN — PANTOPRAZOLE SODIUM 40 MG: 40 TABLET, DELAYED RELEASE ORAL at 14:02

## 2024-12-29 RX ADMIN — ONDANSETRON 4 MG: 2 INJECTION INTRAMUSCULAR; INTRAVENOUS at 10:22

## 2024-12-29 RX ADMIN — OXYCODONE HYDROCHLORIDE 10 MG: 5 TABLET ORAL at 15:51

## 2024-12-29 RX ADMIN — CYCLOBENZAPRINE 10 MG: 10 TABLET, FILM COATED ORAL at 14:02

## 2024-12-29 RX ADMIN — HYDROMORPHONE HYDROCHLORIDE 0.2 MG: 1 INJECTION, SOLUTION INTRAMUSCULAR; INTRAVENOUS; SUBCUTANEOUS at 09:12

## 2024-12-29 SDOH — SOCIAL STABILITY: SOCIAL INSECURITY: WERE YOU ABLE TO COMPLETE ALL THE BEHAVIORAL HEALTH SCREENINGS?: YES

## 2024-12-29 SDOH — SOCIAL STABILITY: SOCIAL INSECURITY: DOES ANYONE TRY TO KEEP YOU FROM HAVING/CONTACTING OTHER FRIENDS OR DOING THINGS OUTSIDE YOUR HOME?: NO

## 2024-12-29 SDOH — SOCIAL STABILITY: SOCIAL INSECURITY: DO YOU FEEL UNSAFE GOING BACK TO THE PLACE WHERE YOU ARE LIVING?: NO

## 2024-12-29 SDOH — SOCIAL STABILITY: SOCIAL INSECURITY: ARE YOU OR HAVE YOU BEEN THREATENED OR ABUSED PHYSICALLY, EMOTIONALLY, OR SEXUALLY BY ANYONE?: NO

## 2024-12-29 SDOH — SOCIAL STABILITY: SOCIAL INSECURITY: ARE THERE ANY APPARENT SIGNS OF INJURIES/BEHAVIORS THAT COULD BE RELATED TO ABUSE/NEGLECT?: NO

## 2024-12-29 SDOH — SOCIAL STABILITY: SOCIAL INSECURITY: DO YOU FEEL ANYONE HAS EXPLOITED OR TAKEN ADVANTAGE OF YOU FINANCIALLY OR OF YOUR PERSONAL PROPERTY?: NO

## 2024-12-29 SDOH — SOCIAL STABILITY: SOCIAL INSECURITY: HAVE YOU HAD THOUGHTS OF HARMING ANYONE ELSE?: NO

## 2024-12-29 SDOH — SOCIAL STABILITY: SOCIAL INSECURITY: ABUSE: ADULT

## 2024-12-29 SDOH — SOCIAL STABILITY: SOCIAL INSECURITY: HAS ANYONE EVER THREATENED TO HURT YOUR FAMILY OR YOUR PETS?: NO

## 2024-12-29 SDOH — SOCIAL STABILITY: SOCIAL INSECURITY: HAVE YOU HAD ANY THOUGHTS OF HARMING ANYONE ELSE?: NO

## 2024-12-29 ASSESSMENT — PAIN SCALES - GENERAL
PAINLEVEL_OUTOF10: 8
PAINLEVEL_OUTOF10: 0 - NO PAIN
PAINLEVEL_OUTOF10: 9
PAINLEVEL_OUTOF10: 10 - WORST POSSIBLE PAIN
PAINLEVEL_OUTOF10: 7
PAINLEVEL_OUTOF10: 7
PAIN_LEVEL: 0
PAINLEVEL_OUTOF10: 7
PAINLEVEL_OUTOF10: 0 - NO PAIN
PAINLEVEL_OUTOF10: 8
PAINLEVEL_OUTOF10: 8
PAINLEVEL_OUTOF10: 9
PAINLEVEL_OUTOF10: 0 - NO PAIN
PAINLEVEL_OUTOF10: 6

## 2024-12-29 ASSESSMENT — PAIN - FUNCTIONAL ASSESSMENT
PAIN_FUNCTIONAL_ASSESSMENT: 0-10

## 2024-12-29 ASSESSMENT — ACTIVITIES OF DAILY LIVING (ADL)
FEEDING YOURSELF: INDEPENDENT
HEARING - RIGHT EAR: FUNCTIONAL
PATIENT'S MEMORY ADEQUATE TO SAFELY COMPLETE DAILY ACTIVITIES?: YES
JUDGMENT_ADEQUATE_SAFELY_COMPLETE_DAILY_ACTIVITIES: YES
DRESSING YOURSELF: INDEPENDENT
HEARING - LEFT EAR: FUNCTIONAL
GROOMING: INDEPENDENT
TOILETING: INDEPENDENT
BATHING: INDEPENDENT
ADEQUATE_TO_COMPLETE_ADL: YES
WALKS IN HOME: INDEPENDENT

## 2024-12-29 ASSESSMENT — PAIN DESCRIPTION - LOCATION: LOCATION: ABDOMEN

## 2024-12-29 ASSESSMENT — PATIENT HEALTH QUESTIONNAIRE - PHQ9
1. LITTLE INTEREST OR PLEASURE IN DOING THINGS: NOT AT ALL
2. FEELING DOWN, DEPRESSED OR HOPELESS: NOT AT ALL
SUM OF ALL RESPONSES TO PHQ9 QUESTIONS 1 & 2: 0

## 2024-12-29 ASSESSMENT — LIFESTYLE VARIABLES
PRESCIPTION_ABUSE_PAST_12_MONTHS: NO
SUBSTANCE_ABUSE_PAST_12_MONTHS: NO

## 2024-12-29 NOTE — H&P
History Of Present Illness  Sejal is a 24 y.o. 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      Patient received a phone call from an office recommending she come in for abnormal labs and generalized fatigue, HA. She reports non-positional headache that feels similar to when she was discharged earlier this month. She also has photophobia that is slightly improved. It improves when she takes oxycodone, ibuprofen together for a few hours but then returns. She tries to sleep through the pain but sometimes is woke up by the headaches. Typically they start behind her eyes and spread all over. She started taking her chemo last Saturday and had her second dose this week. She denies confusion, dizziness, blurry vision, double vision, numbness, weakness, tingling. She has not had a BM and is not sure if her abdominal cramping episodes are related.      Patient's imaging was personally reviewed and notable for CTH incr vents, SS Certas at 4, LLQ cath w/ turn vs possible kink, CT AP catheter w/o kink or discontinuity    Past Medical History  She has a past medical history of Encounter for screening for infections with a predominantly sexual mode of transmission (03/02/2020), Melena (04/12/2019), Other specified health status, Other specified health status, Personal history of other (healed) physical injury and trauma, and Unspecified condition associated with female genital organs and  "menstrual cycle (03/02/2020).    Surgical History  She has a past surgical history that includes Other surgical history (01/18/2019) and Breast surgery (Left).     Social History  She reports that she has never smoked. She has never been exposed to tobacco smoke. She has never used smokeless tobacco. She reports current alcohol use. She reports current drug use. Drug: Marijuana.     Allergies  Morphine, Pollen extracts, and House dust    Medications  (Not in a hospital admission)      Review of Systems  10 point ROS is obtained and negative except the ones mentioned in the HPI       Physical Exam  Constitutional: No acute distress  Resp: breathing comfortably on room air  Cardio: well perfused  GI: non-distended, non-tender to palpation, two small prior laparoscopic incisions well healed  MSK: full range of motion  Neuro: A&Ox3  Cranial Nerves II-XII: PERRL, EOMI, Face symmetric, Facial SILT, Palate/Tongue midline and symmetric, shoulder shrugs symmetric, hearing intact to finger rubs bilaterally  Motor:   BUE 5/5  BLE 5/5  Sensation: SILT throughout all extremities  Psych: appropriate  Skin: no obvious lesions, R parietal cranial incision clean, intact, monocryl sutures present, no erythema or tenderness    Last Recorded Vitals  Blood pressure 137/85, pulse 100, temperature 36.5 °C (97.7 °F), resp. rate 18, height 1.6 m (5' 3\"), weight 68 kg (150 lb), SpO2 99%.    Relevant Results       SCORES  Lab Results   Component Value Date    WBC 6.6 12/28/2024    HGB 10.6 (L) 12/28/2024    HCT 32.2 (L) 12/28/2024    MCV 81 12/28/2024     12/28/2024     Lab Results   Component Value Date    GLUCOSE 70 (L) 12/28/2024    CALCIUM 9.4 12/28/2024     12/28/2024    K 3.2 (L) 12/28/2024    CO2 26 12/28/2024     12/28/2024    BUN 11 12/28/2024    CREATININE 0.57 12/28/2024     Assessment/Plan   Assessment & Plan  Hydrocephalus    Shunt malfunction, initial encounter      Sejal is a 24 y.o. female with h/o anemia, " asthma GERD, IBS, PCOS and breast fibroadenoma s/p L breast excisional biopsy initially p/w HA, CTH 3rd ventricular mass w/ calcifications, MRI 2.4cm peripherally enhancing third ventricular necrotic mass w/ nonenhancing FLAIR hyperintense lesion in medial L temporal lobe, 10/16/24 s/p R stereotactic brain bx (pilocystic astrocytoma), 11/28/24 p/w worsening HA, CTH incr vents, MRI incr mass 2.7x2.5cm, ventriculomegaly, 12/1/24 s/p R parietal VPS Certas at 5, 12/4 certas dialed to 4, 12/28 p/w generalized fatigue, HA, body aches, CTH incr vents, SS Certas at 4, LLQ cath w/ turn vs possible kink, CT AP catheter w/o kink or discontinuity, no pseudocyst    Admit to [facility] under neurosurgery  Q1 neuro checks  OR for  shunt exploration and revision Sun 12/29  Obtain CBC, RFP, coag, T&S, UA, EKG, CXR, hCG for preop  NPO at midnight, hold dvt ppx  Volumetric CT head for OR planning  Restart home meds  Ophtho recs - no papilledema  SCDs   PTOT eval    Eliseo Obrien MD  Note authored by resident on neurosurgery team, with all questions or to contact team please page at [phone]

## 2024-12-29 NOTE — PROGRESS NOTES
"Sejal Duff is a 24 y.o. female on day 1 of admission presenting with Hydrocephalus.    Subjective   No events overnight. Min improvement in headache w/ pain meds    Objective     Physical Exam  Awake, Ox3  BUE 5/5  BLE 5/5  No drift   R parietal incision c/d/I  SILT  Last Recorded Vitals  Blood pressure 113/60, pulse 98, temperature 36.9 °C (98.4 °F), temperature source Temporal, resp. rate 14, height 1.6 m (5' 3\"), weight 68 kg (150 lb), SpO2 99%.  Intake/Output last 3 Shifts:  No intake/output data recorded.    Relevant Results  Lab Results   Component Value Date    WBC 6.6 12/28/2024    HGB 10.6 (L) 12/28/2024    HCT 32.2 (L) 12/28/2024    MCV 81 12/28/2024     12/28/2024     Lab Results   Component Value Date    GLUCOSE 70 (L) 12/28/2024    CALCIUM 9.4 12/28/2024     12/28/2024    K 3.2 (L) 12/28/2024    CO2 26 12/28/2024     12/28/2024    BUN 11 12/28/2024    CREATININE 0.57 12/28/2024       Assessment/Plan   Assessment & Plan  Hydrocephalus    Shunt malfunction, initial encounter    Sejal is a 24 y.o. 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

## 2024-12-29 NOTE — ANESTHESIA POSTPROCEDURE EVALUATION
Patient: Sejal Duff    Procedure Summary       Date: 12/29/24 Room / Location: [facility] OR 24 / [facility] OR    Anesthesia Start: 0752 Anesthesia Stop: 1059    Procedure:  shunt exploration and revision (Right: Head) Diagnosis:       Obstructive hydrocephalus (Multi)      (Obstructive hydrocephalus (Multi) [G91.1])    Surgeons: Paras Yap MD Responsible Provider: Nalini Guillen MD    Anesthesia Type: general ASA Status: 3            Anesthesia Type: No value filed.    Vitals Value Taken Time   /66 12/29/24 1054   Temp 36.4 12/29/24 1102   Pulse 125 12/29/24 1059   Resp 13 12/29/24 1059   SpO2 100 % 12/29/24 1059   Vitals shown include unfiled device data.    Anesthesia Post Evaluation    Patient location during evaluation: bedside  Patient participation: complete - patient participated  Level of consciousness: awake  Pain score: 0  Pain management: adequate  Airway patency: patent  Cardiovascular status: acceptable  Respiratory status: acceptable  Hydration status: acceptable  Postoperative Nausea and Vomiting: none        No notable events documented.

## 2024-12-29 NOTE — ANESTHESIA PROCEDURE NOTES
Airway  Date/Time: 12/29/2024 8:07 AM  Urgency: elective    Airway not difficult    Staffing  Performed: resident   Authorized by: Nalini Guillen MD    Performed by: Leigh Aguero MD  Patient location during procedure: OR    Indications and Patient Condition  Indications for airway management: anesthesia  Spontaneous Ventilation: absent  Sedation level: deep  Preoxygenated: yes  Patient position: sniffing  Mask difficulty assessment: 1 - vent by mask  Planned trial extubation    Final Airway Details  Final airway type: endotracheal airway      Successful airway: ETT  Cuffed: yes   Successful intubation technique: direct laryngoscopy  Facilitating devices/methods: intubating stylet  Endotracheal tube insertion site: oral  Blade: Farzaneh  Blade size: #3  ETT size (mm): 7.0  Cormack-Lehane Classification: grade I - full view of glottis  Placement verified by: chest auscultation and capnometry   Inital cuff pressure (cm H2O): 5  Measured from: lips  ETT to lips (cm): 22  Number of attempts at approach: 1    Additional Comments  LTA used

## 2024-12-29 NOTE — CONSULTS
History of Present Illness:     Sejal Duff is a 24 y.o. 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 Ophthalmology consulted to rule out papilledema.     Patient reports no new visual symptoms. She has previously seen Dr. Fajardo outpatient on 11/06/24 and denies any changes since that time. She does endorse chronic headaches that have not changed in frequency, intensity or nature. She describes that as 'eye pressure' that is transient and occurs once every 2-3 days. Otherwise denies any diplopia, pulsatile tinnitus, TVO. Also denies photopsias, new floaters/flashes, diplopia, curtaining in vision, eye pain/redness/irritation.      PMHx:   Past Medical History:   Diagnosis Date    Encounter for screening for infections with a predominantly sexual mode of transmission 03/02/2020    Screening for STD (sexually transmitted disease)    Melena 04/12/2019    Blood in stool    Other specified health status     No pertinent past medical history    Other specified health status     No pertinent past surgical history    Personal history of other (healed) physical injury and trauma     History of sprain of ankle    Unspecified condition associated with female genital organs and menstrual cycle 03/02/2020    Cervical motion tenderness     Medications: As per Medication List  Allergies: Morphine, Pollen extracts, and House dust  Past Ocular History: as per above HPI    Physical Examination:     Base Eye Exam       Visual Acuity (Snellen - Linear)         Right Left    Near sc 20/20 20/40 phni              Tonometry (Tonopen, 7:41 PM)         Right Left    Pressure 10 10              Pupils         Pupils Dark Light Shape React APD    Right PERRL, No APD 4 2 Round Brisk None    Left PERRL, No APD 4 2 Round Brisk None               Visual Fields (Counting fingers)         Left Right     Full Full              Neuro/Psych       Oriented x3: Yes              Dilation       Both eyes: 1% Mydriacyl & 2.5% Panda  @ 7:41 PM                  Additional Tests       Color         Right Left    Ishihara 11/11 11/11                  Slit Lamp and Fundus Exam       External Exam         Right Left    External Normal Normal              Slit Lamp Exam         Right Left    Lids/Lashes Normal Normal    Conjunctiva/Sclera White and quiet White and quiet    Cornea Clear Clear    Anterior Chamber Deep and quiet Deep and quiet    Iris Round and reactive Round and reactive    Lens Clear Clear    Anterior Vitreous Normal Normal              Fundus Exam         Right Left    Disc Temporal pallor Temporal pallor    C/D Ratio 0.1 0.1    Macula Normal Normal    Vessels Normal Normal    Periphery Normal Normal                  CT head wo IV contrast    Result Date: 12/28/2024  Interpreted By:  Erick Betts, STUDY: IRA MIJARES; 12/28/2024 6:24 pm   INDICATION: Signs/Symptoms:VPS for astrocytoma, malaise;   COMPARISON: 12/05/2024   ACCESSION NUMBER(S): [id number]   ORDERING CLINICIAN: IRA MIJARES   TECHNIQUE: Contiguous axial images were acquired from the vertex through the posterior fossa without IV contrast. All CT examinations are performed with 1 or more of the following dose reduction techniques: Automated exposure control, adjustment of mA and/or kv according to patient's size, or use of iterative reconstruction techniques.   FINDINGS: No significant interval change in size of hypodense lesion centered at the 3rd ventricle/hypothalamus measuring approximately 2.6 x 2.6 cm. However, there is increased effacement of the 3rd ventricle and developing hydrocephalus with a bifrontal horn diameter of 50 mm (previously 39 mm). There is increased mass effect and effacement of the sulci.   Again seen is a right posterior parietal approach  ventriculostomy shunt catheter with tip terminating just to the left of the midline, in the region of the foramen of Monro, unchanged.   No evidence of hemorrhage. Gray-white matter differentiation is maintained.   Paranasal sinuses and mastoid air cells are clear.       Diffuse symmetrical enlargement of the ventricular system consistent with hydrocephalus as described in the body of the report.     Signed by: Erick Betts 12/28/2024 6:40 PM Dictation workstation:   [id number]    XR shunt series    Result Date: 12/28/2024  INDICATION:   shunt, astrocytoma and fatigue. Malaise. TECHNIQUE: A complete shunt evaluation was performed.  5 images were obtained. COMPARISON:  None Available. FINDINGS: Right parietal ventricular peroneal shunt catheter is demonstrated.  There is descending right ventricular.  No shunt catheter.  Catheter terminates in the right lower quadrant.  There is no discontinuity or kinking.  There is moderate chronic stool.  Lungs are clear.    Right-sided ventriculoperitoneal shunt catheter without discontinuity.  Tip is located in the right lower quadrant. Signed by Jimmy Rivera,          Assessment and Plan:  #Optic Atrophy OU  - Stable optic atrophy as noted on previous exams  - No disc edema on today's exam  - Please note that the absence of disc edema does not rule out elevated ICP  - If warranted, further workup should be performed to evaluate for elevated ICP  - Rest of workup per NSGY        Ba Coronel MD  Ophthalmology PGY2      Ophthalmology Adult Pager - [phone]  Ophthalmology Pediatrics Pager - [phone]    For adult follow-up appointments, call: [phone]  For pediatric follow-up appointments, call: [phone]    Brief Key of Common Ophthalmology Abbreviations:  OD: right eye  OS: left eye  OU: both eyes  VA: visual acuity   IOP: intraocular pressure  EOMs: extraocular movements  CVF: confrontal visual fields  DFE: dilated fundus exam  DBH: dot blot hemorrhage  CWS:  cotton wool spot  AC: anterior chamber  RD: retinal detachment  PVD: posterior vitreous detachment    NOTE: This note is not finalized until attending reviews and signs.

## 2024-12-29 NOTE — CARE PLAN
Problem: Pain - Adult  Goal: Verbalizes/displays adequate comfort level or baseline comfort level  Outcome: Progressing     Problem: Safety - Adult  Goal: Free from fall injury  Outcome: Progressing     Problem: Discharge Planning  Goal: Discharge to home or other facility with appropriate resources  Outcome: Progressing     Problem: Fall/Injury  Goal: Not fall by end of shift  Outcome: Progressing     Problem: Skin  Goal: Participates in plan/prevention/treatment measures  Outcome: Progressing     Problem: Hydrocephalus (Acute/Chronic)  Goal: Neuro status stable or improved  Outcome: Progressing  Goal: No change in fontanelles  Outcome: Progressing  Goal: No signs or worsening of infection from drains  Outcome: Progressing  Goal: Tolerates invasive procedures  Outcome: Progressing     The clinical goals for the shift include control pain

## 2024-12-29 NOTE — ANESTHESIA PREPROCEDURE EVALUATION
Patient: Sejal Duff    Procedure Information       Date/Time: 12/29/24 0745    Procedure:  shunt exploration and revision (Right: Head)    Location: [facility] OR 24 / [facility] OR    Surgeons: Paras Yap MD            Relevant Problems   Pulmonary   (+) Asthma      Neuro  Astrocytoma (c/b hydrocephalous, s/p  shunt on 12/1/24)      GI   (+) GERD (gastroesophageal reflux disease)   (+) Irritable bowel syndrome      Hematology   (+) Anemia      ID   (+) Bacterial vaginosis      GYN   (+) Menorrhagia with regular cycle       Clinical information reviewed:    Allergies      OB Status           NPO Detail:  No data recorded     Physical Exam    Airway  Mallampati: II  TM distance: >3 FB  Neck ROM: full     Cardiovascular   Rhythm: regular  Comments: Sinus tachycardia   Dental - normal exam  Comments: Permanent retainers   Pulmonary    Abdominal            Anesthesia Plan    History of general anesthesia?: yes  History of complications of general anesthesia?: no    ASA 3     general     intravenous induction   Postoperative administration of opioids is intended.  Trial extubation is planned.  Anesthetic plan and risks discussed with patient.  Use of blood products discussed with patient who consented to blood products.    Plan discussed with attending and resident.

## 2024-12-29 NOTE — OP NOTE
shunt exploration and revision (R) Operative Note     Date: 2024 - 2024  OR Location: [facility] OR    Name: Sejal Duff : 2000, Age: 24 y.o., MRN: [id number], Sex: female    Diagnosis  Pre-op Diagnosis      * Obstructive hydrocephalus (Multi) [G91.1] Post-op Diagnosis     * Obstructive hydrocephalus (Multi) [G91.1]     Procedures   shunt exploration and revision  34654 - [place] RPLCMT/REVJ CSF SHUNT VALVE/CATH SHUNT SYS      Surgeons      * Paras Yap - Primary    Resident/Fellow/Other Assistant:  Surgeons and Role:     * Natanael Valenzuela MD - Resident - Assisting     * Param Rivas MD - Resident - Assisting    Staff:   Scrub Person: Keila  Circulator: Moustapha    Anesthesia Staff: Anesthesiologist: Nalini Guillen MD  Anesthesia Resident: Leigh Aguero MD    Procedure Summary  Anesthesia: Anesthesia type not filed in the log.  ASA: ASA status not filed in the log.  Estimated Blood Loss: 20mL  Intra-op Medications:   Administrations occurring from 0745 to 1055 on 24:   Medication Name Total Dose   lidocaine-epinephrine (Xylocaine W/EPI) 0.5 %-1:200,000 injection 10 mL   gentamicin (Garamycin) 80 mg in sodium chloride 0.9 % 250 mL irrigation 80 mg   ceFAZolin (Ancef) 1 g 2 g   dexAMETHasone (Decadron) injection 4 mg/mL 4 mg   fentaNYL (Sublimaze) injection 50 mcg/mL 100 mcg   HYDROmorphone (Dilaudid) injection 1 mg/mL 0.4 mg   LR bolus Cannot be calculated   midazolam PF (Versed) injection 1 mg/mL 2 mg   oxyCODONE (Roxicodone) immediate release tablet 5 mg Cannot be calculated   propofol (Diprivan) injection 10 mg/mL 100 mg   rocuronium (ZeMuron) 50 mg/5 mL injection 60 mg              Anesthesia Record               Intraprocedure I/O Totals       None           Specimen: No specimens collected              Drains and/or Catheters: * None in log *    Tourniquet Times:         Implants:  Implants       Type Name Action Serial No.      Neuro Interventional Implant  VAUGHN [employer]AR[employer] - [id number] Implanted               Findings: no prox flow, good distal runoff    Indications: Sejal Duff is an 24 y.o. female who is having surgery for Obstructive hydrocephalus (Multi) [G91.1].     The patient was seen in the preoperative area. The risks, benefits, complications, treatment options, non-operative alternatives, expected recovery and outcomes were discussed with the patient. The possibilities of reaction to medication, pulmonary aspiration, injury to surrounding structures, bleeding, recurrent infection, the need for additional procedures, failure to diagnose a condition, and creating a complication requiring transfusion or operation were discussed with the patient. The patient concurred with the proposed plan, giving informed consent.  The site of surgery was properly noted/marked if necessary per policy. The patient has been actively warmed in preoperative area. Preoperative antibiotics have been ordered and given within 1 hours of incision. Venous thrombosis prophylaxis have been ordered including bilateral sequential compression devices    Procedure Details:   The patient was brought back from preop to OR. A safety huddle was performed and anesthesia was induced. The right side of the head and abdomen were cleansed, prepped and draped in usual sterile fashion. The prior incision was marked. Local anesthetic was used to infiltrate skin over incision.     The prior incision was opened and extended to exposed the Rikham, valve, and distal tubing. The rikham was disconnected from the valve and noted to have no proximal flow. Distal flow was then tested by connecting a manometer to the valve with patent flow. The distal catheter was also separately tested and noted to have flow. A new proximal catheter was softly passed through the prior shunt tract with good CSF return. This was then connected to the valve with 2-0 silk tie.     The incision was copiously irrigated and  closed with 2-0 vicryls followed by running 4-0 monocryl. All counts were correct at end of case and patient was turned over to anesthesia for extubation.   Complications:  None; patient tolerated the procedure well.    Disposition: PACU - hemodynamically stable.  Condition: stable     Attending Attestation:     Paras Yap  Phone Number: [phone]

## 2024-12-30 ENCOUNTER — APPOINTMENT (OUTPATIENT)
Dept: CARDIOLOGY | Facility: HOSPITAL | Age: 24
End: 2024-12-30
Payer: COMMERCIAL

## 2024-12-30 LAB — CARDIAC TROPONIN I PNL SERPL HS: 3 NG/L (ref 0–34)

## 2024-12-30 PROCEDURE — 1200000002 HC GENERAL ROOM WITH TELEMETRY DAILY

## 2024-12-30 PROCEDURE — 2500000004 HC RX 250 GENERAL PHARMACY W/ HCPCS (ALT 636 FOR OP/ED)

## 2024-12-30 PROCEDURE — 2500000002 HC RX 250 W HCPCS SELF ADMINISTERED DRUGS (ALT 637 FOR MEDICARE OP, ALT 636 FOR OP/ED): Performed by: PHYSICIAN ASSISTANT

## 2024-12-30 PROCEDURE — 2500000004 HC RX 250 GENERAL PHARMACY W/ HCPCS (ALT 636 FOR OP/ED): Performed by: STUDENT IN AN ORGANIZED HEALTH CARE EDUCATION/TRAINING PROGRAM

## 2024-12-30 PROCEDURE — 2500000001 HC RX 250 WO HCPCS SELF ADMINISTERED DRUGS (ALT 637 FOR MEDICARE OP): Performed by: STUDENT IN AN ORGANIZED HEALTH CARE EDUCATION/TRAINING PROGRAM

## 2024-12-30 PROCEDURE — 99233 SBSQ HOSP IP/OBS HIGH 50: CPT | Performed by: PHYSICIAN ASSISTANT

## 2024-12-30 PROCEDURE — 2500000001 HC RX 250 WO HCPCS SELF ADMINISTERED DRUGS (ALT 637 FOR MEDICARE OP): Performed by: PHYSICIAN ASSISTANT

## 2024-12-30 PROCEDURE — 93005 ELECTROCARDIOGRAM TRACING: CPT

## 2024-12-30 RX ORDER — DIPHENHYDRAMINE HYDROCHLORIDE 50 MG/ML
25 INJECTION INTRAMUSCULAR; INTRAVENOUS ONCE
Status: COMPLETED | OUTPATIENT
Start: 2024-12-30 | End: 2024-12-30

## 2024-12-30 RX ORDER — CALCIUM CARBONATE 200(500)MG
500 TABLET,CHEWABLE ORAL 4 TIMES DAILY PRN
Status: DISCONTINUED | OUTPATIENT
Start: 2024-12-30 | End: 2024-12-31 | Stop reason: HOSPADM

## 2024-12-30 RX ORDER — HEPARIN SODIUM 5000 [USP'U]/ML
5000 INJECTION, SOLUTION INTRAVENOUS; SUBCUTANEOUS EVERY 8 HOURS SCHEDULED
Status: DISCONTINUED | OUTPATIENT
Start: 2024-12-31 | End: 2024-12-31 | Stop reason: HOSPADM

## 2024-12-30 RX ORDER — DIAZEPAM 5 MG/1
5 TABLET ORAL EVERY 6 HOURS PRN
Status: DISCONTINUED | OUTPATIENT
Start: 2024-12-30 | End: 2024-12-31 | Stop reason: HOSPADM

## 2024-12-30 RX ORDER — AMOXICILLIN 250 MG
2 CAPSULE ORAL 2 TIMES DAILY
Qty: 28 TABLET | Refills: 0 | Status: CANCELLED | OUTPATIENT
Start: 2024-12-30 | End: 2025-01-06

## 2024-12-30 RX ADMIN — OXYCODONE HYDROCHLORIDE 10 MG: 5 TABLET ORAL at 18:46

## 2024-12-30 RX ADMIN — OXYCODONE HYDROCHLORIDE 10 MG: 5 TABLET ORAL at 00:27

## 2024-12-30 RX ADMIN — CETIRIZINE HYDROCHLORIDE 10 MG: 10 TABLET, FILM COATED ORAL at 08:28

## 2024-12-30 RX ADMIN — ONDANSETRON 4 MG: 2 INJECTION INTRAMUSCULAR; INTRAVENOUS at 01:05

## 2024-12-30 RX ADMIN — OXYCODONE HYDROCHLORIDE 10 MG: 5 TABLET ORAL at 10:12

## 2024-12-30 RX ADMIN — DEXAMETHASONE SODIUM PHOSPHATE 4 MG: 4 INJECTION, SOLUTION INTRA-ARTICULAR; INTRALESIONAL; INTRAMUSCULAR; INTRAVENOUS; SOFT TISSUE at 21:19

## 2024-12-30 RX ADMIN — DIAZEPAM 5 MG: 5 TABLET ORAL at 16:18

## 2024-12-30 RX ADMIN — ACETAMINOPHEN 650 MG: 325 TABLET, FILM COATED ORAL at 00:16

## 2024-12-30 RX ADMIN — DIAZEPAM 5 MG: 5 TABLET ORAL at 10:07

## 2024-12-30 RX ADMIN — VILAZODONE HYDROCHLORIDE 10 MG: 10 TABLET ORAL at 11:58

## 2024-12-30 RX ADMIN — ONDANSETRON 4 MG: 2 INJECTION INTRAMUSCULAR; INTRAVENOUS at 21:23

## 2024-12-30 RX ADMIN — CYCLOBENZAPRINE 10 MG: 10 TABLET, FILM COATED ORAL at 08:28

## 2024-12-30 RX ADMIN — CALCIUM CARBONATE (ANTACID) CHEW TAB 500 MG 500 MG: 500 CHEW TAB at 17:26

## 2024-12-30 RX ADMIN — MAGNESIUM OXIDE TAB 400 MG (241.3 MG ELEMENTAL MG) 400 MG: 400 (241.3 MG) TAB at 08:28

## 2024-12-30 RX ADMIN — OXYCODONE HYDROCHLORIDE 10 MG: 5 TABLET ORAL at 14:40

## 2024-12-30 RX ADMIN — SODIUM CHLORIDE 1000 ML: 9 INJECTION, SOLUTION INTRAVENOUS at 21:48

## 2024-12-30 RX ADMIN — CYCLOBENZAPRINE 10 MG: 10 TABLET, FILM COATED ORAL at 03:36

## 2024-12-30 RX ADMIN — ACETAMINOPHEN 650 MG: 325 TABLET, FILM COATED ORAL at 17:25

## 2024-12-30 RX ADMIN — DIPHENHYDRAMINE HYDROCHLORIDE 25 MG: 50 INJECTION INTRAMUSCULAR; INTRAVENOUS at 21:17

## 2024-12-30 RX ADMIN — DIPHENHYDRAMINE HYDROCHLORIDE 25 MG: 50 INJECTION INTRAMUSCULAR; INTRAVENOUS at 02:04

## 2024-12-30 RX ADMIN — ACETAMINOPHEN 650 MG: 325 TABLET, FILM COATED ORAL at 11:58

## 2024-12-30 RX ADMIN — PANTOPRAZOLE SODIUM 40 MG: 40 TABLET, DELAYED RELEASE ORAL at 08:28

## 2024-12-30 RX ADMIN — PROMETHAZINE HYDROCHLORIDE 12.5 MG: 25 INJECTION INTRAMUSCULAR; INTRAVENOUS at 00:16

## 2024-12-30 RX ADMIN — ONDANSETRON 4 MG: 2 INJECTION INTRAMUSCULAR; INTRAVENOUS at 16:18

## 2024-12-30 RX ADMIN — ACETAMINOPHEN 650 MG: 325 TABLET, FILM COATED ORAL at 05:12

## 2024-12-30 RX ADMIN — HYDROMORPHONE HYDROCHLORIDE 0.2 MG: 1 INJECTION, SOLUTION INTRAMUSCULAR; INTRAVENOUS; SUBCUTANEOUS at 01:35

## 2024-12-30 RX ADMIN — OXYCODONE HYDROCHLORIDE 10 MG: 5 TABLET ORAL at 21:19

## 2024-12-30 RX ADMIN — CALCIUM CARBONATE (ANTACID) CHEW TAB 500 MG 500 MG: 500 CHEW TAB at 08:28

## 2024-12-30 RX ADMIN — OXYCODONE HYDROCHLORIDE 10 MG: 5 TABLET ORAL at 05:12

## 2024-12-30 ASSESSMENT — COGNITIVE AND FUNCTIONAL STATUS - GENERAL
DAILY ACTIVITIY SCORE: 24
MOBILITY SCORE: 24

## 2024-12-30 ASSESSMENT — ACTIVITIES OF DAILY LIVING (ADL): LACK_OF_TRANSPORTATION: NO

## 2024-12-30 ASSESSMENT — PAIN SCALES - GENERAL
PAINLEVEL_OUTOF10: 9
PAINLEVEL_OUTOF10: 4
PAINLEVEL_OUTOF10: 10 - WORST POSSIBLE PAIN
PAINLEVEL_OUTOF10: 8
PAINLEVEL_OUTOF10: 9
PAINLEVEL_OUTOF10: 9
PAINLEVEL_OUTOF10: 8

## 2024-12-30 ASSESSMENT — PAIN - FUNCTIONAL ASSESSMENT
PAIN_FUNCTIONAL_ASSESSMENT: 0-10

## 2024-12-30 ASSESSMENT — PAIN DESCRIPTION - LOCATION: LOCATION: HEAD

## 2024-12-30 ASSESSMENT — PAIN DESCRIPTION - DESCRIPTORS: DESCRIPTORS: SPASM

## 2024-12-30 NOTE — PROGRESS NOTES
"  Department of Neurosurgery  Daily Progress Note    Patient Name: Sejal Duff  MRN: [id number]  Date:  12/30/24     Subjective  Patient sleeping comfortably with mother at bedside upon initial evaluation. Patient notes continued issues with \"spasms\" in her abdomen which were occurring preoperatively and have been unchanged post operatively. Overall general headaches have improved but patient notes extreme discomfort with spasms which have not been helped by medications in the past (patient reportedly trailed flexeril, oxycodone, gabapentin, zanaflex, tylenol). Denies any fever, chills, night sweats, CP, SOB, palpitations, nausea or vomiting.    Objective    Vital Signs  /63   Pulse 97   Temp 36.4 °C (97.5 °F) (Temporal)   Resp 20   Ht 1.6 m (5' 3\")   Wt 68 kg (150 lb)   SpO2 99%   BMI 26.57 kg/m²      Physical Exam  Constitutional: A&Ox3, calm and cooperative, NAD.  Eyes: PERRL, EOMI.   ENMT: Moist mucous membranes, no apparent injuries or lesions.  Head/Neck: Cranial incision C/D/I with no ashish-incisional erythema or edema.   Cardiovascular: Normal rate and regular rhythm. 2+ equal pulses of the distal extremities.  Respiratory/Thorax: CTAB, regular respirations on RA. Good symmetric chest expansion.   Gastrointestinal: Abdomen soft, non tender.   Neurological:   A&Ox3  Face symmetric, Facial SILT   Tongue midline and symmetric  RUE D5 / B5 / T5 / HG 5/ IO 5  LUE D5 / B5 / T5 / HG 5/ IO 5  RLE HF5 / KE 5/ DF 5/ PF 5  LLE HF5 / KE 5/ DF 5/ PF 5  No clonus, neg Harris  SILT  Psychological: Appropriate mood and behavior.   Skin: Warm and dry.     Diagnostics   Results for orders placed or performed during the hospital encounter of 12/28/24 (from the past 24 hours)   Troponin I, High Sensitivity   Result Value Ref Range    Troponin I, High Sensitivity (CMC) 3 0 - 34 ng/L   Electrocardiogram, 12-lead PRN ACS symptoms   Result Value Ref Range    Ventricular Rate 106 BPM    Atrial Rate 106 BPM    AR Interval " 144 ms    QRS Duration 82 ms    QT Interval 308 ms    QTC Calculation(Bazett) 409 ms    P Axis 68 degrees    R Axis 52 degrees    T Axis 13 degrees    QRS Count 17 beats    Q Onset 220 ms    P Onset 148 ms    P Offset 196 ms    T Offset 374 ms    QTC Fredericia 372 ms     *Note: Due to a large number of results and/or encounters for the requested time period, some results have not been displayed. A complete set of results can be found in Results Review.     CT head wo IV contrast    Result Date: 12/29/2024  Interpreted By:  Sanjay Perez, STUDY: CT HEAD WO IV CONTRAST;  12/29/2024 12:09 pm   INDICATION: Signs/Symptoms:s/p prox shunt revision.   COMPARISON: December 28   ACCESSION NUMBER(S): [id number]   ORDERING CLINICIAN: MARS VANESSA   TECHNIQUE: Noncontrast enhanced CT brain   FINDINGS: * Interval replacement of the previously demonstrated right posterior frontal/parietal intraventricular catheter. The distal end of the catheter terminates within the left lateral ventricle having passed through the right lateral ventricle. Slight decreased ventricular size with intraventricular gas present on the right side. *Unchanged suprasellar mass *Unchanged 4th ventricle *No evidence of parenchymal or extra-axial hemorrhage       * Status post shunt revision without evidence of hemorrhage or extra-axial collection *Slight decreased ventricular size.   MACRO: none   Signed by: Sanjay Perez 12/29/2024 12:41 PM Dictation workstation:   [id number]    CT abdomen pelvis w IV contrast    Result Date: 12/28/2024  STUDY: CT Abdomen and Pelvis with IV Contrast; 12/28/2024, 6 PM INDICATION: Abdominal pain with nausea with a shunt. COMPARISON: Shunt series 12/28/2024, XR AP 08/27/2021, 10/03/2020. ACCESSION NUMBER(S): [id number] ORDERING CLINICIAN: IRA MIJARES TECHNIQUE: CT of the abdomen and pelvis was performed.  Contiguous axial images were obtained at 3 mm slice thickness through the abdomen and pelvis. Coronal and  sagittal reconstructions at 3 mm slice thickness were performed.  Omnipaque 350, 75  mL was administered intravenously.  FINDINGS: LOWER CHEST: No cardiomegaly.  No pericardial effusion.  Lung bases are clear.  ABDOMEN:  LIVER: No hepatomegaly.  Smooth surface contour.  Normal attenuation.  BILE DUCTS: No intrahepatic or extrahepatic biliary ductal dilatation.  GALLBLADDER: The gallbladder is unremarkable. STOMACH: No abnormalities identified.  PANCREAS: No masses or ductal dilatation.  SPLEEN: No splenomegaly or focal splenic lesion.  ADRENAL GLANDS: No thickening or nodules.  KIDNEYS AND URETERS: Kidneys are normal in size and location.  No renal or ureteral calculi.  There is subtle left renal edema.  Tiny hypodensities within the kidney suggest renal cysts.  PELVIS:  BLADDER: No abnormalities identified.  REPRODUCTIVE ORGANS: No abnormalities identified.  BOWEL: No abnormalities identified.  Appendix is normal.  There is mild/moderate colonic stool.  VESSELS: No abnormalities identified.  Abdominal aorta is normal in caliber.  PERITONEUM/RETROPERITONEUM/LYMPH NODES: Small amount of free pelvic fluid.  No pneumoperitoneum. No lymphadenopathy.  ABDOMINAL WALL: No abnormalities identified.  There is ventriculoperitoneal shunt catheter which terminates in the midabdomen. SOFT TISSUES: No abnormalities identified.  BONES: No acute fracture or aggressive osseous lesion.    Subtle left renal edema.  Please correlate for pyelonephritis. Ventricular peritoneal shunt catheter which terminates in the midabdomen with small amount of free pelvic fluid. No acute inflammatory changes in the abdomen and pelvis.. Signed by Jimmy Rivera, DO    CT head wo IV contrast    Result Date: 12/28/2024  Interpreted By:  Erick Betts, STUDY: IRA MIJARES; 12/28/2024 6:24 pm   INDICATION: Signs/Symptoms:VPS for astrocytoma, malaise;   COMPARISON: 12/05/2024   ACCESSION NUMBER(S): [id number]   ORDERING CLINICIAN: IRA  MARYANA   TECHNIQUE: Contiguous axial images were acquired from the vertex through the posterior fossa without IV contrast. All CT examinations are performed with 1 or more of the following dose reduction techniques: Automated exposure control, adjustment of mA and/or kv according to patient's size, or use of iterative reconstruction techniques.   FINDINGS: No significant interval change in size of hypodense lesion centered at the 3rd ventricle/hypothalamus measuring approximately 2.6 x 2.6 cm. However, there is increased effacement of the 3rd ventricle and developing hydrocephalus with a bifrontal horn diameter of 50 mm (previously 39 mm). There is increased mass effect and effacement of the sulci.   Again seen is a right posterior parietal approach ventriculostomy shunt catheter with tip terminating just to the left of the midline, in the region of the foramen of Monro, unchanged.   No evidence of hemorrhage. Gray-white matter differentiation is maintained.   Paranasal sinuses and mastoid air cells are clear.       Diffuse symmetrical enlargement of the ventricular system consistent with hydrocephalus as described in the body of the report.     Signed by: Erick Betts 12/28/2024 6:40 PM Dictation workstation:   [id number]    XR shunt series    Result Date: 12/28/2024  INDICATION:   shunt, astrocytoma and fatigue. Malaise. TECHNIQUE: A complete shunt evaluation was performed.  5 images were obtained. COMPARISON:  None Available. FINDINGS: Right parietal ventricular peroneal shunt catheter is demonstrated.  There is descending right ventricular.  No shunt catheter.  Catheter terminates in the right lower quadrant.  There is no discontinuity or kinking.  There is moderate chronic stool.  Lungs are clear.    Right-sided ventriculoperitoneal shunt catheter without discontinuity.  Tip is located in the right lower quadrant. Signed by Jimmy Rivera,     Current Medications  Scheduled  medications  acetaminophen, 650 mg, oral, q6h IRAIDA  amitriptyline, 25 mg, oral, Nightly  calcium carbonate, 500 mg, oral, Daily  cetirizine, 10 mg, oral, Daily  cyclobenzaprine, 10 mg, oral, TID  linaCLOtide, 145 mcg, oral, Daily before breakfast  magnesium oxide, 400 mg, oral, Daily  pantoprazole, 40 mg, oral, Daily before breakfast  polyethylene glycol, 17 g, oral, BID  sennosides-docusate sodium, 2 tablet, oral, BID  [Held by provider] tovorafenib, 600 mg, oral, Every Sunday  vilazodone, 10 mg, oral, Daily    Continuous medications     PRN medications  PRN medications: albuterol, diazePAM, HYDROmorphone, naloxone, ondansetron **OR** ondansetron, oxyCODONE, oxyCODONE, promethazine     Assessment/Plan  Sejal Duff is a 24 year old female with a past medical history of anemia, asthma GERD, IBS, PCOS and breast fibroadenoma s/p L breast excisional biopsy who initially presented with HA and found to have a 3rd ventricular mass with calcifications, subsequent MRI demonstrated 2.4cm peripherally enhancing third ventricular necrotic mass with nonenhancing FLAIR hyperintense lesion in medial L temporal lobe. 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 Patient returned to the ED 11/28/24 with worsening HA, CTH with increased ventricles and MRI with increased mass and ventriculomegaly for which patient taken to the OR on 12/1/24 for R parietal VPS Certas at 5. 12/4 certas dialed to 4 and patient discharged home. Patient returned to the [facility] ED on 12/28 with generalized fatigue, HA and body aches. CT head with increased ventricles, SS Certas at 4, LLQ cath with turn vs possible kink, CT AP catheter without kink or discontinuity, no pseudocyst. Ophthalmology evaluated patient with no disc edema on exam. Patient admitted to the neurosurgery service for further management/workup. See below for significant hospital events.     12/28 s/p shunt tap with no proximal flow.   12/29 s/p proximal shunt  revision, distal cath flushing. CT head post op with decreased ventricles, catheter in position.    12/30 Concern for chest tightness/pain overnight with negative EKG and troponin.     # S/p proximal shunt revision (Certas at 4)   - Patient downgraded to floor status AM 12/30  - Post operative CT head 12/29 with decreased ventricles, shunt catheter in appropriate position   - Intra-op distal cath noted to be flushing   - Ophthalmology evaluated patient 12/28 with no papilledema on exam   - Monitor incision for S/S of infection or wound break down, okay to leave CORA   - Encourage ambulation, pending PT/OT assessment post operatively   - Monitor VS/pulse ox [id number]   - Monitor AM CBC/RFP    # Acute postoperative pain  - Concerns with poor management of pain post operatively, unchanged abdominal spasms compared to preop        ·  Discussion had with patient that ongoing symptoms are likely unrelated to shunt or shunt malfunction, will attempt alterations in pain management for control   - Current regimen        ·  Valium 5mg [id number] PRN muscle spasms       ·  Scheduled Tylenol 650mg PO [id number]        ·  Oxycodone 5/10mg PO [id number] PRN mod/severe pain        ·  Dilaudid 0.2mg IV [id number] PRN breakthrough pain   - Scheduled bowel regimen while using narcotics  - IV Zofran PRN nausea due to narcotics   - Pain assessments [id number]     # Hx L temporal mass (pilocystic astrocytoma)   - Will follow up restart of home oral chemo (tovorafebib) post operatively        ·  Patient takes weekly on Sundays   - Consider supportive oncology consult if continued issues with pain management post operatively     # Hx IBS  - Continue home linzess 145mcg daily  - Home montegrity not on formulary, patient advised to bring in home medication to be verified by pharmacy   - Low threshold to increase bowel regimen, hx constipation in post operative period     # Hx anxiety/depression   - Continue home amitriptyline 25mg daily, vilazodone 10mg  daily      # Hx asthma   - Continue home albuterol PRN, zyrtec daily   - Strongly encourage IS every hour while awake     # Hx GERD   - Continue home protonix 40mg daily     Prophylaxis:   - DVT: SCDs, encourage ambulation   - Encourage IS x10 every hour while awake     FEN/GI:  - Monitor/replete electrolytes PRN   - Continue regular diet   - GI protection with Protonix 40mg daily   - Bowel regimen: Scheduled Miralax and pericolace daily        Disposition: Patient medically ready for discharge pending improvement in pain. Post operative appointment with neurosurgery requested.      Patient and plan discussed with chief neurosurgery resident, Dr. Matthews, and Dr. Fracisco Yeboah, PA-C   Neurologic Surgery   [place]  Team Pager #[phone]     Total face to face time spent with patient/family of > 60 minutes, with >50% of the time spent discussing plan of care/management, counseling/educating on disease processes, explaining results of diagnostic testing.

## 2024-12-30 NOTE — PROGRESS NOTES
12/30/24 1349   Discharge Planning   Living Arrangements Parent   Support Systems Parent   Assistance Needed None   Type of Residence Private residence   Home or Post Acute Services None   Expected Discharge Disposition Home   Does the patient need discharge transport arranged? No  (Pts mother to provide transport)   Financial Resource Strain   How hard is it for you to pay for the very basics like food, housing, medical care, and heating? Not hard   Housing Stability   In the last 12 months, was there a time when you were not able to pay the mortgage or rent on time? N   At any time in the past 12 months, were you homeless or living in a shelter (including now)? N   Transportation Needs   In the past 12 months, has lack of transportation kept you from medical appointments or from getting medications? no   In the past 12 months, has lack of transportation kept you from meetings, work, or from getting things needed for daily living? No     Met with pt and introduced myself as Care Coordinator with Care Transitions Team for Discharge Planning. Pt stated she feels safe at home. Pts mother drives to [employer] appts. Pt denies need for assistance with Jewish or cultural practices. Pt's address, phone number and contact information was verified. Pt stated she worked as an [employer], but is currently unemployed. Requesting to speak with [employer] regarding F assistance with food, rent, bills and short term disability. [employer] [place] notified, to follow up with pt. Pts mother to transport home.   Discharge disposition: Home with mother  Home Healthcare: none  DME: none  PCP: Melba Graham  Pharmacy: [facility]    Transitional Care Coordination Progress Note:  Plan per Medical/Surgical team: Pt s/p shunt revision, history of brain mass, pending pain improvement, adjusting medications.   Discharge Disposition: Home with mother.   Potential Barriers: none  ADOD: 12/31

## 2024-12-30 NOTE — HOSPITAL COURSE
Sejal Duff is a 24 y.o. female with a past medical history of anemia, asthma GERD, IBS, PCOS and breast fibroadenoma s/p L breast excisional biopsy who initially presented with HA and found to have a 3rd ventricular mass with calcifications, subsequent MRI demonstrated 2.4cm peripherally enhancing third ventricular necrotic mass with nonenhancing FLAIR hyperintense lesion in medial L temporal lobe. 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 Patient returned to the ED 11/28/24 with worsening HA, CTH with increased ventricles and MRI with increased mass and ventriculomegaly for which patient taken to the OR on 12/1/24 for R parietal VPS Certas at 5. 12/4 certas dialed to 4 and patient discharged home. Patient returned to the [facility] ED on 12/28 with generalized fatigue, HA and body aches. CT head with increased ventricles, SS Certas at 4, LLQ cath with turn vs possible kink, CT AP catheter without kink or discontinuity, no pseudocyst. Patient admitted to the neurosurgery service for further management/workup.     12/28 s/p shunt tap with no proximal flow.   12/29 s/p proximal shunt revision, distal cath flushing. CT head post op with decreased ventricles, catheter in position.    12/30 Concern for chest tightness/pain overnight with negative EKG and troponin. Improvement in pain with PRN valium.     On the day of discharge, the patient was seen and evaluated by the neurosurgery team and deemed suitable for discharge. The patient was given detailed discharge instructions and were scheduled to follow up as an outpatient.

## 2024-12-30 NOTE — CARE PLAN
Problem: Pain - Adult  Goal: Verbalizes/displays adequate comfort level or baseline comfort level  Outcome: Progressing     Problem: Fall/Injury  Goal: Not fall by end of shift  Outcome: Progressing     Problem: Hydrocephalus (Acute/Chronic)  Goal: Neuro status stable or improved  Outcome: Progressing   The patient's goals for the shift include adequate pain control

## 2024-12-31 ENCOUNTER — PHARMACY VISIT (OUTPATIENT)
Dept: PHARMACY | Facility: CLINIC | Age: 24
End: 2024-12-31
Payer: MEDICAID

## 2024-12-31 VITALS
BODY MASS INDEX: 26.58 KG/M2 | OXYGEN SATURATION: 100 % | RESPIRATION RATE: 16 BRPM | SYSTOLIC BLOOD PRESSURE: 120 MMHG | WEIGHT: 150 LBS | DIASTOLIC BLOOD PRESSURE: 80 MMHG | HEART RATE: 112 BPM | TEMPERATURE: 98.1 F | HEIGHT: 63 IN

## 2024-12-31 PROCEDURE — 97161 PT EVAL LOW COMPLEX 20 MIN: CPT | Mod: GP

## 2024-12-31 PROCEDURE — 2500000001 HC RX 250 WO HCPCS SELF ADMINISTERED DRUGS (ALT 637 FOR MEDICARE OP): Performed by: STUDENT IN AN ORGANIZED HEALTH CARE EDUCATION/TRAINING PROGRAM

## 2024-12-31 PROCEDURE — 2500000001 HC RX 250 WO HCPCS SELF ADMINISTERED DRUGS (ALT 637 FOR MEDICARE OP): Mod: SE | Performed by: STUDENT IN AN ORGANIZED HEALTH CARE EDUCATION/TRAINING PROGRAM

## 2024-12-31 PROCEDURE — 2500000001 HC RX 250 WO HCPCS SELF ADMINISTERED DRUGS (ALT 637 FOR MEDICARE OP): Performed by: PHYSICIAN ASSISTANT

## 2024-12-31 PROCEDURE — 2500000004 HC RX 250 GENERAL PHARMACY W/ HCPCS (ALT 636 FOR OP/ED): Mod: SE | Performed by: STUDENT IN AN ORGANIZED HEALTH CARE EDUCATION/TRAINING PROGRAM

## 2024-12-31 PROCEDURE — 97165 OT EVAL LOW COMPLEX 30 MIN: CPT | Mod: GO

## 2024-12-31 PROCEDURE — 99239 HOSP IP/OBS DSCHRG MGMT >30: CPT | Performed by: PHYSICIAN ASSISTANT

## 2024-12-31 PROCEDURE — RXMED WILLOW AMBULATORY MEDICATION CHARGE

## 2024-12-31 RX ORDER — OXYCODONE HYDROCHLORIDE 5 MG/1
5 TABLET ORAL EVERY 6 HOURS PRN
Qty: 20 TABLET | Refills: 0 | Status: SHIPPED | OUTPATIENT
Start: 2024-12-31 | End: 2024-12-31

## 2024-12-31 RX ORDER — ONDANSETRON 4 MG/1
4 TABLET, FILM COATED ORAL EVERY 8 HOURS PRN
Qty: 20 TABLET | Refills: 0 | Status: SHIPPED | OUTPATIENT
Start: 2024-12-31 | End: 2025-01-02 | Stop reason: SDUPTHER

## 2024-12-31 RX ORDER — DIAZEPAM 5 MG/1
5 TABLET ORAL EVERY 6 HOURS PRN
Qty: 28 TABLET | Refills: 0 | Status: SHIPPED | OUTPATIENT
Start: 2024-12-31 | End: 2025-01-07

## 2024-12-31 RX ORDER — OXYCODONE HYDROCHLORIDE 5 MG/1
5 TABLET ORAL EVERY 6 HOURS PRN
Qty: 20 TABLET | Refills: 0 | Status: SHIPPED | OUTPATIENT
Start: 2024-12-31 | End: 2025-01-02 | Stop reason: SDUPTHER

## 2024-12-31 RX ORDER — DIPHENHYDRAMINE HCL 25 MG
25 CAPSULE ORAL ONCE
Status: DISCONTINUED | OUTPATIENT
Start: 2024-12-31 | End: 2024-12-31 | Stop reason: HOSPADM

## 2024-12-31 RX ORDER — MODAFINIL 100 MG/1
100 TABLET ORAL DAILY PRN
Status: DISCONTINUED | OUTPATIENT
Start: 2024-12-31 | End: 2024-12-31 | Stop reason: HOSPADM

## 2024-12-31 RX ORDER — MAGNESIUM SULFATE HEPTAHYDRATE 40 MG/ML
2 INJECTION, SOLUTION INTRAVENOUS ONCE
Status: DISCONTINUED | OUTPATIENT
Start: 2024-12-31 | End: 2024-12-31 | Stop reason: HOSPADM

## 2024-12-31 RX ORDER — AMOXICILLIN 250 MG
2 CAPSULE ORAL 2 TIMES DAILY
Qty: 28 TABLET | Refills: 0 | Status: SHIPPED | OUTPATIENT
Start: 2024-12-31 | End: 2025-01-07

## 2024-12-31 RX ADMIN — ACETAMINOPHEN 650 MG: 325 TABLET, FILM COATED ORAL at 12:02

## 2024-12-31 RX ADMIN — CALCIUM CARBONATE (ANTACID) CHEW TAB 500 MG 500 MG: 500 CHEW TAB at 05:04

## 2024-12-31 RX ADMIN — CALCIUM CARBONATE (ANTACID) CHEW TAB 500 MG 500 MG: 500 CHEW TAB at 09:22

## 2024-12-31 RX ADMIN — LINACLOTIDE 145 MCG: 145 CAPSULE, GELATIN COATED ORAL at 09:21

## 2024-12-31 RX ADMIN — OXYCODONE HYDROCHLORIDE 10 MG: 5 TABLET ORAL at 09:21

## 2024-12-31 RX ADMIN — VILAZODONE HYDROCHLORIDE 10 MG: 10 TABLET ORAL at 09:22

## 2024-12-31 RX ADMIN — ONDANSETRON 4 MG: 2 INJECTION INTRAMUSCULAR; INTRAVENOUS at 10:12

## 2024-12-31 RX ADMIN — OXYCODONE HYDROCHLORIDE 10 MG: 5 TABLET ORAL at 14:48

## 2024-12-31 RX ADMIN — MAGNESIUM OXIDE TAB 400 MG (241.3 MG ELEMENTAL MG) 400 MG: 400 (241.3 MG) TAB at 09:21

## 2024-12-31 RX ADMIN — ACETAMINOPHEN 650 MG: 325 TABLET, FILM COATED ORAL at 05:05

## 2024-12-31 RX ADMIN — PANTOPRAZOLE SODIUM 40 MG: 40 TABLET, DELAYED RELEASE ORAL at 05:04

## 2024-12-31 ASSESSMENT — PAIN - FUNCTIONAL ASSESSMENT
PAIN_FUNCTIONAL_ASSESSMENT: 0-10
PAIN_FUNCTIONAL_ASSESSMENT: 0-10

## 2024-12-31 ASSESSMENT — ACTIVITIES OF DAILY LIVING (ADL)
ADL_ASSISTANCE: INDEPENDENT
ADL_ASSISTANCE: INDEPENDENT

## 2024-12-31 ASSESSMENT — COGNITIVE AND FUNCTIONAL STATUS - GENERAL
CLIMB 3 TO 5 STEPS WITH RAILING: A LITTLE
DAILY ACTIVITIY SCORE: 23
MOBILITY SCORE: 23
DRESSING REGULAR LOWER BODY CLOTHING: A LITTLE

## 2024-12-31 ASSESSMENT — PAIN SCALES - GENERAL
PAINLEVEL_OUTOF10: 0 - NO PAIN
PAINLEVEL_OUTOF10: 0 - NO PAIN

## 2024-12-31 NOTE — DISCHARGE SUMMARY
Discharge Diagnosis  Hydrocephalus    Test Results Pending At Discharge  Pending Labs       No current pending labs.          Hospital Course  Sejal Duff is a 24 y.o. female with a past medical history of anemia, asthma GERD, IBS, PCOS and breast fibroadenoma s/p L breast excisional biopsy who initially presented with HA and found to have a 3rd ventricular mass with calcifications, subsequent MRI demonstrated 2.4cm peripherally enhancing third ventricular necrotic mass with nonenhancing FLAIR hyperintense lesion in medial L temporal lobe. 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 Patient returned to the ED 11/28/24 with worsening HA, CTH with increased ventricles and MRI with increased mass and ventriculomegaly for which patient taken to the OR on 12/1/24 for R parietal VPS Certas at 5. 12/4 certas dialed to 4 and patient discharged home. Patient returned to the [facility] ED on 12/28 with generalized fatigue, HA and body aches. CT head with increased ventricles, SS Certas at 4, LLQ cath with turn vs possible kink, CT AP catheter without kink or discontinuity, no pseudocyst. Patient admitted to the neurosurgery service for further management/workup.     12/28 s/p shunt tap with no proximal flow.   12/29 s/p proximal shunt revision, distal cath flushing. CT head post op with decreased ventricles, catheter in position.    12/30 Concern for chest tightness/pain overnight with negative EKG and troponin. Improvement in pain with PRN valium.     On the day of discharge, the patient was seen and evaluated by the neurosurgery team and deemed suitable for discharge. The patient was given detailed discharge instructions and were scheduled to follow up as an outpatient.    Pertinent Physical Exam At Time of Discharge  Constitutional: A&Ox3, calm and cooperative, NAD.  Eyes: PERRL, EOMI.   ENMT: Moist mucous membranes, no apparent injuries or lesions.  Cardiovascular: Normal rate and regular rhythm. 2+  equal pulses of the distal extremities.  Respiratory/Thorax: CTAB, regular respirations on RA. Good symmetric chest expansion.   Gastrointestinal: Abdomen soft, non tender.   Neurological:   A&Ox3  Face symmetric, Facial SILT   Tongue midline and symmetric  RUE D5 / B5 / T5 / HG 5/ IO 5  LUE D5 / B5 / T5 / HG 5/ IO 5  RLE HF5 / KE 5/ DF 5/ PF 5  LLE HF5 / KE 5/ DF 5/ PF 5  No clonus, neg Harris  SILT  Psychological: Appropriate mood and behavior.   Skin: Warm and dry.     Home Medications     Medication List      START taking these medications     diazePAM 5 mg tablet; Commonly known as: Valium; Take 1 tablet (5 mg) by   mouth every 6 hours if needed for muscle spasms for up to 7 days.   sennosides-docusate sodium 8.6-50 mg tablet; Commonly known as:   Mariola-Colace; Take 2 tablets by mouth 2 times a day for 7 days.     CHANGE how you take these medications     oxyCODONE 5 mg immediate release tablet; Commonly known as: Roxicodone;   Take 1 tablet (5 mg) by mouth every 6 hours if needed for severe pain (7 -   10) for up to 5 days.; What changed: reasons to take this   tovorafenib 600 mg/week (100 mg x 6) tablet; Take 600 mg by mouth 1   (one) time per week. (Six 100 mg tablets = 600 mg.) Swallow tablets whole   with water. Do not chew, cut, or crush. Tablets may be taken with or   without food. DO NOT RESUME UNTIL 1 WEEK POST OPERATIVELY (1/5/2025);   Start taking on: January 5, 2025; What changed: additional instructions     CONTINUE taking these medications     acetaminophen 500 mg tablet; Commonly known as: Tylenol; Take 2 tablets   (1,000 mg) by mouth every 8 hours if needed for mild pain (1 - 3) or   headaches.   albuterol 90 mcg/actuation inhaler; Inhale 1-2 puffs every 4 hours if   needed for wheezing or shortness of breath.   amitriptyline 25 mg tablet; Commonly known as: Elavil; Take 0.5 tablets   (12.5 mg) by mouth once daily at bedtime for 7 days, THEN 1 tablet (25 mg)   once daily at bedtime. Increase to  1 tab (25 mg) at bedtime after 7 days   if tolerates 0.5 tab (12.5 mg).; Start taking on: December 6, 2024   benzoyl peroxide 10 % external wash; Commonly known as: Benzac AC; APPLY   TOPICALLY 2 TIMES A DAY. WASH TWICE DAILY AS DIRECTED   caffeine 200 mg; Take 1 tablet (200 mg) by mouth every 6 hours if needed   (For [place]).   cholecalciferol 50,000 unit capsule; Commonly known as: Vitamin D-3;   Take 1 capsule (50,000 Units) by mouth 1 (one) time per week.   ergocalciferol 1.25 MG (27531 UT) capsule; Commonly known as: Vitamin   D-2; Take 1 capsule (1,250 mcg) by mouth 1 (one) time per week.   Lacto no.34-Ofrwda-EXT-larch 25B cell-25B cell-50 mg capsule; Take 1   capsule by mouth once daily.   linaCLOtide 145 mcg capsule; Commonly known as: Linzess; Take 1 capsule   (145 mcg) by mouth once daily in the morning. Take before meals. Do not   crush or chew.   loratadine 10 mg tablet; Commonly known as: Claritin; Take 1 tablet (10   mg) by mouth once daily.   modafinil 100 mg tablet; Commonly known as: Provigil; Take 1 tablet (100   mg) by mouth once daily. For fatigue.   Motegrity 2 mg tablet; Generic drug: prucalopride; Take 1 tablet (2 mg)   by mouth once daily.   multivitamin with iron tablet; Commonly known as: Daily Multiple   Vitamins/Iron; Take 1 tablet by mouth once daily.   omeprazole 40 mg DR capsule; Commonly known as: PriLOSEC; Take 1 capsule   (40 mg) by mouth once daily.   ondansetron ODT 4 mg disintegrating tablet; Commonly known as:   Zofran-ODT; Dissolve 1 tablet (4 mg) in the mouth every 6 hours if needed   for nausea or vomiting.   phentermine 37.5 mg tablet; Commonly known as: Adipex-P   polyethylene glycol 17 gram/dose powder; Commonly known as: Glycolax,   Miralax; DISSOLVE 17 GRAMS IN 8 OZ OF FLUID LIQUID DRINK DAILY AS DIRECTED   rizatriptan 10 mg tablet; Commonly known as: Maxalt; Take 1 tablet (10   mg) by mouth 1 time if needed for migraine for up to 15 days. May repeat   in 2 hours if  unresolved. Do not exceed 30 mg in 24 hours.   Viibryd 10 mg tablet; Generic drug: vilazodone     STOP taking these medications     cyclobenzaprine 5 mg tablet; Commonly known as: Flexeril   dexAMETHasone 1 mg tablet; Commonly known as: Decadron   ibuprofen 800 mg tablet   metroNIDAZOLE 500 mg tablet; Commonly known as: Flagyl     Outpatient Follow-Up  Future Appointments   Date Time Provider Department Center   1/2/2025 11:30 AM Amy Calloway, APRN-CNP [id number] [facility]   1/6/2025 12:00 PM Amy L Lejeune, RDN, LD [id number] [facility]   1/15/2025  9:15 AM NEUROSURGERY [facility] NURSE [id number] [facility]   2/4/2025  1:30 PM Pepper Davidson, JASVIR-CNP [id number] [facility]   2/6/2025  2:30 PM Den Fajardo MD PhD [id number] Academic   2/10/2025 12:45 PM Paras Yap MD [id number] [facility]       Brie Yeboah PA-C    Total face to face time spent with patient/family of >30 minutes, with >50% of the time spent discussing plan of care/management, counseling/educating on disease processes, explaining results of diagnostic testing.

## 2024-12-31 NOTE — PROGRESS NOTES
Physical Therapy    Physical Therapy Evaluation    Patient Name: Sejal Duff  MRN: [id number]  Today's Date: 12/31/2024   Time Calculation  Start Time: 1006  Stop Time: 1025  Time Calculation (min): 19 min    Assessment/Plan   PT Assessment  Barriers to Discharge Home: No anticipated barriers  End of Session Communication: Bedside nurse  Assessment Comment: Pt mobilizing near functional baseline. Pt does not have any skilled PT needs.  End of Session Patient Position: Alarm off, not on at start of session (seated EOB)  IP OR SWING BED PT PLAN  Inpatient or Swing Bed: Inpatient  PT Plan  PT Plan: PT Eval only  PT Eval Only Reason: At baseline function  PT Frequency: PT eval only  PT Discharge Recommendations: No further acute PT, No PT needed after discharge  PT Recommended Transfer Status: Independent  PT - OK to Discharge: Yes      Subjective   General Visit Information:  Reason for Referral: 12/29 s/p proximal shunt revision, distal cath flushing  Past Medical History Relevant to Rehab: anemia, asthma GERD, IBS, PCOS and breast fibroadenoma s/p L breast excisional biopsy; 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); OR on 12/1/24 for R parietal VPS Certas at 5  Co-Treatment: OT  Co-Treatment Reason: to maximize mobility and safety  Prior to Session Communication: Bedside nurse  Patient Position Received: Alarm off, not on at start of session (seated EOB)   Home Living:  Home Living  Type of Home: House  Lives With: Alone  Home Adaptive Equipment: None  Home Layout: Two level  Home Access: Stairs to enter with rails  Entrance Stairs-Number of Steps: 2  Prior Level of Function:  Prior Function Per Pt/Caregiver Report  Receives Help From:  (mother and brother available to assist)  ADL Assistance: Independent  Homemaking Assistance: Independent  Ambulatory Assistance: Independent  Vocational: Full time employment  Precautions:  Precautions  Medical  Precautions: Fall precautions       Objective     Pain:  Pain Assessment  Pain Assessment: 0-10  0-10 (Numeric) Pain Score: 0 - No pain  Cognition:  Cognition  Overall Cognitive Status: Within Functional Limits  Orientation Level: Oriented X4      Extremity/Trunk Assessments:  Strength:           RLE   RLE : Within Functional Limits  LLE   LLE : Within Functional Limits    General Assessments:            Sensation  Light Touch: No apparent deficits  Coordination  Movements are Fluid and Coordinated: Yes  Static Sitting Balance  Static Sitting-Level of Assistance: Independent  Dynamic Sitting Balance  Dynamic Sitting-Level of Assistance: Independent  Static Standing Balance  Static Standing-Level of Assistance: Independent  Dynamic Standing Balance  Dynamic Standing-Level of Assistance: Distant supervision    Functional Assessments:  Bed Mobility  Bed Mobility: No (pt seated EOB pre/post visit)  Transfers  Transfer: Yes  Transfer 1  Technique 1: Sit to stand, Stand to sit  Transfer Level of Assistance 1: Independent  Ambulation/Gait Training  Ambulation/Gait Training Performed: Yes  Ambulation/Gait Training 1  Assistance 1: Independent  Quality of Gait 1:  (decreased reggie)  Comments/Distance (ft) 1: 200 feet  Stairs  Stairs: Yes  Stairs  Rails 1: Left  Assistance 1: Distant supervision  Comment/Number of Steps 1: 4       Outcome Measures:  [facility] Basic Mobility  Turning from your back to your side while in a flat bed without using bedrails: None  Moving from lying on your back to sitting on the side of a flat bed without using bedrails: None  Moving to and from bed to chair (including a wheelchair): None  Standing up from a chair using your arms (e.g. wheelchair or bedside chair): None  To walk in hospital room: None  Climbing 3-5 steps with railing: A little  Basic Mobility - Total Score: 23                                   Education Documentation  Precautions, taught by Lurdes Infante, PT at 12/31/2024 11:49  AM.  Learner: Patient  Readiness: Acceptance  Method: Explanation  Response: Verbalizes Understanding  Comment: safe mobility, falls precautions    Mobility Training, taught by Lurdes Infante PT at 12/31/2024 11:49 AM.  Learner: Patient  Readiness: Acceptance  Method: Explanation  Response: Verbalizes Understanding  Comment: safe mobility, falls precautions    Education Comments  No comments found.            12/31/24 at 11:49 AM   Lurdes Infante, PT

## 2024-12-31 NOTE — PROGRESS NOTES
"Sejal Duff is a 24 y.o. female on day 3 of admission presenting with Hydrocephalus.    Subjective   No events overnight. GERD improving with tums    Objective     Physical Exam  Awake, Ox3  BUE 5/5  BLE 5/5  No drift   R parietal incision c/d/I  SILT  Last Recorded Vitals  Blood pressure 115/74, pulse 80, temperature 36.2 °C (97.2 °F), resp. rate 16, height 1.6 m (5' 3\"), weight 68 kg (150 lb), SpO2 96%.  Intake/Output last 3 Shifts:  I/O last 3 completed shifts:  In: 1226 (18 mL/kg) [P.O.:1001; I.V.:75 (1.1 mL/kg); IV Piggyback:150]  Out: 900 (13.2 mL/kg) [Urine:900 (0.4 mL/kg/hr)]  Weight: 68 kg     Relevant Results  Lab Results   Component Value Date    WBC 6.6 12/28/2024    HGB 10.6 (L) 12/28/2024    HCT 32.2 (L) 12/28/2024    MCV 81 12/28/2024     12/28/2024     Lab Results   Component Value Date    GLUCOSE 70 (L) 12/28/2024    CALCIUM 9.4 12/28/2024     12/28/2024    K 3.2 (L) 12/28/2024    CO2 26 12/28/2024     12/28/2024    BUN 11 12/28/2024    CREATININE 0.57 12/28/2024       Assessment/Plan   Assessment & Plan  Hydrocephalus    Shunt malfunction, initial encounter    Sejal is a 24 y.o. 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, MRI 2.4cm peripherally enhancing third ventricular necrotic mass w/ nonenhancing FLAIR hyperintense lesion in medial L temporal lobe, 10/16/24 s/p R stereotactic brain bx (pilocystic astrocytoma), 11/28/24 p/w worsening HA, CTH incr vents, MRI incr mass 2.7x2.5cm, ventriculomegaly, 12/1/24 s/p R parietal VPS Certas at 5, 12/4 certas dialed to 4, 12/28 p/w generalized fatigue, HA, body aches, CTH incr vents, SS Certas at 4, LLQ cath w/ turn vs possible kink, CT AP catheter w/o kink or discontinuity, no pseudocyst     12/29 s/p R VPS exploration. Prox cath revision and distal cath flushing    floor  OOB  Dispo when able             Cooper Obando, " MD

## 2024-12-31 NOTE — PROGRESS NOTES
Occupational Therapy    Evaluation    Patient Name: Sejal Duff  MRN: [id number]  Today's Date: 12/31/2024  Time Calculation  Start Time: 1005  Stop Time: 1028  Time Calculation (min): 23 min    Assessment  IP OT Assessment  OT Assessment: Pt completed functional transfers independently, able to complete lower body dressing with SBA, and feeding independently.  Prognosis: Good  Barriers to Discharge Home: No anticipated barriers  Evaluation/Treatment Tolerance: Patient tolerated treatment well  Medical Staff Made Aware: Yes  End of Session Communication: Bedside nurse  End of Session Patient Position: Alarm off, not on at start of session  Plan:  No Skilled OT: No acute OT goals identified  OT Frequency: OT eval only  OT Discharge Recommendations: No further acute OT, No OT needed after discharge  Equipment Recommended upon Discharge:  (Shower chair)  OT Recommended Transfer Status: Independent  OT - OK to Discharge: Yes    Subjective   Current Problem:  1. Shunt malfunction, initial encounter        2. S/P  shunt  oxyCODONE (Roxicodone) 5 mg immediate release tablet    diazePAM (Valium) 5 mg tablet    sennosides-docusate sodium (Mariola-Colace) 8.6-50 mg tablet    ondansetron (Zofran) 4 mg tablet      3. Malaise and fatigue        4. Generalized abdominal pain        5. Obstructive hydrocephalus (Multi)  Case Request Operating Room:  shunt exploration and revision    Case Request Operating Room:  shunt exploration and revision      6. Pilocytic astrocytoma (Multi)  tovorafenib 600 mg/week (100 mg x 6) tablet      7. 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); OR on 12/1/24 for R parietal VPS Certas at 5  Co-Treatment: PT  Co-Treatment Reason: to maximize mobility and safety  Prior to Session Communication: Bedside nurse  Patient Position Received: Alarm off, not on at start of session   Precautions:  Medical Precautions: Fall precautions    Pain:  Pain Assessment  Pain Assessment: 0-10  0-10 (Numeric) Pain Score: 0 - No pain        Objective   Cognition:  Overall Cognitive Status: Within Functional Limits  Orientation Level: Oriented X4           Home Living:  Type of Home: House  Lives With: Alone  Home Adaptive Equipment: None  Home Layout: Two level  Home Access: Stairs to enter with rails  Entrance Stairs-Number of Steps: 2   Prior Function:  Receives Help From:  (mother and brother available to assist)  ADL Assistance: Independent  Homemaking Assistance: Independent  Ambulatory Assistance: Independent  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)  Hand Dominance: Right    ADL:  Eating Assistance: Independent  Grooming Assistance: Independent  LE Dressing Assistance: Stand by  LE Dressing Deficit: Thread RLE into pants, Thread LLE into pants  Activity Tolerance:  Endurance: Endurance does not limit participation in activity  Balance:  Dynamic Sitting Balance  Dynamic Sitting-Balance Support: No upper extremity supported  Dynamic Sitting-Level of Assistance: Independent  Dynamic Standing Balance  Dynamic Standing-Balance Support: No upper extremity supported  Dynamic Standing-Level of Assistance: Independent  Static Sitting Balance  Static Sitting-Balance Support: No upper extremity supported  Static Sitting-Level of Assistance: Independent  Static Standing Balance  Static Standing-Balance Support: No upper extremity supported  Static Standing-Level of Assistance: Independent  Bed Mobility/Transfers: Bed Mobility  Bed  Mobility: No   and Transfers  Transfer: Yes  Transfer 1  Transfer From 1: Bed to  Transfer to 1: Stand  Technique 1: Sit to stand, Stand to sit  Transfer Level of Assistance 1: Independent    Vision:     and Vision - Complex Assessment  Ocular Range of Motion: Within Functional Limits  Sensation:  Light Touch: No apparent deficits  Proprioception: No apparent deficits     Perception:  Inattention/Neglect: Appears intact  Coordination:  Movements are Fluid and Coordinated: Yes   Hand Function:  Hand Function  Gross Grasp: Functional  Coordination: Functional  Extremities: RUE   RUE : Within Functional Limits, LUE   LUE: Within Functional Limits,  , and      Outcome Measures: [facility] Daily Activity  Putting on and taking off regular lower body clothing: A little  Bathing (including washing, rinsing, drying): None  Putting on and taking off regular upper body clothing: None  Toileting, which includes using toilet, bedpan or urinal: None  Taking care of personal grooming such as brushing teeth: None  Eating Meals: None  Daily Activity - Total Score: 23         ,     OT Adult Other Outcome Measures  4AT: negative    Education Documentation  Handouts, taught by Rober Giron OT at 12/31/2024  1:08 PM.  Learner: Patient  Readiness: Acceptance  Method: Explanation  Response: Verbalizes Understanding    Body Mechanics, taught by Rober Giron OT at 12/31/2024  1:08 PM.  Learner: Patient  Readiness: Acceptance  Method: Explanation  Response: Verbalizes Understanding    Precautions, taught by Rober Giron OT at 12/31/2024  1:08 PM.  Learner: Patient  Readiness: Acceptance  Method: Explanation  Response: Verbalizes Understanding    Home Exercise Program, taught by Rober Giron OT at 12/31/2024  1:08 PM.  Learner: Patient  Readiness: Acceptance  Method: Explanation  Response: Verbalizes Understanding    ADL Training, taught by Rober Giron OT at 12/31/2024  1:08 PM.  Learner: Patient  Readiness: Acceptance  Method:  Explanation  Response: Verbalizes Understanding    Education Comments  No comments found.          12/31/24 at 1:09 PM   Rober Giron OTR/OTD  Rehab Office: [phone]

## 2024-12-31 NOTE — PROGRESS NOTES
"CCLS providing support and services throughout patient's hospitalization. Patient stated that she is doing better in a lot of different ways since the last time she was here. She shared her continued coping and recent sessions with therapy. CCLS offered connection to support groups. Patient not interested at this time. Throughout interaction, patient was talking faster than previous interactions (comparison to other admissions). When asked about her hands shaking at this time, she said that is her usual baseline, mentioned \"her ADHD\" and continued talking about her coping. When RN brought meds in for patient, she asked multiple times what each pill was. Once she took her medicine, CCLS and patient engaged in further conversation. CCLS provided support and active listening throughout. Communicated interaction to RN.     40 min    GRACE Cartwright  (EPIC Secure Chat)    "

## 2025-01-01 LAB
ATRIAL RATE: 106 BPM
P AXIS: 68 DEGREES
P OFFSET: 196 MS
P ONSET: 148 MS
PR INTERVAL: 144 MS
Q ONSET: 220 MS
QRS COUNT: 17 BEATS
QRS DURATION: 82 MS
QT INTERVAL: 308 MS
QTC CALCULATION(BAZETT): 409 MS
QTC FREDERICIA: 372 MS
R AXIS: 52 DEGREES
T AXIS: 13 DEGREES
T OFFSET: 374 MS
VENTRICULAR RATE: 106 BPM

## 2025-01-02 ENCOUNTER — TELEMEDICINE (OUTPATIENT)
Dept: PALLIATIVE MEDICINE | Facility: HOSPITAL | Age: 25
End: 2025-01-02
Payer: COMMERCIAL

## 2025-01-02 DIAGNOSIS — Z98.2 S/P VP SHUNT: ICD-10-CM

## 2025-01-02 DIAGNOSIS — G93.89 BRAIN MASS: ICD-10-CM

## 2025-01-02 DIAGNOSIS — R63.0 LOSS OF APPETITE: ICD-10-CM

## 2025-01-02 DIAGNOSIS — R53.0 NEOPLASTIC MALIGNANT RELATED FATIGUE: ICD-10-CM

## 2025-01-02 DIAGNOSIS — G43.919 INTRACTABLE MIGRAINE WITHOUT STATUS MIGRAINOSUS, UNSPECIFIED MIGRAINE TYPE: ICD-10-CM

## 2025-01-02 DIAGNOSIS — K58.1 IRRITABLE BOWEL SYNDROME WITH CONSTIPATION: ICD-10-CM

## 2025-01-02 DIAGNOSIS — C71.9 PILOCYTIC ASTROCYTOMA (MULTI): ICD-10-CM

## 2025-01-02 DIAGNOSIS — Z51.5 PALLIATIVE CARE ENCOUNTER: Primary | ICD-10-CM

## 2025-01-02 DIAGNOSIS — Z79.891 ENCOUNTER FOR MONITORING OPIOID MAINTENANCE THERAPY: ICD-10-CM

## 2025-01-02 DIAGNOSIS — G89.18 POSTOPERATIVE PAIN: ICD-10-CM

## 2025-01-02 DIAGNOSIS — Z51.81 ENCOUNTER FOR MONITORING OPIOID MAINTENANCE THERAPY: ICD-10-CM

## 2025-01-02 DIAGNOSIS — R11.0 NAUSEA: ICD-10-CM

## 2025-01-02 DIAGNOSIS — K31.84 GASTROPARESIS: ICD-10-CM

## 2025-01-02 DIAGNOSIS — G89.18 ACUTE POST-OPERATIVE PAIN: ICD-10-CM

## 2025-01-02 DIAGNOSIS — K21.9 GASTROESOPHAGEAL REFLUX DISEASE WITHOUT ESOPHAGITIS: ICD-10-CM

## 2025-01-02 PROCEDURE — 99214 OFFICE O/P EST MOD 30 MIN: CPT | Performed by: NURSE PRACTITIONER

## 2025-01-02 RX ORDER — IBUPROFEN 800 MG/1
800 TABLET ORAL EVERY 8 HOURS PRN
Qty: 90 TABLET | Refills: 5 | Status: SHIPPED | OUTPATIENT
Start: 2025-01-02 | End: 2025-07-01

## 2025-01-02 RX ORDER — OXYCODONE HYDROCHLORIDE 5 MG/1
5 TABLET ORAL EVERY 6 HOURS PRN
Qty: 120 TABLET | Refills: 0 | Status: SHIPPED | OUTPATIENT
Start: 2025-01-02 | End: 2025-02-01

## 2025-01-02 RX ORDER — METHYLPHENIDATE HYDROCHLORIDE 5 MG/1
5 TABLET ORAL DAILY
Qty: 30 TABLET | Refills: 0 | Status: SHIPPED | OUTPATIENT
Start: 2025-01-02 | End: 2025-02-01

## 2025-01-02 RX ORDER — RIZATRIPTAN BENZOATE 10 MG/1
10 TABLET ORAL ONCE AS NEEDED
Qty: 15 TABLET | Refills: 0 | Status: SHIPPED | OUTPATIENT
Start: 2025-01-02 | End: 2025-01-17

## 2025-01-02 RX ORDER — AMITRIPTYLINE HYDROCHLORIDE 25 MG/1
25 TABLET, FILM COATED ORAL NIGHTLY
Qty: 30 TABLET | Refills: 5 | Status: SHIPPED | OUTPATIENT
Start: 2025-01-02 | End: 2025-07-01

## 2025-01-02 NOTE — PROGRESS NOTES
SUPPORTIVE AND PALLIATIVE ONCOLOGY OUTPATIENT FOLLOW-UP    Virtual or Telephone Consent    An interactive audio and video telecommunication system which permits real time communications between the patient (at the originating site) and provider (at the distant site) was utilized to provide this telehealth service.   Verbal consent was requested and obtained from Sejal Duff on this date, 01/02/25 for a telehealth visit.       SERVICE DATE: 1/2/2025    Medical Oncologist: Migue Aguilar MD    Radiation Oncologist: No care team member to display  Primary Physician: Melba Graham  [phone]    Subjective   HISTORY OF PRESENT ILLNESS: Sejal Duff is a 24 y.o. 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      Patient returned to the [facility] ED 11/28 with worsening HA, light sensitivity, CTH incr vents, MRI incr ventricular mass 2.7x2.5cm, ventriculomegaly, trace R ventricular GRE. Patient admitted to neurosurgery service for further management.      11/28 Ophthalmology evaluation without papilledema.  12/1 s/p R VPS (certas at 5).   12/2 CTH with catheter in position, decreased vents   12/4 CTH decreased vents but persistent ventriculomegly; Certas dialed to 4  12/5 CTH decreased vents.  Continued headaches Neurology consulted--> likely due to underlying structural mass and recent surgical interventions.  Started on scheduled  oral headache cocktail meds of Robaxin, Tylenol, reglan and Benadryl  12/6 Ophthalmology consulted for c/o vision changes--> normal exam, no disc edema  Neurology--> HA's migrainous in nature, has had prior and now worse in setting of underlying structural mass and recent VPS surgery.  Started on preventative therapy with amitriptyline 12.5 mg bedtime for 1 week and increase to 25mg if tolerates and abortive therapy with Rizatriptan PRN    Pain Assessment:  Pain Score:  0  Location:    Description:      Symptom Assessment:  Pain:a little - continues with intermittent headaches but overall manageable. Is taking Oxycodone 2-3x a week, Ibuprofen 800mg 1-2x a day as needed and unsure if she is continuing Amitriptyline at night or not. She is finding that Tylenol does not seem to really help, but overall feels she has this under control. She notes with her chemotherapy she is having general body aches for which she will take either Ibuprofen or Oxycodone as well   Numbness or Tingling in hands/feet/other: none  Sore Muscles/Spasms: none  Headache: a little  Dizziness:none  Constipation: a little - chronic in nature, on Linzess  Diarrhea: none  Nausea: a little - intermittent, controlled with Ondansetron  Vomiting: none  Lack of Appetite: a little   Weight Loss: none  Taste changes: none  Dry Mouth: none  Pain in Mouth/Swallowing: none  Lack of Energy: somewhat - feels very fatigued especially when on treatment, she notes she will not get out of bed for days and has not been able to work. She tried Provigil and did not notice any assistance with this  Difficulty Sleeping: none  Worrying: none  Anxiety: none  Depression: none  Shortness of breath: none  Other: none      Information obtained from: chart review and interview of patient  ______________________________________________________________________     Patient's Oncology History documentation       Oncology History   Pilocytic astrocytoma (Multi)   10/15/2024 Surgery      Biopsy of hypothalamic lesion by Dr. Segal at [facility]       10/15/2024 Pathology      A.  B.  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  See note     Note:  Microscopic examination for parts A and B reveal a low-grade glial neoplasm immunoreactive with OLIG-2 and GFAP.  Neurofilament immunohistochemical stain highlights neuropil and scattered neuronal bodies.  ATRX and [id number] immunohistochemical stains are retained.  IDH1 and BRAF immunohistochemical stains are negative.  GLIOSEQ studies reveal a BRAF-V600E mutation.  This finding in conjunction with the morphology are most consistent with a pilocytic astrocytoma.  However, a ganglioglioma among other low-grade glial/glial neuronal neoplasms, cannot be completely ruled out.  Clinical correlation is recommended               Medical History        Past Medical History:   Diagnosis Date    Encounter for screening for infections with a predominantly sexual mode of transmission 03/02/2020     Screening for STD (sexually transmitted disease)    Melena 04/12/2019     Blood in stool    Other specified health status       No pertinent past medical history    Other specified health status       No pertinent past surgical history    Personal history of other (healed) physical injury and trauma       History of sprain of ankle    Unspecified condition associated with female genital organs and menstrual cycle 03/02/2020     Cervical motion tenderness         Surgical History         Past Surgical History:   Procedure Laterality Date    BREAST SURGERY Left       Mass removal    OTHER SURGICAL HISTORY   01/18/2019     No history of surgery         Family History          Family History   Problem Relation Name Age of Onset    Asthma Mother        Colon cancer Mother                SOCIAL HISTORY  Social History:  reports that she has never smoked. She has never been exposed to tobacco smoke. She has  never used smokeless tobacco. She reports current alcohol use. She reports current drug use. Drug: Marijuana.  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 No children. Single. Primary support is her mom.       Objective     Admission on 12/28/2024, Discharged on 12/31/2024   Component Date Value Ref Range Status    WBC 12/28/2024 6.6  4.4 - 11.3 x10*3/uL Final    nRBC 12/28/2024 0.0  0.0 - 0.0 /100 WBCs Final    RBC 12/28/2024 4.00  4.00 - 5.20 x10*6/uL Final    Hemoglobin 12/28/2024 10.6 (L)  12.0 - 16.0 g/dL Final    Hematocrit 12/28/2024 32.2 (L)  36.0 - 46.0 % Final    MCV 12/28/2024 81  80 - 100 fL Final    MCH 12/28/2024 26.5  26.0 - 34.0 pg Final    MCHC 12/28/2024 32.9  32.0 - 36.0 g/dL Final    RDW 12/28/2024 13.2  11.5 - 14.5 % Final    Platelets 12/28/2024 344  150 - 450 x10*3/uL Final    Neutrophils % 12/28/2024 45.7  40.0 - 80.0 % Final    Immature Granulocytes %, Automated 12/28/2024 0.2  0.0 - 0.9 % Final    Lymphocytes % 12/28/2024 42.1  13.0 - 44.0 % Final    Monocytes % 12/28/2024 8.3  2.0 - 10.0 % Final    Eosinophils % 12/28/2024 3.2  0.0 - 6.0 % Final    Basophils % 12/28/2024 0.5  0.0 - 2.0 % Final    Neutrophils Absolute 12/28/2024 3.03  1.20 - 7.70 x10*3/uL Final    Immature Granulocytes Absolute, Au* 12/28/2024 0.01  0.00 - 0.70 x10*3/uL Final    Lymphocytes Absolute 12/28/2024 2.79  1.20 - 4.80 x10*3/uL Final    Monocytes Absolute 12/28/2024 0.55  0.10 - 1.00 x10*3/uL Final    Eosinophils Absolute 12/28/2024 0.21  0.00 - 0.70 x10*3/uL Final    Basophils Absolute 12/28/2024 0.03  0.00 - 0.10 x10*3/uL Final    Glucose 12/28/2024 70 (L)  74 - 99 mg/dL Final    Sodium 12/28/2024 139  136 - 145 mmol/L Final    Potassium 12/28/2024 3.2 (L)  3.5 - 5.3 mmol/L Final    Chloride 12/28/2024 105  98 - 107 mmol/L Final    Bicarbonate 12/28/2024 26  21 - 32 mmol/L Final    Anion Gap 12/28/2024 11  10 - 20 mmol/L Final    Urea Nitrogen 12/28/2024 11  6 - 23 mg/dL Final    Creatinine 12/28/2024 0.57  0.50 - 1.05 mg/dL Final     eGFR 12/28/2024 >90  >60 mL/min/1.73m*2 Final    Calcium 12/28/2024 9.4  8.6 - 10.6 mg/dL Final    Albumin 12/28/2024 4.5  3.4 - 5.0 g/dL Final    Alkaline Phosphatase 12/28/2024 69  33 - 110 U/L Final    Total Protein 12/28/2024 7.7  6.4 - 8.2 g/dL Final    AST 12/28/2024 16  9 - 39 U/L Final    Bilirubin, Total 12/28/2024 0.3  0.0 - 1.2 mg/dL Final    ALT 12/28/2024 8  7 - 45 U/L Final    Extra Tube 12/28/2024 Hold for add-ons.   Final    Extra Tube 12/28/2024 Hold for add-ons.   Final    Extra Tube 12/28/2024 Hold for add-ons.   Final    POCT pH, Venous 12/28/2024 7.36  7.33 - 7.43 pH Final    POCT pCO2, Venous 12/28/2024 48  41 - 51 mm Hg Final    POCT pO2, Venous 12/28/2024 23 (L)  35 - 45 mm Hg Final    POCT SO2, Venous 12/28/2024 31 (L)  45 - 75 % Final    POCT Oxy Hemoglobin, Venous 12/28/2024 30.9 (L)  45.0 - 75.0 % Final    POCT Hematocrit Calculated, Venous 12/28/2024 31.0 (L)  36.0 - 46.0 % Final    POCT Sodium, Venous 12/28/2024 135 (L)  136 - 145 mmol/L Final    POCT Potassium, Venous 12/28/2024 3.2 (L)  3.5 - 5.3 mmol/L Final    POCT Chloride, Venous 12/28/2024 102  98 - 107 mmol/L Final    POCT Ionized Calicum, Venous 12/28/2024 1.20  1.10 - 1.33 mmol/L Final    POCT Glucose, Venous 12/28/2024 82  74 - 99 mg/dL Final    POCT Lactate, Venous 12/28/2024 0.6  0.4 - 2.0 mmol/L Final    POCT Base Excess, Venous 12/28/2024 1.2  -2.0 - 3.0 mmol/L Final    POCT HCO3 Calculated, Venous 12/28/2024 27.1 (H)  22.0 - 26.0 mmol/L Final    POCT Hemoglobin, Venous 12/28/2024 10.4 (L)  12.0 - 16.0 g/dL Final    POCT Anion Gap, Venous 12/28/2024 9.0 (L)  10.0 - 25.0 mmol/L Final    Patient Temperature 12/28/2024 37.0  degrees Celsius Final    FiO2 12/28/2024 21  % Final    Color, Urine 12/28/2024 Yellow  Light-Yellow, Yellow, Dark-Yellow Final    Appearance, Urine 12/28/2024 Clear  Clear Final    Specific Gravity, Urine 12/28/2024 1.025  1.005 - 1.035 Final    pH, Urine 12/28/2024 6.0  5.0, 5.5, 6.0, 6.5, 7.0, 7.5,  8.0 Final    Protein, Urine 12/28/2024 10 (TRACE)  NEGATIVE, 10 (TRACE), 20 (TRACE) mg/dL Final    Glucose, Urine 12/28/2024 Normal  Normal mg/dL Final    Blood, Urine 12/28/2024 NEGATIVE  NEGATIVE Final    Ketones, Urine 12/28/2024 NEGATIVE  NEGATIVE mg/dL Final    Bilirubin, Urine 12/28/2024 NEGATIVE  NEGATIVE Final    Urobilinogen, Urine 12/28/2024 Normal  Normal mg/dL Final    Nitrite, Urine 12/28/2024 NEGATIVE  NEGATIVE Final    Leukocyte Esterase, Urine 12/28/2024 NEGATIVE  NEGATIVE Final    Extra Tube 12/28/2024 Hold for add-ons.   Final    Magnesium 12/28/2024 2.12  1.60 - 2.40 mg/dL Final    Flu A Result 12/28/2024 Not Detected  Not Detected Final    Flu B Result 12/28/2024 Not Detected  Not Detected Final    Coronavirus 2019, PCR 12/28/2024 Not Detected  Not Detected Final    RSV PCR 12/28/2024 Not Detected  Not Detected Final    WBC, Urine 12/28/2024 1-5  1-5, NONE /HPF Final    RBC, Urine 12/28/2024 1-2  NONE, 1-2, 3-5 /HPF Final    Squamous Epithelial Cells, Urine 12/28/2024 1-9 (SPARSE)  Reference range not established. /HPF Final    Mucus, Urine 12/28/2024 4+  Reference range not established. /LPF Final    Protime 12/28/2024 13.0 (H)  9.8 - 12.8 seconds Final    INR 12/28/2024 1.2 (H)  0.9 - 1.1 Final    ABO TYPE 12/28/2024 O   Final    Rh TYPE 12/28/2024 POS   Final    ANTIBODY SCREEN 12/28/2024 NEG   Final    HCG, Urine 12/28/2024 NEGATIVE  NEGATIVE Final    Troponin I, High Sensitivity (CMC) 12/29/2024 3  0 - 34 ng/L Final    Ventricular Rate 12/30/2024 106  BPM Final    Atrial Rate 12/30/2024 106  BPM Final    NC Interval 12/30/2024 144  ms Final    QRS Duration 12/30/2024 82  ms Final    QT Interval 12/30/2024 308  ms Final    QTC Calculation(Bazett) 12/30/2024 409  ms Final    P Axis 12/30/2024 68  degrees Final    R Axis 12/30/2024 52  degrees Final    T Axis 12/30/2024 13  degrees Final    QRS Count 12/30/2024 17  beats Final    Q Onset 12/30/2024 220  ms Final    P Onset 12/30/2024 148  ms  Final    P Offset 12/30/2024 196  ms Final    T Offset 12/30/2024 374  ms Final    QTC Fredericia 12/30/2024 372  ms Final        Latest Reference Range & Units 12/11/24 10:50   Oxycodone <25 ng/mL 42 (H)   Amphetamine Screen, Urine Presumptive Negative  Presumptive Negative   Barbiturate Screen, Urine Presumptive Negative  Presumptive Negative   PCP Screen, Urine Presumptive Negative  Presumptive Negative   Opiate Screen, Urine Presumptive Negative  Presumptive Negative   Cannabinoid Screen, Urine Presumptive Negative  Presumptive Negative   Morphine  <50 ng/mL <50   Fentanyl Screen, Urine Presumptive Negative  Presumptive Negative   Oxycodone Screen, Urine Presumptive Negative  Presumptive Positive !   Benzodiazepines Screen, Urine Presumptive Negative  Presumptive Negative   Methadone Screen, Urine Presumptive Negative  Presumptive Negative   Cocaine Metabolite Screen, Urine Presumptive Negative  Presumptive Negative   6-Acetylmorphine Urine <25 ng/mL <25   Hydrocodone Saliva <25 ng/mL <25   Hydromorphone Urine <25 ng/mL <25   Norhydrocodone Urine <25 ng/mL <25   Oxymorphone Urine <25 ng/mL 528 (H)   Noroxycodone Urine <25 ng/mL 172 (H)   (H): Data is abnormally high  !: Data is abnormal    PHYSICAL EXAMINATION   Vital Signs:   Vital signs reviewed      12/30/2024     4:00 PM 12/30/2024     4:10 PM 12/30/2024     5:00 PM 12/30/2024     9:00 PM 12/31/2024    12:00 AM 12/31/2024     7:37 AM 12/31/2024    12:09 PM   Vitals   Systolic  114 109 118 115 107 120   Diastolic  67 72 79 74 72 80   BP Location    Left arm Left arm Left arm Left arm   Heart Rate 106 109 97 73 80 106 112   Temp  36.5 °C (97.7 °F) 36.2 °C (97.2 °F) 36.2 °C (97.2 °F) 36.2 °C (97.2 °F) 36.5 °C (97.7 °F) 36.7 °C (98.1 °F)   Resp 17 19 18 18 16 15 16     Physical Exam  Not complete dt virtual visit    ASSESSMENT/PLAN    Pain/Headaches  Pain is: cancer related pain  Type: neuropathic  - Continue Oxycodone 5mg [id number] PRN  - Acetaminophen 1000mg  [id number] PRN - MAX 3000mg/24 hours - patient reports this does not help  - Continue Ibuprofen 800mg [id number] PRN  - Continue Amitriptyline 25mg at bed  - Continue Maxalt 10mg once daily PRN - discussed this is a rescue medicine for headaches if other medications do no work. Encouraged patient to not take daily     Opioid Use  Medication Management:   - OARRS report reviewed with no aberrant behavior; consistent with  prescriptions/records and patient history  - MED 10.  Overdose Risk Score 380.   This has been discussed with patient.   - We will continue to closely monitor the patient for signs of prescription misuse including UDS, OARRS review and subjective reports at each visit.  - No concurrent benzodiazepine use   - I am a provider who either is or has consulted and collaborated with a provider certified in Hospice and Palliative Medicine and have conducted a face-face visit and examination for this patient.  - Routine Urine Drug Screen: 12/11/2024 appropriately + for prescribed medications  - Controlled Substance Agreement: 12/11/2024  - Specifically discussed that controlled substance prescriptions will only be provided by our group as outlined in the completed agreement  - Prescribed naloxone: patient declined  - Red Flags: NA     Constipation  At risk for constipation related to opioids. Chronic concerns of constipation  - Continue Linzess and Motegrity as prescribed by GI     Nausea/Decreased Appetite  - Continue Omeprazole 40mg daily  - Continue Ondansetron ODT 4mg [id number] PRN  - Encouraged good hydration  - Encouraged small more frequent meals  - Encouraged continued Ensure 1-2x daily  Nutrition consult 12/11/2024     Sleeping Difficulty  - Amitriptyline 25mg at bed may assist     Fatigue  - Start Methylphenidate 5mg dally  - Provigil 100mg  - not effective     Tachycardia  - EKG completed today - sinus tach     Advance Directives  Existence of Advance Directives:No - not interested  Decision maker:  Surrogate decision maker is Darby Duff (mom)  Code Status: Full code    Supportive and Palliative Oncology Encounter:  Emotional support provided  Coordination of care  Will continue to follow and address symptoms as needed    Next Follow-Up Visit:  Return to clinic in 3 weeks virtual    Signature and billing  Medical complexity was moderate level due to due to complexity of problems, extensive data review, and high risk of management/treatment.  Time was spent on the following: Prep Time, Time Directly with Patient/Family/Caregiver, Documentation Time. Total time spent: 30      Data  Diagnostic tests and information reviewed for today's visit:  Most recent labs and imaging results, Medications     Some elements copied from Palliative Care note on 12/11/2024, the elements have been updated and all reflect current decision making from today, 1/2/2025.    Plan of Care discussed with: Patient    SIGNATURE: SANTOSH Macias    Contact information:  Supportive and Palliative Oncology  Monday-Friday 8 AM-5 PM  Phone:  [phone], press option #5, then option #1.   Or Epic Secure Chat

## 2025-01-06 ENCOUNTER — NUTRITION (OUTPATIENT)
Dept: HEMATOLOGY/ONCOLOGY | Facility: HOSPITAL | Age: 25
End: 2025-01-06
Payer: COMMERCIAL

## 2025-01-06 NOTE — PROGRESS NOTES
NUTRITION Follow-Up  Unable to reach patient via phone x 3.   Left VM with this writer's contact information and will attempt again.    Nutrition Assessment     Reason for Visit:  Sejal Duff is a 24 y.o. 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 Patient returned to the ED 11/28/24 with worsening HA, CTH with increased ventricles and MRI with increased mass and ventriculomegaly for which patient taken to the OR on 12/1/24 for R parietal VPS Certas at 5. 12/4 certas dialed to 4 and patient discharged home. Patient returned to [facility] ED on 12/28 with generalized fatigue, HA and body aches. CT head with increased ventricles, SS Certas at 4, LLQ cath with turn vs possible kink, CT AP catheter without kink or discontinuity, no pseudocyst. Patient admitted to the neurosurgery service for further management/workup.   Hydrocephalus  12/28 s/p shunt tap with no proximal flow.   12/29 s/p proximal shunt revision, distal cath flushing. CT head post op with decreased ventricles, catheter in position.    12/30 Concern for chest tightness/pain overnight with negative EKG and troponin. Improvement in pain with PRN valium.        Lab Results   Component Value Date/Time    GLUCOSE 70 (L) 12/28/2024 1426     12/28/2024 1426    K 3.2 (L) 12/28/2024 1426     12/28/2024 1426    CO2 26 12/28/2024 1426    ANIONGAP 11 12/28/2024 1426    BUN 11 12/28/2024 1426    CREATININE 0.57 12/28/2024 1426    EGFR >90 12/28/2024 1426    CALCIUM 9.4 12/28/2024 1426    ALBUMIN 4.5 12/28/2024 1426    ALKPHOS 69 12/28/2024 1426    PROT 7.7 12/28/2024 1426    AST 16 12/28/2024 1426    BILITOT 0.3 12/28/2024 1426    ALT 8  12/28/2024 1426    MG 2.12 12/28/2024 1426    PHOS 4.4 12/06/2024 0730     Lab Results   Component Value Date/Time    VITD25 21 (L) 10/07/2024 1729       Anthropometrics:     Wt Readings from Last 10 Encounters:   12/29/24 68 kg (150 lb)   12/16/24 67.1 kg (148 lb)   12/11/24 70 kg (154 lb 5.3 oz)   11/30/24 77.2 kg (170 lb 3.1 oz)   11/26/24 69.9 kg (154 lb)   11/19/24 70.7 kg (155 lb 14.4 oz)   11/14/24 67.6 kg (149 lb 2 oz)   11/11/24 66.7 kg (147 lb 2 oz)   11/08/24 67.2 kg (148 lb 1.6 oz)   10/30/24 66.9 kg (147 lb 8 oz)      Food And Nutrient Intake:                                                              Nutrition Focused Physical Exam Findings:                          Energy Needs           Nutrition Diagnosis             Nutrition Interventions/Recommendations   Nutrition Prescription       Food and Nutrition Delivery       Nutrition Education       Coordination of Care       There are no Patient Instructions on file for this visit.    Nutrition Monitoring and Evaluation

## 2025-01-07 ENCOUNTER — TELEPHONE (OUTPATIENT)
Dept: HEMATOLOGY/ONCOLOGY | Facility: HOSPITAL | Age: 25
End: 2025-01-07
Payer: COMMERCIAL

## 2025-01-07 DIAGNOSIS — C71.9 PILOCYTIC ASTROCYTOMA (MULTI): Primary | ICD-10-CM

## 2025-01-07 NOTE — TELEPHONE ENCOUNTER
Called Sejal, it went straight to . Left a vague message just to check in and left her with our number to call back Please call us with any questions or concerns at [phone] opt. 5, opt. 2. Just calling to check in after shunt revision and if she feels up to restarting her treatment. We can change her appt to be with Deborah Thursday or Friday if she would like to restart.    Selena ARNDT RN

## 2025-01-08 NOTE — TREATMENT PLAN
Patient presenting after fall, found to have epidural hematoma extending from the craniocervical junction to C6, with left C6 laminar fracture. 1/5 status post C4-7 laminectomy and hematoma evacuation. Drain dc'd on 1/8. Patient to remain in c-collar at all times. 2 and 6 week follow ups have been arranged. If patient is still in the hospital 2 weeks after surgery, please let neurosurgery team know. Okay to start aspirin POD7, and xarelto POD14. Thank you for allowing us to provide care for this patient. We will now sign off. Please chat or page with any questions or concerns.    Hector Abernathy MD  Neurosurgery    Patient s/p RO VPS placement (Certas at 5)on 12/1 for increased vents in setting of ventricular mass.  AM CTH stable with anticipated decrease in ventricles post op.  Patient still having complaints of non-positional frontal headaches.  After discussion with team, decision made to dial down VPS to see if headache improves with increased CSF drainage.    Procedure: Shunt Valve setting change    Patient's Certas setting decreased at bedside to from 5 to 4.  Patient tolerated procedure well without complaint.   Certas  confirmed shunts setting at 4.  AM CT head ordered.     Keila Hughes, APRN-CNP

## 2025-01-08 NOTE — TELEPHONE ENCOUNTER
Called and spoke to patient, advised Dr. Aguilar and Dr. Segal discussed her continuing her oral medication without any contraindications, patient advised she never stopped taking it, she takes it every Saturday, she is complaining of constant nausea, she is taking zofran without relief, Dr. Aguilar would like to place a referral to supportive oncology and get some recommendations till she is seen, referral placed, patient will have labs next Tuesday or Wednesday, she has a virtual follow up with Deborah DOWD on Thursday, patient agreeable with plan

## 2025-01-10 ENCOUNTER — TELEPHONE (OUTPATIENT)
Dept: PALLIATIVE MEDICINE | Facility: HOSPITAL | Age: 25
End: 2025-01-10
Payer: COMMERCIAL

## 2025-01-10 DIAGNOSIS — R11.0 NAUSEA: ICD-10-CM

## 2025-01-10 DIAGNOSIS — Z51.5 PALLIATIVE CARE ENCOUNTER: ICD-10-CM

## 2025-01-10 DIAGNOSIS — R63.0 LOSS OF APPETITE: ICD-10-CM

## 2025-01-10 DIAGNOSIS — C71.9 PILOCYTIC ASTROCYTOMA (MULTI): ICD-10-CM

## 2025-01-10 RX ORDER — OLANZAPINE 5 MG/1
5 TABLET ORAL NIGHTLY
Qty: 15 TABLET | Refills: 0 | Status: SHIPPED | OUTPATIENT
Start: 2025-01-10 | End: 2025-02-09

## 2025-01-10 NOTE — TELEPHONE ENCOUNTER
Dear Luc Garzon   Thank you for completing your mammogram.  Please find your most recent results below. Your results show NO clinical evidence of breast cancer. We will repeat the screening in 1 year. In the interim, continue to perform monthly self breast exams and notify me if you have any suspicious findings. Do not hesitate to contact the office if you have any questions or concerns before your next appointment.      Kind regards,        ----  Richardson Thorpe MD  Internal Medicine/ Michelle Armas [address] Internal Medicine   [facility], [id number] [address]  Office: [phone] Patient reported increased nausea. Called patient to discuss further. Sejal reported her nausea has been constant she has been taking zofran every few hours and it is not helping. She has bleching and burning with her nausea. She does not think she has ever taken her omeprazole, her pharmacy states she picked this up on 12/20 - patient to look for this and she will start this for reflux symptoms. She has not had a BM since her admission. Per patient this is her norm due to her IBS, she said she will take her linzess and motegrity tonight to help with this.  She is also not able to eat due to nausea, she only has some salt and vinegar chips and gingerale. She is also not sleeping at night, maybe an hour or two then she wakes up. She tried melatonin but unsure what dosage, it did not help, her mind is racing at night, she wants answers, wants her doctor to call her so she knows how long she'll be on chemo. She is hoping that shell know how long shell be on chemo and then she can have surgery. This is what she is hoping to hear. She talked about depression but think it is more that she is worried/ wants answers.   She has stopped taking some of her medications- amitripryline. She takes oxy with 800mg ibuprofen in morning for her migraine/ pain and occasionally takes that again at night. Her pain is well controlled with this regimen.   She reported new ringing in left ear with decreased hearing and has an echo.   Discussed above with Amy Calloway who would like to start olanzapine 5mg nightly for chemo induced n/v, appetite, sleep, and mood. Also encouraged patient to take her omeprazole. Pended to provider. Amy notified Deborah Snowden of ear/hearing symptoms.   Patient updated and agreeable.

## 2025-01-13 ENCOUNTER — APPOINTMENT (OUTPATIENT)
Dept: HEMATOLOGY/ONCOLOGY | Facility: HOSPITAL | Age: 25
End: 2025-01-13
Payer: COMMERCIAL

## 2025-01-14 ENCOUNTER — NUTRITION (OUTPATIENT)
Dept: HEMATOLOGY/ONCOLOGY | Facility: HOSPITAL | Age: 25
End: 2025-01-14
Payer: COMMERCIAL

## 2025-01-14 VITALS — BODY MASS INDEX: 26.58 KG/M2 | HEIGHT: 63 IN

## 2025-01-14 NOTE — PROGRESS NOTES
NUTRITION Assessment NOTE/Education  Visit Type: Clinical Nutrition, Oncology, Telephone Visit:  A telephone visit (audio only) between the patient (at the distant site) and the provider (at the originating site) was utilized to provide this telehealth service.    Verbal Consent for Encounter: Verbal consent was requested and obtained from patient on this date for a telehealth visit.     Nutrition Assessment     Reason for Visit:  Sejal Duff is a 24 y.o. 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 Patient returned to the ED 11/28/24 with worsening HA, CTH with increased ventricles and MRI with increased mass and ventriculomegaly for which patient taken to the OR on 12/1/24 for R parietal VPS Certas at 5. 12/4 certas dialed to 4 and patient discharged home. Patient returned to the [facility] ED on 12/28 with generalized fatigue, HA and body aches. CT head with increased ventricles, SS Certas at 4, LLQ cath with turn vs possible kink, CT AP catheter without kink or discontinuity, no pseudocyst. Patient admitted to the neurosurgery service for further management/workup.   Hydrocephalus  12/28 s/p shunt tap with no proximal flow.   12/29 s/p proximal shunt revision, distal cath flushing. CT head post op with decreased ventricles, catheter in position.    12/30 Concern for chest tightness/pain overnight with negative EKG and troponin. Improvement in pain with PRN valium.     Nutrition Consult for increased nausea despite taking Zofran frequently.    Patient reports no appetite and chronic nausea with inability to tolerate any food without feeling sick though she not experiencing any vomiting, bloating,   "diarrhea. She has a hx of IBS and continues to be chronically constipated.    Lab Results   Component Value Date/Time    GLUCOSE 70 (L) 12/28/2024 1426     12/28/2024 1426    K 3.2 (L) 12/28/2024 1426     12/28/2024 1426    CO2 26 12/28/2024 1426    ANIONGAP 11 12/28/2024 1426    BUN 11 12/28/2024 1426    CREATININE 0.57 12/28/2024 1426    EGFR >90 12/28/2024 1426    CALCIUM 9.4 12/28/2024 1426    ALBUMIN 4.5 12/28/2024 1426    ALKPHOS 69 12/28/2024 1426    PROT 7.7 12/28/2024 1426    AST 16 12/28/2024 1426    BILITOT 0.3 12/28/2024 1426    ALT 8 12/28/2024 1426    MG 2.12 12/28/2024 1426    PHOS 4.4 12/06/2024 0730     Lab Results   Component Value Date/Time    VITD25 21 (L) 10/07/2024 1729     Anthropometrics:  Anthropometrics  Height: 160 cm (5' 2.99\")    Wt Readings from Last 10 Encounters:   12/29/24 68 kg (150 lb)   12/16/24 67.1 kg (148 lb)   12/11/24 70 kg (154 lb 5.3 oz)   11/30/24 77.2 kg (170 lb 3.1 oz)   11/26/24 69.9 kg (154 lb)   11/19/24 70.7 kg (155 lb 14.4 oz)   11/14/24 67.6 kg (149 lb 2 oz)   11/11/24 66.7 kg (147 lb 2 oz)   11/08/24 67.2 kg (148 lb 1.6 oz)   10/30/24 66.9 kg (147 lb 8 oz)     Weight Stable.     Food And Nutrient Intake:  Food and Nutrient History  Food and Nutrient History: Very nauseated all the time  Energy Intake: Poor < 50 %  Fluid Intake: Gingerale  Food Allergy: NKFA  Food Intolerance: IBS but did not want to relay specific foods  GI Symptoms: nausea, constipation, increased gas, early satiety  GI Symptoms greater than 2 weeks: yes  Oral Problems: denies  Dentition: own     Food Intake  Amount of Food: varied  Meal 1: Turkey, sausage, egg wrap, gingerale  Meal 2: salt & vinegar chips, gingerale  Meal 3: salt & vinegar chips, gingerale  Food Variety: Absent    Estimated energy intake 9550-8437 calories/d  Estimated protein intake ~ 15 g protein/d  Estimated fluid intake ~ >/= 50 oz/d    Food Preparation  Cooking: Patient  Grocery Shopping: Patient  Dining Out: " "More than 3 times a week                       Food Supplement Intake  Oral Nutrition Supplements:  (She doesn't like/tolerate.)    Medication and Complementary/Alternative Medicine Use  Prescription Medication Use: Olanzapine (ordered, but not initiated), Ondansetron, Omeprazole, PE Glycol, Linzess,  Nutrition-Related Complementary/Alternative Medicine Use: MVI w/ minerals;Lacto no.79-[id number]-larch 25B cell-25B cell-50 mg capsule      Nutrition Focused Physical Exam Findings:      Subcutaneous Fat Loss  Orbital Fat Pads: Defer  Buccal Fat Pads: Defer  Triceps: Defer  Ribs: Defer    Muscle Wasting  Temporalis: Defer  Pectoralis (Clavicular Region): Defer  Deltoid/Trapezius: Defer  Interosseous: Defer  Trapezius/Infraspinatus/Supraspinatus (Scapular Region): Defer  Quadriceps: Defer  Gastrocnemius: Defer          Energy Needs  Calculated Energy Needs Using Equations  Height: 160 cm (5' 2.99\")  Weight Used for Equation Calculations: 68 kg (149 lb 14.6 oz)  Miguel Ángel- [place] Equation (Overweight or Obese Patients): 1399  Estimated Energy Needs  Total Energy Estimated Needs (kCal): 1700 kCal  Total Estimated Energy Need per Day (kCal/kg): 25 kCal/kg  Estimated Fluid Needs  Total Fluid Estimated Needs (mL): 1700 mL  Total Fluid Estimated Needs (mL/kg): 25 mL/kg  Estimated Protein Needs  Total Protein Estimated Needs (g):  (68-82)  Total Protein Estimated Needs (g/kg):  (1.0-1.2)  Estimated Fiber Needs  Type of Fiber Needed: 24-30    Nutrition Diagnosis   Malnutrition Diagnosis  Patient has Malnutrition Diagnosis: Yes  Diagnosis Status: Ongoing  Malnutrition Diagnosis: Mild malnutrition related to chronic disease or condition  As Evidenced by: inadequate protein intake per diet recall.  Additional Assessment Information: Poor intake d/t chronic nausea    Nutrition Diagnosis  Patient has Nutrition Diagnosis: Yes  Diagnosis Status (1): New  Nutrition Diagnosis 1: Inadequate protein intake  Related to (1): nausea and hx " of IBS  As Evidenced by (1): <50% of protein needs met per diet recall.    Nutrition Interventions/Recommendations   Nutrition Prescription  Individualized Nutrition Prescription Provided for : Poor appetite, and nausea    Food and Nutrition Delivery  Food and Nutrition Delivery  Meals & Snacks: Modify Composition of Meals/Snacks, Protein-modified diet  Goals: Increase attempt to try sm amounts of protein foods.    Nutrition Education  Nutrition Education  Nutrition Education Content: Content related nutrition education     Coordination of Care  Coordination of Nutrition Care by a Nutrition Professional  Collaboration and referral of nutrition care: Collaboration by nutrition professional with other providers    Patient Instructions   Emailed diet instruction materials Poor appetite, and Ways to Manage Nausea and Vomiting, and Recipes to Help with Nausea.    Follow suggestions that do not interfere with foods/beverages that contribute to your IBS.      Nutrition Monitoring and Evaluation   Food/Nutrient Related History Monitoring  Monitoring and Evaluation Plan: Meal/snack pattern, Protein intake  Meal/Snack Pattern: Estimated meal and snack pattern  Criteria: Diet recall  Estimated protein intake: Estimated protein intake  Criteria: Diet Recall          Time Spent  Prep time on day of patient encounter: 0 minutes  Time spent directly with patient, family or caregiver: 20 minutes  Additional Time Spent on Patient Care Activities: 10 minutes  Documentation Time: 20 minutes  Other Time Spent: 0 minutes  Total: 50 minutes        Readiness to Change : Fair  Level of Understanding : Fair  Anticipated Compliant : Fair

## 2025-01-15 ENCOUNTER — TELEPHONE (OUTPATIENT)
Dept: HEMATOLOGY/ONCOLOGY | Facility: CLINIC | Age: 25
End: 2025-01-15

## 2025-01-15 ENCOUNTER — CLINICAL SUPPORT (OUTPATIENT)
Dept: NEUROSURGERY | Facility: HOSPITAL | Age: 25
End: 2025-01-15
Payer: COMMERCIAL

## 2025-01-15 DIAGNOSIS — C71.9 PILOCYTIC ASTROCYTOMA (MULTI): Primary | ICD-10-CM

## 2025-01-15 NOTE — PROGRESS NOTES
[facility]  Neuro-Oncology    Patient: Sejal Duff  MRN: [id number]  Date of visit: 01/16/25  Visit type: Virtual visit  Clinician: Deborah Snowden PA-C    Oncological & Treatment History:  Oncology History   Pilocytic astrocytoma (Multi)   10/2024 Initial Diagnosis    23 yo F with PMH of anemia, asthma, GERD, IBS, PCOS, and breast fibroadenoma s/p L breast excisional biopsy presented to [facility] ED on 10/14/24 with headache. CTH parasellar mass with calcifications. MRI with 2.4 cm peripherally enhancing necrotic mass with non-enhancing FLAIR hyperintense lesion in medial L temporal lobe. Admitted for neurosurgical management.     10/15/2024 Biopsy    R stereotactic brain biopsy  Surgeon: Den Segal MD    Prelim path: Glial cells with luigi fibers vs craniopharyngioma     10/23/2024 Tumor Board    Procedure: R stereotactic brain biopsy 10/15/2024   Pathology: Final pending; likely to be a glioma, areas of necrosis noted.       The CNS Tumor Board tumor board considered available treatment options and made the following recommendations: Radiation oncology referral. Add back to CNS TB once pathology is completed.     11/5/2024 Pathology     A.  B.  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  See note     Note:  Microscopic examination for parts A and B reveal a low-grade glial neoplasm immunoreactive with OLIG-2 and GFAP.  Neurofilament immunohistochemical stain highlights neuropil and scattered neuronal bodies.  ATRX and [id number] immunohistochemical stains are retained.  IDH1 and BRAF immunohistochemical stains are negative.  GLIOSEQ studies reveal a BRAF-V600E mutation.  This finding in conjunction with the morphology are most consistent with a pilocytic astrocytoma.  However, a ganglioglioma among other low-grade glial/glial neuronal neoplasms, cannot be completely ruled out.  Clinical correlation is recommended     11/6/2024 Tumor  Board    CSF analysis negative.   Pathology:  Low grade glial neoplasm - BRAF - V600E mutation      The CNS Tumor Board tumor board considered available treatment options and made the following recommendations: Repeat MRI brain w/wo and perfusion study. Sellar imaging. Continue following with neuro-oncology. Radiation oncology referral.      11/29/2024 - 12/6/2024 Hospital Admission    Discharge diagnosis: Hydrocephalus    Returned to the [facility] ED 11/28/24 with worsening HA, light sensitivity, CTH incr vents, MRI incr ventricular mass 2.7x2.5cm, ventriculomegaly, trace R ventricular GRE. Patient admitted to neurosurgery service for further management.    11/28 Ophthalmology evaluation without papilledema.  12/1 s/p R VPS (certas at 5) -- Dr. Yap at [facility]  12/2 CTH with catheter in position.      12/29/2024 - 12/31/2024 Hospital Admission    Discharge diagnosis: Hydrocephalus    Returned to the [facility] ED on 12/28/24 with generalized fatigue, HA and body aches. CT head with increased ventricles, SS Certas at 4, LLQ cath with turn vs possible kink, CT AP catheter without kink or discontinuity, no pseudocyst. Patient admitted to the neurosurgery service for further management/workup.    12/28 s/p shunt tap with no proximal flow.   12/29 s/p proximal shunt revision, distal cath flushing. CT head post op with decreased ventricles, catheter in position.         HPI:  Sejal Duff is a 24 y.o. 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      Interval History (NEWEST at BOTTOM of section):  10/29/24: Today she reports some slight headaches but not worsening.  She is currently on dexamethasone 2 mg twice daily.  She takes the morning dose around 9 or 10 AM and the evening dose before bedtime.   She takes ondansetron about 1 hour prior to the dexamethasone.  She endorses significant issues with vomiting after taking the dexamethasone as well as decreased appetite.  She is able to drink enough fluids.  She states that she tried taking 8 mg of ondansetron prior to the dexamethasone but this did not help either.  She states that the dexamethasone makes her very jittery.  She has not taken dexamethasone for the last 2 days.  She continues to have constipation which is her baseline where she does not have a bowel movement for weeks.  She has been referred by her gastroenterologist to integrative medicine for further help with bowel movements.  Otherwise denies any worsening of vision or new gait or balance issues.  11/8/24: Continues to have some headaches but not worse. She has decreased dexamethasone to 1mg bid on her own and she denies any worsening of her headaches. Denies new balance or strength issues.   11/19/2024: Her headaches are about the same intensity.  Denies new nausea/vomiting episodes.  Denies new gait or balance changes.  Denies new vision changes.   11/26/2024: Sejal states her headaches are worse.  She has not been able to sleep much.  Denies new vision problems.  Denies new weakness or gait balance/issues.  Denies new episodes of nausea or vomiting.  She is upset at the MRI spine being canceled recently.    01/16/25: Since the last visit, Sejal underwent  shunt placement for hydrocephalus (12/1/24) and a subsequent VPS revision (12/29/24). She also started tovorafenib on 12/21/24 and has been taking it once weekly as directed on Saturdays. Endorses significant fatigue and body aches for 2-3 days after taking tovorafenib. Endorses a new acne-like rash to her lower face that started with facial tenderness after her 2nd or 3rd dose of tovorafenib; rash is spreading to her nasolabial folds. States she washes her face with warm water and applies Aquaphor to the affected areas. She also reports  "intermittent episodes over the past 3 weeks where she feels overheated, lightheaded, and needs to utilize her inhaler; states it is similar to asthma flares; denies anxiety precipitating episodes. Also reports persistent nausea since her last surgery. States even water makes her feel nauseated. Feels like she's going to vomit, but it \"sits in [her] chest\" and burns. States she takes Zofran \"like it's candy.\" Denies drinking caffeine/carbonated drinks, fried/spicy foods, and lying down within 30 min of eating. States she hasn't picked up Olanzapine and doesn't take omeprazole as she is wary of being on many medications.    Review of Systems:  Review of Systems   Constitutional:  Positive for appetite change and fatigue. Negative for chills and fever.   HENT:   Negative for nosebleeds.    Respiratory:  Negative for hemoptysis.    Gastrointestinal:  Positive for constipation (chronic; has BMs on Fridays after taking Linzess and Motegrity) and nausea. Negative for blood in stool, diarrhea and vomiting.   Genitourinary:  Negative for hematuria.         Reports amenorrhea since ~10/2/24. States menstrual cycle was irregular sometimes as a kid. Had negative pregnancy test last month.   Musculoskeletal:  Negative for gait problem.   Skin:  Positive for rash.   Neurological:  Negative for dizziness, extremity weakness, gait problem, headaches, numbness, seizures and speech difficulty.   Hematological:  Does not bruise/bleed easily.   Psychiatric/Behavioral:          Reports depressive/anxious mood and poor sleep due to anxieties about brain tumor diagnosis and treatments   All other systems reviewed and are negative.      Social History:  Social History     Tobacco Use    Smoking status: Never     Passive exposure: Never    Smokeless tobacco: Never   Vaping Use    Vaping status: Never Used   Substance Use Topics    Alcohol use: Yes     Comment: social    Drug use: Yes     Types: Marijuana   Occupation: [employer], [employer]  Currently working [employer] Tuesday-Friday    Past Medical History:  Past Medical History:   Diagnosis Date    Encounter for screening for infections with a predominantly sexual mode of transmission 03/02/2020    Screening for STD (sexually transmitted disease)    Melena 04/12/2019    Blood in stool    Other specified health status     No pertinent past medical history    Other specified health status     No pertinent past surgical history    Personal history of other (healed) physical injury and trauma     History of sprain of ankle    Unspecified condition associated with female genital organs and menstrual cycle 03/02/2020    Cervical motion tenderness       Past Surgical History:  Past Surgical History:   Procedure Laterality Date    BREAST SURGERY Left     Mass removal    OTHER SURGICAL HISTORY  01/18/2019    No history of surgery       Family History:  Family History   Problem Relation Name Age of Onset    Asthma Mother      Colon cancer Mother         Performance Score:  Karnofsky Score: 90 - Able to carry on normal activity; minor signs or symptoms of disease     Vital Signs:  There were no vitals taken for this visit. -- virtual visit    Physical Exam:  Physical Exam  Constitutional:       General: She is not in acute distress.     Appearance: She is not toxic-appearing.   HENT:      Head: Normocephalic and atraumatic.   Eyes:      Extraocular Movements: Extraocular movements intact.      Conjunctiva/sclera: Conjunctivae normal.   Pulmonary:      Effort: Pulmonary effort is normal. No respiratory distress.   Musculoskeletal:      Cervical back: Normal range of motion and neck supple.   Skin:     Findings: Rash (acneiform rash involving bilateral nasolabial folds, lower cheeks, perioral area, and chin) present.   Neurological:      General: No focal deficit present.      Mental Status: She is alert and oriented to person, place, and time.      Cranial Nerves: No cranial nerve deficit.    Psychiatric:      Comments: Tearful at times         Results:  Lab Results   Component Value Date    WBC 6.6 12/28/2024    HGB 10.6 (L) 12/28/2024    HCT 32.2 (L) 12/28/2024    MCV 81 12/28/2024     12/28/2024       Lab Results   Component Value Date    GLUCOSE 70 (L) 12/28/2024    CALCIUM 9.4 12/28/2024     12/28/2024    K 3.2 (L) 12/28/2024    CO2 26 12/28/2024     12/28/2024    BUN 11 12/28/2024    CREATININE 0.57 12/28/2024       Lab Results   Component Value Date    ALT 8 12/28/2024    AST 16 12/28/2024    ALKPHOS 69 12/28/2024    BILITOT 0.3 12/28/2024       === 11/28/24 ===    MR BRAIN W AND WO CONTRAST    Addendum 11/28/2024  3:18 PM -----------------------------------------------  Interpreted By:  Saravanan Flores,  ADDENDUM:  As described in the body of the report, there also new susceptibility  artifact in the dependent portion of the right lateral ventricle  concerning for blood products.    Signed by: Saravanan Flores 11/28/2024 3:18 PM    -------- ORIGINAL REPORT --------  Dictation workstation:   [id number]    - Impression -  1. Interval enlargement hypothalamic-based necrotic mass (2.7 x 2.6 x  2.5 cm vs 2.3 x 2 x 2.4 cm) resulting in new acute obstructive  hydrocephalus at the level of the foramina of [place]ro causing  significantly increased dilatation of the lateral ventricles (right >  left) and new interstitial edema in the periventricular white matter.  Urgent neurosurgical consultation recommended.    2. New diffuse bilateral sulcal effacement and significantly  increased downward mass effect on the optic chiasm, bilateral ventral  medial thalami, and to a lesser extent the ventral midbrain.    3. Slight increased size of nonenhancing T2/FLAIR hyperintense lesion  in the medial left temporal lobe measuring 1.3 cm x 0.8 cm.    MACRO:  Saravanan Flores discussed the significance and urgency of this  critical finding by telephone with EVONNE ANGELES and   DIOR  (neurosurgery) on 11/28/2024 at 3:09 pm.  (**-RCF-**) Findings:  See  findings.    Signed by: Saravanan Flores 11/28/2024 3:15 PM  Dictation workstation:   [id number]      Assessment & Plan:    **THIS NOTE IS NOT FINALIZED**  Please confirm information with clinician as needed.  -- advised to get labs today/tomorrow, but may end up going Monday  -- serum hcg to r/o pregnancy given nausea, hot flashes, amenorrhea  -- acneiform rash, need update in about 2 weeks    Sejal Duff is a 24 y.o. 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  Treatment: Tovorafenib 600 mg PO once weekly (380 mg/m2 x BSA)  Planned length of treatment is 6 cycles (28-day cycles), but this may be extended or shortened if indicated  Labs on or within 48 hours of day 1 of each cycle to monitor for toxicities  MRI brain w/wo contrast every 2 months (after 2 cycles) to assess treatment response    #Acneiform rash  Start applying topical clindamycin 1% lotion to whole face after washing face with lukewarm water, then follow it with 2.5% hydrocortisone cream to affected areas twice daily for 2 weeks  Contact our office at [phone] Opt 5 Opt 2 if symptoms worsen  Referral to Dermatology    #Amenorrhea  #Hot flashes(?)        Orders Placed This Encounter   Procedures    Comprehensive Metabolic Panel    CBC and Auto Differential    Comprehensive Metabolic Panel    CBC and Auto Differential              neoplasm, 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; 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 Aguilar for reassessment    2. Acneiform rash  Start applying topical clindamycin 1% lotion to whole face after washing face with lukewarm water, then follow it with 2.5% hydrocortisone cream to affected areas twice daily for 2 weeks  Contact our office at [phone] Opt 5 Opt 2 if symptoms worsen  Referral to Dermatology to establish if rash persists/worsens or new skin toxicities develops    3. Amenorrhea  LMP end of 9/2024  History of irregular menstrual cycles, PCOS  Will check serum HCG to r/o pregnancy, but advised to follow-up with OB/GYN      Orders Placed This Encounter   Procedures    Comprehensive Metabolic Panel    CBC and Auto Differential    Comprehensive Metabolic Panel    CBC and Auto Differential

## 2025-01-15 NOTE — PROGRESS NOTES
Saw Pt. in clinic for wound check her  Shunt incision was well approximated with out redness, swelling or drainage. I discussed wound care and pt and she verbalized understanding. She will see Dr. Yap on 1/20/2025. Having nausea and not able to eat.

## 2025-01-16 ENCOUNTER — TELEMEDICINE (OUTPATIENT)
Dept: HEMATOLOGY/ONCOLOGY | Facility: HOSPITAL | Age: 25
End: 2025-01-16
Payer: COMMERCIAL

## 2025-01-16 DIAGNOSIS — N91.2 AMENORRHEA: ICD-10-CM

## 2025-01-16 DIAGNOSIS — L70.8 ACNEIFORM RASH: ICD-10-CM

## 2025-01-16 DIAGNOSIS — C71.9 PILOCYTIC ASTROCYTOMA (MULTI): Primary | ICD-10-CM

## 2025-01-16 PROCEDURE — 99215 OFFICE O/P EST HI 40 MIN: CPT | Mod: GT

## 2025-01-16 PROCEDURE — 99215 OFFICE O/P EST HI 40 MIN: CPT

## 2025-01-16 RX ORDER — CLINDAMYCIN PHOSPHATE 10 UG/ML
LOTION TOPICAL
Qty: 60 ML | Refills: 0 | Status: SHIPPED | OUTPATIENT
Start: 2025-01-16

## 2025-01-16 RX ORDER — HYDROCORTISONE 25 MG/G
CREAM TOPICAL
Qty: 28 G | Refills: 0 | Status: SHIPPED | OUTPATIENT
Start: 2025-01-16

## 2025-01-20 ENCOUNTER — APPOINTMENT (OUTPATIENT)
Dept: NEUROSURGERY | Facility: CLINIC | Age: 25
End: 2025-01-20
Payer: COMMERCIAL

## 2025-01-20 NOTE — PROGRESS NOTES
[facility] OUTPATIENT FOLLOW-UP    Virtual or Telephone Consent    An interactive audio and video telecommunication system which permits real time communications between the patient (at the originating site) and provider (at the distant site) was utilized to provide this telehealth service.   Verbal consent was requested and obtained from Sejal Duff on this date, 1/23/2025 for a telehealth visit.       SERVICE DATE: 1/23/2025    Medical Oncologist: Migue Aguilar MD    Radiation Oncologist: No care team member to display  Primary Physician: Melba Graham  [phone]    Subjective   HISTORY OF PRESENT ILLNESS: Sejal Duff is a 24 y.o. 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      Patient returned to the [facility] ED 11/28 with worsening HA, light sensitivity, CTH incr vents, MRI incr ventricular mass 2.7x2.5cm, ventriculomegaly, trace R ventricular GRE. Patient admitted to neurosurgery service for further management.      11/28/24 Ophthalmology evaluation without papilledema.  12/1/24 s/p R VPS (certas at 5).   12/2/24 CTH with catheter in position, decreased vents   12/4/24 CTH decreased vents but persistent ventriculomegly; Certas dialed to 4  12/5/24 CTH decreased vents.  Continued headaches Neurology consulted--> likely due to underlying structural mass and recent surgical interventions.   Started on scheduled oral headache cocktail meds of Robaxin, Tylenol, reglan and Benadryl  12/6/24 Ophthalmology consulted for c/o vision changes--> normal exam, no disc edema  Neurology--> HA's migrainous in nature, has had prior and now worse in setting of underlying structural mass and recent VPS surgery.  Started on preventative therapy with amitriptyline 12.5 mg bedtime for 1 week and increase to 25mg if tolerates and abortive therapy with Rizatriptan PRN  12/29/24 had a subsequent VPS revision     Pain Assessment:  Pain Score:  0  Location:    Description:      Symptom Assessment:  Pain:a little - general body aches but feel they have improved some the longer she has been on treatment. This is tolerable with Ibuprofen 800mg as needed  Numbness or Tingling in hands/feet/other: none  Sore Muscles/Spasms: none  Headache: a little - notes HA to be more manageable, taking Oxycodone and Ibuprofen 800mg generally morning and night. Has not continued taking Amitriptyline and has not needed Maxalt since last visit  Dizziness:none  Constipation: a little - chronic in nature, on Linzess and Motegrity from GI provider which she is taking on Fridays. States when she takes these every day they make her feel sick and they do not work  Diarrhea: none  Nausea: a little - with treatment nausea has been more persistent and is taking Ondansetron around the clock with persistent nausea. Nausea is keeping her from eating much at all. Discussed if she started Olanzapine that was prescribed last week when RN from [facility] called her. She does not believe she started this and has to look to see if she even picked the medication up.   Vomiting: none  Lack of Appetite: a little   Weight Loss: none  Taste changes: none  Dry Mouth: none  Pain in Mouth/Swallowing: none  Lack of Energy: somewhat - feels very fatigued especially when on treatment, she notes she will not get out of bed for days and has not been able to work. She has not  tried Methylphenidate as prescribed last visit  Difficulty Sleeping: somewhat - reports anxiety at night. Discussed Olanzapine may assist with this as previously discussed. Encouraged patient to start this  Worrying: none  Anxiety: none  Depression: none  Shortness of breath: none  Other: none      Information obtained from: chart review and interview of patient  ______________________________________________________________________     Patient's Oncology History documentation       Oncology History   Pilocytic astrocytoma (Multi)   10/15/2024 Surgery     Biopsy of hypothalamic lesion by Dr. Segal at [facility]       10/15/2024 Pathology      A.  B.  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  See note     Note:  Microscopic examination for parts A and B reveal a low-grade glial neoplasm immunoreactive with OLIG-2 and GFAP.  Neurofilament immunohistochemical stain highlights neuropil and scattered neuronal bodies.  ATRX and [id number] immunohistochemical stains are retained.  IDH1 and BRAF immunohistochemical stains are negative.  GLIOSEQ studies reveal a BRAF-V600E mutation.  This finding in conjunction with the morphology are most consistent with a pilocytic astrocytoma.  However, a ganglioglioma among other low-grade glial/glial neuronal neoplasms, cannot be completely ruled out.  Clinical correlation is recommended               Medical History        Past Medical History:   Diagnosis Date    Encounter for screening for infections with a predominantly sexual mode of transmission 03/02/2020     Screening for STD (sexually transmitted disease)    Melena 04/12/2019     Blood in stool    Other specified health status       No pertinent past medical history    Other specified health status       No pertinent past surgical history    Personal history of other (healed) physical injury and trauma       History of sprain of ankle    Unspecified condition  associated with female genital organs and menstrual cycle 03/02/2020     Cervical motion tenderness         Surgical History         Past Surgical History:   Procedure Laterality Date    BREAST SURGERY Left       Mass removal    OTHER SURGICAL HISTORY   01/18/2019     No history of surgery         Family History          Family History   Problem Relation Name Age of Onset    Asthma Mother        Colon cancer Mother                SOCIAL HISTORY  Social History:  reports that she has never smoked. She has never been exposed to tobacco smoke. She has never used smokeless tobacco. She reports current alcohol use. She reports current drug use. Drug: Marijuana.  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 No children. Single. Primary support is her mom.       Objective     Lab on 01/21/2025   Component Date Value Ref Range Status    Glucose 01/21/2025 99  74 - 99 mg/dL Final    Sodium 01/21/2025 138  136 - 145 mmol/L Final    Potassium 01/21/2025 3.6  3.5 - 5.3 mmol/L Final    Chloride 01/21/2025 106  98 - 107 mmol/L Final    Bicarbonate 01/21/2025 26  21 - 32 mmol/L Final    Anion Gap 01/21/2025 10  10 - 20 mmol/L Final    Urea Nitrogen 01/21/2025 8  6 - 23 mg/dL Final    Creatinine 01/21/2025 0.57  0.50 - 1.05 mg/dL Final    eGFR 01/21/2025 >90  >60 mL/min/1.73m*2 Final    Calcium 01/21/2025 8.9  8.6 - 10.3 mg/dL Final    Albumin 01/21/2025 4.5  3.4 - 5.0 g/dL Final    Alkaline Phosphatase 01/21/2025 75  33 - 110 U/L Final    Total Protein 01/21/2025 7.3  6.4 - 8.2 g/dL Final    AST 01/21/2025 30  9 - 39 U/L Final    Bilirubin, Total 01/21/2025 0.7  0.0 - 1.2 mg/dL Final    ALT 01/21/2025 24  7 - 45 U/L Final    WBC 01/21/2025 5.2  4.4 - 11.3 x10*3/uL Final    nRBC 01/21/2025 0.0  0.0 - 0.0 /100 WBCs Final    RBC 01/21/2025 3.93 (L)  4.00 - 5.20 x10*6/uL Final    Hemoglobin 01/21/2025 10.2 (L)  12.0 - 16.0 g/dL Final    Hematocrit 01/21/2025 32.6 (L)  36.0 - 46.0 % Final    MCV 01/21/2025 83  80 - 100 fL Final    MCH 01/21/2025 26.0  26.0 -  "34.0 pg Final    MCHC 01/21/2025 31.3 (L)  32.0 - 36.0 g/dL Final    RDW 01/21/2025 14.0  11.5 - 14.5 % Final    Platelets 01/21/2025 345  150 - 450 x10*3/uL Final    Neutrophils % 01/21/2025 35.7  40.0 - 80.0 % Final    Immature Granulocytes %, Automated 01/21/2025 0.2  0.0 - 0.9 % Final    Lymphocytes % 01/21/2025 52.9  13.0 - 44.0 % Final    Monocytes % 01/21/2025 6.6  2.0 - 10.0 % Final    Eosinophils % 01/21/2025 4.2  0.0 - 6.0 % Final    Basophils % 01/21/2025 0.4  0.0 - 2.0 % Final    Neutrophils Absolute 01/21/2025 1.85  1.20 - 7.70 x10*3/uL Final    Immature Granulocytes Absolute, Au* 01/21/2025 0.01  0.00 - 0.70 x10*3/uL Final    Lymphocytes Absolute 01/21/2025 2.74  1.20 - 4.80 x10*3/uL Final    Monocytes Absolute 01/21/2025 0.34  0.10 - 1.00 x10*3/uL Final    Eosinophils Absolute 01/21/2025 0.22  0.00 - 0.70 x10*3/uL Final    Basophils Absolute 01/21/2025 0.02  0.00 - 0.10 x10*3/uL Final    HCG, Beta-Quantitative 01/21/2025 <2  <5 mIU/mL Final    Thyroid Stimulating Hormone 01/21/2025 1.10  0.44 - 3.98 mIU/L Final     PHYSICAL EXAMINATION   Vital Signs:   Vital signs reviewed      12/30/2024     4:10 PM 12/30/2024     5:00 PM 12/30/2024     9:00 PM 12/31/2024    12:00 AM 12/31/2024     7:37 AM 12/31/2024    12:09 PM 1/14/2025     1:00 PM   Vitals   Systolic 114 109 118 115 107 120    Diastolic 67 72 79 74 72 80    BP Location   Left arm Left arm Left arm Left arm    Heart Rate 109 97 73 80 106 112    Temp 36.5 °C (97.7 °F) 36.2 °C (97.2 °F) 36.2 °C (97.2 °F) 36.2 °C (97.2 °F) 36.5 °C (97.7 °F) 36.7 °C (98.1 °F)    Resp 19 18 18 16 15 16    Height       1.6 m (5' 2.99\")   BMI       26.58 kg/m2   BSA (m2)       1.74 m2     Physical Exam  Not complete dt virtual visit    ASSESSMENT/PLAN    Pain/Headaches  Pain is: cancer related pain (HA), generalized body aches from treatment  Type: neuropathic  - Continue Oxycodone 5mg [id number] PRN  - Acetaminophen 1000mg [id number] PRN - MAX 3000mg/24 hours - patient reports " this does not help  - Continue Ibuprofen 800mg [id number] PRN  - Amitriptyline 25mg at bed - no longer taking   - Maxalt 10mg once daily PRN - has not needed     Opioid Use  Medication Management:   - OARRS report reviewed with no aberrant behavior; consistent with  prescriptions/records and patient history  - MED 10.  Overdose Risk Score 380.   This has been discussed with patient.   - We will continue to closely monitor the patient for signs of prescription misuse including UDS, OARRS review and subjective reports at each visit.  - No concurrent benzodiazepine use   - I am a provider who either is or has consulted and collaborated with a provider certified in Hospice and Palliative Medicine and have conducted a face-face visit and examination for this patient.  - Routine Urine Drug Screen: 12/11/2024 appropriately + for prescribed medications  - Controlled Substance Agreement: 12/11/2024  - Specifically discussed that controlled substance prescriptions will only be provided by our group as outlined in the completed agreement  - Prescribed naloxone: patient declined  - Red Flags: NA     Constipation  At risk for constipation related to opioids. Chronic concerns of constipation  - Continue Linzess and Motegrity as prescribed by GI     Nausea/Decreased Appetite  - Continue Omeprazole 40mg daily  - Continue Ondansetron ODT 4mg [id number] PRN  - Encouraged to start Olanzapine 5mg at bed  - Encouraged good hydration  - Encouraged small more frequent meals  - Encouraged continued Ensure 1-2x daily  Nutrition consult 12/11/2024     Anxiety/Sleeping Difficulty  - Amitriptyline 25mg - no longer taking  - Start Olanzapine 5mg at bed     Fatigue  - Start Methylphenidate 5mg dally  - Provigil 100mg - not effective     Advance Directives  Existence of Advance Directives:No - not interested  Decision maker: Surrogate decision maker is Darby Duff (mom)  Code Status: Full code    [facility]ive [facility] Palliative Oncology  Encounter:  Emotional support provided  Coordination of care  Will continue to follow and address symptoms as needed    Next Follow-Up Visit:  Return to clinic in 3 weeks with Kala Fernández CNP to align better with oncology visits    Signature and billing  Medical complexity was moderate level due to due to complexity of problems, extensive data review, and high risk of management/treatment.  Time was spent on the following: Prep Time, Time Directly with Patient/Family/Caregiver, Documentation Time. Total time spent: 30      Data  Diagnostic tests and information reviewed for today's visit:  Most recent labs and imaging results, Medications     Some elements copied from Palliative Care note on 1/2/2025, the elements have been updated and all reflect current decision making from today, 1/23/2025.    Plan of Care discussed with: Patient    SIGNATURE: JASVIR Macias-CNP    Contact information:  [facility]  Monday-Friday 8 AM-5 PM  Phone:  [phone], press option #5, then option #1.   Or Epic Secure Chat

## 2025-01-21 ENCOUNTER — LAB (OUTPATIENT)
Dept: LAB | Facility: LAB | Age: 25
End: 2025-01-21
Payer: COMMERCIAL

## 2025-01-21 ENCOUNTER — TELEPHONE (OUTPATIENT)
Dept: HEMATOLOGY/ONCOLOGY | Facility: HOSPITAL | Age: 25
End: 2025-01-21
Payer: COMMERCIAL

## 2025-01-21 DIAGNOSIS — C71.9 PILOCYTIC ASTROCYTOMA (MULTI): ICD-10-CM

## 2025-01-21 DIAGNOSIS — Z79.899 ON ANTINEOPLASTIC CHEMOTHERAPY: ICD-10-CM

## 2025-01-21 DIAGNOSIS — N91.2 AMENORRHEA: ICD-10-CM

## 2025-01-21 LAB
ALBUMIN SERPL BCP-MCNC: 4.5 G/DL (ref 3.4–5)
ALP SERPL-CCNC: 75 U/L (ref 33–110)
ALT SERPL W P-5'-P-CCNC: 24 U/L (ref 7–45)
ANION GAP SERPL CALC-SCNC: 10 MMOL/L (ref 10–20)
AST SERPL W P-5'-P-CCNC: 30 U/L (ref 9–39)
B-HCG SERPL-ACNC: <2 MIU/ML
BASOPHILS # BLD AUTO: 0.02 X10*3/UL (ref 0–0.1)
BASOPHILS NFR BLD AUTO: 0.4 %
BILIRUB SERPL-MCNC: 0.7 MG/DL (ref 0–1.2)
BUN SERPL-MCNC: 8 MG/DL (ref 6–23)
CALCIUM SERPL-MCNC: 8.9 MG/DL (ref 8.6–10.3)
CHLORIDE SERPL-SCNC: 106 MMOL/L (ref 98–107)
CO2 SERPL-SCNC: 26 MMOL/L (ref 21–32)
CREAT SERPL-MCNC: 0.57 MG/DL (ref 0.5–1.05)
EGFRCR SERPLBLD CKD-EPI 2021: >90 ML/MIN/1.73M*2
EOSINOPHIL # BLD AUTO: 0.22 X10*3/UL (ref 0–0.7)
EOSINOPHIL NFR BLD AUTO: 4.2 %
ERYTHROCYTE [DISTWIDTH] IN BLOOD BY AUTOMATED COUNT: 14 % (ref 11.5–14.5)
GLUCOSE SERPL-MCNC: 99 MG/DL (ref 74–99)
HCT VFR BLD AUTO: 32.6 % (ref 36–46)
HGB BLD-MCNC: 10.2 G/DL (ref 12–16)
IMM GRANULOCYTES # BLD AUTO: 0.01 X10*3/UL (ref 0–0.7)
IMM GRANULOCYTES NFR BLD AUTO: 0.2 % (ref 0–0.9)
LYMPHOCYTES # BLD AUTO: 2.74 X10*3/UL (ref 1.2–4.8)
LYMPHOCYTES NFR BLD AUTO: 52.9 %
MCH RBC QN AUTO: 26 PG (ref 26–34)
MCHC RBC AUTO-ENTMCNC: 31.3 G/DL (ref 32–36)
MCV RBC AUTO: 83 FL (ref 80–100)
MONOCYTES # BLD AUTO: 0.34 X10*3/UL (ref 0.1–1)
MONOCYTES NFR BLD AUTO: 6.6 %
NEUTROPHILS # BLD AUTO: 1.85 X10*3/UL (ref 1.2–7.7)
NEUTROPHILS NFR BLD AUTO: 35.7 %
NRBC BLD-RTO: 0 /100 WBCS (ref 0–0)
PLATELET # BLD AUTO: 345 X10*3/UL (ref 150–450)
POTASSIUM SERPL-SCNC: 3.6 MMOL/L (ref 3.5–5.3)
PROT SERPL-MCNC: 7.3 G/DL (ref 6.4–8.2)
RBC # BLD AUTO: 3.93 X10*6/UL (ref 4–5.2)
SODIUM SERPL-SCNC: 138 MMOL/L (ref 136–145)
TSH SERPL-ACNC: 1.1 MIU/L (ref 0.44–3.98)
WBC # BLD AUTO: 5.2 X10*3/UL (ref 4.4–11.3)

## 2025-01-21 PROCEDURE — 84443 ASSAY THYROID STIM HORMONE: CPT

## 2025-01-21 PROCEDURE — 80053 COMPREHEN METABOLIC PANEL: CPT

## 2025-01-21 PROCEDURE — 85025 COMPLETE CBC W/AUTO DIFF WBC: CPT

## 2025-01-21 PROCEDURE — 84702 CHORIONIC GONADOTROPIN TEST: CPT

## 2025-01-21 ASSESSMENT — ENCOUNTER SYMPTOMS
HEMATURIA: 0
CHILLS: 0
NUMBNESS: 0
CONSTIPATION: 1
DIZZINESS: 0
HEADACHES: 0
VOMITING: 0
BLOOD IN STOOL: 0
APPETITE CHANGE: 1
FATIGUE: 1
EXTREMITY WEAKNESS: 0
BRUISES/BLEEDS EASILY: 0
FEVER: 0
HEMOPTYSIS: 0
NAUSEA: 1
SEIZURES: 0
DIARRHEA: 0
SPEECH DIFFICULTY: 0

## 2025-01-21 NOTE — TELEPHONE ENCOUNTER
Called Sejal to remind her to get labs today. She states she will go to [facility] within the next hour or two.

## 2025-01-23 ENCOUNTER — TELEMEDICINE (OUTPATIENT)
Dept: PALLIATIVE MEDICINE | Facility: HOSPITAL | Age: 25
End: 2025-01-23
Payer: COMMERCIAL

## 2025-01-23 DIAGNOSIS — R11.0 NAUSEA: ICD-10-CM

## 2025-01-23 DIAGNOSIS — C71.9 PILOCYTIC ASTROCYTOMA (MULTI): ICD-10-CM

## 2025-01-23 DIAGNOSIS — G43.919 INTRACTABLE MIGRAINE WITHOUT STATUS MIGRAINOSUS, UNSPECIFIED MIGRAINE TYPE: ICD-10-CM

## 2025-01-23 DIAGNOSIS — R53.0 NEOPLASTIC MALIGNANT RELATED FATIGUE: ICD-10-CM

## 2025-01-23 DIAGNOSIS — K58.1 IRRITABLE BOWEL SYNDROME WITH CONSTIPATION: ICD-10-CM

## 2025-01-23 DIAGNOSIS — Z51.81 ENCOUNTER FOR MONITORING OPIOID MAINTENANCE THERAPY: ICD-10-CM

## 2025-01-23 DIAGNOSIS — Z51.5 PALLIATIVE CARE ENCOUNTER: Primary | ICD-10-CM

## 2025-01-23 DIAGNOSIS — Z79.891 ENCOUNTER FOR MONITORING OPIOID MAINTENANCE THERAPY: ICD-10-CM

## 2025-01-23 DIAGNOSIS — K21.9 GASTROESOPHAGEAL REFLUX DISEASE WITHOUT ESOPHAGITIS: ICD-10-CM

## 2025-01-23 DIAGNOSIS — K31.84 GASTROPARESIS: ICD-10-CM

## 2025-01-23 DIAGNOSIS — Z98.2 S/P VP SHUNT: ICD-10-CM

## 2025-01-23 DIAGNOSIS — R63.0 LOSS OF APPETITE: ICD-10-CM

## 2025-01-23 PROCEDURE — 99214 OFFICE O/P EST MOD 30 MIN: CPT | Performed by: NURSE PRACTITIONER

## 2025-01-23 RX ORDER — IBUPROFEN 800 MG/1
800 TABLET ORAL EVERY 8 HOURS PRN
Qty: 90 TABLET | Refills: 5 | Status: SHIPPED | OUTPATIENT
Start: 2025-01-23 | End: 2025-07-22

## 2025-01-27 LAB
ATRIAL RATE: 91 BPM
P AXIS: 68 DEGREES
P OFFSET: 197 MS
P ONSET: 148 MS
PR INTERVAL: 146 MS
Q ONSET: 221 MS
QRS COUNT: 15 BEATS
QRS DURATION: 80 MS
QT INTERVAL: 342 MS
QTC CALCULATION(BAZETT): 420 MS
QTC FREDERICIA: 393 MS
R AXIS: 57 DEGREES
T AXIS: 28 DEGREES
T OFFSET: 392 MS
VENTRICULAR RATE: 91 BPM

## 2025-02-04 ENCOUNTER — TELEPHONE (OUTPATIENT)
Dept: HEMATOLOGY/ONCOLOGY | Facility: HOSPITAL | Age: 25
End: 2025-02-04
Payer: COMMERCIAL

## 2025-02-04 ENCOUNTER — HOSPITAL ENCOUNTER (OUTPATIENT)
Dept: RADIOLOGY | Facility: HOSPITAL | Age: 25
Discharge: HOME | End: 2025-02-04
Payer: COMMERCIAL

## 2025-02-04 ENCOUNTER — DOCUMENTATION (OUTPATIENT)
Dept: HEMATOLOGY/ONCOLOGY | Facility: HOSPITAL | Age: 25
End: 2025-02-04
Payer: COMMERCIAL

## 2025-02-04 ENCOUNTER — PATIENT MESSAGE (OUTPATIENT)
Dept: HEMATOLOGY/ONCOLOGY | Facility: HOSPITAL | Age: 25
End: 2025-02-04

## 2025-02-04 ENCOUNTER — OFFICE VISIT (OUTPATIENT)
Dept: HEMATOLOGY/ONCOLOGY | Facility: HOSPITAL | Age: 25
End: 2025-02-04
Payer: COMMERCIAL

## 2025-02-04 VITALS
TEMPERATURE: 97.5 F | WEIGHT: 140 LBS | DIASTOLIC BLOOD PRESSURE: 64 MMHG | SYSTOLIC BLOOD PRESSURE: 110 MMHG | BODY MASS INDEX: 24.81 KG/M2 | HEART RATE: 100 BPM | OXYGEN SATURATION: 100 %

## 2025-02-04 DIAGNOSIS — Z51.5 ENCOUNTER FOR PALLIATIVE CARE: ICD-10-CM

## 2025-02-04 DIAGNOSIS — Z91.89 AT RISK FOR INFERTILITY: ICD-10-CM

## 2025-02-04 DIAGNOSIS — F40.240 CLAUSTROPHOBIA: ICD-10-CM

## 2025-02-04 DIAGNOSIS — C71.9 PILOCYTIC ASTROCYTOMA (MULTI): Primary | ICD-10-CM

## 2025-02-04 DIAGNOSIS — Z98.2 S/P VP SHUNT: ICD-10-CM

## 2025-02-04 DIAGNOSIS — C71.9 PILOCYTIC ASTROCYTOMA (MULTI): ICD-10-CM

## 2025-02-04 DIAGNOSIS — G89.18 ACUTE POST-OPERATIVE PAIN: ICD-10-CM

## 2025-02-04 DIAGNOSIS — F41.9 ANXIETY: ICD-10-CM

## 2025-02-04 DIAGNOSIS — J45.20 MILD INTERMITTENT ASTHMA, UNSPECIFIED WHETHER COMPLICATED (HHS-HCC): ICD-10-CM

## 2025-02-04 DIAGNOSIS — K58.1 IRRITABLE BOWEL SYNDROME WITH CONSTIPATION: ICD-10-CM

## 2025-02-04 DIAGNOSIS — L70.8 ACNEIFORM RASH: Primary | ICD-10-CM

## 2025-02-04 DIAGNOSIS — R04.0 EPISTAXIS: Primary | ICD-10-CM

## 2025-02-04 PROCEDURE — 72158 MRI LUMBAR SPINE W/O & W/DYE: CPT | Performed by: RADIOLOGY

## 2025-02-04 PROCEDURE — 99215 OFFICE O/P EST HI 40 MIN: CPT | Performed by: NURSE PRACTITIONER

## 2025-02-04 PROCEDURE — 72156 MRI NECK SPINE W/O & W/DYE: CPT

## 2025-02-04 PROCEDURE — A9575 INJ GADOTERATE MEGLUMI 0.1ML: HCPCS | Mod: SE | Performed by: PSYCHIATRY & NEUROLOGY

## 2025-02-04 PROCEDURE — 72157 MRI CHEST SPINE W/O & W/DYE: CPT | Performed by: RADIOLOGY

## 2025-02-04 PROCEDURE — 99214 OFFICE O/P EST MOD 30 MIN: CPT

## 2025-02-04 PROCEDURE — 2500000001 HC RX 250 WO HCPCS SELF ADMINISTERED DRUGS (ALT 637 FOR MEDICARE OP): Mod: SE

## 2025-02-04 PROCEDURE — 72158 MRI LUMBAR SPINE W/O & W/DYE: CPT

## 2025-02-04 PROCEDURE — 2550000001 HC RX 255 CONTRASTS: Mod: SE | Performed by: PSYCHIATRY & NEUROLOGY

## 2025-02-04 PROCEDURE — 72157 MRI CHEST SPINE W/O & W/DYE: CPT

## 2025-02-04 RX ORDER — GADOTERATE MEGLUMINE 376.9 MG/ML
13 INJECTION INTRAVENOUS
Status: COMPLETED | OUTPATIENT
Start: 2025-02-04 | End: 2025-02-04

## 2025-02-04 RX ORDER — LORAZEPAM 1 MG/1
1 TABLET ORAL ONCE
Status: COMPLETED | OUTPATIENT
Start: 2025-02-04 | End: 2025-02-04

## 2025-02-04 RX ORDER — LORAZEPAM 1 MG/1
1 TABLET ORAL ONCE
Status: CANCELLED | OUTPATIENT
Start: 2025-02-04 | End: 2025-02-04

## 2025-02-04 RX ORDER — ALBUTEROL SULFATE 90 UG/1
1-2 INHALANT RESPIRATORY (INHALATION) EVERY 4 HOURS PRN
Qty: 18 G | Refills: 2 | Status: SHIPPED | OUTPATIENT
Start: 2025-02-04

## 2025-02-04 RX ORDER — LORAZEPAM 1 MG/1
TABLET ORAL
Qty: 1 TABLET | Refills: 0 | Status: SHIPPED | OUTPATIENT
Start: 2025-02-04

## 2025-02-04 RX ADMIN — GADOTERATE MEGLUMINE 13 ML: 376.9 INJECTION INTRAVENOUS at 17:53

## 2025-02-04 RX ADMIN — LORAZEPAM 1 MG: 1 TABLET ORAL at 15:55

## 2025-02-04 ASSESSMENT — ENCOUNTER SYMPTOMS
DECREASED CONCENTRATION: 0
NAUSEA: 1
NERVOUS/ANXIOUS: 0
SLEEP DISTURBANCE: 0
HEADACHES: 1
APPETITE CHANGE: 1
APPETITE CHANGE: 1
DEPRESSION: 0
NAUSEA: 1
ABDOMINAL DISTENTION: 1
FATIGUE: 0
CONSTIPATION: 1

## 2025-02-04 ASSESSMENT — PAIN SCALES - GENERAL: PAINLEVEL_OUTOF10: 0-NO PAIN

## 2025-02-04 NOTE — PROGRESS NOTES
Patient ID: Sejal Duff is a 24 y.o. female.  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   -12/01/24 developed hydrocephalus and is s/p  shunt  -12/21/24 D1 tovarefenib (type II JUAN CARLOS inhibitor)   -12/29/24 revision of  shunt    Current treatment plan: curative intent tovarefenib with goal of mass reduction with hope for surgical removal    Subjective    Sejal Duff is a 24 y.o. 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     Returns today in follow up regarding issues unique to young adults with neoplasm.    In the interim since our last visit she required hospitalization for recurrent hydrocephalus and underwent placement of  shunt and revision. 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    Physically she reports feeling fair to poor - main side effects include fatigue, decreased appetite, nausea, headache and rash. Relays having tried many things to help manage side effects and nothing works. Is following with neuro-oncology, GI, and palliative medicine for physical symptoms.     Emotionally, she relays feeling fine. Denies history of anxiety or depression, denies feeling of excessive worry, denies decrease in focus or concentration, still finding enjoyment in activities. Has large family that is very supportive. Does relay medical appointment fatigue  and also some distrust of health care system.     LMP 10/2024, long history of irregular menses. Denies current relationship, not presently sexually active.     Practically she is working light duty d/t recent diagnosis. Per patient she does not have any work restrictions from neuro-surgery. 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 Does not qualify for short term disability at work. Remains interested in SSD - did not receive my prior email re social security application. Met with SW at [facility] but didn't fully understand her options. Concerned about finances - rent is $1300/mo and car payment is $300/mo, she has SNAP benefits for food assistance. Per SW her SSI would only be $600/mo.     Strong family history of cancer, colon cancer MGM, and MGF, MGGF breast cancer, MGGM, cancer of unk type. Per patient she also has several great uncles and aunts with cancer on her mothers side.    Social History: Grew up in [place]. Has 20 1/2 siblings from her father, and 1 1/2 sibling from her mother. Currently lives alone in [place]. 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 She enjoys shopping. Never smoker, does not vape. Does use ETOH and marijuana.     Review of Systems   Constitutional:  Positive for appetite change. Negative for fatigue.   Gastrointestinal:  Positive for abdominal distention, constipation and nausea.   Genitourinary:  Positive for menstrual problem. Negative for vaginal bleeding.    Neurological:  Positive for headaches.   Psychiatric/Behavioral:  Negative for decreased concentration, depression and sleep disturbance. The patient is not nervous/anxious.      Objective   BSA: 1.68 meters squared  /64 (BP Location: Left arm, Patient Position: Sitting, BP Cuff Size: Adult)   Pulse 100   Temp 36.4 °C (97.5 °F) (Temporal)   Wt 63.5 kg (140 lb)   SpO2 100%   BMI 24.81 kg/m²  No VS or weight  recorded due to the telehealth nature of this visit.     Family History   Problem Relation Name Age of Onset    Asthma Mother      Colon cancer Mother       Oncology History   Pilocytic astrocytoma (Multi)   10/2024 Initial Diagnosis    25 yo F with PMH of anemia, asthma, GERD, IBS, PCOS, and breast fibroadenoma s/p L breast excisional biopsy presented to [facility] ED on 10/14/24 with headache. CTH parasellar mass with calcifications. MRI with 2.4 cm peripherally enhancing necrotic mass with non-enhancing FLAIR hyperintense lesion in medial L temporal lobe. Admitted for neurosurgical management.     10/15/2024 Biopsy    R stereotactic brain biopsy  Surgeon: Den Segal MD    Prelim path: Glial cells with luigi fibers vs craniopharyngioma     10/23/2024 Tumor Board    Procedure: R stereotactic brain biopsy 10/15/2024   Pathology: Final pending; likely to be a glioma, areas of necrosis noted.       The [facility] Tumor Board tumor board considered available treatment options and made the following recommendations: Radiation oncology referral. Add back to CNS TB once pathology is completed.     11/5/2024 Pathology     A.  B.  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  See note     Note:  Microscopic examination for parts A and B reveal a low-grade glial neoplasm immunoreactive with OLIG-2 and GFAP.  Neurofilament immunohistochemical stain highlights neuropil and scattered neuronal bodies.  ATRX and [id number] immunohistochemical stains are retained.  IDH1 and BRAF immunohistochemical stains are negative.  GLIOSEQ studies reveal a BRAF-V600E mutation.  This finding in conjunction with the morphology are most consistent with a pilocytic astrocytoma.  However, a ganglioglioma among other low-grade glial/glial neuronal neoplasms, cannot be completely ruled out.  Clinical correlation is recommended     11/6/2024 Tumor Board    CSF analysis negative.   Pathology:  Low grade glial  neoplasm - BRAF - V600E mutation      The [facility] Tumor Board tumor board considered available treatment options and made the following recommendations: Repeat MRI brain w/wo and perfusion study. Sellar imaging. Continue following with neuro-oncology. Radiation oncology referral.      11/29/2024 - 12/6/2024 Hospital Admission    Discharge diagnosis: Hydrocephalus    Returned to the [facility] ED 11/28/24 with worsening HA, light sensitivity, CTH incr vents, MRI incr ventricular mass 2.7x2.5cm, ventriculomegaly, trace R ventricular GRE. Patient admitted to neurosurgery service for further management.    11/28 Ophthalmology evaluation without papilledema.  12/1 s/p R VPS (certas at 5) -- Dr. Yap at [facility]  12/2 CTH with catheter in position.      12/29/2024 - 12/31/2024 Hospital Admission    Discharge diagnosis: Hydrocephalus    Returned to the [facility] ED on 12/28/24 with generalized fatigue, HA and body aches. CT head with increased ventricles, SS Certas at 4, LLQ cath with turn vs possible kink, CT AP catheter without kink or discontinuity, no pseudocyst. Patient admitted to the neurosurgery service for further management/workup.    12/28 s/p shunt tap with no proximal flow.   12/29 s/p proximal shunt revision, distal cath flushing. CT head post op with decreased ventricles, catheter in position.         Sejal Duff  reports that she has never smoked. She has never been exposed to tobacco smoke. She has never used smokeless tobacco.  She  reports current alcohol use.  She  reports current drug use. Drug: Marijuana.    Physical Exam  Constitutional:       General: She is not in acute distress.     Appearance: Normal appearance. She is not ill-appearing.   Neurological:      Mental Status: She is alert.   Psychiatric:         Mood and Affect: Mood normal.         Behavior: Behavior normal.         Thought Content: Thought content normal.         Judgment: Judgment normal.     Performance  Status:  Asymptomatic    Assessment/Plan    Sejal Duff is a 24 y.o. F with a recent diagnosis of astrocytoma with future plans for oral systemic therapy and possible radiation therapy.     #Psychosocial: young adult with benign neoplasm, being treated and followed by neuro-oncology  - Previously discussed resources available including psychiatry, psychology, social work, spiritual care, and expressive therapies  - Connected to young adult oncology program, will receive monthly social programming invitations  - Consider providing community resources for peer support/connection in future  - Again provided patient with website to apply for social security benefits if interested  - Will look into additional financial grants/assistance for patient - specifically as it relates to rent/transportation assistance - reconnect with [facility]  - Has SNAP benefits - identified food insecurity, referral to [employer] is in place, patient needs to call   - Will send cancer to 5k exercise training program when available     #Sexual dysfunction/contraception:   - Previously discussed the seriousness of getting pregnant while receiving oral chemotherapy due to the harmful effects this could have on the  fetus, and on her cancer treatment given the decreased availability of medical  services.   - She is not interested in use of hormonal or non hormonal contraceptives - including IUDs out of concern for their role in developing brain cancer  - Previously discussed that she must prevent pregnancy with use of barrier method every time she engages in penetrative intercourse  - Previously discussed that small amounts of chemotherapy may be found in her body secretions including vaginal secretions and she should use a barrier form of contraception such as a male or female condom to protect her partner from chemotherapy exposure.    #Risk for infertility:  - Has previously been seen by colleagues in [facility]  - Discussed natural  age related decline in fertility and menopause that occurs as a result of ovarian aging, and that cancer treatments can accellerate that progression and diminish ovarian reserve.  - Individuals may experience varying degrees of short or long term ovarian dysfunction and amenorrhea, and that this is dependent upon type of cancer treatment, cumulative dose, and patients age.   - Loss of ovarian function may be permanent or temporary.  - Risk to fertility is UNKNOWN based on cancer treatment of tovarefenib - there is little research about the effects of these agents on ovarian reserve  - Declined fertility preservation prior to initiation of chemotherapy  - Reviewed baseline hormone function testing AMH 5.2 on 11/14/24, E2, FSH/LH WNL  - Discussed a plan to monitor hormone function for s/s of hypogonadism    #Polycystic ovary syndrome PCOS: with features of oligomenorrhea, acne, +10 <10mm follicles on bilateral ovaries per US, and elevated AMH  - Will discuss pathophysiology with patient at future visit  - Consider referral to Dr. Griffiths for comprehensive PCOS management     #Chronic constipation/IBS: chronic, lifelong constipation with minimal improvement despite multiple interventions  - Follows with pediatric gastroenterology  - Has previously had NG clean outs at [facility], hydrocolonic therapy, rectal biofeedback, senna, bisacodyl, miralax, golytely, enemas, suppositories, probiotics, and acupuncture  - Currently on linzess and motegrity without significant improvement  - Has been seen in past by adult GI while admitted for consultation but never followed up outpatient - consider re-referral    #Diet/Exercise/Healthy Lifestyle:  - Previously discussed research demonstrating consumption of a healthy, plant based diet not only decreases cancer risk, but also has been found to improve survival in cancer patients who partake in a healthy diet.   - Previously discussed research that demonstrates decreased cancer risk and  improved survival in cancer patients who exercise and stay active throughout diagnosis and treatment.  - Encouraged patient to continue gentle exercise as tolerated  - Encouraged patient to continue to abstain from alcohol, tobacco, and recreational drugs    #Genetic Risk: young adult with cancer and personal family history of cancer  - Discussed rationale for genetic risk consultation consider placing referral in future    The amount of time that I spent with the patient:  Prep: 0  Time spend directly with patient: 35  Coordinating care: 5  Documentation: 10  Other time:    Total time spent on encounter: 50    Follow up planned in one month                Pepper Davidson, JASVIR-CNP

## 2025-02-04 NOTE — TELEPHONE ENCOUNTER
[email or web address] message sent to Sejal asking if she can upload an image of her rash to include on an e-consult to dermatology.

## 2025-02-04 NOTE — PROGRESS NOTES
-- inhaler  -- nosebleeds -- clots, lasted about 15 min  -- rash  -- shunt keep  -- refill tovo  --

## 2025-02-04 NOTE — PROGRESS NOTES
[facility]  Neuro-Oncology    Patient: Sejal Duff  MRN: [id number]  Date of visit: 02/04/25  Visit type: In-person clinic visit  Clinician: Deborah Snowden PA-C    Oncological & Treatment History:  Oncology History   Pilocytic astrocytoma (Multi)   10/2024 Initial Diagnosis    23 yo F with PMH of anemia, asthma, GERD, IBS, PCOS, and breast fibroadenoma s/p L breast excisional biopsy presented to [facility] ED on 10/14/24 with headache. CTH parasellar mass with calcifications. MRI with 2.4 cm peripherally enhancing necrotic mass with non-enhancing FLAIR hyperintense lesion in medial L temporal lobe. Admitted for neurosurgical management.     10/15/2024 Biopsy    R stereotactic brain biopsy  Surgeon: Den Segal MD    Prelim path: Glial cells with luigi fibers vs craniopharyngioma     10/23/2024 Tumor Board    Procedure: R stereotactic brain biopsy 10/15/2024   Pathology: Final pending; likely to be a glioma, areas of necrosis noted.       The CNS Tumor Board tumor board considered available treatment options and made the following recommendations: Radiation oncology referral. Add back to CNS TB once pathology is completed.     11/5/2024 Pathology     A.  B.  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  See note     Note:  Microscopic examination for parts A and B reveal a low-grade glial neoplasm immunoreactive with OLIG-2 and GFAP.  Neurofilament immunohistochemical stain highlights neuropil and scattered neuronal bodies.  ATRX and [id number] immunohistochemical stains are retained.  IDH1 and BRAF immunohistochemical stains are negative.  GLIOSEQ studies reveal a BRAF-V600E mutation.  This finding in conjunction with the morphology are most consistent with a pilocytic astrocytoma.  However, a ganglioglioma among other low-grade glial/glial neuronal neoplasms, cannot be completely ruled out.  Clinical correlation is recommended      11/6/2024 Tumor Board    CSF analysis negative.   Pathology:  Low grade glial neoplasm - BRAF - V600E mutation      The CNS Tumor Board tumor board considered available treatment options and made the following recommendations: Repeat MRI brain w/wo and perfusion study. Sellar imaging. Continue following with neuro-oncology. Radiation oncology referral.      11/29/2024 - 12/6/2024 Hospital Admission    Discharge diagnosis: Hydrocephalus    Returned to the [facility] ED 11/28/24 with worsening HA, light sensitivity, CTH incr vents, MRI incr ventricular mass 2.7x2.5cm, ventriculomegaly, trace R ventricular GRE. Patient admitted to neurosurgery service for further management.    11/28 Ophthalmology evaluation without papilledema.  12/1 s/p R VPS (certas at 5) -- Dr. Yap at [facility]  12/2 CTH with catheter in position.      12/29/2024 - 12/31/2024 Hospital Admission    Discharge diagnosis: Hydrocephalus    Returned to the [facility] ED on 12/28/24 with generalized fatigue, HA and body aches. CT head with increased ventricles, SS Certas at 4, LLQ cath with turn vs possible kink, CT AP catheter without kink or discontinuity, no pseudocyst. Patient admitted to the neurosurgery service for further management/workup.    12/28 s/p shunt tap with no proximal flow.   12/29 s/p proximal shunt revision, distal cath flushing. CT head post op with decreased ventricles, catheter in position.         HPI:  Sejal Duff is a 24 y.o. 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      Interval History (NEWEST at BOTTOM of section):  10/29/24: Today she reports some slight headaches but not worsening.  She is currently on dexamethasone 2 mg twice daily.  She takes the morning dose around 9 or 10 AM and the evening dose  before bedtime.  She takes ondansetron about 1 hour prior to the dexamethasone.  She endorses significant issues with vomiting after taking the dexamethasone as well as decreased appetite.  She is able to drink enough fluids.  She states that she tried taking 8 mg of ondansetron prior to the dexamethasone but this did not help either.  She states that the dexamethasone makes her very jittery.  She has not taken dexamethasone for the last 2 days.  She continues to have constipation which is her baseline where she does not have a bowel movement for weeks.  She has been referred by her gastroenterologist to integrative medicine for further help with bowel movements.  Otherwise denies any worsening of vision or new gait or balance issues.  11/8/24: Continues to have some headaches but not worse. She has decreased dexamethasone to 1mg bid on her own and she denies any worsening of her headaches. Denies new balance or strength issues.   11/19/2024: Her headaches are about the same intensity.  Denies new nausea/vomiting episodes.  Denies new gait or balance changes.  Denies new vision changes.   11/26/2024: Sejal states her headaches are worse.  She has not been able to sleep much.  Denies new vision problems.  Denies new weakness or gait balance/issues.  Denies new episodes of nausea or vomiting.  She is upset at the MRI spine being canceled recently.    01/16/25: Since the last visit, Sejal underwent  shunt placement for hydrocephalus (12/1/24) and a subsequent VPS revision (12/29/24). She also started tovorafenib on 12/21/24 and has been taking it once weekly as directed on Saturdays. Endorses significant fatigue and body aches for 2-3 days after taking tovorafenib. Endorses a new acne-like rash to her lower face that started with facial tenderness after her 2nd or 3rd dose of tovorafenib; rash is spreading to her nasolabial folds. States she washes her face with warm water and applies Aquaphor to the affected areas.  "She also reports intermittent episodes over the past 3 weeks where she feels overheated, lightheaded, and needs to utilize her inhaler; states it is similar to asthma flares; denies anxiety precipitating episodes. Also reports persistent nausea since her last surgery. States even water makes her feel nauseated. Feels like she's going to vomit, but it \"sits in [her] chest\" and burns. States she takes Zofran \"like it's candy.\" Denies drinking caffeine/carbonated drinks, fried/spicy foods, and lying down within 30 min of eating. States she hasn't picked up Olanzapine and doesn't take omeprazole as she is wary of being on many medications.    02/04/25: Saw patient in clinic for an impromptu visit regarding her worsening acneiform rash after patient saw [facility] today. No improvement in symptoms since using topical hydrocortisone 2.5% and clindamycin 1%. Now has some similar lesions on her chest. Pictures entered into chart. Additionally, patient reports a nosebleed with clots yesterday that resolved after about 15 minutes. She also has questions about medications -- states she was too fatigued the day after olanzapine and wants to clarify which meds she should/should not be taking. Her mother is present as well -- mother notes family history of cancer, which contributes to her concerns about patient's diagnosis/treatment.    Review of Systems:  Review of Systems   Constitutional:  Positive for appetite change.   Gastrointestinal:  Positive for nausea.   Skin:  Positive for rash.       Social History:  Social History     Tobacco Use    Smoking status: Never     Passive exposure: Never    Smokeless tobacco: Never   Vaping Use    Vaping status: Never Used   Substance Use Topics    Alcohol use: Yes     Comment: social    Drug use: Yes     Types: Marijuana   Occupation: [employer], [employer]  Currently working [employer] Tuesday-Friday    Past Medical History:  Past Medical History:   Diagnosis Date    Encounter for screening " for infections with a predominantly sexual mode of transmission 03/02/2020    Screening for STD (sexually transmitted disease)    Melena 04/12/2019    Blood in stool    Other specified health status     No pertinent past medical history    Other specified health status     No pertinent past surgical history    Personal history of other (healed) physical injury and trauma     History of sprain of ankle    Unspecified condition associated with female genital organs and menstrual cycle 03/02/2020    Cervical motion tenderness       Past Surgical History:  Past Surgical History:   Procedure Laterality Date    BREAST SURGERY Left     Mass removal    OTHER SURGICAL HISTORY  01/18/2019    No history of surgery       Family History:  Family History   Problem Relation Name Age of Onset    Asthma Mother      Colon cancer Mother         Performance Score:  Karnofsky Score: 90 - Able to carry on normal activity; minor signs or symptoms of disease     Vital Signs:  There were no vitals taken for this visit. -- vitals taken earlier for other clinic visit today    Physical Exam:  Physical Exam  Constitutional:       General: She is not in acute distress.     Appearance: She is not toxic-appearing.   Skin:     Findings: Rash (worsening acneiform rash involving bilateral nasolabial folds, lower cheeks, perioral area, and chin. Scattered macules to chest. See pictures below.) present.   Neurological:      General: No focal deficit present.      Mental Status: She is alert and oriented to person, place, and time.      Cranial Nerves: No cranial nerve deficit.         Results:  Lab Results   Component Value Date    WBC 5.2 01/21/2025    HGB 10.2 (L) 01/21/2025    HCT 32.6 (L) 01/21/2025    MCV 83 01/21/2025     01/21/2025       Lab Results   Component Value Date    GLUCOSE 99 01/21/2025    CALCIUM 8.9 01/21/2025     01/21/2025    K 3.6 01/21/2025    CO2 26 01/21/2025     01/21/2025    BUN 8 01/21/2025    CREATININE  0.57 01/21/2025       Lab Results   Component Value Date    ALT 24 01/21/2025    AST 30 01/21/2025    ALKPHOS 75 01/21/2025    BILITOT 0.7 01/21/2025       === 11/28/24 ===    MR BRAIN W AND WO CONTRAST    Addendum 11/28/2024  3:18 PM -----------------------------------------------  Interpreted By:  Saravanan Flores,  ADDENDUM:  As described in the body of the report, there also new susceptibility  artifact in the dependent portion of the right lateral ventricle  concerning for blood products.    Signed by: Saravanan Flores 11/28/2024 3:18 PM    -------- ORIGINAL REPORT --------  Dictation workstation:   [id number]    - Impression -  1. Interval enlargement hypothalamic-based necrotic mass (2.7 x 2.6 x  2.5 cm vs 2.3 x 2 x 2.4 cm) resulting in new acute obstructive  hydrocephalus at the level of the foramina of Monro causing  significantly increased dilatation of the lateral ventricles (right >  left) and new interstitial edema in the periventricular white matter.  Urgent neurosurgical consultation recommended.    2. New diffuse bilateral sulcal effacement and significantly  increased downward mass effect on the optic chiasm, bilateral ventral  medial thalami, and to a lesser extent the ventral midbrain.    3. Slight increased size of nonenhancing T2/FLAIR hyperintense lesion  in the medial left temporal lobe measuring 1.3 cm x 0.8 cm.    MACRO:  Saravanan Flores discussed the significance and urgency of this  critical finding by telephone with EVONNE ANGELES and DR RADER  (neurosurgery) on 11/28/2024 at 3:09 pm.  (**-RCF-**) Findings:  See  findings.    Signed by: Saravanan Flores 11/28/2024 3:15 PM  Dictation workstation:   [id number]      Assessment & Plan:  Sejal Duff is a 24 y.o. 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 She also developed hydrocephalus and is s/p VPS (12/1/24) with revision (12/29/24). She started on tovorafenib (type II JUAN CARLOS inhibitor) on 12/21/24.    Spent time with patient reviewing her medication indications. Discussed with her mother what I discussed with Sejal at the previous virtual visit -- Explained that her tumor is low-grade, meaning it is classified as slow-growing and benign/non-cancerous, but it is causing issues/symptoms that make it not harmless. Explained that such tumors could possibly transform into a higher grade, meaning become malignant/cancerous, or could still remain \"benign\" but grow large enough to cause significant, even life-threatening problems. Discussed that our goal with tovorafenib is to shrink the tumor as much as possible and/or reach stability to the point that we can monitor with imaging or we can have neurosurgery evaluate again whether surgery is feasible/safe, but there are no guarantees. Mother verbalized understanding.    Patient amenable to derm e-consult. Will plan to contact patient for a check-in after the e-consult is complete.    1. 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  Treatment: Tovorafenib 600 mg PO once weekly (380 mg/m2 x BSA)  Planned length of treatment is 6 cycles (28-day cycles), but this may be extended or shortened if indicated  Labs on or within 48 hours of day 1 of each cycle to monitor for toxicities  Patient has supply for this weekend's dose, but none otherwise -- will reach out to [id number] and send more if no refills left  Tempus: Will plan to obtain if patient amenable for further genetic evaluation  MRI brain w/wo contrast every 2 months (after 2 cycles) to assess treatment response  Has MRI spine today to check for drop mets -- 1 mg Ativan administered in clinic " due to claustrophobia/procedural anxiety  Has MRI brain scheduled on 2/17/25 -- 1 mg Ativan Rx sent to pharmacy for her to take 30 minutes prior to this MRI  Follow-up as scheduled on 2/18/25 with Dr. Aguilar for reassessment    2. Acneiform rash  Tried topical clindamycin 1% lotion to whole face after washing face with lukewarm water, then followed with 2.5% hydrocortisone cream to affected areas twice daily for 2 weeks -- not improved, now has lesions appearing on chest. Will place e-consult to Derm for recommendations -- likely oral Abx and/or decrease tovorafenib dose  Referral to Dermatology to establish if rash persists/worsens or new skin toxicities develops -- appointment scheduled for June, but will complete e-consult first    3. Epistaxis, resolved  1 episode yesterday, lasted ~15 min  Thrombocytopenia not a known or common AE of tovorafenib, so probably unrelated but will check CBC while patient is on site -- may end up not getting this drawn today as she was late for MRIs due to being in clinic    4. Health maintenance  Sent refill on albuterol inhaler as patient has not been able to get a refill and is almost out -- future refills should go to managing provider (pt may need appt before they will refill)  Went through some of her medications with her, wrote the indication on the bottles so she know why she is taking them. Advised again that she should try omeprazole for at least a few days to see if that will help with her nausea and gastric reflux symptoms -- if no relief, needs to see/contact GI. Will reach out to [facility] regarding patient request for cyclobenzaprine refill.    Orders Placed This Encounter   Procedures    E-consult to Dermatology

## 2025-02-04 NOTE — TELEPHONE ENCOUNTER
Per Deborah Snowden PA-C  Please call Sejal to see if she's been using the topical clindamycin and hydrocortisone cream on her face for her acneiform rash and if there's been any improvement. If persistent or worse, she needs to call to schedule with Dermatology (referral was already entered).     Spoke with Sejal. She reports the creams have not helped and the rash has spread to her neck and shoulders. She states the rash is similar to hives. RN informed her that Deborah would like her to see dermatology. Sejal did try to scheduled with them. She, however, was told they did not know why she needed derm and they would call her back. She has not heard anything from them.   RN encouraged her to try to scheduled again. Will see if we have any assistance we can provide.   Sejal also inquires about a refill on an inhaler, stating no one will refill it. RN instructed her to call her PCP.

## 2025-02-05 RX ORDER — CYCLOBENZAPRINE HCL 5 MG
5 TABLET ORAL 3 TIMES DAILY PRN
Qty: 45 TABLET | Refills: 0 | Status: SHIPPED | OUTPATIENT
Start: 2025-02-05 | End: 2025-02-20

## 2025-02-05 RX ORDER — OXYCODONE HYDROCHLORIDE 5 MG/1
5 TABLET ORAL EVERY 6 HOURS PRN
Qty: 120 TABLET | Refills: 0 | Status: SHIPPED | OUTPATIENT
Start: 2025-02-06 | End: 2025-03-08

## 2025-02-05 NOTE — PATIENT COMMUNICATION
Patient last seen by JASVIR Calloway on 1/23 with plan to continue oxycodone 5mg q6h PRN. Follow up visit is scheduled for 2/18. OARRS reviewed and no aberrancy noted. Patient last filled oxycodone 5mg #120/30 days on 1/8. Prescription pended to provider to approve.

## 2025-02-05 NOTE — PROGRESS NOTES
"Assessment and Plan    12/29/2024 CT head without contrast, which I personally reviewed, shows a right posterior approach ventriculostomy catheter with air in the right lateral ventricle consistent with recent ventriculoperitoneal shunt revision along with the hypothalamic mass.    Interval head imaging.    11/28/2024 MRI brain with contrast, which I personally reviewed, shows a peripherally enhancing mass at the hypothalamus with enlargement of the lateral ventricles.    10/16/2024 CT head without contrast, which I personally reviewed previously, shows a small amount of air in the right frontal horn.  10/13/2024 MRI brain with contrast, which I personally reviewed previously, shows a T2 hyperintense mass at the hypothalamus with peripheral enhancement.  10/13/2024 CT head without contrast, which I personally reviewed previously, shows a hypodense mass of the hypothalamus.    10/15/2024 pathology \" A.  B.  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  See note\"    10/15/2024 cerebrospinal fluid (CSF) RBC 2000, WBC 2, protein 17 & glucose 61. Cytology negative.    Lab Results   Component Value Date/Time    SEDRATE 13 04/12/2023 0958    SEDRATE 35 (H) 08/31/2022 0957    SEDRATE 11 09/06/2019 1450    SEDRATE 27 (H) 07/31/2019 1939    CRP 0.30 08/31/2022 0957    CRP 0.32 02/11/2020 0158    CRP 0.56 09/06/2019 1450    CRP 0.29 07/31/2019 1939    CRP 0.40 01/18/2019 1651     Lab Results   Component Value Date/Time    COPPER 151.2 10/07/2024 1729     07/11/2018 syphilis IgG non-reactive (NR).    02/06/2025 OCT RNFL OD 71 with T & N thinning & OS 63 withT & borderline N & S thinning. (Small decrease)  11/06/2024 OCT RNFL OD 74 with T thinning & OS 65 with T & borderline N & S thinning.    02/06/2025 HVF 24-2 OD fovea 38, temporal depression MD -0.37 & OS fovea 30, temporal depression MD -2.51.  11/06/2024 HVF 24-2 left junctional scotoma OD fovea 35, FL 17/18, FP 1%, FN 9%, " temporal depression MD -4.62 & OS fovea 29, FL 15/17, FP 7%, FN 5%, temporal and central depression MD -3.86.    This 24 year-old woman with a history of third ventricular / hypothalamus pilocytic astrocytoma status post biopsy 10/15/2024 and ventriculoperitoneal shunt last revised 12/29/2024, acne, PCOS, IBS, GERD presents for evaluation of visual fields.    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 I do not see interval change. I also do not see papilledema related to prior hydrocephalus. I do recommend serial examination to assess stability.    Plan    Treatment with neurosurgery and neuro-oncology.    Follow up in 6 months with HVF & OCT. (Dilated 11/6/2024)

## 2025-02-05 NOTE — PATIENT COMMUNICATION
Ok per JASVIR Calloway to send over a refill for Flexeril 5mg TID PRN. Patient notified via [employer].

## 2025-02-06 ENCOUNTER — LAB (OUTPATIENT)
Dept: LAB | Facility: HOSPITAL | Age: 25
End: 2025-02-06
Payer: COMMERCIAL

## 2025-02-06 ENCOUNTER — APPOINTMENT (OUTPATIENT)
Dept: DERMATOLOGY | Facility: CLINIC | Age: 25
End: 2025-02-06
Payer: COMMERCIAL

## 2025-02-06 ENCOUNTER — APPOINTMENT (OUTPATIENT)
Dept: OPHTHALMOLOGY | Facility: CLINIC | Age: 25
End: 2025-02-06
Payer: COMMERCIAL

## 2025-02-06 ENCOUNTER — E-CONSULT (OUTPATIENT)
Dept: DERMATOLOGY | Facility: CLINIC | Age: 25
End: 2025-02-06

## 2025-02-06 DIAGNOSIS — H47.293 PARTIAL OPTIC ATROPHY OF BOTH EYES: ICD-10-CM

## 2025-02-06 DIAGNOSIS — R04.0 EPISTAXIS: ICD-10-CM

## 2025-02-06 DIAGNOSIS — L27.0 ACNEIFORM DRUG ERUPTION: Primary | ICD-10-CM

## 2025-02-06 DIAGNOSIS — H53.47 HETERONYMOUS BILATERAL FIELD DEFECTS IN VISUAL FIELD: ICD-10-CM

## 2025-02-06 DIAGNOSIS — C71.9 PILOCYTIC ASTROCYTOMA (MULTI): ICD-10-CM

## 2025-02-06 DIAGNOSIS — G93.89 MASS OF HYPOTHALAMUS: Primary | ICD-10-CM

## 2025-02-06 LAB
ALBUMIN SERPL BCP-MCNC: 4.5 G/DL (ref 3.4–5)
ALP SERPL-CCNC: 68 U/L (ref 33–110)
ALT SERPL W P-5'-P-CCNC: 29 U/L (ref 7–45)
ANION GAP SERPL CALC-SCNC: 12 MMOL/L (ref 10–20)
AST SERPL W P-5'-P-CCNC: 34 U/L (ref 9–39)
B-HCG SERPL-ACNC: <3 MIU/ML
BASOPHILS # BLD AUTO: 0.01 X10*3/UL (ref 0–0.1)
BASOPHILS NFR BLD AUTO: 0.2 %
BILIRUB SERPL-MCNC: 0.9 MG/DL (ref 0–1.2)
BUN SERPL-MCNC: 8 MG/DL (ref 6–23)
CALCIUM SERPL-MCNC: 8.7 MG/DL (ref 8.6–10.6)
CHLORIDE SERPL-SCNC: 102 MMOL/L (ref 98–107)
CO2 SERPL-SCNC: 25 MMOL/L (ref 21–32)
CREAT SERPL-MCNC: 0.44 MG/DL (ref 0.5–1.05)
EGFRCR SERPLBLD CKD-EPI 2021: >90 ML/MIN/1.73M*2
EOSINOPHIL # BLD AUTO: 0.27 X10*3/UL (ref 0–0.7)
EOSINOPHIL NFR BLD AUTO: 4.3 %
ERYTHROCYTE [DISTWIDTH] IN BLOOD BY AUTOMATED COUNT: 15.2 % (ref 11.5–14.5)
GLUCOSE SERPL-MCNC: 83 MG/DL (ref 74–99)
HCT VFR BLD AUTO: 30.5 % (ref 36–46)
HGB BLD-MCNC: 9.6 G/DL (ref 12–16)
IMM GRANULOCYTES # BLD AUTO: 0.01 X10*3/UL (ref 0–0.7)
IMM GRANULOCYTES NFR BLD AUTO: 0.2 % (ref 0–0.9)
LYMPHOCYTES # BLD AUTO: 3.01 X10*3/UL (ref 1.2–4.8)
LYMPHOCYTES NFR BLD AUTO: 48.4 %
MCH RBC QN AUTO: 26.1 PG (ref 26–34)
MCHC RBC AUTO-ENTMCNC: 31.5 G/DL (ref 32–36)
MCV RBC AUTO: 83 FL (ref 80–100)
MONOCYTES # BLD AUTO: 0.28 X10*3/UL (ref 0.1–1)
MONOCYTES NFR BLD AUTO: 4.5 %
NEUTROPHILS # BLD AUTO: 2.64 X10*3/UL (ref 1.2–7.7)
NEUTROPHILS NFR BLD AUTO: 42.4 %
NRBC BLD-RTO: 0 /100 WBCS (ref 0–0)
PLATELET # BLD AUTO: 376 X10*3/UL (ref 150–450)
POTASSIUM SERPL-SCNC: 3.2 MMOL/L (ref 3.5–5.3)
PROT SERPL-MCNC: 7.5 G/DL (ref 6.4–8.2)
RBC # BLD AUTO: 3.68 X10*6/UL (ref 4–5.2)
SODIUM SERPL-SCNC: 136 MMOL/L (ref 136–145)
WBC # BLD AUTO: 6.2 X10*3/UL (ref 4.4–11.3)

## 2025-02-06 PROCEDURE — 84702 CHORIONIC GONADOTROPIN TEST: CPT

## 2025-02-06 PROCEDURE — 99451 NTRPROF PH1/NTRNET/EHR 5/>: CPT | Performed by: DERMATOLOGY

## 2025-02-06 PROCEDURE — 92133 CPTRZD OPH DX IMG PST SGM ON: CPT | Performed by: PSYCHIATRY & NEUROLOGY

## 2025-02-06 PROCEDURE — 36415 COLL VENOUS BLD VENIPUNCTURE: CPT

## 2025-02-06 PROCEDURE — 80053 COMPREHEN METABOLIC PANEL: CPT

## 2025-02-06 PROCEDURE — 99214 OFFICE O/P EST MOD 30 MIN: CPT | Performed by: PSYCHIATRY & NEUROLOGY

## 2025-02-06 PROCEDURE — 92083 EXTENDED VISUAL FIELD XM: CPT | Performed by: PSYCHIATRY & NEUROLOGY

## 2025-02-06 PROCEDURE — 85025 COMPLETE CBC W/AUTO DIFF WBC: CPT

## 2025-02-06 ASSESSMENT — VISUAL ACUITY
OD_SC: 20/20-3
OS_SC: 20/50-1
METHOD: SNELLEN - LINEAR

## 2025-02-06 ASSESSMENT — CONF VISUAL FIELD
OD_INFERIOR_NASAL_RESTRICTION: 0
OS_NORMAL: 1
OD_SUPERIOR_NASAL_RESTRICTION: 0
OD_NORMAL: 1
OS_INFERIOR_NASAL_RESTRICTION: 0
OD_INFERIOR_TEMPORAL_RESTRICTION: 0
OS_SUPERIOR_TEMPORAL_RESTRICTION: 0
OS_SUPERIOR_NASAL_RESTRICTION: 0
OS_INFERIOR_TEMPORAL_RESTRICTION: 0
OD_SUPERIOR_TEMPORAL_RESTRICTION: 0

## 2025-02-06 ASSESSMENT — SLIT LAMP EXAM - LIDS
COMMENTS: NORMAL
COMMENTS: NORMAL

## 2025-02-06 ASSESSMENT — EXTERNAL EXAM - LEFT EYE: OS_EXAM: NORMAL

## 2025-02-06 ASSESSMENT — CUP TO DISC RATIO
OS_RATIO: 0.1
OD_RATIO: 0.1

## 2025-02-06 ASSESSMENT — ENCOUNTER SYMPTOMS
MUSCULOSKELETAL NEGATIVE: 0
NEUROLOGICAL NEGATIVE: 0
ENDOCRINE NEGATIVE: 0
HEMATOLOGIC/LYMPHATIC NEGATIVE: 0
CONSTITUTIONAL NEGATIVE: 0
CARDIOVASCULAR NEGATIVE: 0
GASTROINTESTINAL NEGATIVE: 0
RESPIRATORY NEGATIVE: 0
ALLERGIC/IMMUNOLOGIC NEGATIVE: 0
EYES NEGATIVE: 1
PSYCHIATRIC NEGATIVE: 0

## 2025-02-06 ASSESSMENT — TONOMETRY
OD_IOP_MMHG: 14
IOP_METHOD: GOLDMANN APPLANATION
OS_IOP_MMHG: 14

## 2025-02-06 ASSESSMENT — EXTERNAL EXAM - RIGHT EYE: OD_EXAM: NORMAL

## 2025-02-06 NOTE — PROGRESS NOTES
E-Consult note:     Summary of data review: Acneiform rash to her lower face that started with facial tenderness after her 2nd or 3rd dose of tovorafenib (type II JUAN CARLOS inhibitor) in December; rash spread to nasolabial folds. Tried 2.5% hydrocortisone and 1% clindamycin, no improvement, now on chest.       Oral antibiotic indicated/recommendations? If no improvement, will likely reduce tovorafenib dose if rash persists.        Findings / recommendations:   After careful review of the patient's information available in the medical record, the following are my findings and recommendations:     Papulopustular / acneiform drug eruption    Diagnosis:    1. Acneiform drug eruption       Acneiform drug eruption / Papulopustular drug eruption is favored.   - Agree with next step of oral antibiotic. Doxycycline 100mg PO BID x2-3 months. Take with food and use sun protection. If upsetting stomach can reduce to once daily or switch to minocycline  - If still not improving with doxycycline PO, next step would be to perform a bacterial culture from an intact pustule      eConsult Time: 5 minutes      Judy Patel MD      2/6/2025

## 2025-02-07 ENCOUNTER — TELEPHONE (OUTPATIENT)
Dept: HEMATOLOGY/ONCOLOGY | Facility: HOSPITAL | Age: 25
End: 2025-02-07
Payer: COMMERCIAL

## 2025-02-07 DIAGNOSIS — L27.0 ACNEIFORM DRUG ERUPTION: Primary | ICD-10-CM

## 2025-02-07 RX ORDER — DOXYCYCLINE 100 MG/1
100 CAPSULE ORAL 2 TIMES DAILY
Qty: 60 CAPSULE | Refills: 2 | Status: SHIPPED | OUTPATIENT
Start: 2025-02-07 | End: 2025-05-08

## 2025-02-07 NOTE — TELEPHONE ENCOUNTER
Called and spoke to Sejal to let her know the dermatologist reviewed the images we sent. She agreed that the next step (after trying the hydrocortisone and clindamycin) is to switch to oral doxycycline. She needs to take this with food and a full glass of water. Needs to also use sun protection while on this medication. She was educated on this. She is not thrilled to start another pill as she cannot tolerate food. I let her know she can fill the script and to try it until the new appointment/MRI reading and hopefully we can make adjustments to her meds then per Deborah Snowden PA-C. She did not want to repeat the information but said she understood. I told her to call us if she cannot tolerate it at all.    Selena ARNDT RN

## 2025-02-07 NOTE — TELEPHONE ENCOUNTER
Rx doxycycline 100 mg PO BID 30-day supply with 2 refills sent to patient's pharmacy for her acneiform drug eruption.

## 2025-02-10 ENCOUNTER — APPOINTMENT (OUTPATIENT)
Dept: NEUROSURGERY | Facility: CLINIC | Age: 25
End: 2025-02-10
Payer: COMMERCIAL

## 2025-02-15 DIAGNOSIS — R11.0 NAUSEA: Primary | ICD-10-CM

## 2025-02-15 RX ORDER — ONDANSETRON 8 MG/1
8 TABLET, ORALLY DISINTEGRATING ORAL EVERY 8 HOURS PRN
Qty: 20 TABLET | Refills: 0 | Status: SHIPPED | OUTPATIENT
Start: 2025-02-15 | End: 2025-02-22

## 2025-02-17 ENCOUNTER — HOSPITAL ENCOUNTER (OUTPATIENT)
Dept: RADIOLOGY | Facility: CLINIC | Age: 25
Discharge: HOME | End: 2025-02-17
Payer: COMMERCIAL

## 2025-02-17 DIAGNOSIS — C71.9 PILOCYTIC ASTROCYTOMA (MULTI): Primary | ICD-10-CM

## 2025-02-17 DIAGNOSIS — C71.9 PILOCYTIC ASTROCYTOMA (MULTI): ICD-10-CM

## 2025-02-17 DIAGNOSIS — G93.89 BRAIN MASS: ICD-10-CM

## 2025-02-17 PROCEDURE — 2550000001 HC RX 255 CONTRASTS: Mod: SE | Performed by: PSYCHIATRY & NEUROLOGY

## 2025-02-17 PROCEDURE — 70553 MRI BRAIN STEM W/O & W/DYE: CPT

## 2025-02-17 PROCEDURE — 70553 MRI BRAIN STEM W/O & W/DYE: CPT | Performed by: RADIOLOGY

## 2025-02-17 PROCEDURE — A9575 INJ GADOTERATE MEGLUMI 0.1ML: HCPCS | Mod: SE | Performed by: PSYCHIATRY & NEUROLOGY

## 2025-02-17 RX ORDER — GADOTERATE MEGLUMINE 376.9 MG/ML
14 INJECTION INTRAVENOUS
Status: COMPLETED | OUTPATIENT
Start: 2025-02-17 | End: 2025-02-17

## 2025-02-17 RX ADMIN — GADOTERATE MEGLUMINE 14 ML: 376.9 INJECTION INTRAVENOUS at 10:40

## 2025-02-18 ENCOUNTER — OFFICE VISIT (OUTPATIENT)
Dept: PALLIATIVE MEDICINE | Facility: HOSPITAL | Age: 25
End: 2025-02-18
Payer: COMMERCIAL

## 2025-02-18 ENCOUNTER — HOSPITAL ENCOUNTER (OUTPATIENT)
Dept: RADIOLOGY | Facility: HOSPITAL | Age: 25
Discharge: HOME | End: 2025-02-18
Payer: COMMERCIAL

## 2025-02-18 ENCOUNTER — OFFICE VISIT (OUTPATIENT)
Dept: HEMATOLOGY/ONCOLOGY | Facility: HOSPITAL | Age: 25
End: 2025-02-18
Payer: COMMERCIAL

## 2025-02-18 VITALS
BODY MASS INDEX: 24.26 KG/M2 | DIASTOLIC BLOOD PRESSURE: 67 MMHG | OXYGEN SATURATION: 100 % | TEMPERATURE: 97 F | RESPIRATION RATE: 17 BRPM | HEART RATE: 108 BPM | SYSTOLIC BLOOD PRESSURE: 117 MMHG | WEIGHT: 141.31 LBS

## 2025-02-18 DIAGNOSIS — K58.1 IRRITABLE BOWEL SYNDROME WITH CONSTIPATION: ICD-10-CM

## 2025-02-18 DIAGNOSIS — R53.0 NEOPLASTIC MALIGNANT RELATED FATIGUE: ICD-10-CM

## 2025-02-18 DIAGNOSIS — L27.0 ACNEIFORM DRUG ERUPTION: Primary | ICD-10-CM

## 2025-02-18 DIAGNOSIS — L70.8 ACNEIFORM RASH: ICD-10-CM

## 2025-02-18 DIAGNOSIS — K59.09 CHRONIC CONSTIPATION: ICD-10-CM

## 2025-02-18 DIAGNOSIS — R06.02 SHORTNESS OF BREATH: ICD-10-CM

## 2025-02-18 DIAGNOSIS — C71.9 PILOCYTIC ASTROCYTOMA (MULTI): ICD-10-CM

## 2025-02-18 DIAGNOSIS — R63.0 LOSS OF APPETITE: ICD-10-CM

## 2025-02-18 DIAGNOSIS — C71.9 PILOCYTIC ASTROCYTOMA (MULTI): Primary | ICD-10-CM

## 2025-02-18 DIAGNOSIS — R11.0 NAUSEA: ICD-10-CM

## 2025-02-18 DIAGNOSIS — G93.89 BRAIN MASS: ICD-10-CM

## 2025-02-18 DIAGNOSIS — T45.1X5D ADVERSE EFFECT OF CHEMOTHERAPY, SUBSEQUENT ENCOUNTER: ICD-10-CM

## 2025-02-18 DIAGNOSIS — Z51.5 PALLIATIVE CARE ENCOUNTER: ICD-10-CM

## 2025-02-18 PROCEDURE — 99417 PROLNG OP E/M EACH 15 MIN: CPT | Performed by: PSYCHIATRY & NEUROLOGY

## 2025-02-18 PROCEDURE — 99214 OFFICE O/P EST MOD 30 MIN: CPT | Performed by: NURSE PRACTITIONER

## 2025-02-18 PROCEDURE — 99214 OFFICE O/P EST MOD 30 MIN: CPT | Mod: 25 | Performed by: NURSE PRACTITIONER

## 2025-02-18 PROCEDURE — 99215 OFFICE O/P EST HI 40 MIN: CPT | Performed by: PSYCHIATRY & NEUROLOGY

## 2025-02-18 PROCEDURE — 99215 OFFICE O/P EST HI 40 MIN: CPT | Mod: 25 | Performed by: PSYCHIATRY & NEUROLOGY

## 2025-02-18 PROCEDURE — 71046 X-RAY EXAM CHEST 2 VIEWS: CPT

## 2025-02-18 PROCEDURE — 71046 X-RAY EXAM CHEST 2 VIEWS: CPT | Performed by: RADIOLOGY

## 2025-02-18 PROCEDURE — 1036F TOBACCO NON-USER: CPT | Performed by: NURSE PRACTITIONER

## 2025-02-18 RX ORDER — INFANT FORMULA WITH IRON
1 POWDER (GRAM) ORAL AS NEEDED
Status: CANCELLED | OUTPATIENT
Start: 2025-02-18

## 2025-02-18 RX ORDER — ONDANSETRON 8 MG/1
8 TABLET, ORALLY DISINTEGRATING ORAL EVERY 8 HOURS PRN
Qty: 30 TABLET | Refills: 1 | Status: SHIPPED | OUTPATIENT
Start: 2025-02-18 | End: 2025-03-10

## 2025-02-18 RX ORDER — HYDROCORTISONE 25 MG/G
CREAM TOPICAL
Qty: 28 G | Refills: 0 | Status: SHIPPED | OUTPATIENT
Start: 2025-02-18 | End: 2025-02-20 | Stop reason: ALTCHOICE

## 2025-02-18 RX ORDER — OLANZAPINE 5 MG/1
5 TABLET ORAL NIGHTLY
Qty: 30 TABLET | Refills: 2 | Status: SHIPPED | OUTPATIENT
Start: 2025-02-18 | End: 2025-05-19

## 2025-02-18 RX ORDER — CLINDAMYCIN PHOSPHATE 10 UG/ML
LOTION TOPICAL
Qty: 60 ML | Refills: 0 | Status: SHIPPED | OUTPATIENT
Start: 2025-02-18

## 2025-02-18 RX ORDER — METHYLPHENIDATE HYDROCHLORIDE 5 MG/1
5 TABLET ORAL DAILY
Qty: 30 TABLET | Refills: 0 | Status: SHIPPED | OUTPATIENT
Start: 2025-02-18 | End: 2025-03-20

## 2025-02-18 ASSESSMENT — PAIN SCALES - GENERAL: PAINLEVEL_OUTOF10: 0-NO PAIN

## 2025-02-18 NOTE — PROGRESS NOTES
Clinical photos entered of patient's worsening acneiform/papulopustular rash, unrelieved by topical hydrocortisone and clindamycin. Patient has NOT tried doxycycline yet due to concern that it will exacerbate her GI issues and make her feel worse; however, she is willing to try after Dr. Aguilar spoke to her in clinic today. Urgent referral to [facility] placed -- Dr. Patel, who performed the derm e-consult, made aware of situation. The June appointment with general dermatology should be canceled.    Rx sent for vitamin A&D cream per patient/mother request. It appears as though this may not be covered by her insurance -- will let patient know.    Dr. Aguilar ordered STAT CXR today to check shunt and lungs d/t patient's reported SOB in hot showers or walking long distances ongoing since after shunt revision. He is planning to discuss with Dr. Segal tomorrow to address patient's concern about VPS tubing causing this symptom.    Will contact pharmacy about albuterol Rx that Sejal stated was not able to be filled -- will let patient know what we find out.    Tovorafenib Treatment Plan updated with new 500 mg once weekly dosing -- dose was reduced from 600 mg once weekly due to the Grade 2 acneiform rash.

## 2025-02-18 NOTE — PROGRESS NOTES
SUPPORTIVE AND PALLIATIVE ONCOLOGY OUTPATIENT FOLLOW-UP      SERVICE DATE: 1/23/2025    Medical Oncologist: Migue Aguilar MD    Radiation Oncologist: No care team member to display  Primary Physician: Melba Graham  [phone]    Subjective   HISTORY OF PRESENT ILLNESS: Sejal Duff is a 24 y.o. 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      Patient returned to the [facility] ED 11/28 with worsening HA, light sensitivity, CTH incr vents, MRI incr ventricular mass 2.7x2.5cm, ventriculomegaly, trace R ventricular GRE. Patient admitted to neurosurgery service for further management.      11/28/24 Ophthalmology evaluation without papilledema.  12/1/24 s/p R VPS (certas at 5).   12/2/24 CTH with catheter in position, decreased vents   12/4/24 CTH decreased vents but persistent ventriculomegly; Certas dialed to 4  12/5/24 CTH decreased vents.  Continued headaches Neurology consulted--> likely due to underlying structural mass and recent surgical interventions.  Started on scheduled oral headache cocktail meds of Robaxin, Tylenol, reglan and Benadryl  12/6/24 Ophthalmology consulted for c/o vision changes--> normal exam, no disc edema  Neurology--> HA's migrainous in nature, has had prior and now worse in setting of underlying structural mass and recent VPS surgery.  Started on preventative therapy with amitriptyline 12.5 mg  bedtime for 1 week and increase to 25mg if tolerates and abortive therapy with Rizatriptan PRN  12/29/24 had a subsequent VPS revision     2/18/24: Has c/o full body acneiform rash . Started in December on face but spread to back/chest/hands/legs within the last couple weeks. She did not start doxy per E consult. Has derm appt but not until June.     Symptom Assessment:  Pain:a little - general body aches - tylenol as needed effective   Numbness or Tingling in hands/feet/other: a little - bilateral toes - very mild and fleeting   Sore Muscles/Spasms: a little -  flexeril PRN.  Headache: a little - controlled with oxycodone and ibuprofen  generally morning and night  Dizziness:none  Constipation: a little - chronic in nature, on Linzess and Motegrity from GI provider which she is taking on Fridays- when she takes these every day they make her feel sick and they do not work  Diarrhea: none  Nausea: a little - much better with olanzapine   Vomiting: none  Lack of Appetite: a little   Weight Loss: none  Taste changes: none  Dry Mouth: none  Pain in Mouth/Swallowing: none  Lack of Energy: somewhat - feels very fatigued especially when on treatment, she notes she will not get out of bed for days and has not been able to work - better with methylphenidate as needed   Difficulty Sleeping: none - much better with olanzapine   Worrying: none  Anxiety: none  Depression: none  Shortness of breath: none  Other: none      Information obtained from: chart review and interview of patient  ______________________________________________________________________     Patient's Oncology History documentation       Oncology History   Pilocytic astrocytoma (Multi)   10/15/2024 Surgery     Biopsy of hypothalamic lesion by Dr. Segal at [facility]       10/15/2024 Pathology      A.  B.  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  See note     Note:  Microscopic examination  for parts A and B reveal a low-grade glial neoplasm immunoreactive with OLIG-2 and GFAP.  Neurofilament immunohistochemical stain highlights neuropil and scattered neuronal bodies.  ATRX and [id number] immunohistochemical stains are retained.  IDH1 and BRAF immunohistochemical stains are negative.  GLIOSEQ studies reveal a BRAF-V600E mutation.  This finding in conjunction with the morphology are most consistent with a pilocytic astrocytoma.  However, a ganglioglioma among other low-grade glial/glial neuronal neoplasms, cannot be completely ruled out.  Clinical correlation is recommended               Medical History        Past Medical History:   Diagnosis Date    Encounter for screening for infections with a predominantly sexual mode of transmission 03/02/2020     Screening for STD (sexually transmitted disease)    Melena 04/12/2019     Blood in stool    Other specified health status       No pertinent past medical history    Other specified health status       No pertinent past surgical history    Personal history of other (healed) physical injury and trauma       History of sprain of ankle    Unspecified condition associated with female genital organs and menstrual cycle 03/02/2020     Cervical motion tenderness         Surgical History         Past Surgical History:   Procedure Laterality Date    BREAST SURGERY Left       Mass removal    OTHER SURGICAL HISTORY   01/18/2019     No history of surgery         Family History          Family History   Problem Relation Name Age of Onset    Asthma Mother        Colon cancer Mother                SOCIAL HISTORY  Social History:  reports that she has never smoked. She has never been exposed to tobacco smoke. She has never used smokeless tobacco. She reports current alcohol use. She reports current drug use. Drug: Marijuana.  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 No children. Single. Primary support is her mom.       Objective     Creatinine   Date Value Ref Range Status   02/06/2025 0.44  "(L) 0.50 - 1.05 mg/dL Final     AST   Date Value Ref Range Status   02/06/2025 34 9 - 39 U/L Final     ALT   Date Value Ref Range Status   02/06/2025 29 7 - 45 U/L Final     Comment:     Patients treated with Sulfasalazine may generate falsely decreased results for ALT.     Hemoglobin   Date Value Ref Range Status   02/06/2025 9.6 (L) 12.0 - 16.0 g/dL Final     Platelets   Date Value Ref Range Status   02/06/2025 376 150 - 450 x10*3/uL Final       PHYSICAL EXAMINATION   Vital Signs:   Vital signs reviewed      12/31/2024    12:00 AM 12/31/2024     7:37 AM 12/31/2024    12:09 PM 1/14/2025     1:00 PM 2/4/2025     1:46 PM 2/17/2025    10:04 AM 2/18/2025    11:31 AM   Vitals   Systolic 115 107 120  110  117   Diastolic 74 72 80  64  67   BP Location Left arm Left arm Left arm  Left arm     Heart Rate 80 106 112  100  108   Temp 36.2 °C (97.2 °F) 36.5 °C (97.7 °F) 36.7 °C (98.1 °F)  36.4 °C (97.5 °F)  36.1 °C (97 °F)   Resp 16 15 16    17   Height    1.6 m (5' 2.99\")  1.626 m (5' 4\")    Weight (lb)     140 148 141.32   BMI    26.58 kg/m2 24.81 kg/m2 25.4 kg/m2 24.26 kg/m2   BSA (m2)    1.74 m2 1.68 m2 1.74 m2 1.7 m2   Visit Report     Report    Report  Report    Report       Physical Exam  HENT:      Head: Normocephalic and atraumatic.   Eyes:      Extraocular Movements: Extraocular movements intact.      Conjunctiva/sclera: Conjunctivae normal.   Pulmonary:      Effort: Pulmonary effort is normal.   Abdominal:      General: Abdomen is flat.   Musculoskeletal:         General: Normal range of motion.   Skin:     Findings: Rash present.   Neurological:      General: No focal deficit present.      Mental Status: She is alert.   Psychiatric:         Mood and Affect: Mood normal.         Thought Content: Thought content normal.         ASSESSMENT/PLAN    Pain/Headaches  Pain is: cancer related pain (HA), generalized body aches from treatment  Type: neuropathic  - Continue Oxycodone 5mg [id number] PRN  - Acetaminophen 1000mg " [id number] PRN - MAX 3000mg/24 hours - patient reports this does not help  - Continue Ibuprofen 800mg [id number] PRN  - Amitriptyline 25mg at bed - no longer taking   - Maxalt 10mg once daily PRN - no longer taking  - Flexeril 5mg TID PRN for spasms - prn      Opioid Use  Medication Management:   - OARRS report reviewed with no aberrant behavior; consistent with  prescriptions/records and patient history  - MED 10.  Overdose Risk Score 410.   This has been discussed with patient.   - We will continue to closely monitor the patient for signs of prescription misuse including UDS, OARRS review and subjective reports at each visit.  - No concurrent benzodiazepine use   - I am a provider who either is or has consulted and collaborated with a provider certified in Hospice and Palliative Medicine and have conducted a face-face visit and examination for this patient.  - Routine Urine Drug Screen: 12/11/2024 appropriately + for prescribed medications  - Controlled Substance Agreement: 12/11/2024  - Specifically discussed that controlled substance prescriptions will only be provided by our group as outlined in the completed agreement  - Prescribed naloxone: patient declined  - Red Flags: NA     Constipation  At risk for constipation related to opioids. Chronic concerns of constipation  - Continue Linzess 145mcg and Motegrity 2mg as prescribed by GI     Nausea/Decreased Appetite  - Continue Omeprazole 40mg daily - takes inconsistently   - Continue Ondansetron ODT 8mg [id number] PRN  - Continue Olanzapine 5mg at bed  - Encouraged good hydration  - Encouraged small more frequent meals  - Encouraged continued Ensure 1-2x daily  Nutrition consult 12/11/2024     Anxiety/Sleeping Difficulty  - Amitriptyline 25mg - no longer taking  - Continue Olanzapine 5mg at bed - very helpful      Fatigue  - Continue methylphenidate 5mg dally - taking as needed   - Provigil 100mg - not effective     Advance Directives  Existence of Advance Directives:No  - not interested  Decision maker: Surrogate decision maker is Darby Duff (mom)  Code Status: Full code    Supportive and Palliative Oncology Encounter:  Emotional support provided  Coordination of care  Will continue to follow and address symptoms as needed    Next Follow-Up Visit:  Return to clinic in 1 months     Signature and billing  Medical complexity was moderate level due to due to complexity of problems, extensive data review, and high risk of management/treatment.  Time was spent on the following: Prep Time, Time Directly with Patient/Family/Caregiver, Documentation Time. Total time spent: 30      Data  Diagnostic tests and information reviewed for today's visit:  Most recent labs and imaging results, Medications     Some elements copied from Palliative Care note on 1/23/2025, the elements have been updated and all reflect current decision making from today, 2/18/2025.    Plan of Care discussed with: Patient    SIGNATURE: JASVIR Ahumada-CNP    Contact information:  Supportive and Palliative Oncology  Monday-Friday 8 AM-5 PM  Phone:  [phone], press option #5, then option #1.   Or Epic Secure Chat

## 2025-02-18 NOTE — PATIENT INSTRUCTIONS
Supportive Oncology and Palliative Care (pain and symptom management)   [phone]  Option 5 then Option 1 or MyChart message (preferred)     Information regarding narcotic/opioid prescriptions:   - For refills, please notify our team during business hours 2-3 days before running out as we can only provide you a maximum of a 30 day supply at a time. Refills can't be  automatically added to the prescription. You must notify us when you are due for a refill.   - Early refills are not permitted. For example, if you are prescribed a 30 day supply, the pharmacy will typically allow you to  your new prescription 1-2 days early (day 28 or 29) but no earlier.   - If your pain is uncontrolled, please notify our team ASAP for medication adjustment. Otherwise, you must take medication as prescribed.   - We encourage you to call your pharmacy after a prescription is sent to ensure the medication is in stock. If out of stock, this will allow time for the medication to be ordered. Alternatively, medications can be sent to another pharmacy that has the medication in stock.   - Please notify us if you will be traveling out of state as prescriptions can't be sent to an out of state pharmacy. We can typically provide an early refill if this the case.   - A lock box is recommended for medication storage. Otherwise, please keep medications out of reach of children, pets, etc.   - Naloxone (Narcan) nasal spray will be offered to you. This is a safety precaution in case of accidentally taking too much medication. The medication is safe if taken as prescribed.   - You are required to be seen in person for an office visit at a minium of every 90 days.   - You are required to submit a urine drug screen and sign a controlled substance agreement yearly.

## 2025-02-19 ENCOUNTER — TELEPHONE (OUTPATIENT)
Dept: ADMISSION | Facility: HOSPITAL | Age: 25
End: 2025-02-19

## 2025-02-19 ENCOUNTER — TELEPHONE (OUTPATIENT)
Dept: HEMATOLOGY/ONCOLOGY | Facility: HOSPITAL | Age: 25
End: 2025-02-19

## 2025-02-19 ENCOUNTER — TUMOR BOARD CONFERENCE (OUTPATIENT)
Dept: HEMATOLOGY/ONCOLOGY | Facility: HOSPITAL | Age: 25
End: 2025-02-19
Payer: COMMERCIAL

## 2025-02-19 DIAGNOSIS — R06.02 SHORTNESS OF BREATH: Primary | ICD-10-CM

## 2025-02-19 DIAGNOSIS — C71.9 PILOCYTIC ASTROCYTOMA (MULTI): ICD-10-CM

## 2025-02-19 PROBLEM — T45.1X5A ADVERSE EFFECT OF CHEMOTHERAPY: Status: ACTIVE | Noted: 2025-02-19

## 2025-02-19 ASSESSMENT — ENCOUNTER SYMPTOMS
APPETITE CHANGE: 1
NAUSEA: 1

## 2025-02-19 NOTE — TELEPHONE ENCOUNTER
Neuro-Oncology  Phone call with patient/family    Spoke to Sejal and her mom.    Advised that tumor board discussed her case this morning and the recommendation is to continue the chemo as long as her tumor continues shrinking. Once it stops shrinking (i.e. if we see stability across consecutive MRIs), then we can discuss more surgery (maybe with the skull base neurosurgeons) and/or radiation; we are aware patient is reluctant to undergo any radiation. Reiterated plan for continuing chemo indefinitely and reassessing in 3-month intervals with new MRIs.    Advised of the importance to get her rash under control so that we can continue this chemo without any treatment interruptions or need for discontinuation. Patient has not yet picked up the doxycycline, so she did not yet try Zofran prior to the doxycycline administration as recommended by Dr. Aguilar yesterday. She sees Dr. Patel (Onco-Derm) tomorrow.    Regarding her shortness of breath with hot showers or with walking long distances, I advised that the neurosurgeons did not think the  shunt is playing a role. Advised that the tubing looked good on the CXR and thus should not be interfering with her chest cavity; CXR was also clear of pneumonia or pneumothorax. Advised she may have a light form of asthma that is exacerbated and/or changing over time. She stated it is difficult to get in with her PCP sometimes and she would prefer to have a referral to Pulmonology for further evaluation -- order placed given patient's increased need for inhalers and breathing treatments over the past 2 months. (Also verified with patient's pharmacy that inhaler was filled on 2/4/25.)    Patient and mom verbalized understanding of discussion.    Addendum by Deborah Snowden PA-C 02/19/25 5:32 PM  Updated Dr. Aguilar -- we will also recheck her hemoglobin next month when she has a check-in visit with me (order placed for FUV). On review of labs, her hemoglobin  appears to be low at baseline (around 9-11 2 years ago).

## 2025-02-19 NOTE — TUMOR BOARD NOTE
CNS Tumor Board Recommendations       Patient was presented by Dr. Migue Aguilar at our CNS Tumor Board on 02/19/2025 which included representatives from Radiation oncology, Surgical oncology, Neuro-oncology, Pathology, Radiology, Research, Neurosurgery, Social Work (Neurosurgery).     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 CSF analysis negative. S/p R stereotactic brain biopsy 10/14/24. 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 Papilledema on optho evaluation now s/p shunt placement. On BRAF inhibitor.     MRI 02/17/25: Lesion has mildly decreased in size with improvement in edema. Spinal imaging negative for drop mets.     The CNS Tumor Board tumor board considered available treatment options and made the following recommendations: Continue with BRAF inhibitor therapy. Surgery not recommended at this time due to lesion decreasing in size.      Clinical Trial Status: N/A     National site-specific guidelines were discussed with respect to the case.

## 2025-02-19 NOTE — PROGRESS NOTES
[facility]  Neuro-Oncology    Patient: Sejal Duff  MRN: [id number]  Date of visit: 02/19/25  Visit type: In-person clinic visit  Clinician: Migue Aguilar MD    Oncological & Treatment History:  Oncology History   Pilocytic astrocytoma (Multi)   10/2024 Initial Diagnosis    23 yo F with PMH of anemia, asthma, GERD, IBS, PCOS, and breast fibroadenoma s/p L breast excisional biopsy presented to [facility] ED on 10/14/24 with headache. CTH parasellar mass with calcifications. MRI with 2.4 cm peripherally enhancing necrotic mass with non-enhancing FLAIR hyperintense lesion in medial L temporal lobe. Admitted for neurosurgical management.     10/15/2024 Biopsy    R stereotactic brain biopsy  Surgeon: Den Segal MD    Prelim path: Glial cells with luigi fibers vs craniopharyngioma     10/23/2024 Tumor Board    Procedure: R stereotactic brain biopsy 10/15/2024   Pathology: Final pending; likely to be a glioma, areas of necrosis noted.       The CNS Tumor Board tumor board considered available treatment options and made the following recommendations: Radiation oncology referral. Add back to CNS TB once pathology is completed.     11/5/2024 Pathology     A.  B.  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  See note     Note:  Microscopic examination for parts A and B reveal a low-grade glial neoplasm immunoreactive with OLIG-2 and GFAP.  Neurofilament immunohistochemical stain highlights neuropil and scattered neuronal bodies.  ATRX and X1Q12ti immunohistochemical stains are retained.  IDH1 and BRAF immunohistochemical stains are negative.  GLIOSEQ studies reveal a BRAF-V600E mutation.  This finding in conjunction with the morphology are most consistent with a pilocytic astrocytoma.  However, a ganglioglioma among other low-grade glial/glial neuronal neoplasms, cannot be completely ruled out.  Clinical correlation is recommended      11/6/2024 Tumor Board    CSF analysis negative.   Pathology:  Low grade glial neoplasm - BRAF - V600E mutation      The CNS Tumor Board tumor board considered available treatment options and made the following recommendations: Repeat MRI brain w/wo and perfusion study. Sellar imaging. Continue following with neuro-oncology. Radiation oncology referral.      11/29/2024 - 12/6/2024 Hospital Admission    Discharge diagnosis: Hydrocephalus    Returned to the [facility] ED 11/28/24 with worsening HA, light sensitivity, CTH incr vents, MRI incr ventricular mass 2.7x2.5cm, ventriculomegaly, trace R ventricular GRE. Patient admitted to neurosurgery service for further management.    11/28 Ophthalmology evaluation without papilledema.  12/1 s/p R VPS (certas at 5) -- Dr. Yap at [facility]  12/2 CTH with catheter in position.      12/29/2024 - 12/31/2024 Hospital Admission    Discharge diagnosis: Hydrocephalus    Returned to the [facility] ED on 12/28/24 with generalized fatigue, HA and body aches. CT head with increased ventricles, SS Certas at 4, LLQ cath with turn vs possible kink, CT AP catheter without kink or discontinuity, no pseudocyst. Patient admitted to the neurosurgery service for further management/workup.    12/28 s/p shunt tap with no proximal flow.   12/29 s/p proximal shunt revision, distal cath flushing. CT head post op with decreased ventricles, catheter in position.         HPI:  Sejal Duff is a 24 y.o. 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      Interval History (NEWEST at BOTTOM of section):  10/29/24: Today she reports some slight headaches but not worsening.  She is currently on dexamethasone 2 mg twice daily.  She takes the morning dose around 9 or 10 AM and the evening dose  before bedtime.  She takes ondansetron about 1 hour prior to the dexamethasone.  She endorses significant issues with vomiting after taking the dexamethasone as well as decreased appetite.  She is able to drink enough fluids.  She states that she tried taking 8 mg of ondansetron prior to the dexamethasone but this did not help either.  She states that the dexamethasone makes her very jittery.  She has not taken dexamethasone for the last 2 days.  She continues to have constipation which is her baseline where she does not have a bowel movement for weeks.  She has been referred by her gastroenterologist to integrative medicine for further help with bowel movements.  Otherwise denies any worsening of vision or new gait or balance issues.  11/8/24: Continues to have some headaches but not worse. She has decreased dexamethasone to 1mg bid on her own and she denies any worsening of her headaches. Denies new balance or strength issues.   11/19/2024: Her headaches are about the same intensity.  Denies new nausea/vomiting episodes.  Denies new gait or balance changes.  Denies new vision changes.   11/26/2024: Sejal states her headaches are worse.  She has not been able to sleep much.  Denies new vision problems.  Denies new weakness or gait balance/issues.  Denies new episodes of nausea or vomiting.  She is upset at the MRI spine being canceled recently.    01/16/25: Since the last visit, Sejal underwent  shunt placement for hydrocephalus (12/1/24) and a subsequent VPS revision (12/29/24). She also started tovorafenib on 12/21/24 and has been taking it once weekly as directed on Saturdays. Endorses significant fatigue and body aches for 2-3 days after taking tovorafenib. Endorses a new acne-like rash to her lower face that started with facial tenderness after her 2nd or 3rd dose of tovorafenib; rash is spreading to her nasolabial folds. States she washes her face with warm water and applies Aquaphor to the affected areas.  "She also reports intermittent episodes over the past 3 weeks where she feels overheated, lightheaded, and needs to utilize her inhaler; states it is similar to asthma flares; denies anxiety precipitating episodes. Also reports persistent nausea since her last surgery. States even water makes her feel nauseated. Feels like she's going to vomit, but it \"sits in [her] chest\" and burns. States she takes Zofran \"like it's candy.\" Denies drinking caffeine/carbonated drinks, fried/spicy foods, and lying down within 30 min of eating. States she hasn't picked up Olanzapine and doesn't take omeprazole as she is wary of being on many medications.    02/04/25: Saw patient in clinic for an impromptu visit regarding her worsening acneiform rash after patient saw [facility] today. No improvement in symptoms since using topical hydrocortisone 2.5% and clindamycin 1%. Now has some similar lesions on her chest. Pictures entered into chart. Additionally, patient reports a nosebleed with clots yesterday that resolved after about 15 minutes. She also has questions about medications -- states she was too fatigued the day after olanzapine and wants to clarify which meds she should/should not be taking. Her mother is present as well -- mother notes family history of cancer, which contributes to her concerns about patient's diagnosis/treatment.    2/18/2025: She denies any visual changes or headaches.  Does endorse some fatigue.  Feels her acneform rash is worsening and has spread more through her torso and back region.  It is itching which prompts her to scratch and then it bleeds.  Also notices some pustular appearing lesions by her wrist and on her feet.  Has been using the clindamycin and hydrocortisone topical lotions.  She has not taken the oral doxycycline as prescribed due to fear of nausea which will keep her from eating.  She feels that her appetite is already bad from her baseline nausea issues.  Also endorses some shortness " of breath when doing normal walking like from the parking garage to the clinic.  Denies any dyspnea at rest.  Denies chest pain or lower extremity edema.  She has asthma at baseline but says that normally she uses a nebulizer or inhaler only when she is sick with a respiratory infection which exacerbates her asthma.  But she has been needing to use her inhaler and nebulizer more often now.  States her menstrual periods have not returned to baseline since the most recent hospitalization.  Normally she has a quite regularly once monthly.  Her last menstrual period was in November 2024.    Review of Systems:  Review of Systems   Constitutional:  Positive for appetite change.   Gastrointestinal:  Positive for nausea.   Skin:  Positive for rash.       Social History:  Social History     Tobacco Use    Smoking status: Never     Passive exposure: Never    Smokeless tobacco: Never   Vaping Use    Vaping status: Never Used   Substance Use Topics    Alcohol use: Yes     Comment: social    Drug use: Yes     Types: Marijuana   Occupation: [employer]  Currently working [employer] Tuesday-Friday    Past Medical History:  Past Medical History:   Diagnosis Date    Encounter for screening for infections with a predominantly sexual mode of transmission 03/02/2020    Screening for STD (sexually transmitted disease)    Melena 04/12/2019    Blood in stool    Other specified health status     No pertinent past medical history    Other specified health status     No pertinent past surgical history    Personal history of other (healed) physical injury and trauma     History of sprain of ankle    Unspecified condition associated with female genital organs and menstrual cycle 03/02/2020    Cervical motion tenderness       Past Surgical History:  Past Surgical History:   Procedure Laterality Date    BREAST SURGERY Left     Mass removal    OTHER SURGICAL HISTORY  01/18/2019    No history of surgery       Family History:  Family History    Problem Relation Name Age of Onset    Asthma Mother      Colon cancer Mother         Performance Score:  Karnofsky Score: 90 - Able to carry on normal activity; minor signs or symptoms of disease     Vital Signs:  There were no vitals taken for this visit. -- vitals taken earlier for other clinic visit today    Physical Exam:  Physical Exam  Constitutional:       General: She is not in acute distress.     Appearance: She is not toxic-appearing.   Abdominal:      Comments: Slight abdominal tenderness in all quadrants. No rebound tenderness.    Skin:     Findings: Rash: worsening acneiform rash involving bilateral nasolabial folds, lower cheeks, perioral area, and chin. Scattered macules to chest. See pictures below..      Comments: Now involving upper back. Scratch marks and erythema noted. No pustular discharge.      Neurological:      General: No focal deficit present.      Mental Status: She is alert and oriented to person, place, and time.      Cranial Nerves: No cranial nerve deficit.         Results:  Lab Results   Component Value Date    WBC 6.2 02/06/2025    HGB 9.6 (L) 02/06/2025    HCT 30.5 (L) 02/06/2025    MCV 83 02/06/2025     02/06/2025       Lab Results   Component Value Date    GLUCOSE 83 02/06/2025    CALCIUM 8.7 02/06/2025     02/06/2025    K 3.2 (L) 02/06/2025    CO2 25 02/06/2025     02/06/2025    BUN 8 02/06/2025    CREATININE 0.44 (L) 02/06/2025       Lab Results   Component Value Date    ALT 29 02/06/2025    AST 34 02/06/2025    ALKPHOS 68 02/06/2025    BILITOT 0.9 02/06/2025       MRI Brain 2/17/25 (radiology read)  1. Decreased size of rim enhancing hypothalamic mass compatible with  known pilocytic astrocytoma.  2. Postsurgical changes of right posterior ventriculostomy shunt with  tip ending within the ventricular system. When compared to MR dated  11/28/2024 and interval CT of 12/29/2024, there has been significant  resolution of the dilation of the lateral  ventricles.    MRI C-spine 2/4/25 (radiology read)  MRI of the cervical spine is within normal limits.     MRI T-spine 2/4/25 (radiology read)  Normal exam     MRI L-spine 2/4/25 (radiology read)  MRI of the lumbar spine is within normal limits.       === 11/28/24 ===    MR BRAIN W AND WO CONTRAST    Addendum 11/28/2024  3:18 PM -----------------------------------------------  Interpreted By:  Saravanan Flores,  ADDENDUM:  As described in the body of the report, there also new susceptibility  artifact in the dependent portion of the right lateral ventricle  concerning for blood products.    Signed by: Saravanan Flores 11/28/2024 3:18 PM    -------- ORIGINAL REPORT --------  Dictation workstation:   [id number]    - Impression -  1. Interval enlargement hypothalamic-based necrotic mass (2.7 x 2.6 x  2.5 cm vs 2.3 x 2 x 2.4 cm) resulting in new acute obstructive  hydrocephalus at the level of the foramina of [place] causing  significantly increased dilatation of the lateral ventricles (right >  left) and new interstitial edema in the periventricular white matter.  Urgent neurosurgical consultation recommended.    2. New diffuse bilateral sulcal effacement and significantly  increased downward mass effect on the optic chiasm, bilateral ventral  medial thalami, and to a lesser extent the ventral midbrain.    3. Slight increased size of nonenhancing T2/FLAIR hyperintense lesion  in the medial left temporal lobe measuring 1.3 cm x 0.8 cm.    MACRO:  Saravanan Flores discussed the significance and urgency of this  critical finding by telephone with EVONNE ANGELES and DR RADER  (neurosurgery) on 11/28/2024 at 3:09 pm.  (**-RCF-**) Findings:  See  findings.    Signed by: Saravanan Flores 11/28/2024 3:15 PM  Dictation workstation:   [id number]      Assessment & Plan:  Sejal Duff is a 24 y.o. 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 She also developed hydrocephalus and is s/p VPS (12/1/24) with revision (12/29/24). She started on tovorafenib (type II JUAN CARLOS inhibitor) on 12/21/24.      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  MRI spine from 2//2025 does not show evidence of any drop metastasis/disseminated disease.  - Discussed with patient and mother that it appears the treatment is working and the tumor is responding to it.  As such would recommend continuing treatment, especially as long as the tumor size keeps decreasing.  - Case also reviewed along with imaging at tumor board on 2/19/2025.  Consensus that so long as tumor keeps decreasing in size on treatment this should be continued and though surgical intervention or radiation can be considered this should wait until tumor sizes at least plateaued while on treatment.    - Treatment: Tovorafenib 600 mg PO once weekly (380 mg/m2 x BSA)  -Planned length of treatment will be for as long as tumor is decreasing in size and/or stable.  Labs on or within 48 hours of day 1 of each cycle to monitor for toxicities  Due to worsening acneform rash will decrease dose to 500 mg p.o. once weekly.  She has enough for 3 more weeks.  Instructed her to take 5 tablets of the 100 mg tovorafenib each week.  Tempus: Will plan to obtain if patient amenable for further genetic evaluation  MRI brain w/wo contrast every 2-3 months to assess treatment response    #Acneiform rash  -Tried topical clindamycin 1% lotion to whole face after washing face with lukewarm water, then followed with 2.5% hydrocortisone cream to  affected areas twice daily for 2 weeks  -Dermatology then recommended oral doxycycline the patient has not started taking it due to her fear of it causing nausea and vomiting and decreasing her p.o. intake.  She has a longstanding history with nausea and GI issues including IBS.  - Discussed that since the treatment is working ideally would like to continue this but we need to bring the skin side effects under control.  Advised to try the doxycycline about 30 to 40 minutes after taking Zofran to see if this will help with any nausea/vomiting.  Patient is reluctant to do this but will try.  - Consult placed to onco-dermatology for further evaluation and recommendations    #Epistaxis, resolved  -1 episode 2/3/25, lasted ~15 min  -monitor     #Shortness of breath  - Two-view chest x-ray on 2/18/2025 did not reveal any infectious process or pneumothorax  - Symptoms and case were reviewed at CNS tumor board on 2/19/2025 with consensus that the  shunt would not be causing these symptoms  - She does have a history of asthma.  Will advise her to start evaluation with PCP to see if medications need to be changed or if she needs to be referred to pulmonology for further evaluation.    #Chronic constipation/IBS  - This has been a longstanding issue.  Unclear if this is all related to her lesion in the hypothalamus.  If that is the case then would expect some improvement as the tumor size decreases.  - Continue Linzess and Motegrity as prescribed by gastroenterology and continue follow-up with gastroenterology team.    #Oligomenorrhea  - States that she has not had a menstrual period since November 2024 prior to her admission.  Normally it is quite regular every month.  - Will monitor for now.  Did discuss that sometimes things like surgical intervention can disrupt menstrual cycle.  Her hormonal levels were within normal limits on initial testing however we will retest if this is not resume.    #Anxiety  - Monitor.  Has been  referred to [facility]YA program to discuss resources available.    #Health maintenance  -Sent refill on albuterol inhaler as patient has not been able to get a refill and is almost out -- future refills should go to managing provider (pt may need appt before they will refill). States they are unable to pick it up and might need prior authorization. Will investigate.    #Follow-up  - Continue monthly visits with neuro-oncology PA during chemo treatment  - Plan for MRI brain with and without contrast and office visit with myself in approximately 8 weeks  - Call return sooner should new or worsening neurological deficits occur      Orders Placed This Encounter   Procedures    XR chest 2 views    MR brain w and wo IV contrast     I personally spent 60 minutes today, exclusive of procedures, providing care for this patient, including preparation, face to face time, documentation and other services such as review of medical records, diagnostic result, patient education, counseling, coordination of care as specified in the encounter.        Migue Aguilar MD  Neuro-oncology

## 2025-02-19 NOTE — TELEPHONE ENCOUNTER
[facility] calling to advise new PA needed for new dosing of   Tavorafenib 100mg- 5 tablets one time per week.

## 2025-02-20 ENCOUNTER — OFFICE VISIT (OUTPATIENT)
Dept: DERMATOLOGY | Facility: CLINIC | Age: 25
End: 2025-02-20
Payer: COMMERCIAL

## 2025-02-20 DIAGNOSIS — L30.9 DERMATITIS: Primary | ICD-10-CM

## 2025-02-20 DIAGNOSIS — L27.0 DRUG ERUPTION: ICD-10-CM

## 2025-02-20 PROCEDURE — 99214 OFFICE O/P EST MOD 30 MIN: CPT | Performed by: DERMATOLOGY

## 2025-02-20 PROCEDURE — 11104 PUNCH BX SKIN SINGLE LESION: CPT | Performed by: DERMATOLOGY

## 2025-02-20 RX ORDER — HYDROCORTISONE 25 MG/G
OINTMENT TOPICAL 2 TIMES DAILY
Qty: 30 G | Refills: 1 | Status: SHIPPED | OUTPATIENT
Start: 2025-02-20

## 2025-02-20 RX ORDER — TRIAMCINOLONE ACETONIDE 1 MG/G
OINTMENT TOPICAL 2 TIMES DAILY
Qty: 454 G | Refills: 1 | Status: SHIPPED | OUTPATIENT
Start: 2025-02-20

## 2025-02-20 RX ORDER — CLOBETASOL PROPIONATE 0.5 MG/G
OINTMENT TOPICAL 2 TIMES DAILY
Qty: 60 G | Refills: 1 | Status: SHIPPED | OUTPATIENT
Start: 2025-02-20

## 2025-02-20 ASSESSMENT — DERMATOLOGY PATIENT ASSESSMENT
ARE YOU AN ORGAN TRANSPLANT RECIPIENT: NO
ARE YOU TRYING TO GET PREGNANT: NO
DO YOU USE SUNSCREEN: OCCASIONALLY
ARE YOU ON BIRTH CONTROL: NO
HAVE YOU HAD OR DO YOU HAVE VASCULAR DISEASE: NO
HAVE YOU HAD OR DO YOU HAVE A STAPH INFECTION: NO
DO YOU USE A TANNING BED: NO
DO YOU HAVE IRREGULAR MENSTRUAL CYCLES: NO
DO YOU HAVE ANY NEW OR CHANGING LESIONS: YES
WHERE ARE THESE NEW OR CHANGING LESIONS LOCATED: WHOLE BODY

## 2025-02-20 ASSESSMENT — DERMATOLOGY QUALITY OF LIFE (QOL) ASSESSMENT
RATE HOW BOTHERED YOU ARE BY EFFECTS OF YOUR SKIN PROBLEMS ON YOUR ACTIVITIES (EG, GOING OUT, ACCOMPLISHING WHAT YOU WANT, WORK ACTIVITIES OR YOUR RELATIONSHIPS WITH OTHERS): 3
ARE THERE EXCLUSIONS OR EXCEPTIONS FOR THE QUALITY OF LIFE ASSESSMENT: NO
RATE HOW BOTHERED YOU ARE BY SYMPTOMS OF YOUR SKIN PROBLEM (EG, ITCHING, STINGING BURNING, HURTING OR SKIN IRRITATION): 3
RATE HOW EMOTIONALLY BOTHERED YOU ARE BY YOUR SKIN PROBLEM (FOR EXAMPLE, WORRY, EMBARRASSMENT, FRUSTRATION): 3
DATE THE QUALITY-OF-LIFE ASSESSMENT WAS COMPLETED: 67256

## 2025-02-20 ASSESSMENT — ITCH NUMERIC RATING SCALE: HOW SEVERE IS YOUR ITCHING?: 9

## 2025-02-20 ASSESSMENT — PATIENT GLOBAL ASSESSMENT (PGA): PATIENT GLOBAL ASSESSMENT: PATIENT GLOBAL ASSESSMENT:  3 - MODERATE

## 2025-02-20 NOTE — PROGRESS NOTES
Subjective     Sejal Duff is a 24 y.o. female who presents for the following: Rash (Generalized whole body rash that started in December; hx of brain ca October 2024; referred by Dr. Aguilar). History of pilocytic astrocytoma diagnosed 10/15/24. Tovorafenib started 12/21/24.     Acneiform rash to her lower face that started with facial tenderness after her 2nd or 3rd dose of tovorafenib (type II JUAN CARLOS inhibitor) in December; rash spread to nasolabial folds. Tried 2.5% hydrocortisone and 1% clindamycin, no improvement, now on chest.     Review of Systems:  No other skin or systemic complaints other than what is documented elsewhere in the note.    The following portions of the chart were reviewed this encounter and updated as appropriate:   Tobacco  Allergies  Meds  Problems  Med Hx  Surg Hx         Skin Cancer History  No skin cancer on file.      Specialty Problems          Dermatology Problems    Adult acne        Objective   Well appearing patient in no apparent distress; mood and affect are within normal limits.    A focused skin examination was performed. All findings within normal limits unless otherwise noted below.    Assessment/Plan   1. Dermatitis  Left Upper Back  Erythematous patch(es)/plaque(s)                      Skin biopsy - Left Upper Back  Type of biopsy: punch    Informed consent: discussed and consent obtained    Timeout: patient name, date of birth, surgical site, and procedure verified    Procedure prep:  Patient was prepped and draped  Anesthesia: the lesion was anesthetized in a standard fashion    Anesthetic:  1% lidocaine w/ epinephrine 1-100,000 local infiltration  Punch size:  4 mm  Suture size:  4-0  Suture type: Prolene (polypropylene)    Suture removal (days):  14  Hemostasis achieved with: suture    Outcome: patient tolerated procedure well    Post-procedure details: sterile dressing applied and wound care instructions given    Dressing type: bandage and petrolatum       Staff Communication: Dermatology Local Anesthesia: Lidocaine 0.5% with Epinephrine 1;200,000 - Amount: 3 ml - Left Upper Back    Specimen 1 - Dermatopathology- DERM LAB  Differential Diagnosis: Eczema vs papulopustular drug eruption vs lichenoid drug eruption. She is on Tovorafenib (JUAN CARLOS inhibitor) for pilocytic astrocytoma  Check Margins Yes/No?:    Comments:    Dermpath Lab: Routine Histopathology (formalin-fixed tissue)    2. Drug eruption  Small pink papules and bland appearing pustules scattered on chin and medial face. Body with diffuse pink papules and macules coalescing into broader patches and plaques on back, chest, abdomen, arms, and legs. Some of the papules appear follicular but many do not. In the antecubital fossae are thin plaques with cracking of the skin. On the hands and feet are small tense vesicles    Concern for diffuse drug rash. The appearance on the body is more consistent with maculopapular than papulopustular, but the timeline is a little atypical. Lichenoid drug eruption would also be considered. She does have history of atopic dermatitis but never fully body, just in antecubs before, and never this severe    - skin biopsy today to further characterize  - start topical steroids as below  - she prefers to avoid orals if possible, especially doxycycline PO which gave stomach upset when used previously for acne    triamcinolone (Kenalog) 0.1 % ointment  Apply topically 2 times a day. To trunk, arms, and legs for 3 weeks then decrease to 3x per week for maintenance    hydrocortisone 2.5 % ointment  Apply topically 2 times a day. To face for 3 weeks then decrease to 3x per week for maintenance    clobetasol (Temovate) 0.05 % ointment  Apply topically 2 times a day. To hands and feet for 3 weeks then decrease to 3x per week for maintenance    Related Procedures  Follow Up In Dermatology - Established Patient        Follow up 2 weeks for dermatitis and s/r

## 2025-02-21 ENCOUNTER — PATIENT MESSAGE (OUTPATIENT)
Dept: PALLIATIVE MEDICINE | Facility: HOSPITAL | Age: 25
End: 2025-02-21
Payer: COMMERCIAL

## 2025-02-21 ENCOUNTER — APPOINTMENT (OUTPATIENT)
Dept: HEMATOLOGY/ONCOLOGY | Facility: CLINIC | Age: 25
End: 2025-02-21
Payer: COMMERCIAL

## 2025-02-21 DIAGNOSIS — Z98.2 S/P VP SHUNT: ICD-10-CM

## 2025-02-21 DIAGNOSIS — G89.18 ACUTE POST-OPERATIVE PAIN: ICD-10-CM

## 2025-02-21 DIAGNOSIS — R14.3 FLATULENCE: Primary | ICD-10-CM

## 2025-02-24 ENCOUNTER — APPOINTMENT (OUTPATIENT)
Dept: PALLIATIVE MEDICINE | Facility: HOSPITAL | Age: 25
End: 2025-02-24
Payer: COMMERCIAL

## 2025-02-24 LAB
LABORATORY COMMENT REPORT: NORMAL
PATH REPORT.FINAL DX SPEC: NORMAL
PATH REPORT.GROSS SPEC: NORMAL
PATH REPORT.MICROSCOPIC SPEC OTHER STN: NORMAL
PATH REPORT.RELEVANT HX SPEC: NORMAL
PATH REPORT.TOTAL CANCER: NORMAL

## 2025-02-28 ENCOUNTER — TELEMEDICINE (OUTPATIENT)
Dept: HEMATOLOGY/ONCOLOGY | Facility: HOSPITAL | Age: 25
End: 2025-02-28
Payer: COMMERCIAL

## 2025-02-28 DIAGNOSIS — C71.9 PILOCYTIC ASTROCYTOMA (MULTI): Primary | ICD-10-CM

## 2025-02-28 DIAGNOSIS — N91.2 AMENORRHEA: ICD-10-CM

## 2025-02-28 DIAGNOSIS — Z51.5 ENCOUNTER FOR PALLIATIVE CARE: ICD-10-CM

## 2025-02-28 PROCEDURE — 99215 OFFICE O/P EST HI 40 MIN: CPT | Performed by: NURSE PRACTITIONER

## 2025-02-28 ASSESSMENT — ENCOUNTER SYMPTOMS
ABDOMINAL DISTENTION: 1
SLEEP DISTURBANCE: 0
CONSTIPATION: 1
DEPRESSION: 0
NERVOUS/ANXIOUS: 0
APPETITE CHANGE: 1
NAUSEA: 1
FATIGUE: 0
HEADACHES: 1
DECREASED CONCENTRATION: 0

## 2025-02-28 NOTE — PROGRESS NOTES
Patient ID: Sejal Duff is a 24 y.o. female.  Referring Physician: No referring provider defined for this encounter.  Primary Care Provider: Melba Graham MD    Virtual or Telephone Consent    An interactive audio and video telecommunication system which permits real time communications between the patient (HOME) and provider at [facility] was utilized to provide this telehealth service.   Verbal consent was requested and obtained from patient on this date, 02/28/25 for a telehealth visit.    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   -12/01/24 developed hydrocephalus and is s/p  shunt  -12/21/24 D1 tovarefenib (type II JUAN CARLOS inhibitor)   -12/29/24 revision of  shunt    Current treatment plan: curative intent tovarefenib with goal of mass reduction with hope for surgical removal    Subjective    Sejal Duff is a 24 y.o. 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     Returns today in follow up regarding issues unique to young adults with neoplasm.    In the interim since our visit one month ago, she had repeat MRI imaging of her brain that demonstrated improvement in her astrocytoma. She was also dose reduced on the drug from 6mg to 5mg for side effects of fatigue, GI upset and skin rash. She did meet with dermatology who prescribed more potent topical medications and she has noticed improvement.     Main bothersome side effect is her difficulty breathing, worse with heat, worse  with activity - she thinks this is related to her recently placed  shunt. She did undergo chest xray which demonstrated stable placement. 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 Does also note generalized fatigue and low energy. Is limited in doing activity d/t difficulty breathing - thinks she can walk for about a block before she has to stop.     Not currently sexually active, not currently in a relationship. Denies problems with sexual function. LMP was end of October. She has not experienced a period in the time she has been on tovorafenib.     Emotionally she relays accepting of circumstances. Denies anxiety or depression, denies feeling of excessive worry, denies decrease in focus or concentration, still finding enjoyment in activities. Has large family that is very supportive. Mother is primary support that she relies on.     Practically - she is still experiencing financial insecurity. Working reduced number of hours d/t breathing/tolerance. Relays not having FMLA benefits or other resources from  to work with accommodations or restrictions. Did apply for SSD, is awaiting determination. Is interested in financial assistance if available, is interested in food assistance d/t food insecurity.     Strong family history of cancer, colon cancer MGM, and MGF, MGGF breast cancer, MGGM, cancer of unk type. Per patient she also has several great uncles and aunts with cancer on her mothers side.    Social History: Grew up in [place]. Has 20 1/2 siblings from her father, and 1 1/2 sibling from her mother. Currently lives alone in [place]. 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 She enjoys shopping. Never smoker, does not vape. Does use ETOH and marijuana. Email [email or web address]    Review of Systems   Constitutional:  Positive for appetite change. Negative for fatigue.   Gastrointestinal:  Positive for abdominal  distention, constipation and nausea.   Genitourinary:  Positive for menstrual problem. Negative for vaginal bleeding.    Neurological:  Positive for headaches.   Psychiatric/Behavioral:  Negative for decreased concentration, depression and sleep disturbance. The patient is not nervous/anxious.      Objective   BSA: There is no height or weight on file to calculate BSA.  There were no vitals taken for this visit. No VS or weight recorded due to the telehealth nature of this visit.     Family History   Problem Relation Name Age of Onset    Asthma Mother      Colon cancer Mother       Oncology History   Pilocytic astrocytoma (Multi)   10/2024 Initial Diagnosis    23 yo F with PMH of anemia, asthma, GERD, IBS, PCOS, and breast fibroadenoma s/p L breast excisional biopsy presented to [facility] ED on 10/14/24 with headache. CTH parasellar mass with calcifications. MRI with 2.4 cm peripherally enhancing necrotic mass with non-enhancing FLAIR hyperintense lesion in medial L temporal lobe. Admitted for neurosurgical management.     10/15/2024 Biopsy    R stereotactic brain biopsy  Surgeon: Den Segal MD    Prelim path: Glial cells with luigi fibers vs craniopharyngioma     10/23/2024 Tumor Board    Procedure: R stereotactic brain biopsy 10/15/2024   Pathology: Final pending; likely to be a glioma, areas of necrosis noted.       The CNS Tumor Board tumor board considered available treatment options and made the following recommendations: Radiation oncology referral. Add back to CNS TB once pathology is completed.     11/5/2024 Pathology     A.  B.  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  See note     Note:  Microscopic examination for parts A and B reveal a low-grade glial neoplasm immunoreactive with OLIG-2 and GFAP.  Neurofilament immunohistochemical stain highlights neuropil and scattered neuronal bodies.  ATRX and [id number] immunohistochemical stains are retained.   IDH1 and BRAF immunohistochemical stains are negative.  GLIOSEQ studies reveal a BRAF-V600E mutation.  This finding in conjunction with the morphology are most consistent with a pilocytic astrocytoma.  However, a ganglioglioma among other low-grade glial/glial neuronal neoplasms, cannot be completely ruled out.  Clinical correlation is recommended     11/6/2024 Tumor Board    CSF analysis negative.   Pathology:  Low grade glial neoplasm - BRAF - V600E mutation      The CNS Tumor Board tumor board considered available treatment options and made the following recommendations: Repeat MRI brain w/wo and perfusion study. Sellar imaging. Continue following with neuro-oncology. Radiation oncology referral.      11/29/2024 - 12/6/2024 Hospital Admission    Discharge diagnosis: Hydrocephalus    Returned to the [facility] ED 11/28/24 with worsening HA, light sensitivity, CTH incr vents, MRI incr ventricular mass 2.7x2.5cm, ventriculomegaly, trace R ventricular GRE. Patient admitted to neurosurgery service for further management.    11/28 Ophthalmology evaluation without papilledema.  12/1 s/p R VPS (certas at 5) -- Dr. Yap at [facility]  12/2 CTH with catheter in position.      12/29/2024 - 12/31/2024 Hospital Admission    Discharge diagnosis: Hydrocephalus    Returned to the [facility] ED on 12/28/24 with generalized fatigue, HA and body aches. CT head with increased ventricles, SS Certas at 4, LLQ cath with turn vs possible kink, CT AP catheter without kink or discontinuity, no pseudocyst. Patient admitted to the neurosurgery service for further management/workup.    12/28 s/p shunt tap with no proximal flow.   12/29 s/p proximal shunt revision, distal cath flushing. CT head post op with decreased ventricles, catheter in position.         Sejal Duff  reports that she has never smoked. She has never been exposed to tobacco smoke. She has never used smokeless tobacco.  She  reports current alcohol use.  She  reports current drug  use. Drug: Marijuana.    Physical Exam  Constitutional:       General: She is not in acute distress.     Appearance: Normal appearance. She is not ill-appearing.   Neurological:      Mental Status: She is alert.   Psychiatric:         Mood and Affect: Mood normal.         Behavior: Behavior normal.         Thought Content: Thought content normal.         Judgment: Judgment normal.     Performance Status:  Asymptomatic    Assessment/Plan    Sejal Duff is a 24 y.o. F with a recent diagnosis of astrocytoma with future plans for oral systemic therapy and possible radiation therapy.     #Psychosocial: young adult with benign neoplasm, being treated and followed by neuro-oncology  - Previously discussed resources available including psychiatry, psychology, social work, spiritual care, and expressive therapies  - Connected to young adult oncology program, will receive monthly social programming invitations  - Consider providing community resources for peer support/connection in future  - Has applied for social security/disability   - Will look into additional financial grants/assistance for patient - specifically as it relates to rent/transportation assistance - reconnect with [facility]  - Has SNAP benefits - identified food insecurity, referral to [employer] is in place, patient needs to call   - Sent cancer to 5k exercise training program     #Sexual dysfunction/contraception:   - Previously discussed the seriousness of getting pregnant while receiving oral chemotherapy due to the harmful effects this could have on the  fetus, and on her cancer treatment given the decreased availability of medical  services.   - She is not interested in use of hormonal or non hormonal contraceptives - including IUDs out of concern for their role in developing brain cancer  - Previously discussed that she must prevent pregnancy with use of barrier method every time she engages in penetrative intercourse  - Previously  discussed that small amounts of chemotherapy may be found in her body secretions including vaginal secretions and she should use a barrier form of contraception such as a male or female condom to protect her partner from chemotherapy exposure.    #Risk for infertility:  - Has previously been seen by colleagues in [place]  - Discussed natural age related decline in fertility and menopause that occurs as a result of ovarian aging, and that cancer treatments can accellerate that progression and diminish ovarian reserve.  - Individuals may experience varying degrees of short or long term ovarian dysfunction and amenorrhea, and that this is dependent upon type of cancer treatment, cumulative dose, and patients age.   - Loss of ovarian function may be permanent or temporary.  - Risk to fertility is UNKNOWN based on cancer treatment of tovarefenib - there is little research about the effects of these agents on ovarian reserve  - Declined fertility preservation prior to initiation of chemotherapy  - Reviewed baseline hormone function testing AMH 5.2 on 11/14/24, E2, FSH/LH WNL  - Experiencing amenorrhea since 10/2024  - We discussed trending her ovarian function q 6 months while she is on therapy, last drawn 10/2024, will repeat in April 2025  - Discussed a plan to monitor hormone function for s/s of hypogonadism    #Polycystic ovary syndrome PCOS: with features of oligomenorrhea, acne, +10 <10mm follicles on bilateral ovaries per US, and elevated AMH  - Will discuss pathophysiology with patient at future visit  - Consider referral to Dr. Griffiths for comprehensive PCOS management     #Chronic constipation/IBS: chronic, lifelong constipation with minimal improvement despite multiple interventions  - Follows with gastroenterology  - Has previously had NG clean outs at [facility], hydrocolonic therapy, rectal biofeedback, senna, bisacodyl, miralax, golytely, enemas, suppositories, probiotics, and acupuncture  - Currently on linzess and  motegrity without significant improvement    #Diet/Exercise/Healthy Lifestyle:  - Previously discussed research demonstrating consumption of a healthy, plant based diet not only decreases cancer risk, but also has been found to improve survival in cancer patients who partake in a healthy diet.   - Previously discussed research that demonstrates decreased cancer risk and improved survival in cancer patients who exercise and stay active throughout diagnosis and treatment.  - Encouraged patient to continue gentle exercise as tolerated  - Encouraged patient to continue to abstain from alcohol, tobacco, and recreational drugs    #Genetic Risk: young adult with cancer and personal family history of cancer  - Discussed rationale for genetic risk consultation consider placing referral in future    The amount of time that I spent with the patient:  Prep: 5  Time spend directly with patient: 25  Coordinating care: 0  Documentation: 10  Other time:    Total time spent on encounter: 40    Follow up planned in one month                JASVIR Corado-CNP

## 2025-03-03 RX ORDER — OXYCODONE HYDROCHLORIDE 5 MG/1
5 TABLET ORAL EVERY 6 HOURS PRN
Qty: 120 TABLET | Refills: 0 | Status: SHIPPED | OUTPATIENT
Start: 2025-03-03 | End: 2025-04-02

## 2025-03-03 RX ORDER — SIMETHICONE 125 MG
125 TABLET,CHEWABLE ORAL EVERY 6 HOURS PRN
Qty: 120 TABLET | Refills: 2 | Status: SHIPPED | OUTPATIENT
Start: 2025-03-03 | End: 2025-06-01

## 2025-03-03 NOTE — PATIENT COMMUNICATION
OARRS report reviewed and reflects  prescription history. Per OARRS, patient last filled 30 day supply 120 on 2/6. Per last visit with Marcelo on 2/18 patient to continue oxycodone 5mg q6h. Patient with follow up visit scheduled with Marcelo on 3/17 . Patient updated that medication will be sent to [facility]. Refill request routed to provider.    Patient has been taking 10mg q8h of oxycodone she took this dosage during hospitalization and said she was told to continue, discharge summary and last notes did say oxy 5 q6h prn, Pt instructed to not take more than 4 tablets per day. Marcelo is OK with a one time early refill of oxycodone tomorrow. Pt requested gas x for her indigestion/ gas pain. Ok to take 125qid per marcelo.

## 2025-03-04 ENCOUNTER — APPOINTMENT (OUTPATIENT)
Dept: DERMATOLOGY | Facility: CLINIC | Age: 25
End: 2025-03-04
Payer: COMMERCIAL

## 2025-03-04 DIAGNOSIS — Z48.02 ENCOUNTER FOR REMOVAL OF SUTURES: ICD-10-CM

## 2025-03-04 PROCEDURE — 99024 POSTOP FOLLOW-UP VISIT: CPT | Performed by: DERMATOLOGY

## 2025-03-04 NOTE — PROGRESS NOTES
Subjective     Sejal Duff is a 24 y.o. female who presents for the following: Nurse Visit (S/p 12 day suture removal to left upper back. ).     Review of Systems:  No other skin or systemic complaints other than what is documented elsewhere in the note.    The following portions of the chart were reviewed this encounter and updated as appropriate:          Skin Cancer History  No skin cancer on file.      Specialty Problems          Dermatology Problems    Adult acne        Objective   Well appearing patient in no apparent distress; mood and affect are within normal limits.    A focused skin examination was performed. All findings within normal limits unless otherwise noted below.    Assessment/Plan   1. Encounter for removal of sutures  Left Upper Back  S/p 12 day suture removal to left upper back. Patient tolerated suture removal. One suture removed. Site clean, dry, intact. No bandage applied. Provided patient mychart message regarding biopsy results. Patient is scheduled follow up w/ Dr. Patel on 3/11. Patient verbalized understanding. No questions/concerns noted.

## 2025-03-07 ENCOUNTER — APPOINTMENT (OUTPATIENT)
Dept: HEMATOLOGY/ONCOLOGY | Facility: HOSPITAL | Age: 25
End: 2025-03-07
Payer: COMMERCIAL

## 2025-03-11 ENCOUNTER — APPOINTMENT (OUTPATIENT)
Dept: DERMATOLOGY | Facility: CLINIC | Age: 25
End: 2025-03-11
Payer: COMMERCIAL

## 2025-03-14 ENCOUNTER — OFFICE VISIT (OUTPATIENT)
Dept: DERMATOLOGY | Facility: CLINIC | Age: 25
End: 2025-03-14
Payer: COMMERCIAL

## 2025-03-14 DIAGNOSIS — T45.1X5D ADVERSE EFFECT OF ANTINEOPLASTIC AND IMMUNOSUPPRESSIVE DRUGS, SUBSEQUENT ENCOUNTER: ICD-10-CM

## 2025-03-14 DIAGNOSIS — L27.1 HAND FOOT SYNDROME: ICD-10-CM

## 2025-03-14 DIAGNOSIS — L27.0 ACNEIFORM DRUG ERUPTION: Primary | ICD-10-CM

## 2025-03-14 DIAGNOSIS — L20.89 FLEXURAL ATOPIC DERMATITIS: ICD-10-CM

## 2025-03-14 PROCEDURE — 99214 OFFICE O/P EST MOD 30 MIN: CPT | Performed by: DERMATOLOGY

## 2025-03-14 PROCEDURE — 1036F TOBACCO NON-USER: CPT | Performed by: DERMATOLOGY

## 2025-03-14 RX ORDER — CLOBETASOL PROPIONATE 0.5 MG/G
OINTMENT TOPICAL 2 TIMES DAILY
Qty: 60 G | Refills: 1 | Status: SHIPPED | OUTPATIENT
Start: 2025-03-14

## 2025-03-14 RX ORDER — HYDROCORTISONE 25 MG/G
OINTMENT TOPICAL 2 TIMES DAILY
Qty: 30 G | Refills: 1 | Status: SHIPPED | OUTPATIENT
Start: 2025-03-14

## 2025-03-14 RX ORDER — TRIAMCINOLONE ACETONIDE 1 MG/G
OINTMENT TOPICAL 2 TIMES DAILY
Qty: 454 G | Refills: 1 | Status: SHIPPED | OUTPATIENT
Start: 2025-03-14

## 2025-03-14 ASSESSMENT — DERMATOLOGY QUALITY OF LIFE (QOL) ASSESSMENT
ARE THERE EXCLUSIONS OR EXCEPTIONS FOR THE QUALITY OF LIFE ASSESSMENT: NO
RATE HOW BOTHERED YOU ARE BY SYMPTOMS OF YOUR SKIN PROBLEM (EG, ITCHING, STINGING BURNING, HURTING OR SKIN IRRITATION): 0 - NEVER BOTHERED
RATE HOW EMOTIONALLY BOTHERED YOU ARE BY YOUR SKIN PROBLEM (FOR EXAMPLE, WORRY, EMBARRASSMENT, FRUSTRATION): 0 - NEVER BOTHERED
WHAT SINGLE SKIN CONDITION LISTED BELOW IS THE PATIENT ANSWERING THE QUALITY-OF-LIFE ASSESSMENT QUESTIONS ABOUT: DERMATITIS
RATE HOW BOTHERED YOU ARE BY EFFECTS OF YOUR SKIN PROBLEMS ON YOUR ACTIVITIES (EG, GOING OUT, ACCOMPLISHING WHAT YOU WANT, WORK ACTIVITIES OR YOUR RELATIONSHIPS WITH OTHERS): 0 - NEVER BOTHERED
DATE THE QUALITY-OF-LIFE ASSESSMENT WAS COMPLETED: 67278

## 2025-03-14 ASSESSMENT — DERMATOLOGY PATIENT ASSESSMENT
ARE YOU ON BIRTH CONTROL: NO
DO YOU USE A TANNING BED: NO
ARE YOU AN ORGAN TRANSPLANT RECIPIENT: NO
HAVE YOU HAD OR DO YOU HAVE VASCULAR DISEASE: NO
DO YOU HAVE ANY NEW OR CHANGING LESIONS: NO
DO YOU HAVE IRREGULAR MENSTRUAL CYCLES: NO
HAVE YOU HAD OR DO YOU HAVE A STAPH INFECTION: NO
ARE YOU TRYING TO GET PREGNANT: NO

## 2025-03-14 ASSESSMENT — PATIENT GLOBAL ASSESSMENT (PGA): PATIENT GLOBAL ASSESSMENT: PATIENT GLOBAL ASSESSMENT:  1 - CLEAR

## 2025-03-14 ASSESSMENT — ITCH NUMERIC RATING SCALE: HOW SEVERE IS YOUR ITCHING?: 8

## 2025-03-14 NOTE — PROGRESS NOTES
Subjective     Sejal Duff is a 24 y.o. female who presents for the following: Dermatitis. Last derm visit 2/20/25 for dermatitis and drug eruption. 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 Tovorafenib started 12/21/24.          Review of Systems:  No other skin or systemic complaints other than what is documented elsewhere in the note.    The following portions of the chart were reviewed this encounter and updated as appropriate:   Tobacco  Allergies  Meds  Problems  Med Hx  Surg Hx         Skin Cancer History  No skin cancer on file.      Specialty Problems          Dermatology Problems    Adult acne        Objective   Well appearing patient in no apparent distress; mood and affect are within normal limits.    A focused skin examination was performed. All findings within normal limits unless otherwise noted below.    Assessment/Plan   1. Acneiform drug eruption  Small pink papules and bland appearing pustules scattered on chin and medial face. No honey crust    Acneiform rash to her lower face that started with facial tenderness after her 2nd or 3rd dose of tovorafenib (type II JUAN CARLOS inhibitor) in December  - could not tolerate doxycycline PO - her appetite is too poor    - continue hydrocortisone 2.5 for now every 3 days alternating with clindamycin lotion    - can consider minocycline in future which has better GI tolerability but we will hold off for now    Related Medications  vitamin A & D cream  Apply topically 2 times a day as needed for dry skin or wound care.    2. Adverse effect of antineoplastic and immunosuppressive drugs, subsequent encounter  History of pilocytic astrocytoma diagnosed 10/15/24. Tovorafenib started 12/21/24.     3. Flexural atopic dermatitis  Body much improved. Now resolved are the diffuse pink  papules and macules coalescing into broader patches and plaques on back, chest, abdomen, arms, and legs. She has many areas of dyspigmentation and excoriation. Just a few small pink papules on arms and legs. 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 She mixes the clobetasol into the A&D ointment and uses all over, needs to use daily to control itch  - using new seaweed moisturizer that she likes    - inflammation is much better but she still has a lot of pruritus  - try natural phototherapy to help with pruritus and eczema  - will also write order and submit for in-office treatment  - want to try to decrease topical steroid use to 3x per week if possible    Related Procedures  Follow Up In Dermatology - Established Patient  Phototherapy treatment    Related Medications  triamcinolone (Kenalog) 0.1 % ointment  Apply topically 2 times a day. To trunk, arms, and legs 3x per week for maintenance    clobetasol (Temovate) 0.05 % ointment  Apply topically 2 times a day. To hands and feet 3x per week for maintenance    hydrocortisone 2.5 % ointment  Apply topically 2 times a day. To face 3x per week for maintenance    4. Hand foot syndrome (4)  Left Foot - Posterior, Left Hand - Anterior, Right Foot - Posterior, Right Hand - Anterior  Yellow papules. Still with deep vesicles along edges of fingers. Tense vesicles on feet now resolved    - can be related to eczema or JUAN CARLOS inhibitor  - clobetasol to hands and use cotton gloves at night        Follow up 2-3 months virtual visit eczema. Send photos prior to visit

## 2025-03-14 NOTE — PATIENT INSTRUCTIONS
Try Natural phototherapy  - Try to get direct sunlight on as much of your skin as you can for 15-30     For your hands  - Clobetasol ointment (the one you showed me the photo of) at night and cover with white cotton gloves    For your face  - avoid benzoyl peroxide wash, can be too irritating  - continue with hydrocortisone 3x per week

## 2025-03-17 ENCOUNTER — APPOINTMENT (OUTPATIENT)
Dept: PALLIATIVE MEDICINE | Facility: HOSPITAL | Age: 25
End: 2025-03-17
Payer: COMMERCIAL

## 2025-03-17 DIAGNOSIS — C71.9 PILOCYTIC ASTROCYTOMA (MULTI): ICD-10-CM

## 2025-03-17 DIAGNOSIS — R11.0 NAUSEA: ICD-10-CM

## 2025-03-17 DIAGNOSIS — Z51.5 PALLIATIVE CARE ENCOUNTER: ICD-10-CM

## 2025-03-17 DIAGNOSIS — R63.0 LOSS OF APPETITE: ICD-10-CM

## 2025-03-17 RX ORDER — OLANZAPINE 5 MG/1
5 TABLET ORAL NIGHTLY
Qty: 90 TABLET | Refills: 0 | Status: SHIPPED | OUTPATIENT
Start: 2025-03-17 | End: 2025-06-15

## 2025-03-17 ASSESSMENT — ENCOUNTER SYMPTOMS
APPETITE CHANGE: 1
NAUSEA: 1

## 2025-03-17 NOTE — PROGRESS NOTES
[facility]  Neuro-Oncology    Patient: Sejal Duff  MRN: [id number]  Date of visit: 03/17/25  Visit type: In-person clinic visit  Clinician: Deborah Snowden PA-C    Oncological & Treatment History:  Oncology History   Pilocytic astrocytoma (Multi)   10/2024 Initial Diagnosis    23 yo F with PMH of anemia, asthma, GERD, IBS, PCOS, and breast fibroadenoma s/p L breast excisional biopsy presented to [facility] ED on 10/14/24 with headache. CTH parasellar mass with calcifications. MRI with 2.4 cm peripherally enhancing necrotic mass with non-enhancing FLAIR hyperintense lesion in medial L temporal lobe. Admitted for neurosurgical management.     10/15/2024 Biopsy    R stereotactic brain biopsy  Surgeon: Den Segal MD    Prelim path: Glial cells with luigi fibers vs craniopharyngioma     10/23/2024 Tumor Board    Procedure: R stereotactic brain biopsy 10/15/2024   Pathology: Final pending; likely to be a glioma, areas of necrosis noted.       The CNS Tumor Board tumor board considered available treatment options and made the following recommendations: Radiation oncology referral. Add back to CNS TB once pathology is completed.     11/5/2024 Pathology     A.  B.  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  See note     Note:  Microscopic examination for parts A and B reveal a low-grade glial neoplasm immunoreactive with OLIG-2 and GFAP.  Neurofilament immunohistochemical stain highlights neuropil and scattered neuronal bodies.  ATRX and [id number] immunohistochemical stains are retained.  IDH1 and BRAF immunohistochemical stains are negative.  GLIOSEQ studies reveal a BRAF-V600E mutation.  This finding in conjunction with the morphology are most consistent with a pilocytic astrocytoma.  However, a ganglioglioma among other low-grade glial/glial neuronal neoplasms, cannot be completely ruled out.  Clinical correlation is recommended      11/6/2024 Tumor Board    CSF analysis negative.   Pathology:  Low grade glial neoplasm - BRAF - V600E mutation      The CNS Tumor Board tumor board considered available treatment options and made the following recommendations: Repeat MRI brain w/wo and perfusion study. Sellar imaging. Continue following with neuro-oncology. Radiation oncology referral.      11/29/2024 - 12/6/2024 Hospital Admission    Discharge diagnosis: Hydrocephalus    Returned to the [facility] ED 11/28/24 with worsening HA, light sensitivity, CTH incr vents, MRI incr ventricular mass 2.7x2.5cm, ventriculomegaly, trace R ventricular GRE. Patient admitted to neurosurgery service for further management.    11/28 Ophthalmology evaluation without papilledema.  12/1 s/p R VPS (certas at 5) -- Dr. Yap at [facility]  12/2 CTH with catheter in position.      12/29/2024 - 12/31/2024 Hospital Admission    Discharge diagnosis: Hydrocephalus    Returned to the [facility] ED on 12/28/24 with generalized fatigue, HA and body aches. CT head with increased ventricles, SS Certas at 4, LLQ cath with turn vs possible kink, CT AP catheter without kink or discontinuity, no pseudocyst. Patient admitted to the neurosurgery service for further management/workup.    12/28 s/p shunt tap with no proximal flow.   12/29 s/p proximal shunt revision, distal cath flushing. CT head post op with decreased ventricles, catheter in position.         HPI:  Sejal Duff is a 24 y.o. 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      Interval History (NEWEST at BOTTOM of section):  10/29/24: Today she reports some slight headaches but not worsening.  She is currently on dexamethasone 2 mg twice daily.  She takes the morning dose around 9 or 10 AM and the evening dose  before bedtime.  She takes ondansetron about 1 hour prior to the dexamethasone.  She endorses significant issues with vomiting after taking the dexamethasone as well as decreased appetite.  She is able to drink enough fluids.  She states that she tried taking 8 mg of ondansetron prior to the dexamethasone but this did not help either.  She states that the dexamethasone makes her very jittery.  She has not taken dexamethasone for the last 2 days.  She continues to have constipation which is her baseline where she does not have a bowel movement for weeks.  She has been referred by her gastroenterologist to integrative medicine for further help with bowel movements.  Otherwise denies any worsening of vision or new gait or balance issues.  11/8/24: Continues to have some headaches but not worse. She has decreased dexamethasone to 1mg bid on her own and she denies any worsening of her headaches. Denies new balance or strength issues.   11/19/2024: Her headaches are about the same intensity.  Denies new nausea/vomiting episodes.  Denies new gait or balance changes.  Denies new vision changes.   11/26/2024: Sejal states her headaches are worse.  She has not been able to sleep much.  Denies new vision problems.  Denies new weakness or gait balance/issues.  Denies new episodes of nausea or vomiting.  She is upset at the MRI spine being canceled recently.    01/16/25: Since the last visit, Sejal underwent  shunt placement for hydrocephalus (12/1/24) and a subsequent VPS revision (12/29/24). She also started tovorafenib on 12/21/24 and has been taking it once weekly as directed on Saturdays. Endorses significant fatigue and body aches for 2-3 days after taking tovorafenib. Endorses a new acne-like rash to her lower face that started with facial tenderness after her 2nd or 3rd dose of tovorafenib; rash is spreading to her nasolabial folds. States she washes her face with warm water and applies Aquaphor to the affected areas.  "She also reports intermittent episodes over the past 3 weeks where she feels overheated, lightheaded, and needs to utilize her inhaler; states it is similar to asthma flares; denies anxiety precipitating episodes. Also reports persistent nausea since her last surgery. States even water makes her feel nauseated. Feels like she's going to vomit, but it \"sits in [her] chest\" and burns. States she takes Zofran \"like it's candy.\" Denies drinking caffeine/carbonated drinks, fried/spicy foods, and lying down within 30 min of eating. States she hasn't picked up Olanzapine and doesn't take omeprazole as she is wary of being on many medications.    02/04/25: Saw patient in clinic for an impromptu visit regarding her worsening acneiform rash after patient saw [facility] today. No improvement in symptoms since using topical hydrocortisone 2.5% and clindamycin 1%. Now has some similar lesions on her chest. Pictures entered into chart. Additionally, patient reports a nosebleed with clots yesterday that resolved after about 15 minutes. She also has questions about medications -- states she was too fatigued the day after olanzapine and wants to clarify which meds she should/should not be taking. Her mother is present as well -- mother notes family history of cancer, which contributes to her concerns about patient's diagnosis/treatment.    2/18/2025: She denies any visual changes or headaches.  Does endorse some fatigue.  Feels her acneform rash is worsening and has spread more through her torso and back region.  It is itching which prompts her to scratch and then it bleeds.  Also notices some pustular appearing lesions by her wrist and on her feet.  Has been using the clindamycin and hydrocortisone topical lotions.  She has not taken the oral doxycycline as prescribed due to fear of nausea which will keep her from eating.  She feels that her appetite is already bad from her baseline nausea issues.  Also endorses some shortness " of breath when doing normal walking like from the parking garage to the clinic.  Denies any dyspnea at rest.  Denies chest pain or lower extremity edema.  She has asthma at baseline but says that normally she uses a nebulizer or inhaler only when she is sick with a respiratory infection which exacerbates her asthma.  But she has been needing to use her inhaler and nebulizer more often now.  States her menstrual periods have not returned to baseline since the most recent hospitalization.  Normally she has a quite regularly once monthly.  Her last menstrual period was in November 2024.    03/17/25: ***    Review of Systems:  Review of Systems   Constitutional:  Positive for appetite change.   Gastrointestinal:  Positive for nausea.   Skin:  Positive for rash.       Social History:  Social History     Tobacco Use    Smoking status: Never     Passive exposure: Never    Smokeless tobacco: Never   Vaping Use    Vaping status: Never Used   Substance Use Topics    Alcohol use: Yes     Comment: social    Drug use: Yes     Types: Marijuana   Occupation: [employer], [employer]  Currently working [employer] Tuesday-Friday    Past Medical History:  Past Medical History:   Diagnosis Date    Encounter for screening for infections with a predominantly sexual mode of transmission 03/02/2020    Screening for STD (sexually transmitted disease)    Melena 04/12/2019    Blood in stool    Other specified health status     No pertinent past medical history    Other specified health status     No pertinent past surgical history    Personal history of other (healed) physical injury and trauma     History of sprain of ankle    Unspecified condition associated with female genital organs and menstrual cycle 03/02/2020    Cervical motion tenderness       Past Surgical History:  Past Surgical History:   Procedure Laterality Date    BREAST SURGERY Left     Mass removal    OTHER SURGICAL HISTORY  01/18/2019    No history of surgery       Family  History:  Family History   Problem Relation Name Age of Onset    Asthma Mother      Colon cancer Mother         Performance Score:  Karnofsky Score: 90 - Able to carry on normal activity; minor signs or symptoms of disease     Vital Signs:  There were no vitals taken for this visit. -- vitals taken earlier for other clinic visit today    Physical Exam:  Physical Exam  Constitutional:       General: She is not in acute distress.     Appearance: She is not toxic-appearing.   Skin:     Findings: Rash (worsening acneiform rash involving bilateral nasolabial folds, lower cheeks, perioral area, and chin. Scattered macules to chest. See pictures below.) present.   Neurological:      General: No focal deficit present.      Mental Status: She is alert and oriented to person, place, and time.      Cranial Nerves: No cranial nerve deficit.         Results:  Lab Results   Component Value Date    WBC 6.2 02/06/2025    HGB 9.6 (L) 02/06/2025    HCT 30.5 (L) 02/06/2025    MCV 83 02/06/2025     02/06/2025       Lab Results   Component Value Date    GLUCOSE 83 02/06/2025    CALCIUM 8.7 02/06/2025     02/06/2025    K 3.2 (L) 02/06/2025    CO2 25 02/06/2025     02/06/2025    BUN 8 02/06/2025    CREATININE 0.44 (L) 02/06/2025       Lab Results   Component Value Date    ALT 29 02/06/2025    AST 34 02/06/2025    ALKPHOS 68 02/06/2025    BILITOT 0.9 02/06/2025       === 11/28/24 ===    MR BRAIN W AND WO CONTRAST    Addendum 11/28/2024  3:18 PM -----------------------------------------------  Interpreted By:  Saravanan Flores,  ADDENDUM:  As described in the body of the report, there also new susceptibility  artifact in the dependent portion of the right lateral ventricle  concerning for blood products.    Signed by: Saravanan Flores 11/28/2024 3:18 PM    -------- ORIGINAL REPORT --------  Dictation workstation:   [id number]    - Impression -  1. Interval enlargement hypothalamic-based necrotic mass (2.7 x 2.6 x  2.5  cm vs 2.3 x 2 x 2.4 cm) resulting in new acute obstructive  hydrocephalus at the level of the foramina of [place]ro causing  significantly increased dilatation of the lateral ventricles (right >  left) and new interstitial edema in the periventricular white matter.  Urgent neurosurgical consultation recommended.    2. New diffuse bilateral sulcal effacement and significantly  increased downward mass effect on the optic chiasm, bilateral ventral  medial thalami, and to a lesser extent the ventral midbrain.    3. Slight increased size of nonenhancing T2/FLAIR hyperintense lesion  in the medial left temporal lobe measuring 1.3 cm x 0.8 cm.    MACRO:  Saravanan Flores discussed the significance and urgency of this  critical finding by telephone with EVONNE ANGELES and DR RADER  (neurosurgery) on 11/28/2024 at 3:09 pm.  (**-RCF-**) Findings:  See  findings.    Signed by: Saravanan Flores 11/28/2024 3:15 PM  Dictation workstation:   [id number]      Assessment & Plan:  Sejal Duff is a 24 y.o. 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 She also developed hydrocephalus and is s/p VPS (12/1/24) with revision (12/29/24). She started on tovorafenib (type II JUAN CARLOS inhibitor) on 12/21/24.    Spent time with patient reviewing her medication indications. Discussed with her mother what I discussed with Sejal at the previous virtual visit -- Explained that her tumor is low-grade, meaning it is classified as slow-growing and benign/non-cancerous, but it is causing issues/symptoms that make it not harmless. Explained that such tumors could possibly transform into a higher grade, meaning become malignant/cancerous, or could still remain  "\"benign\" but grow large enough to cause significant, even life-threatening problems. Discussed that our goal with tovorafenib is to shrink the tumor as much as possible and/or reach stability to the point that we can monitor with imaging or we can have neurosurgery evaluate again whether surgery is feasible/safe, but there are no guarantees. Mother verbalized understanding.    Patient amenable to derm e-consult. Will plan to contact patient for a check-in after the e-consult is complete.    **THIS NOTE IS NOT FINALIZED***  Please confirm information with clinician as needed.      1. 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***  Treatment: Tovorafenib 600 mg PO once weekly (380 mg/m2 x BSA)  Planned length of treatment is 6 cycles (28-day cycles), but this may be extended or shortened if indicated  Labs on or within 48 hours of day 1 of each cycle to monitor for toxicities  Patient has supply for this weekend's dose, but none otherwise -- will reach out to [id number] and send more if no refills left  Tempus: Will plan to obtain if patient amenable for further genetic evaluation  MRI brain w/wo contrast every 2 months (after 2 cycles) to assess treatment response  Has MRI spine today to check for drop mets -- 1 mg Ativan administered in clinic due to claustrophobia/procedural anxiety  Has MRI brain scheduled on 2/17/25 -- 1 mg Ativan Rx sent to pharmacy for her to take 30 minutes prior to this MRI  Follow-up as scheduled on 2/18/25 with Dr. Aguilar for reassessment    2. Acneiform rash***  Tried topical clindamycin 1% lotion to whole face after washing face with lukewarm water, then followed with 2.5% hydrocortisone cream to affected areas twice daily for 2 weeks -- not improved, now has lesions appearing on chest. Will place e-consult to Derm for recommendations -- likely oral Abx and/or decrease tovorafenib dose  Referral to Dermatology to establish if " rash persists/worsens or new skin toxicities develops -- appointment scheduled for June, but will complete e-consult first    3. Epistaxis, resolved***  1 episode yesterday, lasted ~15 min  Thrombocytopenia not a known or common AE of tovorafenib, so probably unrelated but will check CBC while patient is on site -- may end up not getting this drawn today as she was late for MRIs due to being in clinic    4. Health maintenance***  Sent refill on albuterol inhaler as patient has not been able to get a refill and is almost out -- future refills should go to managing provider (pt may need appt before they will refill)  Went through some of her medications with her, wrote the indication on the bottles so she know why she is taking them. Advised again that she should try omeprazole for at least a few days to see if that will help with her nausea and gastric reflux symptoms -- if no relief, needs to see/contact GI. Will reach out to [facility] regarding patient request for cyclobenzaprine refill.    No orders of the defined types were placed in this encounter.

## 2025-03-17 NOTE — TELEPHONE ENCOUNTER
Patient last seen by JASVIR Fernández on 2/18 with plan to continue olanzapine 5mg at bedtime. Prescription pended to covering provider for approval.

## 2025-03-18 ENCOUNTER — APPOINTMENT (OUTPATIENT)
Dept: PALLIATIVE MEDICINE | Facility: HOSPITAL | Age: 25
End: 2025-03-18
Payer: COMMERCIAL

## 2025-03-18 ENCOUNTER — APPOINTMENT (OUTPATIENT)
Dept: HEMATOLOGY/ONCOLOGY | Facility: HOSPITAL | Age: 25
End: 2025-03-18
Payer: COMMERCIAL

## 2025-03-18 ENCOUNTER — PATIENT MESSAGE (OUTPATIENT)
Dept: DERMATOLOGY | Facility: HOSPITAL | Age: 25
End: 2025-03-18
Payer: COMMERCIAL

## 2025-03-22 ENCOUNTER — HOSPITAL ENCOUNTER (OUTPATIENT)
Facility: HOSPITAL | Age: 25
Setting detail: OBSERVATION
Discharge: HOME | End: 2025-03-23
Attending: STUDENT IN AN ORGANIZED HEALTH CARE EDUCATION/TRAINING PROGRAM | Admitting: EMERGENCY MEDICINE
Payer: COMMERCIAL

## 2025-03-22 ENCOUNTER — HOSPITAL ENCOUNTER (EMERGENCY)
Facility: HOSPITAL | Age: 25
Discharge: HOME | End: 2025-03-22
Payer: COMMERCIAL

## 2025-03-22 DIAGNOSIS — R21 RASH: Primary | ICD-10-CM

## 2025-03-22 LAB
ALBUMIN SERPL BCP-MCNC: 4.2 G/DL (ref 3.4–5)
ALP SERPL-CCNC: 72 U/L (ref 33–110)
ALT SERPL W P-5'-P-CCNC: 19 U/L (ref 7–45)
ANION GAP SERPL CALC-SCNC: 12 MMOL/L (ref 10–20)
AST SERPL W P-5'-P-CCNC: 24 U/L (ref 9–39)
BASOPHILS # BLD AUTO: 0.06 X10*3/UL (ref 0–0.1)
BASOPHILS NFR BLD AUTO: 0.8 %
BILIRUB SERPL-MCNC: 0.9 MG/DL (ref 0–1.2)
BUN SERPL-MCNC: 5 MG/DL (ref 6–23)
CALCIUM SERPL-MCNC: 8.8 MG/DL (ref 8.6–10.6)
CHLORIDE SERPL-SCNC: 102 MMOL/L (ref 98–107)
CO2 SERPL-SCNC: 25 MMOL/L (ref 21–32)
CREAT SERPL-MCNC: 0.37 MG/DL (ref 0.5–1.05)
EGFRCR SERPLBLD CKD-EPI 2021: >90 ML/MIN/1.73M*2
EOSINOPHIL # BLD AUTO: 1.58 X10*3/UL (ref 0–0.7)
EOSINOPHIL NFR BLD AUTO: 22.1 %
ERYTHROCYTE [DISTWIDTH] IN BLOOD BY AUTOMATED COUNT: 16.7 % (ref 11.5–14.5)
GLUCOSE SERPL-MCNC: 80 MG/DL (ref 74–99)
HCT VFR BLD AUTO: 23.8 % (ref 36–46)
HGB BLD-MCNC: 7.8 G/DL (ref 12–16)
IMM GRANULOCYTES # BLD AUTO: 0.01 X10*3/UL (ref 0–0.7)
IMM GRANULOCYTES NFR BLD AUTO: 0.1 % (ref 0–0.9)
LYMPHOCYTES # BLD AUTO: 2.32 X10*3/UL (ref 1.2–4.8)
LYMPHOCYTES NFR BLD AUTO: 32.5 %
MCH RBC QN AUTO: 27.4 PG (ref 26–34)
MCHC RBC AUTO-ENTMCNC: 32.8 G/DL (ref 32–36)
MCV RBC AUTO: 84 FL (ref 80–100)
MONOCYTES # BLD AUTO: 0.49 X10*3/UL (ref 0.1–1)
MONOCYTES NFR BLD AUTO: 6.9 %
NEUTROPHILS # BLD AUTO: 2.68 X10*3/UL (ref 1.2–7.7)
NEUTROPHILS NFR BLD AUTO: 37.6 %
NRBC BLD-RTO: 0 /100 WBCS (ref 0–0)
PLATELET # BLD AUTO: 426 X10*3/UL (ref 150–450)
POTASSIUM SERPL-SCNC: 3.4 MMOL/L (ref 3.5–5.3)
PROT SERPL-MCNC: 7.3 G/DL (ref 6.4–8.2)
RBC # BLD AUTO: 2.85 X10*6/UL (ref 4–5.2)
SODIUM SERPL-SCNC: 136 MMOL/L (ref 136–145)
WBC # BLD AUTO: 7.1 X10*3/UL (ref 4.4–11.3)

## 2025-03-22 PROCEDURE — 2500000001 HC RX 250 WO HCPCS SELF ADMINISTERED DRUGS (ALT 637 FOR MEDICARE OP): Mod: SE

## 2025-03-22 PROCEDURE — 2500000004 HC RX 250 GENERAL PHARMACY W/ HCPCS (ALT 636 FOR OP/ED): Mod: SE

## 2025-03-22 PROCEDURE — G0378 HOSPITAL OBSERVATION PER HR: HCPCS

## 2025-03-22 PROCEDURE — 4500999001 HC ED NO CHARGE

## 2025-03-22 PROCEDURE — 96375 TX/PRO/DX INJ NEW DRUG ADDON: CPT | Mod: 59

## 2025-03-22 PROCEDURE — 99223 1ST HOSP IP/OBS HIGH 75: CPT | Performed by: STUDENT IN AN ORGANIZED HEALTH CARE EDUCATION/TRAINING PROGRAM

## 2025-03-22 PROCEDURE — 96374 THER/PROPH/DIAG INJ IV PUSH: CPT | Mod: 59

## 2025-03-22 PROCEDURE — 2500000004 HC RX 250 GENERAL PHARMACY W/ HCPCS (ALT 636 FOR OP/ED): Mod: SE | Performed by: PHYSICIAN ASSISTANT

## 2025-03-22 PROCEDURE — 96376 TX/PRO/DX INJ SAME DRUG ADON: CPT | Mod: 59

## 2025-03-22 PROCEDURE — 84075 ASSAY ALKALINE PHOSPHATASE: CPT | Performed by: PHYSICIAN ASSISTANT

## 2025-03-22 PROCEDURE — 99285 EMERGENCY DEPT VISIT HI MDM: CPT | Mod: 25 | Performed by: STUDENT IN AN ORGANIZED HEALTH CARE EDUCATION/TRAINING PROGRAM

## 2025-03-22 PROCEDURE — 2500000005 HC RX 250 GENERAL PHARMACY W/O HCPCS: Mod: SE

## 2025-03-22 PROCEDURE — 96361 HYDRATE IV INFUSION ADD-ON: CPT | Mod: 59

## 2025-03-22 PROCEDURE — 85025 COMPLETE CBC W/AUTO DIFF WBC: CPT | Performed by: PHYSICIAN ASSISTANT

## 2025-03-22 PROCEDURE — 2500000001 HC RX 250 WO HCPCS SELF ADMINISTERED DRUGS (ALT 637 FOR MEDICARE OP): Mod: SE | Performed by: PHYSICIAN ASSISTANT

## 2025-03-22 PROCEDURE — 36415 COLL VENOUS BLD VENIPUNCTURE: CPT | Performed by: PHYSICIAN ASSISTANT

## 2025-03-22 PROCEDURE — 2500000004 HC RX 250 GENERAL PHARMACY W/ HCPCS (ALT 636 FOR OP/ED): Mod: SE | Performed by: EMERGENCY MEDICINE

## 2025-03-22 RX ORDER — ACETAMINOPHEN 325 MG/1
650 TABLET ORAL EVERY 4 HOURS PRN
Status: DISCONTINUED | OUTPATIENT
Start: 2025-03-22 | End: 2025-03-23 | Stop reason: HOSPADM

## 2025-03-22 RX ORDER — ONDANSETRON HYDROCHLORIDE 2 MG/ML
4 INJECTION, SOLUTION INTRAVENOUS ONCE
Status: COMPLETED | OUTPATIENT
Start: 2025-03-22 | End: 2025-03-22

## 2025-03-22 RX ORDER — TALC
6 POWDER (GRAM) TOPICAL ONCE
Status: COMPLETED | OUTPATIENT
Start: 2025-03-22 | End: 2025-03-22

## 2025-03-22 RX ORDER — SODIUM CHLORIDE 9 MG/ML
100 INJECTION, SOLUTION INTRAVENOUS CONTINUOUS
Status: DISCONTINUED | OUTPATIENT
Start: 2025-03-22 | End: 2025-03-23 | Stop reason: HOSPADM

## 2025-03-22 RX ORDER — FAMOTIDINE 10 MG/ML
20 INJECTION, SOLUTION INTRAVENOUS 2 TIMES DAILY
Status: DISCONTINUED | OUTPATIENT
Start: 2025-03-22 | End: 2025-03-23 | Stop reason: HOSPADM

## 2025-03-22 RX ORDER — ACETAMINOPHEN 160 MG/5ML
650 SOLUTION ORAL EVERY 4 HOURS PRN
Status: DISCONTINUED | OUTPATIENT
Start: 2025-03-22 | End: 2025-03-23 | Stop reason: HOSPADM

## 2025-03-22 RX ORDER — TALC
3 POWDER (GRAM) TOPICAL ONCE
Status: DISCONTINUED | OUTPATIENT
Start: 2025-03-22 | End: 2025-03-23 | Stop reason: HOSPADM

## 2025-03-22 RX ORDER — OXYCODONE AND ACETAMINOPHEN 5; 325 MG/1; MG/1
1 TABLET ORAL ONCE
Status: COMPLETED | OUTPATIENT
Start: 2025-03-22 | End: 2025-03-22

## 2025-03-22 RX ORDER — PREDNISONE 20 MG/1
TABLET ORAL
Status: DISPENSED
Start: 2025-03-22 | End: 2025-03-23

## 2025-03-22 RX ORDER — MINOCYCLINE HYDROCHLORIDE 50 MG/1
100 TABLET ORAL EVERY 12 HOURS SCHEDULED
Status: DISCONTINUED | OUTPATIENT
Start: 2025-03-23 | End: 2025-03-23 | Stop reason: HOSPADM

## 2025-03-22 RX ORDER — DIPHENHYDRAMINE HYDROCHLORIDE 50 MG/ML
25 INJECTION, SOLUTION INTRAMUSCULAR; INTRAVENOUS ONCE
Status: COMPLETED | OUTPATIENT
Start: 2025-03-22 | End: 2025-03-22

## 2025-03-22 RX ORDER — IBUPROFEN 400 MG/1
800 TABLET ORAL EVERY 8 HOURS PRN
Status: DISCONTINUED | OUTPATIENT
Start: 2025-03-23 | End: 2025-03-23 | Stop reason: HOSPADM

## 2025-03-22 RX ORDER — FAMOTIDINE 20 MG/1
20 TABLET, FILM COATED ORAL 2 TIMES DAILY
Status: DISCONTINUED | OUTPATIENT
Start: 2025-03-22 | End: 2025-03-23 | Stop reason: HOSPADM

## 2025-03-22 RX ORDER — ONDANSETRON 4 MG/1
4 TABLET, FILM COATED ORAL EVERY 8 HOURS PRN
Status: DISCONTINUED | OUTPATIENT
Start: 2025-03-22 | End: 2025-03-23 | Stop reason: HOSPADM

## 2025-03-22 RX ORDER — MIDAZOLAM HYDROCHLORIDE 1 MG/ML
1 INJECTION INTRAMUSCULAR; INTRAVENOUS ONCE
Status: COMPLETED | OUTPATIENT
Start: 2025-03-22 | End: 2025-03-22

## 2025-03-22 RX ORDER — CLOBETASOL PROPIONATE 0.5 MG/G
OINTMENT TOPICAL 2 TIMES DAILY
Status: DISCONTINUED | OUTPATIENT
Start: 2025-03-22 | End: 2025-03-23 | Stop reason: HOSPADM

## 2025-03-22 RX ORDER — IBUPROFEN 600 MG/1
600 TABLET ORAL ONCE
Status: COMPLETED | OUTPATIENT
Start: 2025-03-22 | End: 2025-03-22

## 2025-03-22 RX ORDER — ACETAMINOPHEN 650 MG/1
650 SUPPOSITORY RECTAL EVERY 4 HOURS PRN
Status: DISCONTINUED | OUTPATIENT
Start: 2025-03-22 | End: 2025-03-23 | Stop reason: HOSPADM

## 2025-03-22 RX ORDER — POLYETHYLENE GLYCOL 3350 17 G/17G
17 POWDER, FOR SOLUTION ORAL DAILY
Status: DISCONTINUED | OUTPATIENT
Start: 2025-03-22 | End: 2025-03-23 | Stop reason: HOSPADM

## 2025-03-22 RX ORDER — DIPHENHYDRAMINE HCL 25 MG
25 CAPSULE ORAL ONCE
Status: COMPLETED | OUTPATIENT
Start: 2025-03-22 | End: 2025-03-22

## 2025-03-22 RX ORDER — MINOCYCLINE HYDROCHLORIDE 50 MG/1
100 TABLET ORAL ONCE
Status: COMPLETED | OUTPATIENT
Start: 2025-03-22 | End: 2025-03-22

## 2025-03-22 RX ORDER — ONDANSETRON HYDROCHLORIDE 2 MG/ML
4 INJECTION, SOLUTION INTRAVENOUS EVERY 8 HOURS PRN
Status: DISCONTINUED | OUTPATIENT
Start: 2025-03-22 | End: 2025-03-23 | Stop reason: HOSPADM

## 2025-03-22 RX ORDER — OXYCODONE AND ACETAMINOPHEN 5; 325 MG/1; MG/1
TABLET ORAL
Status: COMPLETED
Start: 2025-03-22 | End: 2025-03-22

## 2025-03-22 RX ORDER — IBUPROFEN 600 MG/1
TABLET ORAL
Status: COMPLETED
Start: 2025-03-22 | End: 2025-03-22

## 2025-03-22 RX ORDER — ONDANSETRON HYDROCHLORIDE 2 MG/ML
INJECTION, SOLUTION INTRAVENOUS
Status: COMPLETED
Start: 2025-03-22 | End: 2025-03-22

## 2025-03-22 RX ADMIN — ONDANSETRON 4 MG: 2 INJECTION INTRAMUSCULAR; INTRAVENOUS at 19:02

## 2025-03-22 RX ADMIN — OXYCODONE AND ACETAMINOPHEN 1 TABLET: 5; 325 TABLET ORAL at 17:37

## 2025-03-22 RX ADMIN — PREDNISONE 30 MG: 20 TABLET ORAL at 19:23

## 2025-03-22 RX ADMIN — MIDAZOLAM HYDROCHLORIDE 1 MG: 1 INJECTION, SOLUTION INTRAMUSCULAR; INTRAVENOUS at 22:44

## 2025-03-22 RX ADMIN — FAMOTIDINE 20 MG: 20 TABLET, FILM COATED ORAL at 21:27

## 2025-03-22 RX ADMIN — ONDANSETRON HYDROCHLORIDE 4 MG: 2 INJECTION, SOLUTION INTRAVENOUS at 19:02

## 2025-03-22 RX ADMIN — IBUPROFEN 600 MG: 600 TABLET ORAL at 17:37

## 2025-03-22 RX ADMIN — ONDANSETRON 4 MG: 2 INJECTION INTRAMUSCULAR; INTRAVENOUS at 22:43

## 2025-03-22 RX ADMIN — Medication 6 MG: at 21:43

## 2025-03-22 RX ADMIN — IBUPROFEN 600 MG: 600 TABLET, FILM COATED ORAL at 17:37

## 2025-03-22 RX ADMIN — SODIUM CHLORIDE 100 ML/HR: 9 INJECTION, SOLUTION INTRAVENOUS at 21:21

## 2025-03-22 RX ADMIN — MINOCYCLINE HYDROCHLORIDE 100 MG: 50 TABLET, FILM COATED ORAL at 19:24

## 2025-03-22 RX ADMIN — OXYCODONE HYDROCHLORIDE AND ACETAMINOPHEN 1 TABLET: 5; 325 TABLET ORAL at 17:37

## 2025-03-22 RX ADMIN — DIPHENHYDRAMINE HYDROCHLORIDE 25 MG: 50 INJECTION INTRAMUSCULAR; INTRAVENOUS at 20:06

## 2025-03-22 RX ADMIN — DIPHENHYDRAMINE HYDROCHLORIDE 25 MG: 25 CAPSULE ORAL at 19:19

## 2025-03-22 ASSESSMENT — COLUMBIA-SUICIDE SEVERITY RATING SCALE - C-SSRS
6. HAVE YOU EVER DONE ANYTHING, STARTED TO DO ANYTHING, OR PREPARED TO DO ANYTHING TO END YOUR LIFE?: NO
1. IN THE PAST MONTH, HAVE YOU WISHED YOU WERE DEAD OR WISHED YOU COULD GO TO SLEEP AND NOT WAKE UP?: NO
2. HAVE YOU ACTUALLY HAD ANY THOUGHTS OF KILLING YOURSELF?: NO

## 2025-03-22 NOTE — Clinical Note
Sejal Duff was seen and treated in our emergency department on 3/22/2025.  She may return to work on 03/25/2025.       If you have any questions or concerns, please don't hesitate to call.      Keila Ziegler, APRN-CNP, DNP

## 2025-03-22 NOTE — ED PROVIDER NOTES
Emergency Department Provider Note        History of Present Illness     History provided by: Patient  Limitations to History: None  External Records Reviewed with Brief Summary:  derm notes and biopsy    HPI:  Sejal Duff is a 24 y.o. female with history of brain cancer on oral chemotherapy with last chemo today who presents today for evaluation of dry scaly painful skin to the palms of her hands.  Patient states she saw dermatology about a week ago and they prescribed her steroid creams including Kenalog, clobetasol and hydrocortisone, patient states she was using the cream for her hands but does not like it is getting any better, states that when she straightens out her fingers her skin on her hands cracks and states it feels a burning sensation.  Denies any oral involvement.  Patient states that she is not sure what she is supposed to do, her dermatologist told her to put plastic gloves on over top of her steroids and she feels like it is not helping.  Is requesting something for pain.  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  She does have some involvement of her face, has considered doing doxycycline in the past however she does not tolerate oral antibiotics well and for this reason this was not administered.    Physical Exam   Triage vitals:  T 36.5 °C (97.7 °F)  HR 99  /70  RR 16  O2 98 % None (Room air)    Physical Exam  Vitals and nursing note reviewed.   Constitutional:       General: She is not in acute distress.     Appearance: Normal appearance. She is not toxic-appearing.   HENT:      Head: Normocephalic and atraumatic.      Nose: Nose normal.   Eyes:      Extraocular Movements: Extraocular movements intact.   Cardiovascular:      Rate and Rhythm: Normal rate and regular rhythm.   Pulmonary:      Effort: Pulmonary effort is normal.   Abdominal:      Palpations: Abdomen is  soft.   Musculoskeletal:         General: Normal range of motion.      Cervical back: Normal range of motion and neck supple.   Skin:     General: Skin is warm and dry.      Comments: Multiple hypopigmented lesions to the arms, legs, back.  Feet have some ulcerations without vesicles or bulla.    Hands are noted to have dry flaky skin to the palmar aspect only.  No bleeding, no signs of secondary bacterial infection.   Neurological:      General: No focal deficit present.      Mental Status: She is alert.   Psychiatric:         Mood and Affect: Mood normal.         Thought Content: Thought content normal.        No orders to display     Labs Reviewed   CBC WITH AUTO DIFFERENTIAL - Abnormal       Result Value    WBC 7.1      nRBC 0.0      RBC 2.85 (*)     Hemoglobin 7.8 (*)     Hematocrit 23.8 (*)     MCV 84      MCH 27.4      MCHC 32.8      RDW 16.7 (*)     Platelets 426      Neutrophils % 37.6      Immature Granulocytes %, Automated 0.1      Lymphocytes % 32.5      Monocytes % 6.9      Eosinophils % 22.1      Basophils % 0.8      Neutrophils Absolute 2.68      Immature Granulocytes Absolute, Automated 0.01      Lymphocytes Absolute 2.32      Monocytes Absolute 0.49      Eosinophils Absolute 1.58 (*)     Basophils Absolute 0.06     COMPREHENSIVE METABOLIC PANEL - Abnormal    Glucose 80      Sodium 136      Potassium 3.4 (*)     Chloride 102      Bicarbonate 25      Anion Gap 12      Urea Nitrogen 5 (*)     Creatinine 0.37 (*)     eGFR >90      Calcium 8.8      Albumin 4.2      Alkaline Phosphatase 72      Total Protein 7.3      AST 24      Bilirubin, Total 0.9      ALT 19       ED Course as of 03/22/25 1932   Sat Mar 22, 2025   1817 Per derm: Hey so start minocycline 100mg BID (taken with food to reduce GI sx and can decrease to once daily if not tolerated) and start prednisone 30mg daily until follow up. Please give a 14 day course of both so that she has them until follow up ideally this week. She should continue  cloevtasol under occlusion BID   also reach out to onc to make sure prednisone is okay   []   1833 Spoke with Dr. Aguilar who states prednisone 30 is ok with her current chemo med []   190 Patient endorses itching, states has been ongoing, is requesting IV Benadryl.  Patient notified us that she is able to tolerate p.o. that oral Benadryl will be administered instead.  No signs of anaphylaxis, no tongue or lip swelling, she is not received the prednisone or minocycline yet. []      ED Course User Index  [] Janice Daniel PA-C         Medical Decision Making & ED Course   Medical Decision Makin y.o. female who presents today for evaluation of dry flaky skin to her hands, follows with dermatology and last saw them 1 week ago but feels like the medications are not working and she is having a lot of pain.  I did order her home medication which she states is Percocet and ibuprofen, I did consult dermatology who came in and saw the patient.  Basic labs including CBC and CMP are obtained.    Patient had dermatology see her here and they recommended minocycline 100 mg twice daily until her next dermatology appointment which should be this week as well as starting 30 mg of prednisone orally. D/w her oncologist Dr Aguilar who was ok with prednisone with her chemo med. Patient was very concerned about going home, states that in the past when she tried to tolerate prednisone she was unable to keep it down and it made her dizzy and she ended up having to come back in through the ER and get admitted for IV antibiotics. Her initial plan was to give her oral medications to see how she tolerated them however her hemoglobin came back at 7.8 which is significantly lower than her baseline, she states she is always dizzy, always tired and always short of breath that is not any different from baseline, she is not currently having menstrual cycles or rectal bleeding. She does have a history of low iron. Will be  admitted to CDU for monitoring CBCs and to see how she tolerates the minocycline and prednisone. Dr. Aguilar her neuro oncologist aware and agreess with plan and dermatology saw her as well   ----      Differential diagnoses considered include but are not limited to: Eczema, dermatitis, chemo reaction, etc.     Social Determinants of Health which Significantly Impact Care: None identified     EKG Independent Interpretation: EKG not obtained    Independent Result Review and Interpretation: Relevant laboratory and radiographic results were reviewed and independently interpreted by myself.  As necessary, they are commented on in the ED Course.    Chronic conditions affecting the patient's care: As documented above in MDM    The patient was discussed with the following consultants/services: Dr. Deborah Solano with dermatology    Care Considerations: As documented above in MDM    ED Course:  ED Course as of 03/22/25 1932   Sat Mar 22, 2025   1817 Per derm: Hey so start minocycline 100mg BID (taken with food to reduce GI sx and can decrease to once daily if not tolerated) and start prednisone 30mg daily until follow up. Please give a 14 day course of both so that she has them until follow up ideally this week. She should continue cloevtasol under occlusion BID   also reach out to onc to make sure prednisone is okay   []   1833 Spoke with Dr. Aguilar who states prednisone 30 is ok with her current chemo med []   1909 Patient endorses itching, states has been ongoing, is requesting IV Benadryl.  Patient notified us that she is able to tolerate p.o. that oral Benadryl will be administered instead.  No signs of anaphylaxis, no tongue or lip swelling, she is not received the prednisone or minocycline yet. []      ED Course User Index  [MC] Janice Daniel PA-C     Disposition   As a result of their workup, the patient will require admission to the CDU.  The patient was informed of her diagnosis.  The patient was  given the opportunity to ask questions and I answered them. The patient agreed to be admitted to the hospital.    Procedures   Procedures    This was a shared visit with an ED attending.  The patient was seen and discussed with the ED attending    Janice Daniel PA-C  Emergency Medicine       Janice Daniel PA-C  03/22/25 1932

## 2025-03-22 NOTE — CONSULTS
DERMATOLOGY DEPARTMENT CONSULTATION NOTE  Name: Sejal Duff  MRN: [id number]  : 2000    Reason for consultation: rash on hands and feet    History of Present Illness  Sejal Duff is a 24 y.o. female with a past medical history of pilocytic astrocytoma (dx 10/2024, on tovorafenib since 24), anemia, asthma, GERD, IBS, PCOS, breast fibroadenoma, who presented on 3/22/2025 with rash and pain of hands and feet. Admitted for anemia. Dermatology was consulted for rash.    Patient follows with Dr. Patel for a rash suspected to be drug eruption  tovorafenib. Acneiform rash of the lower face initially started in December. Rash subsequently evolved on the chest, then hands and feet. 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 She was started on clobetasol 0.05% ointment to hands and feet, triamcinolone 0.1% to trunk, arms, and legs, and hydrocortisone 2.5% ointment to the face. When most recently seen in office on 3/14/2025, she was continued on these topicals and phototherapy was discussed. Of note, previous trialed medications: topical hydrocortisone 2.5%, clindamycin 1%, doxycycline (caused GI upset).  She was also noted to have acneiform drug eruption on the face and failed doxycycline for this with note for minocycline in reserve. In addition advised to clindamycin 1% lotion with hydrocortisone.     Since last seen, she has developed increased itching and pain in her hands. Also reports increased hand dryness and cracking. Reports that she has been using clobetasol ointment under occlusion twice a day without improvement. Denies any other new areas of rash. Denies oral or ocular pain. Denies fevers, chills, CP, SOB, N/V/D. Endorses headache.      Review of Systems  As above    Past Medical History  Past Medical History:   Diagnosis Date    Encounter for  screening for infections with a predominantly sexual mode of transmission 03/02/2020    Screening for STD (sexually transmitted disease)    Melena 04/12/2019    Blood in stool    Other specified health status     No pertinent past medical history    Other specified health status     No pertinent past surgical history    Personal history of other (healed) physical injury and trauma     History of sprain of ankle    Unspecified condition associated with female genital organs and menstrual cycle 03/02/2020    Cervical motion tenderness       Past Surgical History   has a past surgical history that includes Other surgical history (01/18/2019) and Breast surgery (Left).     Allergies  Allergies   Allergen Reactions    Morphine Dizziness    Pollen Extracts Runny nose    House Dust Runny nose       Medications  Scheduled Meds: diphenhydrAMINE, 25 mg, intravenous, Once  [START ON 3/23/2025] minocycline, 100 mg, oral, q12h IRAIDA  predniSONE, , ,        Continuous Infusions:     PRN Meds: PRN medications: predniSONE     Family History  Family History   Problem Relation Name Age of Onset    Asthma Mother      Colon cancer Mother         Social History   reports that she has never smoked. She has never been exposed to tobacco smoke. She has never used smokeless tobacco. She reports current alcohol use. She reports current drug use. Drug: Marijuana.     Objective    Vitals:    03/22/25 1353   BP: 106/70   Pulse: 99   Resp: 16   Temp: 36.5 °C (97.7 °F)   SpO2: 98%        Exam    GEN: no acute distress  NEURO: moving all extremities   EYES: conjunctiva and eyelids normal. No conjunctival injection or erosions appreciated  ENT:   - Lips: normal  - Teeth/gums: normal  - Oropharynx: normal tongue and mucosa  NECK: normal and symmetric.   CV: no varicosities or warmth of extremities.  GI: Flat abdomen. Non-tender. No hepatosplenomegaly.  LYMPH: no LAD  EXTREMITIES: no distal digital clubbing, cyanosis, petechiae   SKIN: A full body skin  exam including scalp, face, eyes, ears, neck, trunk, bilateral upper & lower extremities, toenails and fingernails were examined with the following findings:  - On the hands and feet are thin scaly and slightly crusty papules and plaques more prominent on lateral digits and palmar/plantar surfaces;  few tender deep seated erythematous papulovesicles  - Small pink papules and pustules on face  - No rash noted on trunk; mostly hypopigmented macules and patches    Laboratory and Data  Results for orders placed or performed during the hospital encounter of 03/22/25 (from the past 24 hours)   CBC and Auto Differential   Result Value Ref Range    WBC 7.1 4.4 - 11.3 x10*3/uL    nRBC 0.0 0.0 - 0.0 /100 WBCs    RBC 2.85 (L) 4.00 - 5.20 x10*6/uL    Hemoglobin 7.8 (L) 12.0 - 16.0 g/dL    Hematocrit 23.8 (L) 36.0 - 46.0 %    MCV 84 80 - 100 fL    MCH 27.4 26.0 - 34.0 pg    MCHC 32.8 32.0 - 36.0 g/dL    RDW 16.7 (H) 11.5 - 14.5 %    Platelets 426 150 - 450 x10*3/uL    Neutrophils % 37.6 40.0 - 80.0 %    Immature Granulocytes %, Automated 0.1 0.0 - 0.9 %    Lymphocytes % 32.5 13.0 - 44.0 %    Monocytes % 6.9 2.0 - 10.0 %    Eosinophils % 22.1 0.0 - 6.0 %    Basophils % 0.8 0.0 - 2.0 %    Neutrophils Absolute 2.68 1.20 - 7.70 x10*3/uL    Immature Granulocytes Absolute, Automated 0.01 0.00 - 0.70 x10*3/uL    Lymphocytes Absolute 2.32 1.20 - 4.80 x10*3/uL    Monocytes Absolute 0.49 0.10 - 1.00 x10*3/uL    Eosinophils Absolute 1.58 (H) 0.00 - 0.70 x10*3/uL    Basophils Absolute 0.06 0.00 - 0.10 x10*3/uL   Comprehensive metabolic panel   Result Value Ref Range    Glucose 80 74 - 99 mg/dL    Sodium 136 136 - 145 mmol/L    Potassium 3.4 (L) 3.5 - 5.3 mmol/L    Chloride 102 98 - 107 mmol/L    Bicarbonate 25 21 - 32 mmol/L    Anion Gap 12 10 - 20 mmol/L    Urea Nitrogen 5 (L) 6 - 23 mg/dL    Creatinine 0.37 (L) 0.50 - 1.05 mg/dL    eGFR >90 >60 mL/min/1.73m*2    Calcium 8.8 8.6 - 10.6 mg/dL    Albumin 4.2 3.4 - 5.0 g/dL    Alkaline  Phosphatase 72 33 - 110 U/L    Total Protein 7.3 6.4 - 8.2 g/dL    AST 24 9 - 39 U/L    Bilirubin, Total 0.9 0.0 - 1.2 mg/dL    ALT 19 7 - 45 U/L     *Note: Due to a large number of results and/or encounters for the requested time period, some results have not been displayed. A complete set of results can be found in Results Review.        Assessment/Plan   Sejal Duff is a 24 y.o. female with a past medical history of pilocytic astrocytoma (dx 10/2024, on tovorafenib since 12/21/24), anemia, asthma, GERD, IBS, PCOS, breast fibroadenoma, who presented on 3/22/2025 with rash and pain of hands and feet. Admitted for anemia. Dermatology was consulted for rash.    Differential diagnoses: favor drug-induced eruption (eczematous/dyshidrotic vs acneiform vs toxic erythema of chemotherapy) (vs less likely contact dermatitis vs bullous pemphigoid)    Impression:  Patient has a known history of rash correlating with onset of starting tovorafenib. While initial rash on the face appeared more acneiform, development of subsequent rash on the trunk and hands appeared more consistent with an eczematous drug eruption. 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 On examination today, patient's rash does not appear acutely worse. 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 Given she does report increased pain since administration of tovorafenib this morning, tovorafenib continues to be most likely etiology. In this case, patient has failed occlusion with super high potency topical steroids. Can consider a tetracycline in the case of a JUAN CARLOS kinase-inhibitor eruption - minocycline as it has more GI tolerability than doxycycline. Oral steroids may also help relieve some of her symptoms temporarily on the hands, feet.    Other differentials considered (given previous  biopsy results) include allergic contact dermatitis and early bullous pemphigoid. However, distribution of rash on hands and feet today would be atypical for allergic contact dermatitis, and BP of the hands likely would not have evolved into the scaly plaques seen on examination today.     Recommendations:  - Start minocycline 100mg by mouth 2x daily. Take with food to reduce GI upset, headaches and dizziness. Can decrease to 1 time daily if not tolerating well.  - Start prednisone 30mg by mouth daily until follow up with outpatient dermatology. Would reach out to oncology for approval.  - Continue clobetasol 0.05% ointment BID under occlusion to hands and feet.  - We will reach out to the patient to help set up outpatient follow up with Dermatology. Will reach out to patient's dermatologist, Dr. Patel.    The patient was seen and discussed with attending physician Dr. Mojica. The assessment and plan was communicated to the care team.    Thank you for the consultation and for the opportunity to contribute to the care of this patient.      Deborah Solano MD   PGY2, Dermatology  Epic chat (preferred)  Team pager [phone]     I reviewed the resident/fellow's documentation and discussed the patient with the resident/fellow. I agree with the resident/fellow's medical decision making as documented in the note.     Mila Mojica, DO

## 2025-03-22 NOTE — ED TRIAGE NOTES
Pt says she has blisters to hands and feet and body aches, says this always happens when she gets chemo, got chemo today

## 2025-03-23 VITALS
HEART RATE: 103 BPM | OXYGEN SATURATION: 98 % | TEMPERATURE: 97.7 F | SYSTOLIC BLOOD PRESSURE: 118 MMHG | RESPIRATION RATE: 16 BRPM | DIASTOLIC BLOOD PRESSURE: 64 MMHG

## 2025-03-23 LAB
ANION GAP SERPL CALC-SCNC: 11 MMOL/L (ref 10–20)
BASOPHILS # BLD AUTO: 0.02 X10*3/UL (ref 0–0.1)
BASOPHILS NFR BLD AUTO: 0.3 %
BUN SERPL-MCNC: 5 MG/DL (ref 6–23)
CALCIUM SERPL-MCNC: 8.4 MG/DL (ref 8.6–10.6)
CHLORIDE SERPL-SCNC: 105 MMOL/L (ref 98–107)
CO2 SERPL-SCNC: 24 MMOL/L (ref 21–32)
CREAT SERPL-MCNC: 0.5 MG/DL (ref 0.5–1.05)
EGFRCR SERPLBLD CKD-EPI 2021: >90 ML/MIN/1.73M*2
EOSINOPHIL # BLD AUTO: 0 X10*3/UL (ref 0–0.7)
EOSINOPHIL NFR BLD AUTO: 0 %
ERYTHROCYTE [DISTWIDTH] IN BLOOD BY AUTOMATED COUNT: 17 % (ref 11.5–14.5)
ERYTHROCYTE [DISTWIDTH] IN BLOOD BY AUTOMATED COUNT: 17.2 % (ref 11.5–14.5)
GLUCOSE SERPL-MCNC: 109 MG/DL (ref 74–99)
HCT VFR BLD AUTO: 24.1 % (ref 36–46)
HCT VFR BLD AUTO: 24.5 % (ref 36–46)
HGB BLD-MCNC: 7.6 G/DL (ref 12–16)
HGB BLD-MCNC: 7.8 G/DL (ref 12–16)
IMM GRANULOCYTES # BLD AUTO: 0.01 X10*3/UL (ref 0–0.7)
IMM GRANULOCYTES NFR BLD AUTO: 0.2 % (ref 0–0.9)
LYMPHOCYTES # BLD AUTO: 1.58 X10*3/UL (ref 1.2–4.8)
LYMPHOCYTES NFR BLD AUTO: 26.1 %
MCH RBC QN AUTO: 26.9 PG (ref 26–34)
MCH RBC QN AUTO: 27 PG (ref 26–34)
MCHC RBC AUTO-ENTMCNC: 31.5 G/DL (ref 32–36)
MCHC RBC AUTO-ENTMCNC: 31.8 G/DL (ref 32–36)
MCV RBC AUTO: 85 FL (ref 80–100)
MCV RBC AUTO: 86 FL (ref 80–100)
MONOCYTES # BLD AUTO: 0.41 X10*3/UL (ref 0.1–1)
MONOCYTES NFR BLD AUTO: 6.8 %
NEUTROPHILS # BLD AUTO: 4.04 X10*3/UL (ref 1.2–7.7)
NEUTROPHILS NFR BLD AUTO: 66.6 %
NRBC BLD-RTO: 0 /100 WBCS (ref 0–0)
NRBC BLD-RTO: 0 /100 WBCS (ref 0–0)
PLATELET # BLD AUTO: 413 X10*3/UL (ref 150–450)
PLATELET # BLD AUTO: 423 X10*3/UL (ref 150–450)
POTASSIUM SERPL-SCNC: 4.1 MMOL/L (ref 3.5–5.3)
RBC # BLD AUTO: 2.82 X10*6/UL (ref 4–5.2)
RBC # BLD AUTO: 2.9 X10*6/UL (ref 4–5.2)
SODIUM SERPL-SCNC: 136 MMOL/L (ref 136–145)
WBC # BLD AUTO: 3.4 X10*3/UL (ref 4.4–11.3)
WBC # BLD AUTO: 6.1 X10*3/UL (ref 4.4–11.3)

## 2025-03-23 PROCEDURE — 36415 COLL VENOUS BLD VENIPUNCTURE: CPT | Performed by: NURSE PRACTITIONER

## 2025-03-23 PROCEDURE — 2500000001 HC RX 250 WO HCPCS SELF ADMINISTERED DRUGS (ALT 637 FOR MEDICARE OP): Mod: SE

## 2025-03-23 PROCEDURE — 2500000001 HC RX 250 WO HCPCS SELF ADMINISTERED DRUGS (ALT 637 FOR MEDICARE OP): Mod: SE | Performed by: NURSE PRACTITIONER

## 2025-03-23 PROCEDURE — 99238 HOSP IP/OBS DSCHRG MGMT 30/<: CPT | Performed by: NURSE PRACTITIONER

## 2025-03-23 PROCEDURE — G0378 HOSPITAL OBSERVATION PER HR: HCPCS

## 2025-03-23 PROCEDURE — 36415 COLL VENOUS BLD VENIPUNCTURE: CPT

## 2025-03-23 PROCEDURE — 96361 HYDRATE IV INFUSION ADD-ON: CPT | Mod: 59

## 2025-03-23 PROCEDURE — 80048 BASIC METABOLIC PNL TOTAL CA: CPT

## 2025-03-23 PROCEDURE — 85025 COMPLETE CBC W/AUTO DIFF WBC: CPT | Performed by: NURSE PRACTITIONER

## 2025-03-23 PROCEDURE — 2500000001 HC RX 250 WO HCPCS SELF ADMINISTERED DRUGS (ALT 637 FOR MEDICARE OP): Mod: SE | Performed by: PHYSICIAN ASSISTANT

## 2025-03-23 PROCEDURE — 85027 COMPLETE CBC AUTOMATED: CPT

## 2025-03-23 RX ORDER — HYDROXYZINE PAMOATE 25 MG/1
50 CAPSULE ORAL 3 TIMES DAILY PRN
Qty: 30 CAPSULE | Refills: 0 | Status: SHIPPED | OUTPATIENT
Start: 2025-03-23 | End: 2025-04-02

## 2025-03-23 RX ORDER — KETOROLAC TROMETHAMINE 15 MG/ML
15 INJECTION, SOLUTION INTRAMUSCULAR; INTRAVENOUS ONCE
Status: DISCONTINUED | OUTPATIENT
Start: 2025-03-23 | End: 2025-03-23 | Stop reason: HOSPADM

## 2025-03-23 RX ORDER — OXYCODONE HYDROCHLORIDE 5 MG/1
5 TABLET ORAL ONCE
Status: COMPLETED | OUTPATIENT
Start: 2025-03-23 | End: 2025-03-23

## 2025-03-23 RX ORDER — PREDNISONE 20 MG/1
30 TABLET ORAL DAILY
Qty: 45 TABLET | Refills: 0 | Status: SHIPPED | OUTPATIENT
Start: 2025-03-23 | End: 2025-04-22

## 2025-03-23 RX ORDER — MINOCYCLINE HYDROCHLORIDE 100 MG/1
100 TABLET ORAL 2 TIMES DAILY
Qty: 60 TABLET | Refills: 0 | Status: SHIPPED | OUTPATIENT
Start: 2025-03-23 | End: 2025-04-22

## 2025-03-23 RX ORDER — HYDROXYZINE HYDROCHLORIDE 25 MG/1
50 TABLET, FILM COATED ORAL ONCE
Status: COMPLETED | OUTPATIENT
Start: 2025-03-23 | End: 2025-03-23

## 2025-03-23 RX ORDER — ORPHENADRINE CITRATE 30 MG/ML
60 INJECTION INTRAMUSCULAR; INTRAVENOUS ONCE
Status: DISCONTINUED | OUTPATIENT
Start: 2025-03-23 | End: 2025-03-23 | Stop reason: HOSPADM

## 2025-03-23 RX ADMIN — CLOBETASOL PROPIONATE: 0.5 OINTMENT TOPICAL at 00:12

## 2025-03-23 RX ADMIN — OXYCODONE 5 MG: 5 TABLET ORAL at 15:13

## 2025-03-23 RX ADMIN — IBUPROFEN 800 MG: 400 TABLET ORAL at 08:18

## 2025-03-23 RX ADMIN — MINOCYCLINE HYDROCHLORIDE 100 MG: 50 TABLET, FILM COATED ORAL at 08:17

## 2025-03-23 RX ADMIN — FAMOTIDINE 20 MG: 20 TABLET, FILM COATED ORAL at 08:18

## 2025-03-23 RX ADMIN — OXYCODONE 5 MG: 5 TABLET ORAL at 08:18

## 2025-03-23 RX ADMIN — HYDROXYZINE HYDROCHLORIDE 50 MG: 25 TABLET, FILM COATED ORAL at 11:53

## 2025-03-23 RX ADMIN — CLOBETASOL PROPIONATE: 0.5 OINTMENT TOPICAL at 08:17

## 2025-03-23 RX ADMIN — IBUPROFEN 800 MG: 400 TABLET ORAL at 15:13

## 2025-03-23 ASSESSMENT — PAIN DESCRIPTION - LOCATION: LOCATION: HEAD

## 2025-03-23 ASSESSMENT — PAIN SCALES - GENERAL: PAINLEVEL_OUTOF10: 8

## 2025-03-23 NOTE — H&P
"History and Physical  [facility] CLINICAL DECISION  Patient: Sejal Duff  MRN: [id number]  : 2000  Date of Evaluation: 3/22/2025  ED Provider: SANTOSH Valverde      Limitations to history: None  Independent Historian: Self      Patient History:  Sejal Duff is a 24-year-old female with a history of brain cancer on oral chemo (last dose earlier today), asthma, anemia, GERD, PCOS admitted to the CDU for monitoring of worsening anemia and also to monitor medication tolerance (minocycline and prednisone).     Patient initially presented to the ED for chief complaint of worsening dry scaly rash, worse on her hands.  Has been being treated for it now with multiple topical steroids including with Kenalog, clobetasol, hydrocortisone.  Additionally her face, feet, arms, legs, and back have been involved.  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  Oral antibiotics were considered outpatient including with doxycycline and but were not tolerated well due to GI upset.  Diagnostic testing showed CMP within normal ranges and worsening anemia with hemoglobin 7.8 and hematocrit 23.8 on CBC.  Previous to today, hemoglobins have been in the nines and tens.  Patient denied any bleeding.  She endorses intense itching and \"skin crawling feeling\".  She also endorsed nausea and pain.  Percocet given for pain control as well as ibuprofen.  Zofran, Benadryl given for symptom management as well.  Minocycline given per dermatology recommendation and also oral steroid prednisone.  Patient admitted to the CDU for monitoring of tolerance of new medications, and also anemia.    In the CDU the patient endorses feeling itchy and \"having skin crawling sensation\".  Was just given Benadryl prior to coming over here.  States that she has not slept \"in days\".  States that his side effects of her medication plus the itching/skin crawling " that is keeping her awake.  States that she has been given medication cocktails in the past for sleep.  We will give additional meds to help with sleep.  Denies any new complaints at this time.    The acute evaluation included:  Orders Placed This Encounter   Procedures    CBC and Auto Differential    Comprehensive metabolic panel    CBC    Basic metabolic panel    Adult diet Regular    Weight on admission    Height on admission    Activity (specify) No Restrictions    Vital Signs    No telemetry or cardiac monitoring required    Notify provider    Pain Assessment    Apply sequential compression device    Inpatient consult to Dermatology    Electrocardiogram, 12-lead PRN ACS symptoms    Initiate Observation Send to CDU           Past History     Past Medical History:   Diagnosis Date    Encounter for screening for infections with a predominantly sexual mode of transmission 03/02/2020    Screening for STD (sexually transmitted disease)    Melena 04/12/2019    Blood in stool    Other specified health status     No pertinent past medical history    Other specified health status     No pertinent past surgical history    Personal history of other (healed) physical injury and trauma     History of sprain of ankle    Unspecified condition associated with female genital organs and menstrual cycle 03/02/2020    Cervical motion tenderness     Past Surgical History:   Procedure Laterality Date    BREAST SURGERY Left     Mass removal    OTHER SURGICAL HISTORY  01/18/2019    No history of surgery     Social History     Socioeconomic History    Marital status: Single   Tobacco Use    Smoking status: Never     Passive exposure: Never    Smokeless tobacco: Never   Vaping Use    Vaping status: Never Used   Substance and Sexual Activity    Alcohol use: Yes     Comment: social    Drug use: Yes     Types: Marijuana    Sexual activity: Yes     Social Drivers of Health     Financial Resource Strain: Low Risk  (12/30/2024)    Overall  Financial Resource Strain (CARDIA)     Difficulty of Paying Living Expenses: Not hard at all   Food Insecurity: No Food Insecurity (12/5/2024)    Hunger Vital Sign     Worried About Running Out of Food in the Last Year: Never true     Ran Out of Food in the Last Year: Never true   Transportation Needs: No Transportation Needs (12/30/2024)    PRAPARE - Transportation     Lack of Transportation (Medical): No     Lack of Transportation (Non-Medical): No   Physical Activity: Not on File (5/15/2024)    Received from [facility]    Physical Activity     Physical Activity: 0   Stress: Not on File (5/15/2024)    Received from [facility]    Stress     Stress: 0   Social Connections: Not on File (5/15/2024)    Received from [facility]    Social Connections     Social Connections and Isolation: 0   Intimate Partner Violence: Not At Risk (12/5/2024)    Humiliation, Afraid, Rape, and Kick questionnaire     Fear of Current or Ex-Partner: No     Emotionally Abused: No     Physically Abused: No     Sexually Abused: No   Housing Stability: Low Risk  (12/30/2024)    Housing Stability Vital Sign     Unable to Pay for Housing in the Last Year: No     Number of Times Moved in the Last Year: 0     Homeless in the Last Year: No         Medications/Allergies     Previous Medications    ALBUTEROL 90 MCG/ACTUATION INHALER    Inhale 1-2 puffs every 4 hours if needed for wheezing or shortness of breath.    BENZOYL PEROXIDE (BENZAC AC) 10 % EXTERNAL WASH    APPLY TOPICALLY 2 TIMES A DAY. WASH TWICE DAILY AS DIRECTED    CHOLECALCIFEROL (VITAMIN D-3) 50,000 UNIT CAPSULE    Take 1 capsule (50,000 Units) by mouth 1 (one) time per week.    CLINDAMYCIN (CLEOCIN T) 1 % LOTION    Apply a pea-size amount to entire face twice daily after washing face with lukewarm water. Use this BEFORE hydrocortisone cream.    CLOBETASOL (TEMOVATE) 0.05 % OINTMENT    Apply topically 2 times a day. To hands and feet 3x per week for maintenance    ERGOCALCIFEROL  (VITAMIN D-2) 1.25 MG (47471 UT) CAPSULE    Take 1 capsule (1,250 mcg) by mouth 1 (one) time per week.    HYDROCORTISONE 2.5 % OINTMENT    Apply topically 2 times a day. To face 3x per week for maintenance    IBUPROFEN 800 MG TABLET    Take 1 tablet (800 mg) by mouth every 8 hours if needed for mild pain (1 - 3) (pain).    LACTO NO.[id number] 25B CELL-25B CELL-50 MG CAPSULE    Take 1 capsule by mouth once daily.    LINACLOTIDE (LINZESS) 145 MCG CAPSULE    Take 1 capsule (145 mcg) by mouth once daily in the morning. Take before meals. Do not crush or chew.    LORATADINE (CLARITIN) 10 MG TABLET    Take 1 tablet (10 mg) by mouth once daily.    LORAZEPAM (ATIVAN) 1 MG TABLET    Take 1 tablet (1 mg) 30 minutes prior to MRI on 2/17/25 for claustrophobia/anxiety relief.    METHYLPHENIDATE (RITALIN) 5 MG TABLET    Take 1 tablet (5 mg) by mouth once daily.    MULTIVITAMIN WITH IRON (DAILY MULTIPLE VITAMINS/IRON) TABLET    Take 1 tablet by mouth once daily.    OLANZAPINE (ZYPREXA) 5 MG TABLET    TAKE 1 TABLET (5 MG) BY MOUTH ONCE DAILY AT BEDTIME.    OMEPRAZOLE (PRILOSEC) 40 MG DR CAPSULE    Take 1 capsule (40 mg) by mouth once daily.    OXYCODONE (ROXICODONE) 5 MG IMMEDIATE RELEASE TABLET    Take 1 tablet (5 mg) by mouth every 6 hours if needed for severe pain (7 - 10). MAX 4 tablets a day    PRUCALOPRIDE (MOTEGRITY) 2 MG TABLET    Take 1 tablet (2 mg) by mouth once daily.    SIMETHICONE (MYLICON) 125 MG CHEWABLE TABLET    Chew 1 tablet (125 mg) every 6 hours if needed for flatulence.    TOVORAFENIB 500 MG/WEEK (100 MG X 5) TABLET    Take 500 mg by mouth 1 (one) time per week. (Five 100 mg tablets = 500 mg.) Swallow tablets whole with water. Do not chew, cut, or crush. Tablets may be taken with or without food. Do not fill before March 15, 2025.    TRIAMCINOLONE (KENALOG) 0.1 % OINTMENT    Apply topically 2 times a day. To trunk, arms, and legs 3x per week for maintenance    VITAMIN A & D CREAM    Apply topically 2  times a day as needed for dry skin or wound care.     Allergies   Allergen Reactions    Morphine Dizziness    Pollen Extracts Runny nose    House Dust Runny nose           Review of Systems  All systems reviewed and otherwise negative, except as stated above in HPI.      Physical Exam     Visit Vitals  /70   Pulse 99   Temp 36.5 °C (97.7 °F)   Resp 16   SpO2 98%   OB Status Having periods   Smoking Status Never       GENERAL:  The patient appears nourished and normally developed. Vital signs as documented.     HEENT:  Head normocephalic, atraumatic, EOMs intact, PERRLA, Mucous membranes moist. Nares patent without copious rhinorrhea.  No lymphadenopathy.    PULMONARY:  Lungs are clear to auscultation, without any respiratory distress. Able to speak full sentences, no accessory muscle use    CARDIAC:   Normal rate. No murmurs, rubs or gallops    ABDOMEN:  Soft, non-distended, non-tender, BS positive x 4 quadrants, No rebound or guarding, no peritoneal signs, no CVA tenderness, no masses or organomegaly    : External exam unremarkable, speculum exam with no discharge, no bleeding, no adnexal tenderness or masses    MUSCULOSKELETAL:   Able to ambulate, Non edematous, with no obvious deformities. Pulses intact distal    SKIN: Hands and feet have scaly plaques with some papules.  Small pink papules/pustules on the face.  Otherwise good color, with no significant rashes.  No pallor.    NEURO:  No obvious neurological deficits, normal sensation and strength bilaterally.  Able to follow commands, NIH 0, CN 2-12 intact.    Psych: Appropriate mood and affect    Consultants  1) dermatology      Impression and Plan  In summary, Sejal Duff is admitted to the [facility] Clinical Decision Unit for anemia and medication tolerance. Dr. White is the CDU admission attending.    This patient has been risk-stratified based on available history, physical exam, and related study findings. Admission to  the observation status for further diagnosis/treatment/monitoring of anemia and medication tolerance is warranted clinically. This extended period of observation is specifically required to determine the need for hospitalization.     The goals of this admission based on the patient’s clinical problem list are:  1) monitor for worsening anemia and signs of bleeding/vital instability  2) monitor tolerance of medications including doxycycline and prednisone      When met, appropriate disposition will be arranged.        Assessment and Plan:    # Anemia  -Worsening anemia with hemoglobin 7.8 and hematocrit 23.8.  -No signs of bleeding  -Will obtain repeat labs in the morning  -Vital sign monitoring per unit protocol    # Medication intolerance  -Has itching, discomfort, nausea, skin crawling sensation  -Advised to take with food  -Given lotions and bordered topical steroids per dermatology  -Trialing minocycline and prednisone  -Will reach out to dermatology in the morning again if poorly tolerated    # Nausea  -Given Zofran.  Benadryl should also help.    # Insomnia  -Benadryl should help with this  -Given melatonin and small dose of Versed.  We did discuss the risks of taking Versed.  Patient in agreement with it.

## 2025-03-23 NOTE — DISCHARGE SUMMARY
Disposition Note  [facility] CLINICAL DECISION  Patient: Sejal Duff  MRN: [id number]  : 2000  Date of Evaluation: 3/22/2025  ED Provider: SANTOSH Davidson DNP      Limitations to history: None   Independent Historian: Yes   External Records Reviewed: EMR       Subjective:    Sejal Duff is a 24 y.o. female has undergone comprehensive diagnostic evaluation and therapeutic management in accordance with the CDU guidelines for rash and anemia. Based on the patient's  clinical response and diagnostic information during this period of observation, it has been determined that the patient will be discharged.      The acute evaluation included:  Orders Placed This Encounter   Procedures    CBC and Auto Differential    Comprehensive metabolic panel    CBC    Basic metabolic panel    CBC and Auto Differential    Adult diet Regular    Weight on admission    Height on admission    Activity (specify) No Restrictions    Vital Signs    No telemetry or cardiac monitoring required    Notify provider    Pain Assessment    Apply sequential compression device    Inpatient consult to Dermatology    Electrocardiogram, 12-lead PRN ACS symptoms    Initiate Observation Send to CDU         Placed in observation at: 3/22/25        Past History     Past Medical History:   Diagnosis Date    Encounter for screening for infections with a predominantly sexual mode of transmission 2020    Screening for STD (sexually transmitted disease)    Melena 2019    Blood in stool    Other specified health status     No pertinent past medical history    Other specified health status     No pertinent past surgical history    Personal history of other (healed) physical injury and trauma     History of sprain of ankle    Unspecified condition associated with female genital organs and menstrual cycle 2020    Cervical motion tenderness     Past Surgical History:   Procedure Laterality Date    BREAST SURGERY  Left     Mass removal    OTHER SURGICAL HISTORY  01/18/2019    No history of surgery     Social History     Socioeconomic History    Marital status: Single   Tobacco Use    Smoking status: Never     Passive exposure: Never    Smokeless tobacco: Never   Vaping Use    Vaping status: Never Used   Substance and Sexual Activity    Alcohol use: Yes     Comment: social    Drug use: Yes     Types: Marijuana    Sexual activity: Yes     Social Drivers of Health     Financial Resource Strain: Low Risk  (12/30/2024)    Overall Financial Resource Strain (CARDIA)     Difficulty of Paying Living Expenses: Not hard at all   Food Insecurity: No Food Insecurity (12/5/2024)    Hunger Vital Sign     Worried About Running Out of Food in the Last Year: Never true     Ran Out of Food in the Last Year: Never true   Transportation Needs: No Transportation Needs (12/30/2024)    PRAPARE - Transportation     Lack of Transportation (Medical): No     Lack of Transportation (Non-Medical): No   Physical Activity: Not on File (5/15/2024)    Received from "[employer]    Physical Activity     Physical Activity: 0   Stress: Not on File (5/15/2024)    Received from [email or web address]    Stress     Stress: 0   Social Connections: Not on File (5/15/2024)    Received from [employer]    Social Connections     Social Connections and Isolation: 0   Intimate Partner Violence: Not At Risk (12/5/2024)    Humiliation, Afraid, Rape, and Kick questionnaire     Fear of Current or Ex-Partner: No     Emotionally Abused: No     Physically Abused: No     Sexually Abused: No   Housing Stability: Low Risk  (12/30/2024)    Housing Stability Vital Sign     Unable to Pay for Housing in the Last Year: No     Number of Times Moved in the Last Year: 0     Homeless in the Last Year: No         Medications/Allergies     Previous Medications    ALBUTEROL 90 MCG/ACTUATION INHALER    Inhale 1-2 puffs every 4 hours if needed for wheezing or shortness of breath.    BENZOYL PEROXIDE  (BENZAC AC) 10 % EXTERNAL WASH    APPLY TOPICALLY 2 TIMES A DAY. WASH TWICE DAILY AS DIRECTED    CHOLECALCIFEROL (VITAMIN D-3) 50,000 UNIT CAPSULE    Take 1 capsule (50,000 Units) by mouth 1 (one) time per week.    CLINDAMYCIN (CLEOCIN T) 1 % LOTION    Apply a pea-size amount to entire face twice daily after washing face with lukewarm water. Use this BEFORE hydrocortisone cream.    CLOBETASOL (TEMOVATE) 0.05 % OINTMENT    Apply topically 2 times a day. To hands and feet 3x per week for maintenance    ERGOCALCIFEROL (VITAMIN D-2) 1.25 MG (78536 UT) CAPSULE    Take 1 capsule (1,250 mcg) by mouth 1 (one) time per week.    HYDROCORTISONE 2.5 % OINTMENT    Apply topically 2 times a day. To face 3x per week for maintenance    IBUPROFEN 800 MG TABLET    Take 1 tablet (800 mg) by mouth every 8 hours if needed for mild pain (1 - 3) (pain).    LACTO NO.[id number] 25B CELL-25B CELL-50 MG CAPSULE    Take 1 capsule by mouth once daily.    LINACLOTIDE (LINZESS) 145 MCG CAPSULE    Take 1 capsule (145 mcg) by mouth once daily in the morning. Take before meals. Do not crush or chew.    LORATADINE (CLARITIN) 10 MG TABLET    Take 1 tablet (10 mg) by mouth once daily.    LORAZEPAM (ATIVAN) 1 MG TABLET    Take 1 tablet (1 mg) 30 minutes prior to MRI on 2/17/25 for claustrophobia/anxiety relief.    METHYLPHENIDATE (RITALIN) 5 MG TABLET    Take 1 tablet (5 mg) by mouth once daily.    MULTIVITAMIN WITH IRON (DAILY MULTIPLE VITAMINS/IRON) TABLET    Take 1 tablet by mouth once daily.    OLANZAPINE (ZYPREXA) 5 MG TABLET    TAKE 1 TABLET (5 MG) BY MOUTH ONCE DAILY AT BEDTIME.    OMEPRAZOLE (PRILOSEC) 40 MG DR CAPSULE    Take 1 capsule (40 mg) by mouth once daily.    OXYCODONE (ROXICODONE) 5 MG IMMEDIATE RELEASE TABLET    Take 1 tablet (5 mg) by mouth every 6 hours if needed for severe pain (7 - 10). MAX 4 tablets a day    PRUCALOPRIDE (MOTEGRITY) 2 MG TABLET    Take 1 tablet (2 mg) by mouth once daily.    SIMETHICONE (MYLICON) 125 MG  CHEWABLE TABLET    Chew 1 tablet (125 mg) every 6 hours if needed for flatulence.    TOVORAFENIB 500 MG/WEEK (100 MG X 5) TABLET    Take 500 mg by mouth 1 (one) time per week. (Five 100 mg tablets = 500 mg.) Swallow tablets whole with water. Do not chew, cut, or crush. Tablets may be taken with or without food. Do not fill before March 15, 2025.    TRIAMCINOLONE (KENALOG) 0.1 % OINTMENT    Apply topically 2 times a day. To trunk, arms, and legs 3x per week for maintenance    VITAMIN A & D CREAM    Apply topically 2 times a day as needed for dry skin or wound care.     Allergies   Allergen Reactions    Morphine Dizziness    Pollen Extracts Runny nose    House Dust Runny nose         Review of Systems  All systems reviewed and otherwise negative, except as stated above in HPI.    Diagnostics reviewed by Keila Ziegler, APRN-CNP, DNP     Labs:  Results for orders placed or performed during the hospital encounter of 03/22/25   CBC and Auto Differential    Collection Time: 03/22/25  5:48 PM   Result Value Ref Range    WBC 7.1 4.4 - 11.3 x10*3/uL    nRBC 0.0 0.0 - 0.0 /100 WBCs    RBC 2.85 (L) 4.00 - 5.20 x10*6/uL    Hemoglobin 7.8 (L) 12.0 - 16.0 g/dL    Hematocrit 23.8 (L) 36.0 - 46.0 %    MCV 84 80 - 100 fL    MCH 27.4 26.0 - 34.0 pg    MCHC 32.8 32.0 - 36.0 g/dL    RDW 16.7 (H) 11.5 - 14.5 %    Platelets 426 150 - 450 x10*3/uL    Neutrophils % 37.6 40.0 - 80.0 %    Immature Granulocytes %, Automated 0.1 0.0 - 0.9 %    Lymphocytes % 32.5 13.0 - 44.0 %    Monocytes % 6.9 2.0 - 10.0 %    Eosinophils % 22.1 0.0 - 6.0 %    Basophils % 0.8 0.0 - 2.0 %    Neutrophils Absolute 2.68 1.20 - 7.70 x10*3/uL    Immature Granulocytes Absolute, Automated 0.01 0.00 - 0.70 x10*3/uL    Lymphocytes Absolute 2.32 1.20 - 4.80 x10*3/uL    Monocytes Absolute 0.49 0.10 - 1.00 x10*3/uL    Eosinophils Absolute 1.58 (H) 0.00 - 0.70 x10*3/uL    Basophils Absolute 0.06 0.00 - 0.10 x10*3/uL   Comprehensive metabolic panel    Collection Time:  03/22/25  5:48 PM   Result Value Ref Range    Glucose 80 74 - 99 mg/dL    Sodium 136 136 - 145 mmol/L    Potassium 3.4 (L) 3.5 - 5.3 mmol/L    Chloride 102 98 - 107 mmol/L    Bicarbonate 25 21 - 32 mmol/L    Anion Gap 12 10 - 20 mmol/L    Urea Nitrogen 5 (L) 6 - 23 mg/dL    Creatinine 0.37 (L) 0.50 - 1.05 mg/dL    eGFR >90 >60 mL/min/1.73m*2    Calcium 8.8 8.6 - 10.6 mg/dL    Albumin 4.2 3.4 - 5.0 g/dL    Alkaline Phosphatase 72 33 - 110 U/L    Total Protein 7.3 6.4 - 8.2 g/dL    AST 24 9 - 39 U/L    Bilirubin, Total 0.9 0.0 - 1.2 mg/dL    ALT 19 7 - 45 U/L   CBC    Collection Time: 03/23/25  6:44 AM   Result Value Ref Range    WBC 3.4 (L) 4.4 - 11.3 x10*3/uL    nRBC 0.0 0.0 - 0.0 /100 WBCs    RBC 2.82 (L) 4.00 - 5.20 x10*6/uL    Hemoglobin 7.6 (L) 12.0 - 16.0 g/dL    Hematocrit 24.1 (L) 36.0 - 46.0 %    MCV 86 80 - 100 fL    MCH 27.0 26.0 - 34.0 pg    MCHC 31.5 (L) 32.0 - 36.0 g/dL    RDW 17.0 (H) 11.5 - 14.5 %    Platelets 413 150 - 450 x10*3/uL   Basic metabolic panel    Collection Time: 03/23/25  6:44 AM   Result Value Ref Range    Glucose 109 (H) 74 - 99 mg/dL    Sodium 136 136 - 145 mmol/L    Potassium 4.1 3.5 - 5.3 mmol/L    Chloride 105 98 - 107 mmol/L    Bicarbonate 24 21 - 32 mmol/L    Anion Gap 11 10 - 20 mmol/L    Urea Nitrogen 5 (L) 6 - 23 mg/dL    Creatinine 0.50 0.50 - 1.05 mg/dL    eGFR >90 >60 mL/min/1.73m*2    Calcium 8.4 (L) 8.6 - 10.6 mg/dL   CBC and Auto Differential    Collection Time: 03/23/25 12:11 PM   Result Value Ref Range    WBC 6.1 4.4 - 11.3 x10*3/uL    nRBC 0.0 0.0 - 0.0 /100 WBCs    RBC 2.90 (L) 4.00 - 5.20 x10*6/uL    Hemoglobin 7.8 (L) 12.0 - 16.0 g/dL    Hematocrit 24.5 (L) 36.0 - 46.0 %    MCV 85 80 - 100 fL    MCH 26.9 26.0 - 34.0 pg    MCHC 31.8 (L) 32.0 - 36.0 g/dL    RDW 17.2 (H) 11.5 - 14.5 %    Platelets 423 150 - 450 x10*3/uL    Neutrophils % 66.6 40.0 - 80.0 %    Immature Granulocytes %, Automated 0.2 0.0 - 0.9 %    Lymphocytes % 26.1 13.0 - 44.0 %    Monocytes % 6.8 2.0 -  10.0 %    Eosinophils % 0.0 0.0 - 6.0 %    Basophils % 0.3 0.0 - 2.0 %    Neutrophils Absolute 4.04 1.20 - 7.70 x10*3/uL    Immature Granulocytes Absolute, Automated 0.01 0.00 - 0.70 x10*3/uL    Lymphocytes Absolute 1.58 1.20 - 4.80 x10*3/uL    Monocytes Absolute 0.41 0.10 - 1.00 x10*3/uL    Eosinophils Absolute 0.00 0.00 - 0.70 x10*3/uL    Basophils Absolute 0.02 0.00 - 0.10 x10*3/uL     *Note: Due to a large number of results and/or encounters for the requested time period, some results have not been displayed. A complete set of results can be found in Results Review.     Radiographs:  No orders to display           Physical Exam     Visit Vitals  /64 (BP Location: Right arm, Patient Position: Lying)   Pulse (!) 103 Comment: nurse notified   Temp 36.5 °C (97.7 °F) (Temporal)   Resp 16   SpO2 98%   OB Status Having periods   Smoking Status Never       GENERAL:  The patient appears nourished and normally developed. Vital signs as documented.     HEENT:  Head normocephalic, atraumatic, EOMs intact, PERRLA, Mucous membranes moist. Nares patent without copious rhinorrhea.  No lymphadenopathy.    PULMONARY:  Lungs are clear to auscultation, without any respiratory distress. Able to speak full sentences, no accessory muscle use    CARDIAC:   Normal rate. No murmurs, rubs or gallops    ABDOMEN:  Soft, non-distended, non-tender, BS positive x 4 quadrants, No rebound or guarding, no peritoneal signs, no CVA tenderness, no masses or organomegaly    MUSCULOSKELETAL:   Able to ambulate, Non edematous, with no obvious deformities. Pulses intact distal    SKIN:   Good color, with no significant rashes.  No pallor.  Per dermatology,    A full body skin exam including scalp, face, eyes, ears, neck, trunk, bilateral upper & lower extremities, toenails and fingernails were examined with the following findings:  - On the hands and feet are thin scaly and slightly crusty papules and plaques more prominent on lateral digits and  palmar/plantar surfaces;  few tender deep seated erythematous papulovesicles  - Small pink papules and pustules on face  - No rash noted on trunk; mostly depigmented macules and patches  NEURO:  No obvious neurological deficits, normal sensation and strength bilaterally.  Able to follow commands, NIH 0, CN 2-12 intact.    Psych: Appropriate mood and affect    Consultants  1) dermatology       Impression and Plan    In summary, Sejal Duff has been cared for according to the standard [facility] Clinical Decision Unit observation protocol for Rash. This extended period of observation was specifically required to determine the need for hospitalization. Prior to discharge from observation, the final physical exam is documented above.     Significant events during the course of observation based on the goals of the clinical problem list include:   1) rash   Implemented dermatology's recommendations. Has not helped but will continue to use the meds   2) Anemia   Although H&H continue to be low but stable. Spoke with Dr. Wolff and will DC with the patient following up outpatient for possible Iron infusion     Based on the patient's condition and test results, the patient will be discharged.     Total length of observation was 18 hours. Dr. Wolff is the CDU disposition attending.      Discharge Diagnosis  Rash  Anemia     Issues Requiring Follow-Up  Rash   Anemia     Discharge Meds     Your medication list        START taking these medications        Instructions Last Dose Given Next Dose Due   minocycline 100 mg tablet  Commonly known as: Dynacin      Take 1 tablet (100 mg) by mouth 2 times a day.       predniSONE 20 mg tablet  Commonly known as: Deltasone      Take 1.5 tablets (30 mg) by mouth once daily.              ASK your doctor about these medications        Instructions Last Dose Given Next Dose Due   albuterol 90 mcg/actuation inhaler      Inhale 1-2 puffs every 4 hours if needed for  wheezing or shortness of breath.       benzoyl peroxide 10 % external wash  Commonly known as: Benzac AC      APPLY TOPICALLY 2 TIMES A DAY. WASH TWICE DAILY AS DIRECTED       cholecalciferol 1.25 mg (50,000 units) capsule  Commonly known as: Vitamin D-3      Take 1 capsule (50,000 Units) by mouth 1 (one) time per week.       clindamycin 1 % lotion  Commonly known as: Cleocin T      Apply a pea-size amount to entire face twice daily after washing face with lukewarm water. Use this BEFORE hydrocortisone cream.       clobetasol 0.05 % ointment  Commonly known as: Temovate      Apply topically 2 times a day. To hands and feet 3x per week for maintenance       ergocalciferol 1250 mcg (50,000 units) capsule  Commonly known as: Vitamin D-2      Take 1 capsule (1,250 mcg) by mouth 1 (one) time per week.       hydrocortisone 2.5 % ointment      Apply topically 2 times a day. To face 3x per week for maintenance       ibuprofen 800 mg tablet      Take 1 tablet (800 mg) by mouth every 8 hours if needed for mild pain (1 - 3) (pain).       Lacto no.38-[id number]-GHP-larch 25B cell-25B cell-50 mg capsule      Take 1 capsule by mouth once daily.       linaCLOtide 145 mcg capsule  Commonly known as: Linzess      Take 1 capsule (145 mcg) by mouth once daily in the morning. Take before meals. Do not crush or chew.       loratadine 10 mg tablet  Commonly known as: Claritin      Take 1 tablet (10 mg) by mouth once daily.       LORazepam 1 mg tablet  Commonly known as: Ativan      Take 1 tablet (1 mg) 30 minutes prior to MRI on 2/17/25 for claustrophobia/anxiety relief.       methylphenidate 5 mg tablet  Commonly known as: Ritalin      Take 1 tablet (5 mg) by mouth once daily.       Motegrity 2 mg tablet  Generic drug: prucalopride      Take 1 tablet (2 mg) by mouth once daily.       multivitamin with iron tablet  Commonly known as: Daily Multiple Vitamins/Iron      Take 1 tablet by mouth once daily.       OLANZapine 5 mg tablet  Commonly  known as: ZyPREXA      TAKE 1 TABLET (5 MG) BY MOUTH ONCE DAILY AT BEDTIME.       omeprazole 40 mg DR capsule  Commonly known as: PriLOSEC      Take 1 capsule (40 mg) by mouth once daily.       oxyCODONE 5 mg immediate release tablet  Commonly known as: Roxicodone      Take 1 tablet (5 mg) by mouth every 6 hours if needed for severe pain (7 - 10). MAX 4 tablets a day       simethicone 125 mg chewable tablet  Commonly known as: Mylicon      Chew 1 tablet (125 mg) every 6 hours if needed for flatulence.       tovorafenib 500 mg/week (100 mg x 5) tablet      Take 500 mg by mouth 1 (one) time per week. (Five 100 mg tablets = 500 mg.) Swallow tablets whole with water. Do not chew, cut, or crush. Tablets may be taken with or without food. Do not fill before March 15, 2025.       triamcinolone 0.1 % ointment  Commonly known as: Kenalog      Apply topically 2 times a day. To trunk, arms, and legs 3x per week for maintenance       vitamin A & D cream      Apply topically 2 times a day as needed for dry skin or wound care.                 Where to Get Your Medications        These medications were sent to [facility]/pharmacy #[id number] - [place] [id number] [facility] AT [address] [address]      Hours: 24-hours Phone: [phone]   minocycline 100 mg tablet  predniSONE 20 mg tablet         Test Results Pending At Discharge  Pending Labs       No current pending labs.            CDU COURSE:  The patient was admitted to the CDU to monitor her CBC. CBC continues to be lower than baseline but the patient is stable. I also reached out to Dr. Aguilar and updated him with her H&H and he recommended following dermatology's recent recommendations and that she can hold her chemo dose. This was communicated to her and her mom who were both not pleased with the outpatient plan. I tried to review all her results and all the medical decision making that went into discharging her.   She  also requested something stronger for pain at discharge but she had driven to the hospital so I ordered Tylenol and Norflex but this was further discussed with her mom and the patient so the plan was for her dad to pick her up so she could have a dose of Oxycodone prior to discharge. I also checked [place]ARRS which showed 120 tabs of oxycodone given to her on 3/23. I asked her to follow up with her oncologist should she need any additional pills or possibly a higher dose.   I spoke with  Dr. Wolff and we felt that the patient is stable enough to be followed outpatient.   She has an established dermatologist whom she can follow up with.    Outpatient Follow-Up  Future Appointments   Date Time Provider Department [place]   4/1/2025 10:00 AM Deborah Snowden PA-C [id number] [facility]   4/1/2025 10:30 AM Kala Fernández APRN-CNP [id number] [facility]   4/1/2025 11:00 AM JASVIR Corado-CNP [id number] [facility]   4/18/2025 10:00 AM Migue Aguilar MD [id number] [facility]   5/12/2025  8:45 AM Judy Patel MD [id number] [facility]   8/21/2025  9:30 AM Den Fajardo MD PhD [id number] [facility]         Keila Ziegler APRN-CNP, DNP

## 2025-03-23 NOTE — DISCHARGE INSTRUCTIONS
- Start minocycline 100 BID. Take with food to reduce GI upset. Can decrease to 1 time daily if not tolerating well.  - Start prednisone 30 daily until follow up with outpatient dermatology.   - Continue clobetasol 0.05% ointment BID under occlusion to hands and feet.  - We will reach out to the patient to help set up outpatient follow up with Dermatology. Will reach out to patient's dermatologist, Dr. Patel.

## 2025-03-24 ENCOUNTER — TELEPHONE (OUTPATIENT)
Dept: HEMATOLOGY/ONCOLOGY | Facility: HOSPITAL | Age: 25
End: 2025-03-24
Payer: COMMERCIAL

## 2025-03-24 ENCOUNTER — PATIENT MESSAGE (OUTPATIENT)
Dept: HEMATOLOGY/ONCOLOGY | Facility: HOSPITAL | Age: 25
End: 2025-03-24
Payer: COMMERCIAL

## 2025-03-24 DIAGNOSIS — D64.9 ANEMIA, UNSPECIFIED TYPE: Primary | ICD-10-CM

## 2025-03-24 NOTE — TELEPHONE ENCOUNTER
No answer. Sent to , VM not set up yet. Will send mychart. Called to update her on plan. Will update through [facility].    Selena ARNDT RN

## 2025-03-26 ENCOUNTER — PATIENT MESSAGE (OUTPATIENT)
Dept: PALLIATIVE MEDICINE | Facility: HOSPITAL | Age: 25
End: 2025-03-26
Payer: COMMERCIAL

## 2025-03-26 DIAGNOSIS — G89.18 ACUTE POST-OPERATIVE PAIN: ICD-10-CM

## 2025-03-26 DIAGNOSIS — Z98.2 S/P VP SHUNT: ICD-10-CM

## 2025-03-26 NOTE — PATIENT COMMUNICATION
Patient last seen by JASVIR Fernández on 2/18 with plan to continue oxycodone 5mg q6h PRN. Follow up visit is scheduled for 4/1. OARRS reviewed and no aberrancy noted. Patient last filled oxycodone 5mg #120/30 days on 3/4. Prescription pended to provider to approve.

## 2025-03-27 RX ORDER — OXYCODONE HYDROCHLORIDE 5 MG/1
5 TABLET ORAL EVERY 6 HOURS PRN
Qty: 120 TABLET | Refills: 0 | Status: SHIPPED | OUTPATIENT
Start: 2025-04-02 | End: 2025-05-02

## 2025-03-31 ASSESSMENT — ENCOUNTER SYMPTOMS
APPETITE CHANGE: 1
NAUSEA: 1

## 2025-03-31 NOTE — PROGRESS NOTES
[facility]  Neuro-Oncology    Patient: Sejal Duff  MRN: [id number]  Date of visit: 04/01/25  Visit type: In-person clinic visit  Clinician: Deborah Snowden PA-C    Oncological & Treatment History:  Oncology History   Pilocytic astrocytoma (Multi)   10/2024 Initial Diagnosis    23 yo F with PMH of anemia, asthma, GERD, IBS, PCOS, and breast fibroadenoma s/p L breast excisional biopsy presented to [facility] ED on 10/14/24 with headache. CTH parasellar mass with calcifications. MRI with 2.4 cm peripherally enhancing necrotic mass with non-enhancing FLAIR hyperintense lesion in medial L temporal lobe. Admitted for neurosurgical management.     10/15/2024 Biopsy    R stereotactic brain biopsy  Surgeon: Den Segal MD    Prelim path: Glial cells with luigi fibers vs craniopharyngioma     10/23/2024 Tumor Board    Procedure: R stereotactic brain biopsy 10/15/2024   Pathology: Final pending; likely to be a glioma, areas of necrosis noted.       The CNS Tumor Board tumor board considered available treatment options and made the following recommendations: Radiation oncology referral. Add back to CNS TB once pathology is completed.     11/5/2024 Pathology     A.  B.  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  See note     Note:  Microscopic examination for parts A and B reveal a low-grade glial neoplasm immunoreactive with OLIG-2 and GFAP.  Neurofilament immunohistochemical stain highlights neuropil and scattered neuronal bodies.  ATRX and J4P24ci immunohistochemical stains are retained.  IDH1 and BRAF immunohistochemical stains are negative.  GLIOSEQ studies reveal a BRAF-V600E mutation.  This finding in conjunction with the morphology are most consistent with a pilocytic astrocytoma.  However, a ganglioglioma among other low-grade glial/glial neuronal neoplasms, cannot be completely ruled out.  Clinical correlation is recommended      11/6/2024 Tumor Board    CSF analysis negative.   Pathology:  Low grade glial neoplasm - BRAF - V600E mutation      The CNS Tumor Board tumor board considered available treatment options and made the following recommendations: Repeat MRI brain w/wo and perfusion study. Sellar imaging. Continue following with neuro-oncology. Radiation oncology referral.      11/29/2024 - 12/6/2024 Hospital Admission    Discharge diagnosis: Hydrocephalus    Returned to the [facility] ED 11/28/24 with worsening HA, light sensitivity, CTH incr vents, MRI incr ventricular mass 2.7x2.5cm, ventriculomegaly, trace R ventricular GRE. Patient admitted to neurosurgery service for further management.    11/28 Ophthalmology evaluation without papilledema.  12/1 s/p R VPS (certas at 5) -- Dr. Yap at [facility]  12/2 CTH with catheter in position.      12/29/2024 - 12/31/2024 Hospital Admission    Discharge diagnosis: Hydrocephalus    Returned to the [facility] ED on 12/28/24 with generalized fatigue, HA and body aches. CT head with increased ventricles, SS Certas at 4, LLQ cath with turn vs possible kink, CT AP catheter without kink or discontinuity, no pseudocyst. Patient admitted to the neurosurgery service for further management/workup.    12/28 s/p shunt tap with no proximal flow.   12/29 s/p proximal shunt revision, distal cath flushing. CT head post op with decreased ventricles, catheter in position.         HPI:  Sejal Duff is a 24 y.o. 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      Interval History (NEWEST at BOTTOM of section):  10/29/24: Today she reports some slight headaches but not worsening.  She is currently on dexamethasone 2 mg twice daily.  She takes the morning dose around 9 or 10 AM and the evening dose  before bedtime.  She takes ondansetron about 1 hour prior to the dexamethasone.  She endorses significant issues with vomiting after taking the dexamethasone as well as decreased appetite.  She is able to drink enough fluids.  She states that she tried taking 8 mg of ondansetron prior to the dexamethasone but this did not help either.  She states that the dexamethasone makes her very jittery.  She has not taken dexamethasone for the last 2 days.  She continues to have constipation which is her baseline where she does not have a bowel movement for weeks.  She has been referred by her gastroenterologist to integrative medicine for further help with bowel movements.  Otherwise denies any worsening of vision or new gait or balance issues.  11/8/24: Continues to have some headaches but not worse. She has decreased dexamethasone to 1mg bid on her own and she denies any worsening of her headaches. Denies new balance or strength issues.   11/19/2024: Her headaches are about the same intensity.  Denies new nausea/vomiting episodes.  Denies new gait or balance changes.  Denies new vision changes.   11/26/2024: Sejal states her headaches are worse.  She has not been able to sleep much.  Denies new vision problems.  Denies new weakness or gait balance/issues.  Denies new episodes of nausea or vomiting.  She is upset at the MRI spine being canceled recently.    01/16/25: Since the last visit, Sejal underwent  shunt placement for hydrocephalus (12/1/24) and a subsequent VPS revision (12/29/24). She also started tovorafenib on 12/21/24 and has been taking it once weekly as directed on Saturdays. Endorses significant fatigue and body aches for 2-3 days after taking tovorafenib. Endorses a new acne-like rash to her lower face that started with facial tenderness after her 2nd or 3rd dose of tovorafenib; rash is spreading to her nasolabial folds. States she washes her face with warm water and applies Aquaphor to the affected areas.  "She also reports intermittent episodes over the past 3 weeks where she feels overheated, lightheaded, and needs to utilize her inhaler; states it is similar to asthma flares; denies anxiety precipitating episodes. Also reports persistent nausea since her last surgery. States even water makes her feel nauseated. Feels like she's going to vomit, but it \"sits in [her] chest\" and burns. States she takes Zofran \"like it's candy.\" Denies drinking caffeine/carbonated drinks, fried/spicy foods, and lying down within 30 min of eating. States she hasn't picked up Olanzapine and doesn't take omeprazole as she is wary of being on many medications.    02/04/25: Saw patient in clinic for an impromptu visit regarding her worsening acneiform rash after patient saw [facility] today. No improvement in symptoms since using topical hydrocortisone 2.5% and clindamycin 1%. Now has some similar lesions on her chest. Pictures entered into chart. Additionally, patient reports a nosebleed with clots yesterday that resolved after about 15 minutes. She also has questions about medications -- states she was too fatigued the day after olanzapine and wants to clarify which meds she should/should not be taking. Her mother is present as well -- mother notes family history of cancer, which contributes to her concerns about patient's diagnosis/treatment.    2/18/2025: She denies any visual changes or headaches.  Does endorse some fatigue.  Feels her acneform rash is worsening and has spread more through her torso and back region.  It is itching which prompts her to scratch and then it bleeds.  Also notices some pustular appearing lesions by her wrist and on her feet.  Has been using the clindamycin and hydrocortisone topical lotions.  She has not taken the oral doxycycline as prescribed due to fear of nausea which will keep her from eating.  She feels that her appetite is already bad from her baseline nausea issues.  Also endorses some shortness " "of breath when doing normal walking like from the parking garage to the clinic.  Denies any dyspnea at rest.  Denies chest pain or lower extremity edema.  She has asthma at baseline but says that normally she uses a nebulizer or inhaler only when she is sick with a respiratory infection which exacerbates her asthma.  But she has been needing to use her inhaler and nebulizer more often now.  States her menstrual periods have not returned to baseline since the most recent hospitalization.  Normally she has a quite regularly once monthly.  Her last menstrual period was in November 2024.    04/01/25: Sejal presents for follow-up accompanied by her mother. Her tovorafenib has been held since 3/29/25 due to hemoglobin <10. She reports fatigue, decreased energy, and hypersomnia. Endorses lightheadedness if she's 'too active' or after a hot shower. Reports spontaneous L epistaxis for 10-15 minutes every 1-2 days 'when too active.' Denies hematemesis, hematochezia, melena, and hematuria. Still no menstrual periods since 11/2024. Notes continued intermittent abdominal cramping near the surgical incisions; states the pain is \"like a shocking wave\" when it occurs. Still no appetite. Reports color change to her eyelashes, eyebrows, and hair. Reports improvement with some of her diffuse rash but with resulting hypopigmentation; still has itching - clobetasol and hydroxyzine help. Still has cracked skin to the flexural palmar surfaces of her bilateral hands with a couple open fissures. States she initially had improvement with minocycline, but then became unable to tolerate additional doses as she vomited 10-15 minutes after each dose; she did not start prednisone; she inquires about IV antibiotic options.    Review of Systems:  Review of Systems   Constitutional:  Positive for appetite change and fatigue. Negative for chills and fever.   HENT:   Positive for nosebleeds.    Respiratory:  Negative for hemoptysis and shortness of " breath (improved breathing; using inhalers less).    Cardiovascular:  Negative for chest pain.   Gastrointestinal:  Positive for abdominal pain, nausea and vomiting. Negative for blood in stool.   Genitourinary:  Negative for hematuria and vaginal bleeding. Menstrual problem: no period since 11/2024.   Skin:  Positive for rash.   Neurological:  Positive for headaches (unchanged occasional bilat temporal HA managed with oxycodone). Negative for extremity weakness, seizures and speech difficulty. Numbness: regaining some feeling to scalp/surgical area; sometimes numbness in hands and feet.      Social History:  Social History     Tobacco Use    Smoking status: Never     Passive exposure: Never    Smokeless tobacco: Never   Vaping Use    Vaping status: Never Used   Substance Use Topics    Alcohol use: Yes     Comment: social    Drug use: Yes     Types: Marijuana   Occupation: [employer]  Currently working [employer] Tuesday-Friday    Past Medical History:  Past Medical History:   Diagnosis Date    Encounter for screening for infections with a predominantly sexual mode of transmission 03/02/2020    Screening for STD (sexually transmitted disease)    Melena 04/12/2019    Blood in stool    Other specified health status     No pertinent past medical history    Other specified health status     No pertinent past surgical history    Personal history of other (healed) physical injury and trauma     History of sprain of ankle    Unspecified condition associated with female genital organs and menstrual cycle 03/02/2020    Cervical motion tenderness       Past Surgical History:  Past Surgical History:   Procedure Laterality Date    BREAST SURGERY Left     Mass removal    OTHER SURGICAL HISTORY  01/18/2019    No history of surgery       Family History:  Family History   Problem Relation Name Age of Onset    Asthma Mother      Colon cancer Mother         Performance Score:  Karnofsky Score: 90 - Able to carry on normal  activity; minor signs or symptoms of disease     Vital Signs:  /61 (BP Location: Left arm, Patient Position: Sitting, BP Cuff Size: Adult)   Pulse 95   Temp 36.1 °C (97 °F) (Temporal)   Resp 22   Wt 58.7 kg (129 lb 6.6 oz)   SpO2 100%   BMI 22.21 kg/m²      Physical Exam:  Physical Exam  Vitals reviewed.   Constitutional:       General: She is not in acute distress.     Appearance: She is not toxic-appearing.   HENT:      Head: Normocephalic and atraumatic.      Comments: Loss of pigment to eyelashes, eyebrows, and hair  Eyes:      Extraocular Movements: Extraocular movements intact.      Conjunctiva/sclera: Conjunctivae normal.   Pulmonary:      Effort: Pulmonary effort is normal. No respiratory distress.   Musculoskeletal:         General: Normal range of motion.      Cervical back: Normal range of motion and neck supple.   Skin:     General: Skin is dry.      Comments: Improvement in rash that was on entire body, now has resulting spots of hypopigmentation; flexural palmar surfaces with scaling and healing fissures; at least one recently open fissure to L index finger; healing fissure to R lateral foot   Neurological:      General: No focal deficit present.      Mental Status: She is alert and oriented to person, place, and time.      Cranial Nerves: No cranial nerve deficit.      Gait: Gait normal.   Psychiatric:         Mood and Affect: Mood normal.         Behavior: Behavior normal.         Results:  Lab Results   Component Value Date    WBC 8.6 04/01/2025    HGB 7.9 (L) 04/01/2025    HCT 25.7 (L) 04/01/2025    MCV 88 04/01/2025     04/01/2025       Lab Results   Component Value Date    GLUCOSE 89 04/01/2025    CALCIUM 9.2 04/01/2025     04/01/2025    K 3.7 04/01/2025    CO2 26 04/01/2025     (H) 04/01/2025    BUN 7 04/01/2025    CREATININE 0.44 (L) 04/01/2025       Lab Results   Component Value Date    ALT 13 04/01/2025    AST 19 04/01/2025    ALKPHOS 66 04/01/2025    BILITOT  "0.7 04/01/2025     === 02/17/25 ===    MR BRAIN W AND WO CONTRAST    - Impression -  1. Decreased size of rim enhancing hypothalamic mass compatible with  known pilocytic astrocytoma.  2. Postsurgical changes of right posterior ventriculostomy shunt with  tip ending within the ventricular system. When compared to MR dated  11/28/2024 and interval CT of 12/29/2024, there has been significant  resolution of the dilation of the lateral ventricles.    I personally reviewed the images/study and I agree with the findings  as stated by Elijah Ochoa MD. This study was interpreted at  [facility], [place], [place].    MACRO:  None    Signed by: Keith Garcia 2/18/2025 3:13 PM  Dictation workstation:   [id number]      Assessment & Plan:  Sejal Duff is a 24 y.o. 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 She also developed hydrocephalus and is s/p VPS (12/1/24) with revision (12/29/24). She started on tovorafenib (type II JUAN CARLOS inhibitor) on 12/21/24 with course c/b skin toxicities.    Previously discussed diagnosis with patient and her mother - explained that her tumor is low-grade, meaning it is classified as slow-growing and benign/non-cancerous, but it is causing issues/symptoms that make it not harmless. Explained that such tumors could possibly transform into a higher grade, meaning become malignant/cancerous, or could still remain \"benign\" but grow large enough to cause significant, even life-threatening problems. Discussed that our goal with tovorafenib is to shrink the tumor as much as possible and/or reach stability to the point that we can monitor with imaging or we can have neurosurgery " evaluate again whether surgery is feasible/safe, but there are no guarantees.    Today, patient is s/p 3 full cycles; the 4th dose of the 4th cycle was held due to her skin toxicities and low hemoglobin (baseline around 9-11). Overall, she is stable - no new neuro deficits. Patient amenable to plan below.    1. 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 Once able, the plan is to restart at 400 mg PO once weekly (decreased from 500 mg PO once weekly)  Will obtain CBC/diff weekly until recovery in Hgb  Tempus: Will plan to obtain if patient amenable for further genetic evaluation -- deferred to future visit if still appropriate  MRI brain w/wo contrast every 2-3 months to assess treatment response  2/2025 MRI spine did not show any evidence of drop metastasis or disseminated disease  2/2025 MRI brain compared to 11/2024 MRI brain showed decrease in size of lesion  Order placed for MRI brain w/wo contrast on 5/26/25  Needs 1 mg Ativan Rx to take 30 minutes prior to MRI  Follow-up in 4 weeks with me for reassessment, then will have follow-up with Dr. Aguilar on 5/27/25 after MRI    2. Acneiform drug eruption  3. Flexural atopic dermatitis  Reached out to Dr. Patel -- Taking minocycline at night is not recommended due to increased risk of acid relux. IV antibiotics are not an option in this case due to needing a longer treatment course for inflammation, not infection. She could switch from minocycline to Keflex 500 mg BID x 2 months, but patient needs to contact on[facility]der[facility] office via [employer] or by calling [phone] to schedule a  sooner follow-up.  Face and body are much improved compared to the last visit with me -- now has many small areas of hypopigmentation; also has scaling and a few healing fissures to palmar surface of bilateral hands. Continue with clobetasol and other meds per dermatology.    4. Persistent n/v and abd pain  Will reach out to her GI team    Orders Placed This Encounter   Procedures    MR brain w and wo IV contrast    CBC and Auto Differential

## 2025-04-01 ENCOUNTER — SOCIAL WORK (OUTPATIENT)
Dept: CASE MANAGEMENT | Facility: HOSPITAL | Age: 25
End: 2025-04-01

## 2025-04-01 ENCOUNTER — LAB (OUTPATIENT)
Dept: LAB | Facility: HOSPITAL | Age: 25
End: 2025-04-01
Payer: COMMERCIAL

## 2025-04-01 ENCOUNTER — OFFICE VISIT (OUTPATIENT)
Dept: PALLIATIVE MEDICINE | Facility: HOSPITAL | Age: 25
End: 2025-04-01
Payer: COMMERCIAL

## 2025-04-01 ENCOUNTER — OFFICE VISIT (OUTPATIENT)
Dept: HEMATOLOGY/ONCOLOGY | Facility: HOSPITAL | Age: 25
End: 2025-04-01
Payer: COMMERCIAL

## 2025-04-01 VITALS
OXYGEN SATURATION: 100 % | WEIGHT: 129.41 LBS | TEMPERATURE: 97 F | BODY MASS INDEX: 22.21 KG/M2 | SYSTOLIC BLOOD PRESSURE: 108 MMHG | RESPIRATION RATE: 22 BRPM | DIASTOLIC BLOOD PRESSURE: 61 MMHG | HEART RATE: 95 BPM

## 2025-04-01 DIAGNOSIS — G89.3 CANCER ASSOCIATED PAIN: Primary | ICD-10-CM

## 2025-04-01 DIAGNOSIS — Z98.2 S/P VP SHUNT: ICD-10-CM

## 2025-04-01 DIAGNOSIS — R63.0 LOSS OF APPETITE: ICD-10-CM

## 2025-04-01 DIAGNOSIS — C71.9 PILOCYTIC ASTROCYTOMA (MULTI): ICD-10-CM

## 2025-04-01 DIAGNOSIS — Z51.5 ENCOUNTER FOR PALLIATIVE CARE: ICD-10-CM

## 2025-04-01 DIAGNOSIS — Z51.5 PALLIATIVE CARE ENCOUNTER: ICD-10-CM

## 2025-04-01 DIAGNOSIS — D64.9 LOW HEMOGLOBIN: ICD-10-CM

## 2025-04-01 DIAGNOSIS — N91.2 AMENORRHEA: ICD-10-CM

## 2025-04-01 DIAGNOSIS — R11.0 NAUSEA: ICD-10-CM

## 2025-04-01 DIAGNOSIS — G89.18 ACUTE POST-OPERATIVE PAIN: ICD-10-CM

## 2025-04-01 DIAGNOSIS — C71.9 PILOCYTIC ASTROCYTOMA (MULTI): Primary | ICD-10-CM

## 2025-04-01 DIAGNOSIS — R53.0 NEOPLASTIC MALIGNANT RELATED FATIGUE: ICD-10-CM

## 2025-04-01 DIAGNOSIS — R53.83 OTHER FATIGUE: ICD-10-CM

## 2025-04-01 LAB
ALBUMIN SERPL BCP-MCNC: 4.2 G/DL (ref 3.4–5)
ALP SERPL-CCNC: 66 U/L (ref 33–110)
ALT SERPL W P-5'-P-CCNC: 13 U/L (ref 7–45)
ANION GAP SERPL CALC-SCNC: 11 MMOL/L (ref 10–20)
AST SERPL W P-5'-P-CCNC: 19 U/L (ref 9–39)
BASOPHILS # BLD AUTO: 0.05 X10*3/UL (ref 0–0.1)
BASOPHILS NFR BLD AUTO: 0.6 %
BILIRUB SERPL-MCNC: 0.7 MG/DL (ref 0–1.2)
BUN SERPL-MCNC: 7 MG/DL (ref 6–23)
CALCIUM SERPL-MCNC: 9.2 MG/DL (ref 8.6–10.3)
CHLORIDE SERPL-SCNC: 108 MMOL/L (ref 98–107)
CO2 SERPL-SCNC: 26 MMOL/L (ref 21–32)
CREAT SERPL-MCNC: 0.44 MG/DL (ref 0.5–1.05)
EGFRCR SERPLBLD CKD-EPI 2021: >90 ML/MIN/1.73M*2
EOSINOPHIL # BLD AUTO: 1.11 X10*3/UL (ref 0–0.7)
EOSINOPHIL NFR BLD AUTO: 13 %
ERYTHROCYTE [DISTWIDTH] IN BLOOD BY AUTOMATED COUNT: 16 % (ref 11.5–14.5)
GLUCOSE SERPL-MCNC: 89 MG/DL (ref 74–99)
HCT VFR BLD AUTO: 25.7 % (ref 36–46)
HGB BLD-MCNC: 7.9 G/DL (ref 12–16)
IMM GRANULOCYTES # BLD AUTO: 0.02 X10*3/UL (ref 0–0.7)
IMM GRANULOCYTES NFR BLD AUTO: 0.2 % (ref 0–0.9)
LYMPHOCYTES # BLD AUTO: 1.48 X10*3/UL (ref 1.2–4.8)
LYMPHOCYTES NFR BLD AUTO: 17.3 %
MCH RBC QN AUTO: 27.1 PG (ref 26–34)
MCHC RBC AUTO-ENTMCNC: 30.7 G/DL (ref 32–36)
MCV RBC AUTO: 88 FL (ref 80–100)
MONOCYTES # BLD AUTO: 1.04 X10*3/UL (ref 0.1–1)
MONOCYTES NFR BLD AUTO: 12.1 %
NEUTROPHILS # BLD AUTO: 4.87 X10*3/UL (ref 1.2–7.7)
NEUTROPHILS NFR BLD AUTO: 56.8 %
NRBC BLD-RTO: 0 /100 WBCS (ref 0–0)
PLATELET # BLD AUTO: 387 X10*3/UL (ref 150–450)
POTASSIUM SERPL-SCNC: 3.7 MMOL/L (ref 3.5–5.3)
PROT SERPL-MCNC: 7.9 G/DL (ref 6.4–8.2)
RBC # BLD AUTO: 2.91 X10*6/UL (ref 4–5.2)
SODIUM SERPL-SCNC: 141 MMOL/L (ref 136–145)
WBC # BLD AUTO: 8.6 X10*3/UL (ref 4.4–11.3)

## 2025-04-01 PROCEDURE — 99215 OFFICE O/P EST HI 40 MIN: CPT

## 2025-04-01 PROCEDURE — 99214 OFFICE O/P EST MOD 30 MIN: CPT | Performed by: NURSE PRACTITIONER

## 2025-04-01 PROCEDURE — 1036F TOBACCO NON-USER: CPT | Performed by: NURSE PRACTITIONER

## 2025-04-01 PROCEDURE — 2500000001 HC RX 250 WO HCPCS SELF ADMINISTERED DRUGS (ALT 637 FOR MEDICARE OP): Mod: SE | Performed by: NURSE PRACTITIONER

## 2025-04-01 PROCEDURE — 85025 COMPLETE CBC W/AUTO DIFF WBC: CPT

## 2025-04-01 PROCEDURE — 80053 COMPREHEN METABOLIC PANEL: CPT

## 2025-04-01 PROCEDURE — 36415 COLL VENOUS BLD VENIPUNCTURE: CPT

## 2025-04-01 RX ORDER — OXYCODONE HYDROCHLORIDE 5 MG/1
TABLET ORAL
Status: DISPENSED
Start: 2025-04-01 | End: 2025-04-01

## 2025-04-01 RX ORDER — OXYCODONE HYDROCHLORIDE 5 MG/1
5 TABLET ORAL EVERY 6 HOURS PRN
Qty: 120 TABLET | Refills: 0 | Status: SHIPPED | OUTPATIENT
Start: 2025-04-02 | End: 2025-05-02

## 2025-04-01 RX ORDER — METHYLPHENIDATE HYDROCHLORIDE 5 MG/1
5 TABLET ORAL DAILY
Qty: 30 TABLET | Refills: 0 | Status: SHIPPED | OUTPATIENT
Start: 2025-04-01 | End: 2025-05-01

## 2025-04-01 RX ORDER — OXYCODONE HYDROCHLORIDE 5 MG/1
5 TABLET ORAL ONCE
Status: COMPLETED | OUTPATIENT
Start: 2025-04-01 | End: 2025-04-01

## 2025-04-01 RX ORDER — ONDANSETRON HYDROCHLORIDE 8 MG/1
8 TABLET, FILM COATED ORAL EVERY 8 HOURS PRN
Qty: 30 TABLET | Refills: 2 | Status: SHIPPED | OUTPATIENT
Start: 2025-04-01 | End: 2025-05-01

## 2025-04-01 RX ORDER — OLANZAPINE 10 MG/1
10 TABLET ORAL NIGHTLY
Qty: 30 TABLET | Refills: 2 | Status: SHIPPED | OUTPATIENT
Start: 2025-04-01 | End: 2025-06-30

## 2025-04-01 RX ORDER — OLANZAPINE 5 MG/1
5 TABLET ORAL NIGHTLY
Qty: 90 TABLET | Refills: 1 | Status: SHIPPED | OUTPATIENT
Start: 2025-04-01 | End: 2025-04-01 | Stop reason: WASHOUT

## 2025-04-01 RX ADMIN — OXYCODONE 5 MG: 5 TABLET ORAL at 11:05

## 2025-04-01 ASSESSMENT — ENCOUNTER SYMPTOMS
NERVOUS/ANXIOUS: 0
NAUSEA: 1
SLEEP DISTURBANCE: 0
APPETITE CHANGE: 1
ABDOMINAL DISTENTION: 1
DECREASED CONCENTRATION: 0
HEADACHES: 1
FATIGUE: 1
DEPRESSION: 1
CONSTIPATION: 1

## 2025-04-01 ASSESSMENT — PAIN SCALES - GENERAL
PAINLEVEL_OUTOF10: 0-NO PAIN
PAINLEVEL_OUTOF10: 8

## 2025-04-01 NOTE — PROGRESS NOTES
SUPPORTIVE AND PALLIATIVE ONCOLOGY OUTPATIENT FOLLOW-UP      Medical Oncologist: Migue Aguilar MD    Radiation Oncologist: No care team member to display  Primary Physician: Melba Graham  [phone]    Subjective   HISTORY OF PRESENT ILLNESS: Sejal Duff is a 24 y.o. 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      Patient returned to the [facility] ED 11/28 with worsening HA, light sensitivity, CTH incr vents, MRI incr ventricular mass 2.7x2.5cm, ventriculomegaly, trace R ventricular GRE. Patient admitted to neurosurgery service for further management.      11/28/24 Ophthalmology evaluation without papilledema.  12/1/24 s/p R VPS (certas at 5).   12/2/24 CTH with catheter in position, decreased vents   12/4/24 CTH decreased vents but persistent ventriculomegly; Certas dialed to 4  12/5/24 CTH decreased vents.  Continued headaches Neurology consulted--> likely due to underlying structural mass and recent surgical interventions.  Started on scheduled oral headache cocktail meds of Robaxin, Tylenol, reglan and Benadryl  12/6/24 Ophthalmology consulted for c/o vision changes--> normal exam, no disc edema  Neurology--> HA's migrainous in nature, has had prior and now worse in setting of underlying structural mass and recent VPS surgery.  Started on preventative therapy with amitriptyline 12.5 mg bedtime for 1 week and increase  to 25mg if tolerates and abortive therapy with Rizatriptan PRN  12/21/24: started on tovorafenib (type II JUAN CARLOS inhibitor)    12/29/24 had a subsequent VPS revision     2/18/24: Previously followed with Amy Calloway CNP. Has c/o full body acneiform rash . Started in December on face but spread to back/chest/hands/legs within the last couple weeks. She did not start doxy per E consult. Has derm appt but not until June.     4/1/25: Fatigue persists and she is sleeping a significant amount of the day. Never started ritalin. Also having epistaxis and worsening anemia. She endorses chronic abd pain and headaches but no other overt signs of bleeding. Appetite is poor. She also endorses nausea with meds and insomnia. Denies depression. Following with derm for her skin rash.     Symptom Assessment:    Pain:   Headaches  RLQ pain  - fairly constant - aching and stabbing - chronic since shunt placement   Numbness or tingling in hands/feet/other: none  Lack of appetite: a little  Weight loss: a little  Nausea/early satiety: a little  Vomiting: none  Constipation: a little  Diarrhea: none  Lack of energy: somewhat  Difficulty sleeping: a little  Anxiety: none  Depression: none  Shortness of breath: a little  Other: none    Information obtained from: chart review and interview of patient  ______________________________________________________________________     Patient's Oncology History documentation       Oncology History   Pilocytic astrocytoma (Multi)   10/15/2024 Surgery     Biopsy of hypothalamic lesion by Dr. Segal at [facility]       10/15/2024 Pathology      A.  B.  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  See note     Note:  Microscopic examination for parts A and B reveal a low-grade glial neoplasm immunoreactive with OLIG-2 and GFAP.  Neurofilament immunohistochemical stain highlights neuropil and scattered neuronal bodies.  ATRX and [id number]  immunohistochemical stains are retained.  IDH1 and BRAF immunohistochemical stains are negative.  GLIOSEQ studies reveal a BRAF-V600E mutation.  This finding in conjunction with the morphology are most consistent with a pilocytic astrocytoma.  However, a ganglioglioma among other low-grade glial/glial neuronal neoplasms, cannot be completely ruled out.  Clinical correlation is recommended               Medical History        Past Medical History:   Diagnosis Date    Encounter for screening for infections with a predominantly sexual mode of transmission 03/02/2020     Screening for STD (sexually transmitted disease)    Melena 04/12/2019     Blood in stool    Other specified health status       No pertinent past medical history    Other specified health status       No pertinent past surgical history    Personal history of other (healed) physical injury and trauma       History of sprain of ankle    Unspecified condition associated with female genital organs and menstrual cycle 03/02/2020     Cervical motion tenderness         Surgical History         Past Surgical History:   Procedure Laterality Date    BREAST SURGERY Left       Mass removal    OTHER SURGICAL HISTORY   01/18/2019     No history of surgery         Family History          Family History   Problem Relation Name Age of Onset    Asthma Mother        Colon cancer Mother                SOCIAL HISTORY  Social History:  reports that she has never smoked. She has never been exposed to tobacco smoke. She has never used smokeless tobacco. She reports current alcohol use. She reports current drug use. Drug: Marijuana.  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 No children. Single. Primary support is her mom.       Objective     Creatinine   Date Value Ref Range Status   04/01/2025 0.44 (L) 0.50 - 1.05 mg/dL Final     AST   Date Value Ref Range Status   04/01/2025 19 9 - 39 U/L Final     ALT   Date Value Ref Range Status   04/01/2025 13 7 - 45 U/L Final     Comment:     Patients  treated with Sulfasalazine may generate falsely decreased results for ALT.     Hemoglobin   Date Value Ref Range Status   04/01/2025 7.9 (L) 12.0 - 16.0 g/dL Final     Platelets   Date Value Ref Range Status   04/01/2025 387 150 - 450 x10*3/uL Final       PHYSICAL EXAMINATION   Vital Signs:   Vital signs reviewed      3/22/2025     1:53 PM 3/23/2025    12:30 AM 3/23/2025     1:01 AM 3/23/2025     4:10 AM 3/23/2025     8:45 AM 3/23/2025    12:35 PM 4/1/2025     9:55 AM   Vitals   Systolic 106 98  101 107 118 108   Diastolic 70 54  56 65 64 61   BP Location     Right arm Right arm Left arm   Heart Rate 99  85 69 94 103 95   Temp 36.5 °C (97.7 °F)    36.2 °C (97.2 °F) 36.5 °C (97.7 °F) 36.1 °C (97 °F)   Resp 16  18 16 16 16 22   Weight (lb)       129.41   BMI       22.21 kg/m2   BSA (m2)       1.63 m2   Visit Report       Report    Report       Physical Exam  HENT:      Head: Normocephalic and atraumatic.   Eyes:      Extraocular Movements: Extraocular movements intact.      Conjunctiva/sclera: Conjunctivae normal.   Pulmonary:      Effort: Pulmonary effort is normal.   Abdominal:      General: Abdomen is flat.   Musculoskeletal:         General: Normal range of motion.   Skin:     Findings: Rash present.   Neurological:      General: No focal deficit present.      Mental Status: She is alert.   Psychiatric:         Mood and Affect: Mood normal.         Thought Content: Thought content normal.         ASSESSMENT/PLAN    Pain/Headaches  Pain is: cancer related pain (HA), generalized body aches from treatment, chronic abd pain   Type: neuropathic, somatic, visceral   - Continue Oxycodone 5mg [id number] PRN and x1 dose today in clinic   - Acetaminophen 1000mg [id number] PRN - MAX 3000mg/24 hours - patient reports this does not help  - Continue Ibuprofen 800mg g6znsam PRN - takes one per day at most   - Amitriptyline 25mg at bed - no longer taking but consider resuming   - Maxalt 10mg once daily PRN - no longer taking  -  Consider abdominal imaging - d/w oncology      Opioid Use  Medication Management:   - OARRS report reviewed with no aberrant behavior; consistent with  prescriptions/records and patient history  - MED 10.  Overdose Risk Score 400.   This has been discussed with patient.   - We will continue to closely monitor the patient for signs of prescription misuse including UDS, OARRS review and subjective reports at each visit.  - No concurrent benzodiazepine use   - I am a provider who either is or has consulted and collaborated with a provider certified in Hospice and Palliative Medicine and have conducted a face-face visit and examination for this patient.  - Routine Urine Drug Screen: 12/11/2024 appropriately + for prescribed medications  - Controlled Substance Agreement: 12/11/2024  - Specifically discussed that controlled substance prescriptions will only be provided by our group as outlined in the completed agreement  - Prescribed naloxone: patient declined  - Red Flags: NA     Constipation  At risk for constipation related to opioids. Chronic concerns of constipation  - Continue Linzess 145mcg and Motegrity 2mg as prescribed by GI    Anemia  Epistaxis  -Mgmt per primary/oncology      Nausea/Decreased Appetite  - Continue Omeprazole 40mg daily - takes inconsistently   - Continue Ondansetron ODT 8mg [id number] PRN  - Increase Olanzapine to 10mg at bed  - Encouraged good hydration  - Encouraged small more frequent meals  - Encouraged continued Ensure 1-2x daily  Nutrition consult 12/11/2024     Anxiety/Sleeping Difficulty  - Amitriptyline 25mg - no longer taking  - Olanzapine per above     Fatigue  - Trial  methylphenidate 5mg dally  - Provigil 100mg - not effective  - 2/2 depression? Patient declines trial of antidepressant      Advance Directives  Existence of Advance Directives:No - not interested  Decision maker: Surrogate decision maker is Darby Duff (mom)  Code Status: Full code    Supportive and Palliative  Oncology Encounter:  Emotional support provided  Coordination of care  Will continue to follow and address symptoms as needed    Next Follow-Up Visit:  Return to clinic in 1-2 months;  align with oncology appt    Signature and billing  Medical complexity was moderate level due to due to complexity of problems, extensive data review, and high risk of management/treatment.  Time was spent on the following: Prep Time, Time Directly with Patient/Family/Caregiver, Documentation Time. Total time spent: 30      Data  Diagnostic tests and information reviewed for today's visit:  Most recent labs and imaging results, Medications     Some elements copied from Palliative Care note on 2/18/2025, the elements have been updated and all reflect current decision making from today, 4/1/2025.    Plan of Care discussed with: Patient    SIGNATURE: JASVIR Ahumada-CNP    Contact information:  Supportive and Palliative Oncology  Monday-Friday 8 AM-5 PM  Phone:  [phone], press option #5, then option #1.   Or Epic Secure Chat

## 2025-04-01 NOTE — PROGRESS NOTES
This SW was asked by AYA provider and neuro-onc PASebasC to assist pt with SSI and letter for work. When asked, pt said she applied for SSDI/SSI in Feb. This SW shared that SSI eligibility is normally looked at immediately and payments begin in 1-2 weeks. This SW recommended setting up an online account so she can better track any decisions or needs. SW will follow up with a couple of foundation websites for pt to seek assistance. SW assisted with preparation of letter for TONY to review and sign.  SW to continue to follow.  Update 04/02/25: This SW emailed pt several foundations for which she should qualify for financial assistance. SW advised that the likelihood of getting a shiela may be low and made suggestions for modest amounts, specific bills, and telling her story.

## 2025-04-02 ENCOUNTER — PATIENT MESSAGE (OUTPATIENT)
Dept: HEMATOLOGY/ONCOLOGY | Facility: HOSPITAL | Age: 25
End: 2025-04-02
Payer: COMMERCIAL

## 2025-04-02 ASSESSMENT — ENCOUNTER SYMPTOMS
HEMATURIA: 0
VOMITING: 1
CHILLS: 0
HEADACHES: 1
EXTREMITY WEAKNESS: 0
FATIGUE: 1
SHORTNESS OF BREATH: 0
SEIZURES: 0
ABDOMINAL PAIN: 1
HEMOPTYSIS: 0
BLOOD IN STOOL: 0
FEVER: 0
SPEECH DIFFICULTY: 0

## 2025-04-03 DIAGNOSIS — R11.0 NAUSEA: Primary | ICD-10-CM

## 2025-04-03 RX ORDER — ONDANSETRON 8 MG/1
8 TABLET, ORALLY DISINTEGRATING ORAL EVERY 8 HOURS PRN
Qty: 20 TABLET | Refills: 2 | Status: SHIPPED | OUTPATIENT
Start: 2025-04-03 | End: 2025-04-23

## 2025-04-07 ENCOUNTER — APPOINTMENT (OUTPATIENT)
Dept: DERMATOLOGY | Facility: CLINIC | Age: 25
End: 2025-04-07
Payer: COMMERCIAL

## 2025-04-08 ENCOUNTER — LAB (OUTPATIENT)
Dept: LAB | Facility: CLINIC | Age: 25
End: 2025-04-08
Payer: COMMERCIAL

## 2025-04-08 DIAGNOSIS — D64.9 LOW HEMOGLOBIN: ICD-10-CM

## 2025-04-08 LAB
BASOPHILS # BLD AUTO: 0.04 X10*3/UL (ref 0–0.1)
BASOPHILS NFR BLD AUTO: 0.6 %
EOSINOPHIL # BLD AUTO: 0.94 X10*3/UL (ref 0–0.7)
EOSINOPHIL NFR BLD AUTO: 13.8 %
ERYTHROCYTE [DISTWIDTH] IN BLOOD BY AUTOMATED COUNT: 17 % (ref 11.5–14.5)
HCT VFR BLD AUTO: 24.4 % (ref 36–46)
HGB BLD-MCNC: 7.5 G/DL (ref 12–16)
IMM GRANULOCYTES # BLD AUTO: 0.01 X10*3/UL (ref 0–0.7)
IMM GRANULOCYTES NFR BLD AUTO: 0.1 % (ref 0–0.9)
LYMPHOCYTES # BLD AUTO: 2.4 X10*3/UL (ref 1.2–4.8)
LYMPHOCYTES NFR BLD AUTO: 35.1 %
MCH RBC QN AUTO: 27.4 PG (ref 26–34)
MCHC RBC AUTO-ENTMCNC: 30.7 G/DL (ref 32–36)
MCV RBC AUTO: 89 FL (ref 80–100)
MONOCYTES # BLD AUTO: 0.82 X10*3/UL (ref 0.1–1)
MONOCYTES NFR BLD AUTO: 12 %
NEUTROPHILS # BLD AUTO: 2.62 X10*3/UL (ref 1.2–7.7)
NEUTROPHILS NFR BLD AUTO: 38.4 %
NRBC BLD-RTO: ABNORMAL /100{WBCS}
PLATELET # BLD AUTO: 412 X10*3/UL (ref 150–450)
RBC # BLD AUTO: 2.74 X10*6/UL (ref 4–5.2)
WBC # BLD AUTO: 6.8 X10*3/UL (ref 4.4–11.3)

## 2025-04-08 PROCEDURE — 36415 COLL VENOUS BLD VENIPUNCTURE: CPT

## 2025-04-08 PROCEDURE — 85025 COMPLETE CBC W/AUTO DIFF WBC: CPT

## 2025-04-09 ENCOUNTER — TELEPHONE (OUTPATIENT)
Dept: HEMATOLOGY/ONCOLOGY | Facility: HOSPITAL | Age: 25
End: 2025-04-09
Payer: COMMERCIAL

## 2025-04-09 NOTE — TELEPHONE ENCOUNTER
Refill request received from [facility] for the patient's Tovorafenib 500mg/week.  Message sent to team to refill if appropriate.

## 2025-04-09 NOTE — TELEPHONE ENCOUNTER
Per NOEMÍ Castillo:    Chemo is on hold while awaiting improvement in her skin condition and hemoglobin, so this request may be disregarded at this time.     Called [facility] back to relay the message as above. Spoke to Vadim Ayon. He states they will put the prescription on hold until pt is ready to resume therapy.

## 2025-04-14 ENCOUNTER — TELEPHONE (OUTPATIENT)
Dept: HEMATOLOGY/ONCOLOGY | Facility: HOSPITAL | Age: 25
End: 2025-04-14
Payer: COMMERCIAL

## 2025-04-14 DIAGNOSIS — R21 RASH: ICD-10-CM

## 2025-04-14 DIAGNOSIS — D64.9 LOW HEMOGLOBIN: Primary | ICD-10-CM

## 2025-04-14 RX ORDER — HYDROXYZINE PAMOATE 25 MG/1
25 CAPSULE ORAL NIGHTLY PRN
Qty: 60 CAPSULE | Refills: 2 | Status: SHIPPED | OUTPATIENT
Start: 2025-04-14

## 2025-04-14 NOTE — TELEPHONE ENCOUNTER
Called and spoke with Sejal. She is aware of lab orders in for tomorrow. She is taking the minocycline with a great improvement in skin issues. Her hands are no longer peeling and facial rash is improved. She still has some itching but the hydroxyzine helps. Next derm appt is 5/12, she has not contacted them since last appt.    Selena ARNDT RN

## 2025-04-15 ENCOUNTER — LAB (OUTPATIENT)
Dept: LAB | Facility: CLINIC | Age: 25
End: 2025-04-15
Payer: COMMERCIAL

## 2025-04-15 DIAGNOSIS — D64.9 LOW HEMOGLOBIN: ICD-10-CM

## 2025-04-15 LAB
BASOPHILS # BLD AUTO: 0.06 X10*3/UL (ref 0–0.1)
BASOPHILS NFR BLD AUTO: 0.7 %
EOSINOPHIL # BLD AUTO: 0.57 X10*3/UL (ref 0–0.7)
EOSINOPHIL NFR BLD AUTO: 6.9 %
ERYTHROCYTE [DISTWIDTH] IN BLOOD BY AUTOMATED COUNT: 15.8 % (ref 11.5–14.5)
HCT VFR BLD AUTO: 28.7 % (ref 36–46)
HGB BLD-MCNC: 8.7 G/DL (ref 12–16)
IMM GRANULOCYTES # BLD AUTO: 0 X10*3/UL (ref 0–0.7)
IMM GRANULOCYTES NFR BLD AUTO: 0 % (ref 0–0.9)
LYMPHOCYTES # BLD AUTO: 1.73 X10*3/UL (ref 1.2–4.8)
LYMPHOCYTES NFR BLD AUTO: 20.8 %
MCH RBC QN AUTO: 26.4 PG (ref 26–34)
MCHC RBC AUTO-ENTMCNC: 30.3 G/DL (ref 32–36)
MCV RBC AUTO: 87 FL (ref 80–100)
MONOCYTES # BLD AUTO: 0.99 X10*3/UL (ref 0.1–1)
MONOCYTES NFR BLD AUTO: 11.9 %
NEUTROPHILS # BLD AUTO: 4.96 X10*3/UL (ref 1.2–7.7)
NEUTROPHILS NFR BLD AUTO: 59.7 %
NRBC BLD-RTO: ABNORMAL /100{WBCS}
PLATELET # BLD AUTO: 452 X10*3/UL (ref 150–450)
RBC # BLD AUTO: 3.29 X10*6/UL (ref 4–5.2)
WBC # BLD AUTO: 8.3 X10*3/UL (ref 4.4–11.3)

## 2025-04-15 PROCEDURE — 36415 COLL VENOUS BLD VENIPUNCTURE: CPT

## 2025-04-15 PROCEDURE — 85025 COMPLETE CBC W/AUTO DIFF WBC: CPT

## 2025-04-17 ENCOUNTER — TELEPHONE (OUTPATIENT)
Dept: HEMATOLOGY/ONCOLOGY | Facility: HOSPITAL | Age: 25
End: 2025-04-17
Payer: COMMERCIAL

## 2025-04-17 NOTE — TELEPHONE ENCOUNTER
"Reason for Conversation  Chemo restart    Background   I called and spoke with Sejal - relayed the message from Deborah Snowden. She repeated back message and verbalized understanding. Instructed her to please call us if she does not have enough pills, if her skin begins to worsen again after restart, and/or if she develops any symptoms of low hemoglobin levels. S&S reviewed. Patient verbalized understanding and agreement with the plan.      \"sent you a new letter for work -- let me know if you didn't get it.     Also, good news, we can restart your chemo this weekend since your hemoglobin is increasing and since your skin issues are improving. We will restart at a reduced dose of 400 mg, so you should only take four tablets for each dose. You should still have two weeks of chemo pills left from your previous supply -- is that correct? If so, you may start this Saturday, 4/19/25. We will also send a prescription for another one-month supply.     You'll have to stay on the minocycline for at least 2 months after we restart the chemo. You should also continue using the clobetasol on your hands and feet 3 times per week for prevention of your skin issues flaring up again.     We will get labs again when you're in clinic on 4/29/25.\"    "

## 2025-04-18 ENCOUNTER — APPOINTMENT (OUTPATIENT)
Dept: HEMATOLOGY/ONCOLOGY | Facility: HOSPITAL | Age: 25
End: 2025-04-18
Payer: COMMERCIAL

## 2025-04-18 ENCOUNTER — APPOINTMENT (OUTPATIENT)
Dept: HEMATOLOGY/ONCOLOGY | Facility: CLINIC | Age: 25
End: 2025-04-18
Payer: COMMERCIAL

## 2025-04-18 ENCOUNTER — TELEPHONE (OUTPATIENT)
Dept: ADMISSION | Facility: HOSPITAL | Age: 25
End: 2025-04-18
Payer: COMMERCIAL

## 2025-04-18 NOTE — TELEPHONE ENCOUNTER
Reason for Conversation  FYI- Prior Auth update    [facility] calling to update provider prior auth for tovorafenib has been submitted to CoverMyMEds.  Can be reviewed using Key: [id number]

## 2025-04-21 ENCOUNTER — NURSE TRIAGE (OUTPATIENT)
Dept: HEMATOLOGY/ONCOLOGY | Facility: HOSPITAL | Age: 25
End: 2025-04-21
Payer: COMMERCIAL

## 2025-04-21 ENCOUNTER — TELEPHONE (OUTPATIENT)
Dept: ADMISSION | Facility: HOSPITAL | Age: 25
End: 2025-04-21
Payer: COMMERCIAL

## 2025-04-21 NOTE — TELEPHONE ENCOUNTER
[id number] called to inform that a prior authorization is needed for tovorafenib.    CoverMyMeds key for PA: SEAN

## 2025-04-21 NOTE — TELEPHONE ENCOUNTER
Reason for Conversation  Abdominal Pain    Disposition   Go to ED Now    Called and spoke with Sejal. Unable to go to [facility] today as she doesn't have a ride. Could go tomorrow but waiting to hear if we can get a [facility] outpatient or place orders and call ahead to ED. Sejal agreeable and I will call her back when I get some more answers.    Selena ARNDT RN

## 2025-04-21 NOTE — TELEPHONE ENCOUNTER
Reason for Conversation  Abdominal Pain      Disposition   Per pt, plan is for her to go to the ER tonight when her mom can drive her at 7pm. The team cannot place an outpatient order as the ER team will need to assess her to make sure more emergent imaging is not needed. I told patient to let the ER know she is there because our team directed her to go there to r/o any urgent bowel issues for her 10/10 abd pain for a week. Still no pain relief after laxatives, successful bowel movements, and some emesis episodes yesterday. Only able to keep some fluids down.    Selena ARNDT RN

## 2025-04-21 NOTE — TELEPHONE ENCOUNTER
"Reason for Conversation  Pt message; chest pain, abdominal pain, and pain causing issues walking    Background   Called and spoke with Sejal. She has been having this abdominal side pain 10/10 for over a week. The pain is throbbing \"like a gas bubble that won't go away\", she has taken laxatives and gas x 2 days ago with several successful BMs and a few vomiting episodes, no blood in vomit. Oxycodone, ibuprofen, and heating pad usually take the pain away. Pain is still present she is amenable that it could still be constipation and will try to clean out again. She has been able to keep water and gatorade down but only toast since Saturday. She mentioned being SOB with activity and chest tightness. I did encourage her to go to the ED. She refuses at this time because she believes they will just send her home. I will contact team and call her back with updates.    Selena ARNDT RN    "

## 2025-04-22 ENCOUNTER — TELEPHONE (OUTPATIENT)
Dept: PEDIATRIC GASTROENTEROLOGY | Facility: CLINIC | Age: 25
End: 2025-04-22
Payer: COMMERCIAL

## 2025-04-22 NOTE — TELEPHONE ENCOUNTER
Received PA request called [facility] was told denied be cause no supporting clinical documentation suggested to re submit with clinic note from last appointment in July. Submitted PA via cover my meds waiting for questions and to submit PA saved clinic note to submit.

## 2025-04-23 ENCOUNTER — LAB (OUTPATIENT)
Dept: LAB | Facility: CLINIC | Age: 25
End: 2025-04-23
Payer: COMMERCIAL

## 2025-04-23 DIAGNOSIS — R63.0 LOSS OF APPETITE: ICD-10-CM

## 2025-04-23 DIAGNOSIS — D64.9 ANEMIA, UNSPECIFIED TYPE: ICD-10-CM

## 2025-04-23 LAB
BASOPHILS # BLD AUTO: 0.05 X10*3/UL (ref 0–0.1)
BASOPHILS NFR BLD AUTO: 0.6 %
EOSINOPHIL # BLD AUTO: 0.46 X10*3/UL (ref 0–0.7)
EOSINOPHIL NFR BLD AUTO: 6 %
ERYTHROCYTE [DISTWIDTH] IN BLOOD BY AUTOMATED COUNT: 15 % (ref 11.5–14.5)
HCT VFR BLD AUTO: 27.9 % (ref 36–46)
HGB BLD-MCNC: 8.3 G/DL (ref 12–16)
IMM GRANULOCYTES # BLD AUTO: 0.01 X10*3/UL (ref 0–0.7)
IMM GRANULOCYTES NFR BLD AUTO: 0.1 % (ref 0–0.9)
LYMPHOCYTES # BLD AUTO: 1.95 X10*3/UL (ref 1.2–4.8)
LYMPHOCYTES NFR BLD AUTO: 25.3 %
MCH RBC QN AUTO: 25.4 PG (ref 26–34)
MCHC RBC AUTO-ENTMCNC: 29.7 G/DL (ref 32–36)
MCV RBC AUTO: 85 FL (ref 80–100)
MONOCYTES # BLD AUTO: 0.79 X10*3/UL (ref 0.1–1)
MONOCYTES NFR BLD AUTO: 10.2 %
NEUTROPHILS # BLD AUTO: 4.45 X10*3/UL (ref 1.2–7.7)
NEUTROPHILS NFR BLD AUTO: 57.8 %
NRBC BLD-RTO: ABNORMAL /100{WBCS}
PLATELET # BLD AUTO: 482 X10*3/UL (ref 150–450)
RBC # BLD AUTO: 3.27 X10*6/UL (ref 4–5.2)
WBC # BLD AUTO: 7.7 X10*3/UL (ref 4.4–11.3)

## 2025-04-23 PROCEDURE — 85025 COMPLETE CBC W/AUTO DIFF WBC: CPT

## 2025-04-23 PROCEDURE — 36415 COLL VENOUS BLD VENIPUNCTURE: CPT

## 2025-04-23 RX ORDER — OLANZAPINE 10 MG/1
10 TABLET, FILM COATED ORAL NIGHTLY
Qty: 90 TABLET | Refills: 1 | OUTPATIENT
Start: 2025-04-23 | End: 2025-07-22

## 2025-04-23 NOTE — TELEPHONE ENCOUNTER
Per EMR, Kala Fernández CNP sent 90 day supply +2 refills on 4/1/25 to [facility]. I called [facility] to confirm they have script with refills and no need for new script. Rx refill cancelled that was received.

## 2025-04-24 ENCOUNTER — HOSPITAL ENCOUNTER (EMERGENCY)
Facility: HOSPITAL | Age: 25
Discharge: HOME | End: 2025-04-25
Attending: EMERGENCY MEDICINE
Payer: COMMERCIAL

## 2025-04-24 ENCOUNTER — CLINICAL SUPPORT (OUTPATIENT)
Dept: EMERGENCY MEDICINE | Facility: HOSPITAL | Age: 25
End: 2025-04-24
Payer: COMMERCIAL

## 2025-04-24 ENCOUNTER — APPOINTMENT (OUTPATIENT)
Dept: RADIOLOGY | Facility: HOSPITAL | Age: 25
End: 2025-04-24
Payer: COMMERCIAL

## 2025-04-24 ENCOUNTER — TELEPHONE (OUTPATIENT)
Dept: PEDIATRIC GASTROENTEROLOGY | Facility: HOSPITAL | Age: 25
End: 2025-04-24

## 2025-04-24 DIAGNOSIS — R10.9 ABDOMINAL PAIN, UNSPECIFIED ABDOMINAL LOCATION: Primary | ICD-10-CM

## 2025-04-24 LAB
ALBUMIN SERPL BCP-MCNC: 3.7 G/DL (ref 3.4–5)
ALP SERPL-CCNC: 58 U/L (ref 33–110)
ALT SERPL W P-5'-P-CCNC: 7 U/L (ref 7–45)
ANION GAP SERPL CALC-SCNC: 11 MMOL/L (ref 10–20)
APPEARANCE UR: CLEAR
AST SERPL W P-5'-P-CCNC: 18 U/L (ref 9–39)
ATRIAL RATE: 105 BPM
BASOPHILS # BLD AUTO: 0.04 X10*3/UL (ref 0–0.1)
BASOPHILS NFR BLD AUTO: 0.4 %
BILIRUB SERPL-MCNC: 0.4 MG/DL (ref 0–1.2)
BILIRUB UR STRIP.AUTO-MCNC: NEGATIVE MG/DL
BUN SERPL-MCNC: 9 MG/DL (ref 6–23)
CALCIUM SERPL-MCNC: 8.9 MG/DL (ref 8.6–10.6)
CARDIAC TROPONIN I PNL SERPL HS: <3 NG/L (ref 0–34)
CHLORIDE SERPL-SCNC: 106 MMOL/L (ref 98–107)
CO2 SERPL-SCNC: 26 MMOL/L (ref 21–32)
COLOR UR: NORMAL
CREAT SERPL-MCNC: 0.47 MG/DL (ref 0.5–1.05)
EGFRCR SERPLBLD CKD-EPI 2021: >90 ML/MIN/1.73M*2
EOSINOPHIL # BLD AUTO: 0.49 X10*3/UL (ref 0–0.7)
EOSINOPHIL NFR BLD AUTO: 5.3 %
ERYTHROCYTE [DISTWIDTH] IN BLOOD BY AUTOMATED COUNT: 14.7 % (ref 11.5–14.5)
GLUCOSE SERPL-MCNC: 77 MG/DL (ref 74–99)
GLUCOSE UR STRIP.AUTO-MCNC: NORMAL MG/DL
HCT VFR BLD AUTO: 24.8 % (ref 36–46)
HGB BLD-MCNC: 7.7 G/DL (ref 12–16)
IMM GRANULOCYTES # BLD AUTO: 0.03 X10*3/UL (ref 0–0.7)
IMM GRANULOCYTES NFR BLD AUTO: 0.3 % (ref 0–0.9)
KETONES UR STRIP.AUTO-MCNC: NEGATIVE MG/DL
LEUKOCYTE ESTERASE UR QL STRIP.AUTO: NEGATIVE
LYMPHOCYTES # BLD AUTO: 1.5 X10*3/UL (ref 1.2–4.8)
LYMPHOCYTES NFR BLD AUTO: 16.1 %
MCH RBC QN AUTO: 25.2 PG (ref 26–34)
MCHC RBC AUTO-ENTMCNC: 31 G/DL (ref 32–36)
MCV RBC AUTO: 81 FL (ref 80–100)
MONOCYTES # BLD AUTO: 1.16 X10*3/UL (ref 0.1–1)
MONOCYTES NFR BLD AUTO: 12.5 %
NEUTROPHILS # BLD AUTO: 6.07 X10*3/UL (ref 1.2–7.7)
NEUTROPHILS NFR BLD AUTO: 65.4 %
NITRITE UR QL STRIP.AUTO: NEGATIVE
NRBC BLD-RTO: 0 /100 WBCS (ref 0–0)
P AXIS: 68 DEGREES
P OFFSET: 198 MS
P ONSET: 145 MS
PH UR STRIP.AUTO: 8 [PH]
PLATELET # BLD AUTO: 485 X10*3/UL (ref 150–450)
POTASSIUM SERPL-SCNC: 3.5 MMOL/L (ref 3.5–5.3)
PR INTERVAL: 146 MS
PREGNANCY TEST URINE, POC: NEGATIVE
PROT SERPL-MCNC: 7.6 G/DL (ref 6.4–8.2)
PROT UR STRIP.AUTO-MCNC: NEGATIVE MG/DL
Q ONSET: 218 MS
QRS COUNT: 17 BEATS
QRS DURATION: 80 MS
QT INTERVAL: 348 MS
QTC CALCULATION(BAZETT): 459 MS
QTC FREDERICIA: 419 MS
R AXIS: 66 DEGREES
RBC # BLD AUTO: 3.05 X10*6/UL (ref 4–5.2)
RBC # UR STRIP.AUTO: NEGATIVE MG/DL
SODIUM SERPL-SCNC: 139 MMOL/L (ref 136–145)
SP GR UR STRIP.AUTO: 1.02
T AXIS: 23 DEGREES
T OFFSET: 392 MS
UROBILINOGEN UR STRIP.AUTO-MCNC: NORMAL MG/DL
VENTRICULAR RATE: 105 BPM
WBC # BLD AUTO: 9.3 X10*3/UL (ref 4.4–11.3)

## 2025-04-24 PROCEDURE — 70250 X-RAY EXAM OF SKULL: CPT | Performed by: RADIOLOGY

## 2025-04-24 PROCEDURE — 84075 ASSAY ALKALINE PHOSPHATASE: CPT | Performed by: EMERGENCY MEDICINE

## 2025-04-24 PROCEDURE — 93005 ELECTROCARDIOGRAM TRACING: CPT

## 2025-04-24 PROCEDURE — 2500000001 HC RX 250 WO HCPCS SELF ADMINISTERED DRUGS (ALT 637 FOR MEDICARE OP): Mod: SE

## 2025-04-24 PROCEDURE — 85025 COMPLETE CBC W/AUTO DIFF WBC: CPT | Performed by: EMERGENCY MEDICINE

## 2025-04-24 PROCEDURE — 96374 THER/PROPH/DIAG INJ IV PUSH: CPT | Mod: 59

## 2025-04-24 PROCEDURE — 84484 ASSAY OF TROPONIN QUANT: CPT | Performed by: EMERGENCY MEDICINE

## 2025-04-24 PROCEDURE — 2550000001 HC RX 255 CONTRASTS: Mod: SE | Performed by: EMERGENCY MEDICINE

## 2025-04-24 PROCEDURE — 81025 URINE PREGNANCY TEST: CPT

## 2025-04-24 PROCEDURE — 86803 HEPATITIS C AB TEST: CPT | Performed by: EMERGENCY MEDICINE

## 2025-04-24 PROCEDURE — 70450 CT HEAD/BRAIN W/O DYE: CPT

## 2025-04-24 PROCEDURE — 71045 X-RAY EXAM CHEST 1 VIEW: CPT | Performed by: RADIOLOGY

## 2025-04-24 PROCEDURE — 93010 ELECTROCARDIOGRAM REPORT: CPT | Performed by: EMERGENCY MEDICINE

## 2025-04-24 PROCEDURE — 36415 COLL VENOUS BLD VENIPUNCTURE: CPT | Performed by: EMERGENCY MEDICINE

## 2025-04-24 PROCEDURE — 70450 CT HEAD/BRAIN W/O DYE: CPT | Performed by: RADIOLOGY

## 2025-04-24 PROCEDURE — 96375 TX/PRO/DX INJ NEW DRUG ADDON: CPT

## 2025-04-24 PROCEDURE — 96376 TX/PRO/DX INJ SAME DRUG ADON: CPT

## 2025-04-24 PROCEDURE — 80053 COMPREHEN METABOLIC PANEL: CPT | Performed by: EMERGENCY MEDICINE

## 2025-04-24 PROCEDURE — 71045 X-RAY EXAM CHEST 1 VIEW: CPT

## 2025-04-24 PROCEDURE — 74177 CT ABD & PELVIS W/CONTRAST: CPT | Performed by: RADIOLOGY

## 2025-04-24 PROCEDURE — 2500000004 HC RX 250 GENERAL PHARMACY W/ HCPCS (ALT 636 FOR OP/ED): Mod: JZ,SE

## 2025-04-24 PROCEDURE — 99285 EMERGENCY DEPT VISIT HI MDM: CPT | Performed by: EMERGENCY MEDICINE

## 2025-04-24 PROCEDURE — 81003 URINALYSIS AUTO W/O SCOPE: CPT

## 2025-04-24 PROCEDURE — 99285 EMERGENCY DEPT VISIT HI MDM: CPT | Mod: 25 | Performed by: EMERGENCY MEDICINE

## 2025-04-24 PROCEDURE — 74018 RADEX ABDOMEN 1 VIEW: CPT | Performed by: RADIOLOGY

## 2025-04-24 PROCEDURE — 87389 HIV-1 AG W/HIV-1&-2 AB AG IA: CPT | Performed by: EMERGENCY MEDICINE

## 2025-04-24 PROCEDURE — 74177 CT ABD & PELVIS W/CONTRAST: CPT

## 2025-04-24 RX ORDER — METOCLOPRAMIDE HYDROCHLORIDE 5 MG/ML
10 INJECTION INTRAMUSCULAR; INTRAVENOUS ONCE
Status: COMPLETED | OUTPATIENT
Start: 2025-04-24 | End: 2025-04-24

## 2025-04-24 RX ORDER — DIPHENHYDRAMINE HCL 25 MG
25 CAPSULE ORAL ONCE
Status: COMPLETED | OUTPATIENT
Start: 2025-04-24 | End: 2025-04-24

## 2025-04-24 RX ORDER — ACETAMINOPHEN 325 MG/1
975 TABLET ORAL ONCE
Status: COMPLETED | OUTPATIENT
Start: 2025-04-24 | End: 2025-04-24

## 2025-04-24 RX ADMIN — METOCLOPRAMIDE 10 MG: 5 INJECTION, SOLUTION INTRAMUSCULAR; INTRAVENOUS at 21:33

## 2025-04-24 RX ADMIN — HYDROMORPHONE HYDROCHLORIDE 0.5 MG: 1 INJECTION, SOLUTION INTRAMUSCULAR; INTRAVENOUS; SUBCUTANEOUS at 21:33

## 2025-04-24 RX ADMIN — IOHEXOL 75 ML: 350 INJECTION, SOLUTION INTRAVENOUS at 22:05

## 2025-04-24 RX ADMIN — ACETAMINOPHEN 975 MG: 325 TABLET, FILM COATED ORAL at 21:33

## 2025-04-24 RX ADMIN — HYDROMORPHONE HYDROCHLORIDE 0.5 MG: 1 INJECTION, SOLUTION INTRAMUSCULAR; INTRAVENOUS; SUBCUTANEOUS at 22:52

## 2025-04-24 RX ADMIN — DIPHENHYDRAMINE HYDROCHLORIDE 25 MG: 25 CAPSULE ORAL at 21:33

## 2025-04-24 ASSESSMENT — COLUMBIA-SUICIDE SEVERITY RATING SCALE - C-SSRS
1. IN THE PAST MONTH, HAVE YOU WISHED YOU WERE DEAD OR WISHED YOU COULD GO TO SLEEP AND NOT WAKE UP?: NO
6. HAVE YOU EVER DONE ANYTHING, STARTED TO DO ANYTHING, OR PREPARED TO DO ANYTHING TO END YOUR LIFE?: NO
2. HAVE YOU ACTUALLY HAD ANY THOUGHTS OF KILLING YOURSELF?: NO

## 2025-04-24 ASSESSMENT — PAIN DESCRIPTION - LOCATION: LOCATION: ABDOMEN

## 2025-04-24 ASSESSMENT — LIFESTYLE VARIABLES
TOTAL SCORE: 0
EVER FELT BAD OR GUILTY ABOUT YOUR DRINKING: NO
HAVE YOU EVER FELT YOU SHOULD CUT DOWN ON YOUR DRINKING: NO
EVER HAD A DRINK FIRST THING IN THE MORNING TO STEADY YOUR NERVES TO GET RID OF A HANGOVER: NO
HAVE PEOPLE ANNOYED YOU BY CRITICIZING YOUR DRINKING: NO

## 2025-04-24 ASSESSMENT — PAIN SCALES - GENERAL
PAINLEVEL_OUTOF10: 10 - WORST POSSIBLE PAIN
PAINLEVEL_OUTOF10: 9
PAINLEVEL_OUTOF10: 5 - MODERATE PAIN

## 2025-04-24 ASSESSMENT — PAIN DESCRIPTION - ORIENTATION: ORIENTATION: RIGHT

## 2025-04-24 ASSESSMENT — PAIN - FUNCTIONAL ASSESSMENT
PAIN_FUNCTIONAL_ASSESSMENT: 0-10
PAIN_FUNCTIONAL_ASSESSMENT: 0-10

## 2025-04-24 ASSESSMENT — PAIN DESCRIPTION - DESCRIPTORS
DESCRIPTORS: PRESSURE
DESCRIPTORS: SHARP

## 2025-04-24 NOTE — PROGRESS NOTES
Pediatric Gastroenterology   Chart Update    HPI  Sejal Duff  is followed by  [facility]  for chronic constipation.     In the past, she was refractory to most medications.   Since being diagnosed and treated for astrocytoma she has been responsive to medications.     At this time it is medically necessary for her to be on Linzess and Motegrity to treat her chronic constipation.   It is essential she have both of these medications as she continues to recover from  cancer.     Please contact us with questions or concerns.     JASVIR Mobley, DNP

## 2025-04-24 NOTE — ED TRIAGE NOTES
Pt C/O headache, CP and abd pain x 1 week. Pt has brain tumor and shunts placed. Currently on Chemo treatments. Pt stated she feels pain is related to shunts. , other VSS, NAD, Resp E&U

## 2025-04-25 ENCOUNTER — HOSPITAL ENCOUNTER (EMERGENCY)
Facility: HOSPITAL | Age: 25
Discharge: ANOTHER HOSPITAL | End: 2025-04-25
Attending: EMERGENCY MEDICINE
Payer: COMMERCIAL

## 2025-04-25 ENCOUNTER — HOSPITAL ENCOUNTER (EMERGENCY)
Facility: HOSPITAL | Age: 25
Discharge: HOME | End: 2025-04-26
Attending: EMERGENCY MEDICINE
Payer: COMMERCIAL

## 2025-04-25 VITALS
WEIGHT: 130 LBS | DIASTOLIC BLOOD PRESSURE: 78 MMHG | RESPIRATION RATE: 18 BRPM | BODY MASS INDEX: 23.04 KG/M2 | HEIGHT: 63 IN | SYSTOLIC BLOOD PRESSURE: 118 MMHG | OXYGEN SATURATION: 100 % | TEMPERATURE: 99.4 F | HEART RATE: 98 BPM

## 2025-04-25 VITALS
RESPIRATION RATE: 16 BRPM | HEIGHT: 63 IN | SYSTOLIC BLOOD PRESSURE: 107 MMHG | HEART RATE: 79 BPM | WEIGHT: 130 LBS | OXYGEN SATURATION: 98 % | DIASTOLIC BLOOD PRESSURE: 65 MMHG | TEMPERATURE: 98.2 F | BODY MASS INDEX: 23.04 KG/M2

## 2025-04-25 DIAGNOSIS — R10.84 GENERALIZED ABDOMINAL PAIN: Primary | ICD-10-CM

## 2025-04-25 DIAGNOSIS — Z98.2 VP (VENTRICULOPERITONEAL) SHUNT STATUS: Primary | ICD-10-CM

## 2025-04-25 DIAGNOSIS — K59.00 CONSTIPATION, UNSPECIFIED CONSTIPATION TYPE: ICD-10-CM

## 2025-04-25 LAB
ABO GROUP (TYPE) IN BLOOD: NORMAL
ALBUMIN SERPL BCP-MCNC: 3.7 G/DL (ref 3.4–5)
ALP SERPL-CCNC: 59 U/L (ref 33–110)
ALT SERPL W P-5'-P-CCNC: 7 U/L (ref 7–45)
ANION GAP SERPL CALC-SCNC: 10 MMOL/L (ref 10–20)
ANTIBODY SCREEN: NORMAL
APPEARANCE UR: CLEAR
APTT PPP: 32 SECONDS (ref 26–36)
AST SERPL W P-5'-P-CCNC: 16 U/L (ref 9–39)
BASOPHILS # BLD AUTO: 0.02 X10*3/UL (ref 0–0.1)
BASOPHILS NFR BLD AUTO: 0.2 %
BILIRUB SERPL-MCNC: 0.4 MG/DL (ref 0–1.2)
BILIRUB UR STRIP.AUTO-MCNC: NEGATIVE MG/DL
BUN SERPL-MCNC: 8 MG/DL (ref 6–23)
CALCIUM SERPL-MCNC: 8.7 MG/DL (ref 8.6–10.3)
CHLORIDE SERPL-SCNC: 105 MMOL/L (ref 98–107)
CO2 SERPL-SCNC: 27 MMOL/L (ref 21–32)
COLOR UR: YELLOW
CREAT SERPL-MCNC: 0.47 MG/DL (ref 0.5–1.05)
EGFRCR SERPLBLD CKD-EPI 2021: >90 ML/MIN/1.73M*2
EOSINOPHIL # BLD AUTO: 0.29 X10*3/UL (ref 0–0.7)
EOSINOPHIL NFR BLD AUTO: 2.9 %
ERYTHROCYTE [DISTWIDTH] IN BLOOD BY AUTOMATED COUNT: 14.9 % (ref 11.5–14.5)
GLUCOSE SERPL-MCNC: 93 MG/DL (ref 74–99)
GLUCOSE UR STRIP.AUTO-MCNC: NORMAL MG/DL
HCG UR QL IA.RAPID: NEGATIVE
HCT VFR BLD AUTO: 26.7 % (ref 36–46)
HCV AB SER QL: NONREACTIVE
HGB BLD-MCNC: 7.9 G/DL (ref 12–16)
HIV 1+2 AB+HIV1 P24 AG SERPL QL IA: NONREACTIVE
HOLD SPECIMEN: NORMAL
IMM GRANULOCYTES # BLD AUTO: 0.04 X10*3/UL (ref 0–0.7)
IMM GRANULOCYTES NFR BLD AUTO: 0.4 % (ref 0–0.9)
INR PPP: 1.3 (ref 0.9–1.1)
KETONES UR STRIP.AUTO-MCNC: NEGATIVE MG/DL
LACTATE SERPL-SCNC: 0.8 MMOL/L (ref 0.4–2)
LEUKOCYTE ESTERASE UR QL STRIP.AUTO: NEGATIVE
LIPASE SERPL-CCNC: 4 U/L (ref 9–82)
LYMPHOCYTES # BLD AUTO: 1.59 X10*3/UL (ref 1.2–4.8)
LYMPHOCYTES NFR BLD AUTO: 15.9 %
MCH RBC QN AUTO: 24.8 PG (ref 26–34)
MCHC RBC AUTO-ENTMCNC: 29.6 G/DL (ref 32–36)
MCV RBC AUTO: 84 FL (ref 80–100)
MONOCYTES # BLD AUTO: 1.3 X10*3/UL (ref 0.1–1)
MONOCYTES NFR BLD AUTO: 13 %
MUCOUS THREADS #/AREA URNS AUTO: NORMAL /LPF
NEUTROPHILS # BLD AUTO: 6.74 X10*3/UL (ref 1.2–7.7)
NEUTROPHILS NFR BLD AUTO: 67.6 %
NITRITE UR QL STRIP.AUTO: NEGATIVE
NRBC BLD-RTO: 0 /100 WBCS (ref 0–0)
PH UR STRIP.AUTO: 6.5 [PH]
PLATELET # BLD AUTO: 503 X10*3/UL (ref 150–450)
POTASSIUM SERPL-SCNC: 3.5 MMOL/L (ref 3.5–5.3)
PROT SERPL-MCNC: 7.4 G/DL (ref 6.4–8.2)
PROT UR STRIP.AUTO-MCNC: ABNORMAL MG/DL
PROTHROMBIN TIME: 14.3 SECONDS (ref 9.8–12.4)
RBC # BLD AUTO: 3.19 X10*6/UL (ref 4–5.2)
RBC # UR STRIP.AUTO: NEGATIVE MG/DL
RBC #/AREA URNS AUTO: NORMAL /HPF
RH FACTOR (ANTIGEN D): NORMAL
SODIUM SERPL-SCNC: 138 MMOL/L (ref 136–145)
SP GR UR STRIP.AUTO: 1.03
SQUAMOUS #/AREA URNS AUTO: NORMAL /HPF
UROBILINOGEN UR STRIP.AUTO-MCNC: ABNORMAL MG/DL
WBC # BLD AUTO: 10 X10*3/UL (ref 4.4–11.3)
WBC #/AREA URNS AUTO: NORMAL /HPF

## 2025-04-25 PROCEDURE — 93010 ELECTROCARDIOGRAM REPORT: CPT | Performed by: EMERGENCY MEDICINE

## 2025-04-25 PROCEDURE — 96375 TX/PRO/DX INJ NEW DRUG ADDON: CPT

## 2025-04-25 PROCEDURE — 2500000001 HC RX 250 WO HCPCS SELF ADMINISTERED DRUGS (ALT 637 FOR MEDICARE OP): Mod: SE

## 2025-04-25 PROCEDURE — 36415 COLL VENOUS BLD VENIPUNCTURE: CPT

## 2025-04-25 PROCEDURE — 99285 EMERGENCY DEPT VISIT HI MDM: CPT | Performed by: EMERGENCY MEDICINE

## 2025-04-25 PROCEDURE — 81001 URINALYSIS AUTO W/SCOPE: CPT | Performed by: EMERGENCY MEDICINE

## 2025-04-25 PROCEDURE — 99285 EMERGENCY DEPT VISIT HI MDM: CPT | Mod: 25 | Performed by: EMERGENCY MEDICINE

## 2025-04-25 PROCEDURE — 96374 THER/PROPH/DIAG INJ IV PUSH: CPT

## 2025-04-25 PROCEDURE — 81025 URINE PREGNANCY TEST: CPT | Performed by: EMERGENCY MEDICINE

## 2025-04-25 PROCEDURE — 85652 RBC SED RATE AUTOMATED: CPT | Performed by: STUDENT IN AN ORGANIZED HEALTH CARE EDUCATION/TRAINING PROGRAM

## 2025-04-25 PROCEDURE — 2500000001 HC RX 250 WO HCPCS SELF ADMINISTERED DRUGS (ALT 637 FOR MEDICARE OP): Performed by: EMERGENCY MEDICINE

## 2025-04-25 PROCEDURE — 96376 TX/PRO/DX INJ SAME DRUG ADON: CPT

## 2025-04-25 PROCEDURE — 83690 ASSAY OF LIPASE: CPT | Performed by: EMERGENCY MEDICINE

## 2025-04-25 PROCEDURE — 2500000004 HC RX 250 GENERAL PHARMACY W/ HCPCS (ALT 636 FOR OP/ED): Mod: JZ | Performed by: EMERGENCY MEDICINE

## 2025-04-25 PROCEDURE — 36415 COLL VENOUS BLD VENIPUNCTURE: CPT | Performed by: EMERGENCY MEDICINE

## 2025-04-25 PROCEDURE — 99284 EMERGENCY DEPT VISIT MOD MDM: CPT | Mod: 25 | Performed by: EMERGENCY MEDICINE

## 2025-04-25 PROCEDURE — 85610 PROTHROMBIN TIME: CPT

## 2025-04-25 PROCEDURE — 80053 COMPREHEN METABOLIC PANEL: CPT | Performed by: EMERGENCY MEDICINE

## 2025-04-25 PROCEDURE — 86140 C-REACTIVE PROTEIN: CPT | Performed by: STUDENT IN AN ORGANIZED HEALTH CARE EDUCATION/TRAINING PROGRAM

## 2025-04-25 PROCEDURE — 2500000004 HC RX 250 GENERAL PHARMACY W/ HCPCS (ALT 636 FOR OP/ED): Mod: JZ,SE

## 2025-04-25 PROCEDURE — 85025 COMPLETE CBC W/AUTO DIFF WBC: CPT | Performed by: EMERGENCY MEDICINE

## 2025-04-25 PROCEDURE — 83605 ASSAY OF LACTIC ACID: CPT | Performed by: EMERGENCY MEDICINE

## 2025-04-25 PROCEDURE — 86901 BLOOD TYPING SEROLOGIC RH(D): CPT

## 2025-04-25 RX ORDER — OXYCODONE HYDROCHLORIDE 5 MG/1
7.5 TABLET ORAL ONCE
Refills: 0 | Status: COMPLETED | OUTPATIENT
Start: 2025-04-25 | End: 2025-04-25

## 2025-04-25 RX ORDER — HYDROMORPHONE HYDROCHLORIDE 1 MG/ML
1 INJECTION, SOLUTION INTRAMUSCULAR; INTRAVENOUS; SUBCUTANEOUS ONCE
Status: COMPLETED | OUTPATIENT
Start: 2025-04-25 | End: 2025-04-25

## 2025-04-25 RX ORDER — DIPHENHYDRAMINE HCL 25 MG
25 CAPSULE ORAL ONCE
Status: COMPLETED | OUTPATIENT
Start: 2025-04-25 | End: 2025-04-25

## 2025-04-25 RX ORDER — METOCLOPRAMIDE HYDROCHLORIDE 5 MG/ML
10 INJECTION INTRAMUSCULAR; INTRAVENOUS ONCE
Status: COMPLETED | OUTPATIENT
Start: 2025-04-25 | End: 2025-04-25

## 2025-04-25 RX ORDER — HYDROXYZINE HYDROCHLORIDE 25 MG/1
25 TABLET, FILM COATED ORAL ONCE
Status: COMPLETED | OUTPATIENT
Start: 2025-04-25 | End: 2025-04-25

## 2025-04-25 RX ORDER — PROCHLORPERAZINE EDISYLATE 5 MG/ML
10 INJECTION INTRAMUSCULAR; INTRAVENOUS ONCE AS NEEDED
Status: DISCONTINUED | OUTPATIENT
Start: 2025-04-25 | End: 2025-04-26 | Stop reason: HOSPADM

## 2025-04-25 RX ORDER — OXYCODONE HYDROCHLORIDE 5 MG/1
2.5 TABLET ORAL EVERY 6 HOURS PRN
Qty: 6 TABLET | Refills: 0 | Status: SHIPPED | OUTPATIENT
Start: 2025-04-25 | End: 2025-04-29 | Stop reason: WASHOUT

## 2025-04-25 RX ADMIN — HYDROXYZINE HYDROCHLORIDE 25 MG: 25 TABLET, FILM COATED ORAL at 01:26

## 2025-04-25 RX ADMIN — DIPHENHYDRAMINE HYDROCHLORIDE 25 MG: 25 CAPSULE ORAL at 16:33

## 2025-04-25 RX ADMIN — HYDROMORPHONE HYDROCHLORIDE 1 MG: 1 INJECTION, SOLUTION INTRAMUSCULAR; INTRAVENOUS; SUBCUTANEOUS at 12:41

## 2025-04-25 RX ADMIN — HYDROMORPHONE HYDROCHLORIDE 0.5 MG: 1 INJECTION, SOLUTION INTRAMUSCULAR; INTRAVENOUS; SUBCUTANEOUS at 18:41

## 2025-04-25 RX ADMIN — HYDROMORPHONE HYDROCHLORIDE 1 MG: 1 INJECTION, SOLUTION INTRAMUSCULAR; INTRAVENOUS; SUBCUTANEOUS at 13:51

## 2025-04-25 RX ADMIN — HYDROMORPHONE HYDROCHLORIDE 0.5 MG: 1 INJECTION, SOLUTION INTRAMUSCULAR; INTRAVENOUS; SUBCUTANEOUS at 16:11

## 2025-04-25 RX ADMIN — DIPHENHYDRAMINE HYDROCHLORIDE 25 MG: 25 CAPSULE ORAL at 21:45

## 2025-04-25 RX ADMIN — METOCLOPRAMIDE 10 MG: 5 INJECTION, SOLUTION INTRAMUSCULAR; INTRAVENOUS at 21:45

## 2025-04-25 RX ADMIN — HYDROMORPHONE HYDROCHLORIDE 0.5 MG: 0.5 INJECTION, SOLUTION INTRAMUSCULAR; INTRAVENOUS; SUBCUTANEOUS at 20:15

## 2025-04-25 RX ADMIN — OXYCODONE 7.5 MG: 5 TABLET ORAL at 03:28

## 2025-04-25 RX ADMIN — HYDROMORPHONE HYDROCHLORIDE 0.5 MG: 0.5 INJECTION, SOLUTION INTRAMUSCULAR; INTRAVENOUS; SUBCUTANEOUS at 21:45

## 2025-04-25 RX ADMIN — METOCLOPRAMIDE 10 MG: 5 INJECTION, SOLUTION INTRAMUSCULAR; INTRAVENOUS at 16:33

## 2025-04-25 RX ADMIN — HYDROMORPHONE HYDROCHLORIDE 0.5 MG: 1 INJECTION, SOLUTION INTRAMUSCULAR; INTRAVENOUS; SUBCUTANEOUS at 17:41

## 2025-04-25 ASSESSMENT — PAIN - FUNCTIONAL ASSESSMENT
PAIN_FUNCTIONAL_ASSESSMENT: 0-10

## 2025-04-25 ASSESSMENT — PAIN SCALES - GENERAL
PAINLEVEL_OUTOF10: 10 - WORST POSSIBLE PAIN
PAINLEVEL_OUTOF10: 9
PAINLEVEL_OUTOF10: 10 - WORST POSSIBLE PAIN

## 2025-04-25 ASSESSMENT — PAIN DESCRIPTION - DESCRIPTORS: DESCRIPTORS: ACHING

## 2025-04-25 ASSESSMENT — COLUMBIA-SUICIDE SEVERITY RATING SCALE - C-SSRS
1. IN THE PAST MONTH, HAVE YOU WISHED YOU WERE DEAD OR WISHED YOU COULD GO TO SLEEP AND NOT WAKE UP?: NO
6. HAVE YOU EVER DONE ANYTHING, STARTED TO DO ANYTHING, OR PREPARED TO DO ANYTHING TO END YOUR LIFE?: NO
2. HAVE YOU ACTUALLY HAD ANY THOUGHTS OF KILLING YOURSELF?: NO
6. HAVE YOU EVER DONE ANYTHING, STARTED TO DO ANYTHING, OR PREPARED TO DO ANYTHING TO END YOUR LIFE?: NO
1. IN THE PAST MONTH, HAVE YOU WISHED YOU WERE DEAD OR WISHED YOU COULD GO TO SLEEP AND NOT WAKE UP?: NO
2. HAVE YOU ACTUALLY HAD ANY THOUGHTS OF KILLING YOURSELF?: NO

## 2025-04-25 ASSESSMENT — PAIN DESCRIPTION - LOCATION
LOCATION: ABDOMEN
LOCATION: ABDOMEN

## 2025-04-25 ASSESSMENT — PAIN DESCRIPTION - ORIENTATION: ORIENTATION: RIGHT

## 2025-04-25 ASSESSMENT — PAIN DESCRIPTION - PAIN TYPE: TYPE: ACUTE PAIN

## 2025-04-25 NOTE — ED PROVIDER NOTES
HPI   Chief Complaint   Patient presents with    Abdominal Pain       HPI  Patient is a 24-year-old female with past medical history significant for brain cancer on oral chemotherapy, anemia, GERD, IBS, asthma, PCOS, breast fibroadenoma status post biopsy who has a brain to abdomen shunt in place and presents for ongoing abdominal pain and headache.  She states that she was at outside hospital at [facility] last night for the exact same thing.  She states that she was told there may be an infection around the insertion site of her shunt into her abdomen and that they sent her home with pain medication but states that when she tried to pick it up at the pharmacy was not available.  She denies any meningismus, fever, nausea, vomiting, diarrhea, neurologic deficits or any new symptoms other than ongoing discomfort.  Her last pain medication dose was when she was in the emergency room.      PMHx: As above  PSHx: As above  FamilyHx: Denies pertinent  SocialHx: Denies  Allergies: EMR  Medications: See Medication Reconciliation     ROS  As above otherwise denies    Physical Exam    GENERAL: Awake and Alert, No Acute Distress but appears uncomfortable  HEENT: AT/NC, PERRL, EOMI, Normal Oropharynx, No Signs of Dehydration  NECK: Normal Inspection, No JVD  CARDIOVASCULAR: TRR, No M/R/G  RESPIRATORY: CTA Bilaterally, No Wheezes, Rales or Rhonchi, Chest Wall Non-tender  ABDOMEN: Soft diffusely tender to palpation without rebound, guarding or peritoneal signs normal Bowel Sounds, No Distention  BACK: No CVA Tenderness  SKIN: Normal Color, Warm, Dry, No Rashes   EXTREMITIES: Non-Tender, Full ROM, No Pedal Edema  NEURO: A&O x 3, Normal Motor and Sensation, Normal Mood and Affect    Nursing Assessment and Vitals Reviewed    Medical Decision  Patient is a 24-year-old female with past medical history significant for brain cancer on oral chemotherapy, anemia, GERD, IBS, asthma, PCOS, breast fibroadenoma status post biopsy who has a brain to  abdomen shunt in place and presents for ongoing abdominal pain and headache.  She states that she was at outside hospital at [facility] last night for the exact same thing.  She states that she was told there may be an infection around the insertion site of her shunt into her abdomen and that they sent her home with pain medication but states that when she tried to pick it up at the pharmacy was not available.  She denies any meningismus, fever, nausea, vomiting, diarrhea, neurologic deficits or any new symptoms other than ongoing discomfort.  Her last pain medication dose was when she was in the emergency room.    Evaluation patient does appear uncomfortable.  Lungs are clear and heart is slightly tachycardic but regular.  She has diffuse abdominal tenderness without peritoneal signs.  She is neurologically intact.  She has no meningismus.  She is ordered Dilaudid for pain.    Review of records show the patient was seen at [facility] ED last night.  At that time she was tachycardic but otherwise hemodynamically stable.  There was concern that she was experiencing a  shunt.  At that time she also described chest pain which she did not endorse today.  Her tachycardia resolved after pain medication with Dilaudid.  CT imaging was obtained.  At that time her pain was out of proportion with what would be expected for shunt pathology.  CT showed enteritis but no emergent findings.  She was discharged to follow-up with her primary care physician and was given a prescription to add 2.5 mg of oxycodone to her home dose.  CT head had shown decrease in the size of a hypothalamic mass in a similar positioning of the right posterior approach ventricular shunt catheter.    Workup for patient included labs that did not reveal any emergent electrolyte imbalance.  Lactate is within normal limits.  She has no leukocytosis.  She has anemia which is improved from last night.  Urinalysis is negative and she is negative for pregnancy.  I  discussed patient with Dr. Mir, neurosurgery at [facility].  He recommends transfer to [facility] due to concern that patient may be over draining from the shunt and needs to be dilated to a higher setting.  Patient is excepted by Dr. Grant at [facility].  She remained in stable condition awaiting transfer to [facility] ED under Dr. Chatman.                 Patient History   Medical History[1]  Surgical History[2]  Family History[3]  Social History[4]    Physical Exam   ED Triage Vitals [04/25/25 1143]   Temperature Heart Rate Respirations BP   37.4 °C (99.4 °F) (!) 113 18 117/69      Pulse Ox Temp Source Heart Rate Source Patient Position   98 % Oral -- --      BP Location FiO2 (%)     -- --       Physical Exam      ED Course & MDM   Diagnoses as of 04/25/25 1502    (ventriculoperitoneal) shunt status                 No data recorded     [place] Coma Scale Score: 15 (04/25/25 1144 : Paolo Graham RN)                           Medical Decision Making      Procedure  Procedures       [1]   Past Medical History:  Diagnosis Date    Encounter for screening for infections with a predominantly sexual mode of transmission 03/02/2020    Screening for STD (sexually transmitted disease)    Melena 04/12/2019    Blood in stool    Other specified health status     No pertinent past medical history    Other specified health status     No pertinent past surgical history    Personal history of other (healed) physical injury and trauma     History of sprain of ankle    Unspecified condition associated with female genital organs and menstrual cycle 03/02/2020    Cervical motion tenderness   [2]   Past Surgical History:  Procedure Laterality Date    BREAST SURGERY Left     Mass removal    OTHER SURGICAL HISTORY  01/18/2019    No history of surgery   [3]   Family History  Problem Relation Name Age of Onset    Asthma Mother      Colon cancer Mother     [4]   Social History  Tobacco Use    Smoking status: Never     Passive exposure: Never     Smokeless tobacco: Never   Vaping Use    Vaping status: Never Used   Substance Use Topics    Alcohol use: Yes     Comment: social    Drug use: Yes     Types: Marijuana        Mena Kendall MD  04/25/25 0677

## 2025-04-25 NOTE — ED TRIAGE NOTES
Patient transferred from [facility] for NSGY consult. Patient initially presented for worsening abd and back pain. Patient transferred d/t concern  shunt may be clogged. Hx brain mass

## 2025-04-25 NOTE — PROGRESS NOTES
Emergency Department Transition of Care Note       Signout   I received Sejal Duff in signout from Dr. Whitlock.  Please see the ED Provider Note for all HPI, PE and MDM up to the time of signout at 2300.  This is in addition to the primary record.    In brief Sejal Duff is an 24 y.o. 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     At the time of signout we were awaiting:  CT imaging results of the abdomen, and patient tolerance of p.o. challenge.    ED Course & Medical Decision Making   Medical Decision Making:  Under my care,   CT scan results showed potential for enteritis, but no acute actionable abnormalities.  Patient was informed of her results.  Patient was provided food which she tolerated.  Patient was concerned that her pain was returning once her IV doses of Dilaudid were wearing off.  Discussed with patient that CT scans and other imaging were reassuring and that no acute life-threatening pathology was occurring.  Patient recommended to follow-up with her outpatient provider as soon as she is able to discuss further pain management options.  Patient states she has an appointment scheduled for this upcoming Tuesday.  Patient was provided a temporary 2.5 mg additive description of oxycodone to her home 5 mg dose until she can meet with her provider on Tuesday.  Patient was discharged home in stable condition and given appropriate return precautions including worsening fever/chills, nausea/vomiting, abdominal pain.      ED Course:  ED Course as of 04/25/25 0330   Thu Apr 24, 2025 2219 EKG shows sinus tachycardia rate of 113 bpm, a right axis deviation, otherwise no ST or T wave abnormalities indicative acute ischemia for electrolyte abnormalities, suspect lead reversal given positive axis and aVR  [GA]   2322 KG shows sinus tachycardia rate of 105 bpm, normal axis, normal intervals, no findings indicative of acute ischemia or electrolyte abnormalities.  The previously noted right axis deviation and limb lead reversal has resolved [GA]      ED Course User Index  [GA] Nithin Whitlock MD         Diagnoses as of 04/25/25 0330   Abdominal pain, unspecified abdominal location       Disposition   As a result of the work-up, the patient was discharged home.  she was informed of her diagnosis and instructed to come back with any concerns or worsening of condition.  she and was agreeable to the plan as discussed above.  she was given the opportunity to ask questions.  All of the patient's questions were answered.    Procedures   Procedures    Patient seen and discussed with ED attending physician.    Carlos Gay, DO  Emergency Medicine

## 2025-04-25 NOTE — ED PROVIDER NOTES
History of Present Illness     History provided by: Patient  Limitations to History: None  External Records Reviewed: ED provider notes and outpatient provider notes as well as discharge summaries    HPI:  Sejal Duff is a 24 y.o. 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  Patient states headache without visual changes, facial droop, changes in her speech, weakness or numbness.  No focal neurologic deficit per patient.  Patient states she feels that her belly pain and her headache and chest discomfort are consistent with past episodes of shunt dysfunction.  Patient denies vaginal pain, bleeding, discharge, urinary discomfort, hematuria, rectal bleeding.  Patient denies constipation or obstipation.  Patient denies diarrhea.  She denies fevers or chills.  She denies cough or shortness of breath.  Patient describes her chest discomfort as sharp and poorly localized.  There is no pleuritic component.  Patient has still been eating and drinking without discomfort or difficulty.    Physical Exam   Triage vitals:  T 36.4 °C (97.5 °F)  HR (!) 115  /76  RR 16  O2 99 % None (Room air)    General: Awake, alert, in no acute distress  Eyes: Gaze conjugate.  No scleral icterus or injection  HENT: Normo-cephalic, atraumatic. No stridor  CV: Tachycardic rate, regular rhythm. Radial pulses 2+ bilaterally, 2+ DP and PT pulses bilaterally, no delay in capillary refill, skin is warm well-perfused  Resp: Breathing non-labored, speaking in full sentences.  Clear to auscultation bilaterally  GI: Soft, non-distended, voluntary guarding, nonperitonitic, tender to palpation in the right lower quadrant  : No suprapubic or CVA tenderness  MSK/Extremities: No gross bony deformities. Moving all extremities, no fluctuance at   shunt site  Skin: Warm. Appropriate color  Neuro: Alert. Oriented. Face symmetric. Speech is fluent.  Gross strength and sensation intact in b/l UE and LEs, EOMs are intact, symmetric head turn.  Moves all 4 extremities without deficit or difficulty.  Psych: Appropriate mood and affect    Medical Decision Making & ED Course   Medical Decision Makin y.o. female tachycardic but otherwise hemodynamically stable.  She presents feeling that her symptoms are consistent with past episodes of malfunctions with her  shunt.  With a reassuring and nonfocal neurologic exam no indication for neurosurgical consult at this time.  Low concern for pulmonary embolism in this patient and will workup other etiologies prior to pursuing pulmonary embolism given the fact that she is not dyspneic and tolerating room air and her tachycardia can be better explained from her discomfort.  After pain medicines the patient was no longer tachycardic and when her pain medicine wore off she became tachycardic again.  No concern for ovarian or  pathology in this patient.  Will evaluate her with CT head for shunt as well as shunt series.  Will add on CT abdomen pelvis given her tenderness in the right lower quadrant which is out of proportion to what I would expect in a patient with shunt pathology.  Patient was given multiple doses of multimodal pain therapy.  Patient was signed out to the oncoming provider pending results of imaging studies.  ----         Social Determinants of Health which Significantly Impact Care: None identified       Chronic conditions affecting the patient's care: See HPI    The patient was discussed with the following consultants/services: Please see ED course for consult transcript        ED Course:  ED Course as of 25   Thu  EKG shows sinus tachycardia rate of 113 bpm, a right axis deviation, otherwise no ST or T wave abnormalities indicative acute ischemia for electrolyte  abnormalities, suspect lead reversal given positive axis and aVR [GA]      ED Course User Index  [GA] Nithin Whitlock MD     Disposition   Patient was signed out to oncoming provider at 2300 pending completion of their work-up.  Please see the next provider's transition of care note for the remainder of the patient's care.     Procedures   Procedures    Patient was seen and discussed with the attending of record.    Nithin Whitlock MD  Emergency Medicine     Nithin Whitlock MD  Resident  04/24/25 9829

## 2025-04-26 VITALS
BODY MASS INDEX: 24.8 KG/M2 | TEMPERATURE: 98 F | SYSTOLIC BLOOD PRESSURE: 115 MMHG | WEIGHT: 140 LBS | DIASTOLIC BLOOD PRESSURE: 73 MMHG | RESPIRATION RATE: 18 BRPM | HEIGHT: 63 IN | HEART RATE: 110 BPM | OXYGEN SATURATION: 100 %

## 2025-04-26 LAB
CRP SERPL-MCNC: 11.55 MG/DL
ERYTHROCYTE [SEDIMENTATION RATE] IN BLOOD BY WESTERGREN METHOD: 43 MM/H (ref 0–20)

## 2025-04-26 PROCEDURE — 2500000001 HC RX 250 WO HCPCS SELF ADMINISTERED DRUGS (ALT 637 FOR MEDICARE OP): Mod: SE

## 2025-04-26 PROCEDURE — 96372 THER/PROPH/DIAG INJ SC/IM: CPT

## 2025-04-26 PROCEDURE — 96376 TX/PRO/DX INJ SAME DRUG ADON: CPT

## 2025-04-26 PROCEDURE — 2500000004 HC RX 250 GENERAL PHARMACY W/ HCPCS (ALT 636 FOR OP/ED): Mod: JZ,SE | Performed by: EMERGENCY MEDICINE

## 2025-04-26 PROCEDURE — 2500000004 HC RX 250 GENERAL PHARMACY W/ HCPCS (ALT 636 FOR OP/ED): Mod: JZ,SE

## 2025-04-26 RX ORDER — DICYCLOMINE HYDROCHLORIDE 10 MG/ML
20 INJECTION INTRAMUSCULAR ONCE
Status: COMPLETED | OUTPATIENT
Start: 2025-04-26 | End: 2025-04-26

## 2025-04-26 RX ORDER — ALUMINUM HYDROXIDE, MAGNESIUM HYDROXIDE, AND SIMETHICONE 1200; 120; 1200 MG/30ML; MG/30ML; MG/30ML
30 SUSPENSION ORAL ONCE
Status: COMPLETED | OUTPATIENT
Start: 2025-04-26 | End: 2025-04-26

## 2025-04-26 RX ORDER — OXYCODONE HYDROCHLORIDE 5 MG/1
5 TABLET ORAL ONCE
Refills: 0 | Status: COMPLETED | OUTPATIENT
Start: 2025-04-26 | End: 2025-04-26

## 2025-04-26 RX ORDER — DICYCLOMINE HYDROCHLORIDE 20 MG/1
20 TABLET ORAL 2 TIMES DAILY
Qty: 14 TABLET | Refills: 0 | Status: SHIPPED | OUTPATIENT
Start: 2025-04-26 | End: 2025-05-03

## 2025-04-26 RX ADMIN — ALUMINUM HYDROXIDE, MAGNESIUM HYDROXIDE, AND SIMETHICONE 30 ML: 200; 200; 20 SUSPENSION ORAL at 09:37

## 2025-04-26 RX ADMIN — HYDROMORPHONE HYDROCHLORIDE 0.5 MG: 0.5 INJECTION, SOLUTION INTRAMUSCULAR; INTRAVENOUS; SUBCUTANEOUS at 11:11

## 2025-04-26 RX ADMIN — DICYCLOMINE HYDROCHLORIDE 20 MG: 10 INJECTION, SOLUTION INTRAMUSCULAR at 08:01

## 2025-04-26 RX ADMIN — OXYCODONE 5 MG: 5 TABLET ORAL at 05:15

## 2025-04-26 RX ADMIN — HYDROMORPHONE HYDROCHLORIDE 0.5 MG: 0.5 INJECTION, SOLUTION INTRAMUSCULAR; INTRAVENOUS; SUBCUTANEOUS at 00:21

## 2025-04-26 RX ADMIN — LINACLOTIDE 145 MCG: 145 CAPSULE, GELATIN COATED ORAL at 09:37

## 2025-04-26 RX ADMIN — HYDROMORPHONE HYDROCHLORIDE 0.2 MG: 0.5 INJECTION, SOLUTION INTRAMUSCULAR; INTRAVENOUS; SUBCUTANEOUS at 08:01

## 2025-04-26 ASSESSMENT — PAIN SCALES - GENERAL
PAINLEVEL_OUTOF10: 10 - WORST POSSIBLE PAIN
PAINLEVEL_OUTOF10: 10 - WORST POSSIBLE PAIN
PAINLEVEL_OUTOF10: 8
PAINLEVEL_OUTOF10: 8
PAINLEVEL_OUTOF10: 10 - WORST POSSIBLE PAIN

## 2025-04-26 ASSESSMENT — PAIN - FUNCTIONAL ASSESSMENT
PAIN_FUNCTIONAL_ASSESSMENT: 0-10

## 2025-04-26 ASSESSMENT — PAIN DESCRIPTION - DESCRIPTORS: DESCRIPTORS: CRAMPING;ACHING

## 2025-04-26 ASSESSMENT — PAIN DESCRIPTION - LOCATION: LOCATION: ABDOMEN

## 2025-04-26 ASSESSMENT — PAIN DESCRIPTION - PROGRESSION: CLINICAL_PROGRESSION: NOT CHANGED

## 2025-04-26 ASSESSMENT — PAIN DESCRIPTION - ORIENTATION: ORIENTATION: MID

## 2025-04-26 NOTE — SIGNIFICANT EVENT
Patient with minimal headache but decreased vents.  Shunt dialed to 5.  Will arrange for 1 week follow up.

## 2025-04-26 NOTE — CONSULTS
"[facility]  ACUTE CARE SURGERY - CONSULT    Patient Name: Sejal Duff  MRN: [id number]  Admit Date: 2025  : 2000  AGE: 24 y.o.   GENDER: female  ==============================================================================  TODAY'S ASSESSMENT AND PLAN OF CARE:  ASSESSMENT:  Sejal Duff is a 24 y.o. female with history of brain cancer on oral chemotherapy s/p  shunt x2, anemia, GERD, IBS, asthma, PCOS, breast fibroadenoma status post biopsy  who presents to [facility] for abdominal pain. ACS is consulted for SBP vs enteritis. Patient is in fair condition. NSGY consulted for c/f infection around  shunt, low c/f shunt infxn. CT scan negative for any transition point indicating SBO, patient passing gas.    Recommend gynecology consult given severe TTP in supraumbilical region in absence of clear etiology as well as associated pain with urination and vaginal pain  Please page ACS with any questions or concerns    Discussed and seen with Dr. Simon.    Kala Serra MD  PGY-1 General Surgery  Acute Care Surgery [id number]    Subjective   ==============================================================================  CHIEF COMPLAINT/REASON FOR CONSULT:  Chief Complaint: Abdominal pain    HPI:  Sejal Duff is a 24 y.o. female with history of brain cancer (on tovorafenib), anemia, GERD, IBS, asthma, PCOS, breast fibroadenoma status post biopsy  who presents to [facility] for concerns of abdominal pain. Acute care surgery is consulted for SBO vs enteritis. Patient states that she has had abdominal pain for the last couple weeks, primarily in the RLQ and supraumbilical region, radiating to her back. It was initially bearable, but she fainted Thursday when the pain overwhelmed her and she presented to the ED at that time. Subsequently discharged same day. Today, patient could not stand due to her abdominal pain which prompted her to return to the ED. States the pain feels like \"having a " "broken rib.\" The pain is constant and only minimally improved with a pain regimen of oxy and ibuprofen. Endorses having had dark red blood in her stools in the last few months, but recently developed bright green stool. Has had dark red in stool before, but has never been worked up. Attributed to her chronic constipation. Last BM Wednesday. Last passed gas a few hours prior. Endorses pain with urination and difficulty initiating a stream, vaginal pain without abnormal discharge. Denies fevers, chills, CP, N/V. She started chemo in December 2024.     Objective   PAST MEDICAL HISTORY:   PMH:   Medical History[1]  PSH:   Surgical History[2]  FH:   Family History[3]  SOCIAL HISTORY:  Denies any current tobacco use, alcohol use, illicit drug use. Has been encouraged to use MJ to increase her appetite iso chemo.  MEDICATIONS:   Prior to Admission medications    Medication Sig Start Date End Date Taking? Authorizing Provider   albuterol 90 mcg/actuation inhaler Inhale 1-2 puffs every 4 hours if needed for wheezing or shortness of breath. 2/4/25   Deborah Snowden PA-C   benzoyl peroxide (Benzac AC) 10 % external wash APPLY TOPICALLY 2 TIMES A DAY. WASH TWICE DAILY AS DIRECTED 5/28/24   Cody Bryan MD   cholecalciferol (Vitamin D-3) 50,000 unit capsule Take 1 capsule (50,000 Units) by mouth 1 (one) time per week. 9/24/24 9/24/25  Jimmy Patterson MD, LAc   clindamycin (Cleocin T) 1 % lotion Apply a pea-size amount to entire face twice daily after washing face with lukewarm water. Use this BEFORE hydrocortisone cream. 2/18/25   Migue Aguilar MD   clobetasol (Temovate) 0.05 % ointment Apply topically 2 times a day. To hands and feet 3x per week for maintenance 3/14/25   Judy Patel MD   dicyclomine (Bentyl) 20 mg tablet Take 1 tablet (20 mg) by mouth 2 times a day for 7 days. 4/26/25 5/3/25  Beth Segal DO   ergocalciferol (Vitamin D-2) 1.25 MG (95642 UT) capsule Take 1 capsule (1,250 mcg) " by mouth 1 (one) time per week. 10/28/24 10/28/25  Jimmy Patterson MD, LAc   hydrocortisone 2.5 % ointment Apply topically 2 times a day. To face 3x per week for maintenance 3/14/25   Judy Patel MD   hydrOXYzine pamoate (Vistaril) 25 mg capsule Take 1 capsule (25 mg) by mouth as needed at bedtime for itching. If 1 capsule is not enough to control symptoms may increase to 2 capsules 4/14/25   Judy Patel MD   ibuprofen 800 mg tablet Take 1 tablet (800 mg) by mouth every 8 hours if needed for mild pain (1 - 3) (pain). 1/23/25 7/22/25  Amy Calloway APRN-CNP   Lacto no.72-Zoszvf-UUJ-larch 25B cell-25B cell-50 mg capsule Take 1 capsule by mouth once daily. 4/12/23   Cody Bryan MD   linaCLOtide (Linzess) 145 mcg capsule Take 1 capsule (145 mcg) by mouth once daily in the morning. Take before meals. Do not crush or chew. 4/26/24   Dipika Marina APRN-CNP   loratadine (Claritin) 10 mg tablet Take 1 tablet (10 mg) by mouth once daily. 8/19/24 2/15/25  Cody Bryan MD   LORazepam (Ativan) 1 mg tablet Take 1 tablet (1 mg) 30 minutes prior to MRI on 2/17/25 for claustrophobia/anxiety relief. 2/4/25   Deborah Snowden PA-C   methylphenidate (Ritalin) 5 mg tablet Take 1 tablet (5 mg) by mouth once daily. 4/1/25 5/1/25  Kala Fernández APRN-CNP   minocycline (Dynacin) 100 mg tablet Take 1 tablet (100 mg) by mouth 2 times a day. 3/23/25 4/22/25  JASVIR Davidson-CNP, DNP   multivitamin with iron (Daily Multiple Vitamins/Iron) tablet Take 1 tablet by mouth once daily. 4/12/23   Cody Bryan MD   OLANZapine (ZyPREXA) 10 mg tablet Take 1 tablet (10 mg) by mouth once daily at bedtime. 4/1/25 6/30/25  Kala Fernández, APRN-CNP   omeprazole (PriLOSEC) 40 mg DR capsule Take 1 capsule (40 mg) by mouth once daily. 12/11/24 3/11/25  Amy Calloway APRN-CNP   ondansetron (Zofran) 8 mg tablet Take 1 tablet (8 mg) by mouth every 8 hours if needed for nausea. 4/1/25 5/1/25  Kala MARVIN  "SANTOSH Fernández   ondansetron ODT (Zofran-ODT) 8 mg disintegrating tablet Dissolve 1 tablet (8 mg) in the mouth every 8 hours if needed for nausea or vomiting for up to 20 days. 4/3/25 4/23/25  Migue Aguilar MD   oxyCODONE (Roxicodone) 5 mg immediate release tablet Take 1 tablet (5 mg) by mouth every 6 hours if needed for severe pain (7 - 10). MAX 4 tablets a day Do not fill before April 2, 2025. 4/2/25 5/2/25  SANTOSH Ahumada   oxyCODONE (Roxicodone) 5 mg immediate release tablet Take 0.5 tablets (2.5 mg) by mouth every 6 hours if needed for severe pain (7 - 10) for up to 3 days. 4/25/25 4/28/25  Carlos Gay,    predniSONE (Deltasone) 20 mg tablet Take 1.5 tablets (30 mg) by mouth once daily. 3/23/25 4/22/25  SANTOSH Davidson, DNP   prucalopride (Motegrity) 2 mg tablet Take 1 tablet (2 mg) by mouth once daily. 4/26/24   SANTOSH Mobley   simethicone (Mylicon) 125 mg chewable tablet Chew 1 tablet (125 mg) every 6 hours if needed for flatulence. 3/3/25 6/1/25  SANTOSH Ahumada   tovorafenib 400 mg/week (100 mg x 4) tablet Take 400 mg by mouth 1 (one) time per week. (Saturdays) (Four 100 mg tablets = 400 mg.) Swallow tablets whole with water. Do not chew, cut, or crush. Tablets may be taken with or without food. Do not fill before May 3, 2025. 5/3/25   Deborah Snowden PA-C   triamcinolone (Kenalog) 0.1 % ointment Apply topically 2 times a day. To trunk, arms, and legs 3x per week for maintenance 3/14/25   Judy Patel MD   vitamin A & D cream Apply topically 2 times a day as needed for dry skin or wound care. 2/18/25 4/19/25  Deborah Snowden PA-C     ALLERGIES:   RX Allergies[4]    REVIEW OF SYSTEMS:  A 12-point ROS was performed and was unremarkable except as above.    PHYSICAL EXAM:  Blood pressure 115/73, pulse (!) 110, temperature 36.7 °C (98 °F), temperature source Oral, resp. rate 18, height 1.6 m (5' 3\"), weight 63.5 kg (140 lb), SpO2 " 100%.    GEN: No acute distress. Alert, awake and conversant.  HEENT: Sclera anicteric. Moist mucous membranes.  RESP: Breathing non-labored, equal chest rise. On RA.  CV: Tachycardic to 110s , normotensive  GI: Abdomen soft, mildly distended,  exquisitely TTP in RLQ and supraumbilical region with associated involuntary guarding. Moderately TTP in RUQ, LLQ. No rebound tenderness. Negative Rodríguez's sign.  2 lap incisions well healed from  shunts. No palpable mass. Full abdominal exam limited by pain.  : Deferred.  MSK: No gross deformities. Moves all extremities spontaneously.  NEURO: Alert and oriented x3. No focal deficits.  PSYCH: Appropriate mood and affect.  SKIN:  Warm and dry    IMAGING SUMMARY:   Imaging  CT abdomen pelvis w IV contrast  Addendum Date: 4/24/2025  Interpreted By:  Chris Christensen, ADDENDUM: Ill-defined area of low attenuation in the posterior lower pole right kidney which appears new from previous exam. Similar area of ill-defined hypodensity in the anterior lower left kidney not substantially changed from prior exam. These are nonspecific, however pyelonephritis could have this appearance and clinical correlation is recommended.   Signed by: Chris Christensen 4/24/2025 11:50 PM   -------- ORIGINAL REPORT -------- Dictation workstation:   [id number]    Result Date: 4/24/2025  1. Ventriculoperitoneal shunt catheter terminates within the left pelvis. No evidence of kinking or discontinuity along the course of the catheter. 2. Nonspecific fluid within mildly prominent but not overtly distended small bowel loops in the mid abdomen and pelvis. This is a nonspecific finding but could potentially reflect an enteritis or possibly low-grade or developing bowel obstruction. No discrete transition point identified. 3. Feculent debris in the distal ileum suggesting delayed transit. 4. Small-to-moderate free fluid within the pelvis as well as mild mesenteric fat stranding in the pelvis. This may in part  be related to fluid from the  shunt with mesenteric congestion or inflammatory process not excluded.   I personally reviewed the image(s)/study and resident interpretation. I agree with the findings as stated by resident Meng Sanchez.   MACRO: None   Signed by: Chris Christensen 4/24/2025 11:25 PM Dictation workstation:   [id number]    XR shunt series  Result Date: 4/24/2025  1.  Right posterior approach ventriculostomy shunt catheter without evidence of kinking or discontinuity. 2.  No focal infiltrate or pneumothorax. 3.  Nonobstructive bowel gas pattern.   I personally reviewed the image(s)/study and resident interpretation. I agree with the findings as stated by resident Meng Sanchez.   MACRO: None   Signed by: Chris Christensen 4/24/2025 11:27 PM Dictation workstation:   [id number]    CT head wo IV contrast  Result Date: 4/24/2025  1. No acute intracranial abnormality. 2. Mass centered within the region of the hypothalamus is decreased in size from prior CT dated 12/29/2024 and is likely at least slightly decreased in size from recent MRI from February 2025. Follow-up MRI may be performed for more accurate comparison. 3. Similar positioning of the right posterior approach ventricular shunt catheter. The ventricles are nondilated.     I personally reviewed the image(s)/study and resident interpretation. I agree with the findings as stated by resident Meng Sanchez.   MACRO: None   Signed by: Chris Christensen 4/24/2025 11:07 PM Dictation workstation:   [id number]      Cardiology, Vascular, and Other Imaging  ECG 12 lead  Result Date: 4/24/2025  Sinus tachycardia Otherwise normal ECG When compared with ECG of 24-APR-2025 15:56, QRS axis Shifted left T wave inversion less evident in Inferior leads Nonspecific T wave abnormality no longer evident in Anterolateral leads See ED provider note for full interpretation and clinical correlation Confirmed by Wanda Mckee ([id number]) on 4/24/2025 11:48:08 PM       I  have reviewed the imaging above as it pertains to the patient's surgical concerns and agree with the radiologist's interpretation.  LABS:  Results for orders placed or performed during the hospital encounter of 04/25/25 (from the past 24 hours)   Type and Screen   Result Value Ref Range    ABO TYPE O     Rh TYPE POS     ANTIBODY SCREEN NEG    Coagulation Screen   Result Value Ref Range    Protime 14.3 (H) 9.8 - 12.4 seconds    INR 1.3 (H) 0.9 - 1.1    aPTT 32 26 - 36 seconds     *Note: Due to a large number of results and/or encounters for the requested time period, some results have not been displayed. A complete set of results can be found in Results Review.     I/O past 24h:  No intake/output data recorded.    I have reviewed all laboratory and imaging results ordered/pertinent for this encounter.          [1]   Past Medical History:  Diagnosis Date    Encounter for screening for infections with a predominantly sexual mode of transmission 03/02/2020    Screening for STD (sexually transmitted disease)    Melena 04/12/2019    Blood in stool    Other specified health status     No pertinent past medical history    Other specified health status     No pertinent past surgical history    Personal history of other (healed) physical injury and trauma     History of sprain of ankle    Unspecified condition associated with female genital organs and menstrual cycle 03/02/2020    Cervical motion tenderness   [2]   Past Surgical History:  Procedure Laterality Date    BREAST SURGERY Left     Mass removal    OTHER SURGICAL HISTORY  01/18/2019    No history of surgery   [3]   Family History  Problem Relation Name Age of Onset    Asthma Mother      Colon cancer Mother     [4]   Allergies  Allergen Reactions    Morphine Dizziness    Pollen Extracts Runny nose    House Dust Runny nose

## 2025-04-26 NOTE — ED PROVIDER NOTES
History of Present Illness     History provided by: Patient  Limitations to History: None  External Records Reviewed with Brief Summary:  Transfer note from [facility], ED visit from , previous imaging    HPI:  Sejal Duff is a 24 y.o. female with history of brain cancer, currently on oral chemo and has a shunt, of anemia, asthma, GERD, IBS, PCOS, breast fibroadenoma who presented to the ED as a transfer from [facility] for neurosurgery consult for possible shunt adjustment.  She was seen here yesterday for abdominal pain and headache for a few weeks.  She was sent home with pain medication, but woke up this morning with worsening pain.  She was seen at [facility] and was discussed with the neurosurgeon there, he recommended transfer due to concern that patient may be over draining from shunt.  She denied any nausea, vomiting, new shortness of breath, new chest pain, changes in urination or bowel movements, changes today.  She denies any fever or chills.  She denies any vision changes, diarrhea, weakness/tingling/numbness in her extremities.    Physical Exam   Triage vitals:  T 36.7 °C (98 °F)  HR (!) 112  /74  RR 16  O2 100 % None (Room air)    General: Awake, alert, mild distress  Eyes: Gaze conjugate.  No scleral icterus or injection  HENT: Normo-cephalic, atraumatic. No stridor  CV: Tachycardic rate, regular rhythm. Radial pulses 2+ bilaterally, 2+ DP pulses, cap refill intact.  Resp: Breathing non-labored, speaking in full sentences.  Clear to auscultation bilaterally  GI: Soft, non-distended, tender to palpation in right lower quadrant, guarding present.  No rebound.  MSK/Extremities: No gross bony deformities. Moving all extremities  Skin: Warm. Appropriate color  Neuro: Alert. Oriented. Face symmetric. Speech is fluent.  Gross strength and sensation intact in b/l UE and LEs  Psych: Appropriate mood and affect    Medical Decision Making & ED Course   Medical Decision Makin y.o. female with above medical  history presents to the ED as a transfer from [facility] for neurosurgery consult for possible shunt readjustment.  She has been having a headache and abdominal pain for the past few days, concerned that her shunt was over draining.  She was seen here in the ED yesterday, CTAP at that time concerning for possible enteritis, as well as small to moderate free fluid in the pelvis which may be related to fluid from  shunt.  CT head at that time showed no acute intracranial abnormality.  Patient stated that she had felt better, and then her pain had acutely worsened today.  On physical exam, patient does have tenderness palpation right lower quadrant, no focal neurodeficits noted.  Neurosurgery was paged at this time for possible shunt readjustment.  Patient was given multiple doses of Dilaudid along with Reglan and Benadryl for symptoms.  Discussed with neurosurgery, they did not need any additional imaging or labs at this time, pending their recommendations.  At this time patient was signed out to oncoming provider, please see their note for further workup and management.  ED Course:  ED Course as of 04/26/25 1163   Sat Apr 26, 2025   [id number] I did evaluate the patient at bedside, she was attempting to leave the department AMA.  I did discuss with the patient her concerns, which largely revolved around her persistent abdominal pain and the lack of perceived advocacy throughout the night.  Discussed with patient additional options for pain control constipation and she was in agreement to trial these and remain in the department.  Patient provided with her home Linzess, and also trialing Bentyl for treatment of her pain given her existing IBS, and an additional dose of Dilaudid IV.  Will consider other treatment modalities for pain control as needed while pending final neurosurgery recommendations.  I did place a page out to neurosurgery at this time as well.  [AT]      ED Course User Index  [AT] Beth Segal, DO          Diagnoses as of 04/26/25 2333   Generalized abdominal pain   Constipation, unspecified constipation type       EKG Independent Interpretation: EKG obtained at 11:15 PM showed sinus rhythm with ventricular rate of 105, rate of 146, QTc 459.  Normal axis.  No ST elevation.  Compared to prior EKG from April 24, 2025, patient longer has right axis deviation  ----    Differential diagnoses considered include but are not limited to: Shunt malfunction, headache, over draining of shunt, enteritis     Social Determinants of Health which Significantly Impact Care: None identified     The patient was discussed with the following consultants/services:  Neurosurgery      Disposition   Patient was signed out to Dr. Michel at 11PM pending completion of their work-up.  Please see the next provider's transition of care note for the remainder of the patient's care.     Procedures   Procedures    Patient seen and discussed with ED attending physician.    Rafaela Cota DO  Emergency Medicine     Rafaela Cota DO  Resident  04/27/25 0032

## 2025-04-26 NOTE — ED NOTES
Pt requested to be discharged. She states that no one is helping her. Physician aware. Pt requested to have IV removed. Pt currently getting dressed.      Amadeo Khanna RN  04/26/25 1569

## 2025-04-26 NOTE — CONSULTS
NEUROSURGERY CONSULT NOTE        Date of Service:  4/25/2025 Attending Provider:  Carlos Resendiz MD     Reason for Consultation:  Sejal Duff is being seen today for a consult requested by Carlos Resendiz MD for c/f shunt infection/malfunction.      Subjective   History of Present Illness:  Sejal is a 24 y.o. 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 Prox cath revision and distal cath flushing, 4/25 p/w abdominal/pelvic pain and HA, CTH slit vents, decreased from 2/17 MRI, SS no kinking or discontinuity     Review of Systems   10 point ROS is obtained and negative except the ones mentioned in the HPI    Social History  She reports that she has never smoked. She has never been exposed to tobacco smoke. She has never used smokeless tobacco. She reports current alcohol use. She reports current drug use. Drug: Marijuana.    Medical History  Medical History[1]    Surgical History  Surgical History[2]     Objective     Vitals:  Vitals:    04/25/25 1940   BP: 132/74   Pulse: (!) 112   Resp: 16   Temp: 36.7 °C (98 °F)   SpO2: 100%         Exam:  Constitutional: In moderate distress  Resp: breathing comfortably  Cardio: well perfused  GI: nondistended, tender to palpation  MSK: full range of motion  Neuro: Awake, A&Ox3  Cranial Nerves II-XII: PERRL, EOMI, Face  symmetric, Facial SILT, Palate/Tongue midline and symmetric, shoulder shrugs symmetric, hearing intact to finger rubs bilaterally  Motor: 5/5, no dysmetria on finger to nose, no pronator drift  Sensation: SILT throughout all extremities  Skin: cranial and abdominal incisions c/d/i      Medications  Current Outpatient Medications   Medication Instructions    albuterol 90 mcg/actuation inhaler 1-2 puffs, inhalation, Every 4 hours PRN    benzoyl peroxide (Benzac AC) 10 % external wash APPLY TOPICALLY 2 TIMES A DAY. WASH TWICE DAILY AS DIRECTED    cholecalciferol (VITAMIN D-3) 50,000 Units, oral, Weekly    clindamycin (Cleocin T) 1 % lotion Apply a pea-size amount to entire face twice daily after washing face with lukewarm water. Use this BEFORE hydrocortisone cream.    clobetasol (Temovate) 0.05 % ointment Topical, 2 times daily, To hands and feet 3x per week for maintenance    ergocalciferol (VITAMIN D-2) 1,250 mcg, oral, Weekly    hydrocortisone 2.5 % ointment Topical, 2 times daily, To face 3x per week for maintenance    hydrOXYzine pamoate (VISTARIL) 25 mg, oral, Nightly PRN, If 1 capsule is not enough to control symptoms may increase to 2 capsules    ibuprofen 800 mg, oral, Every 8 hours PRN    Lacto no.69-Spkstb-ZST-larch 25B cell-25B cell-50 mg capsule 1 capsule, oral, Daily    linaCLOtide (LINZESS) 145 mcg, oral, Daily before breakfast, Do not crush or chew.    loratadine (CLARITIN) 10 mg, oral, Daily    LORazepam (Ativan) 1 mg tablet Take 1 tablet (1 mg) 30 minutes prior to MRI on 2/17/25 for claustrophobia/anxiety relief.    methylphenidate (RITALIN) 5 mg, oral, Daily    Motegrity 2 mg, oral, Daily    multivitamin with iron (Daily Multiple Vitamins/Iron) tablet 1 tablet, oral, Daily    OLANZapine (ZYPREXA) 10 mg, oral, Nightly    omeprazole (PRILOSEC) 40 mg, oral, Daily    ondansetron (ZOFRAN) 8 mg, oral, Every 8 hours PRN    oxyCODONE (ROXICODONE) 5 mg, oral, Every 6 hours PRN, MAX 4 tablets a day     oxyCODONE (ROXICODONE) 2.5 mg, oral, Every 6 hours PRN    simethicone (MYLICON) 125 mg, oral, Every 6 hours PRN    [START ON 5/3/2025] tovorafenib 400 mg, oral, Weekly, (Saturdays) (Four 100 mg tablets = 400 mg.) Swallow tablets whole with water. Do not chew, cut, or crush. Tablets may be taken with or without food.    triamcinolone (Kenalog) 0.1 % ointment Topical, 2 times daily, To trunk, arms, and legs 3x per week for maintenance        Diagnostic Results:    Lab Results   Component Value Date    WBC 10.0 04/25/2025    HGB 7.9 (L) 04/25/2025    HCT 26.7 (L) 04/25/2025    MCV 84 04/25/2025     (H) 04/25/2025     Lab Results   Component Value Date    CREATININE 0.47 (L) 04/25/2025    BUN 8 04/25/2025     04/25/2025    K 3.5 04/25/2025     04/25/2025    CO2 27 04/25/2025     Lab Results   Component Value Date    INR 1.3 (H) 04/25/2025    INR 1.2 (H) 12/28/2024    INR 1.1 11/30/2024    PROTIME 14.3 (H) 04/25/2025    PROTIME 13.0 (H) 12/28/2024    PROTIME 12.3 11/30/2024       Imaging Results:    No orders to display             Assessment/Plan   Assessment:  Sejal is a 24 y.o. 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 Prox cath revision and distal cath flushing, 4/25 p/w abdominal/pelvic pain and HA, CTH slit vents, decreased from 2/17  MRI, SS no kinking or discontinuity     Patient with continued abdominal pain and pelvic pain. Has presented to the ED prior on 4/24 with no resolution in symptoms. Very low concern for shunt malfunction given decrease in ventriclar caliber compared to prior and patient with mainly abdominal and pelvic symptoms. Shunt personally checked in ED and confirmed at setting 4. Imaging with no concerns of shunt malfunction. Shunt was revised in December 2024, 4 months ago and as a result have very low suspicion for any shunt infection. Patient symptoms of abdominal pain near shunt site is likely shuntalgia/irritation of shunt catheter. ESR and CRP elevated at OSH and will need further workup for other source of infection. Recommend consulting ACS/GI for patients severe abdominal/pelvic pain.     Plan:  - Will plan to dial shunt to 5 for patients HA, shunt not cause of patients severe abdominal/pelvic pain  - Elevated ESR/CRP will need further workup for other source of infection, very low concern for any shunt infection given extended time from shunt placement   - Recommend GI/ACS consult for patients severe abdominal/pelvic pain       Telly Robles MD  Department of Neurosurgery   [facility]   Note authored by resident on neurosurgery team, with all questions or to contact team please page at [phone]  Plan not finalized until note signed by attending         [1]   Past Medical History:  Diagnosis Date    Encounter for screening for infections with a predominantly sexual mode of transmission 03/02/2020    Screening for STD (sexually transmitted disease)    Melena 04/12/2019    Blood in stool    Other specified health status     No pertinent past medical history    Other specified health status     No pertinent past surgical history    Personal history of other (healed) physical injury and trauma     History of sprain of ankle    Unspecified condition associated with female genital organs and  menstrual cycle 03/02/2020    Cervical motion tenderness   [2]   Past Surgical History:  Procedure Laterality Date    BREAST SURGERY Left     Mass removal    OTHER SURGICAL HISTORY  01/18/2019    No history of surgery

## 2025-04-26 NOTE — PROGRESS NOTES
Emergency Department Transition of Care Note       Signout   I received Sejal Duff in signout from Dr. Cota.  Please see the ED Provider Note for all HPI, PE and MDM up to the time of signout at 2300.  This is in addition to the primary record.    In brief Sejal Duff is an 24 y.o. female presenting for neurosurgery consult iso abd pain    At the time of signout we were awaiting:  Neurosurgery consult     ED Course & Medical Decision Making   Medical Decision Making:  Under my care, patient continued to complain of abdominal pain.  Treated with pain medication Dilaudid, oxy.  Continue to await neurosurgery evaluation at the time of handoff.  I reviewed the case with the attending ED physician Dr. Damon. The attending ED physician agrees with the plan. Patient and/or patient’s representative was counseled regarding labs, imaging, likely diagnosis, and plan. All questions were answered.    ED Course:       Disposition   Sign Out  Patient was signed out to oncoming team pending completion of their work-up.  Please see the next provider's transition of care note for the remainder of the patient's care.       Procedures   Procedures    Patient seen and discussed with ED attending physician.    Cindi Michel,   Transitional Year, PGY-1

## 2025-04-26 NOTE — CONSULTS
"Reason For Consult  ***    History Of Present Illness  Sejal Duff is a 24 y.o. female presenting with abdominal and pelvic pain.  Patient is reporting two weeks of abdominal plain that came on slowly and has been persistent. Pain is localized on her right flank and midline pelvis, worsened with palpation and urination. She noticed a worsening of the abdominal pain on 4/24 and reported to the ED, symptoms resolved in the ED and she was discharged after eating and drinking comfortably.  Pain recurred today 4/25 and she returned to the ED.       Past Medical History  She has a past medical history of Encounter for screening for infections with a predominantly sexual mode of transmission (03/02/2020), Melena (04/12/2019), Other specified health status, Other specified health status, Personal history of other (healed) physical injury and trauma, and Unspecified condition associated with female genital organs and menstrual cycle (03/02/2020).    Surgical History  She has a past surgical history that includes Other surgical history (01/18/2019) and Breast surgery (Left).     Social History  She reports that she has never smoked. She has never been exposed to tobacco smoke. She has never used smokeless tobacco. She reports current alcohol use. She reports current drug use. Drug: Marijuana.    Family History  Family History[1]     Allergies  Morphine, Pollen extracts, and House dust    Review of Systems  ***     Physical Exam  ***     Last Recorded Vitals  Blood pressure 132/74, pulse (!) 112, temperature 36.7 °C (98 °F), temperature source Oral, resp. rate 16, height 1.6 m (5' 3\"), weight 63.5 kg (140 lb), SpO2 100%.    Relevant Results  ***     Assessment/Plan     ***        Kal Martin         [1]   Family History  Problem Relation Name Age of Onset    Asthma Mother      Colon cancer Mother       "

## 2025-04-26 NOTE — PROGRESS NOTES
Emergency Department Transition of Care Note       Signout   I received Sejal Duff in signout from Dr. Michel.  Please see the ED Provider Note for all HPI, PE and MDM up to the time of signout at 0700.  This is in addition to the primary record.    In brief Sejal Duff is an 24 y.o. female with history of brain cancer, currently on oral chemo and has a shunt, of anemia, asthma, GERD, IBS, PCOS, breast fibroadenoma who presented to the ED as a transfer from [facility] for neurosurgery consult for possible shunt adjustment.  She was seen here yesterday for abdominal pain and headache for a few weeks.  She was sent home with pain medication, but woke up this morning with worsening pain.  She was seen at [facility] and was discussed with the neurosurgeon there, he recommended transfer due to concern that patient may be over draining from shunt.      At the time of signout we were awaiting:  Final CT read  Final NSGY recommendations  Final disposition.     ED Course & Medical Decision Making   Medical Decision Making:  Under my care, I spoke with patient regarding plan and she was agreeable to stay in ED until final recommendations given. Patient given additional doses of Dilaudid for pain control as well as trialing of Bentyl and Maalox. Patient endorses primarily gas-related pain at this time. She is having bowel movements, last noted 3 days ago following clean out with her linzess and motegrity. She is currently passing gas. NSGY did not feel any acute intervention intacted, believe similar to patient previous shuntalgia. They recommended ACS consultation due to patient's constiption vs enteritis vs early developing obstruction. ACS to follow for acute serial abdominal exams, no acute interventions planned. ACS did recommend potential gynecology consultation, however patient on our exam denies any vaginal pain, pelvic pain, nor does she have reproducible lower quadrant abdominal tenderness, patient endorsing pain primarily  to right upper quadrant and epigastrium. She has not been sexually active since before her  shunt placement, and patient has no concerns at this time for a gynecological pathology. Discussed the option of consulting gynecology and patient declined, stating she would prefer outpatient follow-up should her symptoms worsen. Patient ultimately discharged home with a prescription for Bentyl for use at home in conjunction with her prescribed oxycodone. Instructed patient to continue her home medications for constipation and to return to ED if she has not been passing gas or concern for persistently unable to have bowel movements. Patient given return precautions and was discharged home in stable condition.     ED Course:  ED Course as of 04/27/25 2008   Sat Apr 26, 2025   [id number] I did evaluate the patient at bedside, she was attempting to leave the department AMA.  I did discuss with the patient her concerns, which largely revolved around her persistent abdominal pain and the lack of perceived advocacy throughout the night.  Discussed with patient additional options for pain control constipation and she was in agreement to trial these and remain in the department.  Patient provided with her home Linzess, and also trialing Bentyl for treatment of her pain given her existing IBS, and an additional dose of Dilaudid IV.  Will consider other treatment modalities for pain control as needed while pending final neurosurgery recommendations.  I did place a page out to neurosurgery at this time as well.  [AT]      ED Course User Index  [AT] Beth Segal,          Diagnoses as of 04/27/25 2008   Generalized abdominal pain   Constipation, unspecified constipation type       Disposition   As a result of the work-up, the patient was discharged home.  she was informed of her diagnosis and instructed to come back with any concerns or worsening of condition.  she and was agreeable to the plan as discussed above.  she was given  the opportunity to ask questions.  All of the patient's questions were answered.    Procedures   Procedures    Patient seen and discussed with ED attending physician.    Beth Segal, DO  Emergency Medicine

## 2025-04-26 NOTE — DISCHARGE INSTRUCTIONS
You are seen in the emergency department for abdominal pain.  We did do multiple labs and consulted neurosurgery.  Neurosurgery did not feel there were any actions that they needed to take while you were here in the emergency department.  They would like you to follow-up in their office in 1 week, they will schedule this appointment for you.  You were also evaluated by the ACS team.  I do not believe that you are having an acute infection or a bowel obstruction at this time.  They recommend that you continue taking your medications for constipation at home.  Return to us if you are unable to have a bowel movement following your typical cleanout regimen, you are not passing gas for greater than 72 hours, or for any other concerns.  If you develop fever that does not go away with Tylenol, chest pain or shortness of breath that does not go away even with rest, return to the ED for further evaluation.  Additionally if you have acute onset headache and vision changes, you should also return for reevaluation.  Continue to take your previously prescribed oxycodone at home as directed.  You are also provided with a short course of Bentyl for additional abdominal pain relief.  You may also continue to take over-the-counter medications as needed such as Tylenol and Motrin.  You can alternate these to take 1 medication (I.e. tylenol) every 4 hours followed by the next (motrin).  Or you can take both medications together every 8 hours.  --   Neurosurgery Clinic   [facility]  Phone: [phone] or [phone]

## 2025-04-29 ENCOUNTER — OFFICE VISIT (OUTPATIENT)
Dept: HEMATOLOGY/ONCOLOGY | Facility: HOSPITAL | Age: 25
End: 2025-04-29
Payer: COMMERCIAL

## 2025-04-29 ENCOUNTER — OFFICE VISIT (OUTPATIENT)
Dept: PALLIATIVE MEDICINE | Facility: HOSPITAL | Age: 25
End: 2025-04-29
Payer: COMMERCIAL

## 2025-04-29 ENCOUNTER — LAB (OUTPATIENT)
Dept: LAB | Facility: HOSPITAL | Age: 25
End: 2025-04-29
Payer: COMMERCIAL

## 2025-04-29 VITALS
RESPIRATION RATE: 16 BRPM | HEART RATE: 104 BPM | OXYGEN SATURATION: 100 % | BODY MASS INDEX: 22 KG/M2 | SYSTOLIC BLOOD PRESSURE: 115 MMHG | DIASTOLIC BLOOD PRESSURE: 71 MMHG | WEIGHT: 124.2 LBS | TEMPERATURE: 97 F

## 2025-04-29 DIAGNOSIS — N91.2 AMENORRHEA: ICD-10-CM

## 2025-04-29 DIAGNOSIS — F41.9 ANXIETY: ICD-10-CM

## 2025-04-29 DIAGNOSIS — K59.09 CHRONIC CONSTIPATION: ICD-10-CM

## 2025-04-29 DIAGNOSIS — Z79.899 ENCOUNTER FOR MEDICATION MANAGEMENT: ICD-10-CM

## 2025-04-29 DIAGNOSIS — Z51.5 ENCOUNTER FOR PALLIATIVE CARE: ICD-10-CM

## 2025-04-29 DIAGNOSIS — R63.4 WEIGHT LOSS: Primary | ICD-10-CM

## 2025-04-29 DIAGNOSIS — R53.0 NEOPLASTIC MALIGNANT RELATED FATIGUE: ICD-10-CM

## 2025-04-29 DIAGNOSIS — C71.9 PILOCYTIC ASTROCYTOMA (MULTI): ICD-10-CM

## 2025-04-29 DIAGNOSIS — R10.84 GENERALIZED ABDOMINAL PAIN: ICD-10-CM

## 2025-04-29 DIAGNOSIS — R63.0 DECREASED APPETITE: ICD-10-CM

## 2025-04-29 DIAGNOSIS — R63.0 LOSS OF APPETITE: ICD-10-CM

## 2025-04-29 DIAGNOSIS — C71.9 PILOCYTIC ASTROCYTOMA (MULTI): Primary | ICD-10-CM

## 2025-04-29 DIAGNOSIS — Z98.2 S/P VP SHUNT: ICD-10-CM

## 2025-04-29 DIAGNOSIS — G43.919 INTRACTABLE MIGRAINE WITHOUT STATUS MIGRAINOSUS, UNSPECIFIED MIGRAINE TYPE: ICD-10-CM

## 2025-04-29 DIAGNOSIS — G89.18 ACUTE POST-OPERATIVE PAIN: ICD-10-CM

## 2025-04-29 DIAGNOSIS — R11.0 NAUSEA: ICD-10-CM

## 2025-04-29 DIAGNOSIS — R14.3 FLATULENCE: ICD-10-CM

## 2025-04-29 LAB
BASOPHILS # BLD AUTO: 0.05 X10*3/UL (ref 0–0.1)
BASOPHILS NFR BLD AUTO: 0.6 %
CARDIAC TROPONIN I PNL SERPL HS: <3 NG/L (ref 0–34)
EOSINOPHIL # BLD AUTO: 0.24 X10*3/UL (ref 0–0.7)
EOSINOPHIL NFR BLD AUTO: 3 %
ERYTHROCYTE [DISTWIDTH] IN BLOOD BY AUTOMATED COUNT: 15.2 % (ref 11.5–14.5)
FSH SERPL-ACNC: 7.2 IU/L
HCT VFR BLD AUTO: 30 % (ref 36–46)
HGB BLD-MCNC: 8.8 G/DL (ref 12–16)
HOLD SPECIMEN: NORMAL
IMM GRANULOCYTES # BLD AUTO: 0.03 X10*3/UL (ref 0–0.7)
IMM GRANULOCYTES NFR BLD AUTO: 0.4 % (ref 0–0.9)
LH SERPL-ACNC: 9.6 IU/L
LYMPHOCYTES # BLD AUTO: 2.18 X10*3/UL (ref 1.2–4.8)
LYMPHOCYTES NFR BLD AUTO: 27.6 %
MCH RBC QN AUTO: 24.4 PG (ref 26–34)
MCHC RBC AUTO-ENTMCNC: 29.3 G/DL (ref 32–36)
MCV RBC AUTO: 83 FL (ref 80–100)
MONOCYTES # BLD AUTO: 0.35 X10*3/UL (ref 0.1–1)
MONOCYTES NFR BLD AUTO: 4.4 %
NEUTROPHILS # BLD AUTO: 5.06 X10*3/UL (ref 1.2–7.7)
NEUTROPHILS NFR BLD AUTO: 64 %
NRBC BLD-RTO: 0 /100 WBCS (ref 0–0)
PLATELET # BLD AUTO: 648 X10*3/UL (ref 150–450)
PROLACTIN SERPL-MCNC: 22 UG/L (ref 3–20)
RBC # BLD AUTO: 3.6 X10*6/UL (ref 4–5.2)
WBC # BLD AUTO: 7.9 X10*3/UL (ref 4.4–11.3)

## 2025-04-29 PROCEDURE — 83002 ASSAY OF GONADOTROPIN (LH): CPT

## 2025-04-29 PROCEDURE — 84484 ASSAY OF TROPONIN QUANT: CPT | Performed by: EMERGENCY MEDICINE

## 2025-04-29 PROCEDURE — 99215 OFFICE O/P EST HI 40 MIN: CPT | Performed by: NURSE PRACTITIONER

## 2025-04-29 PROCEDURE — 84146 ASSAY OF PROLACTIN: CPT

## 2025-04-29 PROCEDURE — 99214 OFFICE O/P EST MOD 30 MIN: CPT | Performed by: NURSE PRACTITIONER

## 2025-04-29 PROCEDURE — 83001 ASSAY OF GONADOTROPIN (FSH): CPT

## 2025-04-29 PROCEDURE — 99215 OFFICE O/P EST HI 40 MIN: CPT

## 2025-04-29 PROCEDURE — 36415 COLL VENOUS BLD VENIPUNCTURE: CPT

## 2025-04-29 PROCEDURE — 85025 COMPLETE CBC W/AUTO DIFF WBC: CPT

## 2025-04-29 PROCEDURE — 83003 ASSAY GROWTH HORMONE (HGH): CPT

## 2025-04-29 RX ORDER — SIMETHICONE 125 MG
125 TABLET,CHEWABLE ORAL EVERY 6 HOURS PRN
Qty: 120 TABLET | Refills: 2 | Status: SHIPPED | OUTPATIENT
Start: 2025-04-29 | End: 2025-07-28

## 2025-04-29 RX ORDER — METHYLPHENIDATE HYDROCHLORIDE 5 MG/1
5 TABLET ORAL DAILY
Qty: 30 TABLET | Refills: 0 | Status: SHIPPED | OUTPATIENT
Start: 2025-04-29 | End: 2025-05-29

## 2025-04-29 RX ORDER — OLANZAPINE 10 MG/1
10 TABLET, FILM COATED ORAL NIGHTLY
Qty: 30 TABLET | Refills: 2 | Status: SHIPPED | OUTPATIENT
Start: 2025-04-29 | End: 2025-07-28

## 2025-04-29 RX ORDER — OXYCODONE HYDROCHLORIDE 5 MG/1
5 TABLET ORAL EVERY 6 HOURS PRN
Qty: 120 TABLET | Refills: 0 | Status: SHIPPED | OUTPATIENT
Start: 2025-04-29 | End: 2025-05-29

## 2025-04-29 RX ORDER — PRUCALOPRIDE 2 MG/1
1 TABLET ORAL DAILY
Qty: 30 TABLET | Refills: 3 | Status: SHIPPED | OUTPATIENT
Start: 2025-04-29

## 2025-04-29 RX ORDER — ONDANSETRON 8 MG/1
8 TABLET, ORALLY DISINTEGRATING ORAL EVERY 8 HOURS PRN
Qty: 20 TABLET | Refills: 2 | Status: SHIPPED | OUTPATIENT
Start: 2025-04-29 | End: 2025-05-19

## 2025-04-29 RX ORDER — IBUPROFEN 800 MG/1
800 TABLET ORAL EVERY 8 HOURS PRN
Qty: 90 TABLET | Refills: 5 | Status: SHIPPED | OUTPATIENT
Start: 2025-04-29 | End: 2025-10-26

## 2025-04-29 ASSESSMENT — ENCOUNTER SYMPTOMS
DECREASED CONCENTRATION: 0
CHILLS: 0
HEADACHES: 1
VOMITING: 1
APPETITE CHANGE: 1
APPETITE CHANGE: 1
SHORTNESS OF BREATH: 0
FATIGUE: 0
SEIZURES: 0
NERVOUS/ANXIOUS: 0
NAUSEA: 1
EXTREMITY WEAKNESS: 0
FEVER: 0
HEADACHES: 1
SPEECH DIFFICULTY: 0
FATIGUE: 1
BLOOD IN STOOL: 0
ABDOMINAL PAIN: 1
HEMOPTYSIS: 0
HEMATURIA: 0
SLEEP DISTURBANCE: 0
NAUSEA: 1
ABDOMINAL PAIN: 1
ABDOMINAL DISTENTION: 1
DEPRESSION: 1
CONSTIPATION: 1

## 2025-04-29 ASSESSMENT — PAIN SCALES - GENERAL
PAINLEVEL_OUTOF10: 10-WORST PAIN EVER
PAINLEVEL_OUTOF10: 10-WORST PAIN EVER

## 2025-04-29 NOTE — PROGRESS NOTES
Patient ID: Sejal Duff is a 24 y.o. female.  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   -12/01/24 developed hydrocephalus and is s/p  shunt  -12/21/24 D1 tovarefenib (type II JUAN CARLOS inhibitor)   -12/29/24 revision of  shunt    Current treatment plan: curative intent tovarefenib with goal of mass reduction with hope for surgical removal    Subjective    Sejal Duff is a 24 y.o. 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     Returns today in follow up regarding issues unique to young adults with neoplasm.    Met with both neuro-onc and palliative care today. Fatigue, anemia, epistaxis, and skin rash improved/resolved - ongoing acute abdominal pain, recently in ED for evaluation.     Emotionally she relays doing fair. Spending time with family. Relays feeling well supported. Has been able to do activities she enjoys. Does admit to worries about the future, has contemplated death - states that if she has survived these surgeries there must be some reason or purpose to her life. Is Yarsani, identifies as Yazdanism, attends Judaism on occasion with her grandmother. Is future oriented, would like to be a pediatric nurse, or nurse practitioner. In past used to journal as form of coping.     Not dating or in a relationship. Not currently sexually active. LMP 10/2024. She has not  experienced a period in the time she has been on tovorafenib.     Practically, still dealing with financial and food insecurity. Recently received information from [facility] - needs medical certification of diagnosis/treatment.    Social History: Grew up in [place]. Has 20 1/2 siblings from her father, and 1 1/2 sibling from her mother. Currently lives alone in [place]. 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 She enjoys shopping. Never smoker, does not vape. Does use ETOH and marijuana. Email [email or web address]    Family History:  Colon cancer MGM, mGF  Breast cancer MGGF  Cancer unk primary MGGM, MU's, MA's    Review of Systems   Constitutional:  Positive for appetite change. Negative for fatigue.   Gastrointestinal:  Positive for abdominal distention, abdominal pain, constipation and nausea.   Genitourinary:  Positive for menstrual problem. Negative for vaginal bleeding.    Neurological:  Positive for headaches.   Psychiatric/Behavioral:  Positive for depression. Negative for decreased concentration and sleep disturbance. The patient is not nervous/anxious.      Objective   BSA: There is no height or weight on file to calculate BSA.  There were no vitals taken for this visit. Vs and weight reviewed in other provider encounter.     Family History   Problem Relation Name Age of Onset    Asthma Mother      Colon cancer Mother       Oncology History   Pilocytic astrocytoma (Multi)   10/2024 Initial Diagnosis    25 yo F with PMH of anemia, asthma, GERD, IBS, PCOS, and breast fibroadenoma s/p L breast excisional biopsy presented to [facility] ED on 10/14/24 with headache. CTH parasellar mass with calcifications. MRI with 2.4 cm peripherally enhancing necrotic mass with non-enhancing FLAIR hyperintense lesion in medial L temporal lobe. Admitted for neurosurgical management.     10/15/2024 Biopsy    R stereotactic brain biopsy  Surgeon: Den Segal,  MD Lopez path: Glial cells with luigi fibers vs craniopharyngioma     10/23/2024 Tumor Board    Procedure: R stereotactic brain biopsy 10/15/2024   Pathology: Final pending; likely to be a glioma, areas of necrosis noted.       The CNS Tumor Board tumor board considered available treatment options and made the following recommendations: Radiation oncology referral. Add back to CNS TB once pathology is completed.     11/5/2024 Pathology     A.  B.  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  See note     Note:  Microscopic examination for parts A and B reveal a low-grade glial neoplasm immunoreactive with OLIG-2 and GFAP.  Neurofilament immunohistochemical stain highlights neuropil and scattered neuronal bodies.  ATRX and [id number] immunohistochemical stains are retained.  IDH1 and BRAF immunohistochemical stains are negative.  GLIOSEQ studies reveal a BRAF-V600E mutation.  This finding in conjunction with the morphology are most consistent with a pilocytic astrocytoma.  However, a ganglioglioma among other low-grade glial/glial neuronal neoplasms, cannot be completely ruled out.  Clinical correlation is recommended     11/6/2024 Tumor Board    CSF analysis negative.   Pathology:  Low grade glial neoplasm - BRAF - V600E mutation      The CNS Tumor Board tumor board considered available treatment options and made the following recommendations: Repeat MRI brain w/wo and perfusion study. Sellar imaging. Continue following with neuro-oncology. Radiation oncology referral.      11/29/2024 - 12/6/2024 Hospital Admission    Discharge diagnosis: Hydrocephalus    Returned to the [facility] ED 11/28/24 with worsening HA, light sensitivity, CTH incr vents, MRI incr ventricular mass 2.7x2.5cm, ventriculomegaly, trace R ventricular GRE. Patient admitted to neurosurgery service for further management.    11/28 Ophthalmology evaluation without papilledema.  12/1 s/p R VPS (certas at  5) -- Dr. Yap at [facility]  12/2 CTH with catheter in position.      12/29/2024 - 12/31/2024 Hospital Admission    Discharge diagnosis: Hydrocephalus    Returned to the [facility] ED on 12/28/24 with generalized fatigue, HA and body aches. CT head with increased ventricles, SS Certas at 4, LLQ cath with turn vs possible kink, CT AP catheter without kink or discontinuity, no pseudocyst. Patient admitted to the neurosurgery service for further management/workup.    12/28 s/p shunt tap with no proximal flow.   12/29 s/p proximal shunt revision, distal cath flushing. CT head post op with decreased ventricles, catheter in position.         Sejal Duff  reports that she has never smoked. She has never been exposed to tobacco smoke. She has never used smokeless tobacco.  She  reports current alcohol use.  She  reports current drug use. Drug: Marijuana.    Physical Exam  Constitutional:       General: She is not in acute distress.     Appearance: Normal appearance. She is not ill-appearing.   Neurological:      Mental Status: She is alert.   Psychiatric:         Mood and Affect: Mood normal.         Behavior: Behavior normal.         Thought Content: Thought content normal.         Judgment: Judgment normal.     Performance Status:  Asymptomatic    Assessment/Plan    Sejal Duff is a 24 y.o. F with a recent diagnosis of astrocytoma with future plans for oral systemic therapy and possible radiation therapy.     #Psychosocial: young adult with benign neoplasm, being treated and followed by neuro-oncology  - Previously discussed resources available including psychiatry, psychology, social work, spiritual care, and expressive therapies  - Discussed processing emotions/experience with writing/narrative therapy - provided patient with journal prompts for cancer patients  - Provided patient with local resource [employer] - they have virtual writing workshop  - Connected to young adult oncology program, will receive monthly  social programming invitations  - Following with Paige MOSQUEDA - discussed with primary team patient need for letter for SSD application  - Has SNAP benefits - identified food insecurity, referral to [facility] is in place, patient needs to call     #Oligomenorrhea: unclear etiology r/t underlying CNS/glioma, vs oral tovarefenib, vs PCOS (with features of oligomenorrhea, acne, +10 <10mm follicles on bilateral ovaries per US, and elevated AMH)  - Will discuss pathophysiology of PCOS with patient at next visit  - May need to consider progesterone induced withdrawal bleeds  - Consider referral to Dr. Griffiths for comprehensive PCOS management     #Sexual dysfunction/contraception:   - Previously discussed the seriousness of getting pregnant while receiving oral chemotherapy due to the harmful effects this could have on the  fetus, and on her cancer treatment given the decreased availability of medical  services.   - She is not interested in use of hormonal or non hormonal contraceptives - including IUDs out of concern for their role in developing brain cancer  - Previously discussed that she must prevent pregnancy with use of barrier method every time she engages in penetrative intercourse  - Previously discussed that small amounts of chemotherapy may be found in her body secretions including vaginal secretions and she should use a barrier form of contraception such as a male or female condom to protect her partner from chemotherapy exposure.    #Risk for infertility:  - Has previously been seen by colleagues in [place]  - Discussed natural age related decline in fertility and menopause that occurs as a result of ovarian aging, and that cancer treatments can accellerate that progression and diminish ovarian reserve.  - Individuals may experience varying degrees of short or long term ovarian dysfunction and amenorrhea, and that this is dependent upon type of cancer treatment, cumulative dose, and  patients age.   - Loss of ovarian function may be permanent or temporary.  - Risk to fertility is UNKNOWN based on cancer treatment of tovarefenib - there is little research about the effects of these agents on ovarian reserve  - Declined fertility preservation prior to initiation of chemotherapy  - Reviewed baseline hormone function testing AMH 5.2 on 11/14/24, E2, FSH/LH WNL  - Experiencing amenorrhea since 10/2024  - We discussed trending her ovarian function q 6 months while she is on therapy, last drawn 10/2024, will repeat in April 2025  - Discussed a plan to monitor hormone function for s/s of hypogonadism    #Chronic constipation/IBS: chronic, lifelong constipation with minimal improvement despite multiple interventions, worse acute on chronic pain  - Primary team/palliative care referred patient to GI - re-establish with peds gastroenterology  - Has previously had NG clean outs at [facility], hydrocolonic therapy, rectal biofeedback, senna, bisacodyl, miralax, golytely, enemas, suppositories, probiotics, and acupuncture  - Consider serotonin agent in future to help  - Currently on linzess and motegrity without significant improvement    #Diet/Exercise/Healthy Lifestyle:  - Previously discussed research demonstrating consumption of a healthy, plant based diet not only decreases cancer risk, but also has been found to improve survival in cancer patients who partake in a healthy diet.   - Previously discussed research that demonstrates decreased cancer risk and improved survival in cancer patients who exercise and stay active throughout diagnosis and treatment.  - Encouraged patient to continue gentle exercise as tolerated  - Encouraged patient to continue to abstain from alcohol, tobacco, and recreational drugs    #Genetic Risk: young adult with cancer and personal family history of cancer  - Discussed rationale for genetic risk consultation consider placing referral in future    The amount of time that I spent with  the patient:  Prep: 5  Time spend directly with patient: 30  Coordinating care: 5  Documentation: 5  Other time:    Total time spent on encounter: 45    Follow up planned in one month                Pepper Davidson, JASVIR-CNP

## 2025-04-29 NOTE — PROGRESS NOTES
[facility]  Neuro-Oncology    Patient: Sejal Duff  MRN: [id number]  Date of visit: 04/29/25  Visit type: In-person clinic visit  Clinician: Dbeorah Snowden PA-C    Oncological & Treatment History:  Oncology History   Pilocytic astrocytoma (Multi)   10/2024 Initial Diagnosis    23 yo F with PMH of anemia, asthma, GERD, IBS, PCOS, and breast fibroadenoma s/p L breast excisional biopsy presented to [facility] ED on 10/14/24 with headache. CTH parasellar mass with calcifications. MRI with 2.4 cm peripherally enhancing necrotic mass with non-enhancing FLAIR hyperintense lesion in medial L temporal lobe. Admitted for neurosurgical management.     10/15/2024 Biopsy    R stereotactic brain biopsy  Surgeon: Den Segal MD    Prelim path: Glial cells with luigi fibers vs craniopharyngioma     10/23/2024 Tumor Board    Procedure: R stereotactic brain biopsy 10/15/2024   Pathology: Final pending; likely to be a glioma, areas of necrosis noted.       The CNS Tumor Board tumor board considered available treatment options and made the following recommendations: Radiation oncology referral. Add back to CNS TB once pathology is completed.     11/5/2024 Pathology     A.  B.  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  See note     Note:  Microscopic examination for parts A and B reveal a low-grade glial neoplasm immunoreactive with OLIG-2 and GFAP.  Neurofilament immunohistochemical stain highlights neuropil and scattered neuronal bodies.  ATRX and [id number] immunohistochemical stains are retained.  IDH1 and BRAF immunohistochemical stains are negative.  GLIOSEQ studies reveal a BRAF-V600E mutation.  This finding in conjunction with the morphology are most consistent with a pilocytic astrocytoma.  However, a ganglioglioma among other low-grade glial/glial neuronal neoplasms, cannot be completely ruled out.  Clinical correlation is recommended      11/6/2024 Tumor Board    CSF analysis negative.   Pathology:  Low grade glial neoplasm - BRAF - V600E mutation      The CNS Tumor Board tumor board considered available treatment options and made the following recommendations: Repeat MRI brain w/wo and perfusion study. Sellar imaging. Continue following with neuro-oncology. Radiation oncology referral.      11/29/2024 - 12/6/2024 Hospital Admission    Discharge diagnosis: Hydrocephalus    Returned to the [facility] ED 11/28/24 with worsening HA, light sensitivity, CTH incr vents, MRI incr ventricular mass 2.7x2.5cm, ventriculomegaly, trace R ventricular GRE. Patient admitted to neurosurgery service for further management.    11/28 Ophthalmology evaluation without papilledema.  12/1 s/p R VPS (certas at 5) -- Dr. Yap at [facility]  12/2 CTH with catheter in position.      12/29/2024 - 12/31/2024 Hospital Admission    Discharge diagnosis: Hydrocephalus    Returned to the [facility] ED on 12/28/24 with generalized fatigue, HA and body aches. CT head with increased ventricles, SS Certas at 4, LLQ cath with turn vs possible kink, CT AP catheter without kink or discontinuity, no pseudocyst. Patient admitted to the neurosurgery service for further management/workup.    12/28 s/p shunt tap with no proximal flow.   12/29 s/p proximal shunt revision, distal cath flushing. CT head post op with decreased ventricles, catheter in position.         HPI:  Sejal Duff is a 24 y.o. 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      Interval History (NEWEST at BOTTOM of section):  10/29/24: Today she reports some slight headaches but not worsening.  She is currently on dexamethasone 2 mg twice daily.  She takes the morning dose around 9 or 10 AM and the evening dose  before bedtime.  She takes ondansetron about 1 hour prior to the dexamethasone.  She endorses significant issues with vomiting after taking the dexamethasone as well as decreased appetite.  She is able to drink enough fluids.  She states that she tried taking 8 mg of ondansetron prior to the dexamethasone but this did not help either.  She states that the dexamethasone makes her very jittery.  She has not taken dexamethasone for the last 2 days.  She continues to have constipation which is her baseline where she does not have a bowel movement for weeks.  She has been referred by her gastroenterologist to integrative medicine for further help with bowel movements.  Otherwise denies any worsening of vision or new gait or balance issues.  11/8/24: Continues to have some headaches but not worse. She has decreased dexamethasone to 1mg bid on her own and she denies any worsening of her headaches. Denies new balance or strength issues.   11/19/2024: Her headaches are about the same intensity.  Denies new nausea/vomiting episodes.  Denies new gait or balance changes.  Denies new vision changes.   11/26/2024: Sejal states her headaches are worse.  She has not been able to sleep much.  Denies new vision problems.  Denies new weakness or gait balance/issues.  Denies new episodes of nausea or vomiting.  She is upset at the MRI spine being canceled recently.    01/16/25: Since the last visit, Sejal underwent  shunt placement for hydrocephalus (12/1/24) and a subsequent VPS revision (12/29/24). She also started tovorafenib on 12/21/24 and has been taking it once weekly as directed on Saturdays. Endorses significant fatigue and body aches for 2-3 days after taking tovorafenib. Endorses a new acne-like rash to her lower face that started with facial tenderness after her 2nd or 3rd dose of tovorafenib; rash is spreading to her nasolabial folds. States she washes her face with warm water and applies Aquaphor to the affected areas.  "She also reports intermittent episodes over the past 3 weeks where she feels overheated, lightheaded, and needs to utilize her inhaler; states it is similar to asthma flares; denies anxiety precipitating episodes. Also reports persistent nausea since her last surgery. States even water makes her feel nauseated. Feels like she's going to vomit, but it \"sits in [her] chest\" and burns. States she takes Zofran \"like it's candy.\" Denies drinking caffeine/carbonated drinks, fried/spicy foods, and lying down within 30 min of eating. States she hasn't picked up Olanzapine and doesn't take omeprazole as she is wary of being on many medications.    02/04/25: Saw patient in clinic for an impromptu visit regarding her worsening acneiform rash after patient saw [facility] today. No improvement in symptoms since using topical hydrocortisone 2.5% and clindamycin 1%. Now has some similar lesions on her chest. Pictures entered into chart. Additionally, patient reports a nosebleed with clots yesterday that resolved after about 15 minutes. She also has questions about medications -- states she was too fatigued the day after olanzapine and wants to clarify which meds she should/should not be taking. Her mother is present as well -- mother notes family history of cancer, which contributes to her concerns about patient's diagnosis/treatment.    2/18/2025: She denies any visual changes or headaches.  Does endorse some fatigue.  Feels her acneform rash is worsening and has spread more through her torso and back region.  It is itching which prompts her to scratch and then it bleeds.  Also notices some pustular appearing lesions by her wrist and on her feet.  Has been using the clindamycin and hydrocortisone topical lotions.  She has not taken the oral doxycycline as prescribed due to fear of nausea which will keep her from eating.  She feels that her appetite is already bad from her baseline nausea issues.  Also endorses some shortness " "of breath when doing normal walking like from the parking garage to the clinic.  Denies any dyspnea at rest.  Denies chest pain or lower extremity edema.  She has asthma at baseline but says that normally she uses a nebulizer or inhaler only when she is sick with a respiratory infection which exacerbates her asthma.  But she has been needing to use her inhaler and nebulizer more often now.  States her menstrual periods have not returned to baseline since the most recent hospitalization.  Normally she has a quite regularly once monthly.  Her last menstrual period was in November 2024.    04/01/25: Sejal presents for follow-up accompanied by her mother. Her tovorafenib has been held since 3/29/25 due to hemoglobin <10. She reports fatigue, decreased energy, and hypersomnia. Endorses lightheadedness if she's 'too active' or after a hot shower. Reports spontaneous L epistaxis for 10-15 minutes every 1-2 days 'when too active.' Denies hematemesis, hematochezia, melena, and hematuria. Still no menstrual periods since 11/2024. Notes continued intermittent abdominal cramping near the surgical incisions; states the pain is \"like a shocking wave\" when it occurs. Still no appetite. Reports color change to her eyelashes, eyebrows, and hair. Reports improvement with some of her diffuse rash but with resulting hypopigmentation; still has itching - clobetasol and hydroxyzine help. Still has cracked skin to the flexural palmar surfaces of her bilateral hands with a couple open fissures. States she initially had improvement with minocycline, but then became unable to tolerate additional doses as she vomited 10-15 minutes after each dose; she did not start prednisone; she inquires about IV antibiotic options.    04/29/25: ***    Review of Systems:  Review of Systems   Constitutional:  Positive for appetite change and fatigue. Negative for chills and fever.   HENT:   Positive for nosebleeds.    Respiratory:  Negative for hemoptysis " and shortness of breath (improved breathing; using inhalers less).    Cardiovascular:  Negative for chest pain.   Gastrointestinal:  Positive for abdominal pain, nausea and vomiting. Negative for blood in stool.   Genitourinary:  Negative for hematuria and vaginal bleeding. Menstrual problem: no period since 11/2024.   Skin:  Positive for rash.   Neurological:  Positive for headaches (unchanged occasional bilat temporal HA managed with oxycodone). Negative for extremity weakness, seizures and speech difficulty. Numbness: regaining some feeling to scalp/surgical area; sometimes numbness in hands and feet.      Social History:  Social History     Tobacco Use   • Smoking status: Never     Passive exposure: Never   • Smokeless tobacco: Never   Vaping Use   • Vaping status: Never Used   Substance Use Topics   • Alcohol use: Yes     Comment: social   • Drug use: Yes     Types: Marijuana   Occupation: [employer], [employer]  Currently working [employer] Tuesday-Friday    Past Medical History:  Past Medical History:   Diagnosis Date   • Encounter for screening for infections with a predominantly sexual mode of transmission 03/02/2020    Screening for STD (sexually transmitted disease)   • Melena 04/12/2019    Blood in stool   • Other specified health status     No pertinent past medical history   • Other specified health status     No pertinent past surgical history   • Personal history of other (healed) physical injury and trauma     History of sprain of ankle   • Unspecified condition associated with female genital organs and menstrual cycle 03/02/2020    Cervical motion tenderness       Past Surgical History:  Past Surgical History:   Procedure Laterality Date   • BREAST SURGERY Left     Mass removal   • OTHER SURGICAL HISTORY  01/18/2019    No history of surgery       Family History:  Family History   Problem Relation Name Age of Onset   • Asthma Mother     • Colon cancer Mother         Performance Score:  Karnofsky Score: 90 -  Able to carry on normal activity; minor signs or symptoms of disease     Vital Signs:  There were no vitals taken for this visit.     Physical Exam:  Physical Exam  Vitals reviewed.   Constitutional:       General: She is not in acute distress.     Appearance: She is not toxic-appearing.   HENT:      Head: Normocephalic and atraumatic.      Comments: Loss of pigment to eyelashes, eyebrows, and hair  Eyes:      Extraocular Movements: Extraocular movements intact.      Conjunctiva/sclera: Conjunctivae normal.   Pulmonary:      Effort: Pulmonary effort is normal. No respiratory distress.   Musculoskeletal:         General: Normal range of motion.      Cervical back: Normal range of motion and neck supple.   Skin:     General: Skin is dry.      Comments: Improvement in rash that was on entire body, now has resulting spots of hypopigmentation; flexural palmar surfaces with scaling and healing fissures; at least one recently open fissure to L index finger; healing fissure to R lateral foot   Neurological:      General: No focal deficit present.      Mental Status: She is alert and oriented to person, place, and time.      Cranial Nerves: No cranial nerve deficit.      Gait: Gait normal.   Psychiatric:         Mood and Affect: Mood normal.         Behavior: Behavior normal.       Results:  Lab Results   Component Value Date    WBC 10.0 04/25/2025    HGB 7.9 (L) 04/25/2025    HCT 26.7 (L) 04/25/2025    MCV 84 04/25/2025     (H) 04/25/2025       Lab Results   Component Value Date    GLUCOSE 93 04/25/2025    CALCIUM 8.7 04/25/2025     04/25/2025    K 3.5 04/25/2025    CO2 27 04/25/2025     04/25/2025    BUN 8 04/25/2025    CREATININE 0.47 (L) 04/25/2025       Lab Results   Component Value Date    ALT 7 04/25/2025    AST 16 04/25/2025    ALKPHOS 59 04/25/2025    BILITOT 0.4 04/25/2025     === 02/17/25 ===    MR BRAIN W AND WO CONTRAST    - Impression -  1. Decreased size of rim enhancing hypothalamic mass  "compatible with  known pilocytic astrocytoma.  2. Postsurgical changes of right posterior ventriculostomy shunt with  tip ending within the ventricular system. When compared to MR dated  11/28/2024 and interval CT of 12/29/2024, there has been significant  resolution of the dilation of the lateral ventricles.    I personally reviewed the images/study and I agree with the findings  as stated by Elijah Ochoa MD. This study was interpreted at  [facility], [place], [place].    MACRO:  None    Signed by: Keith Garcia 2/18/2025 3:13 PM  Dictation workstation:   [id number]      Assessment & Plan:  Sejal Duff is a 24 y.o. 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 She also developed hydrocephalus and is s/p VPS (12/1/24) with revision (12/29/24). She started on tovorafenib (type II JUAN CARLOS inhibitor) on 12/21/24 with course c/b skin toxicities.    Previously discussed diagnosis with patient and her mother - explained that her tumor is low-grade, meaning it is classified as slow-growing and benign/non-cancerous, but it is causing issues/symptoms that make it not harmless. Explained that such tumors could possibly transform into a higher grade, meaning become malignant/cancerous, or could still remain \"benign\" but grow large enough to cause significant, even life-threatening problems. Discussed that our goal with tovorafenib is to shrink the tumor as much as possible and/or reach stability to the point that we can monitor with imaging or we can have neurosurgery evaluate again whether surgery is feasible/safe, but there are no guarantees.    Today, patient is s/p 3 full cycles; the 4th dose of the 4th " cycle was held due to her skin toxicities and low hemoglobin (baseline around 9-11). Overall, she is stable - no new neuro deficits. Patient amenable to plan below.***    **THIS NOTE IS NOT FINALIZED***  Please confirm information with clinician as needed.  -- repeat cbc and hormone labs today  -- needs to get in with GI  -- try pain control per pall care  -- plan for mri brain next month and tumor board afterwards  -- continue tovorafenib    1. 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 Once able, the plan is to restart at 400 mg PO once weekly (decreased from 500 mg PO once weekly)  Will obtain CBC/diff weekly until recovery in Hgb  Tempus: Will plan to obtain if patient amenable for further genetic evaluation -- deferred to future visit if still appropriate  MRI brain w/wo contrast every 2-3 months to assess treatment response  2/2025 MRI spine did not show any evidence of drop metastasis or disseminated disease  2/2025 MRI brain compared to 11/2024 MRI brain showed decrease in size of lesion  Order placed for MRI brain w/wo contrast on 5/26/25  Needs 1 mg Ativan Rx to take 30 minutes prior to MRI  Follow-up in 4 weeks with me for reassessment, then will have follow-up with Dr. Aguilar on 5/27/25 after MRI    2. Acneiform drug eruption***  3. Flexural atopic dermatitis***  Reached out to Dr. Patel -- Taking minocycline at night is not recommended due to increased risk of acid relux. IV antibiotics are not an option in this case due to needing a longer treatment course for inflammation, not infection. She could switch from  minocycline to Keflex 500 mg BID x 2 months, but patient needs to contact onco-derm office via [employer] or by calling [phone] to schedule a sooner follow-up.  Face and body are much improved compared to the last visit with me -- now has many small areas of hypopigmentation; also has scaling and a few healing fissures to palmar surface of bilateral hands. Continue with clobetasol and other meds per dermatology.    4. Persistent n/v and abd pain***  Will reach out to her GI team    No orders of the defined types were placed in this encounter.

## 2025-04-29 NOTE — PROGRESS NOTES
SUPPORTIVE AND PALLIATIVE ONCOLOGY OUTPATIENT FOLLOW-UP      Medical Oncologist: Migue Aguilar MD    Radiation Oncologist: No care team member to display  Primary Physician: Melba Graham  [phone]    Subjective   HISTORY OF PRESENT ILLNESS: Sejal Duff is a 24 y.o. 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      Patient returned to the [facility] ED 11/28 with worsening HA, light sensitivity, CTH incr vents, MRI incr ventricular mass 2.7x2.5cm, ventriculomegaly, trace R ventricular GRE. Patient admitted to neurosurgery service for further management.      11/28/24 Ophthalmology evaluation without papilledema.  12/1/24 s/p R VPS (certas at 5).   12/2/24 CTH with catheter in position, decreased vents   12/4/24 CTH decreased vents but persistent ventriculomegly; Certas dialed to 4  12/5/24 CTH decreased vents.  Continued headaches Neurology consulted--> likely due to underlying structural mass and recent surgical interventions.  Started on scheduled oral headache cocktail meds of Robaxin, Tylenol, reglan and Benadryl  12/6/24 Ophthalmology consulted for c/o vision changes--> normal exam, no disc edema  Neurology--> HA's migrainous in nature, has had prior and now worse in setting of underlying structural mass and recent VPS surgery.  Started on preventative therapy with amitriptyline 12.5 mg bedtime for 1 week and increase  "to 25mg if tolerates and abortive therapy with Rizatriptan PRN  12/21/24: started on tovorafenib (type II JUAN CARLOS inhibitor)    12/29/24 had a subsequent VPS revision     2/18/24: Previously followed with Amy Calloway CNP. Has c/o full body acneiform rash . Started in December on face but spread to back/chest/hands/legs within the last couple weeks. She did not start doxy per E consult. Has derm appt but not until June.     4/1/25: Fatigue persists and she is sleeping a significant amount of the day. Never started ritalin. Also having epistaxis and worsening anemia. She endorses chronic abd pain and headaches but no other overt signs of bleeding. Appetite is poor. She also endorses nausea with meds and insomnia. Denies depression. Following with derm for her skin rash.     4/29/25: Chemo was on hold due to low hemoglobin but started back on Saturday. ER at  4/24 for abdominal pain and w/o unremarkable and she discharged early morning 4/25 and returned to ER at [facility] that same day. She states initially there was concern for infection at the insertion site of her shunt into her abdomen was to get abx but never Rx'ed. Neurosurgery at [facility]  recommended transfer to [facility] due to concern that patient may be over draining from the shunt and needs to be dilated to a higher setting. Shunt was adjusted. Low concern for shunt infection. ACS was consulted and recommended gyn consult but she was discharged from ER. She also told she constipation is contributing to her pain.   Derm issues are resolving.     Pain Assessment:    Location: R abdomen at shunt location   Severity: Severe   Quality: \"Like insides are twisting.\"   Duration: Weeks but  \"out of control\" over the past week to the point she can't stand up.  Timing: Constant   Referral Pattern: Localized   Associated S/S: Pressure from urination causes more pressure where her existing pain is located. Oral intake exacerbates pain.   She is not sexually active and denies other " urinary symptoms.     Has not tried bentyl that she was discharged with.     Additional Symptom Assessment:    Numbness or tingling in hands/feet/other: none  Headache: a little - slight   Lack of appetite: very much - 5 lbs in one month   Weight loss: somewhat  Nausea/early satiety: a little  Vomiting: none  Constipation: very much - Takes linzess and montegrity on Friday only - moves BM once per week. She states she must taken them 2-3 days apart otherwise they dont work at all if she uses them daily. States she tries to make a GI appt but they tell her to wait until until her tumor is removed.   Diarrhea: none  Lack of energy: somewhat  Difficulty sleeping: very much  Anxiety: a little  Depression: a little  Shortness of breath: a little - When in pain.  Other: none      Information obtained from: chart review and interview of patient  ______________________________________________________________________     Patient's Oncology History documentation       Oncology History   Pilocytic astrocytoma (Multi)   10/15/2024 Surgery     Biopsy of hypothalamic lesion by Dr. Segal at [facility]       10/15/2024 Pathology      A.  B.  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  See note     Note:  Microscopic examination for parts A and B reveal a low-grade glial neoplasm immunoreactive with OLIG-2 and GFAP.  Neurofilament immunohistochemical stain highlights neuropil and scattered neuronal bodies.  ATRX and [id number] immunohistochemical stains are retained.  IDH1 and BRAF immunohistochemical stains are negative.  GLIOSEQ studies reveal a BRAF-V600E mutation.  This finding in conjunction with the morphology are most consistent with a pilocytic astrocytoma.  However, a ganglioglioma among other low-grade glial/glial neuronal neoplasms, cannot be completely ruled out.  Clinical correlation is recommended               Medical History        Past Medical  History:   Diagnosis Date    Encounter for screening for infections with a predominantly sexual mode of transmission 03/02/2020     Screening for STD (sexually transmitted disease)    Melena 04/12/2019     Blood in stool    Other specified health status       No pertinent past medical history    Other specified health status       No pertinent past surgical history    Personal history of other (healed) physical injury and trauma       History of sprain of ankle    Unspecified condition associated with female genital organs and menstrual cycle 03/02/2020     Cervical motion tenderness         Surgical History         Past Surgical History:   Procedure Laterality Date    BREAST SURGERY Left       Mass removal    OTHER SURGICAL HISTORY   01/18/2019     No history of surgery         Family History          Family History   Problem Relation Name Age of Onset    Asthma Mother        Colon cancer Mother                SOCIAL HISTORY  Social History:  reports that she has never smoked. She has never been exposed to tobacco smoke. She has never used smokeless tobacco. She reports current alcohol use. She reports current drug use. Drug: Marijuana.  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 No children. Single. Primary support is her mom.       Objective     Creatinine   Date Value Ref Range Status   04/25/2025 0.47 (L) 0.50 - 1.05 mg/dL Final     AST   Date Value Ref Range Status   04/25/2025 16 9 - 39 U/L Final     ALT   Date Value Ref Range Status   04/25/2025 7 7 - 45 U/L Final     Comment:     Patients treated with Sulfasalazine may generate falsely decreased results for ALT.     Hemoglobin   Date Value Ref Range Status   04/25/2025 7.9 (L) 12.0 - 16.0 g/dL Final     Platelets   Date Value Ref Range Status   04/25/2025 503 (H) 150 - 450 x10*3/uL Final       PHYSICAL EXAMINATION   Vital Signs:   Vital signs reviewed      4/25/2025     7:40 PM 4/26/2025    12:22 AM 4/26/2025     4:20 AM 4/26/2025     7:23 AM 4/26/2025     9:39 AM 4/26/2025     "11:15 AM 4/29/2025     8:30 AM   Vitals   Systolic 132 115 96 102 115 115 115   Diastolic 74 71 56 62 59 73 71   BP Location    Right arm Right arm Right arm Right arm   Heart Rate 112 92 97 98 89 110 104   Temp 36.7 °C (98 °F)      36.1 °C (97 °F)   Resp 16 18 16 18 18 18 16   Height 1.6 m (5' 3\")         Weight (lb) 140      124.2   BMI 24.8 kg/m2      22 kg/m2   BSA (m2) 1.68 m2      1.58 m2   Visit Report       Report    Report     4/1/25 - 129.5 lbs     Physical Exam  HENT:      Head: Normocephalic and atraumatic.   Eyes:      Extraocular Movements: Extraocular movements intact.      Conjunctiva/sclera: Conjunctivae normal.   Pulmonary:      Effort: Pulmonary effort is normal.   Abdominal:      General: Abdomen is flat. There is no distension.      Tenderness: There is abdominal tenderness in the right upper quadrant and right lower quadrant.   Musculoskeletal:         General: Normal range of motion.   Neurological:      General: No focal deficit present.      Mental Status: She is alert.   Psychiatric:         Mood and Affect: Mood normal.         Thought Content: Thought content normal.         ASSESSMENT/PLAN    Pain/Headaches  Pain is: cancer related pain (HA), generalized body aches from treatment, chronic abd pain   Type: neuropathic, somatic, visceral   - Continue Oxycodone 5mg [id number] PRN   - Acetaminophen 1000mg [id number] PRN - MAX 3000mg/24 hours - patient reports this does not help  - Continue Ibuprofen 800mg [id number] PRN - takes one per day at most   - Amitriptyline 25mg at bed - no longer taking but consider resuming   - Maxalt 10mg once daily PRN - no longer taking  - Trial bentyl 20mg BID prn      Opioid Use  Medication Management:   - OARRS report reviewed with no aberrant behavior; consistent with  prescriptions/records and patient history  - MED 22.5.  Overdose Risk Score 400.   This has been discussed with patient.   - We will continue to closely monitor the patient for signs of prescription " misuse including UDS, OARRS review and subjective reports at each visit.  - No concurrent benzodiazepine use   - I am a provider who either is or has consulted and collaborated with a provider certified in Hospice and Palliative Medicine and have conducted a face-face visit and examination for this patient.  - Routine Urine Drug Screen: 12/11/2024 appropriately + for prescribed medications  - Controlled Substance Agreement: 12/11/2024  - Specifically discussed that controlled substance prescriptions will only be provided by our group as outlined in the completed agreement  - Prescribed naloxone: patient declined  - Red Flags: NA     Constipation  At risk for constipation related to opioids. Chronic concerns of constipation  - Continue Linzess 145mcg and Motegrity 2mg as prescribed by GI  - FU GI ASAP - message sent to her GI provider today      Nausea/Decreased Appetite/Weight loss   - Continue Omeprazole 40mg daily - takes inconsistently   - Continue Ondansetron ODT 8mg [id number] PRN  - Continue Olanzapine to 10mg at bed  - Encouraged good hydration  - Encouraged small more frequent meals  - Encouraged continued Ensure 1-2x daily    Anxiety/Sleeping Difficulty  - Amitriptyline 25mg - no longer taking  - Olanzapine per above     Fatigue  - Continue methylphenidate 5mg dally - mild improvement   - Provigil 100mg - not effective  - 2/2 depression? Patient declines trial of antidepressant      Advance Directives  Existence of Advance Directives:No - not interested  Decision maker: Surrogate decision maker is Darby Duff (mom)  Code Status: Full code    Supportive and Palliative Oncology Encounter:  Emotional support provided  Coordination of care  Will continue to follow and address symptoms as needed    Next Follow-Up Visit:  Return to clinic in 1-2 months;  align with oncology appt    Signature and billing  Medical complexity was moderate level due to due to complexity of problems, extensive data review, and high  risk of management/treatment.  Time was spent on the following: Prep Time, Time Directly with Patient/Family/Caregiver, Documentation Time. Total time spent: 30      Data  Diagnostic tests and information reviewed for today's visit:  Most recent labs and imaging results, Medications     Some elements copied from Palliative Care note on 4/1/2025, the elements have been updated and all reflect current decision making from today, 4/29/2025.    Plan of Care discussed with: Patient, Deborah Snowden PAC    SIGNATURE: SANTOSH Ahumada    Contact information:  Supportive and Palliative Oncology  Monday-Friday 8 AM-5 PM  Phone:  [phone], press option #5, then option #1.   Or Epic Secure Chat

## 2025-05-02 LAB — GH SERPL-MCNC: 0.63 NG/ML (ref 0.05–8)

## 2025-05-05 ENCOUNTER — APPOINTMENT (OUTPATIENT)
Dept: NEUROSURGERY | Facility: CLINIC | Age: 25
End: 2025-05-05
Payer: COMMERCIAL

## 2025-05-05 VITALS
SYSTOLIC BLOOD PRESSURE: 107 MMHG | HEIGHT: 63 IN | RESPIRATION RATE: 17 BRPM | DIASTOLIC BLOOD PRESSURE: 65 MMHG | BODY MASS INDEX: 21.99 KG/M2 | HEART RATE: 79 BPM | TEMPERATURE: 97.5 F | WEIGHT: 124.12 LBS

## 2025-05-05 DIAGNOSIS — Z98.2 VP (VENTRICULOPERITONEAL) SHUNT STATUS: Primary | ICD-10-CM

## 2025-05-05 PROCEDURE — 99212 OFFICE O/P EST SF 10 MIN: CPT | Performed by: NEUROLOGICAL SURGERY

## 2025-05-05 ASSESSMENT — PAIN SCALES - GENERAL: PAINLEVEL_OUTOF10: 0-NO PAIN

## 2025-05-05 NOTE — PROGRESS NOTES
12/29/24  Shunt at #5. Here today after being in the ER with pain on the right side. ER said there is an infection. Has mid headaches here and there. Has numbness on the right side of her head.    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  She was recently in the emergency room with right sided abdominal pain.  Evaluation did not demonstrate any evidence of ventriculomegaly or clear evidence of infection.  The pain has slightly improved but is still present.    On examination, the patient is alert and interactive.  Extraocular movements are intact.  Face moves symmetrically.  She moves all extremities symmetrically.  She has some mild tenderness along her entire shunt tube system consistent with scar deposition.    At this time there is no evidence of shunt malfunction or shunt infection.  She will follow-up with her GI doctors to evaluate other possible causes of her abdominal pain.

## 2025-05-08 ENCOUNTER — APPOINTMENT (OUTPATIENT)
Dept: PEDIATRIC GASTROENTEROLOGY | Facility: CLINIC | Age: 25
End: 2025-05-08
Payer: COMMERCIAL

## 2025-05-08 VITALS
DIASTOLIC BLOOD PRESSURE: 73 MMHG | HEART RATE: 83 BPM | BODY MASS INDEX: 23.48 KG/M2 | WEIGHT: 132.5 LBS | SYSTOLIC BLOOD PRESSURE: 117 MMHG | HEIGHT: 63 IN

## 2025-05-08 DIAGNOSIS — K59.09 CONSTIPATION, CHRONIC: Primary | ICD-10-CM

## 2025-05-08 PROCEDURE — 99214 OFFICE O/P EST MOD 30 MIN: CPT | Performed by: NURSE PRACTITIONER

## 2025-05-08 PROCEDURE — 3008F BODY MASS INDEX DOCD: CPT | Performed by: NURSE PRACTITIONER

## 2025-05-08 RX ORDER — PLECANATIDE 3 MG/1
3 TABLET ORAL DAILY
Qty: 30 TABLET | Refills: 11 | Status: SHIPPED | OUTPATIENT
Start: 2025-05-08 | End: 2026-05-08

## 2025-05-08 ASSESSMENT — ENCOUNTER SYMPTOMS
OCCASIONAL FEELINGS OF UNSTEADINESS: 1
LOSS OF SENSATION IN FEET: 0
DEPRESSION: 0

## 2025-05-08 NOTE — PROGRESS NOTES
Pediatric Gastroenterology   Follow Up Office Visit      HPI  Sejal Duffand her mother were seen in the [facility] in follow-up on 5/8/2025. Sejal Duff is a 24 y.o. year-old  who has been followed by Pediatric Gastroenterology for constipation.    She also has a past history of anemia, asthma GERD, IBS, PCOS and breast fibroadenoma s/p L breast excisional biopsy, now with recent diagnosis of astrocytoma.    Our last visit was about one year ago.  Since then, she has been through A LOT.     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   -12/01/24 developed hydrocephalus and is s/p  shunt  -12/21/24 D1 tovarefenib (type II JUAN CARLOS inhibitor)   -12/29/24 revision of  shunt    Sejal and her mother are frustrated because she continues to suffer form   Right sided abdominal pain and  Constipation     She has been in and out of the ED with abdominal pain. Most recent xray and CT scan confirm excess stool, even into the small bowel. Sejal and her mother were told that her small bowel could perforate. She was in the hospital overnight with the belief that she would be receiving some type of treatment before discharge but ended up getting discharge with plan for home enema.      I have known Sejal for many years and she has been constipated since early childhood. Enemas have never worked for her because of the location of her stool.   She has been on numerous medications including high dose otC  stimulants and stool softeners as well as newer secretagogues and long acting stimulants.   She has done colonics at an outside institution. Anal irrigation was discussed in the past and ordered but she did not receive the equipment.     Her typical pattern is to take Linzess 145 mcg and Motegrity 3 mg every four days. If she takes it more often (daily as it is intended) it does not work at all. She has tried many combinations and timing. When she was under 22 years, she was admitted to [facility] for go lytely clean outs with IV support and this was very helpful. Due to her age, we have not been able to do this in some time.     Right now she has Right upper and lower quadrant pain, known constipation. We discussed that until she is full cleaned out, it will be difficult to revise her program and assess its effectiveness.  Denies vomiting. Undergoing home oral chemo once  week. Always takes Linzess and motegrity the night before chemo to pass at least enough stool so she is not too uncomfortable.     We also discussed CT findings:    CT scan in April 2025   IMPRESSION:  1. Ventriculoperitoneal shunt catheter terminates within the left  pelvis. No evidence of kinking or discontinuity along the course of  the catheter.  2. Nonspecific fluid within mildly prominent but not overtly  distended small bowel loops in the mid abdomen and pelvis. This is a  nonspecific finding but could potentially reflect an enteritis or  possibly low-grade or developing bowel obstruction. No discrete  transition point identified.  3. Feculent debris in the distal ileum suggesting delayed transit.  4. Small-to-moderate free fluid within the pelvis as well as mild  mesenteric fat stranding in the pelvis. This may in part be related  to fluid from the  shunt with mesenteric congestion or inflammatory  process not excluded.      Review from Previous encounters:  Sejal has irritable bowel syndrome with constipation dominance.  I have known her for  several years although it has been sometime since I had seen her.  She did see an adult GI provider since our last visit but is returning to our pediatric clinic today.  She continues to suffer from chronic constipation, abdominal bloating, and GI distress.  At 1 point we discussed anal irrigation using the Mariola ramesh her [employer] home systems.  She was unable to get this system due to insurance issues.  She would like to pursue this if it is an option.  In the meantime she has been managing her constipation with Linzess -alternating 145 with to 290.  She feels the 145 dose works better.  She is also using Motegrity 2 mg/day.  She feels this combination has been helpful but she still has not achieved an optimal bowel regimen.    Per Sejal:  189 pounds  on January 2, 2024   Today she weighs 139 on February 7,2024  This would be a 60 pound decrease in the span of 4 weeks.  Today is 132 pounds     BM frequency -very infrequent.  About every 4 days with meds.   BM quality BSC  -can be liquid or formed feels like she has to go to the bathroom but is unable to pass stool.  After cleanouts her abdominal distention decreases significantly.  BM soiling - none  BM Hematochezia - no  BM Nocturnal - no  Urinary Symptoms - none    No Changes to Family Medical History      Allergies   Allergen Reactions    Morphine Dizziness    Pollen Extracts Runny nose    House Dust Runny nose         Current Outpatient Medications on File Prior to Visit   Medication Sig Dispense Refill    albuterol 90 mcg/actuation inhaler Inhale 1-2 puffs every 4 hours if needed for wheezing or shortness of breath. 18 g 2    benzoyl peroxide (Benzac AC) 10 % external wash APPLY TOPICALLY 2 TIMES A DAY. WASH TWICE DAILY AS DIRECTED 148 mL 3    cholecalciferol (Vitamin D-3) 50,000 unit capsule Take 1 capsule (50,000 Units) by mouth 1 (one) time per week. 4 capsule 11    clindamycin (Cleocin T) 1 % lotion Apply a pea-size amount to entire face twice daily after  washing face with lukewarm water. Use this BEFORE hydrocortisone cream. 60 mL 0    clobetasol (Temovate) 0.05 % ointment Apply topically 2 times a day. To hands and feet 3x per week for maintenance 60 g 1    ergocalciferol (Vitamin D-2) 1.25 MG (41692 UT) capsule Take 1 capsule (1,250 mcg) by mouth 1 (one) time per week. 4 capsule 11    hydrocortisone 2.5 % ointment Apply topically 2 times a day. To face 3x per week for maintenance 30 g 1    hydrOXYzine pamoate (Vistaril) 25 mg capsule Take 1 capsule (25 mg) by mouth as needed at bedtime for itching. If 1 capsule is not enough to control symptoms may increase to 2 capsules 60 capsule 2    ibuprofen 800 mg tablet Take 1 tablet (800 mg) by mouth every 8 hours if needed for mild pain (1 - 3) (pain). 90 tablet 5    Lacto no.61-Itafmz-YQM-larch 25B cell-25B cell-50 mg capsule Take 1 capsule by mouth once daily. 90 capsule 3    linaCLOtide (Linzess) 145 mcg capsule Take 1 capsule (145 mcg) by mouth once daily in the morning. Take before meals. Do not crush or chew. 30 capsule 3    LORazepam (Ativan) 1 mg tablet Take 1 tablet (1 mg) 30 minutes prior to MRI on 2/17/25 for claustrophobia/anxiety relief. 1 tablet 0    methylphenidate (Ritalin) 5 mg tablet Take 1 tablet (5 mg) by mouth once daily. 30 tablet 0    multivitamin with iron (Daily Multiple Vitamins/Iron) tablet Take 1 tablet by mouth once daily. 90 each 3    OLANZapine (ZyPREXA) 10 mg tablet Take 1 tablet (10 mg) by mouth once daily at bedtime. 30 tablet 2    ondansetron ODT (Zofran-ODT) 8 mg disintegrating tablet Dissolve 1 tablet (8 mg) in the mouth every 8 hours if needed for nausea or vomiting for up to 20 days. 20 tablet 2    oxyCODONE (Roxicodone) 5 mg immediate release tablet Take 1 tablet (5 mg) by mouth every 6 hours if needed for severe pain (7 - 10). MAX 4 tablets a day 120 tablet 0    prucalopride (Motegrity) 2 mg tablet Take 1 tablet (2 mg) by mouth once daily. 30 tablet 3    simethicone (Mylicon) 125  "mg chewable tablet Chew 1 tablet (125 mg) every 6 hours if needed for flatulence. 120 tablet 2    tovorafenib 400 mg/week (100 mg x 4) tablet Take 400 mg by mouth 1 (one) time per week. () (Four 100 mg tablets = 400 mg.) Swallow tablets whole with water. Do not chew, cut, or crush. Tablets may be taken with or without food. Do not fill before May 3, 2025. 16 tablet 0    triamcinolone (Kenalog) 0.1 % ointment Apply topically 2 times a day. To trunk, arms, and legs 3x per week for maintenance 454 g 1    [] dicyclomine (Bentyl) 20 mg tablet Take 1 tablet (20 mg) by mouth 2 times a day for 7 days. 14 tablet 0    loratadine (Claritin) 10 mg tablet Take 1 tablet (10 mg) by mouth once daily. 90 tablet 1    omeprazole (PriLOSEC) 40 mg DR capsule Take 1 capsule (40 mg) by mouth once daily. 30 capsule 2     No current facility-administered medications on file prior to visit.           PHYSICAL EXAMINATION:  Vital signs : /73   Pulse 83   Ht 1.606 m (5' 3.23\")   Wt 60.1 kg (132 lb 7.9 oz)   BMI 23.30 kg/m²  Facility age limit for growth %nba is 20 years.    Physical Exam  Constitutional:       General: Appear well.   HENT:      Head: Normocephalic.      Right Ear: External ear normal.      Left Ear: External ear normal.      Nose: Nose normal.      Mouth/Throat:      Mouth: Mucous membranes are moist.   Eyes:      Extraocular Movements: Extraocular movements intact.      Conjunctiva/sclera: Conjunctivae normal.   Cardiovascular:      Rate and Rhythm: Normal rate and regular rhythm.      Heart sounds: Normal heart sounds.      Capillary Refill: Capillary refill takes less than 2 seconds.   Pulmonary:      Effort: Respiratory effort is normal.      Breath sounds: Normal breath sounds.   Abdominal:      General: Abdomen is flat. Bowel sounds are normal. There is medium distension. There are no masses.   Shunt tubing palpable.      Palpations: Abdomen is soft.      Tenderness: There is some abdominal " tenderness x 4 quadrants.      Gastrostomy tubes: N/A  Anal Rectal:     Not examined   Musculoskeletal:         General: Normal range of motion of all extremities.     Joints: no selling or redness.  Skin:     General: Skin is warm and dry.      No rashes  Neurological:      General: No focal deficit present.      Mental Status: Alert  Psychiatric:         Mood and Affect: Mood normal.        IMPRESSION AND PLAN:  Sejal has a longstanding history of chronic constipation which is likely dysmotility with irritable bowel syndrome with constipation dominance.  Over the past year she has a new diagnosis of astrocytoma for which she is currently undergoing therapy.  Her constipation issues have not improved and infact, she is currently impacted. She really needs to be admitted for an ng golytely clean out as she can not tolerate a home clean out and her previous home clean outs have been ineffective.   Ideally -  Admit to adult side for NG go lytely clean out (I did reach out to adult internal medicine today) and then follow with    1. Daily Anal irrigation with Navina or Mariola ramesh system   2. Oral medications Linzess 145 mcg and Motegrity  every 4 days (if given daily does not work at all)   3. Today I added Trulance 3 mg to be taken daily since this has not been tried.     We discussed that until she is full cleaned out, it will be difficult to revise her program and assess its effectiveness.      CONTACT:  Division of Pediatric Gastroenterology, Hepatology and Nutrition  All results will be on line on My Chart.  Make sure sure you have signed up for My Chart.     Office phone   Office fax   Email [email or web address]     Please note:  After hours and on call [phone] and ask for Pediatric Gastroenterology Fellow on Call  Office visit Scheduling   Radiology Scheduling      I am in clinic M, T, W and may not be able to return call until Thursday.   Phone calls and  email to our office are returned by one of our nurses within 48 business hours.  Please call for prescription renewals when you have one week of medication remaining.   Please call if you have trouble with insurance company coverage of any medications we prescribe.      This note was created using voice recognition software. I have made every reasonable attempts to avoid incorrect errors, but this document may contain errors not identified before proof reading and finalizing the document. If the errors change the accuracy of the document, I would appreciate being brought to my attention. Thanks

## 2025-05-10 ENCOUNTER — PATIENT MESSAGE (OUTPATIENT)
Dept: HEMATOLOGY/ONCOLOGY | Facility: HOSPITAL | Age: 25
End: 2025-05-10
Payer: COMMERCIAL

## 2025-05-10 DIAGNOSIS — F40.240 CLAUSTROPHOBIA: ICD-10-CM

## 2025-05-10 DIAGNOSIS — F41.9 ANXIETY: ICD-10-CM

## 2025-05-14 ASSESSMENT — ENCOUNTER SYMPTOMS
DIARRHEA: 0
SHORTNESS OF BREATH: 0
WHEEZING: 0
CONSTIPATION: 1
CHEST TIGHTNESS: 1
COUGH: 0

## 2025-05-15 ENCOUNTER — TELEPHONE (OUTPATIENT)
Dept: PEDIATRIC GASTROENTEROLOGY | Facility: CLINIC | Age: 25
End: 2025-05-15
Payer: COMMERCIAL

## 2025-05-15 NOTE — TELEPHONE ENCOUNTER
Spoke with Pharmacist Ethan call #[phone] He approved generic Motegrity for 1 year. We will receive approval fax. Ethan suggests not contacting pharmacy until we receive letter.

## 2025-05-19 RX ORDER — LORAZEPAM 1 MG/1
TABLET ORAL
Qty: 1 TABLET | Refills: 0 | Status: SHIPPED | OUTPATIENT
Start: 2025-05-19

## 2025-05-23 ENCOUNTER — HOSPITAL ENCOUNTER (OUTPATIENT)
Dept: RADIOLOGY | Facility: HOSPITAL | Age: 25
Discharge: HOME | End: 2025-05-23
Payer: COMMERCIAL

## 2025-05-23 DIAGNOSIS — C71.9 PILOCYTIC ASTROCYTOMA (MULTI): ICD-10-CM

## 2025-05-23 PROCEDURE — 2550000001 HC RX 255 CONTRASTS

## 2025-05-23 PROCEDURE — 70553 MRI BRAIN STEM W/O & W/DYE: CPT

## 2025-05-23 PROCEDURE — A9575 INJ GADOTERATE MEGLUMI 0.1ML: HCPCS

## 2025-05-23 RX ORDER — GADOTERATE MEGLUMINE 376.9 MG/ML
11 INJECTION INTRAVENOUS
Status: COMPLETED | OUTPATIENT
Start: 2025-05-23 | End: 2025-05-23

## 2025-05-23 RX ADMIN — GADOTERATE MEGLUMINE 11 ML: 376.9 INJECTION INTRAVENOUS at 09:43

## 2025-05-26 DIAGNOSIS — R63.0 LOSS OF APPETITE: ICD-10-CM

## 2025-05-27 ENCOUNTER — OFFICE VISIT (OUTPATIENT)
Dept: HEMATOLOGY/ONCOLOGY | Facility: HOSPITAL | Age: 25
End: 2025-05-27
Payer: COMMERCIAL

## 2025-05-27 ENCOUNTER — APPOINTMENT (OUTPATIENT)
Dept: RADIOLOGY | Facility: HOSPITAL | Age: 25
End: 2025-05-27
Payer: COMMERCIAL

## 2025-05-27 ENCOUNTER — HOSPITAL ENCOUNTER (INPATIENT)
Facility: HOSPITAL | Age: 25
LOS: 2 days | Discharge: HOME | End: 2025-05-29
Attending: PEDIATRICS | Admitting: PEDIATRICS
Payer: COMMERCIAL

## 2025-05-27 ENCOUNTER — OFFICE VISIT (OUTPATIENT)
Dept: PALLIATIVE MEDICINE | Facility: HOSPITAL | Age: 25
End: 2025-05-27
Payer: COMMERCIAL

## 2025-05-27 VITALS
DIASTOLIC BLOOD PRESSURE: 67 MMHG | WEIGHT: 133 LBS | SYSTOLIC BLOOD PRESSURE: 104 MMHG | OXYGEN SATURATION: 100 % | RESPIRATION RATE: 18 BRPM | BODY MASS INDEX: 23.56 KG/M2 | HEART RATE: 85 BPM | TEMPERATURE: 96.1 F

## 2025-05-27 DIAGNOSIS — Z79.69 ON ANTINEOPLASTIC CHEMOTHERAPY: ICD-10-CM

## 2025-05-27 DIAGNOSIS — T45.1X5A CHEMOTHERAPY INDUCED NAUSEA AND VOMITING: Primary | ICD-10-CM

## 2025-05-27 DIAGNOSIS — R63.0 LOSS OF APPETITE: ICD-10-CM

## 2025-05-27 DIAGNOSIS — T45.1X5D ADVERSE EFFECT OF CHEMOTHERAPY, SUBSEQUENT ENCOUNTER: ICD-10-CM

## 2025-05-27 DIAGNOSIS — J45.909 ASTHMA, UNSPECIFIED ASTHMA SEVERITY, UNSPECIFIED WHETHER COMPLICATED, UNSPECIFIED WHETHER PERSISTENT (HHS-HCC): ICD-10-CM

## 2025-05-27 DIAGNOSIS — R11.2 CHEMOTHERAPY INDUCED NAUSEA AND VOMITING: Primary | ICD-10-CM

## 2025-05-27 DIAGNOSIS — R53.0 NEOPLASTIC MALIGNANT RELATED FATIGUE: ICD-10-CM

## 2025-05-27 DIAGNOSIS — C71.9 PILOCYTIC ASTROCYTOMA (MULTI): ICD-10-CM

## 2025-05-27 DIAGNOSIS — G89.18 ACUTE POST-OPERATIVE PAIN: ICD-10-CM

## 2025-05-27 DIAGNOSIS — F41.9 ANXIETY: ICD-10-CM

## 2025-05-27 DIAGNOSIS — K59.00 CONSTIPATION: Primary | ICD-10-CM

## 2025-05-27 DIAGNOSIS — G89.3 CANCER ASSOCIATED PAIN: ICD-10-CM

## 2025-05-27 DIAGNOSIS — K31.84 GASTROPARESIS: ICD-10-CM

## 2025-05-27 DIAGNOSIS — C71.9 PILOCYTIC ASTROCYTOMA (MULTI): Primary | ICD-10-CM

## 2025-05-27 DIAGNOSIS — Z98.2 S/P VP SHUNT: ICD-10-CM

## 2025-05-27 DIAGNOSIS — Z51.5 ENCOUNTER FOR PALLIATIVE CARE: ICD-10-CM

## 2025-05-27 LAB
ABO GROUP (TYPE) IN BLOOD: NORMAL
ALBUMIN SERPL BCP-MCNC: 3.7 G/DL (ref 3.4–5)
ANION GAP SERPL CALC-SCNC: 9 MMOL/L (ref 10–20)
ANTIBODY SCREEN: NORMAL
BASOPHILS # BLD AUTO: 0.06 X10*3/UL (ref 0–0.1)
BASOPHILS NFR BLD AUTO: 0.9 %
BUN SERPL-MCNC: 11 MG/DL (ref 6–23)
CALCIUM SERPL-MCNC: 8.8 MG/DL (ref 8.6–10.6)
CHLORIDE SERPL-SCNC: 109 MMOL/L (ref 98–107)
CO2 SERPL-SCNC: 23 MMOL/L (ref 21–32)
CREAT SERPL-MCNC: 0.44 MG/DL (ref 0.5–1.05)
EGFRCR SERPLBLD CKD-EPI 2021: >90 ML/MIN/1.73M*2
EOSINOPHIL # BLD AUTO: 0.42 X10*3/UL (ref 0–0.7)
EOSINOPHIL NFR BLD AUTO: 6.2 %
ERYTHROCYTE [DISTWIDTH] IN BLOOD BY AUTOMATED COUNT: 17 % (ref 11.5–14.5)
GLUCOSE SERPL-MCNC: 89 MG/DL (ref 74–99)
HCT VFR BLD AUTO: 27.5 % (ref 36–46)
HGB BLD-MCNC: 7.9 G/DL (ref 12–16)
IMM GRANULOCYTES # BLD AUTO: 0.01 X10*3/UL (ref 0–0.7)
IMM GRANULOCYTES NFR BLD AUTO: 0.1 % (ref 0–0.9)
LYMPHOCYTES # BLD AUTO: 3.54 X10*3/UL (ref 1.2–4.8)
LYMPHOCYTES NFR BLD AUTO: 52.6 %
MAGNESIUM SERPL-MCNC: 1.91 MG/DL (ref 1.6–2.4)
MCH RBC QN AUTO: 24 PG (ref 26–34)
MCHC RBC AUTO-ENTMCNC: 28.7 G/DL (ref 32–36)
MCV RBC AUTO: 84 FL (ref 80–100)
MONOCYTES # BLD AUTO: 0.38 X10*3/UL (ref 0.1–1)
MONOCYTES NFR BLD AUTO: 5.6 %
NEUTROPHILS # BLD AUTO: 2.32 X10*3/UL (ref 1.2–7.7)
NEUTROPHILS NFR BLD AUTO: 34.6 %
NRBC BLD-RTO: 0 /100 WBCS (ref 0–0)
PHOSPHATE SERPL-MCNC: 3 MG/DL (ref 2.5–4.9)
PLATELET # BLD AUTO: 399 X10*3/UL (ref 150–450)
POTASSIUM SERPL-SCNC: 3.3 MMOL/L (ref 3.5–5.3)
RBC # BLD AUTO: 3.29 X10*6/UL (ref 4–5.2)
RH FACTOR (ANTIGEN D): NORMAL
SODIUM SERPL-SCNC: 138 MMOL/L (ref 136–145)
WBC # BLD AUTO: 6.7 X10*3/UL (ref 4.4–11.3)

## 2025-05-27 PROCEDURE — 2500000001 HC RX 250 WO HCPCS SELF ADMINISTERED DRUGS (ALT 637 FOR MEDICARE OP): Mod: SE

## 2025-05-27 PROCEDURE — 2500000005 HC RX 250 GENERAL PHARMACY W/O HCPCS: Mod: SE

## 2025-05-27 PROCEDURE — 99222 1ST HOSP IP/OBS MODERATE 55: CPT

## 2025-05-27 PROCEDURE — 80069 RENAL FUNCTION PANEL: CPT

## 2025-05-27 PROCEDURE — 99215 OFFICE O/P EST HI 40 MIN: CPT | Performed by: PSYCHIATRY & NEUROLOGY

## 2025-05-27 PROCEDURE — 99215 OFFICE O/P EST HI 40 MIN: CPT | Performed by: NURSE PRACTITIONER

## 2025-05-27 PROCEDURE — 2500000004 HC RX 250 GENERAL PHARMACY W/ HCPCS (ALT 636 FOR OP/ED): Mod: SE,JZ

## 2025-05-27 PROCEDURE — 99214 OFFICE O/P EST MOD 30 MIN: CPT | Performed by: NURSE PRACTITIONER

## 2025-05-27 PROCEDURE — 1100000001 HC PRIVATE ROOM DAILY

## 2025-05-27 PROCEDURE — 85025 COMPLETE CBC W/AUTO DIFF WBC: CPT

## 2025-05-27 PROCEDURE — 83735 ASSAY OF MAGNESIUM: CPT

## 2025-05-27 PROCEDURE — 2500000002 HC RX 250 W HCPCS SELF ADMINISTERED DRUGS (ALT 637 FOR MEDICARE OP, ALT 636 FOR OP/ED): Mod: SE

## 2025-05-27 PROCEDURE — 36415 COLL VENOUS BLD VENIPUNCTURE: CPT

## 2025-05-27 PROCEDURE — 86850 RBC ANTIBODY SCREEN: CPT

## 2025-05-27 RX ORDER — ACETAMINOPHEN 325 MG/1
975 TABLET ORAL EVERY 6 HOURS
Status: DISCONTINUED | OUTPATIENT
Start: 2025-05-27 | End: 2025-05-29 | Stop reason: HOSPADM

## 2025-05-27 RX ORDER — CLINDAMYCIN PHOSPHATE 10 UG/ML
LOTION TOPICAL 2 TIMES DAILY
Status: DISCONTINUED | OUTPATIENT
Start: 2025-05-27 | End: 2025-05-29 | Stop reason: HOSPADM

## 2025-05-27 RX ORDER — ENOXAPARIN SODIUM 100 MG/ML
40 INJECTION SUBCUTANEOUS EVERY 24 HOURS
Status: CANCELLED | OUTPATIENT
Start: 2025-05-27

## 2025-05-27 RX ORDER — OXYCODONE HYDROCHLORIDE 5 MG/1
5 TABLET ORAL EVERY 6 HOURS PRN
Qty: 120 TABLET | Refills: 0 | Status: SHIPPED | OUTPATIENT
Start: 2025-05-27 | End: 2025-06-26

## 2025-05-27 RX ORDER — METHYLPHENIDATE HYDROCHLORIDE 5 MG/1
5 TABLET ORAL DAILY
Status: DISCONTINUED | OUTPATIENT
Start: 2025-05-28 | End: 2025-05-29 | Stop reason: HOSPADM

## 2025-05-27 RX ORDER — PANTOPRAZOLE SODIUM 40 MG/1
40 TABLET, DELAYED RELEASE ORAL
Status: CANCELLED | OUTPATIENT
Start: 2025-05-28

## 2025-05-27 RX ORDER — POLYETHYLENE GLYCOL 3350 17 G/17G
238 POWDER, FOR SOLUTION ORAL ONCE AS NEEDED
Status: DISCONTINUED | OUTPATIENT
Start: 2025-05-27 | End: 2025-05-27

## 2025-05-27 RX ORDER — CETIRIZINE HYDROCHLORIDE 10 MG/1
10 TABLET ORAL DAILY
Status: DISCONTINUED | OUTPATIENT
Start: 2025-05-27 | End: 2025-05-29 | Stop reason: HOSPADM

## 2025-05-27 RX ORDER — TRIAMCINOLONE ACETONIDE 1 MG/G
OINTMENT TOPICAL 3 TIMES WEEKLY
Status: DISCONTINUED | OUTPATIENT
Start: 2025-05-28 | End: 2025-05-29 | Stop reason: HOSPADM

## 2025-05-27 RX ORDER — LIDOCAINE HYDROCHLORIDE 40 MG/ML
200 INJECTION, SOLUTION RETROBULBAR; TOPICAL ONCE
Status: DISCONTINUED | OUTPATIENT
Start: 2025-05-27 | End: 2025-05-29 | Stop reason: HOSPADM

## 2025-05-27 RX ORDER — ASPIRIN 325 MG
1.25 TABLET, DELAYED RELEASE (ENTERIC COATED) ORAL WEEKLY
Status: DISCONTINUED | OUTPATIENT
Start: 2025-05-28 | End: 2025-05-29 | Stop reason: HOSPADM

## 2025-05-27 RX ORDER — POLYETHYLENE GLYCOL 3350, SODIUM CHLORIDE, SODIUM BICARBONATE, POTASSIUM CHLORIDE 420; 11.2; 5.72; 1.48 G/4L; G/4L; G/4L; G/4L
4000 POWDER, FOR SOLUTION ORAL ONCE
Status: DISCONTINUED | OUTPATIENT
Start: 2025-05-27 | End: 2025-05-27

## 2025-05-27 RX ORDER — IBUPROFEN 400 MG/1
800 TABLET, FILM COATED ORAL EVERY 8 HOURS PRN
Status: DISCONTINUED | OUTPATIENT
Start: 2025-05-27 | End: 2025-05-29 | Stop reason: HOSPADM

## 2025-05-27 RX ORDER — OXYCODONE HYDROCHLORIDE 5 MG/1
5 TABLET ORAL EVERY 6 HOURS PRN
Refills: 0 | Status: DISCONTINUED | OUTPATIENT
Start: 2025-05-27 | End: 2025-05-29 | Stop reason: HOSPADM

## 2025-05-27 RX ORDER — SODIUM CHLORIDE, SODIUM LACTATE, POTASSIUM CHLORIDE, CALCIUM CHLORIDE 600; 310; 30; 20 MG/100ML; MG/100ML; MG/100ML; MG/100ML
75 INJECTION, SOLUTION INTRAVENOUS CONTINUOUS
Status: ACTIVE | OUTPATIENT
Start: 2025-05-27 | End: 2025-05-28

## 2025-05-27 RX ORDER — SIMETHICONE 80 MG
125 TABLET,CHEWABLE ORAL EVERY 6 HOURS PRN
Status: DISCONTINUED | OUTPATIENT
Start: 2025-05-27 | End: 2025-05-29 | Stop reason: HOSPADM

## 2025-05-27 RX ORDER — CLOBETASOL PROPIONATE 0.5 MG/G
OINTMENT TOPICAL 3 TIMES WEEKLY
Status: DISCONTINUED | OUTPATIENT
Start: 2025-05-28 | End: 2025-05-27

## 2025-05-27 RX ORDER — POLYETHYLENE GLYCOL 3350 17 G/17G
17 POWDER, FOR SOLUTION ORAL DAILY
Status: CANCELLED | OUTPATIENT
Start: 2025-05-27

## 2025-05-27 RX ORDER — POLYETHYLENE GLYCOL 3350, SODIUM CHLORIDE, SODIUM BICARBONATE, POTASSIUM CHLORIDE 420; 11.2; 5.72; 1.48 G/4L; G/4L; G/4L; G/4L
4000 POWDER, FOR SOLUTION ORAL ONCE
Status: COMPLETED | OUTPATIENT
Start: 2025-05-27 | End: 2025-05-28

## 2025-05-27 RX ORDER — ONDANSETRON HYDROCHLORIDE 2 MG/ML
4 INJECTION, SOLUTION INTRAVENOUS EVERY 6 HOURS PRN
Status: DISCONTINUED | OUTPATIENT
Start: 2025-05-27 | End: 2025-05-29 | Stop reason: HOSPADM

## 2025-05-27 RX ORDER — OLANZAPINE 10 MG/1
10 TABLET, FILM COATED ORAL NIGHTLY
Qty: 90 TABLET | Refills: 0 | Status: SHIPPED | OUTPATIENT
Start: 2025-05-27 | End: 2025-08-25

## 2025-05-27 RX ORDER — TALC
3 POWDER (GRAM) TOPICAL NIGHTLY
Status: DISCONTINUED | OUTPATIENT
Start: 2025-05-27 | End: 2025-05-29 | Stop reason: HOSPADM

## 2025-05-27 RX ORDER — POLYETHYLENE GLYCOL 3350, SODIUM CHLORIDE, SODIUM BICARBONATE, POTASSIUM CHLORIDE 420; 11.2; 5.72; 1.48 G/4L; G/4L; G/4L; G/4L
4000 POWDER, FOR SOLUTION ORAL ONCE
Status: CANCELLED | OUTPATIENT
Start: 2025-05-27 | End: 2025-05-27

## 2025-05-27 RX ORDER — OLANZAPINE 5 MG/1
10 TABLET, FILM COATED ORAL NIGHTLY
Status: DISCONTINUED | OUTPATIENT
Start: 2025-05-27 | End: 2025-05-29 | Stop reason: HOSPADM

## 2025-05-27 RX ORDER — PANTOPRAZOLE SODIUM 40 MG/10ML
40 INJECTION, POWDER, LYOPHILIZED, FOR SOLUTION INTRAVENOUS
Status: DISCONTINUED | OUTPATIENT
Start: 2025-05-28 | End: 2025-05-29 | Stop reason: HOSPADM

## 2025-05-27 RX ORDER — METHYLPHENIDATE HYDROCHLORIDE 5 MG/1
5 TABLET ORAL DAILY
Qty: 30 TABLET | Refills: 0 | Status: SHIPPED | OUTPATIENT
Start: 2025-05-27 | End: 2025-06-26

## 2025-05-27 RX ORDER — ACETAMINOPHEN 650 MG/1
650 SUPPOSITORY RECTAL EVERY 6 HOURS
Status: DISCONTINUED | OUTPATIENT
Start: 2025-05-27 | End: 2025-05-29 | Stop reason: HOSPADM

## 2025-05-27 RX ORDER — PANTOPRAZOLE SODIUM 40 MG/1
40 TABLET, DELAYED RELEASE ORAL
Status: DISCONTINUED | OUTPATIENT
Start: 2025-05-28 | End: 2025-05-29 | Stop reason: HOSPADM

## 2025-05-27 RX ORDER — CLOBETASOL PROPIONATE 0.5 MG/G
OINTMENT TOPICAL DAILY
Status: DISCONTINUED | OUTPATIENT
Start: 2025-05-27 | End: 2025-05-29 | Stop reason: HOSPADM

## 2025-05-27 RX ORDER — ACETAMINOPHEN 160 MG/5ML
950 SOLUTION ORAL EVERY 6 HOURS
Status: DISCONTINUED | OUTPATIENT
Start: 2025-05-27 | End: 2025-05-29 | Stop reason: HOSPADM

## 2025-05-27 RX ORDER — PRUCALOPRIDE 2 MG/1
1 TABLET ORAL DAILY
Status: DISCONTINUED | OUTPATIENT
Start: 2025-05-27 | End: 2025-05-28

## 2025-05-27 RX ORDER — HYDROCORTISONE 25 MG/G
OINTMENT TOPICAL 2 TIMES DAILY
Status: DISCONTINUED | OUTPATIENT
Start: 2025-05-27 | End: 2025-05-27

## 2025-05-27 RX ORDER — HYDROXYZINE HYDROCHLORIDE 25 MG/1
25 TABLET, FILM COATED ORAL EVERY 8 HOURS PRN
Status: DISCONTINUED | OUTPATIENT
Start: 2025-05-27 | End: 2025-05-29 | Stop reason: HOSPADM

## 2025-05-27 RX ORDER — ONDANSETRON 4 MG/1
4 TABLET, FILM COATED ORAL EVERY 8 HOURS PRN
Qty: 60 TABLET | Refills: 2 | Status: SHIPPED | OUTPATIENT
Start: 2025-05-27 | End: 2025-07-26

## 2025-05-27 RX ORDER — ALBUTEROL SULFATE 90 UG/1
2 INHALANT RESPIRATORY (INHALATION) EVERY 4 HOURS PRN
Status: DISCONTINUED | OUTPATIENT
Start: 2025-05-27 | End: 2025-05-29 | Stop reason: HOSPADM

## 2025-05-27 RX ADMIN — SODIUM CHLORIDE, POTASSIUM CHLORIDE, SODIUM LACTATE AND CALCIUM CHLORIDE 500 ML: 600; 310; 30; 20 INJECTION, SOLUTION INTRAVENOUS at 19:25

## 2025-05-27 RX ADMIN — HYDROXYZINE HYDROCHLORIDE 25 MG: 25 TABLET, FILM COATED ORAL at 22:04

## 2025-05-27 RX ADMIN — CLOBETASOL PROPIONATE: 0.5 OINTMENT TOPICAL at 22:05

## 2025-05-27 RX ADMIN — OLANZAPINE 10 MG: 5 TABLET, FILM COATED ORAL at 22:04

## 2025-05-27 RX ADMIN — OXYCODONE 5 MG: 5 TABLET ORAL at 22:15

## 2025-05-27 RX ADMIN — IBUPROFEN 800 MG: 400 TABLET ORAL at 22:04

## 2025-05-27 RX ADMIN — CETIRIZINE HYDROCHLORIDE 10 MG: 10 TABLET, FILM COATED ORAL at 19:25

## 2025-05-27 RX ADMIN — SODIUM CHLORIDE, POTASSIUM CHLORIDE, SODIUM LACTATE AND CALCIUM CHLORIDE 75 ML/HR: 600; 310; 30; 20 INJECTION, SOLUTION INTRAVENOUS at 22:12

## 2025-05-27 RX ADMIN — CLINDAMYCIN PHOSPHATE: 10 LOTION TOPICAL at 22:05

## 2025-05-27 RX ADMIN — Medication 3 MG: at 22:04

## 2025-05-27 RX ADMIN — ACETAMINOPHEN 1000 MG: 160 SOLUTION ORAL at 22:03

## 2025-05-27 SDOH — SOCIAL STABILITY: SOCIAL INSECURITY: HAVE YOU HAD ANY THOUGHTS OF HARMING ANYONE ELSE?: NO

## 2025-05-27 SDOH — SOCIAL STABILITY: SOCIAL INSECURITY: WERE YOU ABLE TO COMPLETE ALL THE BEHAVIORAL HEALTH SCREENINGS?: YES

## 2025-05-27 SDOH — SOCIAL STABILITY: SOCIAL INSECURITY: HAS ANYONE EVER THREATENED TO HURT YOUR FAMILY OR YOUR PETS?: NO

## 2025-05-27 SDOH — ECONOMIC STABILITY: INCOME INSECURITY: IN THE PAST 12 MONTHS HAS THE ELECTRIC, GAS, OIL, OR WATER COMPANY THREATENED TO SHUT OFF SERVICES IN YOUR HOME?: NO

## 2025-05-27 SDOH — ECONOMIC STABILITY: FOOD INSECURITY: WITHIN THE PAST 12 MONTHS, YOU WORRIED THAT YOUR FOOD WOULD RUN OUT BEFORE YOU GOT THE MONEY TO BUY MORE.: NEVER TRUE

## 2025-05-27 SDOH — SOCIAL STABILITY: SOCIAL INSECURITY: WITHIN THE LAST YEAR, HAVE YOU BEEN HUMILIATED OR EMOTIONALLY ABUSED IN OTHER WAYS BY YOUR PARTNER OR EX-PARTNER?: NO

## 2025-05-27 SDOH — SOCIAL STABILITY: SOCIAL INSECURITY: WITHIN THE LAST YEAR, HAVE YOU BEEN AFRAID OF YOUR PARTNER OR EX-PARTNER?: NO

## 2025-05-27 SDOH — ECONOMIC STABILITY: FOOD INSECURITY: WITHIN THE PAST 12 MONTHS, THE FOOD YOU BOUGHT JUST DIDN'T LAST AND YOU DIDN'T HAVE MONEY TO GET MORE.: NEVER TRUE

## 2025-05-27 SDOH — SOCIAL STABILITY: SOCIAL INSECURITY: ARE THERE ANY APPARENT SIGNS OF INJURIES/BEHAVIORS THAT COULD BE RELATED TO ABUSE/NEGLECT?: NO

## 2025-05-27 SDOH — SOCIAL STABILITY: SOCIAL INSECURITY: DO YOU FEEL UNSAFE GOING BACK TO THE PLACE WHERE YOU ARE LIVING?: NO

## 2025-05-27 SDOH — SOCIAL STABILITY: SOCIAL INSECURITY: DOES ANYONE TRY TO KEEP YOU FROM HAVING/CONTACTING OTHER FRIENDS OR DOING THINGS OUTSIDE YOUR HOME?: NO

## 2025-05-27 SDOH — SOCIAL STABILITY: SOCIAL INSECURITY: ARE YOU OR HAVE YOU BEEN THREATENED OR ABUSED PHYSICALLY, EMOTIONALLY, OR SEXUALLY BY ANYONE?: NO

## 2025-05-27 SDOH — SOCIAL STABILITY: SOCIAL INSECURITY: DO YOU FEEL ANYONE HAS EXPLOITED OR TAKEN ADVANTAGE OF YOU FINANCIALLY OR OF YOUR PERSONAL PROPERTY?: NO

## 2025-05-27 SDOH — SOCIAL STABILITY: SOCIAL INSECURITY: ABUSE: ADULT

## 2025-05-27 SDOH — SOCIAL STABILITY: SOCIAL INSECURITY: HAVE YOU HAD THOUGHTS OF HARMING ANYONE ELSE?: NO

## 2025-05-27 ASSESSMENT — ENCOUNTER SYMPTOMS
DIARRHEA: 0
FATIGUE: 1
NAUSEA: 1
SPEECH DIFFICULTY: 0
CHILLS: 0
VOMITING: 1
APPETITE CHANGE: 1
CONSTIPATION: 1
WHEEZING: 0
COUGH: 0
HEADACHES: 1
DECREASED CONCENTRATION: 0
NERVOUS/ANXIOUS: 0
BLOOD IN STOOL: 0
SHORTNESS OF BREATH: 0
ABDOMINAL DISTENTION: 1
FATIGUE: 0
ABDOMINAL PAIN: 1
FEVER: 0
ABDOMINAL PAIN: 1
SLEEP DISTURBANCE: 0
HEMATURIA: 0
NAUSEA: 1
CHEST TIGHTNESS: 1
EXTREMITY WEAKNESS: 0
CONSTIPATION: 1
DEPRESSION: 1
APPETITE CHANGE: 1
SEIZURES: 0

## 2025-05-27 ASSESSMENT — COGNITIVE AND FUNCTIONAL STATUS - GENERAL
MOBILITY SCORE: 24
PATIENT BASELINE BEDBOUND: NO
DAILY ACTIVITIY SCORE: 24
MOBILITY SCORE: 24
DAILY ACTIVITIY SCORE: 24
DAILY ACTIVITIY SCORE: 24
MOBILITY SCORE: 24

## 2025-05-27 ASSESSMENT — ACTIVITIES OF DAILY LIVING (ADL)
BATHING: INDEPENDENT
GROOMING: INDEPENDENT
WALKS IN HOME: INDEPENDENT
ADEQUATE_TO_COMPLETE_ADL: YES
PATIENT'S MEMORY ADEQUATE TO SAFELY COMPLETE DAILY ACTIVITIES?: YES
LACK_OF_TRANSPORTATION: NO
TOILETING: INDEPENDENT
HEARING - RIGHT EAR: FUNCTIONAL
FEEDING YOURSELF: INDEPENDENT
HEARING - LEFT EAR: FUNCTIONAL
DRESSING YOURSELF: INDEPENDENT
JUDGMENT_ADEQUATE_SAFELY_COMPLETE_DAILY_ACTIVITIES: YES

## 2025-05-27 ASSESSMENT — LIFESTYLE VARIABLES
HOW MANY STANDARD DRINKS CONTAINING ALCOHOL DO YOU HAVE ON A TYPICAL DAY: PATIENT DOES NOT DRINK
AUDIT-C TOTAL SCORE: 0
HOW OFTEN DO YOU HAVE A DRINK CONTAINING ALCOHOL: NEVER
SKIP TO QUESTIONS 9-10: 1
AUDIT-C TOTAL SCORE: 0
HOW OFTEN DO YOU HAVE 6 OR MORE DRINKS ON ONE OCCASION: NEVER

## 2025-05-27 ASSESSMENT — PAIN SCALES - GENERAL
PAINLEVEL_OUTOF10: 0 - NO PAIN
PAINLEVEL_OUTOF10: 8
PAINLEVEL_OUTOF10: 6

## 2025-05-27 ASSESSMENT — PAIN - FUNCTIONAL ASSESSMENT: PAIN_FUNCTIONAL_ASSESSMENT: 0-10

## 2025-05-27 NOTE — PATIENT INSTRUCTIONS
Please schedule your MRI in 10 weeks. We can schedule a follow up visit with Dr. Aguilar after that is scheduled.    Schedule a visit with Deborah Snowden PA-C in 4-5 weeks.    Continue your chemo at 300mg weekly.    Will place referral for PCP and pulmonology.    Please get your labwork drawn every 2 weeks starting today 5/27.  Standing orders will be placed.

## 2025-05-27 NOTE — TELEPHONE ENCOUNTER
Per last visit with Kala Fernández CNP on 4/29/25 patient to continue Olanzapine 10mg at bedtime. Pharmacy requests 90 day script and in need of Dx code. Script pended to provider with this information. Patient has FUV on 5/27/25.

## 2025-05-27 NOTE — PROGRESS NOTES
[facility]  Neuro-Oncology    Patient: Sejal Duff  MRN: [id number]  Date of visit: 05/27/25  Visit type: In-person clinic visit  Clinician: Migue Aguilar MD    Oncological & Treatment History:  Oncology History   Pilocytic astrocytoma (Multi)   10/2024 Initial Diagnosis    25 yo F with PMH of anemia, asthma, GERD, IBS, PCOS, and breast fibroadenoma s/p L breast excisional biopsy presented to [facility] ED on 10/14/24 with headache. CTH parasellar mass with calcifications. MRI with 2.4 cm peripherally enhancing necrotic mass with non-enhancing FLAIR hyperintense lesion in medial L temporal lobe. Admitted for neurosurgical management.     10/15/2024 Biopsy    R stereotactic brain biopsy  Surgeon: Den Segal MD    Prelim path: Glial cells with luigi fibers vs craniopharyngioma     10/23/2024 Tumor Board    Procedure: R stereotactic brain biopsy 10/15/2024   Pathology: Final pending; likely to be a glioma, areas of necrosis noted.       The CNS Tumor Board tumor board considered available treatment options and made the following recommendations: Radiation oncology referral. Add back to CNS TB once pathology is completed.     11/5/2024 Pathology     A.  B.  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  See note     Note:  Microscopic examination for parts A and B reveal a low-grade glial neoplasm immunoreactive with OLIG-2 and GFAP.  Neurofilament immunohistochemical stain highlights neuropil and scattered neuronal bodies.  ATRX and [id number] immunohistochemical stains are retained.  IDH1 and BRAF immunohistochemical stains are negative.  GLIOSEQ studies reveal a BRAF-V600E mutation.  This finding in conjunction with the morphology are most consistent with a pilocytic astrocytoma.  However, a ganglioglioma among other low-grade glial/glial neuronal neoplasms, cannot be completely ruled out.  Clinical correlation is recommended      11/6/2024 Tumor Board    CSF analysis negative.   Pathology:  Low grade glial neoplasm - BRAF - V600E mutation      The CNS Tumor Board tumor board considered available treatment options and made the following recommendations: Repeat MRI brain w/wo and perfusion study. Sellar imaging. Continue following with neuro-oncology. Radiation oncology referral.      11/29/2024 - 12/6/2024 Hospital Admission    Discharge diagnosis: Hydrocephalus    Returned to the [facility] ED 11/28/24 with worsening HA, light sensitivity, CTH incr vents, MRI incr ventricular mass 2.7x2.5cm, ventriculomegaly, trace R ventricular GRE. Patient admitted to neurosurgery service for further management.    11/28 Ophthalmology evaluation without papilledema.  12/1 s/p R VPS (certas at 5) -- Dr. Yap at [facility]  12/2 CTH with catheter in position.      12/29/2024 - 12/31/2024 Hospital Admission    Discharge diagnosis: Hydrocephalus    Returned to the [facility] ED on 12/28/24 with generalized fatigue, HA and body aches. CT head with increased ventricles, SS Certas at 4, LLQ cath with turn vs possible kink, CT AP catheter without kink or discontinuity, no pseudocyst. Patient admitted to the neurosurgery service for further management/workup.    12/28 s/p shunt tap with no proximal flow.   12/29 s/p proximal shunt revision, distal cath flushing. CT head post op with decreased ventricles, catheter in position.         HPI:  Sejal Duff is a 24 y.o. 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      Interval History (NEWEST at BOTTOM of section):  10/29/24: Today she reports some slight headaches but not worsening.  She is currently on dexamethasone 2 mg twice daily.  She takes the morning dose around 9 or 10 AM and the evening dose  before bedtime.  She takes ondansetron about 1 hour prior to the dexamethasone.  She endorses significant issues with vomiting after taking the dexamethasone as well as decreased appetite.  She is able to drink enough fluids.  She states that she tried taking 8 mg of ondansetron prior to the dexamethasone but this did not help either.  She states that the dexamethasone makes her very jittery.  She has not taken dexamethasone for the last 2 days.  She continues to have constipation which is her baseline where she does not have a bowel movement for weeks.  She has been referred by her gastroenterologist to integrative medicine for further help with bowel movements.  Otherwise denies any worsening of vision or new gait or balance issues.  11/8/24: Continues to have some headaches but not worse. She has decreased dexamethasone to 1mg bid on her own and she denies any worsening of her headaches. Denies new balance or strength issues.   11/19/2024: Her headaches are about the same intensity.  Denies new nausea/vomiting episodes.  Denies new gait or balance changes.  Denies new vision changes.   11/26/2024: Sejal states her headaches are worse.  She has not been able to sleep much.  Denies new vision problems.  Denies new weakness or gait balance/issues.  Denies new episodes of nausea or vomiting.  She is upset at the MRI spine being canceled recently.    01/16/25: Since the last visit, Sejal underwent  shunt placement for hydrocephalus (12/1/24) and a subsequent VPS revision (12/29/24). She also started tovorafenib on 12/21/24 and has been taking it once weekly as directed on Saturdays. Endorses significant fatigue and body aches for 2-3 days after taking tovorafenib. Endorses a new acne-like rash to her lower face that started with facial tenderness after her 2nd or 3rd dose of tovorafenib; rash is spreading to her nasolabial folds. States she washes her face with warm water and applies Aquaphor to the affected areas.  "She also reports intermittent episodes over the past 3 weeks where she feels overheated, lightheaded, and needs to utilize her inhaler; states it is similar to asthma flares; denies anxiety precipitating episodes. Also reports persistent nausea since her last surgery. States even water makes her feel nauseated. Feels like she's going to vomit, but it \"sits in [her] chest\" and burns. States she takes Zofran \"like it's candy.\" Denies drinking caffeine/carbonated drinks, fried/spicy foods, and lying down within 30 min of eating. States she hasn't picked up Olanzapine and doesn't take omeprazole as she is wary of being on many medications.    02/04/25: Saw patient in clinic for an impromptu visit regarding her worsening acneiform rash after patient saw [facility] today. No improvement in symptoms since using topical hydrocortisone 2.5% and clindamycin 1%. Now has some similar lesions on her chest. Pictures entered into chart. Additionally, patient reports a nosebleed with clots yesterday that resolved after about 15 minutes. She also has questions about medications -- states she was too fatigued the day after olanzapine and wants to clarify which meds she should/should not be taking. Her mother is present as well -- mother notes family history of cancer, which contributes to her concerns about patient's diagnosis/treatment.    2/18/2025: She denies any visual changes or headaches.  Does endorse some fatigue.  Feels her acneform rash is worsening and has spread more through her torso and back region.  It is itching which prompts her to scratch and then it bleeds.  Also notices some pustular appearing lesions by her wrist and on her feet.  Has been using the clindamycin and hydrocortisone topical lotions.  She has not taken the oral doxycycline as prescribed due to fear of nausea which will keep her from eating.  She feels that her appetite is already bad from her baseline nausea issues.  Also endorses some shortness " "of breath when doing normal walking like from the parking garage to the clinic.  Denies any dyspnea at rest.  Denies chest pain or lower extremity edema.  She has asthma at baseline but says that normally she uses a nebulizer or inhaler only when she is sick with a respiratory infection which exacerbates her asthma.  But she has been needing to use her inhaler and nebulizer more often now.  States her menstrual periods have not returned to baseline since the most recent hospitalization.  Normally she has a quite regularly once monthly.  Her last menstrual period was in November 2024.    04/01/25: Sejal presents for follow-up accompanied by her mother. Her tovorafenib has been held since 3/29/25 due to hemoglobin <10. She reports fatigue, decreased energy, and hypersomnia. Endorses lightheadedness if she's 'too active' or after a hot shower. Reports spontaneous L epistaxis for 10-15 minutes every 1-2 days 'when too active.' Denies hematemesis, hematochezia, melena, and hematuria. Still no menstrual periods since 11/2024. Notes continued intermittent abdominal cramping near the surgical incisions; states the pain is \"like a shocking wave\" when it occurs. Still no appetite. Reports color change to her eyelashes, eyebrows, and hair. Reports improvement with some of her diffuse rash but with resulting hypopigmentation; still has itching - clobetasol and hydroxyzine help. Still has cracked skin to the flexural palmar surfaces of her bilateral hands with a couple open fissures. States she initially had improvement with minocycline, but then became unable to tolerate additional doses as she vomited 10-15 minutes after each dose; she did not start prednisone; she inquires about IV antibiotic options.    04/29/25: Sejal presents unaccompanied to clinic for follow-up. Tovorafenib has been held since 3/29/25 to allow time for her skin to heal and for us to monitor her hemoglobin (hovering around 7.5-8.7). She was permitted to " restart as of 4/17/25 at a reduced dose of 400 mg; she ended up restarting on 4/26/25. Skin is much improved and hemoglobin is stable. Main concern today is severe abdominal pain that is limiting her ability to move and is affecting her QoL. She hasn't been eating due to the pain and has lost <10 pounds over ~5 days, and is now able to palpate her VPS tubing, which is quite bothersome to her. She was seen in the ED twice for abdominal pain with 4/24/25 CTAP concerning for possible enteritis and a small to moderate amount of free fluid in pelvis that may be related to fluid from the VPS. NSGY consulted and adjusted her shunt in ED due to concern for over-draining; low concern for shunt infection. She hasn't seen GI since last fall and states she's tried to schedule but they told her she should see them after brain tumor is addressed (?).    5/27/2025 (clinic visit): Presents for follow-up visit (mother on the phone).  Denies any new headaches or visual changes.  Reports her skin breakouts are improved and her torso and extremities but feels that the lesions on her face is slightly worse and starting tovorafenib.  Continues to have abdominal discomfort and has not had a bowel movement in approximately 10 days.  They are attempting to direct admit her for a bowel cleanse.  Waiting for an open bed.  This feel like it is more difficult to take a deep breath and she has been using her rescue inhaler at least 3 times a week.  She has not seen her PCP in about a year.  Denies any coughing.      Review of Systems:  Review of Systems   Constitutional:  Positive for appetite change and fatigue. Negative for chills and fever.   HENT:   Negative for nosebleeds.    Respiratory:  Positive for chest tightness (feels tight/hard to take deep breaths due to abd pain and some pain assoc with midline VPS tubing). Negative for cough, shortness of breath and wheezing.    Gastrointestinal:  Positive for abdominal pain, constipation, nausea  and vomiting. Negative for blood in stool and diarrhea.   Genitourinary:  Negative for hematuria and vaginal bleeding. Menstrual problem: no period since 11/2024.   Skin:  Negative for itching and rash.   Neurological:  Negative for extremity weakness, seizures and speech difficulty. Headaches: unchanged occasional bilat temporal HA managed with oxycodone. Numbness: regaining some feeling to scalp/surgical area; sometimes numbness in hands and feet.      Social History:  Social History     Tobacco Use    Smoking status: Never     Passive exposure: Never    Smokeless tobacco: Never   Vaping Use    Vaping status: Never Used   Substance Use Topics    Alcohol use: Yes     Comment: social    Drug use: Yes     Types: Marijuana   Occupation: [employer], [employer]    Past Medical History:  Past Medical History:   Diagnosis Date    Encounter for screening for infections with a predominantly sexual mode of transmission 03/02/2020    Screening for STD (sexually transmitted disease)    Melena 04/12/2019    Blood in stool    Other specified health status     No pertinent past medical history    Other specified health status     No pertinent past surgical history    Personal history of other (healed) physical injury and trauma     History of sprain of ankle    Unspecified condition associated with female genital organs and menstrual cycle 03/02/2020    Cervical motion tenderness       Past Surgical History:  Past Surgical History:   Procedure Laterality Date    BREAST SURGERY Left     Mass removal    OTHER SURGICAL HISTORY  01/18/2019    No history of surgery       Family History:  Family History   Problem Relation Name Age of Onset    Asthma Mother      Colon cancer Mother         Performance Score:  Karnofsky Score: 80 - Normal activity with effort; some signs or symptoms of disease    Vital Signs:  Vitals reviewed.     Physical Exam:  Physical Exam  Vitals reviewed.   Constitutional:       Comments: In slight discomfort.    HENT:       Head: Normocephalic and atraumatic.      Comments: Loss of pigment to eyelashes, eyebrows, and hair  Eyes:      Extraocular Movements: Extraocular movements intact.      Conjunctiva/sclera: Conjunctivae normal.   Pulmonary:      Effort: Pulmonary effort is normal. No respiratory distress.   Abdominal:      General: Abdomen is flat.      Tenderness: There is abdominal tenderness.      Comments: Guarded movement; VPS tubing palpable from over inferior sternum through epigastrium with mild TTP   Musculoskeletal:         General: Normal range of motion.      Cervical back: Normal range of motion and neck supple.   Skin:     General: Skin is dry.      Findings: No erythema or rash.      Comments: Skin is much improved from prior exam -- resolution of scaling and fissures on bilateral palmar surfaces; still has scattered hypopigmented macules to entire body from previous diffuse rash; scattered hyperpigmented healing macules to face   Neurological:      General: No focal deficit present.      Mental Status: She is alert and oriented to person, place, and time.      Cranial Nerves: No cranial nerve deficit.      Gait: Gait normal.   Psychiatric:         Mood and Affect: Mood normal.         Behavior: Behavior normal.         Results:  MRI Brain 5/23/25 (radiology read)  1. The FLAIR hyperintense lesion centered in the hypothalamic region  is slightly smaller on current study. Also, the previously  demonstrated peripheral enhancement is not demonstrated on the  current study though there appears to be different techniques for the  postcontrast images between the prior and current studies which may  account for some or all of this finding.  2. The ventricles are slightly increased in size from the prior  examinations with right posterior approach shunt catheter in place.    MRI 5/23/25      MRI Brain 2/17/25      MRI Brain 11/28/25      Assessment & Plan:  Sejal Duff is a 24 y.o. 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 She also developed hydrocephalus and is s/p VPS (12/1/24) with revision (12/29/24). She started on tovorafenib (type II JUAN CARLOS inhibitor) on 12/21/24 with course c/b skin toxicities.    Previously discussed diagnosis with patient and her mother - explained that her tumor is low-grade, meaning it is classified as slow-growing and benign/non-cancerous, but it is causing issues/symptoms that make it not harmless. Explained that such tumors could possibly transform into a higher grade, meaning become malignant/cancerous, or could still remain \"benign\" but grow large enough to cause significant, even life-threatening problems. Discussed that our goal with tovorafenib is to shrink the tumor as much as possible and/or reach stability to the point that we can monitor with imaging or we can have neurosurgery evaluate again whether surgery is feasible/safe, but there are no guarantees.    Today, patient is s/p 5 full cycles; the 4th dose of the 4th cycle was held from 3/29/25 until 4/26/25 due to her skin toxicities and low hemoglobin (baseline around 9-11, but now around 7.5-8.9). Her skin is much improved and hgb is stable. She is okay to continue the chemo if labs with WNL.     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  Treatment: Tovorafenib 400 mg by mouth once weekly (Saturdays)  Planned length of treatment is 6-12 cycles (28-day cycles), but this may be extended or shortened if indicated  Held from 3/29/25 until 4/26/25 due to skin toxicities and persistently decreased hemoglobin -- restarted at a reduced dose of 400 mg  With smaller tumor size will decrease " tovorafenib to 300mg PO weekly (to start 5/31/25)  Check CBC/CMP today (missed last couple of labs) and start chemo if WNL; check labs every 2 weeks  MRI brain w/wo contrast every 2-3 months to assess treatment response  On personal review of MRI brain from 5/23/2025 and compared to one from February 2025 and enhancing portion of lesion has decreased in size.  Much more noticeable decrease in size compared to MRI from November 2024.  Plan for MRI brain in approximately 10 weeks  Needs 1 mg Ativan Rx to take 30 minutes prior to MRI  2/2025 MRI spine did not show any evidence of drop metastasis or disseminated disease      #Acneiform drug eruption, resolving  #Flexural atopic dermatitis, resolved  Per Derm/Dr. Patel: Continue minocycline for at least 2 months after restarting chemo and continue clobetasol to the hands and feet 3x/week as prevention  Note: Taking minocycline at night is not recommended due to increased risk of acid relux.  Note: IV antibiotics are not an option in this case due to needing a longer treatment course for inflammation (versus a shorter course if she had an infection).  Continue triamcinolone 0.1% ointment, clobetasol 0.05% ointment per onco-dermatology  Hydrocortisone 2.5% ointment also prescribed.  Patient states that she does not use this regularly  Continue to follow with [facility] as scheduled (she missed her last appointment).  Asked her to reschedule and follow closely with them.    #Abdominal pain  Pain control regimen per palliative medicine team  Planning on direct admit for bowel clean out (awaiting bed)  Continue follow-up with GI team as this has been an ongoing issue which is affecting her quality of life     #Asthma  -feels it's still had to take deep breaths; unclear if this is from bowel issues or asthma exacerbation   -has been using her rescue inhaler more often (at least 3x per week)   -has not seen her PCP in about 1 year; asked her to make an appt and  follow-up    #Social/financial stressors  -has been having a hard time working with current treatment and symptoms   -having financial issues; would like a letter to see if she could qualify for some sort of disability assistance   -will  have social work team see what options are     #Follow-up  -RTC in 4 weeks for PA visit and 10 weeks for MRI/MD visit   - Call or return sooner should new or worsening neurological deficits occur    Orders Placed This Encounter   Procedures    MR brain w and wo IV contrast    Comprehensive Metabolic Panel    CBC and Auto Differential    Comprehensive Metabolic Panel    CBC and Auto Differential    Comprehensive Metabolic Panel    CBC and Auto Differential       Mgiue Aguilar MD  Neuro-oncology

## 2025-05-27 NOTE — PROGRESS NOTES
SUPPORTIVE AND PALLIATIVE ONCOLOGY OUTPATIENT FOLLOW-UP      Medical Oncologist: Migue Aguilar MD    Radiation Oncologist: No care team member to display  Primary Physician: Melba Graham  [phone]    Subjective   HISTORY OF PRESENT ILLNESS: Sejal Duff is a 24 y.o. 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      Patient returned to the [facility] ED 11/28 with worsening HA, light sensitivity, CTH incr vents, MRI incr ventricular mass 2.7x2.5cm, ventriculomegaly, trace R ventricular GRE. Patient admitted to neurosurgery service for further management.      11/28/24 Ophthalmology evaluation without papilledema.  12/1/24 s/p R VPS (certas at 5).   12/2/24 CTH with catheter in position, decreased vents   12/4/24 CTH decreased vents but persistent ventriculomegly; Certas dialed to 4  12/5/24 CTH decreased vents.  Continued headaches Neurology consulted--> likely due to underlying structural mass and recent surgical interventions.  Started on scheduled oral headache cocktail meds of Robaxin, Tylenol, reglan and Benadryl  12/6/24 Ophthalmology consulted for c/o vision changes--> normal exam, no disc edema  Neurology--> HA's migrainous in nature, has had prior and now worse in setting of underlying structural mass and recent VPS surgery.  Started on preventative therapy with amitriptyline 12.5 mg bedtime for 1 week and increase  "to 25mg if tolerates and abortive therapy with Rizatriptan PRN  12/21/24: started on tovorafenib (type II JUAN CARLOS inhibitor)    12/29/24 had a subsequent VPS revision     2/18/24: Previously followed with Amy Calloway CNP. Has c/o full body acneiform rash . Started in December on face but spread to back/chest/hands/legs within the last couple weeks. She did not start doxy per E consult. Has derm appt but not until June.     4/1/25: Fatigue persists and she is sleeping a significant amount of the day. Never started ritalin. Also having epistaxis and worsening anemia. She endorses chronic abd pain and headaches but no other overt signs of bleeding. Appetite is poor. She also endorses nausea with meds and insomnia. Denies depression. Following with derm for her skin rash.     4/29/25: Chemo was on hold due to low hemoglobin but started back on Saturday. ER at  4/24 for abdominal pain and w/o unremarkable and she discharged early morning 4/25 and returned to ER at [facility] that same day. She states initially there was concern for infection at the insertion site of her shunt into her abdomen was to get abx but never Rx'ed. Neurosurgery at [facility]  recommended transfer to [facility] due to concern that patient may be over draining from the shunt and needs to be dilated to a higher setting. Shunt was adjusted. Low concern for shunt infection. ACS was consulted and recommended gyn consult but she was discharged from ER. She also told she constipation is contributing to her pain.   Derm issues are resolving.     5/27/25: Symptoms stable. Following with GI again re chronic constipation. There is plan for her to be admitted for bowel clean out.     Pain Assessment:    Location: R abdomen at shunt location   Severity: Moderate   Quality: \"Like insides are twisting.\" Spasms at times.   Duration: Weeks   Timing: Constant   Referral Pattern: Localized   Associated S/S: none    Additional Symptom Assessment:    Numbness or tingling in " hands/feet/other: none  Headache: a little - slight   Lack of appetite: somewhat   Weight loss: somewhat  Nausea/early satiety: a little  Vomiting: none  Constipation: very much - Takes linzess and montegrity on Friday only - moves BM once per week. She states she must taken them 2-3 days apart otherwise they dont work at all if she uses them daily.   Diarrhea: none  Lack of energy: a little  Difficulty sleeping: a little  Anxiety: a little  Depression: a little  Shortness of breath: none  Other: none      Information obtained from: chart review and interview of patient  ______________________________________________________________________     Patient's Oncology History documentation       Oncology History   Pilocytic astrocytoma (Multi)   10/15/2024 Surgery     Biopsy of hypothalamic lesion by Dr. Segal at [facility]       10/15/2024 Pathology      A.  B.  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  See note     Note:  Microscopic examination for parts A and B reveal a low-grade glial neoplasm immunoreactive with OLIG-2 and GFAP.  Neurofilament immunohistochemical stain highlights neuropil and scattered neuronal bodies.  ATRX and [id number] immunohistochemical stains are retained.  IDH1 and BRAF immunohistochemical stains are negative.  GLIOSEQ studies reveal a BRAF-V600E mutation.  This finding in conjunction with the morphology are most consistent with a pilocytic astrocytoma.  However, a ganglioglioma among other low-grade glial/glial neuronal neoplasms, cannot be completely ruled out.  Clinical correlation is recommended               Medical History        Past Medical History:   Diagnosis Date    Encounter for screening for infections with a predominantly sexual mode of transmission 03/02/2020     Screening for STD (sexually transmitted disease)    Melena 04/12/2019     Blood in stool    Other specified health status       No pertinent past  "medical history    Other specified health status       No pertinent past surgical history    Personal history of other (healed) physical injury and trauma       History of sprain of ankle    Unspecified condition associated with female genital organs and menstrual cycle 03/02/2020     Cervical motion tenderness         Surgical History         Past Surgical History:   Procedure Laterality Date    BREAST SURGERY Left       Mass removal    OTHER SURGICAL HISTORY   01/18/2019     No history of surgery         Family History          Family History   Problem Relation Name Age of Onset    Asthma Mother        Colon cancer Mother                SOCIAL HISTORY  Social History:  reports that she has never smoked. She has never been exposed to tobacco smoke. She has never used smokeless tobacco. She reports current alcohol use. She reports current drug use. Drug: Marijuana.  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 No children. Single. Primary support is her mom.       Objective     Creatinine   Date Value Ref Range Status   04/25/2025 0.47 (L) 0.50 - 1.05 mg/dL Final     AST   Date Value Ref Range Status   04/25/2025 16 9 - 39 U/L Final     ALT   Date Value Ref Range Status   04/25/2025 7 7 - 45 U/L Final     Comment:     Patients treated with Sulfasalazine may generate falsely decreased results for ALT.     Hemoglobin   Date Value Ref Range Status   04/29/2025 8.8 (L) 12.0 - 16.0 g/dL Final     Platelets   Date Value Ref Range Status   04/29/2025 648 (H) 150 - 450 x10*3/uL Final       PHYSICAL EXAMINATION       4/26/2025     9:39 AM 4/26/2025    11:15 AM 4/29/2025     8:30 AM 5/5/2025    12:40 PM 5/8/2025    12:04 PM 5/23/2025     9:14 AM 5/27/2025     8:39 AM   Vitals   Systolic 115 115 115 107 117  104   Diastolic 59 73 71 65 73  67   BP Location Right arm Right arm Right arm Right arm      Heart Rate 89 110 104 79 83  85   Temp   36.1 °C (97 °F) 36.4 °C (97.5 °F)   35.6 °C (96.1 °F)   Resp 18 18 16 17   18   Height    1.6 m (5' 3\") 1.606 m " "(5' 3.23\") 1.6 m (5' 3\")    Weight (lb)   124.2 124.12 132.5 124 133   BMI   22 kg/m2 21.99 kg/m2 23.3 kg/m2 21.97 kg/m2 23.56 kg/m2   BSA (m2)   1.58 m2 1.58 m2 1.64 m2 1.58 m2 1.64 m2   Visit Report   Report    Report    Report  Report  Report    Report    Report         Physical Exam  HENT:      Head: Normocephalic and atraumatic.   Eyes:      Extraocular Movements: Extraocular movements intact.      Conjunctiva/sclera: Conjunctivae normal.   Pulmonary:      Effort: Pulmonary effort is normal.   Abdominal:      General: Abdomen is flat. There is no distension.      Tenderness: There is abdominal tenderness in the right upper quadrant and right lower quadrant.   Musculoskeletal:         General: Normal range of motion.   Neurological:      General: No focal deficit present.      Mental Status: She is alert.   Psychiatric:         Mood and Affect: Mood normal.         Thought Content: Thought content normal.         ASSESSMENT/PLAN    Pain/Headaches  Pain is: cancer related pain (HA), generalized body aches from treatment, chronic abd pain   Type: neuropathic, somatic, visceral   - Continue Oxycodone 5mg [id number] PRN. Discussed need to wean narcotics in the future due to ongoing issues with constipation.   - Acetaminophen 1000mg [id number] PRN - MAX 3000mg/24 hours - patient reports this does not help  - Continue Ibuprofen 800mg [id number] PRN - takes one per day at most   - Amitriptyline 25mg at bed - no longer taking but consider resuming   - Maxalt 10mg once daily PRN - no longer taking  - bentyl 20mg BID prn - ineffective      Opioid Use  Medication Management:   - OARRS report reviewed with no aberrant behavior; consistent with  prescriptions/records and patient history  - MED 22.5.  Overdose Risk Score 400.   This has been discussed with patient.   - We will continue to closely monitor the patient for signs of prescription misuse including UDS, OARRS review and subjective reports at each visit.  - No concurrent " benzodiazepine use   - I am a provider who either is or has consulted and collaborated with a provider certified in Hospice and Palliative Medicine and have conducted a face-face visit and examination for this patient.  - Routine Urine Drug Screen: 12/11/2024 appropriately + for prescribed medications  - Controlled Substance Agreement: 12/11/2024  - Specifically discussed that controlled substance prescriptions will only be provided by our group as outlined in the completed agreement  - Prescribed naloxone: patient declined  - Red Flags: NA     Constipation  At risk for constipation related to opioids. Chronic concerns of constipation  - Continue Linzess 145mcg and Motegrity 2mg as prescribed by GI  - FU GI ASAP encouraged for admission as planned and for further ongoing mgmt.      Nausea/Decreased Appetite/Weight loss   - Continue Omeprazole 40mg daily - takes inconsistently   - Continue Ondansetron 4mg [id number] PRN  - Continue Olanzapine to 10mg at bed  - Encouraged good hydration  - Encouraged small more frequent meals  - Encouraged continued Ensure 1-2x daily    Anxiety/Sleeping Difficulty  - Amitriptyline 25mg - no longer taking  - Olanzapine per above     Fatigue  - Continue methylphenidate 5mg dally - mild improvement   - Provigil 100mg - not effective  - 2/2 depression? Patient declines trial of antidepressant      Advance Directives  Existence of Advance Directives:No - not interested  Decision maker: Surrogate decision maker is Darby Duff (mom)  Code Status: Full code    Supportive and Palliative Oncology Encounter:  Emotional support provided  Coordination of care  Will continue to follow and address symptoms as needed    Next Follow-Up Visit:  Return to clinic in 1-2 months;  align with oncology appt    Signature and billing  Medical complexity was moderate level due to due to complexity of problems, extensive data review, and high risk of management/treatment.  Time was spent on the following: Prep  Time, Time Directly with Patient/Family/Caregiver, Documentation Time. Total time spent: 30      Data  Diagnostic tests and information reviewed for today's visit:  Most recent labs and imaging results, Medications     Some elements copied from Palliative Care note on 4/29/2025, the elements have been updated and all reflect current decision making from today, 4/29/2025.    Plan of Care discussed with: Patient, Deborah Snowden PAC    SIGNATURE: JASVIR Ahumada-CNP    Contact information:  Supportive and Palliative Oncology  Monday-Friday 8 AM-5 PM  Phone:  [phone], press option #5, then option #1.   Or Epic Secure Chat

## 2025-05-27 NOTE — PROGRESS NOTES
Plan discussed with Dr. Aguilar:  Decrease tovorafenib to 300 mg/week (attempting control/shrinkage at lower dose given still having some cutaneous side effects)  Has 4 weeks left of tovorafenib, which will be sufficient for cycle 6  Labs today and every 2 weeks  MRI in 10 weeks (8/5/25)  Proceed with bowel cleanout / admission  Referral to PCP / Pulm for repeat PFTs

## 2025-05-27 NOTE — PROGRESS NOTES
Patient ID: Sejal Duff is a 24 y.o. female.  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   -12/01/24 developed hydrocephalus and is s/p  shunt  -12/21/24 D1 tovarefenib (type II JUAN CARLOS inhibitor)   -12/29/24 revision of  shunt    Current treatment plan: curative intent tovarefenib with goal of mass reduction with hope for surgical removal    Subjective    Sejal Duff is a 24 y.o. 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     Returns today in follow up regarding issues unique to young adults with neoplasm.    In the interim since our last visit she was seen by peds-GI provider who she is well-established with. They plan to admit for a GI clean out before making adjustments to medications d/t such significant constipation. Was to be admitted last week but patient deferred admit until after her neuro-onc appts, is on pre-admit list today. Continues to experience significant constipation - last BM 5/16 following dosing of linzess. Chronic abdominal fullness/decreased PO appetite and intermittent nausea. Last ate polish sausage at bbq yesterday.     Emotionally - she is unchanged. Feels well supported by family. Not looking forward to hospitalization - last was prolonged for several weeks and she does not want to be admitted over her birthday. Relays difficult experience with NG  tube placement and retention. She is uncertain about the plan for admit.     Not dating or in a relationship. Not currently sexually active. LMP 10/2024. She still has not experienced a period in the time she has been on tovorafenib. Not connected to gynecology.     Practically, still dealing with financial and food insecurity. Recently received information from [facility] - needs medical certification of diagnosis/treatment - still awaiting this letter from primary team.    Social History: Grew up in [place]. Has 20 1/2 siblings from her father, and 1 1/2 sibling from her mother. Currently lives alone in [place]. 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 She enjoys shopping. Never smoker, does not vape. Does use ETOH and marijuana. Email [email or web address]    Family History:  Colon cancer MGM, mGF  Breast cancer MGGF  Cancer unk primary MGGM, MU's, MA's    Review of Systems   Constitutional:  Positive for appetite change. Negative for fatigue.   Gastrointestinal:  Positive for abdominal distention, abdominal pain, constipation and nausea.   Genitourinary:  Positive for menstrual problem. Negative for vaginal bleeding.    Neurological:  Positive for headaches.   Psychiatric/Behavioral:  Positive for depression. Negative for decreased concentration and sleep disturbance. The patient is not nervous/anxious.      Objective   BSA: 1.64 meters squared  /67   Pulse 85   Temp 35.6 °C (96.1 °F)   Resp 18   Wt 60.3 kg (133 lb)   SpO2 100%   BMI 23.56 kg/m²      Family History   Problem Relation Name Age of Onset    Asthma Mother      Colon cancer Mother       Oncology History   Pilocytic astrocytoma (Multi)   10/2024 Initial Diagnosis    25 yo F with PMH of anemia, asthma, GERD, IBS, PCOS, and breast fibroadenoma s/p L breast excisional biopsy presented to [facility] ED on 10/14/24 with headache. CTH parasellar mass with calcifications. MRI with 2.4 cm  peripherally enhancing necrotic mass with non-enhancing FLAIR hyperintense lesion in medial L temporal lobe. Admitted for neurosurgical management.     10/15/2024 Biopsy    R stereotactic brain biopsy  Surgeon: Den Segal MD    Prelim path: Glial cells with luigi fibers vs craniopharyngioma     10/23/2024 Tumor Board    Procedure: R stereotactic brain biopsy 10/15/2024   Pathology: Final pending; likely to be a glioma, areas of necrosis noted.       The CNS Tumor Board tumor board considered available treatment options and made the following recommendations: Radiation oncology referral. Add back to CNS TB once pathology is completed.     11/5/2024 Pathology     A.  B.  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  See note     Note:  Microscopic examination for parts A and B reveal a low-grade glial neoplasm immunoreactive with OLIG-2 and GFAP.  Neurofilament immunohistochemical stain highlights neuropil and scattered neuronal bodies.  ATRX and [id number] immunohistochemical stains are retained.  IDH1 and BRAF immunohistochemical stains are negative.  GLIOSEQ studies reveal a BRAF-V600E mutation.  This finding in conjunction with the morphology are most consistent with a pilocytic astrocytoma.  However, a ganglioglioma among other low-grade glial/glial neuronal neoplasms, cannot be completely ruled out.  Clinical correlation is recommended     11/6/2024 Tumor Board    CSF analysis negative.   Pathology:  Low grade glial neoplasm - BRAF - V600E mutation      The CNS Tumor Board tumor board considered available treatment options and made the following recommendations: Repeat MRI brain w/wo and perfusion study. Sellar imaging. Continue following with neuro-oncology. Radiation oncology referral.      11/29/2024 - 12/6/2024 Hospital Admission    Discharge diagnosis: Hydrocephalus    Returned to the [facility] ED 11/28/24 with worsening HA, light sensitivity, CTH incr vents,  MRI incr ventricular mass 2.7x2.5cm, ventriculomegaly, trace R ventricular GRE. Patient admitted to neurosurgery service for further management.    11/28 Ophthalmology evaluation without papilledema.  12/1 s/p R VPS (certas at 5) -- Dr. Yap at [facility]  12/2 CTH with catheter in position.      12/29/2024 - 12/31/2024 Hospital Admission    Discharge diagnosis: Hydrocephalus    Returned to the [facility] ED on 12/28/24 with generalized fatigue, HA and body aches. CT head with increased ventricles, SS Certas at 4, LLQ cath with turn vs possible kink, CT AP catheter without kink or discontinuity, no pseudocyst. Patient admitted to the neurosurgery service for further management/workup.    12/28 s/p shunt tap with no proximal flow.   12/29 s/p proximal shunt revision, distal cath flushing. CT head post op with decreased ventricles, catheter in position.         Sejal Duff  reports that she has never smoked. She has never been exposed to tobacco smoke. She has never used smokeless tobacco.  She  reports current alcohol use.  She  reports current drug use. Drug: Marijuana.    Physical Exam  Constitutional:       General: She is not in acute distress.     Appearance: Normal appearance. She is not ill-appearing.   Neurological:      Mental Status: She is alert.   Psychiatric:         Mood and Affect: Mood normal.         Behavior: Behavior normal.         Thought Content: Thought content normal.         Judgment: Judgment normal.       Performance Status:  Asymptomatic    Assessment/Plan    Sejal Duff is a 24 y.o. F with a recent diagnosis of astrocytoma with future plans for oral systemic therapy and possible radiation therapy.     #Chronic constipation/IBS: chronic, lifelong constipation with minimal improvement despite multiple interventions, worse acute on chronic pain  - Re-established with peds gastroenterology, planning to admit for NG clean out, will follow up re admit  - Has previously had NG clean outs at [facility],  hydrocolonic therapy, rectal biofeedback, senna, bisacodyl, miralax, golytely, enemas, suppositories, probiotics, and acupuncture  - Consider serotonin agent in future to help  - Currently on linzess and motegrity without significant improvement    #Psychosocial: young adult with benign neoplasm, being treated and followed by neuro-oncology  - Validated emotions and experience - fears about admission and prolonged inpatient time  - Previously discussed resources available including psychiatry, psychology, social work, spiritual care, and expressive therapies  - Discussed processing emotions/experience with writing/narrative therapy  - Connected to young adult oncology program, will receive monthly social programming invitations  - Following with Paige MOSQUEDA - discussed with primary team patient need for letter for SSD application  - Has SNAP benefits - identified food insecurity, referral to [place] is in place, patient needs to call     #Oligomenorrhea: unclear etiology r/t underlying CNS/glioma, vs oral tovarefenib, vs PCOS (with features of oligomenorrhea, acne, +10 <10mm follicles on bilateral ovaries per US, and elevated AMH)  - Discussed concern for PCOS at today's visit  - May need to consider progesterone induced withdrawal bleeds  - Plan for referral to Dr. Griffiths for comprehensive PCOS management in future    #Sexual dysfunction/contraception:   - Previously discussed the seriousness of getting pregnant while receiving oral chemotherapy due to the harmful effects this could have on the  fetus, and on her cancer treatment given the decreased availability of medical  services.   - She is not interested in use of hormonal or non hormonal contraceptives - including IUDs out of concern for their role in developing brain cancer  - Previously discussed that she must prevent pregnancy with use of barrier method every time she engages in penetrative intercourse  - Previously discussed  that small amounts of chemotherapy may be found in her body secretions including vaginal secretions and she should use a barrier form of contraception such as a male or female condom to protect her partner from chemotherapy exposure.    #Risk for infertility:  - Has previously been seen by colleagues in [place]  - Discussed natural age related decline in fertility and menopause that occurs as a result of ovarian aging, and that cancer treatments can accellerate that progression and diminish ovarian reserve.  - Individuals may experience varying degrees of short or long term ovarian dysfunction and amenorrhea, and that this is dependent upon type of cancer treatment, cumulative dose, and patients age.   - Loss of ovarian function may be permanent or temporary.  - Risk to fertility is UNKNOWN based on cancer treatment of tovarefenib - there is little research about the effects of these agents on ovarian reserve  - Declined fertility preservation prior to initiation of chemotherapy  - Reviewed baseline hormone function testing AMH 5.2 on 11/14/24, E2, FSH/LH WNL  - Experiencing amenorrhea since 10/2024  - We discussed trending her ovarian function q 6 months while she is on therapy, last drawn 10/2024, will repeat in April 2025  - Discussed a plan to monitor hormone function for s/s of hypogonadism    #Diet/Exercise/Healthy Lifestyle:  - Previously discussed research demonstrating consumption of a healthy, plant based diet not only decreases cancer risk, but also has been found to improve survival in cancer patients who partake in a healthy diet.   - Previously discussed research that demonstrates decreased cancer risk and improved survival in cancer patients who exercise and stay active throughout diagnosis and treatment.  - Encouraged patient to continue gentle exercise as tolerated  - Encouraged patient to continue to abstain from alcohol, tobacco, and recreational drugs    #Genetic Risk: young adult with cancer and  personal family history of cancer  - Discussed rationale for genetic risk consultation consider placing referral in future    The amount of time that I spent with the patient:  Prep: 5  Time spend directly with patient: 25  Coordinating care: 5  Documentation: 5  Other time:    Total time spent on encounter: 40    Follow up planned in one month                Pepper Davidson, APRN-CNP

## 2025-05-27 NOTE — CARE PLAN
The patient's goals for the shift include      The clinical goals for the shift include   Problem: Pain - Adult  Goal: Verbalizes/displays adequate comfort level or baseline comfort level  Outcome: Progressing     Problem: Safety - Adult  Goal: Free from fall injury  Outcome: Progressing     Problem: Discharge Planning  Goal: Discharge to home or other facility with appropriate resources  Outcome: Progressing     Problem: Chronic Conditions and Co-morbidities  Goal: Patient's chronic conditions and co-morbidity symptoms are monitored and maintained or improved  Outcome: Progressing     Problem: Nutrition  Goal: Nutrient intake appropriate for maintaining nutritional needs  Outcome: Progressing     Problem: Fall/Injury  Goal: Not fall by end of shift  Outcome: Progressing  Goal: Be free from injury by end of the shift  Outcome: Progressing  Goal: Verbalize understanding of personal risk factors for fall in the hospital  Outcome: Progressing  Goal: Verbalize understanding of risk factor reduction measures to prevent injury from fall in the home  Outcome: Progressing  Goal: Use assistive devices by end of the shift  Outcome: Progressing  Goal: Pace activities to prevent fatigue by end of the shift  Outcome: Progressing     Problem: Pain  Goal: Takes deep breaths with improved pain control throughout the shift  Outcome: Progressing  Goal: Turns in bed with improved pain control throughout the shift  Outcome: Progressing  Goal: Walks with improved pain control throughout the shift  Outcome: Progressing  Goal: Performs ADL's with improved pain control throughout shift  Outcome: Progressing  Goal: Participates in PT with improved pain control throughout the shift  Outcome: Progressing  Goal: Free from opioid side effects throughout the shift  Outcome: Progressing  Goal: Free from acute confusion related to pain meds throughout the shift  Outcome: Progressing     Problem: Respiratory  Goal: Clear secretions with  interventions this shift  Outcome: Progressing  Goal: Minimize anxiety/maximize coping throughout shift  Outcome: Progressing  Goal: Minimal/no exertional discomfort or dyspnea this shift  Outcome: Progressing  Goal: No signs of respiratory distress (eg. Use of accessory muscles. Peds grunting)  Outcome: Progressing  Goal: Patent airway maintained this shift  Outcome: Progressing  Goal: Tolerate mechanical ventilation evidenced by VS/agitation level this shift  Outcome: Progressing  Goal: Tolerate pulmonary toileting this shift  Outcome: Progressing  Goal: Verbalize decreased shortness of breath this shift  Outcome: Progressing  Goal: Wean oxygen to maintain O2 saturation per order/standard this shift  Outcome: Progressing  Goal: Increase self care and/or family involvement in next 24 hours  Outcome: Progressing

## 2025-05-27 NOTE — H&P
"History Of Present Illness  Sejal Duff is a 24 y.o. 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  She reports chronic constipation, managed previously with multiple admissions for deimpaction, likely related to dysmotility. Organic causes have been ruled out by her pediatric endocrinologist.    She was in her usual state of health until two weeks ago when she noticed an inability to pass stool despite passing gas. She denies vomiting except for occasional episodes (approximately once daily) over the last two weeks. She denies associated symptoms such as abdominal pain, fever, chills, headaches, shortness of breath, chest pain, leg pain, numbness, weakness, visual changes, or palpitations. She is taking oxycodone for cancer-related pain but reports that it has not worsened her constipation.        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   -12/01/24 developed hydrocephalus and is s/p  shunt  -12/21/24 D1 tovarefenib (type II JUAN CARLOS inhibitor)   -12/29/24 revision of  shunt       Visit Vitals  /73 (Patient Position: Lying)   Pulse 70   Temp 36.6 °C (97.9 °F)   Resp 18   Ht 1.6 m (5' 2.99\")   Wt 60.3 kg (133 lb)   SpO2 98%   BMI 23.57 kg/m²   OB Status Having periods   Smoking Status Never   BSA 1.64 m²     Physical Exam:  General: Awake, alert, conversant, appears " stated age  HEENT: Pupils equal and round, no scleral icterus or conjunctivitis  Skin: No suspect lesions or rashes noted on visible skin  Chest: Ctab, normal respiratory effort, not on supplemental oxygen  Cardiac: Regular rate and rhythm, normal s1, s2, no M/R/G, no JVD  Abdomen: Soft, distended, diffusely tender.   : No flank pain or indwelling urinary catheter  EXT: No peripheral edema, no asymmetry noted  MSK: No focal joint swelling noted  Neuro: AOx4, moving all limbs spontaneously, follows commands  Psych: Coherent thought process, appropriate mood and affect     Labs:   Still pending     Imaging:  CT abdomen pelvis w IV contrast  4/24  IMPRESSION:  1. Ventriculoperitoneal shunt catheter terminates within the left  pelvis. No evidence of kinking or discontinuity along the course of  the catheter.  2. Nonspecific fluid within mildly prominent but not overtly  distended small bowel loops in the mid abdomen and pelvis. This is a  nonspecific finding but could potentially reflect an enteritis or  possibly low-grade or developing bowel obstruction. No discrete  transition point identified.  3. Feculent debris in the distal ileum suggesting delayed transit.  4. Small-to-moderate free fluid within the pelvis as well as mild  mesenteric fat stranding in the pelvis. This may in part be related  to fluid from the  shunt with mesenteric congestion or inflammatory  process not excluded.    Home Medications       Previous Medications     ALBUTEROL 90 MCG/ACTUATION INHALER    Inhale 1-2 puffs every 4 hours if needed for wheezing or shortness of breath.     BENZOYL PEROXIDE (BENZAC AC) 10 % EXTERNAL WASH    APPLY TOPICALLY 2 TIMES A DAY. WASH TWICE DAILY AS DIRECTED     CHOLECALCIFEROL (VITAMIN D-3) 50,000 UNIT CAPSULE    Take 1 capsule (50,000 Units) by mouth 1 (one) time per week.     CLINDAMYCIN (CLEOCIN T) 1 % LOTION    Apply a pea-size amount to entire face twice daily after washing face with lukewarm water. Use  this BEFORE hydrocortisone cream.     CLOBETASOL (TEMOVATE) 0.05 % OINTMENT    Apply topically 2 times a day. To hands and feet 3x per week for maintenance     ERGOCALCIFEROL (VITAMIN D-2) 1.25 MG (68090 UT) CAPSULE    Take 1 capsule (1,250 mcg) by mouth 1 (one) time per week.     HYDROCORTISONE 2.5 % OINTMENT    Apply topically 2 times a day. To face 3x per week for maintenance     HYDROXYZINE PAMOATE (VISTARIL) 25 MG CAPSULE    Take 1 capsule (25 mg) by mouth as needed at bedtime for itching. If 1 capsule is not enough to control symptoms may increase to 2 capsules     IBUPROFEN 800 MG TABLET    Take 1 tablet (800 mg) by mouth every 8 hours if needed for mild pain (1 - 3) (pain).     LACTO NO.[id number]-[id number]-VAF-LARCH 25B CELL-25B CELL-50 MG CAPSULE    Take 1 capsule by mouth once daily.     LINACLOTIDE (LINZESS) 145 MCG CAPSULE    Take 1 capsule (145 mcg) by mouth once daily in the morning. Take before meals. Do not crush or chew.     LORATADINE (CLARITIN) 10 MG TABLET    Take 1 tablet (10 mg) by mouth once daily.     LORAZEPAM (ATIVAN) 1 MG TABLET    Take 1 tablet (1 mg) 30 minutes prior to MRI for claustrophobia/anxiety relief.     MULTIVITAMIN WITH IRON (DAILY MULTIPLE VITAMINS/IRON) TABLET    Take 1 tablet by mouth once daily.     OMEPRAZOLE (PRILOSEC) 40 MG DR CAPSULE    Take 1 capsule (40 mg) by mouth once daily.     PLECANATIDE (TRULANCE) TABLET TABLET    Take 1 tablet (3 mg) by mouth once daily.     PRUCALOPRIDE (MOTEGRITY) 2 MG TABLET    Take 1 tablet (2 mg) by mouth once daily.     SIMETHICONE (MYLICON) 125 MG CHEWABLE TABLET    Chew 1 tablet (125 mg) every 6 hours if needed for flatulence.     TOVORAFENIB 400 MG/WEEK (100 MG X 4) TABLET    Take 400 mg by mouth 1 (one) time per week. (Saturdays) (Four 100 mg tablets = 400 mg.) Swallow tablets whole with water. Do not chew, cut, or crush. Tablets may be taken with or without food. Do not fill before May 3, 2025.     TRIAMCINOLONE (KENALOG) 0.1 % OINTMENT     Apply topically 2 times a day. To trunk, arms, and legs 3x per week for maintenance                 Assessment/Plan:   Sejal Duff is a 24 y.o. 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 We will get KUB before and after the NG golytwly ( 200ml\hr) and will start LR 75 ml\hr.         #Chronic constipation/IBS  :: chronic, lifelong constipation with minimal improvement despite multiple interventions,    :: Has previously had NG clean outs at [facility], hydrocolonic therapy, rectal biofeedback, senna, bisacodyl, miralax, golytely, enemas, suppositories, probiotics, and acupuncture  :: Currently on linzess, motegrity , trulance, and lactulose with simethicone  - continue vitamin D and multi supplements   - KUB pre and post clean out  - NG tube golytely and IV fluid repletion   - Glycolax   - follow up with GI outpatient      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  -  shunt in place for hx of hydrocephalus   - Tovorafenib 400 mg by mouth once weekly (Saturdays) for 6-12 cycles (28-day cycles  - smaller tumor size so decreased tovorafenib to 300mg PO weekly (to start 5/31/25)  - MRI brain w/wo contrast every 2-3 months to assess treatment response        #Acneiform drug eruption, resolving  #Flexural atopic dermatitis, resolved  - Continue minocycline for at least 2 months after restarting chemo and continue clobetasol to the hands and feet 3x/week as prevention  - Continue triamcinolone 0.1% ointment, clobetasol 0.05% ointment per onco-dermatology  - Hydrocortisone 2.5% ointment also prescribed.  Patient states that she does not use this regularly        #Asthma    -  Albuterol PRN     #GERD  - continue omeprazole 40 mg     #Low energy   - Hold  ritalin 5 mg         F: Replete PRN  E: Replete PRN  N: NPO  Access/Lines: PIV      DVT Ppx: Lovenox   GI Ppx: Pantoprazole      Abx: None   O2: None      Pain regimen: Tylenol / Oxy     Code status: Full Code  Primary Emergency Contact: MIRTA HANSON  Home Phone: [phone]  Relation: Mother

## 2025-05-28 ENCOUNTER — APPOINTMENT (OUTPATIENT)
Dept: RADIOLOGY | Facility: HOSPITAL | Age: 25
End: 2025-05-28
Payer: COMMERCIAL

## 2025-05-28 ENCOUNTER — TUMOR BOARD CONFERENCE (OUTPATIENT)
Dept: HEMATOLOGY/ONCOLOGY | Facility: HOSPITAL | Age: 25
End: 2025-05-28
Payer: COMMERCIAL

## 2025-05-28 LAB
ALBUMIN SERPL BCP-MCNC: 3.4 G/DL (ref 3.4–5)
ANION GAP SERPL CALC-SCNC: 8 MMOL/L (ref 10–20)
BASOPHILS # BLD AUTO: 0.05 X10*3/UL (ref 0–0.1)
BASOPHILS NFR BLD AUTO: 0.9 %
BUN SERPL-MCNC: 9 MG/DL (ref 6–23)
CALCIUM SERPL-MCNC: 8.3 MG/DL (ref 8.6–10.6)
CHLORIDE SERPL-SCNC: 111 MMOL/L (ref 98–107)
CO2 SERPL-SCNC: 23 MMOL/L (ref 21–32)
CREAT SERPL-MCNC: 0.43 MG/DL (ref 0.5–1.05)
EGFRCR SERPLBLD CKD-EPI 2021: >90 ML/MIN/1.73M*2
EOSINOPHIL # BLD AUTO: 0.31 X10*3/UL (ref 0–0.7)
EOSINOPHIL NFR BLD AUTO: 5.5 %
ERYTHROCYTE [DISTWIDTH] IN BLOOD BY AUTOMATED COUNT: 17 % (ref 11.5–14.5)
FERRITIN SERPL-MCNC: 35 NG/ML (ref 8–150)
FOLATE SERPL-MCNC: 11.2 NG/ML
GLUCOSE SERPL-MCNC: 80 MG/DL (ref 74–99)
HCT VFR BLD AUTO: 24.9 % (ref 36–46)
HGB BLD-MCNC: 7.4 G/DL (ref 12–16)
IMM GRANULOCYTES # BLD AUTO: 0.01 X10*3/UL (ref 0–0.7)
IMM GRANULOCYTES NFR BLD AUTO: 0.2 % (ref 0–0.9)
IRON SATN MFR SERPL: 27 % (ref 25–45)
IRON SERPL-MCNC: 83 UG/DL (ref 35–150)
LYMPHOCYTES # BLD AUTO: 3.42 X10*3/UL (ref 1.2–4.8)
LYMPHOCYTES NFR BLD AUTO: 60.9 %
MAGNESIUM SERPL-MCNC: 1.81 MG/DL (ref 1.6–2.4)
MCH RBC QN AUTO: 24.4 PG (ref 26–34)
MCHC RBC AUTO-ENTMCNC: 29.7 G/DL (ref 32–36)
MCV RBC AUTO: 82 FL (ref 80–100)
MONOCYTES # BLD AUTO: 0.29 X10*3/UL (ref 0.1–1)
MONOCYTES NFR BLD AUTO: 5.2 %
NEUTROPHILS # BLD AUTO: 1.54 X10*3/UL (ref 1.2–7.7)
NEUTROPHILS NFR BLD AUTO: 27.3 %
NRBC BLD-RTO: 0 /100 WBCS (ref 0–0)
PHOSPHATE SERPL-MCNC: 2.8 MG/DL (ref 2.5–4.9)
PLATELET # BLD AUTO: 363 X10*3/UL (ref 150–450)
POTASSIUM SERPL-SCNC: 3.4 MMOL/L (ref 3.5–5.3)
RBC # BLD AUTO: 3.03 X10*6/UL (ref 4–5.2)
SODIUM SERPL-SCNC: 139 MMOL/L (ref 136–145)
TIBC SERPL-MCNC: 310 UG/DL (ref 240–445)
UIBC SERPL-MCNC: 227 UG/DL (ref 110–370)
VIT B12 SERPL-MCNC: 1121 PG/ML (ref 211–911)
WBC # BLD AUTO: 5.6 X10*3/UL (ref 4.4–11.3)

## 2025-05-28 PROCEDURE — 83540 ASSAY OF IRON: CPT

## 2025-05-28 PROCEDURE — 2500000001 HC RX 250 WO HCPCS SELF ADMINISTERED DRUGS (ALT 637 FOR MEDICARE OP): Mod: SE

## 2025-05-28 PROCEDURE — 74018 RADEX ABDOMEN 1 VIEW: CPT | Performed by: RADIOLOGY

## 2025-05-28 PROCEDURE — 2500000004 HC RX 250 GENERAL PHARMACY W/ HCPCS (ALT 636 FOR OP/ED): Mod: SE

## 2025-05-28 PROCEDURE — 82728 ASSAY OF FERRITIN: CPT

## 2025-05-28 PROCEDURE — 74018 RADEX ABDOMEN 1 VIEW: CPT

## 2025-05-28 PROCEDURE — 1100000001 HC PRIVATE ROOM DAILY

## 2025-05-28 PROCEDURE — 36415 COLL VENOUS BLD VENIPUNCTURE: CPT

## 2025-05-28 PROCEDURE — 2500000005 HC RX 250 GENERAL PHARMACY W/O HCPCS: Mod: SE

## 2025-05-28 PROCEDURE — 85025 COMPLETE CBC W/AUTO DIFF WBC: CPT

## 2025-05-28 PROCEDURE — 2500000002 HC RX 250 W HCPCS SELF ADMINISTERED DRUGS (ALT 637 FOR MEDICARE OP, ALT 636 FOR OP/ED): Mod: SE

## 2025-05-28 PROCEDURE — 82746 ASSAY OF FOLIC ACID SERUM: CPT

## 2025-05-28 PROCEDURE — 83735 ASSAY OF MAGNESIUM: CPT

## 2025-05-28 PROCEDURE — 84100 ASSAY OF PHOSPHORUS: CPT

## 2025-05-28 PROCEDURE — 82607 VITAMIN B-12: CPT

## 2025-05-28 RX ORDER — DIPHENHYDRAMINE HYDROCHLORIDE 50 MG/ML
25 INJECTION, SOLUTION INTRAMUSCULAR; INTRAVENOUS ONCE
Status: COMPLETED | OUTPATIENT
Start: 2025-05-28 | End: 2025-05-28

## 2025-05-28 RX ORDER — WATER
1000 LIQUID (ML) MISCELLANEOUS 3 TIMES DAILY PRN
Status: DISCONTINUED | OUTPATIENT
Start: 2025-05-28 | End: 2025-05-29 | Stop reason: HOSPADM

## 2025-05-28 RX ORDER — POTASSIUM CHLORIDE 20 MEQ/1
40 TABLET, EXTENDED RELEASE ORAL ONCE
Status: COMPLETED | OUTPATIENT
Start: 2025-05-28 | End: 2025-05-28

## 2025-05-28 RX ORDER — POLYETHYLENE GLYCOL 3350 17 G/17G
17 POWDER, FOR SOLUTION ORAL
Status: DISCONTINUED | OUTPATIENT
Start: 2025-05-28 | End: 2025-05-29 | Stop reason: HOSPADM

## 2025-05-28 RX ORDER — DEXTROMETHORPHAN POLISTIREX 30 MG/5 ML
1 SUSPENSION, EXTENDED RELEASE 12 HR ORAL ONCE
Status: COMPLETED | OUTPATIENT
Start: 2025-05-28 | End: 2025-05-28

## 2025-05-28 RX ORDER — SODIUM CHLORIDE, SODIUM LACTATE, POTASSIUM CHLORIDE, CALCIUM CHLORIDE 600; 310; 30; 20 MG/100ML; MG/100ML; MG/100ML; MG/100ML
75 INJECTION, SOLUTION INTRAVENOUS CONTINUOUS
Status: ACTIVE | OUTPATIENT
Start: 2025-05-28 | End: 2025-05-29

## 2025-05-28 RX ADMIN — ACETAMINOPHEN 975 MG: 325 TABLET, FILM COATED ORAL at 03:14

## 2025-05-28 RX ADMIN — MINERAL OIL 1 ENEMA: 100 ENEMA RECTAL at 10:54

## 2025-05-28 RX ADMIN — IBUPROFEN 800 MG: 400 TABLET ORAL at 17:51

## 2025-05-28 RX ADMIN — CETIRIZINE HYDROCHLORIDE 10 MG: 10 TABLET, FILM COATED ORAL at 09:58

## 2025-05-28 RX ADMIN — POLYETHYLENE GLYCOL 3350 17 G: 17 POWDER, FOR SOLUTION ORAL at 21:50

## 2025-05-28 RX ADMIN — POLYETHYLENE GLYCOL 3350 17 G: 17 POWDER, FOR SOLUTION ORAL at 12:20

## 2025-05-28 RX ADMIN — ONDANSETRON 4 MG: 2 INJECTION INTRAMUSCULAR; INTRAVENOUS at 10:18

## 2025-05-28 RX ADMIN — POLYETHYLENE GLYCOL 3350 17 G: 17 POWDER, FOR SOLUTION ORAL at 18:27

## 2025-05-28 RX ADMIN — Medication 3 MG: at 21:45

## 2025-05-28 RX ADMIN — POLYETHYLENE GLYCOL 3350 17 G: 17 POWDER, FOR SOLUTION ORAL at 19:18

## 2025-05-28 RX ADMIN — ACETAMINOPHEN 975 MG: 325 TABLET, FILM COATED ORAL at 15:41

## 2025-05-28 RX ADMIN — SIMETHICONE 120 MG: 80 TABLET, CHEWABLE ORAL at 09:57

## 2025-05-28 RX ADMIN — POLYETHYLENE GLYCOL 3350, SODIUM SULFATE ANHYDROUS, SODIUM BICARBONATE, SODIUM CHLORIDE, POTASSIUM CHLORIDE 4000 ML: 236; 22.74; 6.74; 5.86; 2.97 POWDER, FOR SOLUTION ORAL at 00:16

## 2025-05-28 RX ADMIN — HYDROXYZINE HYDROCHLORIDE 25 MG: 25 TABLET, FILM COATED ORAL at 17:51

## 2025-05-28 RX ADMIN — POTASSIUM CHLORIDE 40 MEQ: 1500 TABLET, EXTENDED RELEASE ORAL at 09:58

## 2025-05-28 RX ADMIN — SODIUM CHLORIDE, POTASSIUM CHLORIDE, SODIUM LACTATE AND CALCIUM CHLORIDE 75 ML/HR: 600; 310; 30; 20 INJECTION, SOLUTION INTRAVENOUS at 15:45

## 2025-05-28 RX ADMIN — POLYETHYLENE GLYCOL 3350 17 G: 17 POWDER, FOR SOLUTION ORAL at 14:09

## 2025-05-28 RX ADMIN — CLINDAMYCIN PHOSPHATE: 10 LOTION TOPICAL at 12:20

## 2025-05-28 RX ADMIN — POLYETHYLENE GLYCOL 3350 17 G: 17 POWDER, FOR SOLUTION ORAL at 20:45

## 2025-05-28 RX ADMIN — CLOBETASOL PROPIONATE: 0.5 OINTMENT TOPICAL at 12:18

## 2025-05-28 RX ADMIN — OXYCODONE 5 MG: 5 TABLET ORAL at 10:18

## 2025-05-28 RX ADMIN — POLYETHYLENE GLYCOL 3350 17 G: 17 POWDER, FOR SOLUTION ORAL at 15:44

## 2025-05-28 RX ADMIN — POLYETHYLENE GLYCOL 3350 17 G: 17 POWDER, FOR SOLUTION ORAL at 17:33

## 2025-05-28 RX ADMIN — OFLOXACIN 1.25 MG: 300 TABLET, COATED ORAL at 09:59

## 2025-05-28 RX ADMIN — ACETAMINOPHEN 975 MG: 325 TABLET, FILM COATED ORAL at 09:56

## 2025-05-28 RX ADMIN — OXYCODONE 5 MG: 5 TABLET ORAL at 17:51

## 2025-05-28 RX ADMIN — DIPHENHYDRAMINE HYDROCHLORIDE 25 MG: 50 INJECTION INTRAMUSCULAR; INTRAVENOUS at 13:25

## 2025-05-28 RX ADMIN — IBUPROFEN 800 MG: 400 TABLET ORAL at 09:57

## 2025-05-28 RX ADMIN — TRIAMCINOLONE ACETONIDE: 1 OINTMENT TOPICAL at 12:18

## 2025-05-28 RX ADMIN — SODIUM CHLORIDE, POTASSIUM CHLORIDE, SODIUM LACTATE AND CALCIUM CHLORIDE 75 ML/HR: 600; 310; 30; 20 INJECTION, SOLUTION INTRAVENOUS at 17:34

## 2025-05-28 RX ADMIN — OLANZAPINE 10 MG: 5 TABLET, FILM COATED ORAL at 21:45

## 2025-05-28 RX ADMIN — SIMETHICONE 120 MG: 80 TABLET, CHEWABLE ORAL at 22:16

## 2025-05-28 RX ADMIN — HYDROXYZINE HYDROCHLORIDE 25 MG: 25 TABLET, FILM COATED ORAL at 09:57

## 2025-05-28 ASSESSMENT — PAIN SCALES - GENERAL
PAINLEVEL_OUTOF10: 3
PAINLEVEL_OUTOF10: 9
PAINLEVEL_OUTOF10: 3
PAINLEVEL_OUTOF10: 5 - MODERATE PAIN
PAINLEVEL_OUTOF10: 7
PAINLEVEL_OUTOF10: 0 - NO PAIN

## 2025-05-28 ASSESSMENT — PAIN - FUNCTIONAL ASSESSMENT
PAIN_FUNCTIONAL_ASSESSMENT: 0-10

## 2025-05-28 ASSESSMENT — PAIN DESCRIPTION - LOCATION: LOCATION: HEAD

## 2025-05-28 ASSESSMENT — ACTIVITIES OF DAILY LIVING (ADL): LACK_OF_TRANSPORTATION: NO

## 2025-05-28 NOTE — PROGRESS NOTES
Daily Progress Note      Daily Progress Note    Sejal Duff is a 24 y.o. female admitted on 5/27/2025  5:55 PM for chronic constipation and bowel clean out.     Overnight:      No acute events reported. She did well overnight. An NG tube was placed but she promptly pulled it out and stated that she did not want another tube. She states that she will continue to drink her bowel prep and does not need a tube.     Subjective   Patient was seen at the bedside. She is doing well. She had over 6 BMs today but still states that she feels backed up. She has some diffuse tenderness in her abdomen. She states that the Golytely is not what caused her bowel movements. She stated they occurred because she took her Trulance and motegrity.    Today she continues to drink her bowel prep and the KUB this morning showed decreased stools in colon but still a prominent stool burden following the prep. She states that she is having clear stools.     Denied any chills, headache/dizziness, NV, chest pain/tightness, dyspnea, abdominal pain, DC, dysuria.     Objective   Vitals: I/O:   Vitals:    05/28/25 1203   BP: 102/69   Pulse: 94   Resp: 18   Temp: 36.4 °C (97.5 °F)   SpO2: 100%        24hr Min/Max:  Temp  Min: 36.2 °C (97.2 °F)  Max: 36.6 °C (97.9 °F)  Pulse  Min: 70  Max: 94  BP  Min: 102/69  Max: 112/73  Resp  Min: 17  Max: 18  SpO2  Min: 98 %  Max: 100 %   Intake/Output Summary (Last 24 hours) at 5/28/2025 1649  Last data filed at 5/28/2025 1203  Gross per 24 hour   Intake 680 ml   Output --   Net 680 ml        Net IO Since Admission: 680 mL [05/28/25 1649]      PE:  General: Awake, alert, conversant, appears stated age  HEENT: Pupils equal and round, no scleral icterus or conjunctivitis  Skin: No suspect lesions or rashes noted on visible skin  Chest: Ctab, normal respiratory effort, not on supplemental oxygen  Cardiac: Regular rate and rhythm, normal s1, s2, no M/R/G, no JVD  Abdomen: Soft, distended, diffusely tender.   : No  flank pain or indwelling urinary catheter  EXT: No peripheral edema, no asymmetry noted  MSK: No focal joint swelling noted  Neuro: AOx4, moving all limbs spontaneously, follows commands  Psych: Coherent thought process, appropriate mood and affect      Laboratory Studies:      CBC RFP   Lab Results   Component Value Date    WBC 5.6 05/28/2025    HGB 7.4 (L) 05/28/2025    HCT 24.9 (L) 05/28/2025    MCV 82 05/28/2025     05/28/2025    NEUTROABS 1.54 05/28/2025    Lab Results   Component Value Date     05/28/2025    K 3.4 (L) 05/28/2025     (H) 05/28/2025    CO2 23 05/28/2025    BUN 9 05/28/2025    CREATININE 0.43 (L) 05/28/2025    CREATININE 0.44 (L) 05/27/2025     Lab Results   Component Value Date    MG 1.81 05/28/2025    PHOS 2.8 05/28/2025    CALCIUM 8.3 (L) 05/28/2025         Hepatic Function ABG/VBG   Lab Results   Component Value Date    ALT 7 04/25/2025    AST 16 04/25/2025    ALKPHOS 59 04/25/2025     Lab Results   Component Value Date    BILIDIR 0.1 10/04/2020      Lab Results   Component Value Date    PROTIME 14.3 (H) 04/25/2025    APTT 32 04/25/2025    INR 1.3 (H) 04/25/2025    Lab Results   Component Value Date    LACTATE 0.8 04/25/2025        Results from last 7 days   Lab Units 05/28/25  0757 05/27/25  1901   GLUCOSE mg/dL 80 89       Imaging:    Imaging  XR abdomen 1 view  Result Date: 5/28/2025  1. Moderate colonic stool burden. 2. Nonobstructive bowel gas pattern.   I personally reviewed the images/study and I agree with the findings as stated by Daniela Molina DO, PGY-3. This study was interpreted at [facility], [place], [place].   MACRO: None   Signed by: Dougie Hoskins 5/28/2025 8:19 AM Dictation workstation:   [id number]      Cardiology, Vascular, and Other Imaging  No other imaging results found for the past 2 days       EKG:      Encounter Date: 04/24/25   ECG 12 lead   Result Value    Ventricular Rate 105    Atrial Rate 105    NM Interval  146    QRS Duration 80    QT Interval 348    QTC Calculation(Bazett) 459    P Axis 68    R Axis 66    T Axis 23    QRS Count 17    Q Onset 218    P Onset 145    P Offset 198    T Offset 392    QTC Fredericia 419    Narrative    Sinus tachycardia  Otherwise normal ECG  When compared with ECG of 24-APR-2025 15:56,  QRS axis Shifted left  T wave inversion less evident in Inferior leads  Nonspecific T wave abnormality no longer evident in Anterolateral leads    See ED provider note for full interpretation and clinical correlation  Confirmed by Wanda Mckee ([id number]) on 4/24/2025 11:48:08 PM       Medications:      Scheduled Medications:  PRN Medications:    Scheduled Medications[1] PRN Medications[2]     Assessment/Plan     Assessment/Plan:   Sejal Duff is a 24 y.o. 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 We will get KUB before and after the NG golytwly ( 200ml\hr) and will start LR 75 ml\hr.         #Chronic constipation/IBS  :: chronic, lifelong constipation with minimal improvement despite multiple interventions,    :: Has previously had NG clean outs at [facility], hydrocolonic therapy, rectal biofeedback, senna, bisacodyl, miralax, golytely, enemas, suppositories, probiotics, and acupuncture  :: Currently on linzess, motegrity , trulance, and lactulose with simethicone  - continue vitamin D and multi supplements   - KUB pre and post clean out  - Continue golytely/miralax with Gatorade and IV fluid repletion   - Enema on 5/28    - Glycolax   - follow up with GI outpatient      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  -  shunt in place for hx of hydrocephalus   - Tovorafenib 400 mg by mouth once weekly (Saturdays) for 6-12 cycles (28-day cycles  -  smaller tumor size so decreased tovorafenib to 300mg PO weekly (to start 5/31/25)  - MRI brain w/wo contrast every 2-3 months to assess treatment response        #Acneiform drug eruption, resolving  #Flexural atopic dermatitis, resolved  - Continue minocycline for at least 2 months after restarting chemo and continue clobetasol to the hands and feet 3x/week as prevention  - Continue triamcinolone 0.1% ointment, clobetasol 0.05% ointment per onco-dermatology  - Hydrocortisone 2.5% ointment also prescribed.  Patient states that she does not use this regularly        #Asthma    -  Albuterol PRN     #GERD  - continue omeprazole 40 mg     #Low energy   - Hold ritalin 5 mg         F: Replete PRN  E: Replete PRN  N: Clear liquids   Access/Lines: PIV      DVT Ppx: Lovenox   GI Ppx: Pantoprazole      Abx: None   O2: None      Pain regimen: Tylenol / Oxy     Code status: Full Code  Primary Emergency Contact: MIRTA HANSON  Home Phone: [phone]  Relation: Mother    BETSEY MEDRANO, MS4  Department of Internal Medicine, [facility]         [1] acetaminophen, 975 mg, oral, q6h   Or  acetaminophen, 1,000 mg, oral, q6h   Or  acetaminophen, 650 mg, rectal, q6h  cetirizine, 10 mg, oral, Daily  cholecalciferol, 1.25 mg, oral, Weekly  clindamycin, , Topical, BID  clobetasol, , Topical, Daily  lidocaine PF, 200 mg, inhalation, Once  [Held by provider] linaCLOtide, 145 mcg, oral, Daily before breakfast  melatonin, 3 mg, oral, Nightly  [Held by provider] methylphenidate, 5 mg, oral, Daily  OLANZapine, 10 mg, oral, Nightly  pantoprazole, 40 mg, oral, Daily before breakfast   Or  pantoprazole, 40 mg, intravenous, Daily before breakfast  polyethylene glycol, 17 g, oral, q1h  triamcinolone, , Topical, Once per day on Monday Wednesday Friday  [2] PRN medications: albuterol, hydrOXYzine HCL, ibuprofen, ondansetron, oral hydration, oxyCODONE, simethicone

## 2025-05-28 NOTE — PROGRESS NOTES
05/28/25 1459   Discharge Planning   Living Arrangements Alone   Support Systems Parent;Family members   Assistance Needed none   Type of Residence Private residence   Who is requesting discharge planning? Provider   Expected Discharge Disposition Home   Does the patient need discharge transport arranged? Yes   Financial Resource Strain   How hard is it for you to pay for the very basics like food, housing, medical care, and heating? Not very   Housing Stability   In the last 12 months, was there a time when you were not able to pay the mortgage or rent on time? N   At any time in the past 12 months, were you homeless or living in a shelter (including now)? N   Transportation Needs   In the past 12 months, has lack of transportation kept you from medical appointments or from getting medications? no   In the past 12 months, has lack of transportation kept you from meetings, work, or from getting things needed for daily living? No   Patient Choice   Provider Choice list and CMS website (https://medicare.gov/care-compare#search) for post-acute Quality and Resource Measure Data were provided and reviewed with: Patient   Intensity of Service   Intensity of Service 0-30 min     PCP: Melba Clinton  DATE OF LAST VISIT: does not remember   PHARMACY: [facility]   RECENT FALLS: denies   EQUIPMENT USED IN HOME: N/A   HOME O2/CPAP/NEBS: N/A   TRANSPORT HOME: mother   CURRENT HC: N/A     Address, phone and emergency contact information verified. Pt states she was independent prior to admission, denies any DC needs. Pt agreeable with M2Bs on DC-will make medical team aware. All questions and concerns answered. Will continue to follow as needed.

## 2025-05-28 NOTE — SIGNIFICANT EVENT
Direct admission for constipation. KUB -> NG -> KUB confirm -> Golytely at 200cc/hr and willl increase by 100cc/hr every 3 hrs as tolerated till goal of 400cc/hr.    Taking oxy 5 Q6 w ibuprofen since December, feels liek she really needs it. Understands risks with constipation. Will continue for now since already on tylenol and NSAIDs. C/w creams, says does not use hydrocortisone on face. Also said does not need ritalin now while inpatient. Holding prucalopride and tovorafenib as non formulary and already will get golytely. Chemo every Sunday, if still inpatient by then consult heme/onc for approval.     GI exam -> Tender to palptation in all quadrants, more so in left side and suprapubic and RLQ. Passes flatus, no BM since last week. Will monitor KUB to conisder enemas +/- transition point.  
NGT was inserted, but patient pulled it out immediatly, and indicated that she does not want us to try place the NGT again.   
No

## 2025-05-28 NOTE — CARE PLAN
Problem: Pain - Adult  Goal: Verbalizes/displays adequate comfort level or baseline comfort level  Outcome: Progressing     Problem: Safety - Adult  Goal: Free from fall injury  Outcome: Progressing     Problem: Discharge Planning  Goal: Discharge to home or other facility with appropriate resources  Outcome: Progressing     Problem: Chronic Conditions and Co-morbidities  Goal: Patient's chronic conditions and co-morbidity symptoms are monitored and maintained or improved  Outcome: Progressing     Problem: Nutrition  Goal: Nutrient intake appropriate for maintaining nutritional needs  Outcome: Progressing     Problem: Fall/Injury  Goal: Not fall by end of shift  Outcome: Progressing  Goal: Be free from injury by end of the shift  Outcome: Progressing  Goal: Verbalize understanding of personal risk factors for fall in the hospital  Outcome: Progressing  Goal: Verbalize understanding of risk factor reduction measures to prevent injury from fall in the home  Outcome: Progressing  Goal: Use assistive devices by end of the shift  Outcome: Progressing  Goal: Pace activities to prevent fatigue by end of the shift  Outcome: Progressing     Problem: Pain  Goal: Takes deep breaths with improved pain control throughout the shift  Outcome: Progressing  Goal: Turns in bed with improved pain control throughout the shift  Outcome: Progressing  Goal: Walks with improved pain control throughout the shift  Outcome: Progressing  Goal: Performs ADL's with improved pain control throughout shift  Outcome: Progressing  Goal: Participates in PT with improved pain control throughout the shift  Outcome: Progressing  Goal: Free from opioid side effects throughout the shift  Outcome: Progressing  Goal: Free from acute confusion related to pain meds throughout the shift  Outcome: Progressing     Problem: Respiratory  Goal: Clear secretions with interventions this shift  Outcome: Progressing  Goal: Minimize anxiety/maximize coping throughout  shift  Outcome: Progressing  Goal: Minimal/no exertional discomfort or dyspnea this shift  Outcome: Progressing  Goal: No signs of respiratory distress (eg. Use of accessory muscles. Peds grunting)  Outcome: Progressing  Goal: Patent airway maintained this shift  Outcome: Progressing  Goal: Tolerate mechanical ventilation evidenced by VS/agitation level this shift  Outcome: Progressing  Goal: Tolerate pulmonary toileting this shift  Outcome: Progressing  Goal: Verbalize decreased shortness of breath this shift  Outcome: Progressing  Goal: Wean oxygen to maintain O2 saturation per order/standard this shift  Outcome: Progressing  Goal: Increase self care and/or family involvement in next 24 hours  Outcome: Progressing   The patient's goals for the shift include      The clinical goals for the shift include patient to have BM today

## 2025-05-28 NOTE — CARE PLAN
The patient's goals for the shift include      The clinical goals for the shift include Patient will have BM by end of shift Patient having liquid watery BM  continue with Miralax as ordered

## 2025-05-28 NOTE — PROGRESS NOTES
Pharmacy Medication History Review    Sejal Duff is a 24 y.o. female admitted for Constipation. Pharmacy reviewed the patient's harnn-dl-lfzdnjcff medications and allergies for accuracy.    Medications ADDED:  None  Medications CHANGED:  None  Medications REMOVED:   None     The list below reflects the updated PTA list.   Prior to Admission Medications   Prescriptions Last Dose Informant   LORazepam (Ativan) 1 mg tablet  Self   Sig: Take 1 tablet (1 mg) 30 minutes prior to MRI for claustrophobia/anxiety relief.   Lacto no.30-Bccbph-GUK-larch 25B cell-25B cell-50 mg capsule  Self   Sig: Take 1 capsule by mouth once daily.   Patient taking differently: Take 1 capsule by mouth once daily as needed (constipated).   OLANZapine (ZyPREXA) 10 mg tablet  Self   Sig: TAKE 1 TABLET (10 MG) BY MOUTH ONCE DAILY AT BEDTIME.   albuterol 90 mcg/actuation inhaler  Self   Sig: Inhale 1-2 puffs every 4 hours if needed for wheezing or shortness of breath.   benzoyl peroxide (Benzac AC) 10 % external wash  Self   Sig: APPLY TOPICALLY 2 TIMES A DAY. WASH TWICE DAILY AS DIRECTED   Patient taking differently: Apply 1 Application topically once daily.   cholecalciferol (Vitamin D-3) 50,000 unit capsule  Self   Sig: Take 1 capsule (50,000 Units) by mouth 1 (one) time per week.  --> takes when she remembers, but does not remember last time she took this   clindamycin (Cleocin T) 1 % lotion  Self   Sig: Apply a pea-size amount to entire face twice daily after washing face with lukewarm water. Use this BEFORE hydrocortisone cream.   Patient taking differently: Apply 1 Application topically once daily.   clobetasol (Temovate) 0.05 % ointment  Self   Sig: Apply topically 2 times a day. To hands and feet 3x per week for maintenance   Patient taking differently: Apply 1 Application topically once daily.   ergocalciferol (Vitamin D-2) 1.25 MG (21922 UT) capsule  Self   Sig: Take 1 capsule (1,250 mcg) by mouth 1 (one) time per week.  --> takes when  she remembers, but does not remember last time she took this; last filled 10/28/2024   hydrOXYzine pamoate (Vistaril) 25 mg capsule  Self   Sig: Take 1 capsule (25 mg) by mouth as needed at bedtime for itching. If 1 capsule is not enough to control symptoms may increase to 2 capsules   hydrocortisone 2.5 % ointment  Self   Sig: Apply topically 2 times a day. To face 3x per week for maintenance   Patient taking differently: Apply 1 Application topically once daily.   ibuprofen 800 mg tablet  Self   Sig: Take 1 tablet (800 mg) by mouth every 8 hours if needed for mild pain (1 - 3) (pain).   linaCLOtide (Linzess) 145 mcg capsule  Self   Sig: Take 1 capsule (145 mcg) by mouth once daily in the morning. Take before meals. Do not crush or chew.   Patient taking differently: Take 1 capsule (145 mcg) by mouth every 7 days. Do not crush or chew.   loratadine (Claritin) 10 mg tablet  Self   Sig: Take 1 tablet (10 mg) by mouth once daily.   methylphenidate (Ritalin) 5 mg tablet  Self   Sig: Take 1 tablet (5 mg) by mouth once daily.   multivitamin with iron (Daily Multiple Vitamins/Iron) tablet  Self   Sig: Take 1 tablet by mouth once daily.   omeprazole (PriLOSEC) 40 mg DR capsule     Sig: Take 1 capsule (40 mg) by mouth once daily.  last filled 3/6/2025   ondansetron (Zofran) 4 mg tablet  Self   Sig: Take 1 tablet (4 mg) by mouth every 8 hours if needed for nausea or vomiting.   oxyCODONE (Roxicodone) 5 mg immediate release tablet  Self   Sig: Take 1 tablet (5 mg) by mouth every 6 hours if needed for severe pain (7 - 10). MAX 4 tablets a day   plecanatide (Trulance) tablet tablet  Self   Sig: Take 1 tablet (3 mg) by mouth once daily.  --> has not started this medication yet   prucalopride (Motegrity) 2 mg tablet  Self   Sig: Take 1 tablet (2 mg) by mouth once daily.   Patient taking differently: Take 1 tablet (2 mg) by mouth every 7 days. With Linzess   simethicone (Mylicon) 125 mg chewable tablet  Self   Sig: Chew 1 tablet  "(125 mg) every 6 hours if needed for flatulence.   tovorafenib 400 mg/week (100 mg x 4) tablet  Self   Sig: Take 400 mg by mouth 1 (one) time per week. (Saturdays) (Four 100 mg tablets = 400 mg.) Swallow tablets whole with water. Do not chew, cut, or crush. Tablets may be taken with or without food. Do not fill before May 3, 2025.   Patient taking differently: Take 300 mg by mouth 1 (one) time per week. (Saturdays) (Four 100 mg tablets = 400 mg.) Swallow tablets whole with water. Do not chew, cut, or crush. Tablets may be taken with or without food.   triamcinolone (Kenalog) 0.1 % ointment  Self   Sig: Apply topically 2 times a day. To trunk, arms, and legs 3x per week for maintenance   Patient taking differently: Apply topically 2 times a day as needed. To trunk, arms, and legs 3x per week for maintenance      Facility-Administered Medications: None        The list below reflects the updated allergy list. Please review each documented allergy for additional clarification and justification.  Allergies  Reviewed by Sunny JensenD on 5/28/2025        Severity Reactions Comments    Morphine High Dizziness     Pollen Extracts Medium Runny nose     House Dust Low Runny nose             Patient accepts M2B at discharge.     Sources:   [facility]  Pharmacy dispense history  Patient Interview --Moderate historian  Very disengaged throughout interview-- limited history provided  Chart Review  Care Everywhere    Additional Comments:  Please review all comments above within medication list  NOTE:   pt states that she takes her Motegrity and linzess together- once weekly on Friday  Has not started the Trulance  Recently reduced Tovorafenib from 400 mg to 300 mg weekly        Brandy Clifton PharmD  Transitions of Care Pharmacist  05/28/25     Secure Chat preferred   If no response call [phone] or [facility] \"Med Rec\"        "

## 2025-05-28 NOTE — DISCHARGE INSTRUCTIONS
Dear Sejal Duff,   It was a pleasure caring for you! You were admitted to [facility] ([facility] on 5/27/2025 for a bowel clean out.  While you were here, we gave performed two enemas and gave you Miralax and Golytely to aid your bowel movements. We also followed your bowel movements with abdominal X-rays. You responded well to the bowel regiment and your abdominal x-ray improved significantly.  Please come to the emergency department if you develop any worsening symptoms.     Follow up appointments:  Primary care: It is recommended that you follow up with your primary care provider once discharged. Please make sure to schedule this appointment through [employer] at a location and time that works for you. We recommend being seen within 2 weeks of discharge from the hospital. If you prefer to speak with someone to help you schedule this appointment, you may call  Central Scheduling at [phone].  Oncology: 6/24/25 at 9:30am with Pepper Davidson CNP, 10:00am with Deborah Snowden PA-C    Please monitor your stool output daily. Notify your PCP if you stop having regular bowel movements.      Thank you for letting us take part in your care,   Internal Med [facility] Team

## 2025-05-29 ENCOUNTER — APPOINTMENT (OUTPATIENT)
Dept: RADIOLOGY | Facility: HOSPITAL | Age: 25
End: 2025-05-29
Payer: COMMERCIAL

## 2025-05-29 VITALS
DIASTOLIC BLOOD PRESSURE: 57 MMHG | BODY MASS INDEX: 23.57 KG/M2 | SYSTOLIC BLOOD PRESSURE: 97 MMHG | TEMPERATURE: 97.9 F | WEIGHT: 133 LBS | OXYGEN SATURATION: 100 % | RESPIRATION RATE: 16 BRPM | HEART RATE: 85 BPM | HEIGHT: 63 IN

## 2025-05-29 LAB
ALBUMIN SERPL BCP-MCNC: 3.5 G/DL (ref 3.4–5)
ANION GAP SERPL CALC-SCNC: 9 MMOL/L (ref 10–20)
BASOPHILS # BLD AUTO: 0.06 X10*3/UL (ref 0–0.1)
BASOPHILS NFR BLD AUTO: 1 %
BUN SERPL-MCNC: 6 MG/DL (ref 6–23)
CALCIUM SERPL-MCNC: 8.2 MG/DL (ref 8.6–10.6)
CHLORIDE SERPL-SCNC: 112 MMOL/L (ref 98–107)
CO2 SERPL-SCNC: 23 MMOL/L (ref 21–32)
CREAT SERPL-MCNC: 0.44 MG/DL (ref 0.5–1.05)
EGFRCR SERPLBLD CKD-EPI 2021: >90 ML/MIN/1.73M*2
EOSINOPHIL # BLD AUTO: 0.18 X10*3/UL (ref 0–0.7)
EOSINOPHIL NFR BLD AUTO: 3 %
ERYTHROCYTE [DISTWIDTH] IN BLOOD BY AUTOMATED COUNT: 17.2 % (ref 11.5–14.5)
GLUCOSE SERPL-MCNC: 83 MG/DL (ref 74–99)
HCT VFR BLD AUTO: 26.2 % (ref 36–46)
HGB BLD-MCNC: 7.5 G/DL (ref 12–16)
IMM GRANULOCYTES # BLD AUTO: 0.01 X10*3/UL (ref 0–0.7)
IMM GRANULOCYTES NFR BLD AUTO: 0.2 % (ref 0–0.9)
LYMPHOCYTES # BLD AUTO: 2.77 X10*3/UL (ref 1.2–4.8)
LYMPHOCYTES NFR BLD AUTO: 46.4 %
MAGNESIUM SERPL-MCNC: 1.81 MG/DL (ref 1.6–2.4)
MCH RBC QN AUTO: 24 PG (ref 26–34)
MCHC RBC AUTO-ENTMCNC: 28.6 G/DL (ref 32–36)
MCV RBC AUTO: 84 FL (ref 80–100)
MONOCYTES # BLD AUTO: 0.34 X10*3/UL (ref 0.1–1)
MONOCYTES NFR BLD AUTO: 5.7 %
NEUTROPHILS # BLD AUTO: 2.61 X10*3/UL (ref 1.2–7.7)
NEUTROPHILS NFR BLD AUTO: 43.7 %
NRBC BLD-RTO: 0 /100 WBCS (ref 0–0)
PATH REVIEW-CBC DIFFERENTIAL: NORMAL
PHOSPHATE SERPL-MCNC: 2.2 MG/DL (ref 2.5–4.9)
PLATELET # BLD AUTO: 369 X10*3/UL (ref 150–450)
POTASSIUM SERPL-SCNC: 3.4 MMOL/L (ref 3.5–5.3)
RBC # BLD AUTO: 3.13 X10*6/UL (ref 4–5.2)
SODIUM SERPL-SCNC: 141 MMOL/L (ref 136–145)
WBC # BLD AUTO: 6 X10*3/UL (ref 4.4–11.3)

## 2025-05-29 PROCEDURE — 36415 COLL VENOUS BLD VENIPUNCTURE: CPT

## 2025-05-29 PROCEDURE — 99232 SBSQ HOSP IP/OBS MODERATE 35: CPT | Performed by: PEDIATRICS

## 2025-05-29 PROCEDURE — 74018 RADEX ABDOMEN 1 VIEW: CPT | Performed by: RADIOLOGY

## 2025-05-29 PROCEDURE — 80069 RENAL FUNCTION PANEL: CPT

## 2025-05-29 PROCEDURE — 83735 ASSAY OF MAGNESIUM: CPT

## 2025-05-29 PROCEDURE — 2500000004 HC RX 250 GENERAL PHARMACY W/ HCPCS (ALT 636 FOR OP/ED): Mod: JZ,SE

## 2025-05-29 PROCEDURE — 2500000002 HC RX 250 W HCPCS SELF ADMINISTERED DRUGS (ALT 637 FOR MEDICARE OP, ALT 636 FOR OP/ED): Mod: SE

## 2025-05-29 PROCEDURE — 2500000004 HC RX 250 GENERAL PHARMACY W/ HCPCS (ALT 636 FOR OP/ED): Mod: SE

## 2025-05-29 PROCEDURE — 2500000001 HC RX 250 WO HCPCS SELF ADMINISTERED DRUGS (ALT 637 FOR MEDICARE OP): Mod: SE

## 2025-05-29 PROCEDURE — 85025 COMPLETE CBC W/AUTO DIFF WBC: CPT

## 2025-05-29 PROCEDURE — 74018 RADEX ABDOMEN 1 VIEW: CPT

## 2025-05-29 RX ORDER — DIPHENHYDRAMINE HCL 25 MG
25 CAPSULE ORAL EVERY 6 HOURS PRN
Status: DISCONTINUED | OUTPATIENT
Start: 2025-05-29 | End: 2025-05-29 | Stop reason: HOSPADM

## 2025-05-29 RX ORDER — DEXTROMETHORPHAN POLISTIREX 30 MG/5 ML
1 SUSPENSION, EXTENDED RELEASE 12 HR ORAL ONCE
Status: COMPLETED | OUTPATIENT
Start: 2025-05-29 | End: 2025-05-29

## 2025-05-29 RX ORDER — DIPHENHYDRAMINE HCL 25 MG
25 CAPSULE ORAL
Status: COMPLETED | OUTPATIENT
Start: 2025-05-29 | End: 2025-05-29

## 2025-05-29 RX ORDER — POTASSIUM CHLORIDE 20 MEQ/1
20 TABLET, EXTENDED RELEASE ORAL ONCE
Status: COMPLETED | OUTPATIENT
Start: 2025-05-29 | End: 2025-05-29

## 2025-05-29 RX ADMIN — POTASSIUM CHLORIDE 20 MEQ: 1500 TABLET, EXTENDED RELEASE ORAL at 15:22

## 2025-05-29 RX ADMIN — POLYETHYLENE GLYCOL 3350 17 G: 17 POWDER, FOR SOLUTION ORAL at 12:36

## 2025-05-29 RX ADMIN — POLYETHYLENE GLYCOL 3350 17 G: 17 POWDER, FOR SOLUTION ORAL at 07:55

## 2025-05-29 RX ADMIN — POLYETHYLENE GLYCOL 3350 17 G: 17 POWDER, FOR SOLUTION ORAL at 11:04

## 2025-05-29 RX ADMIN — POLYETHYLENE GLYCOL 3350 17 G: 17 POWDER, FOR SOLUTION ORAL at 04:30

## 2025-05-29 RX ADMIN — POLYETHYLENE GLYCOL 3350 17 G: 17 POWDER, FOR SOLUTION ORAL at 08:48

## 2025-05-29 RX ADMIN — POLYETHYLENE GLYCOL 3350 17 G: 17 POWDER, FOR SOLUTION ORAL at 17:00

## 2025-05-29 RX ADMIN — POLYETHYLENE GLYCOL 3350 17 G: 17 POWDER, FOR SOLUTION ORAL at 02:18

## 2025-05-29 RX ADMIN — POLYETHYLENE GLYCOL 3350 17 G: 17 POWDER, FOR SOLUTION ORAL at 14:11

## 2025-05-29 RX ADMIN — CLOBETASOL PROPIONATE: 0.5 OINTMENT TOPICAL at 11:04

## 2025-05-29 RX ADMIN — POLYETHYLENE GLYCOL 3350 17 G: 17 POWDER, FOR SOLUTION ORAL at 14:10

## 2025-05-29 RX ADMIN — SIMETHICONE 120 MG: 80 TABLET, CHEWABLE ORAL at 09:33

## 2025-05-29 RX ADMIN — PHENYLEPHRINE HCL 1 ENEMA: 10 TABLET, COATED ORAL at 14:11

## 2025-05-29 RX ADMIN — POLYETHYLENE GLYCOL 3350 17 G: 17 POWDER, FOR SOLUTION ORAL at 16:00

## 2025-05-29 RX ADMIN — IBUPROFEN 800 MG: 400 TABLET ORAL at 15:36

## 2025-05-29 RX ADMIN — ACETAMINOPHEN 975 MG: 325 TABLET, FILM COATED ORAL at 04:30

## 2025-05-29 RX ADMIN — CETIRIZINE HYDROCHLORIDE 10 MG: 10 TABLET, FILM COATED ORAL at 08:48

## 2025-05-29 RX ADMIN — POLYETHYLENE GLYCOL 3350 17 G: 17 POWDER, FOR SOLUTION ORAL at 06:33

## 2025-05-29 RX ADMIN — OXYCODONE 5 MG: 5 TABLET ORAL at 15:36

## 2025-05-29 RX ADMIN — POLYETHYLENE GLYCOL 3350 17 G: 17 POWDER, FOR SOLUTION ORAL at 01:10

## 2025-05-29 RX ADMIN — POLYETHYLENE GLYCOL 3350 17 G: 17 POWDER, FOR SOLUTION ORAL at 03:49

## 2025-05-29 RX ADMIN — ONDANSETRON 4 MG: 2 INJECTION INTRAMUSCULAR; INTRAVENOUS at 07:53

## 2025-05-29 RX ADMIN — POLYETHYLENE GLYCOL 3350 17 G: 17 POWDER, FOR SOLUTION ORAL at 15:23

## 2025-05-29 RX ADMIN — POTASSIUM PHOSPHATE, MONOBASIC 500 MG: 500 TABLET, SOLUBLE ORAL at 17:52

## 2025-05-29 RX ADMIN — POLYETHYLENE GLYCOL 3350 17 G: 17 POWDER, FOR SOLUTION ORAL at 08:49

## 2025-05-29 RX ADMIN — DIPHENHYDRAMINE HCL 25 MG: 25 CAPSULE ORAL at 08:48

## 2025-05-29 RX ADMIN — POLYETHYLENE GLYCOL 3350 17 G: 17 POWDER, FOR SOLUTION ORAL at 11:05

## 2025-05-29 RX ADMIN — OXYCODONE 5 MG: 5 TABLET ORAL at 07:53

## 2025-05-29 RX ADMIN — CLINDAMYCIN PHOSPHATE: 10 LOTION TOPICAL at 06:36

## 2025-05-29 RX ADMIN — CLINDAMYCIN PHOSPHATE: 10 LOTION TOPICAL at 11:04

## 2025-05-29 RX ADMIN — ACETAMINOPHEN 975 MG: 325 TABLET, FILM COATED ORAL at 11:04

## 2025-05-29 RX ADMIN — PANTOPRAZOLE SODIUM 40 MG: 40 INJECTION, POWDER, FOR SOLUTION INTRAVENOUS at 08:49

## 2025-05-29 ASSESSMENT — PAIN SCALES - GENERAL
PAINLEVEL_OUTOF10: 7
PAINLEVEL_OUTOF10: 4

## 2025-05-29 NOTE — NURSING NOTE
Discharge instructions reviewed with patient. All questions answered. No further concerns at time of discharge.

## 2025-05-29 NOTE — CARE PLAN
The patient's goals for the shift include      The clinical goals for the shift include BM by end of shift    Over the shift, the patient has reported making several BM'S that were watery.

## 2025-05-29 NOTE — CARE PLAN
The patient's goals for the shift include      The clinical goals for the shift include Patient will have BM by end of shift    Over the shift, the patient did not make progress toward the following goals. Barriers to progression include ***. Recommendations to address these barriers include ***.

## 2025-05-29 NOTE — PROGRESS NOTES
Daily Progress Note      Daily Progress Note    Sejal Duff is a 24 y.o. female admitted on 5/27/2025  5:55 PM for chronic constipation and bowel clean out.     Overnight:      She had no cute events overnight. She had over 10 Bms and stated that some were clear and some were still brown.     Subjective   Patient was seen at the bedside.She is doing well today and her only complain is this abdominal tenderness. She continues to take her bowel prep with Gatorade and continues to have BMs. She is getting another enema today and her stool burden is being followed with KUBs.     She had an episode of vomiting this morning when she woke up but she has not had any additional episodes of vomiting since then.     Denied any chills, headache/dizziness, NV, chest pain/tightness, dyspnea, abdominal pain, DC, dysuria.     Objective   Vitals: I/O:   Vitals:    05/29/25 1208   BP: 97/57   Pulse: 85   Resp: 16   Temp: 36.6 °C (97.9 °F)   SpO2: 100%        24hr Min/Max:  Temp  Min: 36.6 °C (97.9 °F)  Max: 36.6 °C (97.9 °F)  Pulse  Min: 77  Max: 85  BP  Min: 90/57  Max: 97/57  Resp  Min: 16  Max: 16  SpO2  Min: 100 %  Max: 100 %   Intake/Output Summary (Last 24 hours) at 5/29/2025 1452  Last data filed at 5/29/2025 0800  Gross per 24 hour   Intake 2000 ml   Output --   Net 2000 ml        Net IO Since Admission: 2,680 mL [05/29/25 1452]      PE:  General: Awake, alert, conversant, appears stated age  HEENT: Pupils equal and round, no scleral icterus or conjunctivitis  Skin: No suspect lesions or rashes noted on visible skin  Chest: Ctab, normal respiratory effort, not on supplemental oxygen  Cardiac: Regular rate and rhythm, normal s1, s2, no M/R/G, no JVD  Abdomen: Soft, distended, diffusely tender.   : No flank pain or indwelling urinary catheter  EXT: No peripheral edema, no asymmetry noted  MSK: No focal joint swelling noted  Neuro: AOx4, moving all limbs spontaneously, follows commands  Psych: Coherent thought process,  appropriate mood and affect      Laboratory Studies:      CBC RFP   Lab Results   Component Value Date    WBC 6.0 05/29/2025    HGB 7.5 (L) 05/29/2025    HCT 26.2 (L) 05/29/2025    MCV 84 05/29/2025     05/29/2025    NEUTROABS 2.61 05/29/2025    Lab Results   Component Value Date     05/29/2025    K 3.4 (L) 05/29/2025     (H) 05/29/2025    CO2 23 05/29/2025    BUN 6 05/29/2025    CREATININE 0.44 (L) 05/29/2025    CREATININE 0.43 (L) 05/28/2025     Lab Results   Component Value Date    MG 1.81 05/29/2025    PHOS 2.2 (L) 05/29/2025    CALCIUM 8.2 (L) 05/29/2025         Hepatic Function ABG/VBG   Lab Results   Component Value Date    ALT 7 04/25/2025    AST 16 04/25/2025    ALKPHOS 59 04/25/2025     Lab Results   Component Value Date    BILIDIR 0.1 10/04/2020      Lab Results   Component Value Date    PROTIME 14.3 (H) 04/25/2025    APTT 32 04/25/2025    INR 1.3 (H) 04/25/2025    Lab Results   Component Value Date    LACTATE 0.8 04/25/2025        Results from last 7 days   Lab Units 05/29/25  0812 05/28/25  0757 05/27/25  1901   GLUCOSE mg/dL 83 80 89       Imaging:    Imaging  XR abdomen 1 view  Result Date: 5/29/2025  1.  Nonspecific, nonobstructive bowel gas pattern. 2. Mild stool burden   I personally reviewed the images/study and I agree with the findings as stated by Patsy Douglas MD, PGY-2 this study was interpreted at [facility], [place], [place].   MACRO: None   Signed by: Dougie Hoskins 5/29/2025 2:37 PM Dictation workstation:   [id number]    XR abdomen 1 view  Result Date: 5/28/2025  1. No bowel obstruction seen. No significant amount of stool burden   MACRO: None   Signed by: Shubham Bynum 5/28/2025 5:24 PM Dictation workstation:   [id number]    XR abdomen 1 view  Result Date: 5/28/2025  1. Moderate colonic stool burden. 2. Nonobstructive bowel gas pattern.   I personally reviewed the images/study and I agree with the findings as stated by Daniela  DO Jesse, PGY-3. This study was interpreted at [facility], [place], [place].   MACRO: None   Signed by: Dougie Hoskins 5/28/2025 8:19 AM Dictation workstation:   [id number]      Cardiology, Vascular, and Other Imaging  No other imaging results found for the past 2 days       EKG:      Encounter Date: 04/24/25   ECG 12 lead   Result Value    Ventricular Rate 105    Atrial Rate 105    FL Interval 146    QRS Duration 80    QT Interval 348    QTC Calculation(Bazett) 459    P Axis 68    R Axis 66    T Axis 23    QRS Count 17    Q Onset 218    P Onset 145    P Offset 198    T Offset 392    QTC Fredericia 419    Narrative    Sinus tachycardia  Otherwise normal ECG  When compared with ECG of 24-APR-2025 15:56,  QRS axis Shifted left  T wave inversion less evident in Inferior leads  Nonspecific T wave abnormality no longer evident in Anterolateral leads    See ED provider note for full interpretation and clinical correlation  Confirmed by Wanda Mckee ([id number]) on 4/24/2025 11:48:08 PM       Medications:      Scheduled Medications:  PRN Medications:    Scheduled Medications[1] PRN Medications[2]     Assessment/Plan     Assessment/Plan:   Sejal Duff is a 24 y.o. 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 We continue to get KUB and giving her polyethelene glycol, and she will continue to get LR for hydration.  DC when stool clear and KUB shows no/minimal residual stool burden     #Chronic constipation/IBS  :: chronic, lifelong constipation with minimal improvement despite multiple interventions,    :: Has previously had NG clean outs at [facility], hydrocolonic therapy, rectal biofeedback, senna, bisacodyl, miralax, golytely, enemas, suppositories, probiotics, and acupuncture  ::  Currently on linzess, motegrity , trulance, and lactulose with simethicone  - continue vitamin D and multi supplements   - Trend stool burden on KUBs  - Continue miralax with Gatorade and IV fluid repletion   - Enema on 5/28 and 5/29   - follow up with GI outpatient      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  -  shunt in place for hx of hydrocephalus   - Tovorafenib 400 mg by mouth once weekly (Saturdays) for 6-12 cycles (28-day cycles  - smaller tumor size so decreased tovorafenib to 300mg PO weekly (to start 5/31/25)  - MRI brain w/wo contrast every 2-3 months to assess treatment response     #Acneiform drug eruption, resolving  #Flexural atopic dermatitis, resolved  - Continue minocycline for at least 2 months after restarting chemo and continue clobetasol to the hands and feet 3x/week as prevention  - Continue triamcinolone 0.1% ointment, clobetasol 0.05% ointment per onco-dermatology  - Hydrocortisone 2.5% ointment also prescribed.  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   - follow up with hematology outpatient       #Asthma    -  Albuterol PRN     #GERD  - continue omeprazole 40 mg     #Low energy   - Hold ritalin 5 mg         F: Replete PRN  E: Replete PRN  N: Clear liquids   Access/Lines: PIV      DVT Ppx: Lovenox   GI Ppx: Pantoprazole      Abx: None   O2: None      Pain regimen: Tylenol / Oxy     Code status: Full Code  Primary Emergency Contact: MIRTA HANSON  Home Phone: [phone]  Relation: Mother    BETSEY MEDRANO, MS4  Department of Internal Medicine, [facility]         [1] acetaminophen, 975 mg, oral, q6h   Or  acetaminophen, 1,000 mg, oral, q6h   Or  acetaminophen, 650 mg, rectal, q6h  cetirizine, 10 mg, oral, Daily  cholecalciferol, 1.25 mg, oral,  Weekly  clindamycin, , Topical, BID  clobetasol, , Topical, Daily  lidocaine PF, 200 mg, inhalation, Once  [Held by provider] linaCLOtide, 145 mcg, oral, Daily before breakfast  melatonin, 3 mg, oral, Nightly  [Held by provider] methylphenidate, 5 mg, oral, Daily  OLANZapine, 10 mg, oral, Nightly  pantoprazole, 40 mg, oral, Daily before breakfast   Or  pantoprazole, 40 mg, intravenous, Daily before breakfast  polyethylene glycol, 17 g, oral, q1h  potassium chloride CR, 20 mEq, oral, Once  potassium phosphate, 15 mmol, intravenous, Once  triamcinolone, , Topical, Once per day on Monday Wednesday Friday     [2] PRN medications: albuterol, hydrOXYzine HCL, ibuprofen, ondansetron, oral hydration, oxyCODONE, simethicone

## 2025-05-29 NOTE — PROGRESS NOTES
"Recreation Therapy Note    Therapy Session  Visit Type: New visit  Session Start Time: 1300  Session End Time: 1415  Intervention Delivery: In-person  Conflict of Service: None  Number of family members present: 0  Family Present for Session: None  Family Participation: None  Number of staff members present: 1    Pre-assessment  Mood/Affect: Appropriate, Calm  Verbalized Emotional State:  (none)    Treatment  Areas of Focus: Coping, Socialization, Self-expression, Normalization, Stress reduction  Co-Treatment:  (none)  Interruption: No    Post-assessment  Mood/Affect: Appropriate, Calm, Cooperative, Participative  Verbalized Emotional State:  (none)  Continue Visiting: Yes  Total Session Time (min): 75 minutes    Narrative  Assessment Detail: Patient was resting in bed upon arrival.  Plan: To encourage the exploration of safe and effective positive leisure coping skills to manage situational stressors.  Intervention: Patient chose to paint a bird house  Evaluation: Patient was invested in her painting with good attention to detail.  Initiated conversation .  Pleasant and social throughout the session.  Patient identified the benefit of our session as \"This is so soothing.  This made me feel much better.\"  Follow-up: Will continue to encourage the exploration of positive leisure coping skills.    Sejal Duff is a 24 y.o. 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  She reports chronic constipation, managed previously with multiple admissions for deimpaction, likely related to dysmotility. Organic causes have been ruled out by her pediatric endocrinologist.     "

## 2025-05-29 NOTE — CARE PLAN
Problem: Pain - Adult  Goal: Verbalizes/displays adequate comfort level or baseline comfort level  Outcome: Progressing     Problem: Safety - Adult  Goal: Free from fall injury  Outcome: Progressing     Problem: Discharge Planning  Goal: Discharge to home or other facility with appropriate resources  Outcome: Progressing     Problem: Chronic Conditions and Co-morbidities  Goal: Patient's chronic conditions and co-morbidity symptoms are monitored and maintained or improved  Outcome: Progressing     Problem: Nutrition  Goal: Nutrient intake appropriate for maintaining nutritional needs  Outcome: Progressing     Problem: Fall/Injury  Goal: Not fall by end of shift  Outcome: Progressing  Goal: Be free from injury by end of the shift  Outcome: Progressing  Goal: Verbalize understanding of personal risk factors for fall in the hospital  Outcome: Progressing  Goal: Verbalize understanding of risk factor reduction measures to prevent injury from fall in the home  Outcome: Progressing  Goal: Use assistive devices by end of the shift  Outcome: Progressing  Goal: Pace activities to prevent fatigue by end of the shift  Outcome: Progressing     Problem: Pain  Goal: Takes deep breaths with improved pain control throughout the shift  Outcome: Progressing  Goal: Turns in bed with improved pain control throughout the shift  Outcome: Progressing  Goal: Walks with improved pain control throughout the shift  Outcome: Progressing  Goal: Performs ADL's with improved pain control throughout shift  Outcome: Progressing  Goal: Participates in PT with improved pain control throughout the shift  Outcome: Progressing  Goal: Free from opioid side effects throughout the shift  Outcome: Progressing  Goal: Free from acute confusion related to pain meds throughout the shift  Outcome: Progressing     Problem: Respiratory  Goal: Clear secretions with interventions this shift  Outcome: Progressing  Goal: Minimize anxiety/maximize coping throughout  shift  Outcome: Progressing  Goal: Minimal/no exertional discomfort or dyspnea this shift  Outcome: Progressing  Goal: No signs of respiratory distress (eg. Use of accessory muscles. Peds grunting)  Outcome: Progressing  Goal: Patent airway maintained this shift  Outcome: Progressing  Goal: Tolerate mechanical ventilation evidenced by VS/agitation level this shift  Outcome: Progressing  Goal: Tolerate pulmonary toileting this shift  Outcome: Progressing  Goal: Verbalize decreased shortness of breath this shift  Outcome: Progressing  Goal: Wean oxygen to maintain O2 saturation per order/standard this shift  Outcome: Progressing  Goal: Increase self care and/or family involvement in next 24 hours  Outcome: Progressing   The patient's goals for the shift include      The clinical goals for the shift include BM by end of shift    Patient able to have multiple Bms, they are watery. Patient in good spirits about her condition.

## 2025-05-30 ENCOUNTER — PATIENT OUTREACH (OUTPATIENT)
Dept: CARE COORDINATION | Facility: CLINIC | Age: 25
End: 2025-05-30
Payer: COMMERCIAL

## 2025-05-30 NOTE — PROGRESS NOTES
Chart review complete for CM.  Patient is well established with hem onc for follow up. Appts are scheduled.    Frannie Courtney RN, [facility]  Phone [phone]

## 2025-06-02 NOTE — DISCHARGE SUMMARY
Discharge Diagnosis  Constipation           Issues Requiring Follow-Up  Ongoing GI care per Peds GI    Discharge Meds     Medication List      CONTINUE taking these medications     albuterol 90 mcg/actuation inhaler; Inhale 1-2 puffs every 4 hours if   needed for wheezing or shortness of breath.   benzoyl peroxide 10 % external wash; Commonly known as: Benzac AC; APPLY   TOPICALLY 2 TIMES A DAY. WASH TWICE DAILY AS DIRECTED   cholecalciferol 1.25 mg (50,000 units) capsule; Commonly known as:   Vitamin D-3; Take 1 capsule (50,000 Units) by mouth 1 (one) time per week.   clindamycin 1 % lotion; Commonly known as: Cleocin T; Apply a pea-size   amount to entire face twice daily after washing face with lukewarm water.   Use this BEFORE hydrocortisone cream.   clobetasol 0.05 % ointment; Commonly known as: Temovate; Apply topically   2 times a day. To hands and feet 3x per week for maintenance   ergocalciferol 1250 mcg (50,000 units) capsule; Commonly known as:   Vitamin D-2; Take 1 capsule (1,250 mcg) by mouth 1 (one) time per week.   hydrocortisone 2.5 % ointment; Apply topically 2 times a day. To face 3x   per week for maintenance   hydrOXYzine pamoate 25 mg capsule; Commonly known as: Vistaril; Take 1   capsule (25 mg) by mouth as needed at bedtime for itching. If 1 capsule is   not enough to control symptoms may increase to 2 capsules   ibuprofen 800 mg tablet; Take 1 tablet (800 mg) by mouth every 8 hours   if needed for mild pain (1 - 3) (pain).   Lacto no.45-Dshiyq-RZY-larch 25B cell-25B cell-50 mg capsule; Take 1   capsule by mouth once daily.   linaCLOtide 145 mcg capsule; Commonly known as: Linzess; Take 1 capsule   (145 mcg) by mouth once daily in the morning. Take before meals. Do not   crush or chew.   loratadine 10 mg tablet; Commonly known as: Claritin; Take 1 tablet (10   mg) by mouth once daily.   LORazepam 1 mg tablet; Commonly known as: Ativan; Take 1 tablet (1 mg)   30 minutes prior to MRI for  claustrophobia/anxiety relief.   methylphenidate 5 mg tablet; Commonly known as: Ritalin; Take 1 tablet   (5 mg) by mouth once daily.   multivitamin with iron tablet; Commonly known as: Daily Multiple   Vitamins/Iron; Take 1 tablet by mouth once daily.   OLANZapine 10 mg tablet; Commonly known as: ZyPREXA; TAKE 1 TABLET (10   MG) BY MOUTH ONCE DAILY AT BEDTIME.   omeprazole 40 mg DR capsule; Commonly known as: PriLOSEC; Take 1 capsule   (40 mg) by mouth once daily.   ondansetron 4 mg tablet; Commonly known as: Zofran; Take 1 tablet (4 mg)   by mouth every 8 hours if needed for nausea or vomiting.   oxyCODONE 5 mg immediate release tablet; Commonly known as: Roxicodone;   Take 1 tablet (5 mg) by mouth every 6 hours if needed for severe pain (7 -   10). MAX 4 tablets a day   prucalopride 2 mg tablet; Commonly known as: Motegrity; Take 1 tablet (2   mg) by mouth once daily.   simethicone 125 mg chewable tablet; Commonly known as: Mylicon; Chew 1   tablet (125 mg) every 6 hours if needed for flatulence.   tovorafenib 400 mg/week (100 mg x 4) tablet; Take 400 mg by mouth 1   (one) time per week. (Saturdays) (Four 100 mg tablets = 400 mg.) Swallow   tablets whole with water. Do not chew, cut, or crush. Tablets may be taken   with or without food. Do not fill before May 3, 2025.   triamcinolone 0.1 % ointment; Commonly known as: Kenalog; Apply   topically 2 times a day. To trunk, arms, and legs 3x per week for   maintenance   Trulance tablet tablet; Generic drug: plecanatide; Take 1 tablet (3 mg)   by mouth once daily.       Test Results Pending At Discharge  Pending Labs       No current pending labs.            Hospital Course  Ms Duff was admitted for constipation with no BM x 2 weeks prior to admission. An NG was placed on admission but not tolerated; patient elected to drink GoLytely at recommended pace of 400cc/hr. On day 2 this was changed to Miralax, 17g/hr during waking hours. She had substantial stool output  over 3 days of hospitalization and was supported with IV fluids and electrolyte replacement. By day 3, stool was clear/liquid and KUB showed substantially reduced stool burden. Patient was discharged home to follow with Peds GI for ongoing IBS/constipation management as well as her oncology team.    Of note, a laboratory evaluation for patient's chronic anemia suggested a hypoproliferative normocytic anemia without contributing nutritional deficiencies; likely related to her tovorafenib. Recommend ongoing care with hem/onc.    Pertinent Physical Exam At Time of Discharge  Physical Exam    Outpatient Follow-Up  Future Appointments   Date Time Provider Department Center   6/24/2025  9:30 AM JASVIR Corado-CNP [id number] [facility]   6/24/2025 10:00 AM Deborah Snowden PA-C [id number] [facility]   8/5/2025  9:00 AM [facility]   8/5/2025 10:30 AM Migue Aguilar MD [id number] [facility]   8/21/2025  9:30 AM Den Fajardo MD PhD [id number] Academic         Beny Estrada MD

## 2025-06-03 ENCOUNTER — TELEPHONE (OUTPATIENT)
Dept: ADMISSION | Facility: HOSPITAL | Age: 25
End: 2025-06-03
Payer: COMMERCIAL

## 2025-06-04 ENCOUNTER — TELEPHONE (OUTPATIENT)
Dept: PEDIATRIC GASTROENTEROLOGY | Facility: HOSPITAL | Age: 25
End: 2025-06-04
Payer: COMMERCIAL

## 2025-06-04 NOTE — TELEPHONE ENCOUNTER
----- Message from Nurse Guillermina MICHAELS sent at 5/12/2025  2:11 PM EDT -----  Completed Bharat script, spoke with Sejal to make sure she has not had in the past and was not getting supplies currently. She confirmed. Emailed script demos and clinic note to office to be faxed.  ----- Message -----  From: Dipika Marina, JASVIR-CNP  Sent: 5/8/2025  12:44 PM EDT  To: Guillermina Simon RN    New Bharat Prescription.   Need ASAP.

## 2025-06-04 NOTE — TELEPHONE ENCOUNTER
Per Deborah Snowden PA-C, dose was decreased to 300mg weekly and pt has enough from her previous supply to cover this current cycle.  She has follow up on 6/24 and next cycle will be ordered if appropriate.   Pharmacist updated as above.

## 2025-06-09 ENCOUNTER — APPOINTMENT (OUTPATIENT)
Dept: DERMATOLOGY | Facility: CLINIC | Age: 25
End: 2025-06-09
Payer: COMMERCIAL

## 2025-06-20 ENCOUNTER — HOSPITAL ENCOUNTER (EMERGENCY)
Facility: HOSPITAL | Age: 25
Discharge: OTHER INSTITUTION | End: 2025-06-20
Attending: EMERGENCY MEDICINE
Payer: COMMERCIAL

## 2025-06-20 ENCOUNTER — APPOINTMENT (OUTPATIENT)
Dept: RADIOLOGY | Facility: HOSPITAL | Age: 25
End: 2025-06-20
Payer: COMMERCIAL

## 2025-06-20 ENCOUNTER — HOSPITAL ENCOUNTER (EMERGENCY)
Facility: HOSPITAL | Age: 25
Discharge: STILL ADMITTED OR ADMINISTRATIVE | End: 2025-06-20
Payer: COMMERCIAL

## 2025-06-20 VITALS
TEMPERATURE: 98.4 F | DIASTOLIC BLOOD PRESSURE: 85 MMHG | HEART RATE: 85 BPM | OXYGEN SATURATION: 99 % | RESPIRATION RATE: 16 BRPM | HEIGHT: 63 IN | SYSTOLIC BLOOD PRESSURE: 110 MMHG | WEIGHT: 130 LBS | BODY MASS INDEX: 23.04 KG/M2

## 2025-06-20 DIAGNOSIS — C71.9 ASTROCYTOMA BRAIN TUMOR (MULTI): Primary | ICD-10-CM

## 2025-06-20 DIAGNOSIS — Z98.2 HYDROCEPHALUS MANAGED WITH VP SHUNT: ICD-10-CM

## 2025-06-20 DIAGNOSIS — G44.89 OTHER HEADACHE SYNDROME: ICD-10-CM

## 2025-06-20 DIAGNOSIS — G91.9 HYDROCEPHALUS MANAGED WITH VP SHUNT: ICD-10-CM

## 2025-06-20 LAB
ALBUMIN SERPL BCP-MCNC: 3.6 G/DL (ref 3.4–5)
ALP SERPL-CCNC: 59 U/L (ref 33–110)
ALT SERPL W P-5'-P-CCNC: 12 U/L (ref 7–45)
ANION GAP SERPL CALC-SCNC: 10 MMOL/L (ref 10–20)
AST SERPL W P-5'-P-CCNC: 19 U/L (ref 9–39)
B-HCG SERPL-ACNC: <2 MIU/ML
BASOPHILS # BLD AUTO: 0.04 X10*3/UL (ref 0–0.1)
BASOPHILS NFR BLD AUTO: 0.5 %
BILIRUB SERPL-MCNC: 0.3 MG/DL (ref 0–1.2)
BUN SERPL-MCNC: 6 MG/DL (ref 6–23)
CALCIUM SERPL-MCNC: 8.3 MG/DL (ref 8.6–10.3)
CHLORIDE SERPL-SCNC: 107 MMOL/L (ref 98–107)
CO2 SERPL-SCNC: 21 MMOL/L (ref 21–32)
CREAT SERPL-MCNC: 0.42 MG/DL (ref 0.5–1.05)
EGFRCR SERPLBLD CKD-EPI 2021: >90 ML/MIN/1.73M*2
EOSINOPHIL # BLD AUTO: 0.79 X10*3/UL (ref 0–0.7)
EOSINOPHIL NFR BLD AUTO: 10.7 %
ERYTHROCYTE [DISTWIDTH] IN BLOOD BY AUTOMATED COUNT: 19.4 % (ref 11.5–14.5)
GLUCOSE SERPL-MCNC: 89 MG/DL (ref 74–99)
HCT VFR BLD AUTO: 25 % (ref 36–46)
HGB BLD-MCNC: 7.4 G/DL (ref 12–16)
IMM GRANULOCYTES # BLD AUTO: 0.01 X10*3/UL (ref 0–0.7)
IMM GRANULOCYTES NFR BLD AUTO: 0.1 % (ref 0–0.9)
LYMPHOCYTES # BLD AUTO: 2.85 X10*3/UL (ref 1.2–4.8)
LYMPHOCYTES NFR BLD AUTO: 38.6 %
MCH RBC QN AUTO: 24.6 PG (ref 26–34)
MCHC RBC AUTO-ENTMCNC: 29.6 G/DL (ref 32–36)
MCV RBC AUTO: 83 FL (ref 80–100)
MONOCYTES # BLD AUTO: 1.01 X10*3/UL (ref 0.1–1)
MONOCYTES NFR BLD AUTO: 13.7 %
NEUTROPHILS # BLD AUTO: 2.68 X10*3/UL (ref 1.2–7.7)
NEUTROPHILS NFR BLD AUTO: 36.4 %
NRBC BLD-RTO: 0 /100 WBCS (ref 0–0)
PLATELET # BLD AUTO: 319 X10*3/UL (ref 150–450)
POTASSIUM SERPL-SCNC: 3.2 MMOL/L (ref 3.5–5.3)
PROT SERPL-MCNC: 7.1 G/DL (ref 6.4–8.2)
RBC # BLD AUTO: 3.01 X10*6/UL (ref 4–5.2)
SODIUM SERPL-SCNC: 135 MMOL/L (ref 136–145)
WBC # BLD AUTO: 7.4 X10*3/UL (ref 4.4–11.3)

## 2025-06-20 PROCEDURE — 96366 THER/PROPH/DIAG IV INF ADDON: CPT | Performed by: EMERGENCY MEDICINE

## 2025-06-20 PROCEDURE — 85025 COMPLETE CBC W/AUTO DIFF WBC: CPT | Performed by: PHYSICIAN ASSISTANT

## 2025-06-20 PROCEDURE — 70450 CT HEAD/BRAIN W/O DYE: CPT | Performed by: RADIOLOGY

## 2025-06-20 PROCEDURE — 36415 COLL VENOUS BLD VENIPUNCTURE: CPT | Performed by: PHYSICIAN ASSISTANT

## 2025-06-20 PROCEDURE — 96375 TX/PRO/DX INJ NEW DRUG ADDON: CPT | Performed by: EMERGENCY MEDICINE

## 2025-06-20 PROCEDURE — 2500000004 HC RX 250 GENERAL PHARMACY W/ HCPCS (ALT 636 FOR OP/ED): Mod: JZ | Performed by: EMERGENCY MEDICINE

## 2025-06-20 PROCEDURE — 96365 THER/PROPH/DIAG IV INF INIT: CPT | Performed by: EMERGENCY MEDICINE

## 2025-06-20 PROCEDURE — 4500999001 HC ED NO CHARGE

## 2025-06-20 PROCEDURE — 71045 X-RAY EXAM CHEST 1 VIEW: CPT | Mod: FOREIGN READ | Performed by: RADIOLOGY

## 2025-06-20 PROCEDURE — 2500000004 HC RX 250 GENERAL PHARMACY W/ HCPCS (ALT 636 FOR OP/ED): Mod: JZ | Performed by: PHYSICIAN ASSISTANT

## 2025-06-20 PROCEDURE — 70250 X-RAY EXAM OF SKULL: CPT | Mod: FOREIGN READ | Performed by: RADIOLOGY

## 2025-06-20 PROCEDURE — 84702 CHORIONIC GONADOTROPIN TEST: CPT | Performed by: PHYSICIAN ASSISTANT

## 2025-06-20 PROCEDURE — 74018 RADEX ABDOMEN 1 VIEW: CPT | Mod: FOREIGN READ | Performed by: RADIOLOGY

## 2025-06-20 PROCEDURE — 2500000001 HC RX 250 WO HCPCS SELF ADMINISTERED DRUGS (ALT 637 FOR MEDICARE OP): Performed by: PHYSICIAN ASSISTANT

## 2025-06-20 PROCEDURE — 80053 COMPREHEN METABOLIC PANEL: CPT | Performed by: PHYSICIAN ASSISTANT

## 2025-06-20 PROCEDURE — 70450 CT HEAD/BRAIN W/O DYE: CPT

## 2025-06-20 PROCEDURE — 71045 X-RAY EXAM CHEST 1 VIEW: CPT

## 2025-06-20 PROCEDURE — 96376 TX/PRO/DX INJ SAME DRUG ADON: CPT | Performed by: EMERGENCY MEDICINE

## 2025-06-20 PROCEDURE — 99285 EMERGENCY DEPT VISIT HI MDM: CPT | Mod: 25 | Performed by: EMERGENCY MEDICINE

## 2025-06-20 RX ORDER — KETOROLAC TROMETHAMINE 30 MG/ML
15 INJECTION, SOLUTION INTRAMUSCULAR; INTRAVENOUS ONCE
Status: COMPLETED | OUTPATIENT
Start: 2025-06-20 | End: 2025-06-20

## 2025-06-20 RX ORDER — MAGNESIUM SULFATE HEPTAHYDRATE 40 MG/ML
2 INJECTION, SOLUTION INTRAVENOUS ONCE
Status: COMPLETED | OUTPATIENT
Start: 2025-06-20 | End: 2025-06-20

## 2025-06-20 RX ORDER — DIPHENHYDRAMINE HCL 25 MG
25 CAPSULE ORAL ONCE
Status: COMPLETED | OUTPATIENT
Start: 2025-06-20 | End: 2025-06-20

## 2025-06-20 RX ORDER — HYDROMORPHONE HYDROCHLORIDE 1 MG/ML
1 INJECTION, SOLUTION INTRAMUSCULAR; INTRAVENOUS; SUBCUTANEOUS ONCE
Status: COMPLETED | OUTPATIENT
Start: 2025-06-20 | End: 2025-06-20

## 2025-06-20 RX ORDER — METOCLOPRAMIDE HYDROCHLORIDE 5 MG/ML
5 INJECTION INTRAMUSCULAR; INTRAVENOUS ONCE
Status: COMPLETED | OUTPATIENT
Start: 2025-06-20 | End: 2025-06-20

## 2025-06-20 RX ADMIN — HYDROMORPHONE HYDROCHLORIDE 0.5 MG: 1 INJECTION, SOLUTION INTRAMUSCULAR; INTRAVENOUS; SUBCUTANEOUS at 02:35

## 2025-06-20 RX ADMIN — MAGNESIUM SULFATE HEPTAHYDRATE 2 G: 40 INJECTION, SOLUTION INTRAVENOUS at 03:28

## 2025-06-20 RX ADMIN — DIPHENHYDRAMINE HYDROCHLORIDE 25 MG: 25 CAPSULE ORAL at 02:35

## 2025-06-20 RX ADMIN — KETOROLAC TROMETHAMINE 15 MG: 30 INJECTION, SOLUTION INTRAMUSCULAR at 03:28

## 2025-06-20 RX ADMIN — HYDROMORPHONE HYDROCHLORIDE 1 MG: 1 INJECTION, SOLUTION INTRAMUSCULAR; INTRAVENOUS; SUBCUTANEOUS at 05:08

## 2025-06-20 RX ADMIN — METOCLOPRAMIDE 5 MG: 5 INJECTION, SOLUTION INTRAMUSCULAR; INTRAVENOUS at 02:35

## 2025-06-20 ASSESSMENT — PAIN SCALES - GENERAL
PAINLEVEL_OUTOF10: 10 - WORST POSSIBLE PAIN
PAINLEVEL_OUTOF10: 8
PAINLEVEL_OUTOF10: 8
PAINLEVEL_OUTOF10: 10 - WORST POSSIBLE PAIN
PAINLEVEL_OUTOF10: 10 - WORST POSSIBLE PAIN

## 2025-06-20 ASSESSMENT — PAIN DESCRIPTION - LOCATION
LOCATION: HEAD

## 2025-06-20 ASSESSMENT — PAIN - FUNCTIONAL ASSESSMENT
PAIN_FUNCTIONAL_ASSESSMENT: 0-10

## 2025-06-20 ASSESSMENT — PAIN DESCRIPTION - PAIN TYPE
TYPE: ACUTE PAIN

## 2025-06-20 ASSESSMENT — PAIN DESCRIPTION - PROGRESSION
CLINICAL_PROGRESSION: GRADUALLY IMPROVING
CLINICAL_PROGRESSION: NOT CHANGED
CLINICAL_PROGRESSION: NOT CHANGED
CLINICAL_PROGRESSION: RAPIDLY IMPROVING
CLINICAL_PROGRESSION: NOT CHANGED

## 2025-06-20 NOTE — ED TRIAGE NOTES
Pt from home c/o severe head pain. Pt states that she has brain cancer and has been having severe pain since yesterday. Pt wants to make sure none of her shunt are clogged. Pt states she is having 10/10 pain and is sensitive to light.

## 2025-06-20 NOTE — ED PROVIDER NOTES
HPI     CC: Headache     HPI: Sejal Duff is a 25 y.o. 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  Patient reports that it started yesterday and was gradual onset.  Reports no trauma.  She took her abortive medication of Imitrex without any relief.  She is also tried Motrin and Tylenol without relief.  She reports significant photophobia but no other associated symptoms such as fever, neck stiffness, weakness, numbness, tingling, nausea, vomiting, chest pain, or shortness of breath.  She states that she does often get migraines because of the cancer but she mainly presents to ensure that her  shunt is functioning.  No other acute complaints.    ROS: 10-point review of systems was performed and is otherwise negative except as noted in HPI.      Past Medical History: Noncontributory except per HPI     Past Surgical History: Noncontributory except per HPI     Family History: Reviewed and noncontributory     Social History:  Noncontributory except per HPI       RX Allergies[1]    Medical History[2]    Home Meds:   Current Outpatient Medications   Medication Instructions    albuterol 90 mcg/actuation inhaler 1-2 puffs, inhalation, Every 4 hours PRN    benzoyl peroxide (Benzac AC) 10 % external wash APPLY TOPICALLY 2 TIMES A DAY. WASH TWICE DAILY AS DIRECTED    cholecalciferol (VITAMIN D-3) 50,000 Units, oral, Weekly    clindamycin (Cleocin T) 1 % lotion Apply a pea-size amount to entire face twice daily after washing face with lukewarm water. Use this BEFORE hydrocortisone cream.    clobetasol (Temovate) 0.05 % ointment Topical, 2 times daily, To hands and feet 3x per week for maintenance    ergocalciferol (VITAMIN D-2) 1,250 mcg, oral, Weekly    hydrocortisone 2.5 % ointment Topical, 2 times daily, To face 3x per week for  "maintenance    hydrOXYzine pamoate (VISTARIL) 25 mg, oral, Nightly PRN, If 1 capsule is not enough to control symptoms may increase to 2 capsules    ibuprofen 800 mg, oral, Every 8 hours PRN    Lacto no.96-[id number]-TIT-larch 25B cell-25B cell-50 mg capsule 1 capsule, oral, Daily    linaCLOtide (LINZESS) 145 mcg, oral, Daily before breakfast, Do not crush or chew.    loratadine (CLARITIN) 10 mg, oral, Daily    LORazepam (Ativan) 1 mg tablet Take 1 tablet (1 mg) 30 minutes prior to MRI for claustrophobia/anxiety relief.    methylphenidate (RITALIN) 5 mg, oral, Daily    multivitamin with iron (Daily Multiple Vitamins/Iron) tablet 1 tablet, oral, Daily    OLANZapine (ZYPREXA) 10 mg, oral, Nightly    omeprazole (PRILOSEC) 40 mg, oral, Daily    ondansetron (ZOFRAN) 4 mg, oral, Every 8 hours PRN    oxyCODONE (ROXICODONE) 5 mg, oral, Every 6 hours PRN, MAX 4 tablets a day    prucalopride (MOTEGRITY) 2 mg, oral, Daily    simethicone (MYLICON) 125 mg, oral, Every 6 hours PRN    tovorafenib 400 mg, oral, Weekly, (Saturdays) (Four 100 mg tablets = 400 mg.) Swallow tablets whole with water. Do not chew, cut, or crush. Tablets may be taken with or without food.    triamcinolone (Kenalog) 0.1 % ointment Topical, 2 times daily, To trunk, arms, and legs 3x per week for maintenance    Trulance 3 mg, oral, Daily        ED Triage Vitals [06/20/25 0052]   Temperature Heart Rate Respirations BP   36.9 °C (98.4 °F) 84 16 114/82      Pulse Ox Temp Source Heart Rate Source Patient Position   100 % Temporal Monitor Sitting      BP Location FiO2 (%)     Left arm --         Heart Rate:  [84-85]   Temperature:  [36.9 °C (98.4 °F)]   Respirations:  [16]   BP: (110-114)/(82-85)   Height:  [160 cm (5' 3\")]   Weight:  [59 kg (130 lb)]   Pulse Ox:  [99 %-100 %]      Physical Exam:  Physical Exam  Vitals and nursing note reviewed.   Constitutional:       General: She is not in acute distress.     Appearance: Normal appearance. She is not ill-appearing. "   HENT:      Head: Normocephalic and atraumatic.      Right Ear: External ear normal.      Left Ear: External ear normal.      Nose: Nose normal.      Mouth/Throat:      Mouth: Mucous membranes are moist.   Eyes:      General: No visual field deficit.     Extraocular Movements: Extraocular movements intact.      Right eye: Normal extraocular motion.      Left eye: Normal extraocular motion.      Conjunctiva/sclera: Conjunctivae normal.      Pupils: Pupils are equal, round, and reactive to light.   Cardiovascular:      Rate and Rhythm: Normal rate.   Pulmonary:      Effort: Pulmonary effort is normal. No respiratory distress.   Musculoskeletal:         General: Normal range of motion.      Cervical back: Normal range of motion.   Skin:     General: Skin is warm and dry.   Neurological:      General: No focal deficit present.      Mental Status: She is alert and oriented to person, place, and time.      GCS: GCS eye subscore is 4. GCS verbal subscore is 5. GCS motor subscore is 6.      Cranial Nerves: No cranial nerve deficit, dysarthria or facial asymmetry.      Sensory: No sensory deficit.      Motor: No weakness.      Coordination: Coordination normal.   Psychiatric:         Mood and Affect: Mood normal.          Diagnostic Results        Labs Reviewed   CBC WITH AUTO DIFFERENTIAL - Abnormal       Result Value    WBC 7.4      nRBC 0.0      RBC 3.01 (*)     Hemoglobin 7.4 (*)     Hematocrit 25.0 (*)     MCV 83      MCH 24.6 (*)     MCHC 29.6 (*)     RDW 19.4 (*)     Platelets 319      Neutrophils % 36.4      Immature Granulocytes %, Automated 0.1      Lymphocytes % 38.6      Monocytes % 13.7      Eosinophils % 10.7      Basophils % 0.5      Neutrophils Absolute 2.68      Immature Granulocytes Absolute, Automated 0.01      Lymphocytes Absolute 2.85      Monocytes Absolute 1.01 (*)     Eosinophils Absolute 0.79 (*)     Basophils Absolute 0.04     COMPREHENSIVE METABOLIC PANEL - Abnormal    Glucose 89      Sodium 135  (*)     Potassium 3.2 (*)     Chloride 107      Bicarbonate 21      Anion Gap 10      Urea Nitrogen 6      Creatinine 0.42 (*)     eGFR >90      Calcium 8.3 (*)     Albumin 3.6      Alkaline Phosphatase 59      Total Protein 7.1      AST 19      Bilirubin, Total 0.3      ALT 12     HUMAN CHORIONIC GONADOTROPIN, SERUM QUANTITATIVE - Normal    HCG, Beta-Quantitative <2      Narrative:      Total HCG measurement is performed using the [employer] Access   Immunoassay which detects intact HCG and free beta HCG subunit.    This test is not indicated for use as a tumor marker.   HCG testing is performed using a different test methodology at [facility] than other [facility] hospitals. Direct result comparison   should only be made within the same method.             XR shunt series   Final Result    shunt catheter tubing appears intact.   Signed by Harinder Ochoa      CT head wo IV contrast   Final Result   Unchanged appearance right posterior approach  shunt catheter with   stable appearance of ventricles compared to prior MR imaging   05/23/2025.        Otherwise unchanged appearance of previously described mass in the   region of the hypothalamus.        MACRO:   None.        Signed by: Keenan Encinas 6/20/2025 3:16 AM   Dictation workstation:   [id number]      XR skull complete 4+ views    (Results Pending)                 [place] Coma Scale Score: 15                  Procedure  Procedures    ED Course & MDM   Assessment/Plan:     Medications   HYDROmorphone (Dilaudid) injection 0.5 mg (0.5 mg intravenous Given 6/20/25 0235)   diphenhydrAMINE (BENADryl) capsule 25 mg (25 mg oral Given 6/20/25 0235)   metoclopramide (Reglan) injection 5 mg (5 mg intravenous Given 6/20/25 0235)   ketorolac (Toradol) injection 15 mg (15 mg intravenous Given 6/20/25 0328)   magnesium sulfate 2 g in sterile water for injection 50 mL (0 g intravenous Stopped 6/20/25 0535)   HYDROmorphone (Dilaudid) injection 1 mg (1 mg  intravenous Given 6/20/25 0508)        Diagnoses as of 06/20/25 0504   Other headache syndrome   Hydrocephalus managed with  shunt   Astrocytoma brain tumor (Multi)       Medical Decision Making    Sejal Duff is a 25 y.o. 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 . Patient is nontoxic appearing and vital signs are normal. Differential diagnosis includes hydrocephalus,  shunt failure, migraine headache, symptom of underlying astrocytoma, infection.  Will obtain CT head without contrast to evaluate shunt and for hydrocephalus.  Upon chart review, patient has been seen multiple times for headache in the past.  Typical things that work for her can include narcotics, Benadryl, Reglan, steroids.  Will start with 0.5 mg Dilaudid, 25 mg Benadryl, and 5 mg of Reglan.  Patient has many visits throughout her chart and was recently discharged on 5/29 after her stay in the hospital for chronic constipation.  She was also seen on 4/29 in the ER due to concern for possible infection at the site of the shunt and need for dilation.  She was however discharged to the ER and told her constipation was contributing and she is planned for a bowel cleanout with GI.    CT scan unchanged.   shunt series obtained as well which showed intact tubing.  Discussed the case with on-call neurosurgery resident.  Patient was given the above medications and then given additional 2 g of magnesium, 15 mg Toradol, and 1 mg of Dilaudid without improvement of symptoms.  Given this and patient's history as well as neurosurgery's review of CT scan with possibly enlarged ventricles, would recommend transfer to [facility] for shunt evaluation.  Patient is agreeable to transfer if her mother can drive her.  This is appropriate.  Basic labs obtained for  transfer and possible admission to include CBC, CMP, and hCG.  hCG negative.  CBC with baseline anemia at 7.4 and no leukocytosis.  CMP grossly unremarkable.  Patient accepted for transfer.    Seen with Dr. Duvall    Disposition: Transfer to [facility] for neurosurgical evaluation    ED Prescriptions    None         Social Determinants Affecting Care: none     Jeni Norman PA-C    This note was dictated by speech recognition. Minor errors in transcription may be present.       [1]   Allergies  Allergen Reactions    Morphine Dizziness    Pollen Extracts Runny nose    House Dust Runny nose   [2]   Past Medical History:  Diagnosis Date    Encounter for screening for infections with a predominantly sexual mode of transmission 03/02/2020    Screening for STD (sexually transmitted disease)    Melena 04/12/2019    Blood in stool    Other specified health status     No pertinent past medical history    Other specified health status     No pertinent past surgical history    Personal history of other (healed) physical injury and trauma     History of sprain of ankle    Unspecified condition associated with female genital organs and menstrual cycle 03/02/2020    Cervical motion tenderness        Jeni Norman PA-C  06/20/25 0591

## 2025-06-21 ENCOUNTER — PATIENT MESSAGE (OUTPATIENT)
Dept: HEMATOLOGY/ONCOLOGY | Facility: HOSPITAL | Age: 25
End: 2025-06-21
Payer: COMMERCIAL

## 2025-06-23 ENCOUNTER — LAB (OUTPATIENT)
Dept: LAB | Facility: CLINIC | Age: 25
End: 2025-06-23
Payer: COMMERCIAL

## 2025-06-23 DIAGNOSIS — C71.9 PILOCYTIC ASTROCYTOMA (MULTI): ICD-10-CM

## 2025-06-23 LAB
ALBUMIN SERPL BCP-MCNC: 4.3 G/DL (ref 3.4–5)
ALP SERPL-CCNC: 66 U/L (ref 33–110)
ALT SERPL W P-5'-P-CCNC: 10 U/L (ref 7–45)
ANION GAP SERPL CALC-SCNC: 12 MMOL/L (ref 10–20)
AST SERPL W P-5'-P-CCNC: 15 U/L (ref 9–39)
BASOPHILS # BLD AUTO: 0.07 X10*3/UL (ref 0–0.1)
BASOPHILS NFR BLD AUTO: 0.8 %
BILIRUB SERPL-MCNC: 0.3 MG/DL (ref 0–1.2)
BUN SERPL-MCNC: 10 MG/DL (ref 6–23)
CALCIUM SERPL-MCNC: 9.8 MG/DL (ref 8.6–10.6)
CHLORIDE SERPL-SCNC: 104 MMOL/L (ref 98–107)
CO2 SERPL-SCNC: 25 MMOL/L (ref 21–32)
CREAT SERPL-MCNC: 0.46 MG/DL (ref 0.5–1.05)
DACRYOCYTES BLD QL SMEAR: NORMAL
EGFRCR SERPLBLD CKD-EPI 2021: >90 ML/MIN/1.73M*2
EOSINOPHIL # BLD AUTO: 0.85 X10*3/UL (ref 0–0.7)
EOSINOPHIL NFR BLD AUTO: 10.1 %
ERYTHROCYTE [DISTWIDTH] IN BLOOD BY AUTOMATED COUNT: 20.8 % (ref 11.5–14.5)
GLUCOSE SERPL-MCNC: 80 MG/DL (ref 74–99)
HCT VFR BLD AUTO: 27.8 % (ref 36–46)
HGB BLD-MCNC: 8.5 G/DL (ref 12–16)
IMM GRANULOCYTES # BLD AUTO: 0 X10*3/UL (ref 0–0.7)
IMM GRANULOCYTES NFR BLD AUTO: 0 % (ref 0–0.9)
LYMPHOCYTES # BLD AUTO: 2.18 X10*3/UL (ref 1.2–4.8)
LYMPHOCYTES NFR BLD AUTO: 26 %
MCH RBC QN AUTO: 24.9 PG (ref 26–34)
MCHC RBC AUTO-ENTMCNC: 30.6 G/DL (ref 32–36)
MCV RBC AUTO: 82 FL (ref 80–100)
MONOCYTES # BLD AUTO: 1.2 X10*3/UL (ref 0.1–1)
MONOCYTES NFR BLD AUTO: 14.3 %
NEUTROPHILS # BLD AUTO: 4.1 X10*3/UL (ref 1.2–7.7)
NEUTROPHILS NFR BLD AUTO: 48.8 %
NRBC BLD-RTO: ABNORMAL /100{WBCS}
OVALOCYTES BLD QL SMEAR: NORMAL
PLATELET # BLD AUTO: 373 X10*3/UL (ref 150–450)
PLATELET CLUMP BLD QL SMEAR: PRESENT
POLYCHROMASIA BLD QL SMEAR: NORMAL
POTASSIUM SERPL-SCNC: 4.2 MMOL/L (ref 3.5–5.3)
PROT SERPL-MCNC: 8.1 G/DL (ref 6.4–8.2)
RBC # BLD AUTO: 3.41 X10*6/UL (ref 4–5.2)
RBC MORPH BLD: NORMAL
SCHISTOCYTES BLD QL SMEAR: NORMAL
SODIUM SERPL-SCNC: 137 MMOL/L (ref 136–145)
WBC # BLD AUTO: 8.4 X10*3/UL (ref 4.4–11.3)

## 2025-06-23 PROCEDURE — 80053 COMPREHEN METABOLIC PANEL: CPT

## 2025-06-23 PROCEDURE — 36415 COLL VENOUS BLD VENIPUNCTURE: CPT

## 2025-06-23 PROCEDURE — 85025 COMPLETE CBC W/AUTO DIFF WBC: CPT

## 2025-06-24 ENCOUNTER — OFFICE VISIT (OUTPATIENT)
Dept: HEMATOLOGY/ONCOLOGY | Facility: HOSPITAL | Age: 25
End: 2025-06-24
Payer: COMMERCIAL

## 2025-06-24 ENCOUNTER — LAB (OUTPATIENT)
Dept: LAB | Facility: HOSPITAL | Age: 25
End: 2025-06-24
Payer: COMMERCIAL

## 2025-06-24 VITALS
BODY MASS INDEX: 23.22 KG/M2 | WEIGHT: 131.1 LBS | DIASTOLIC BLOOD PRESSURE: 57 MMHG | RESPIRATION RATE: 16 BRPM | SYSTOLIC BLOOD PRESSURE: 111 MMHG | TEMPERATURE: 95.7 F | OXYGEN SATURATION: 100 % | HEART RATE: 91 BPM

## 2025-06-24 DIAGNOSIS — L27.0 ACNEIFORM DRUG ERUPTION: ICD-10-CM

## 2025-06-24 DIAGNOSIS — G89.18 ACUTE POST-OPERATIVE PAIN: ICD-10-CM

## 2025-06-24 DIAGNOSIS — Z51.5 ENCOUNTER FOR PALLIATIVE CARE: ICD-10-CM

## 2025-06-24 DIAGNOSIS — C71.9 PILOCYTIC ASTROCYTOMA (MULTI): Primary | ICD-10-CM

## 2025-06-24 DIAGNOSIS — C71.9 PILOCYTIC ASTROCYTOMA (MULTI): ICD-10-CM

## 2025-06-24 DIAGNOSIS — F41.9 ANXIETY: ICD-10-CM

## 2025-06-24 DIAGNOSIS — Z98.2 S/P VP SHUNT: ICD-10-CM

## 2025-06-24 DIAGNOSIS — F40.240 CLAUSTROPHOBIA: ICD-10-CM

## 2025-06-24 LAB
ALBUMIN SERPL BCP-MCNC: 4.3 G/DL (ref 3.4–5)
ALP SERPL-CCNC: 65 U/L (ref 33–110)
ALT SERPL W P-5'-P-CCNC: 11 U/L (ref 7–45)
ANION GAP SERPL CALC-SCNC: 10 MMOL/L (ref 10–20)
AST SERPL W P-5'-P-CCNC: 16 U/L (ref 9–39)
BASOPHILS # BLD AUTO: 0.05 X10*3/UL (ref 0–0.1)
BASOPHILS NFR BLD AUTO: 0.7 %
BILIRUB SERPL-MCNC: 0.4 MG/DL (ref 0–1.2)
BUN SERPL-MCNC: 9 MG/DL (ref 6–23)
CALCIUM SERPL-MCNC: 9.7 MG/DL (ref 8.6–10.3)
CHLORIDE SERPL-SCNC: 104 MMOL/L (ref 98–107)
CO2 SERPL-SCNC: 29 MMOL/L (ref 21–32)
CREAT SERPL-MCNC: 0.54 MG/DL (ref 0.5–1.05)
EGFRCR SERPLBLD CKD-EPI 2021: >90 ML/MIN/1.73M*2
EOSINOPHIL # BLD AUTO: 0.83 X10*3/UL (ref 0–0.7)
EOSINOPHIL NFR BLD AUTO: 11.5 %
ERYTHROCYTE [DISTWIDTH] IN BLOOD BY AUTOMATED COUNT: 20.7 % (ref 11.5–14.5)
GLUCOSE SERPL-MCNC: 87 MG/DL (ref 74–99)
HCT VFR BLD AUTO: 30.4 % (ref 36–46)
HGB BLD-MCNC: 9 G/DL (ref 12–16)
IMM GRANULOCYTES # BLD AUTO: 0.02 X10*3/UL (ref 0–0.7)
IMM GRANULOCYTES NFR BLD AUTO: 0.3 % (ref 0–0.9)
LYMPHOCYTES # BLD AUTO: 2.54 X10*3/UL (ref 1.2–4.8)
LYMPHOCYTES NFR BLD AUTO: 35.3 %
MCH RBC QN AUTO: 24.7 PG (ref 26–34)
MCHC RBC AUTO-ENTMCNC: 29.6 G/DL (ref 32–36)
MCV RBC AUTO: 84 FL (ref 80–100)
MONOCYTES # BLD AUTO: 0.89 X10*3/UL (ref 0.1–1)
MONOCYTES NFR BLD AUTO: 12.4 %
NEUTROPHILS # BLD AUTO: 2.87 X10*3/UL (ref 1.2–7.7)
NEUTROPHILS NFR BLD AUTO: 39.8 %
NRBC BLD-RTO: 0 /100 WBCS (ref 0–0)
PLATELET # BLD AUTO: 359 X10*3/UL (ref 150–450)
POTASSIUM SERPL-SCNC: 3.4 MMOL/L (ref 3.5–5.3)
PROT SERPL-MCNC: 8.4 G/DL (ref 6.4–8.2)
RBC # BLD AUTO: 3.64 X10*6/UL (ref 4–5.2)
SODIUM SERPL-SCNC: 140 MMOL/L (ref 136–145)
WBC # BLD AUTO: 7.2 X10*3/UL (ref 4.4–11.3)

## 2025-06-24 PROCEDURE — 99215 OFFICE O/P EST HI 40 MIN: CPT | Performed by: NURSE PRACTITIONER

## 2025-06-24 PROCEDURE — 80053 COMPREHEN METABOLIC PANEL: CPT

## 2025-06-24 PROCEDURE — 99215 OFFICE O/P EST HI 40 MIN: CPT

## 2025-06-24 PROCEDURE — 36415 COLL VENOUS BLD VENIPUNCTURE: CPT

## 2025-06-24 PROCEDURE — 85025 COMPLETE CBC W/AUTO DIFF WBC: CPT

## 2025-06-24 RX ORDER — LORAZEPAM 1 MG/1
TABLET ORAL
Qty: 1 TABLET | Refills: 0 | Status: SHIPPED | OUTPATIENT
Start: 2025-06-24

## 2025-06-24 RX ORDER — OXYCODONE HYDROCHLORIDE 5 MG/1
5 TABLET ORAL EVERY 6 HOURS PRN
Qty: 120 TABLET | Refills: 0 | Status: SHIPPED | OUTPATIENT
Start: 2025-06-24 | End: 2025-07-24

## 2025-06-24 ASSESSMENT — ENCOUNTER SYMPTOMS
CHILLS: 0
APPETITE CHANGE: 1
HEMATURIA: 0
NAUSEA: 1
EXTREMITY WEAKNESS: 0
SEIZURES: 0
SPEECH DIFFICULTY: 0
DIARRHEA: 0
VOMITING: 1
FATIGUE: 1
CONSTIPATION: 1
BLOOD IN STOOL: 0
HEADACHES: 1
FEVER: 0

## 2025-06-24 ASSESSMENT — PAIN SCALES - GENERAL: PAINLEVEL_OUTOF10: 10-WORST PAIN EVER

## 2025-06-24 NOTE — TELEPHONE ENCOUNTER
[facility] report reviewed and reflects  prescription history, no aberrancy noted. Per [facility], patient last filled 30 day supply on 6/1. Per last visit with Kala Fernández on 5/27 patient to continue oxy 5mg q6h. Patient with follow up visit scheduled with Kala on 7/28 medication will be sent to [facility]. Refill request routed to provider.

## 2025-06-24 NOTE — PROGRESS NOTES
[facility]  Neuro-Oncology    Patient: Sejal Duff  MRN: [id number]  Date of visit: 06/24/25  Visit type: In-person clinic visit  Clinician: Deborha Snowden PA-C    Oncological & Treatment History:  Oncology History   Pilocytic astrocytoma (Multi)   10/2024 Initial Diagnosis    25 yo F with PMH of anemia, asthma, GERD, IBS, PCOS, and breast fibroadenoma s/p L breast excisional biopsy presented to [facility] ED on 10/14/24 with headache. CTH parasellar mass with calcifications. MRI with 2.4 cm peripherally enhancing necrotic mass with non-enhancing FLAIR hyperintense lesion in medial L temporal lobe. Admitted for neurosurgical management.     10/15/2024 Biopsy    R stereotactic brain biopsy  Surgeon: Den Segal MD    Prelim path: Glial cells with luigi fibers vs craniopharyngioma     10/23/2024 Tumor Board    Procedure: R stereotactic brain biopsy 10/15/2024   Pathology: Final pending; likely to be a glioma, areas of necrosis noted.       The CNS Tumor Board tumor board considered available treatment options and made the following recommendations: Radiation oncology referral. Add back to CNS TB once pathology is completed.     11/5/2024 Pathology     A.  B.  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  See note     Note:  Microscopic examination for parts A and B reveal a low-grade glial neoplasm immunoreactive with OLIG-2 and GFAP.  Neurofilament immunohistochemical stain highlights neuropil and scattered neuronal bodies.  ATRX and [id number] immunohistochemical stains are retained.  IDH1 and BRAF immunohistochemical stains are negative.  GLIOSEQ studies reveal a BRAF-V600E mutation.  This finding in conjunction with the morphology are most consistent with a pilocytic astrocytoma.  However, a ganglioglioma among other low-grade glial/glial neuronal neoplasms, cannot be completely ruled out.  Clinical correlation is recommended      11/6/2024 Tumor Board    CSF analysis negative.   Pathology:  Low grade glial neoplasm - BRAF - V600E mutation      The CNS Tumor Board tumor board considered available treatment options and made the following recommendations: Repeat MRI brain w/wo and perfusion study. Sellar imaging. Continue following with neuro-oncology. Radiation oncology referral.      11/29/2024 - 12/6/2024 Hospital Admission    Discharge diagnosis: Hydrocephalus    Returned to the [facility] ED 11/28/24 with worsening HA, light sensitivity, CTH incr vents, MRI incr ventricular mass 2.7x2.5cm, ventriculomegaly, trace R ventricular GRE. Patient admitted to neurosurgery service for further management.    11/28 Ophthalmology evaluation without papilledema.  12/1 s/p R VPS (certas at 5) -- Dr. Yap at [facility]  12/2 CTH with catheter in position.      12/29/2024 - 12/31/2024 Hospital Admission    Discharge diagnosis: Hydrocephalus    Returned to the [facility] ED on 12/28/24 with generalized fatigue, HA and body aches. CT head with increased ventricles, SS Certas at 4, LLQ cath with turn vs possible kink, CT AP catheter without kink or discontinuity, no pseudocyst. Patient admitted to the neurosurgery service for further management/workup.    12/28 s/p shunt tap with no proximal flow.   12/29 s/p proximal shunt revision, distal cath flushing. CT head post op with decreased ventricles, catheter in position.         HPI:  Sejal Duff is a 24 y.o. 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      Interval History (NEWEST at BOTTOM of section):  10/29/24: Today she reports some slight headaches but not worsening.  She is currently on dexamethasone 2 mg twice daily.  She takes the morning dose around 9 or 10 AM and the evening dose  before bedtime.  She takes ondansetron about 1 hour prior to the dexamethasone.  She endorses significant issues with vomiting after taking the dexamethasone as well as decreased appetite.  She is able to drink enough fluids.  She states that she tried taking 8 mg of ondansetron prior to the dexamethasone but this did not help either.  She states that the dexamethasone makes her very jittery.  She has not taken dexamethasone for the last 2 days.  She continues to have constipation which is her baseline where she does not have a bowel movement for weeks.  She has been referred by her gastroenterologist to integrative medicine for further help with bowel movements.  Otherwise denies any worsening of vision or new gait or balance issues.  11/8/24: Continues to have some headaches but not worse. She has decreased dexamethasone to 1mg bid on her own and she denies any worsening of her headaches. Denies new balance or strength issues.   11/19/2024: Her headaches are about the same intensity.  Denies new nausea/vomiting episodes.  Denies new gait or balance changes.  Denies new vision changes.   11/26/2024: Sejal states her headaches are worse.  She has not been able to sleep much.  Denies new vision problems.  Denies new weakness or gait balance/issues.  Denies new episodes of nausea or vomiting.  She is upset at the MRI spine being canceled recently.    01/16/25: Since the last visit, Sejal underwent  shunt placement for hydrocephalus (12/1/24) and a subsequent VPS revision (12/29/24). She also started tovorafenib on 12/21/24 and has been taking it once weekly as directed on Saturdays. Endorses significant fatigue and body aches for 2-3 days after taking tovorafenib. Endorses a new acne-like rash to her lower face that started with facial tenderness after her 2nd or 3rd dose of tovorafenib; rash is spreading to her nasolabial folds. States she washes her face with warm water and applies Aquaphor to the affected areas.  "She also reports intermittent episodes over the past 3 weeks where she feels overheated, lightheaded, and needs to utilize her inhaler; states it is similar to asthma flares; denies anxiety precipitating episodes. Also reports persistent nausea since her last surgery. States even water makes her feel nauseated. Feels like she's going to vomit, but it \"sits in [her] chest\" and burns. States she takes Zofran \"like it's candy.\" Denies drinking caffeine/carbonated drinks, fried/spicy foods, and lying down within 30 min of eating. States she hasn't picked up Olanzapine and doesn't take omeprazole as she is wary of being on many medications.    02/04/25: Saw patient in clinic for an impromptu visit regarding her worsening acneiform rash after patient saw [facility] today. No improvement in symptoms since using topical hydrocortisone 2.5% and clindamycin 1%. Now has some similar lesions on her chest. Pictures entered into chart. Additionally, patient reports a nosebleed with clots yesterday that resolved after about 15 minutes. She also has questions about medications -- states she was too fatigued the day after olanzapine and wants to clarify which meds she should/should not be taking. Her mother is present as well -- mother notes family history of cancer, which contributes to her concerns about patient's diagnosis/treatment.    2/18/2025: She denies any visual changes or headaches.  Does endorse some fatigue.  Feels her acneform rash is worsening and has spread more through her torso and back region.  It is itching which prompts her to scratch and then it bleeds.  Also notices some pustular appearing lesions by her wrist and on her feet.  Has been using the clindamycin and hydrocortisone topical lotions.  She has not taken the oral doxycycline as prescribed due to fear of nausea which will keep her from eating.  She feels that her appetite is already bad from her baseline nausea issues.  Also endorses some shortness " "of breath when doing normal walking like from the parking garage to the clinic.  Denies any dyspnea at rest.  Denies chest pain or lower extremity edema.  She has asthma at baseline but says that normally she uses a nebulizer or inhaler only when she is sick with a respiratory infection which exacerbates her asthma.  But she has been needing to use her inhaler and nebulizer more often now.  States her menstrual periods have not returned to baseline since the most recent hospitalization.  Normally she has a quite regularly once monthly.  Her last menstrual period was in November 2024.    04/01/25: Sejal presents for follow-up accompanied by her mother. Her tovorafenib has been held since 3/29/25 due to hemoglobin <10. She reports fatigue, decreased energy, and hypersomnia. Endorses lightheadedness if she's 'too active' or after a hot shower. Reports spontaneous L epistaxis for 10-15 minutes every 1-2 days 'when too active.' Denies hematemesis, hematochezia, melena, and hematuria. Still no menstrual periods since 11/2024. Notes continued intermittent abdominal cramping near the surgical incisions; states the pain is \"like a shocking wave\" when it occurs. Still no appetite. Reports color change to her eyelashes, eyebrows, and hair. Reports improvement with some of her diffuse rash but with resulting hypopigmentation; still has itching - clobetasol and hydroxyzine help. Still has cracked skin to the flexural palmar surfaces of her bilateral hands with a couple open fissures. States she initially had improvement with minocycline, but then became unable to tolerate additional doses as she vomited 10-15 minutes after each dose; she did not start prednisone; she inquires about IV antibiotic options.    04/29/25: Sejal presents unaccompanied to clinic for follow-up. Tovorafenib has been held since 3/29/25 to allow time for her skin to heal and for us to monitor her hemoglobin (hovering around 7.5-8.7). She was permitted to " restart as of 4/17/25 at a reduced dose of 400 mg; she ended up restarting on 4/26/25. Skin is much improved and hemoglobin is stable. Main concern today is severe abdominal pain that is limiting her ability to move and is affecting her QoL. She hasn't been eating due to the pain and has lost <10 pounds over ~5 days, and is now able to palpate her VPS tubing, which is quite bothersome to her. She was seen in the ED twice for abdominal pain with 4/24/25 CTAP concerning for possible enteritis and a small to moderate amount of free fluid in pelvis that may be related to fluid from the VPS. NSGY consulted and adjusted her shunt in ED due to concern for over-draining; low concern for shunt infection. She hasn't seen GI since last fall and states she's tried to schedule but they told her she should see them after brain tumor is addressed (?).    5/27/2025 (clinic visit): Presents for follow-up visit (mother on the phone).  Denies any new headaches or visual changes.  Reports her skin breakouts are improved and her torso and extremities but feels that the lesions on her face is slightly worse and starting tovorafenib.  Continues to have abdominal discomfort and has not had a bowel movement in approximately 10 days.  They are attempting to direct admit her for a bowel cleanse.  Waiting for an open bed.  This feel like it is more difficult to take a deep breath and she has been using her rescue inhaler at least 3 times a week.  She has not seen her PCP in about a year.  Denies any coughing.    06/24/25: Sejal presents for follow-up. There was some miscommunication with what was supposed to be C6 of tovorafenib; we were under the impression she had enough 100 mg pills left to supply her through the cycle, but she states she has not had any since her 5/31/25 dose. She reports having a headache for about 2 weeks, worse on 6/20/25 when she went to the [facility] ED due to the pain; pain is located behind her eyes and bitemporally;  sometimes feels like burning or 'zapping'; unrelieved by oxycodone, Tylenol, ibuprofen, and only somewhat relieved by migraine cocktail given in ED. [facility] wanted [place] [facility] [place] given VPS, but patient declined. She also endorses 1-2 episodes of nausea/vomiting every 1-2 days. 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 She was admitted for a bowel clean out 5/27-5/29 and since then has only had 3 BMs (last one yesterday); no GI follow-up scheduled; was told they had to stop Motegrity(?) and she's waiting to hear about a 'machine' to help her with BMs. Her acneiform rash has worsened on her face since about 1.5 weeks ago; R cheek especially feels hard and painful to touch; tried prescribed triamcinolone, hydrocortisone, clobetasol without relief; now using cold compresses, aloe vera gel, [facility] [place], Cerave Mineral Sunscreen; had a facial last week (after sx already worsened).     Review of Systems:  Review of Systems   Constitutional:  Positive for appetite change and fatigue. Negative for chills and fever.   Gastrointestinal:  Positive for constipation, nausea and vomiting. Negative for blood in stool and diarrhea.   Genitourinary:  Negative for hematuria and vaginal bleeding. Menstrual problem: no period since 11/2024.   Musculoskeletal:  Negative for gait problem.   Skin:  Negative for itching and rash.   Neurological:  Positive for headaches. Negative for extremity weakness, gait problem, seizures and speech difficulty.       Social History:  Social History[1]  Occupation: LXSN, [employer]    Past Medical History:  Medical History[2]     Past Surgical History:  Surgical History[3]     Family History:  Family History[4]    Performance Score:  Karnofsky Score: 70 - Cares for self; unable to carry on normal activity  "or do normal work     Vital Signs:      5/28/2025     3:19 AM 5/28/2025    12:03 PM 5/28/2025     8:13 PM 5/29/2025    12:08 PM 6/20/2025    12:52 AM 6/20/2025     3:00 AM 6/24/2025     9:42 AM   Vitals   Systolic 109 102 90 97 114 110 111   Diastolic 65 69 57 57 82 85 57   BP Location Right arm  Right arm  Left arm  Left arm   Heart Rate 89 94 77 85 84 85 91   Temp 36.2 °C (97.2 °F) 36.4 °C (97.5 °F) 36.6 °C (97.9 °F) 36.6 °C (97.9 °F) 36.9 °C (98.4 °F)  35.4 °C (95.7 °F)   Resp 17 18 16 16 16 16 16   Height     1.6 m (5' 3\")     Weight (lb)     130  131.1   BMI     23.03 kg/m2  23.22 kg/m2   BSA (m2)     1.62 m2  1.63 m2   Visit Report       Report    Report      Physical Exam:  Physical Exam  Vitals reviewed.   Constitutional:       General: She is not in acute distress.     Appearance: She is not toxic-appearing.   HENT:      Head: Normocephalic and atraumatic.      Comments: Loss of pigment to eyelashes and eyebrows  Eyes:      Extraocular Movements: Extraocular movements intact.      Conjunctiva/sclera: Conjunctivae normal.   Pulmonary:      Effort: Pulmonary effort is normal. No respiratory distress.   Abdominal:      General: Abdomen is flat.   Musculoskeletal:         General: Normal range of motion.      Cervical back: Normal range of motion and neck supple.   Skin:     General: Skin is dry.      Findings: No erythema or rash.      Comments: From prior visit: Skin is much improved from prior exam -- resolution of scaling and fissures on bilateral palmar surfaces; still has scattered hypopigmented macules to entire body from previous diffuse rash; scattered hyperpigmented healing macules to face    New 06/24/25: Worse acneiform rash to bilateral facial cheeks with confluence of lesions on R. Pictures uploaded to Media tab.   Neurological:      General: No focal deficit present.      Mental Status: She is alert and oriented to person, place, and time. Mental status is at baseline.      Cranial Nerves: No " "cranial nerve deficit.      Gait: Gait normal.   Psychiatric:         Mood and Affect: Mood normal.         Behavior: Behavior normal.         Results:  === 05/23/25 ===    MR BRAIN W AND WO CONTRAST    - Impression -  1. The FLAIR hyperintense lesion centered in the hypothalamic region  is slightly smaller on current study. Also, the previously  demonstrated peripheral enhancement is not demonstrated on the  current study though there appears to be different techniques for the  postcontrast images between the prior and current studies which may  account for some or all of this finding.  2. The ventricles are slightly increased in size from the prior  examinations with right posterior approach shunt catheter in place.      MACRO:  None    Signed by: Aldo Montero 5/26/2025 4:48 PM  Dictation workstation:   [id number]      Assessment & Plan:  Sejal Duff is a 25 y.o. 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 She also developed hydrocephalus and is s/p VPS (12/1/24) with revision (12/29/24). She started on tovorafenib (type II JUAN CARLOS inhibitor) on 12/21/24 with course c/b skin toxicities.    Previously discussed diagnosis with patient and her mother - explained that her tumor is low-grade, meaning it is classified as slow-growing and benign/non-cancerous, but it is causing issues/symptoms that make it not harmless. Explained that such tumors could possibly transform into a higher grade, meaning become malignant/cancerous, or could still remain \"benign\" but grow large enough to cause significant, even life-threatening problems. Discussed that our goal with tovorafenib is to shrink the tumor as much as possible and/or " reach stability to the point that we can monitor with imaging or we can have neurosurgery evaluate again whether surgery is feasible/safe, but there are no guarantees.    Patient is neurologically stable. She will resume tovorafenib at 300 mg/week once she receives the new supply from the pharmacy (Rx sent today). Amenable to plans below.    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  Treatment: Tovorafenib 300 mg by mouth once weekly (Saturdays)  Planned length of treatment is 6-12 cycles (28-day cycles), but this may be extended or shortened if indicated  Labs on or within 48 hours of day 1 of each cycle to monitor for toxicities  Dosing:  C1 600 mg/week  C2 600 mg/week  C3 600 mg/week  C4 started at 500 mg/week due to grade 2 acneiform rash, then was held from 3/29/25 until 4/26/25 due to worsening skin toxicities and persistently decreased hemoglobin, and was then restarted at a reduced dose of 400 mg/week  C5 400 mg/week  C6 300 mg/week - attempting tumor control/shrinkage at lower dose due to persistent cutaneous AEs -- patient had 1 week, then a 3 week gap due to miscommunication/misunderstanding as we thought she had enough of a supply to cover her C6 due to the new dose adjustment; she did not inform us until today; new Rx tovorafenib sent to pharmacy today; advised patient to inform us if she does not receive it before this Saturday, which is the planned start date  MRI brain w/wo contrast every 2-3 months to assess treatment response  Next MRI scheduled for 8/5/25  Needs 1 mg Ativan Rx to take 30 minutes prior to MRI -- Rx sent  2/2025 MRI spine did not show any evidence of drop metastasis or disseminated disease  Referral to Radiation Oncology -- advised patient and her mom that radiation was previously brought up as an option for a non-invasive treatment of the lesion that could yield faster results versus continuing on tovorafenib for an  indefinite amount of time, especially given the skin toxicities associated with the medication; patient and her mom are very understandably reluctant to consider radiation due to family experiences with it; advised patient that we still recommend she meet with Dr. Tristan to at least hear about the different modalities, potential treatment/effects, and be able to make an informed decision, but also understand if she decides against it -- at this time, she agrees to see Dr. Tristan when she returns to clinic on 8/5/25; reached out to their team to schedule  Follow-up scheduled for 8/5/25 with me due to MD not having clinic time that week, but he will be available to us if needed    #Acneiform drug eruption  #Flexural atopic dermatitis, resolved  Previously per Derm/Dr. Patel: Continue minocycline for at least 2 months after restarting chemo and continue clobetasol to the hands and feet 3x/week as prevention  Note: Taking minocycline at night is not recommended due to increased risk of acid relux.  Note: IV antibiotics are not an option in this case due to needing a longer treatment course for inflammation (versus a shorter course if she had an infection).  Patient missed appointment with Onco-Dermatology in 3/2025  E-consult to Dermatology given acute worsening of rash on face - patient aware of e-consult and possible need to see them in person    #Abdominal pain  #Constipation, chronic  Pain control regimen per palliative medicine team  Reached out to her GI provider Dipika Marina CNP, who stated insurance denied Motegrity and she is awaiting insurance approval for a trans anal irrigation system that will be used to help patient have BMs. Advised Sejal that the NP is hopeful this may be sorted out in the next week or so and that she/her team will plan to reach out to go over plans/instructions    #Headache  Informed [facility] of patient request for oxycodone refill as she is about out of her  current Rx -- [facility] will send new Rx  Reached out to neurosurgery/Dr Yap team to arrange follow-up visit since patient was not evaluated by them during recent ED visit and she still has persistent HA -- pending    Orders Placed This Encounter   Procedures    Referral to Radiation Oncology    E-consult to Dermatology          [1]   Social History  Tobacco Use    Smoking status: Never     Passive exposure: Never    Smokeless tobacco: Never   Vaping Use    Vaping status: Never Used   Substance Use Topics    Alcohol use: Yes     Comment: social    Drug use: Yes     Types: Marijuana   [2]   Past Medical History:  Diagnosis Date    Encounter for screening for infections with a predominantly sexual mode of transmission 03/02/2020    Screening for STD (sexually transmitted disease)    Melena 04/12/2019    Blood in stool    Other specified health status     No pertinent past medical history    Other specified health status     No pertinent past surgical history    Personal history of other (healed) physical injury and trauma     History of sprain of ankle    Unspecified condition associated with female genital organs and menstrual cycle 03/02/2020    Cervical motion tenderness   [3]   Past Surgical History:  Procedure Laterality Date    BREAST SURGERY Left     Mass removal    OTHER SURGICAL HISTORY  01/18/2019    No history of surgery   [4]   Family History  Problem Relation Name Age of Onset    Asthma Mother      Colon cancer Mother

## 2025-06-25 ASSESSMENT — ENCOUNTER SYMPTOMS
DECREASED CONCENTRATION: 0
ABDOMINAL PAIN: 1
NAUSEA: 1
NERVOUS/ANXIOUS: 0
APPETITE CHANGE: 1
FATIGUE: 0
ABDOMINAL DISTENTION: 1
CONSTIPATION: 1
SLEEP DISTURBANCE: 0
DEPRESSION: 1
HEADACHES: 1

## 2025-06-25 NOTE — PROGRESS NOTES
Patient ID: Sejal Duff is a 25 y.o. female.  Referring Physician: Pepper Davidson, APRN-CNP  [address] [place] [address]  Primary Care Provider: Melba Graham MD    Oncologic History:   -10/13/24 presented to 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   -12/01/24 developed hydrocephalus and is s/p  shunt  -12/21/24 D1 tovarefenib (type II JUAN CARLOS inhibitor)   -12/29/24 revision of  shunt    Current treatment plan: curative intent tovarefenib with goal of mass reduction with hope for surgical removal    Subjective    Sejal Duff is a 24 y.o. 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     Returns today in follow up regarding issues unique to young adults with neoplasm.    In the interim since our last visit she was admitted for GI clean out, discharged 5/29 following clear bowel movements. Did not have scheduled follow up with GI team - no change in medications. Again expresses frustration in medical care and lack of follow through - has requested letter stating dx/disability for months and without receiving. Also expresses frustration over care of her astrocytoma greatly desires surgical intervention - relays reduction of disease on imaging but still plan for more chemotherapy.    Emotionally she relays feeling like she is just going through the motions, that nothing changes and no one really helps her. Does express great satisfaction from her team of [facility] providers.     Not dating or in a relationship. Not currently sexually active. LMP  10/2024. She still has not experienced a period in the time she has been on tovorafenib. Not connected to gynecology.     Practically, still dealing with financial and food insecurity. Began working again b/c she needs money for basic needs. Still not approved for SSD.    Social History: Grew up in [place]. Has 20 1/2 siblings from her father, and 1 1/2 sibling from her mother. Currently lives alone in [place]. 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 She enjoys shopping. Never smoker, does not vape. Does use ETOH and marijuana. Email [email or web address]    Family History:  Colon cancer MGM, mGF  Breast cancer MGGF  Cancer unk primary MGGM, PAXTON's, MA's    Review of Systems   Constitutional:  Positive for appetite change. Negative for fatigue.   Gastrointestinal:  Positive for abdominal distention, abdominal pain, constipation and nausea.   Genitourinary:  Positive for menstrual problem. Negative for vaginal bleeding.    Neurological:  Positive for headaches.   Psychiatric/Behavioral:  Positive for depression. Negative for decreased concentration and sleep disturbance. The patient is not nervous/anxious.      Objective   BSA: 1.63 meters squared  /57 (BP Location: Left arm, Patient Position: Sitting, BP Cuff Size: Adult)   Pulse 91   Temp 35.4 °C (95.7 °F) (Temporal)   Resp 16   Wt 59.5 kg (131 lb 1.6 oz)   SpO2 100%   BMI 23.22 kg/m²      Family History   Problem Relation Name Age of Onset    Asthma Mother      Colon cancer Mother       Oncology History   Pilocytic astrocytoma (Multi)   10/2024 Initial Diagnosis    23 yo F with PMH of anemia, asthma, GERD, IBS, PCOS, and breast fibroadenoma s/p L breast excisional biopsy presented to [facility] ED on 10/14/24 with headache. CTH parasellar mass with calcifications. MRI with 2.4 cm peripherally enhancing necrotic mass with non-enhancing FLAIR hyperintense lesion in medial L temporal lobe.  Admitted for neurosurgical management.     10/15/2024 Biopsy    R stereotactic brain biopsy  Surgeon: Den Segal MD    Prelim path: Glial cells with luigi fibers vs craniopharyngioma     10/23/2024 Tumor Board    Procedure: R stereotactic brain biopsy 10/15/2024   Pathology: Final pending; likely to be a glioma, areas of necrosis noted.       The CNS Tumor Board tumor board considered available treatment options and made the following recommendations: Radiation oncology referral. Add back to CNS TB once pathology is completed.     11/5/2024 Pathology     A.  B.  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  See note     Note:  Microscopic examination for parts A and B reveal a low-grade glial neoplasm immunoreactive with OLIG-2 and GFAP.  Neurofilament immunohistochemical stain highlights neuropil and scattered neuronal bodies.  ATRX and [id number] immunohistochemical stains are retained.  IDH1 and BRAF immunohistochemical stains are negative.  GLIOSEQ studies reveal a BRAF-V600E mutation.  This finding in conjunction with the morphology are most consistent with a pilocytic astrocytoma.  However, a ganglioglioma among other low-grade glial/glial neuronal neoplasms, cannot be completely ruled out.  Clinical correlation is recommended     11/6/2024 Tumor Board    CSF analysis negative.   Pathology:  Low grade glial neoplasm - BRAF - V600E mutation      The CNS Tumor Board tumor board considered available treatment options and made the following recommendations: Repeat MRI brain w/wo and perfusion study. Sellar imaging. Continue following with neuro-oncology. Radiation oncology referral.      11/29/2024 - 12/6/2024 Hospital Admission    Discharge diagnosis: Hydrocephalus    Returned to the [facility] ED 11/28/24 with worsening HA, light sensitivity, CTH incr vents, MRI incr ventricular mass 2.7x2.5cm, ventriculomegaly, trace R ventricular GRE. Patient admitted to  neurosurgery service for further management.    11/28 Ophthalmology evaluation without papilledema.  12/1 s/p R VPS (certas at 5) -- Dr. Yap at [facility]  12/2 CTH with catheter in position.      12/29/2024 - 12/31/2024 Hospital Admission    Discharge diagnosis: Hydrocephalus    Returned to the [facility] ED on 12/28/24 with generalized fatigue, HA and body aches. CT head with increased ventricles, SS Certas at 4, LLQ cath with turn vs possible kink, CT AP catheter without kink or discontinuity, no pseudocyst. Patient admitted to the neurosurgery service for further management/workup.    12/28 s/p shunt tap with no proximal flow.   12/29 s/p proximal shunt revision, distal cath flushing. CT head post op with decreased ventricles, catheter in position.         Sejal Duff  reports that she has never smoked. She has never been exposed to tobacco smoke. She has never used smokeless tobacco.  She  reports current alcohol use.  She  reports current drug use. Drug: Marijuana.    Physical Exam  Constitutional:       General: She is not in acute distress.     Appearance: Normal appearance. She is not ill-appearing.   Neurological:      Mental Status: She is alert.   Psychiatric:         Mood and Affect: Mood normal.         Behavior: Behavior normal.         Thought Content: Thought content normal.         Judgment: Judgment normal.       Performance Status:  Asymptomatic    Assessment/Plan    Sejal Duff is a 24 y.o. F with a recent diagnosis of astrocytoma with future plans for oral systemic therapy and possible radiation therapy.     #Chronic constipation/IBS: chronic, lifelong constipation with minimal improvement despite multiple interventions, worse acute on chronic pain  - S/p GI clean out without follow up from [facility], consider referral to adult provider in gastroenterology  - Has previously had NG clean outs at [facility], hydrocolonic therapy, rectal biofeedback, senna, bisacodyl, miralax, golytely, enemas, suppositories,  probiotics, and acupuncture  - Consider serotonin agent in future to help  - Currently on linzess and motegrity without significant improvement    #Psychosocial: young adult with benign neoplasm, being treated and followed by neuro-oncology  - Validated emotions and experience - frustration with fragmented care, goal for surgery but not yet viable option d/t tumor size  - Previously discussed resources available including psychiatry, psychology, social work, spiritual care, and expressive therapies  - Discussed processing emotions/experience with writing/narrative therapy  - Connected to young adult oncology program, will receive monthly social programming invitations  - Following with Paige MOSQUEDA - again discussed with primary team patient need for letter for SSD application  - Has SNAP benefits - identified food insecurity, referral to [employer] is in place, patient needs to call     #Oligomenorrhea: unclear etiology r/t underlying CNS/glioma, vs oral tovarefenib, vs PCOS (with features of oligomenorrhea, acne, +10 <10mm follicles on bilateral ovaries per US, and elevated AMH)  - Discussed concern for PCOS at today's visit  - May need to consider progesterone induced withdrawal bleeds  - Plan for referral to Dr. Griffiths for comprehensive PCOS management in future    #Sexual dysfunction/contraception:   - Previously discussed the seriousness of getting pregnant while receiving oral chemotherapy due to the harmful effects this could have on the  fetus, and on her cancer treatment given the decreased availability of medical  services.   - She is not interested in use of hormonal or non hormonal contraceptives - including IUDs out of concern for their role in developing brain cancer  - Previously discussed that she must prevent pregnancy with use of barrier method every time she engages in penetrative intercourse  - Previously discussed that small amounts of chemotherapy may be found in her  body secretions including vaginal secretions and she should use a barrier form of contraception such as a male or female condom to protect her partner from chemotherapy exposure.    #Risk for infertility:  - Has previously been seen by colleagues in [facility]  - Discussed natural age related decline in fertility and menopause that occurs as a result of ovarian aging, and that cancer treatments can accellerate that progression and diminish ovarian reserve.  - Individuals may experience varying degrees of short or long term ovarian dysfunction and amenorrhea, and that this is dependent upon type of cancer treatment, cumulative dose, and patients age.   - Loss of ovarian function may be permanent or temporary.  - Risk to fertility is UNKNOWN based on cancer treatment of tovarefenib - there is little research about the effects of these agents on ovarian reserve  - Declined fertility preservation prior to initiation of chemotherapy  - Reviewed baseline hormone function testing AMH 5.2 on 11/14/24, E2, FSH/LH WNL  - Experiencing amenorrhea since 10/2024  - We discussed trending her ovarian function q 6 months while she is on therapy, last drawn 10/2024, will repeat in April 2025  - Discussed a plan to monitor hormone function for s/s of hypogonadism    #Diet/Exercise/Healthy Lifestyle:  - Previously discussed research demonstrating consumption of a healthy, plant based diet not only decreases cancer risk, but also has been found to improve survival in cancer patients who partake in a healthy diet.   - Previously discussed research that demonstrates decreased cancer risk and improved survival in cancer patients who exercise and stay active throughout diagnosis and treatment.  - Encouraged patient to continue gentle exercise as tolerated  - Encouraged patient to continue to abstain from alcohol, tobacco, and recreational drugs    #Genetic Risk: young adult with cancer and personal family history of cancer  - Discussed rationale  for genetic risk consultation consider placing referral in future    The amount of time that I spent with the patient:  Prep: 5  Time spend directly with patient: 25  Coordinating care: 5  Documentation: 5  Other time:    Total time spent on encounter: 40    Follow up planned in one month                Pepper Davidson, APRN-CNP

## 2025-06-27 ENCOUNTER — E-CONSULT (OUTPATIENT)
Dept: DERMATOLOGY | Facility: CLINIC | Age: 25
End: 2025-06-27
Payer: COMMERCIAL

## 2025-06-27 DIAGNOSIS — L70.8 INFLAMMATORY ACNE: ICD-10-CM

## 2025-06-27 DIAGNOSIS — L27.0 ACNEIFORM DRUG ERUPTION: Primary | ICD-10-CM

## 2025-06-27 PROCEDURE — 99451 NTRPROF PH1/NTRNET/EHR 5/>: CPT | Performed by: DERMATOLOGY

## 2025-06-27 NOTE — PROGRESS NOTES
E-Consult note:     Summary of data review: Acneiform rash bilateral cheeks, worse on R since 1.5 wks ago. On lower dose of tovorafenib - 300 mg weekly. Tried Rx triamcinolone, clobetasol, hydrocortisone w/o relief. Using cold compresses, aloe vera gel, cleansing spray, cerave mineral sunscreen.     Pics uploaded to chart 6/24/25. Any new recs for tx of facial rash? In-person appt needed? (She missed 5/2025 visit with Dr. Patel, then was re-scheduled with a different provider, so she canceled that.)        Findings / recommendations:   After careful review of the patient's information available in the medical record, the following are my findings and recommendations:       Diagnosis:    1. Acneiform drug eruption    2. Inflammatory acne       Tovorafenib is known to cause acneiform drug eruption. Sejal is also in the age range for hormonal acne in young women who develop deep cystic lesions along the cheeks and jawline. It is possible that she may have an overlap of both.    Recommendations:  - She had been on minocycline previously. If she can tolerate it GI-wise, re-starting that would help  - Combined oral contraceptive, especially one that contains drospirenone can help with the hormonal component of acne  - Isotretinoin is another option but would require monthly visits with dermatology since it is a regulated medication.   - Stopping topical steroids on the face. Would only stop if she can tolerate minocycline PO    If these recommendations are not feasible, I recommend re-establishing with dermatology. If her lesions are only on the face she can schedule for a virtual visit which may be easier for her    eConsult Time: 10 minutes      Judy Patel MD      6/27/2025

## 2025-07-02 ENCOUNTER — TELEPHONE (OUTPATIENT)
Dept: HEMATOLOGY/ONCOLOGY | Facility: HOSPITAL | Age: 25
End: 2025-07-02
Payer: COMMERCIAL

## 2025-07-02 NOTE — TELEPHONE ENCOUNTER
----- Message from Deborah Snowden sent at 7/1/2025  3:38 PM EDT -----  Regarding: derm check in  Hi Selena (or Zoe),    Please call Sejal to let her know the dermatologist suggested she may have a combination of treatment-related acneiform drug rash AND inflammatory/hormonal acne given she is a young adult, so there are a couple treatment options:    1) Is she still taking minocycline? If not, we should restart it. If she is taking it, then she can stop applying topical steroids to her face (clobetasol, hydrocortisone, triamcinolone).  2) We could try a combo oral contraceptive pill to help with the hormonal component of acne as per Dr. Patel's recommendations.  3) We can get her set up for a virtual visit with dermatology if she's unable/unwilling to try these options. Derm also said isotretinoin (aka Accutane) is an option, but would require monthly visits with dermatology since it is a regulated medication.    I can talk to her if she has questions/concerns. Also her GI team sent her a [employer] message that hasn't been read, so I'm wondering if she's heard from them via phone at all?    Thank you,  Deborah

## 2025-07-02 NOTE — TELEPHONE ENCOUNTER
Called and spoke with Sejal regarding dermatology recommendations. She has not been taking the minocycline because that made her sick to her stomach. She has been using the topical steroids. She really doesn't want to take anymore pills but I will reach out to our team to see if there are any other options and if a virtual visit would be best. She has not heard from the GI team but I did tell her to check her mychart. I told her I would also send the recommendations from derm into her mychart so she can look at them.    Selena ARNDT RN

## 2025-07-02 NOTE — TELEPHONE ENCOUNTER
Called and spoke with timi that we recommend a visit with dermatology again. Will send her the office info through [employer]. She has no other questions at this time.    Selena ARNDT RN   30-Oct-2021 11:31

## 2025-07-14 ENCOUNTER — APPOINTMENT (OUTPATIENT)
Dept: NEUROSURGERY | Facility: CLINIC | Age: 25
End: 2025-07-14
Payer: COMMERCIAL

## 2025-07-14 VITALS
DIASTOLIC BLOOD PRESSURE: 62 MMHG | WEIGHT: 131.17 LBS | RESPIRATION RATE: 16 BRPM | BODY MASS INDEX: 23.24 KG/M2 | HEART RATE: 88 BPM | SYSTOLIC BLOOD PRESSURE: 116 MMHG | HEIGHT: 63 IN

## 2025-07-14 DIAGNOSIS — Z98.2 VP (VENTRICULOPERITONEAL) SHUNT STATUS: Primary | ICD-10-CM

## 2025-07-14 PROCEDURE — 99212 OFFICE O/P EST SF 10 MIN: CPT | Performed by: NEUROLOGICAL SURGERY

## 2025-07-14 PROCEDURE — 1036F TOBACCO NON-USER: CPT | Performed by: NEUROLOGICAL SURGERY

## 2025-07-14 PROCEDURE — 3008F BODY MASS INDEX DOCD: CPT | Performed by: NEUROLOGICAL SURGERY

## 2025-07-14 ASSESSMENT — PAIN SCALES - GENERAL: PAINLEVEL_OUTOF10: 0-NO PAIN

## 2025-07-14 NOTE — PROGRESS NOTES
12/29/24  shunt at #5. Today is having sharp pain at her shunt and headaches. Had scans. Told to follow up. Is better now.    25-year-old woman with shunted obstructive hydrocephalus returns for follow-up.  Patient was recently seen in the emergency department after she had an episode of pain in the region of her shunt.  That has subsequently resolved.  She is feeling like her normal self.    On examination, she is alert and interactive.  Extraocular movements are intact.  Face moves symmetrically.  She moves all extremities symmetrically.  Interrogation of the shunt demonstrates that is still at setting #5.    A new CAT scan intensities I personally reviewed demonstrate stable positioning of the ventricular catheter.  There is a question of slight increase in the size of her ventricles.  The shunt series is intact.    While the ventricles may be slightly increased, the patient is showing no clinical signs of shunt malfunction or intracranial hypertension at this time.  As such, do not believe we should change the shunt setting.  I would be happy to see her again if any new issues arise.

## 2025-07-16 ENCOUNTER — TELEPHONE (OUTPATIENT)
Dept: ADMISSION | Facility: HOSPITAL | Age: 25
End: 2025-07-16
Payer: COMMERCIAL

## 2025-07-16 DIAGNOSIS — C71.9 PILOCYTIC ASTROCYTOMA (MULTI): Primary | ICD-10-CM

## 2025-07-16 NOTE — TELEPHONE ENCOUNTER
Currently on 300mg/weekly. She has one dose left. She will get labs Friday.    She is also requesting some oinment which cannot remember the name of for her skin. She says it was 2 tubes and a jar but cannot recall names. I will send a message to the derm team to see if they can refill or reach out to her.    Selena ARNDT RN

## 2025-07-17 DIAGNOSIS — L20.89 FLEXURAL ATOPIC DERMATITIS: ICD-10-CM

## 2025-07-17 RX ORDER — TRIAMCINOLONE ACETONIDE 1 MG/G
OINTMENT TOPICAL 2 TIMES DAILY PRN
Qty: 454 G | Refills: 3 | Status: SHIPPED | OUTPATIENT
Start: 2025-07-17

## 2025-07-17 RX ORDER — HYDROCORTISONE 25 MG/G
1 OINTMENT TOPICAL DAILY
Qty: 30 G | Refills: 3 | Status: SHIPPED | OUTPATIENT
Start: 2025-07-17

## 2025-07-17 RX ORDER — CLOBETASOL PROPIONATE 0.5 MG/G
1 OINTMENT TOPICAL DAILY
Qty: 60 G | Refills: 3 | Status: SHIPPED | OUTPATIENT
Start: 2025-07-17

## 2025-07-28 ENCOUNTER — OFFICE VISIT (OUTPATIENT)
Dept: PALLIATIVE MEDICINE | Facility: HOSPITAL | Age: 25
End: 2025-07-28
Payer: COMMERCIAL

## 2025-07-28 ENCOUNTER — LAB (OUTPATIENT)
Dept: LAB | Facility: HOSPITAL | Age: 25
End: 2025-07-28
Payer: COMMERCIAL

## 2025-07-28 ENCOUNTER — OFFICE VISIT (OUTPATIENT)
Dept: HEMATOLOGY/ONCOLOGY | Facility: HOSPITAL | Age: 25
End: 2025-07-28
Payer: COMMERCIAL

## 2025-07-28 VITALS
TEMPERATURE: 97 F | DIASTOLIC BLOOD PRESSURE: 72 MMHG | HEART RATE: 99 BPM | OXYGEN SATURATION: 100 % | BODY MASS INDEX: 25.7 KG/M2 | SYSTOLIC BLOOD PRESSURE: 122 MMHG | RESPIRATION RATE: 18 BRPM | WEIGHT: 145.1 LBS

## 2025-07-28 DIAGNOSIS — R11.2 CHEMOTHERAPY INDUCED NAUSEA AND VOMITING: ICD-10-CM

## 2025-07-28 DIAGNOSIS — N91.2 AMENORRHEA: ICD-10-CM

## 2025-07-28 DIAGNOSIS — T45.1X5A CHEMOTHERAPY INDUCED NAUSEA AND VOMITING: ICD-10-CM

## 2025-07-28 DIAGNOSIS — C71.9 PILOCYTIC ASTROCYTOMA (MULTI): Primary | ICD-10-CM

## 2025-07-28 DIAGNOSIS — R11.0 NAUSEA: Primary | ICD-10-CM

## 2025-07-28 DIAGNOSIS — Z98.2 S/P VP SHUNT: ICD-10-CM

## 2025-07-28 DIAGNOSIS — C71.9 PILOCYTIC ASTROCYTOMA (MULTI): ICD-10-CM

## 2025-07-28 DIAGNOSIS — R53.0 NEOPLASTIC MALIGNANT RELATED FATIGUE: ICD-10-CM

## 2025-07-28 DIAGNOSIS — F41.9 ANXIETY: ICD-10-CM

## 2025-07-28 DIAGNOSIS — Z51.5 ENCOUNTER FOR PALLIATIVE CARE: ICD-10-CM

## 2025-07-28 DIAGNOSIS — K59.09 CHRONIC CONSTIPATION: ICD-10-CM

## 2025-07-28 DIAGNOSIS — G89.18 ACUTE POST-OPERATIVE PAIN: ICD-10-CM

## 2025-07-28 LAB
ALBUMIN SERPL BCP-MCNC: 3.9 G/DL (ref 3.4–5)
ALP SERPL-CCNC: 58 U/L (ref 33–110)
ALT SERPL W P-5'-P-CCNC: 10 U/L (ref 7–45)
ANION GAP SERPL CALC-SCNC: 11 MMOL/L (ref 10–20)
AST SERPL W P-5'-P-CCNC: 18 U/L (ref 9–39)
BASOPHILS # BLD AUTO: 0.05 X10*3/UL (ref 0–0.1)
BASOPHILS NFR BLD AUTO: 0.7 %
BILIRUB SERPL-MCNC: 0.5 MG/DL (ref 0–1.2)
BUN SERPL-MCNC: 10 MG/DL (ref 6–23)
CALCIUM SERPL-MCNC: 8.8 MG/DL (ref 8.6–10.3)
CHLORIDE SERPL-SCNC: 106 MMOL/L (ref 98–107)
CO2 SERPL-SCNC: 26 MMOL/L (ref 21–32)
CREAT SERPL-MCNC: 0.61 MG/DL (ref 0.5–1.05)
EGFRCR SERPLBLD CKD-EPI 2021: >90 ML/MIN/1.73M*2
EOSINOPHIL # BLD AUTO: 0.43 X10*3/UL (ref 0–0.7)
EOSINOPHIL NFR BLD AUTO: 6.1 %
ERYTHROCYTE [DISTWIDTH] IN BLOOD BY AUTOMATED COUNT: 20.6 % (ref 11.5–14.5)
GLUCOSE SERPL-MCNC: 87 MG/DL (ref 74–99)
HCT VFR BLD AUTO: 29.4 % (ref 36–46)
HGB BLD-MCNC: 8.6 G/DL (ref 12–16)
IMM GRANULOCYTES # BLD AUTO: 0.03 X10*3/UL (ref 0–0.7)
IMM GRANULOCYTES NFR BLD AUTO: 0.4 % (ref 0–0.9)
LYMPHOCYTES # BLD AUTO: 3.9 X10*3/UL (ref 1.2–4.8)
LYMPHOCYTES NFR BLD AUTO: 55.6 %
MCH RBC QN AUTO: 23.8 PG (ref 26–34)
MCHC RBC AUTO-ENTMCNC: 29.3 G/DL (ref 32–36)
MCV RBC AUTO: 81 FL (ref 80–100)
MONOCYTES # BLD AUTO: 0.47 X10*3/UL (ref 0.1–1)
MONOCYTES NFR BLD AUTO: 6.7 %
NEUTROPHILS # BLD AUTO: 2.14 X10*3/UL (ref 1.2–7.7)
NEUTROPHILS NFR BLD AUTO: 30.5 %
NRBC BLD-RTO: 0 /100 WBCS (ref 0–0)
PLATELET # BLD AUTO: 375 X10*3/UL (ref 150–450)
POTASSIUM SERPL-SCNC: 3.6 MMOL/L (ref 3.5–5.3)
PROT SERPL-MCNC: 7.8 G/DL (ref 6.4–8.2)
RBC # BLD AUTO: 3.62 X10*6/UL (ref 4–5.2)
SODIUM SERPL-SCNC: 139 MMOL/L (ref 136–145)
WBC # BLD AUTO: 7 X10*3/UL (ref 4.4–11.3)

## 2025-07-28 PROCEDURE — 99214 OFFICE O/P EST MOD 30 MIN: CPT | Performed by: NURSE PRACTITIONER

## 2025-07-28 PROCEDURE — 99215 OFFICE O/P EST HI 40 MIN: CPT | Performed by: NURSE PRACTITIONER

## 2025-07-28 PROCEDURE — 99215 OFFICE O/P EST HI 40 MIN: CPT | Mod: 25,27 | Performed by: NURSE PRACTITIONER

## 2025-07-28 PROCEDURE — 85025 COMPLETE CBC W/AUTO DIFF WBC: CPT

## 2025-07-28 PROCEDURE — 99214 OFFICE O/P EST MOD 30 MIN: CPT | Mod: 25 | Performed by: NURSE PRACTITIONER

## 2025-07-28 PROCEDURE — 36415 COLL VENOUS BLD VENIPUNCTURE: CPT

## 2025-07-28 PROCEDURE — 80053 COMPREHEN METABOLIC PANEL: CPT

## 2025-07-28 RX ORDER — METHYLPHENIDATE HYDROCHLORIDE 5 MG/1
5 TABLET ORAL DAILY
Qty: 30 TABLET | Refills: 0 | Status: SHIPPED | OUTPATIENT
Start: 2025-07-28 | End: 2025-08-27

## 2025-07-28 RX ORDER — OXYCODONE HYDROCHLORIDE 5 MG/1
5 TABLET ORAL EVERY 6 HOURS PRN
Qty: 120 TABLET | Refills: 0 | Status: SHIPPED | OUTPATIENT
Start: 2025-07-28 | End: 2025-08-27

## 2025-07-28 RX ORDER — ONDANSETRON 4 MG/1
4 TABLET, FILM COATED ORAL EVERY 8 HOURS PRN
Qty: 60 TABLET | Refills: 2 | Status: SHIPPED | OUTPATIENT
Start: 2025-07-28 | End: 2025-09-26

## 2025-07-28 ASSESSMENT — ENCOUNTER SYMPTOMS
FATIGUE: 0
NERVOUS/ANXIOUS: 0
ABDOMINAL DISTENTION: 1
NAUSEA: 1
APPETITE CHANGE: 1
CONSTIPATION: 1
ABDOMINAL PAIN: 1
DEPRESSION: 1
SLEEP DISTURBANCE: 0
HEADACHES: 1
DECREASED CONCENTRATION: 0

## 2025-07-28 ASSESSMENT — PAIN SCALES - GENERAL: PAINLEVEL_OUTOF10: 6

## 2025-07-28 NOTE — PROGRESS NOTES
SUPPORTIVE AND PALLIATIVE ONCOLOGY OUTPATIENT FOLLOW-UP      Medical Oncologist: Migue Aguliar MD    Radiation Oncologist: No care team member to display  Primary Physician: Melba Graham  [phone]    Subjective   HISTORY OF PRESENT ILLNESS: Sejal Duff is a 25 y.o. 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      Patient returned to the [facility] ED 11/28 with worsening HA, light sensitivity, CTH incr vents, MRI incr ventricular mass 2.7x2.5cm, ventriculomegaly, trace R ventricular GRE. Patient admitted to neurosurgery service for further management.      11/28/24 Ophthalmology evaluation without papilledema.  12/1/24 s/p R VPS (certas at 5).   12/2/24 CTH with catheter in position, decreased vents   12/4/24 CTH decreased vents but persistent ventriculomegly; Certas dialed to 4  12/5/24 CTH decreased vents.  Continued headaches Neurology consulted--> likely due to underlying structural mass and recent surgical interventions.  Started on scheduled oral headache cocktail meds of Robaxin, Tylenol, reglan and Benadryl  12/6/24 Ophthalmology consulted for c/o vision changes--> normal exam, no disc edema  Neurology--> HA's migrainous in nature, has had prior and now worse in setting of underlying structural mass and recent VPS surgery.  Started on preventative therapy with amitriptyline 12.5 mg bedtime for 1 week and increase  to 25mg if tolerates and abortive therapy with Rizatriptan PRN  12/21/24: started on tovorafenib (type II JUAN CARLOS inhibitor)    12/29/24 had a subsequent VPS revision     2/18/24: Previously followed with Amy Calloway CNP. Has c/o full body acneiform rash . Started in December on face but spread to back/chest/hands/legs within the last couple weeks. She did not start doxy per E consult. Has derm appt but not until June.     4/1/25: Fatigue persists and she is sleeping a significant amount of the day. Never started ritalin. Also having epistaxis and worsening anemia. She endorses chronic abd pain and headaches but no other overt signs of bleeding. Appetite is poor. She also endorses nausea with meds and insomnia. Denies depression. Following with derm for her skin rash.     4/29/25: Chemo was on hold due to low hemoglobin but started back on Saturday. ER at  4/24 for abdominal pain and w/o unremarkable and she discharged early morning 4/25 and returned to ER at [facility] that same day. She states initially there was concern for infection at the insertion site of her shunt into her abdomen was to get abx but never Rx'ed. Neurosurgery at [facility]  recommended transfer to [facility] due to concern that patient may be over draining from the shunt and needs to be dilated to a higher setting. Shunt was adjusted. Low concern for shunt infection. ACS was consulted and recommended gyn consult but she was discharged from ER. She also told she constipation is contributing to her pain.   Derm issues are resolving.     5/27/25: Symptoms stable. Following with GI again re chronic constipation. There is plan for her to be admitted for bowel clean out.     7/282/5: Having more headaches. Evaluated by neurosurgery - no concern about shunt. Received an irrigation system from GI but hasn't started yet. Tried trulance but ineffective. Back to taking motegrity and linzess.     Pain Assessment:    Location: R abdomen at shunt location   Severity:  "Moderate   Quality: \"Like insides are twisting.\" Spasms at times.   Duration: Months   Timing: Constant   Referral Pattern: Localized   Associated S/S: none    Headaches   Behind the eyes   Intermittent   Started a few weeks ago  Saw neurosurgery and not concern about shunt     Additional Symptom Assessment:    Numbness or tingling in hands/feet/other: none  Lack of appetite: somewhat   Weight loss: somewhat  Nausea/early satiety: a little   Vomiting: none  Constipation: very much - Takes linzess and montegrity on Friday only - moves BM once per week. She states she must taken them 2-3 days apart otherwise they dont work at all if she uses them daily.   Diarrhea: none  Lack of energy: a little  Difficulty sleeping: a little  Anxiety: a little  Depression: a little  Shortness of breath: none  Other: none      Information obtained from: chart review and interview of patient  ______________________________________________________________________     Patient's Oncology History documentation       Oncology History   Pilocytic astrocytoma (Multi)   10/15/2024 Surgery     Biopsy of hypothalamic lesion by Dr. Segal at [facility]       10/15/2024 Pathology      A.  B.  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  See note     Note:  Microscopic examination for parts A and B reveal a low-grade glial neoplasm immunoreactive with OLIG-2 and GFAP.  Neurofilament immunohistochemical stain highlights neuropil and scattered neuronal bodies.  ATRX and [id number] immunohistochemical stains are retained.  IDH1 and BRAF immunohistochemical stains are negative.  GLIOSEQ studies reveal a BRAF-V600E mutation.  This finding in conjunction with the morphology are most consistent with a pilocytic astrocytoma.  However, a ganglioglioma among other low-grade glial/glial neuronal neoplasms, cannot be completely ruled out.  Clinical correlation is recommended               Medical " History        Past Medical History:   Diagnosis Date    Encounter for screening for infections with a predominantly sexual mode of transmission 03/02/2020     Screening for STD (sexually transmitted disease)    Melena 04/12/2019     Blood in stool    Other specified health status       No pertinent past medical history    Other specified health status       No pertinent past surgical history    Personal history of other (healed) physical injury and trauma       History of sprain of ankle    Unspecified condition associated with female genital organs and menstrual cycle 03/02/2020     Cervical motion tenderness         Surgical History         Past Surgical History:   Procedure Laterality Date    BREAST SURGERY Left       Mass removal    OTHER SURGICAL HISTORY   01/18/2019     No history of surgery         Family History          Family History   Problem Relation Name Age of Onset    Asthma Mother        Colon cancer Mother                SOCIAL HISTORY  Social History:  reports that she has never smoked. She has never been exposed to tobacco smoke. She has never used smokeless tobacco. She reports current alcohol use. She reports current drug use. Drug: Marijuana.  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 No children. Single. Primary support is her mom.       Objective     Creatinine   Date Value Ref Range Status   06/24/2025 0.54 0.50 - 1.05 mg/dL Final     AST   Date Value Ref Range Status   06/24/2025 16 9 - 39 U/L Final     ALT   Date Value Ref Range Status   06/24/2025 11 7 - 45 U/L Final     Comment:     Patients treated with Sulfasalazine may generate falsely decreased results for ALT.     Hemoglobin   Date Value Ref Range Status   06/24/2025 9.0 (L) 12.0 - 16.0 g/dL Final     Platelets   Date Value Ref Range Status   06/24/2025 359 150 - 450 x10*3/uL Final       PHYSICAL EXAMINATION       5/28/2025     8:13 PM 5/29/2025    12:08 PM 6/20/2025    12:52 AM 6/20/2025     3:00 AM 6/24/2025     9:42 AM 7/14/2025    11:47 AM  "7/28/2025    10:08 AM   Vitals   Systolic 90 97 114 110 111 116 122   Diastolic 57 57 82 85 57 62 72   BP Location Right arm  Left arm  Left arm Right arm Right arm   Heart Rate 77 85 84 85 91 88 99   Temp 36.6 °C (97.9 °F) 36.6 °C (97.9 °F) 36.9 °C (98.4 °F)  35.4 °C (95.7 °F)  36.1 °C (97 °F)   Resp 16 16 16 16 16 16 18   Height   1.6 m (5' 3\")   1.6 m (5' 3\")    Weight (lb)   130  131.1 131.17 145.1   BMI   23.03 kg/m2  23.22 kg/m2 23.24 kg/m2 25.7 kg/m2   BSA (m2)   1.62 m2  1.63 m2 1.63 m2 1.71 m2   Visit Report     Report    Report Report Report       Wt Readings from Last 10 Encounters:   07/28/25 65.8 kg (145 lb 1.6 oz)   07/14/25 59.5 kg (131 lb 2.8 oz)   06/24/25 59.5 kg (131 lb 1.6 oz)   06/20/25 59 kg (130 lb)   05/27/25 60.3 kg (133 lb)   05/27/25 60.3 kg (133 lb)   05/08/25 60.1 kg (132 lb 7.9 oz)   05/05/25 56.3 kg (124 lb 1.9 oz)   04/29/25 56.3 kg (124 lb 3.2 oz)   04/25/25 63.5 kg (140 lb)         Physical Exam  HENT:      Head: Normocephalic and atraumatic.     Eyes:      Extraocular Movements: Extraocular movements intact.      Conjunctiva/sclera: Conjunctivae normal.     Pulmonary:      Effort: Pulmonary effort is normal.   Abdominal:      General: Abdomen is flat.     Musculoskeletal:         General: Normal range of motion.     Neurological:      General: No focal deficit present.      Mental Status: She is alert.     Psychiatric:         Mood and Affect: Mood normal.         Thought Content: Thought content normal.         ASSESSMENT/PLAN    Pain/Headaches  Pain is: cancer related pain (HA), generalized body aches from treatment, chronic abd pain   Type: neuropathic, somatic, visceral   - Continue Oxycodone 5mg [id number] PRN.   - Continue Ibuprofen 800mg g8tospa PRN - takes one per day at most   - Maxalt, amitriptyline, tylenol, bentyl ineffective      Opioid Use  Medication Management:   - OARRS report reviewed with no aberrant behavior; consistent with  prescriptions/records and patient " history  - MED 22.5.  Overdose Risk Score 350.   This has been discussed with patient.   - We will continue to closely monitor the patient for signs of prescription misuse including UDS, OARRS review and subjective reports at each visit.  - No concurrent benzodiazepine use   - I am a provider who either is or has consulted and collaborated with a provider certified in Hospice and Palliative Medicine and have conducted a face-face visit and examination for this patient.  - Routine Urine Drug Screen: 12/11/2024 appropriately + for prescribed medications  - Controlled Substance Agreement: 12/11/2024  - Specifically discussed that controlled substance prescriptions will only be provided by our group as outlined in the completed agreement  - Prescribed naloxone: patient declined  - Red Flags: NA     Constipation  At risk for constipation related to opioids. Chronic concerns of constipation  - Continue Linzess 145mcg and Motegrity 2mg as prescribed by GI  - Transanal irrigation device per GI     Nausea/Decreased Appetite  - Continue Omeprazole 40mg daily - takes inconsistently   - Continue Ondansetron 4mg [id number] PRN  - Continue Olanzapine to 10mg at bed  - Encouraged good hydration  - Encouraged small more frequent meals    Anxiety/Sleeping Difficulty  - Olanzapine per above     Fatigue  - Continue methylphenidate 5mg dally - mild improvement   - Provigil 100mg - ineffective  - 2/2 depression? Patient declines trial of antidepressant      Advance Directives  Existence of Advance Directives:No - not interested  Decision maker: Surrogate decision maker is Darby Duff (mom)  Code Status: Full code    Next Follow-Up Visit:  Return to clinic in 1-2 months;  align with oncology appt    Signature and billing  Medical complexity was moderate level due to due to complexity of problems, extensive data review, and high risk of management/treatment.  Time was spent on the following: Prep Time, Time Directly with  Patient/Family/Caregiver, Documentation Time. Total time spent: 30      Data  Diagnostic tests and information reviewed for today's visit:  Most recent labs and imaging results, Medications     Some elements copied from Palliative Care note on 5/27/2025, the elements have been updated and all reflect current decision making from today, 7/282025.    Plan of Care discussed with: Patient, RN, mother     SIGNATURE: JASVIR Ahumada-CNP    Contact information:  Supportive [facility] Palliative Oncology  Monday-Friday 8 AM-5 PM  Phone:  [phone], press option #5, then option #1.   Or Epic Secure Chat

## 2025-07-28 NOTE — PROGRESS NOTES
Patient ID: Sejal Duff is a 25 y.o. female.  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   -12/01/24 developed hydrocephalus and is s/p  shunt  -12/21/24 D1 tovarefenib (type II JUAN CARLOS inhibitor)   -12/29/24 revision of  shunt    Current treatment plan: curative intent tovarefenib with goal of mass reduction with hope for surgical removal    Subjective    Sejal Duff is a 24 y.o. female with a past history of anemia, asthma GERD, IBS, PCOS and breast fibroadenoma s/p L breast excisional biopsy, with recent diagnosis of astrocytoma.     Returns today in follow up regarding issues unique to young adults with neoplasm.    Continues to experience ongoing constipation and headaches. Achieving bowel movement weekly with use of medications. Has titrated drugs in past without relief, amenable to consistent use of twice weekly dosing. Is interested in exploring non-medication approaches to her symptoms.     Relays decreased endurance and deconditioning is interested in physical therapy to help with strength and endurance. Goal to be able to exercise.    Emotionally, she relays feeling somewhat improved from past visit. Continues to spend time with family primarily. Living at home with parents/siblings.     Not dating or in a relationship. Not currently sexually active. LMP was 10/2024, had a period in June and July. Last Ob/Gyn appt 02/2024.    Practically, still dealing with financial and food  insecurity. Began working again b/c she needs money for basic needs. Still not approved for SSD. Is interested in assistance with getting an air conditioner for medical reasons.     Social History: Grew up in [place]. Has 20 1/2 siblings from her father, and 1 1/2 sibling from her mother. Currently lives alone in [place]. 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 She enjoys shopping. Never smoker, does not vape. Does use ETOH and marijuana. Email [email or web address]    Family History:  Colon cancer MGM, mGF  Breast cancer MGGF  Cancer unk primary MGGM, MU's, MA's    Review of Systems   Constitutional:  Positive for appetite change. Negative for fatigue.   Gastrointestinal:  Positive for abdominal distention, abdominal pain, constipation and nausea.   Genitourinary:  Positive for menstrual problem. Negative for vaginal bleeding.    Neurological:  Positive for headaches.   Psychiatric/Behavioral:  Positive for depression. Negative for decreased concentration and sleep disturbance. The patient is not nervous/anxious.      Objective   BSA: There is no height or weight on file to calculate BSA.  LMP  (LMP Unknown)      Family History   Problem Relation Name Age of Onset    Asthma Mother      Colon cancer Mother       Oncology History   Pilocytic astrocytoma (Multi)   10/2024 Initial Diagnosis    23 yo F with PMH of anemia, asthma, GERD, IBS, PCOS, and breast fibroadenoma s/p L breast excisional biopsy presented to [facility] ED on 10/14/24 with headache. CTH parasellar mass with calcifications. MRI with 2.4 cm peripherally enhancing necrotic mass with non-enhancing FLAIR hyperintense lesion in medial L temporal lobe. Admitted for neurosurgical management.     10/15/2024 Biopsy    R stereotactic brain biopsy  Surgeon: Den Segal MD    Prelim path: Glial cells with luigi fibers vs craniopharyngioma     10/23/2024 Tumor Board    Procedure: R  stereotactic brain biopsy 10/15/2024   Pathology: Final pending; likely to be a glioma, areas of necrosis noted.       The CNS Tumor Board tumor board considered available treatment options and made the following recommendations: Radiation oncology referral. Add back to CNS TB once pathology is completed.     11/5/2024 Pathology     A.  B.  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  See note     Note:  Microscopic examination for parts A and B reveal a low-grade glial neoplasm immunoreactive with OLIG-2 and GFAP.  Neurofilament immunohistochemical stain highlights neuropil and scattered neuronal bodies.  ATRX and [id number] immunohistochemical stains are retained.  IDH1 and BRAF immunohistochemical stains are negative.  GLIOSEQ studies reveal a BRAF-V600E mutation.  This finding in conjunction with the morphology are most consistent with a pilocytic astrocytoma.  However, a ganglioglioma among other low-grade glial/glial neuronal neoplasms, cannot be completely ruled out.  Clinical correlation is recommended     11/6/2024 Tumor Board    CSF analysis negative.   Pathology:  Low grade glial neoplasm - BRAF - V600E mutation      The CNS Tumor Board tumor board considered available treatment options and made the following recommendations: Repeat MRI brain w/wo and perfusion study. Sellar imaging. Continue following with neuro-oncology. Radiation oncology referral.      11/29/2024 - 12/6/2024 Hospital Admission    Discharge diagnosis: Hydrocephalus    Returned to the [facility] ED 11/28/24 with worsening HA, light sensitivity, CTH incr vents, MRI incr ventricular mass 2.7x2.5cm, ventriculomegaly, trace R ventricular GRE. Patient admitted to neurosurgery service for further management.    11/28 Ophthalmology evaluation without papilledema.  12/1 s/p R VPS (certas at 5) -- Dr. Yap at [facility]  12/2 CTH with catheter in position.      12/29/2024 - 12/31/2024 Hospital Admission     Discharge diagnosis: Hydrocephalus    Returned to the [facility] ED on 12/28/24 with generalized fatigue, HA and body aches. CT head with increased ventricles, SS Certas at 4, LLQ cath with turn vs possible kink, CT AP catheter without kink or discontinuity, no pseudocyst. Patient admitted to the neurosurgery service for further management/workup.    12/28 s/p shunt tap with no proximal flow.   12/29 s/p proximal shunt revision, distal cath flushing. CT head post op with decreased ventricles, catheter in position.         Sejal Duff  reports that she has never smoked. She has never been exposed to tobacco smoke. She has never used smokeless tobacco.  She  reports current alcohol use.  She  reports current drug use. Drug: Marijuana.    Physical Exam  Constitutional:       General: She is not in acute distress.     Appearance: Normal appearance. She is not ill-appearing.     Neurological:      Mental Status: She is alert.     Psychiatric:         Mood and Affect: Mood normal.         Behavior: Behavior normal.         Thought Content: Thought content normal.         Judgment: Judgment normal.     Performance Status:  Asymptomatic    Assessment/Plan    Sejal Duff is a 24 y.o. F with a diagnosis of astrocytoma currently receiving systemic therapy with tovorafenib weekly and possible radiation therapy.     #Chronic constipation/IBS: chronic, lifelong constipation with minimal improvement despite multiple interventions, worse acute on chronic pain  - S/p GI clean out without follow up from [facility] GI, consider referral to adult provider in gastroenterology  - Has previously had NG clean outs at [facility], hydrocolonic therapy, rectal biofeedback, senna, bisacodyl, miralax, golytely, enemas, suppositories, probiotics, and acupuncture  - Consider referral to adult GI provider for another opinion re management; will look into providers with focus on IBS/chronic constipation  - Referral to acupuncture for constipation and headaches  -  Consider serotonin agent in future to help  - Currently on linzess and motegrity without significant improvement    #Deconditioning: due to sedentary lifestyle, oral targeted therapy, medical appointment fatigue  - Referral to physical therapy  - Will provide patient with information about community [facility] cancer rehab program    #Psychosocial: young adult with benign neoplasm, being treated and followed by neuro-oncology  - Validated emotions and experience - encouraged engagement in young adult oncology social programming  - Encouraged physical therapy and gentle exercise to help with mood  - Previously discussed resources available including psychiatry, psychology, social work, spiritual care, and expressive therapies  - Previously discussed processing emotions/experience with writing/narrative therapy  - Provided patient with letter for emotional support animal  - Connected to young adult oncology program, will receive monthly social programming invitations  - Following with Paige MOSQUEDA - will ask for letter for air conditioner unit  - Has SNAP benefits - identified food insecurity, referral to [facility] is in place, patient needs to call     #Oligomenorrhea: unclear etiology r/t underlying CNS/glioma, vs oral tovarefenib, vs PCOS (with features of oligomenorrhea, acne, +10 <10mm follicles on bilateral ovaries per US, and elevated AMH)  - Discussed concern for PCOS at today's visit  - LMP 2025  - Plan for referral to Dr. Griffiths for comprehensive PCOS management in future    #Sexual dysfunction/contraception:   - Previously discussed the seriousness of getting pregnant while receiving oral chemotherapy due to the harmful effects this could have on the  fetus, and on her cancer treatment given the decreased availability of medical  services.   - She is not interested in use of hormonal or non hormonal contraceptives - including IUDs out of concern for their role in developing  brain cancer  - Previously discussed that she must prevent pregnancy with use of barrier method every time she engages in penetrative intercourse  - Previously discussed that small amounts of chemotherapy may be found in her body secretions including vaginal secretions and she should use a barrier form of contraception such as a male or female condom to protect her partner from chemotherapy exposure.    #Risk for infertility:  - Has previously been seen by colleagues in [facility]  - Discussed natural age related decline in fertility and menopause that occurs as a result of ovarian aging, and that cancer treatments can accellerate that progression and diminish ovarian reserve.  - Individuals may experience varying degrees of short or long term ovarian dysfunction and amenorrhea, and that this is dependent upon type of cancer treatment, cumulative dose, and patients age.   - Loss of ovarian function may be permanent or temporary.  - Risk to fertility is UNKNOWN based on cancer treatment of tovarefenib - there is little research about the effects of these agents on ovarian reserve  - Declined fertility preservation prior to initiation of chemotherapy  - Reviewed baseline hormone function testing AMH 5.2 on 11/14/24, E2, FSH/LH WNL  - We discussed trending her ovarian function q 6 months while she is on therapy, last drawn 10/2024, will repeat in April 2025  - Discussed a plan to monitor hormone function for s/s of hypogonadism    #Diet/Exercise/Healthy Lifestyle:  - Previously discussed research demonstrating consumption of a healthy, plant based diet not only decreases cancer risk, but also has been found to improve survival in cancer patients who partake in a healthy diet.   - Previously discussed research that demonstrates decreased cancer risk and improved survival in cancer patients who exercise and stay active throughout diagnosis and treatment.  - Encouraged patient to continue gentle exercise as tolerated  -  Encouraged patient to continue to abstain from alcohol, tobacco, and recreational drugs    #Genetic Risk: young adult with cancer and personal family history of cancer  - Discussed rationale for genetic risk consultation consider placing referral in future    The amount of time that I spent with the patient:  Prep: 5  Time spend directly with patient: 25  Coordinating care: 5  Documentation: 5  Other time:    Total time spent on encounter: 40    Follow up planned in one month                Pepper Davidson, APRN-CNP

## 2025-07-29 NOTE — PATIENT INSTRUCTIONS
Walt Post, it was nice to see you today.     I requested a follow up visit in about a month; we can always move this appointment to align with your other appointments, just let me know when they are scheduled.     I did place a referral to acupuncture through [employer]. I also placed a referral for physical therapy. The location closest to you is [facility], [address] [place] [address]. Their phone number is [phone], you can call to arrange an initial evaluation.    [email or web address] this is a link to the [facility] 12 week exercise program for people with cancer. There is a location at the [facility], you can click on the website for more information and to contact the [facility] program directly.     I will be in touch with Paige Braswell our  regarding a letter in support for an air conditioner. I did write a letter of support for an emotional support animal, you should see that in your [employer] account.     Do consider joining us on Thursday for the BBQ/Kickball game, I know it is far! But will be fun!    Take care,  Roma Davidson

## 2025-08-04 ENCOUNTER — TELEPHONE (OUTPATIENT)
Dept: RADIATION ONCOLOGY | Facility: HOSPITAL | Age: 25
End: 2025-08-04
Payer: COMMERCIAL

## 2025-08-04 NOTE — PROGRESS NOTES
Radiation Oncology Outpatient Consult    Patient Name:  Sejal Duff  MRN:  [id number]  :  2000    Referring Provider: Deborah Snowden PA-C  Primary Care Provider: Melba Graham MD  Care Team: Patient Care Team:  Melba Garham MD as PCP - General (Pediatrics)  SANTOSH Mobley as PCP - [facility] PCP  SANTOSH Mobley as PCP - [facility] Medicaid PCP  Migue Aguilar MD as Medical Oncologist (Neurology)  Sylvester Tristan MD, MS as Radiation Oncologist (Radiation Oncology)    Date of Service: 2025     SUBJECTIVE  History of Present Illness:  Sejal Duff is a 25 y.o. female who was referred by Deborah Snowden PA-C, for a consultation to the [facility] Department of Radiation Oncology.  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     In 2024, the patient presented with headache and was found to have a 2.4?cm rim-enhancing, centrally necrotic hypothalamic mass with an additional 1.1?cm non-enhancing medial left temporal lobe lesion. A right stereotactic brain biopsy on 10/15/24 revealed a low-grade glial neoplasm with Kaya fibers, BRAF [id number], consistent with pilocytic astrocytoma. She subsequently developed hydrocephalus requiring  shunt placement on 24 with revision on 24, and systemic therapy with tovorafenib (type II JUAN CARLOS inhibitor) was initiated on 24, complicated by side effects. Side effects have included skin reactions (sores on hands and feet, facial rash), hair color changes (white eyebrows and eyelashes), and occasional nosebleeds. Patient reports increased fatigue. She was taken off medication completely for approximately 1.5 months and has only recently restarted at the lower dose about 2-3 weeks ago.  Mother reported that the patient has had bowel issues since age 7-8, which may have been related to the tumor's presence. Mother reports strong family  history of cancer (colon cancer in father who  at 50, stomach cancer in sister who  at 29).  Patient and mother express preference to avoid radiation therapy if possible. They were under the impression that medication would continue until October with possible surgical intervention afterward.    Prior Radiotherapy:  No radiation treatments to show. (Treatments may have been administered in another system.)       Past Medical History:  Medical History[1]     Past Surgical History:  Surgical History[2]     Family History:  Cancer-related family history includes Colon cancer in her mother.    Social History:  Social History[3]    Allergies:  Allergies[4]     Medications:  Current Medications[5]      Review of Systems:  Review of Systems - Oncology, see nurse note    Performance Status:  The Karnofsky performance scale today is 100, Fully active, able to carry on all pre-disease performed without restriction (ECOG equivalent 0).        OBJECTIVE  LMP  (LMP Unknown)    Physical Exam   General: no acute distress, calm, cooperative, engaged in conversation.   HEENT: Normocephalic, atraumatic. Extraocular movements are intact.   Neck: supple with trachea at midline, no palpable adenopathy.   Pulmonary: Breathing comfortably on room air, no respiratory distress  Cardiovascular: Regular rate, no cyanosis, well-perfused  Abdomen: Soft, nontender, nondistended.   Extremities: No lower extremity edema or cyanosis. Normal range of motion.  Skin: Without rash or obvious lesions.   Musculoskeletal: Normal range of motion. Able to raise both arms above head without issues  Neurologic: Alert and oriented x3. Cranial nerves intact to examination. Motor strength intact bilaterally. Sensation intact to pain/fine touch throughout. No dysdiadochokinesia. Normal gait.      Laboratory Review:  There are no laboratory contraindications to radiation therapy.    The pertinent lab results were reviewed and discussed with the  patient.    Pathology Review:  The pertinent pathology results were reviewed and discussed with the patient.        Imaging:  The pertinent imaging results were reviewed and discussed with the patient.      MR brain w and wo IV contrast  Result Date: 5/26/2025  1. The FLAIR hyperintense lesion centered in the hypothalamic region is slightly smaller on current study. Also, the previously demonstrated peripheral enhancement is not demonstrated on the current study though there appears to be different techniques for the postcontrast images between the prior and current studies which may account for some or all of this finding. 2. The ventricles are slightly increased in size from the prior examinations with right posterior approach shunt catheter in place.     MACRO: None   Signed by: Aldo Montero 5/26/2025 4:48 PM Dictation workstation:   [id number]    MR brain w and wo IV contrast  Result Date: 2/18/2025  1. Decreased size of rim enhancing hypothalamic mass compatible with known pilocytic astrocytoma. 2. Postsurgical changes of right posterior ventriculostomy shunt with tip ending within the ventricular system. When compared to MR dated 11/28/2024 and interval CT of 12/29/2024, there has been significant resolution of the dilation of the lateral ventricles.   I personally reviewed the images/study and I agree with the findings as stated by Elijah Ochoa MD. This study was interpreted at [facility], [place], [place].   MACRO: None   Signed by: Keith Garcia 2/18/2025 3:13 PM Dictation workstation:   [id number]    MR brain w and wo IV contrast  Addendum Date: 11/28/2024  Interpreted By:  Saravanan Flores, ADDENDUM: As described in the body of the report, there also new susceptibility artifact in the dependent portion of the right lateral ventricle concerning for blood products.   Signed by: Saravanan Flores 11/28/2024 3:18 PM   -------- ORIGINAL REPORT -------- Dictation workstation:    [id number]    Result Date: 11/28/2024  1. Interval enlargement hypothalamic-based necrotic mass (2.7 x 2.6 x 2.5 cm vs 2.3 x 2 x 2.4 cm) resulting in new acute obstructive hydrocephalus at the level of the foramina of [place] causing significantly increased dilatation of the lateral ventricles (right > left) and new interstitial edema in the periventricular white matter. Urgent neurosurgical consultation recommended.   2. New diffuse bilateral sulcal effacement and significantly increased downward mass effect on the optic chiasm, bilateral ventral medial thalami, and to a lesser extent the ventral midbrain.   3. Slight increased size of nonenhancing T2/FLAIR hyperintense lesion in the medial left temporal lobe measuring 1.3 cm x 0.8 cm.   MACRO: Saravanan Flores discussed the significance and urgency of this critical finding by telephone with EVONNE ANGELES and DR RADER (neurosurgery) on 11/28/2024 at 3:09 pm.  (**-RCF-**) Findings:  See findings.   Signed by: Saravanan Flores 11/28/2024 3:15 PM Dictation workstation:   [id number]    MR brain w and wo IV contrast  Result Date: 10/13/2024  Cystic/necrotic rim enhancing lesion centered within the hypothalamus measuring up the 2.4 cm resulting in partial effacement of the neural foramina and slight prominence of the lateral ventricles.   Additional nonenhancing FLAIR hyperintense lesion within the medial left temporal lobe measuring 1.1 x 0.9 cm. Given multifocal appearance, findings may represent primary neoplasm such as an optic pathway glioma or less likely metastatic disease. Suprasellar lesions such as craniopharyngioma and germinoma are also thought to be less likely. Infection can not be ruled out as diffusion-weighted imaging is nondiagnostic secondary to susceptibility from patient's braces.   Signed by: Waleska Tamayo 10/13/2024 7:08 PM Dictation workstation:   [id number]       ASSESSMENT:     Sejla Duff, 25-year-old female with PMH of anemia, asthma,  GERD, IBS, PCOS, and prior L breast fibroadenoma excision, presented in October 2024 with headache and was found to have a 2.4?cm hypothalamic mass with an additional 1.1?cm medial left temporal lobe lesion. Right stereotactic brain biopsy on 10/15/24 demonstrated a low-grade glial neoplasm with Kaya fibers, BRAF [id number], most consistent with pilocytic astrocytoma. She subsequently developed hydrocephalus requiring  shunt placement on 12/1/24, with revision on 12/29/24. Systemic therapy with tovorafenib (type II JUAN CARLOS inhibitor) was initiated on 12/21/24, with course complicated by side effects.    We had an extensive discussion regarding the natural history of her disease and overall prognosis as well as the role of radiation therapy in its management.  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 It has shown good response to BRAF inhibitor therapy with significant tumor shrinkage since initiation.   We discussed the rationale, logistics, side effects, and toxicity of radiation therapy especially the proton therapy in detail as it pertains to her diagnosis. Patient and mother prefer to avoid radiation therapy if possible. Educational materials about proton therapy were provided to the patient for future consideration      PLAN:     NCCN Guidelines were applicable to guide this patients treatment plan.     Patient and mother express strong preference to continue medical management with BRAF inhibitor therapy as long as effective, with surgical intervention as secondary option if needed. Radiation therapy discussed but patient/family currently not interested in this option.  Follow up with medical oncology for medication management and MRI brain follow up      Isabella Larson MD, PhD  PGY-2 Resident, Radiation Oncology    Staffed with Dr. Tristan              [1]   Past Medical History:  Diagnosis Date    Encounter for screening for infections  with a predominantly sexual mode of transmission 03/02/2020    Screening for STD (sexually transmitted disease)    Melena 04/12/2019    Blood in stool    Other specified health status     No pertinent past medical history    Other specified health status     No pertinent past surgical history    Personal history of other (healed) physical injury and trauma     History of sprain of ankle    Unspecified condition associated with female genital organs and menstrual cycle 03/02/2020    Cervical motion tenderness   [2]   Past Surgical History:  Procedure Laterality Date    BREAST SURGERY Left     Mass removal    OTHER SURGICAL HISTORY  01/18/2019    No history of surgery   [3]   Social History  Tobacco Use    Smoking status: Never     Passive exposure: Never    Smokeless tobacco: Never   Vaping Use    Vaping status: Never Used   Substance Use Topics    Alcohol use: Yes     Comment: social    Drug use: Yes     Types: Marijuana   [4]   Allergies  Allergen Reactions    Morphine Dizziness    Pollen Extracts Runny nose    House Dust Runny nose   [5]   Current Outpatient Medications:     albuterol 90 mcg/actuation inhaler, Inhale 1-2 puffs every 4 hours if needed for wheezing or shortness of breath., Disp: 18 g, Rfl: 2    benzoyl peroxide (Benzac AC) 10 % external wash, APPLY TOPICALLY 2 TIMES A DAY. WASH TWICE DAILY AS DIRECTED (Patient taking differently: Apply 1 Application topically once daily.), Disp: 148 mL, Rfl: 3    cholecalciferol (Vitamin D-3) 50,000 unit capsule, Take 1 capsule (50,000 Units) by mouth 1 (one) time per week., Disp: 4 capsule, Rfl: 11    clindamycin (Cleocin T) 1 % lotion, Apply a pea-size amount to entire face twice daily after washing face with lukewarm water. Use this BEFORE hydrocortisone cream. (Patient taking differently: Apply 1 Application topically once daily. Apply a pea-size amount to entire face twice daily after washing face with lukewarm water. Use this BEFORE hydrocortisone cream.),  Disp: 60 mL, Rfl: 0    clobetasol (Temovate) 0.05 % ointment, Apply 1 Application topically once daily. To hands and feet 3x per week for maintenance, Disp: 60 g, Rfl: 3    ergocalciferol (Vitamin D-2) 1.25 MG (19339 UT) capsule, Take 1 capsule (1,250 mcg) by mouth 1 (one) time per week., Disp: 4 capsule, Rfl: 11    hydrocortisone 2.5 % ointment, Apply 1 Application topically once daily. To face 3x per week for maintenance, Disp: 30 g, Rfl: 3    hydrOXYzine pamoate (Vistaril) 25 mg capsule, Take 1 capsule (25 mg) by mouth as needed at bedtime for itching. If 1 capsule is not enough to control symptoms may increase to 2 capsules, Disp: 60 capsule, Rfl: 2    ibuprofen 800 mg tablet, Take 1 tablet (800 mg) by mouth every 8 hours if needed for mild pain (1 - 3) (pain)., Disp: 90 tablet, Rfl: 5    Lacto no.35-Hcafnt-JTL-larch 25B cell-25B cell-50 mg capsule, Take 1 capsule by mouth once daily. (Patient taking differently: Take 1 capsule by mouth once daily as needed (constipated).), Disp: 90 capsule, Rfl: 3    linaCLOtide (Linzess) 145 mcg capsule, Take 1 capsule (145 mcg) by mouth once daily in the morning. Take before meals. Do not crush or chew. (Patient taking differently: Take 1 capsule (145 mcg) by mouth every 7 days. Do not crush or chew.), Disp: 30 capsule, Rfl: 3    loratadine (Claritin) 10 mg tablet, Take 1 tablet (10 mg) by mouth once daily., Disp: 90 tablet, Rfl: 1    LORazepam (Ativan) 1 mg tablet, Take 1 tablet (1 mg) 30 minutes prior to MRI for claustrophobia/anxiety relief., Disp: 1 tablet, Rfl: 0    methylphenidate (Ritalin) 5 mg tablet, Take 1 tablet (5 mg) by mouth once daily., Disp: 30 tablet, Rfl: 0    multivitamin with iron (Daily Multiple Vitamins/Iron) tablet, Take 1 tablet by mouth once daily., Disp: 90 each, Rfl: 3    OLANZapine (ZyPREXA) 10 mg tablet, TAKE 1 TABLET (10 MG) BY MOUTH ONCE DAILY AT BEDTIME., Disp: 90 tablet, Rfl: 0    omeprazole (PriLOSEC) 40 mg DR capsule, Take 1 capsule (40 mg)  by mouth once daily., Disp: 30 capsule, Rfl: 2    ondansetron (Zofran) 4 mg tablet, Take 1 tablet (4 mg) by mouth every 8 hours if needed for nausea or vomiting., Disp: 60 tablet, Rfl: 2    oxyCODONE (Roxicodone) 5 mg immediate release tablet, Take 1 tablet (5 mg) by mouth every 6 hours if needed for severe pain (7 - 10). MAX 4 tablets a day, Disp: 120 tablet, Rfl: 0    plecanatide (Trulance) tablet tablet, Take 1 tablet (3 mg) by mouth once daily., Disp: 30 tablet, Rfl: 11    prucalopride (Motegrity) 2 mg tablet, Take 1 tablet (2 mg) by mouth once daily. (Patient taking differently: Take 1 tablet (2 mg) by mouth every 7 days. With Linzess), Disp: 30 tablet, Rfl: 3    tovorafenib 400 mg/week (100 mg x 4) tablet, Take 300 mg by mouth 1 (one) time per week. (Saturdays) (Three 100 mg tablets = 300 mg.) Swallow tablets whole with water. Do not chew, cut, or crush. Tablets may be taken with or without food. Do not fill before July 19, 2025., Disp: 12 tablet, Rfl: 0    triamcinolone (Kenalog) 0.1 % ointment, Apply topically 2 times a day as needed for rash. To trunk, arms, and legs 3x per week for maintenance, Disp: 454 g, Rfl: 3

## 2025-08-05 ENCOUNTER — OFFICE VISIT (OUTPATIENT)
Dept: HEMATOLOGY/ONCOLOGY | Facility: HOSPITAL | Age: 25
End: 2025-08-05
Payer: COMMERCIAL

## 2025-08-05 ENCOUNTER — HOSPITAL ENCOUNTER (OUTPATIENT)
Dept: RADIOLOGY | Facility: HOSPITAL | Age: 25
Discharge: HOME | End: 2025-08-05
Payer: COMMERCIAL

## 2025-08-05 ENCOUNTER — HOSPITAL ENCOUNTER (OUTPATIENT)
Dept: RADIATION ONCOLOGY | Facility: HOSPITAL | Age: 25
Setting detail: RADIATION/ONCOLOGY SERIES
Discharge: HOME | End: 2025-08-05
Payer: COMMERCIAL

## 2025-08-05 VITALS
WEIGHT: 140.87 LBS | HEART RATE: 86 BPM | SYSTOLIC BLOOD PRESSURE: 108 MMHG | OXYGEN SATURATION: 100 % | BODY MASS INDEX: 24.95 KG/M2 | RESPIRATION RATE: 18 BRPM | DIASTOLIC BLOOD PRESSURE: 74 MMHG | TEMPERATURE: 97.3 F

## 2025-08-05 DIAGNOSIS — C71.9 PILOCYTIC ASTROCYTOMA (MULTI): Primary | ICD-10-CM

## 2025-08-05 DIAGNOSIS — C71.9 PILOCYTIC ASTROCYTOMA (MULTI): ICD-10-CM

## 2025-08-05 PROCEDURE — 2550000001 HC RX 255 CONTRASTS: Mod: SE

## 2025-08-05 PROCEDURE — 99215 OFFICE O/P EST HI 40 MIN: CPT | Performed by: STUDENT IN AN ORGANIZED HEALTH CARE EDUCATION/TRAINING PROGRAM

## 2025-08-05 PROCEDURE — 99215 OFFICE O/P EST HI 40 MIN: CPT

## 2025-08-05 PROCEDURE — 70553 MRI BRAIN STEM W/O & W/DYE: CPT | Performed by: RADIOLOGY

## 2025-08-05 PROCEDURE — 99205 OFFICE O/P NEW HI 60 MIN: CPT | Performed by: STUDENT IN AN ORGANIZED HEALTH CARE EDUCATION/TRAINING PROGRAM

## 2025-08-05 PROCEDURE — 70553 MRI BRAIN STEM W/O & W/DYE: CPT

## 2025-08-05 PROCEDURE — A9575 INJ GADOTERATE MEGLUMI 0.1ML: HCPCS | Mod: SE

## 2025-08-05 RX ORDER — GADOTERATE MEGLUMINE 376.9 MG/ML
13 INJECTION INTRAVENOUS
Status: COMPLETED | OUTPATIENT
Start: 2025-08-05 | End: 2025-08-05

## 2025-08-05 RX ADMIN — GADOTERATE MEGLUMINE 13 ML: 376.9 INJECTION INTRAVENOUS at 09:33

## 2025-08-05 ASSESSMENT — ENCOUNTER SYMPTOMS
HEMATURIA: 0
EXTREMITY WEAKNESS: 0
NAUSEA: 1
APPETITE CHANGE: 1
DEPRESSION: 0
WOUND: 1
FEVER: 0
HEADACHES: 1
OCCASIONAL FEELINGS OF UNSTEADINESS: 0
MUSCULOSKELETAL NEGATIVE: 1
SEIZURES: 0
FATIGUE: 1
BLOOD IN STOOL: 0
LOSS OF SENSATION IN FEET: 0
FATIGUE: 1
RESPIRATORY NEGATIVE: 1
CONSTIPATION: 1
EYES NEGATIVE: 1
CARDIOVASCULAR NEGATIVE: 1
VOMITING: 1
DEPRESSION: 1
SPEECH DIFFICULTY: 0
HEADACHES: 1
HEMATOLOGIC/LYMPHATIC NEGATIVE: 1
CONSTIPATION: 1
CHILLS: 0
DIARRHEA: 0
NAUSEA: 1
APPETITE CHANGE: 1
ENDOCRINE NEGATIVE: 1
BLOOD IN STOOL: 1

## 2025-08-05 ASSESSMENT — PATIENT HEALTH QUESTIONNAIRE - PHQ9
1. LITTLE INTEREST OR PLEASURE IN DOING THINGS: NOT AT ALL
2. FEELING DOWN, DEPRESSED OR HOPELESS: SEVERAL DAYS
SUM OF ALL RESPONSES TO PHQ9 QUESTIONS 1 AND 2: 1

## 2025-08-05 ASSESSMENT — PAIN SCALES - GENERAL: PAINLEVEL_OUTOF10: 0-NO PAIN

## 2025-08-05 NOTE — PROGRESS NOTES
Radiation Oncology Nursing Note    Prior Radiotherapy:  No  No radiation treatments to show. (Treatments may have been administered in another system.)     Current Systemic Treatment:  Yes, describe: Tovorafenib, once weekly,28 day cycle     Presence of Pacemaker or ICD:  No    History of Autoimmune or Connective Tissue Disorders:  No    Pain: The patient's current pain level was assessed.  They report currently having a pain of 6 out of 10.  They feel their pain is under control with the use of pain medications.    Review of Systems:  Review of Systems   Constitutional:  Positive for appetite change (eating less) and fatigue.   HENT:   Positive for nosebleeds (with increased activity, since diagnosis).    Eyes: Negative.         Wears glasses   Respiratory: Negative.     Cardiovascular: Negative.    Gastrointestinal:  Positive for blood in stool (first noted end of last week), constipation (last BM over 1 week ago) and nausea (intermittent).   Endocrine: Negative.    Genitourinary: Negative.     Musculoskeletal: Negative.    Skin:  Positive for rash (rashes on face improving) and wound (resolved wounds on feet.).        Occassional flair ups from medications      Neurological:  Positive for headaches (intermittent, last one couple of days ago).   Hematological: Negative.    Psychiatric/Behavioral:  Positive for depression (intemittent).

## 2025-08-05 NOTE — PROGRESS NOTES
[facility]  Neuro-Oncology    Patient: Sejal Duff  MRN: [id number]  Date of visit: 08/05/25  Visit type: In-person clinic visit  Clinician: Deborah Snowden PA-C    Oncological & Treatment History:  Oncology History   Pilocytic astrocytoma (Multi)   10/2024 Initial Diagnosis    25 yo F with PMH of anemia, asthma, GERD, IBS, PCOS, and breast fibroadenoma s/p L breast excisional biopsy presented to [facility] ED on 10/14/24 with headache. CTH parasellar mass with calcifications. MRI with 2.4 cm peripherally enhancing necrotic mass with non-enhancing FLAIR hyperintense lesion in medial L temporal lobe. Admitted for neurosurgical management.     10/15/2024 Biopsy    R stereotactic brain biopsy  Surgeon: Den Segal MD    Prelim path: Glial cells with luigi fibers vs craniopharyngioma     10/23/2024 Tumor Board    Procedure: R stereotactic brain biopsy 10/15/2024   Pathology: Final pending; likely to be a glioma, areas of necrosis noted.       The CNS Tumor Board tumor board considered available treatment options and made the following recommendations: Radiation oncology referral. Add back to CNS TB once pathology is completed.     11/5/2024 Pathology     A.  B.  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  See note     Note:  Microscopic examination for parts A and B reveal a low-grade glial neoplasm immunoreactive with OLIG-2 and GFAP.  Neurofilament immunohistochemical stain highlights neuropil and scattered neuronal bodies.  ATRX and [id number] immunohistochemical stains are retained.  IDH1 and BRAF immunohistochemical stains are negative.  GLIOSEQ studies reveal a BRAF-V600E mutation.  This finding in conjunction with the morphology are most consistent with a pilocytic astrocytoma.  However, a ganglioglioma among other low-grade glial/glial neuronal neoplasms, cannot be completely ruled out.  Clinical correlation is recommended      11/6/2024 Tumor Board    CSF analysis negative.   Pathology:  Low grade glial neoplasm - BRAF - V600E mutation      The CNS Tumor Board tumor board considered available treatment options and made the following recommendations: Repeat MRI brain w/wo and perfusion study. Sellar imaging. Continue following with neuro-oncology. Radiation oncology referral.      11/29/2024 - 12/6/2024 Hospital Admission    Discharge diagnosis: Hydrocephalus    Returned to the [facility] ED 11/28/24 with worsening HA, light sensitivity, CTH incr vents, MRI incr ventricular mass 2.7x2.5cm, ventriculomegaly, trace R ventricular GRE. Patient admitted to neurosurgery service for further management.    11/28 Ophthalmology evaluation without papilledema.  12/1 s/p R VPS (certas at 5) -- Dr. Yap at [facility]  12/2 CTH with catheter in position.      12/29/2024 - 12/31/2024 Hospital Admission    Discharge diagnosis: Hydrocephalus    Returned to the [facility] ED on 12/28/24 with generalized fatigue, HA and body aches. CT head with increased ventricles, SS Certas at 4, LLQ cath with turn vs possible kink, CT AP catheter without kink or discontinuity, no pseudocyst. Patient admitted to the neurosurgery service for further management/workup.    12/28 s/p shunt tap with no proximal flow.   12/29 s/p proximal shunt revision, distal cath flushing. CT head post op with decreased ventricles, catheter in position.         HPI:  Sejal Duff is a 24 y.o. 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      Interval History (NEWEST at BOTTOM of section):  10/29/24: Today she reports some slight headaches but not worsening.  She is currently on dexamethasone 2 mg twice daily.  She takes the morning dose around 9 or 10 AM and the evening dose  before bedtime.  She takes ondansetron about 1 hour prior to the dexamethasone.  She endorses significant issues with vomiting after taking the dexamethasone as well as decreased appetite.  She is able to drink enough fluids.  She states that she tried taking 8 mg of ondansetron prior to the dexamethasone but this did not help either.  She states that the dexamethasone makes her very jittery.  She has not taken dexamethasone for the last 2 days.  She continues to have constipation which is her baseline where she does not have a bowel movement for weeks.  She has been referred by her gastroenterologist to integrative medicine for further help with bowel movements.  Otherwise denies any worsening of vision or new gait or balance issues.  11/8/24: Continues to have some headaches but not worse. She has decreased dexamethasone to 1mg bid on her own and she denies any worsening of her headaches. Denies new balance or strength issues.   11/19/2024: Her headaches are about the same intensity.  Denies new nausea/vomiting episodes.  Denies new gait or balance changes.  Denies new vision changes.   11/26/2024: Sejal states her headaches are worse.  She has not been able to sleep much.  Denies new vision problems.  Denies new weakness or gait balance/issues.  Denies new episodes of nausea or vomiting.  She is upset at the MRI spine being canceled recently.    01/16/25: Since the last visit, Sejal underwent  shunt placement for hydrocephalus (12/1/24) and a subsequent VPS revision (12/29/24). She also started tovorafenib on 12/21/24 and has been taking it once weekly as directed on Saturdays. Endorses significant fatigue and body aches for 2-3 days after taking tovorafenib. Endorses a new acne-like rash to her lower face that started with facial tenderness after her 2nd or 3rd dose of tovorafenib; rash is spreading to her nasolabial folds. States she washes her face with warm water and applies Aquaphor to the affected areas.  "She also reports intermittent episodes over the past 3 weeks where she feels overheated, lightheaded, and needs to utilize her inhaler; states it is similar to asthma flares; denies anxiety precipitating episodes. Also reports persistent nausea since her last surgery. States even water makes her feel nauseated. Feels like she's going to vomit, but it \"sits in [her] chest\" and burns. States she takes Zofran \"like it's candy.\" Denies drinking caffeine/carbonated drinks, fried/spicy foods, and lying down within 30 min of eating. States she hasn't picked up Olanzapine and doesn't take omeprazole as she is wary of being on many medications.    02/04/25: Saw patient in clinic for an impromptu visit regarding her worsening acneiform rash after patient saw [facility] today. No improvement in symptoms since using topical hydrocortisone 2.5% and clindamycin 1%. Now has some similar lesions on her chest. Pictures entered into chart. Additionally, patient reports a nosebleed with clots yesterday that resolved after about 15 minutes. She also has questions about medications -- states she was too fatigued the day after olanzapine and wants to clarify which meds she should/should not be taking. Her mother is present as well -- mother notes family history of cancer, which contributes to her concerns about patient's diagnosis/treatment.    2/18/2025: She denies any visual changes or headaches.  Does endorse some fatigue.  Feels her acneform rash is worsening and has spread more through her torso and back region.  It is itching which prompts her to scratch and then it bleeds.  Also notices some pustular appearing lesions by her wrist and on her feet.  Has been using the clindamycin and hydrocortisone topical lotions.  She has not taken the oral doxycycline as prescribed due to fear of nausea which will keep her from eating.  She feels that her appetite is already bad from her baseline nausea issues.  Also endorses some shortness " "of breath when doing normal walking like from the parking garage to the clinic.  Denies any dyspnea at rest.  Denies chest pain or lower extremity edema.  She has asthma at baseline but says that normally she uses a nebulizer or inhaler only when she is sick with a respiratory infection which exacerbates her asthma.  But she has been needing to use her inhaler and nebulizer more often now.  States her menstrual periods have not returned to baseline since the most recent hospitalization.  Normally she has a quite regularly once monthly.  Her last menstrual period was in November 2024.    04/01/25: Sejal presents for follow-up accompanied by her mother. Her tovorafenib has been held since 3/29/25 due to hemoglobin <10. She reports fatigue, decreased energy, and hypersomnia. Endorses lightheadedness if she's 'too active' or after a hot shower. Reports spontaneous L epistaxis for 10-15 minutes every 1-2 days 'when too active.' Denies hematemesis, hematochezia, melena, and hematuria. Still no menstrual periods since 11/2024. Notes continued intermittent abdominal cramping near the surgical incisions; states the pain is \"like a shocking wave\" when it occurs. Still no appetite. Reports color change to her eyelashes, eyebrows, and hair. Reports improvement with some of her diffuse rash but with resulting hypopigmentation; still has itching - clobetasol and hydroxyzine help. Still has cracked skin to the flexural palmar surfaces of her bilateral hands with a couple open fissures. States she initially had improvement with minocycline, but then became unable to tolerate additional doses as she vomited 10-15 minutes after each dose; she did not start prednisone; she inquires about IV antibiotic options.    04/29/25: Sejal presents unaccompanied to clinic for follow-up. Tovorafenib has been held since 3/29/25 to allow time for her skin to heal and for us to monitor her hemoglobin (hovering around 7.5-8.7). She was permitted to " restart as of 4/17/25 at a reduced dose of 400 mg; she ended up restarting on 4/26/25. Skin is much improved and hemoglobin is stable. Main concern today is severe abdominal pain that is limiting her ability to move and is affecting her QoL. She hasn't been eating due to the pain and has lost <10 pounds over ~5 days, and is now able to palpate her VPS tubing, which is quite bothersome to her. She was seen in the ED twice for abdominal pain with 4/24/25 CTAP concerning for possible enteritis and a small to moderate amount of free fluid in pelvis that may be related to fluid from the VPS. NSGY consulted and adjusted her shunt in ED due to concern for over-draining; low concern for shunt infection. She hasn't seen GI since last fall and states she's tried to schedule but they told her she should see them after brain tumor is addressed (?).    5/27/2025 (clinic visit): Presents for follow-up visit (mother on the phone).  Denies any new headaches or visual changes.  Reports her skin breakouts are improved and her torso and extremities but feels that the lesions on her face is slightly worse and starting tovorafenib.  Continues to have abdominal discomfort and has not had a bowel movement in approximately 10 days.  They are attempting to direct admit her for a bowel cleanse.  Waiting for an open bed.  This feel like it is more difficult to take a deep breath and she has been using her rescue inhaler at least 3 times a week.  She has not seen her PCP in about a year.  Denies any coughing.    06/24/25: Sejal presents for follow-up. There was some miscommunication with what was supposed to be C6 of tovorafenib; we were under the impression she had enough 100 mg pills left to supply her through the cycle, but she states she has not had any since her 5/31/25 dose. She reports having a headache for about 2 weeks, worse on 6/20/25 when she went to the [facility] ED due to the pain; pain is located behind her eyes and bitemporally;  sometimes feels like burning or 'zapping'; unrelieved by oxycodone, Tylenol, ibuprofen, and only somewhat relieved by migraine cocktail given in ED. [facility] wanted her to go to [facility] ED for neurosurgical evaluation given VPS, but patient declined. She also endorses 1-2 episodes of nausea/vomiting every 1-2 days. 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 She was admitted for a bowel clean out 5/27-5/29 and since then has only had 3 BMs (last one yesterday); no GI follow-up scheduled; was told they had to stop Motegrity(?) and she's waiting to hear about a 'machine' to help her with BMs. Her acneiform rash has worsened on her face since about 1.5 weeks ago; R cheek especially feels hard and painful to touch; tried prescribed triamcinolone, hydrocortisone, clobetasol without relief; now using cold compresses, aloe vera gel, [facility] [place], Cerave Mineral Sunscreen; had a facial last week (after sx already worsened).    08/05/25: Here for follow-up during tovorafenib C7. Tolerating ***    Review of Systems:  Review of Systems   Constitutional:  Positive for appetite change and fatigue. Negative for chills and fever.   Gastrointestinal:  Positive for constipation, nausea and vomiting. Negative for blood in stool and diarrhea.   Genitourinary:  Negative for hematuria and vaginal bleeding. Menstrual problem: no period since 11/2024.   Musculoskeletal:  Negative for gait problem.   Skin:  Negative for itching and rash.   Neurological:  Positive for headaches. Negative for extremity weakness, gait problem, seizures and speech difficulty.       Social History:  Social History[1]  Occupation: [employer], [employer]    Past Medical History:  Medical History[2]     Past Surgical History:  Surgical History[3]     Family History:  Family History[4]    Performance  "Score:  Karnofsky Score: 70 - Cares for self; unable to carry on normal activity or do normal work     Vital Signs:      5/29/2025    12:08 PM 6/20/2025    12:52 AM 6/20/2025     3:00 AM 6/24/2025     9:42 AM 7/14/2025    11:47 AM 7/28/2025    10:08 AM 8/5/2025    10:19 AM   Vitals   Systolic 97 114 110 111 116 122 108   Diastolic 57 82 85 57 62 72 74   BP Location  Left arm  Left arm Right arm Right arm    Heart Rate 85 84 85 91 88 99 86   Temp 36.6 °C (97.9 °F) 36.9 °C (98.4 °F)  35.4 °C (95.7 °F)  36.1 °C (97 °F) 36.3 °C (97.3 °F)   Resp 16 16 16 16 16 18 18   Height  1.6 m (5' 3\")   1.6 m (5' 3\")     Weight (lb)  130  131.1 131.17 145.1 140.87   BMI  23.03 kg/m2  23.22 kg/m2 23.24 kg/m2 25.7 kg/m2 24.95 kg/m2   BSA (m2)  1.62 m2  1.63 m2 1.63 m2 1.71 m2 1.69 m2   Visit Report    Report    Report Report Report    Report Report      Physical Exam:  Physical Exam  Vitals reviewed.   Constitutional:       General: She is not in acute distress.     Appearance: She is not toxic-appearing.   HENT:      Head: Normocephalic and atraumatic.      Comments: Loss of pigment to eyelashes and eyebrows    Eyes:      Extraocular Movements: Extraocular movements intact.      Conjunctiva/sclera: Conjunctivae normal.     Pulmonary:      Effort: Pulmonary effort is normal. No respiratory distress.   Abdominal:      General: Abdomen is flat.     Musculoskeletal:         General: Normal range of motion.      Cervical back: Normal range of motion and neck supple.     Skin:     General: Skin is dry.      Findings: No erythema or rash.      Comments: From prior visit: Skin is much improved from prior exam -- resolution of scaling and fissures on bilateral palmar surfaces; still has scattered hypopigmented macules to entire body from previous diffuse rash; scattered hyperpigmented healing macules to face    New 06/24/25: Worse acneiform rash to bilateral facial cheeks with confluence of lesions on R. Pictures uploaded to Media tab. "     Neurological:      General: No focal deficit present.      Mental Status: She is alert and oriented to person, place, and time. Mental status is at baseline.      Cranial Nerves: No cranial nerve deficit.      Gait: Gait normal.     Psychiatric:         Mood and Affect: Mood normal.         Behavior: Behavior normal.         Results:  === 05/23/25 ===    MR BRAIN W AND WO CONTRAST    - Impression -  1. The FLAIR hyperintense lesion centered in the hypothalamic region  is slightly smaller on current study. Also, the previously  demonstrated peripheral enhancement is not demonstrated on the  current study though there appears to be different techniques for the  postcontrast images between the prior and current studies which may  account for some or all of this finding.  2. The ventricles are slightly increased in size from the prior  examinations with right posterior approach shunt catheter in place.      MACRO:  None    Signed by: Aldo Montero 5/26/2025 4:48 PM  Dictation workstation:   [id number]      Assessment & Plan:  Sejal Duff is a 25 y.o. 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 She also developed hydrocephalus and is s/p VPS (12/1/24) with revision (12/29/24). She started on tovorafenib (type II JUAN CARLOS inhibitor) on 12/21/24 with course c/b skin toxicities.    Previously discussed diagnosis with patient and her mother - explained that her tumor is low-grade, meaning it is classified as slow-growing and benign/non-cancerous, but it is causing issues/symptoms that make it not harmless. Explained that such tumors could possibly transform into a higher grade, meaning become malignant/cancerous, or could still  "remain \"benign\" but grow large enough to cause significant, even life-threatening problems. Discussed that our goal with tovorafenib is to shrink the tumor as much as possible and/or reach stability to the point that we can monitor with imaging or we can have neurosurgery evaluate again whether surgery is feasible/safe, but there are no guarantees.    Patient is neurologically stable. She will resume tovorafenib at 300 mg/week once she receives the new supply from the pharmacy (Rx sent today). Amenable to plans below.    **THIS NOTE IS NOT FINALIZED***  Please confirm information with clinician as needed.  -- stay at 300 mg/week  -- discussed dynamic management, that the tumor looks stable today, so we'll see with next mri if still stable or more shrinkage; discussed that it would be the MD call whether if next mri is stable, could we consider monitoring with imaging or increasing ojemda to 400 mg/week  -- discussed rad onc appt today was supposed to be a consult just to discuss options so she could make educated/informed decisions; RT could be faster/more definitive option vs longterm med management  -- will touch base with md then determine next appt etc -- appt with him?  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***  Treatment: Tovorafenib 300 mg by mouth once weekly (Saturdays)  Planned length of treatment is 6-12 cycles (28-day cycles), but this may be extended or shortened if indicated  Labs on or within 48 hours of day 1 of each cycle to monitor for toxicities  Dosing:  C1 600 mg/week  C2 600 mg/week  C3 600 mg/week  C4 started at 500 mg/week due to grade 2 acneiform rash, then was held from 3/29/25 until 4/26/25 due to worsening skin toxicities and persistently decreased hemoglobin, and was then restarted at a reduced dose of 400 mg/week  C5 400 mg/week  C6 300 mg/week - attempting tumor control/shrinkage at lower dose due to persistent " cutaneous AEs -- patient had 1 week, then a 3 week gap due to miscommunication/misunderstanding as we thought she had enough of a supply to cover her C6 due to the new dose adjustment; she did not inform us until 6/24/25 visit; restarted 6/28/25  C7 300 mg/week  MRI brain w/wo contrast every 2-3 months to assess treatment response***  Next MRI scheduled for 8/5/25***  Needs 1 mg Ativan Rx to take 30 minutes prior to MRI -- Rx sent  2/2025 MRI spine did not show any evidence of drop metastasis or disseminated disease  Referral to Radiation Oncology -- advised patient and her mom that radiation was previously brought up as an option for a non-invasive treatment of the lesion that could yield faster results versus continuing on tovorafenib for an indefinite amount of time, especially given the skin toxicities associated with the medication; patient and her mom are very understandably reluctant to consider radiation due to family experiences with it; advised patient that we still recommend she meet with Dr. Tristan to at least hear about the different modalities, potential treatment/effects, and be able to make an informed decision, but also understand if she decides against it -- at this time, she agrees to see Dr. Tristan when she returns to clinic on 8/5/25; reached out to their team to schedule  Follow-up scheduled for 8/5/25 with me due to MD not having clinic time that week, but he will be available to us if needed    #Acneiform drug eruption***  #Flexural atopic dermatitis, resolved  Previously per Derm/Dr. Patel: Continue minocycline for at least 2 months after restarting chemo and continue clobetasol to the hands and feet 3x/week as prevention  Note: Taking minocycline at night is not recommended due to increased risk of acid relux.  Note: IV antibiotics are not an option in this case due to needing a longer treatment course for inflammation (versus a shorter course if she had an infection).  Patient missed  appointment with [facility]co[facility]Dermatology in 3/2025  E-consult to Dermatology given acute worsening of rash on face - patient aware of e-consult and possible need to see them in person    #Abdominal pain***  #Constipation, chronic  Pain control regimen per palliative medicine team  Reached out to her GI provider Dipika Marina CNP, who stated insurance denied Motegrity and she is awaiting insurance approval for a trans anal irrigation system that will be used to help patient have BMs. Advised Sejal that the NP is hopeful this may be sorted out in the next week or so and that she/her team will plan to reach out to go over plans/instructions    #Headache***  Informed [facility] of patient request for oxycodone refill as she is about out of her current Rx -- [facility] will send new Rx  Reached out to neurosurgery/Dr Yap team to arrange follow-up visit since patient was not evaluated by them during recent ED visit and she still has persistent HA -- pending    No orders of the defined types were placed in this encounter.             [1]  Social History  Tobacco Use   • Smoking status: Never     Passive exposure: Never   • Smokeless tobacco: Never   Vaping Use   • Vaping status: Never Used   Substance Use Topics   • Alcohol use: Yes     Comment: social   • Drug use: Yes     Types: Marijuana   [2]  Past Medical History:  Diagnosis Date   • Encounter for screening for infections with a predominantly sexual mode of transmission 03/02/2020    Screening for STD (sexually transmitted disease)   • Melena 04/12/2019    Blood in stool   • Other specified health status     No pertinent past medical history   • Other specified health status     No pertinent past surgical history   • Personal history of other (healed) physical injury and trauma     History of sprain of ankle   • Unspecified condition associated with female genital organs and menstrual cycle 03/02/2020    Cervical motion tenderness   [3]  Past Surgical  History:  Procedure Laterality Date   • BREAST SURGERY Left     Mass removal   • OTHER SURGICAL HISTORY  01/18/2019    No history of surgery   [4]  Family History  Problem Relation Name Age of Onset   • Asthma Mother     • Colon cancer Mother

## 2025-08-06 ENCOUNTER — TUMOR BOARD CONFERENCE (OUTPATIENT)
Dept: NEUROSURGERY | Facility: HOSPITAL | Age: 25
End: 2025-08-06
Payer: COMMERCIAL

## 2025-08-06 ENCOUNTER — TELEPHONE (OUTPATIENT)
Dept: HEMATOLOGY/ONCOLOGY | Facility: HOSPITAL | Age: 25
End: 2025-08-06
Payer: COMMERCIAL

## 2025-08-06 NOTE — TUMOR BOARD NOTE
CNS Tumor Board Recommendations       Patient was presented by Dr. Pat Tristan at our CNS Tumor Board on 08/06/2025 which included representatives from Radiation oncology, Surgical oncology, Neuro-oncology, Pathology, Radiology, Research, Neurosurgery, Social Work (Neurosurgery).     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 CSF analysis negative. S/p R stereotactic brain biopsy 10/14/24. 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 Papilledema on optho evaluation now s/p shunt placement. On BRAF inhibitor. MRI Brain with increase in nodularity and T2/FLAIR hyperintense signal to L hippocampal region consistent with progression. Patient and family declining radiation therapy.     The CNS Tumor Board tumor board considered available treatment options and made the following recommendations: Patient and family declinign RT - Continue with BRAF inhibitor. Repeat MRI brain w/wo in 12 weeks.     Clinical Trial Status: N/A     National site-specific guidelines were discussed with respect to the case.

## 2025-08-06 NOTE — TELEPHONE ENCOUNTER
Dion from [id number] calls to verify height and weight for patient's Tovorafenib.    Provided height/weight from yesterday's office visit. Understanding verbalized with teach back. No further needs.

## 2025-08-08 ENCOUNTER — TELEPHONE (OUTPATIENT)
Dept: HEMATOLOGY/ONCOLOGY | Facility: HOSPITAL | Age: 25
End: 2025-08-08
Payer: COMMERCIAL

## 2025-08-08 DIAGNOSIS — C71.9 PILOCYTIC ASTROCYTOMA (MULTI): ICD-10-CM

## 2025-08-08 NOTE — ADDENDUM NOTE
Encounter addended by: Sylvester Tristan MD, MS on: 8/8/2025 4:11 PM   Actions taken: Level of Service modified, Cosign clinical note with attestation

## 2025-08-08 NOTE — TELEPHONE ENCOUNTER
Called and spoke with timi to update her on plan. We will have her continue the 300mg dosing of tovorafenib as Dr. Aguilar does not think there is much change and going up to 400mg caused a lot of side effects for her. We also placed a mis September clinic visit scheduled with Dr. Aguilar. She is aware the MRI brain should be end of October with a FUV after. She has no other questions at this time.    Selena ARNDT RN

## 2025-08-13 ENCOUNTER — APPOINTMENT (OUTPATIENT)
Dept: HEMATOLOGY/ONCOLOGY | Facility: HOSPITAL | Age: 25
End: 2025-08-13
Payer: COMMERCIAL

## 2025-08-13 ENCOUNTER — PATIENT MESSAGE (OUTPATIENT)
Dept: CARDIOLOGY | Facility: HOSPITAL | Age: 25
End: 2025-08-13

## 2025-08-14 ENCOUNTER — HOSPITAL ENCOUNTER (EMERGENCY)
Facility: HOSPITAL | Age: 25
Discharge: HOME | End: 2025-08-14
Attending: GENERAL PRACTICE
Payer: COMMERCIAL

## 2025-08-14 ENCOUNTER — APPOINTMENT (OUTPATIENT)
Dept: RADIOLOGY | Facility: HOSPITAL | Age: 25
End: 2025-08-14
Payer: COMMERCIAL

## 2025-08-14 VITALS
TEMPERATURE: 98.2 F | HEART RATE: 88 BPM | RESPIRATION RATE: 18 BRPM | BODY MASS INDEX: 23.04 KG/M2 | OXYGEN SATURATION: 97 % | WEIGHT: 130 LBS | HEIGHT: 63 IN | DIASTOLIC BLOOD PRESSURE: 70 MMHG | SYSTOLIC BLOOD PRESSURE: 112 MMHG

## 2025-08-14 DIAGNOSIS — R51.9 NONINTRACTABLE EPISODIC HEADACHE, UNSPECIFIED HEADACHE TYPE: Primary | ICD-10-CM

## 2025-08-14 LAB
ANION GAP SERPL CALC-SCNC: 12 MMOL/L (ref 10–20)
B-HCG SERPL-ACNC: <2 MIU/ML
BASOPHILS # BLD AUTO: 0.03 X10*3/UL (ref 0–0.1)
BASOPHILS NFR BLD AUTO: 0.3 %
BUN SERPL-MCNC: 12 MG/DL (ref 6–23)
CALCIUM SERPL-MCNC: 9.1 MG/DL (ref 8.6–10.3)
CHLORIDE SERPL-SCNC: 109 MMOL/L (ref 98–107)
CO2 SERPL-SCNC: 21 MMOL/L (ref 21–32)
CREAT SERPL-MCNC: 0.49 MG/DL (ref 0.5–1.05)
EGFRCR SERPLBLD CKD-EPI 2021: >90 ML/MIN/1.73M*2
EOSINOPHIL # BLD AUTO: 0.21 X10*3/UL (ref 0–0.7)
EOSINOPHIL NFR BLD AUTO: 2.2 %
ERYTHROCYTE [DISTWIDTH] IN BLOOD BY AUTOMATED COUNT: 20.3 % (ref 11.5–14.5)
GLUCOSE SERPL-MCNC: 71 MG/DL (ref 74–99)
HCT VFR BLD AUTO: 30.8 % (ref 36–46)
HGB BLD-MCNC: 9 G/DL (ref 12–16)
IMM GRANULOCYTES # BLD AUTO: 0.02 X10*3/UL (ref 0–0.7)
IMM GRANULOCYTES NFR BLD AUTO: 0.2 % (ref 0–0.9)
LYMPHOCYTES # BLD AUTO: 2.57 X10*3/UL (ref 1.2–4.8)
LYMPHOCYTES NFR BLD AUTO: 26.5 %
MCH RBC QN AUTO: 24.5 PG (ref 26–34)
MCHC RBC AUTO-ENTMCNC: 29.2 G/DL (ref 32–36)
MCV RBC AUTO: 84 FL (ref 80–100)
MONOCYTES # BLD AUTO: 0.98 X10*3/UL (ref 0.1–1)
MONOCYTES NFR BLD AUTO: 10.1 %
NEUTROPHILS # BLD AUTO: 5.87 X10*3/UL (ref 1.2–7.7)
NEUTROPHILS NFR BLD AUTO: 60.7 %
NRBC BLD-RTO: 0 /100 WBCS (ref 0–0)
PLATELET # BLD AUTO: 403 X10*3/UL (ref 150–450)
POTASSIUM SERPL-SCNC: 4.2 MMOL/L (ref 3.5–5.3)
RBC # BLD AUTO: 3.68 X10*6/UL (ref 4–5.2)
SODIUM SERPL-SCNC: 138 MMOL/L (ref 136–145)
WBC # BLD AUTO: 9.7 X10*3/UL (ref 4.4–11.3)

## 2025-08-14 PROCEDURE — 70450 CT HEAD/BRAIN W/O DYE: CPT | Performed by: RADIOLOGY

## 2025-08-14 PROCEDURE — 84702 CHORIONIC GONADOTROPIN TEST: CPT | Performed by: GENERAL PRACTICE

## 2025-08-14 PROCEDURE — 2500000004 HC RX 250 GENERAL PHARMACY W/ HCPCS (ALT 636 FOR OP/ED): Performed by: GENERAL PRACTICE

## 2025-08-14 PROCEDURE — 99285 EMERGENCY DEPT VISIT HI MDM: CPT | Mod: 25 | Performed by: GENERAL PRACTICE

## 2025-08-14 PROCEDURE — 70250 X-RAY EXAM OF SKULL: CPT | Mod: FOREIGN READ | Performed by: RADIOLOGY

## 2025-08-14 PROCEDURE — 71045 X-RAY EXAM CHEST 1 VIEW: CPT

## 2025-08-14 PROCEDURE — 96375 TX/PRO/DX INJ NEW DRUG ADDON: CPT

## 2025-08-14 PROCEDURE — 96374 THER/PROPH/DIAG INJ IV PUSH: CPT

## 2025-08-14 PROCEDURE — 80048 BASIC METABOLIC PNL TOTAL CA: CPT | Performed by: GENERAL PRACTICE

## 2025-08-14 PROCEDURE — 96361 HYDRATE IV INFUSION ADD-ON: CPT

## 2025-08-14 PROCEDURE — 2500000001 HC RX 250 WO HCPCS SELF ADMINISTERED DRUGS (ALT 637 FOR MEDICARE OP): Performed by: GENERAL PRACTICE

## 2025-08-14 PROCEDURE — 74018 RADEX ABDOMEN 1 VIEW: CPT | Mod: FOREIGN READ | Performed by: RADIOLOGY

## 2025-08-14 PROCEDURE — 36415 COLL VENOUS BLD VENIPUNCTURE: CPT | Performed by: GENERAL PRACTICE

## 2025-08-14 PROCEDURE — 85025 COMPLETE CBC W/AUTO DIFF WBC: CPT | Performed by: GENERAL PRACTICE

## 2025-08-14 PROCEDURE — 70450 CT HEAD/BRAIN W/O DYE: CPT

## 2025-08-14 PROCEDURE — 71045 X-RAY EXAM CHEST 1 VIEW: CPT | Mod: FOREIGN READ | Performed by: RADIOLOGY

## 2025-08-14 RX ORDER — METOCLOPRAMIDE HYDROCHLORIDE 5 MG/ML
10 INJECTION INTRAMUSCULAR; INTRAVENOUS ONCE
Status: COMPLETED | OUTPATIENT
Start: 2025-08-14 | End: 2025-08-14

## 2025-08-14 RX ORDER — HYDROMORPHONE HYDROCHLORIDE 1 MG/ML
1 INJECTION, SOLUTION INTRAMUSCULAR; INTRAVENOUS; SUBCUTANEOUS ONCE
Status: COMPLETED | OUTPATIENT
Start: 2025-08-14 | End: 2025-08-14

## 2025-08-14 RX ORDER — DIPHENHYDRAMINE HYDROCHLORIDE 50 MG/ML
25 INJECTION, SOLUTION INTRAMUSCULAR; INTRAVENOUS ONCE
Status: COMPLETED | OUTPATIENT
Start: 2025-08-14 | End: 2025-08-14

## 2025-08-14 RX ORDER — ACETAMINOPHEN 325 MG/1
975 TABLET ORAL ONCE
Status: COMPLETED | OUTPATIENT
Start: 2025-08-14 | End: 2025-08-14

## 2025-08-14 RX ORDER — KETOROLAC TROMETHAMINE 30 MG/ML
15 INJECTION, SOLUTION INTRAMUSCULAR; INTRAVENOUS ONCE
Status: COMPLETED | OUTPATIENT
Start: 2025-08-14 | End: 2025-08-14

## 2025-08-14 RX ADMIN — DIPHENHYDRAMINE HYDROCHLORIDE 25 MG: 50 INJECTION, SOLUTION INTRAMUSCULAR; INTRAVENOUS at 01:48

## 2025-08-14 RX ADMIN — HYDROMORPHONE HYDROCHLORIDE 1 MG: 1 INJECTION, SOLUTION INTRAMUSCULAR; INTRAVENOUS; SUBCUTANEOUS at 05:17

## 2025-08-14 RX ADMIN — METOCLOPRAMIDE 10 MG: 5 INJECTION, SOLUTION INTRAMUSCULAR; INTRAVENOUS at 01:49

## 2025-08-14 RX ADMIN — SODIUM CHLORIDE 1000 ML: 9 INJECTION, SOLUTION INTRAVENOUS at 02:11

## 2025-08-14 RX ADMIN — ACETAMINOPHEN 975 MG: 325 TABLET ORAL at 01:46

## 2025-08-14 RX ADMIN — KETOROLAC TROMETHAMINE 15 MG: 30 INJECTION, SOLUTION INTRAMUSCULAR at 01:48

## 2025-08-14 ASSESSMENT — PAIN - FUNCTIONAL ASSESSMENT
PAIN_FUNCTIONAL_ASSESSMENT: 0-10
PAIN_FUNCTIONAL_ASSESSMENT: 0-10

## 2025-08-14 ASSESSMENT — PAIN DESCRIPTION - LOCATION
LOCATION: HEAD
LOCATION: HEAD

## 2025-08-14 ASSESSMENT — PAIN SCALES - GENERAL
PAINLEVEL_OUTOF10: 8
PAINLEVEL_OUTOF10: 10 - WORST POSSIBLE PAIN

## 2025-08-15 ENCOUNTER — PATIENT OUTREACH (OUTPATIENT)
Dept: CARE COORDINATION | Facility: CLINIC | Age: 25
End: 2025-08-15
Payer: COMMERCIAL

## 2025-08-19 ENCOUNTER — APPOINTMENT (OUTPATIENT)
Dept: RADIOLOGY | Facility: HOSPITAL | Age: 25
End: 2025-08-19
Payer: COMMERCIAL

## 2025-08-19 ENCOUNTER — APPOINTMENT (OUTPATIENT)
Dept: CARDIOLOGY | Facility: HOSPITAL | Age: 25
End: 2025-08-19
Payer: COMMERCIAL

## 2025-08-19 ENCOUNTER — HOSPITAL ENCOUNTER (EMERGENCY)
Facility: HOSPITAL | Age: 25
Discharge: NURSING, REHAB OR LONG-TERM CARE | End: 2025-08-20
Attending: EMERGENCY MEDICINE
Payer: COMMERCIAL

## 2025-08-19 LAB
ALBUMIN SERPL BCP-MCNC: 4.2 G/DL (ref 3.4–5)
ALP SERPL-CCNC: 62 U/L (ref 33–110)
ALT SERPL W P-5'-P-CCNC: 14 U/L (ref 7–45)
ANION GAP SERPL CALC-SCNC: 10 MMOL/L (ref 10–20)
AST SERPL W P-5'-P-CCNC: 24 U/L (ref 9–39)
B-HCG SERPL-ACNC: <2 MIU/ML
BASOPHILS # BLD AUTO: 0.06 X10*3/UL (ref 0–0.1)
BASOPHILS NFR BLD AUTO: 0.7 %
BILIRUB SERPL-MCNC: 0.4 MG/DL (ref 0–1.2)
BNP SERPL-MCNC: 16 PG/ML (ref 0–99)
BUN SERPL-MCNC: 11 MG/DL (ref 6–23)
CALCIUM SERPL-MCNC: 9 MG/DL (ref 8.6–10.3)
CARDIAC TROPONIN I PNL SERPL HS: 3 NG/L (ref 0–13)
CARDIAC TROPONIN I PNL SERPL HS: 4 NG/L (ref 0–13)
CHLORIDE SERPL-SCNC: 107 MMOL/L (ref 98–107)
CO2 SERPL-SCNC: 26 MMOL/L (ref 21–32)
CREAT SERPL-MCNC: 0.49 MG/DL (ref 0.5–1.05)
EGFRCR SERPLBLD CKD-EPI 2021: >90 ML/MIN/1.73M*2
EOSINOPHIL # BLD AUTO: 0.58 X10*3/UL (ref 0–0.7)
EOSINOPHIL NFR BLD AUTO: 7 %
ERYTHROCYTE [DISTWIDTH] IN BLOOD BY AUTOMATED COUNT: 19.6 % (ref 11.5–14.5)
GLUCOSE SERPL-MCNC: 99 MG/DL (ref 74–99)
HCT VFR BLD AUTO: 30 % (ref 36–46)
HGB BLD-MCNC: 8.8 G/DL (ref 12–16)
IMM GRANULOCYTES # BLD AUTO: 0.02 X10*3/UL (ref 0–0.7)
IMM GRANULOCYTES NFR BLD AUTO: 0.2 % (ref 0–0.9)
LYMPHOCYTES # BLD AUTO: 3.74 X10*3/UL (ref 1.2–4.8)
LYMPHOCYTES NFR BLD AUTO: 45.2 %
MCH RBC QN AUTO: 24.4 PG (ref 26–34)
MCHC RBC AUTO-ENTMCNC: 29.3 G/DL (ref 32–36)
MCV RBC AUTO: 83 FL (ref 80–100)
MONOCYTES # BLD AUTO: 0.62 X10*3/UL (ref 0.1–1)
MONOCYTES NFR BLD AUTO: 7.5 %
NEUTROPHILS # BLD AUTO: 3.26 X10*3/UL (ref 1.2–7.7)
NEUTROPHILS NFR BLD AUTO: 39.4 %
NRBC BLD-RTO: 0 /100 WBCS (ref 0–0)
PLATELET # BLD AUTO: 398 X10*3/UL (ref 150–450)
POTASSIUM SERPL-SCNC: 3.5 MMOL/L (ref 3.5–5.3)
PROT SERPL-MCNC: 8.2 G/DL (ref 6.4–8.2)
RBC # BLD AUTO: 3.61 X10*6/UL (ref 4–5.2)
SODIUM SERPL-SCNC: 139 MMOL/L (ref 136–145)
WBC # BLD AUTO: 8.3 X10*3/UL (ref 4.4–11.3)

## 2025-08-19 PROCEDURE — 2500000001 HC RX 250 WO HCPCS SELF ADMINISTERED DRUGS (ALT 637 FOR MEDICARE OP)

## 2025-08-19 PROCEDURE — 80053 COMPREHEN METABOLIC PANEL: CPT

## 2025-08-19 PROCEDURE — 85025 COMPLETE CBC W/AUTO DIFF WBC: CPT

## 2025-08-19 PROCEDURE — 36415 COLL VENOUS BLD VENIPUNCTURE: CPT

## 2025-08-19 PROCEDURE — 99285 EMERGENCY DEPT VISIT HI MDM: CPT | Mod: 25 | Performed by: EMERGENCY MEDICINE

## 2025-08-19 PROCEDURE — 70450 CT HEAD/BRAIN W/O DYE: CPT

## 2025-08-19 PROCEDURE — 2550000001 HC RX 255 CONTRASTS: Performed by: EMERGENCY MEDICINE

## 2025-08-19 PROCEDURE — 85652 RBC SED RATE AUTOMATED: CPT | Performed by: EMERGENCY MEDICINE

## 2025-08-19 PROCEDURE — 86140 C-REACTIVE PROTEIN: CPT | Performed by: EMERGENCY MEDICINE

## 2025-08-19 PROCEDURE — 2500000004 HC RX 250 GENERAL PHARMACY W/ HCPCS (ALT 636 FOR OP/ED): Mod: JZ

## 2025-08-19 PROCEDURE — 74177 CT ABD & PELVIS W/CONTRAST: CPT

## 2025-08-19 PROCEDURE — 93005 ELECTROCARDIOGRAM TRACING: CPT

## 2025-08-19 PROCEDURE — 96375 TX/PRO/DX INJ NEW DRUG ADDON: CPT

## 2025-08-19 PROCEDURE — 84484 ASSAY OF TROPONIN QUANT: CPT

## 2025-08-19 PROCEDURE — 71045 X-RAY EXAM CHEST 1 VIEW: CPT

## 2025-08-19 PROCEDURE — 83880 ASSAY OF NATRIURETIC PEPTIDE: CPT

## 2025-08-19 PROCEDURE — 74018 RADEX ABDOMEN 1 VIEW: CPT | Performed by: RADIOLOGY

## 2025-08-19 PROCEDURE — 70250 X-RAY EXAM OF SKULL: CPT | Performed by: RADIOLOGY

## 2025-08-19 PROCEDURE — 84702 CHORIONIC GONADOTROPIN TEST: CPT

## 2025-08-19 PROCEDURE — 71045 X-RAY EXAM CHEST 1 VIEW: CPT | Performed by: RADIOLOGY

## 2025-08-19 PROCEDURE — 70450 CT HEAD/BRAIN W/O DYE: CPT | Performed by: STUDENT IN AN ORGANIZED HEALTH CARE EDUCATION/TRAINING PROGRAM

## 2025-08-19 RX ORDER — METOCLOPRAMIDE HYDROCHLORIDE 5 MG/ML
10 INJECTION INTRAMUSCULAR; INTRAVENOUS ONCE
Status: COMPLETED | OUTPATIENT
Start: 2025-08-19 | End: 2025-08-19

## 2025-08-19 RX ORDER — PROCHLORPERAZINE EDISYLATE 5 MG/ML
10 INJECTION INTRAMUSCULAR; INTRAVENOUS ONCE
Status: COMPLETED | OUTPATIENT
Start: 2025-08-19 | End: 2025-08-20

## 2025-08-19 RX ORDER — FAMOTIDINE 20 MG/1
20 TABLET, FILM COATED ORAL ONCE
Status: COMPLETED | OUTPATIENT
Start: 2025-08-19 | End: 2025-08-19

## 2025-08-19 RX ORDER — DIPHENHYDRAMINE HCL 25 MG
25 CAPSULE ORAL ONCE
Status: COMPLETED | OUTPATIENT
Start: 2025-08-19 | End: 2025-08-19

## 2025-08-19 RX ORDER — KETOROLAC TROMETHAMINE 30 MG/ML
15 INJECTION, SOLUTION INTRAMUSCULAR; INTRAVENOUS ONCE
Status: COMPLETED | OUTPATIENT
Start: 2025-08-19 | End: 2025-08-20

## 2025-08-19 RX ADMIN — IOHEXOL 100 ML: 350 INJECTION, SOLUTION INTRAVENOUS at 23:53

## 2025-08-19 RX ADMIN — DIPHENHYDRAMINE HYDROCHLORIDE 25 MG: 25 CAPSULE ORAL at 21:11

## 2025-08-19 RX ADMIN — METOCLOPRAMIDE 10 MG: 5 INJECTION, SOLUTION INTRAMUSCULAR; INTRAVENOUS at 21:11

## 2025-08-19 RX ADMIN — FAMOTIDINE 20 MG: 20 TABLET, FILM COATED ORAL at 21:17

## 2025-08-19 ASSESSMENT — PAIN DESCRIPTION - LOCATION: LOCATION: HEAD

## 2025-08-19 ASSESSMENT — PAIN SCALES - GENERAL: PAINLEVEL_OUTOF10: 10 - WORST POSSIBLE PAIN

## 2025-08-19 ASSESSMENT — PAIN - FUNCTIONAL ASSESSMENT: PAIN_FUNCTIONAL_ASSESSMENT: 0-10

## 2025-08-20 ENCOUNTER — APPOINTMENT (OUTPATIENT)
Dept: RADIOLOGY | Facility: HOSPITAL | Age: 25
End: 2025-08-20
Payer: COMMERCIAL

## 2025-08-20 ENCOUNTER — HOSPITAL ENCOUNTER (INPATIENT)
Facility: HOSPITAL | Age: 25
End: 2025-08-20
Attending: EMERGENCY MEDICINE | Admitting: NEUROLOGICAL SURGERY
Payer: COMMERCIAL

## 2025-08-20 VITALS
WEIGHT: 140 LBS | HEART RATE: 78 BPM | OXYGEN SATURATION: 99 % | RESPIRATION RATE: 17 BRPM | TEMPERATURE: 97.7 F | HEIGHT: 63 IN | BODY MASS INDEX: 24.8 KG/M2 | SYSTOLIC BLOOD PRESSURE: 105 MMHG | DIASTOLIC BLOOD PRESSURE: 56 MMHG

## 2025-08-20 PROBLEM — T85.730A INFECTION OF VENTRICULAR SHUNT: Status: ACTIVE | Noted: 2025-08-20

## 2025-08-20 LAB
CRP SERPL-MCNC: 0.49 MG/DL
ERYTHROCYTE [SEDIMENTATION RATE] IN BLOOD BY WESTERGREN METHOD: 34 MM/H (ref 0–20)

## 2025-08-20 PROCEDURE — 96365 THER/PROPH/DIAG IV INF INIT: CPT

## 2025-08-20 PROCEDURE — 2500000004 HC RX 250 GENERAL PHARMACY W/ HCPCS (ALT 636 FOR OP/ED): Performed by: EMERGENCY MEDICINE

## 2025-08-20 PROCEDURE — 2500000005 HC RX 250 GENERAL PHARMACY W/O HCPCS: Performed by: PHARMACIST

## 2025-08-20 PROCEDURE — 2500000004 HC RX 250 GENERAL PHARMACY W/ HCPCS (ALT 636 FOR OP/ED): Performed by: PHARMACIST

## 2025-08-20 PROCEDURE — 96361 HYDRATE IV INFUSION ADD-ON: CPT

## 2025-08-20 PROCEDURE — 74177 CT ABD & PELVIS W/CONTRAST: CPT | Performed by: STUDENT IN AN ORGANIZED HEALTH CARE EDUCATION/TRAINING PROGRAM

## 2025-08-20 PROCEDURE — 96375 TX/PRO/DX INJ NEW DRUG ADDON: CPT

## 2025-08-20 PROCEDURE — 96367 TX/PROPH/DG ADDL SEQ IV INF: CPT

## 2025-08-20 RX ORDER — METRONIDAZOLE 500 MG/100ML
500 INJECTION, SOLUTION INTRAVENOUS ONCE
Status: COMPLETED | OUTPATIENT
Start: 2025-08-20 | End: 2025-08-20

## 2025-08-20 RX ORDER — FENTANYL CITRATE 50 UG/ML
50 INJECTION, SOLUTION INTRAMUSCULAR; INTRAVENOUS ONCE
Status: COMPLETED | OUTPATIENT
Start: 2025-08-20 | End: 2025-08-20

## 2025-08-20 RX ORDER — CEFEPIME HYDROCHLORIDE 2 G/50ML
2 INJECTION, SOLUTION INTRAVENOUS ONCE
Status: COMPLETED | OUTPATIENT
Start: 2025-08-20 | End: 2025-08-20

## 2025-08-20 RX ORDER — VANCOMYCIN HYDROCHLORIDE 1 G/200ML
1000 INJECTION, SOLUTION INTRAVENOUS ONCE
Status: DISCONTINUED | OUTPATIENT
Start: 2025-08-20 | End: 2025-08-20

## 2025-08-20 RX ADMIN — VANCOMYCIN HYDROCHLORIDE 1500 MG: 5 INJECTION, POWDER, LYOPHILIZED, FOR SOLUTION INTRAVENOUS at 03:22

## 2025-08-20 RX ADMIN — KETOROLAC TROMETHAMINE 15 MG: 30 INJECTION, SOLUTION INTRAMUSCULAR at 00:01

## 2025-08-20 RX ADMIN — SODIUM CHLORIDE, SODIUM LACTATE, POTASSIUM CHLORIDE, AND CALCIUM CHLORIDE 1000 ML: .6; .31; .03; .02 INJECTION, SOLUTION INTRAVENOUS at 00:01

## 2025-08-20 RX ADMIN — METRONIDAZOLE 500 MG: 500 INJECTION, SOLUTION INTRAVENOUS at 02:13

## 2025-08-20 RX ADMIN — PROCHLORPERAZINE EDISYLATE 10 MG: 5 INJECTION INTRAMUSCULAR; INTRAVENOUS at 00:01

## 2025-08-20 RX ADMIN — CEFEPIME HYDROCHLORIDE 2 G: 2 INJECTION, SOLUTION INTRAVENOUS at 01:52

## 2025-08-20 RX ADMIN — FENTANYL CITRATE 50 MCG: 50 INJECTION, SOLUTION INTRAMUSCULAR; INTRAVENOUS at 04:00

## 2025-08-20 ASSESSMENT — PAIN DESCRIPTION - PAIN TYPE: TYPE: ACUTE PAIN

## 2025-08-20 ASSESSMENT — LIFESTYLE VARIABLES
EVER FELT BAD OR GUILTY ABOUT YOUR DRINKING: NO
TOTAL SCORE: 0
HAVE YOU EVER FELT YOU SHOULD CUT DOWN ON YOUR DRINKING: NO
EVER HAD A DRINK FIRST THING IN THE MORNING TO STEADY YOUR NERVES TO GET RID OF A HANGOVER: NO
HAVE PEOPLE ANNOYED YOU BY CRITICIZING YOUR DRINKING: NO

## 2025-08-20 ASSESSMENT — PAIN SCALES - GENERAL
PAINLEVEL_OUTOF10: 0 - NO PAIN
PAINLEVEL_OUTOF10: 9
PAINLEVEL_OUTOF10: 9
PAINLEVEL_OUTOF10: 0 - NO PAIN

## 2025-08-20 ASSESSMENT — PAIN - FUNCTIONAL ASSESSMENT: PAIN_FUNCTIONAL_ASSESSMENT: 0-10

## 2025-08-20 ASSESSMENT — PAIN DESCRIPTION - LOCATION: LOCATION: HEAD

## 2025-08-21 ENCOUNTER — ANESTHESIA (OUTPATIENT)
Dept: OPERATING ROOM | Facility: HOSPITAL | Age: 25
End: 2025-08-21
Payer: COMMERCIAL

## 2025-08-21 ENCOUNTER — APPOINTMENT (OUTPATIENT)
Dept: RADIOLOGY | Facility: HOSPITAL | Age: 25
End: 2025-08-21
Payer: COMMERCIAL

## 2025-08-21 ENCOUNTER — ANESTHESIA EVENT (OUTPATIENT)
Dept: OPERATING ROOM | Facility: HOSPITAL | Age: 25
End: 2025-08-21
Payer: COMMERCIAL

## 2025-08-21 ENCOUNTER — APPOINTMENT (OUTPATIENT)
Dept: OPHTHALMOLOGY | Facility: CLINIC | Age: 25
End: 2025-08-21
Payer: COMMERCIAL

## 2025-08-21 PROCEDURE — 2500000004 HC RX 250 GENERAL PHARMACY W/ HCPCS (ALT 636 FOR OP/ED): Mod: SE

## 2025-08-21 PROCEDURE — P9045 ALBUMIN (HUMAN), 5%, 250 ML: HCPCS | Mod: JZ,SE

## 2025-08-21 PROCEDURE — 2500000004 HC RX 250 GENERAL PHARMACY W/ HCPCS (ALT 636 FOR OP/ED): Mod: JZ,SE

## 2025-08-21 RX ORDER — PHENYLEPHRINE HCL IN 0.9% NACL 0.4MG/10ML
SYRINGE (ML) INTRAVENOUS AS NEEDED
Status: DISCONTINUED | OUTPATIENT
Start: 2025-08-21 | End: 2025-08-21

## 2025-08-21 RX ORDER — ROCURONIUM BROMIDE 10 MG/ML
INJECTION, SOLUTION INTRAVENOUS AS NEEDED
Status: DISCONTINUED | OUTPATIENT
Start: 2025-08-21 | End: 2025-08-21

## 2025-08-21 RX ORDER — ALBUMIN HUMAN 50 G/1000ML
SOLUTION INTRAVENOUS AS NEEDED
Status: DISCONTINUED | OUTPATIENT
Start: 2025-08-21 | End: 2025-08-21

## 2025-08-21 RX ORDER — FENTANYL CITRATE 50 UG/ML
INJECTION, SOLUTION INTRAMUSCULAR; INTRAVENOUS AS NEEDED
Status: DISCONTINUED | OUTPATIENT
Start: 2025-08-21 | End: 2025-08-21

## 2025-08-21 RX ORDER — PROPOFOL 10 MG/ML
INJECTION, EMULSION INTRAVENOUS AS NEEDED
Status: DISCONTINUED | OUTPATIENT
Start: 2025-08-21 | End: 2025-08-21

## 2025-08-21 RX ORDER — HYDROMORPHONE HYDROCHLORIDE 1 MG/ML
INJECTION, SOLUTION INTRAMUSCULAR; INTRAVENOUS; SUBCUTANEOUS AS NEEDED
Status: DISCONTINUED | OUTPATIENT
Start: 2025-08-21 | End: 2025-08-21

## 2025-08-21 RX ORDER — LIDOCAINE HYDROCHLORIDE 20 MG/ML
INJECTION, SOLUTION INFILTRATION; PERINEURAL AS NEEDED
Status: DISCONTINUED | OUTPATIENT
Start: 2025-08-21 | End: 2025-08-21

## 2025-08-21 RX ORDER — ONDANSETRON HYDROCHLORIDE 2 MG/ML
INJECTION, SOLUTION INTRAVENOUS AS NEEDED
Status: DISCONTINUED | OUTPATIENT
Start: 2025-08-21 | End: 2025-08-21

## 2025-08-21 RX ADMIN — PROPOFOL 30 MG: 10 INJECTION, EMULSION INTRAVENOUS at 20:10

## 2025-08-21 RX ADMIN — HYDROMORPHONE HYDROCHLORIDE 0.2 MG: 1 INJECTION, SOLUTION INTRAMUSCULAR; INTRAVENOUS; SUBCUTANEOUS at 20:11

## 2025-08-21 RX ADMIN — ONDANSETRON 4 MG: 2 INJECTION, SOLUTION INTRAMUSCULAR; INTRAVENOUS at 19:53

## 2025-08-21 RX ADMIN — SUGAMMADEX 200 MG: 100 INJECTION, SOLUTION INTRAVENOUS at 20:24

## 2025-08-21 RX ADMIN — Medication 80 MCG: at 19:10

## 2025-08-21 RX ADMIN — FENTANYL CITRATE 50 MCG: 50 INJECTION, SOLUTION INTRAMUSCULAR; INTRAVENOUS at 19:38

## 2025-08-21 RX ADMIN — ALBUMIN HUMAN 250 ML: 0.05 INJECTION, SOLUTION INTRAVENOUS at 19:10

## 2025-08-21 RX ADMIN — FENTANYL CITRATE 50 MCG: 50 INJECTION, SOLUTION INTRAMUSCULAR; INTRAVENOUS at 19:10

## 2025-08-21 RX ADMIN — SUGAMMADEX 200 MG: 100 INJECTION, SOLUTION INTRAVENOUS at 20:18

## 2025-08-21 RX ADMIN — DEXAMETHASONE SODIUM PHOSPHATE 4 MG: 4 INJECTION INTRA-ARTICULAR; INTRALESIONAL; INTRAMUSCULAR; INTRAVENOUS; SOFT TISSUE at 19:14

## 2025-08-21 RX ADMIN — ROCURONIUM BROMIDE 50 MG: 10 INJECTION, SOLUTION INTRAVENOUS at 19:10

## 2025-08-21 RX ADMIN — PROPOFOL 150 MG: 10 INJECTION, EMULSION INTRAVENOUS at 19:10

## 2025-08-21 RX ADMIN — SODIUM CHLORIDE, SODIUM LACTATE, POTASSIUM CHLORIDE, AND CALCIUM CHLORIDE: 600; 310; 30; 20 INJECTION, SOLUTION INTRAVENOUS at 19:10

## 2025-08-21 RX ADMIN — LIDOCAINE HYDROCHLORIDE 50 MG: 20 INJECTION, SOLUTION INFILTRATION; PERINEURAL at 19:21

## 2025-08-21 SDOH — ECONOMIC STABILITY: FOOD INSECURITY: HOW HARD IS IT FOR YOU TO PAY FOR THE VERY BASICS LIKE FOOD, HOUSING, MEDICAL CARE, AND HEATING?: NOT VERY HARD

## 2025-08-21 SDOH — SOCIAL STABILITY: SOCIAL INSECURITY: WITHIN THE LAST YEAR, HAVE YOU BEEN AFRAID OF YOUR PARTNER OR EX-PARTNER?: NO

## 2025-08-21 SDOH — SOCIAL STABILITY: SOCIAL INSECURITY: ARE YOU OR HAVE YOU BEEN THREATENED OR ABUSED PHYSICALLY, EMOTIONALLY, OR SEXUALLY BY ANYONE?: NO

## 2025-08-21 SDOH — HEALTH STABILITY: PHYSICAL HEALTH: ON AVERAGE, HOW MANY MINUTES DO YOU ENGAGE IN EXERCISE AT THIS LEVEL?: 0 MIN

## 2025-08-21 SDOH — SOCIAL STABILITY: SOCIAL INSECURITY: ARE THERE ANY APPARENT SIGNS OF INJURIES/BEHAVIORS THAT COULD BE RELATED TO ABUSE/NEGLECT?: NO

## 2025-08-21 SDOH — ECONOMIC STABILITY: FOOD INSECURITY: WITHIN THE PAST 12 MONTHS, THE FOOD YOU BOUGHT JUST DIDN'T LAST AND YOU DIDN'T HAVE MONEY TO GET MORE.: NEVER TRUE

## 2025-08-21 SDOH — SOCIAL STABILITY: SOCIAL INSECURITY: DO YOU FEEL ANYONE HAS EXPLOITED OR TAKEN ADVANTAGE OF YOU FINANCIALLY OR OF YOUR PERSONAL PROPERTY?: NO

## 2025-08-21 SDOH — ECONOMIC STABILITY: INCOME INSECURITY: IN THE PAST 12 MONTHS HAS THE ELECTRIC, GAS, OIL, OR WATER COMPANY THREATENED TO SHUT OFF SERVICES IN YOUR HOME?: NO

## 2025-08-21 SDOH — ECONOMIC STABILITY: FOOD INSECURITY: WITHIN THE PAST 12 MONTHS, YOU WORRIED THAT YOUR FOOD WOULD RUN OUT BEFORE YOU GOT THE MONEY TO BUY MORE.: NEVER TRUE

## 2025-08-21 SDOH — ECONOMIC STABILITY: HOUSING INSECURITY: IN THE PAST 12 MONTHS, HOW MANY TIMES HAVE YOU MOVED WHERE YOU WERE LIVING?: 1

## 2025-08-21 SDOH — SOCIAL STABILITY: SOCIAL INSECURITY: ABUSE: ADULT

## 2025-08-21 SDOH — SOCIAL STABILITY: SOCIAL INSECURITY: HAVE YOU HAD ANY THOUGHTS OF HARMING ANYONE ELSE?: NO

## 2025-08-21 SDOH — ECONOMIC STABILITY: HOUSING INSECURITY: AT ANY TIME IN THE PAST 12 MONTHS, WERE YOU HOMELESS OR LIVING IN A SHELTER (INCLUDING NOW)?: NO

## 2025-08-21 SDOH — ECONOMIC STABILITY: HOUSING INSECURITY: IN THE LAST 12 MONTHS, WAS THERE A TIME WHEN YOU WERE NOT ABLE TO PAY THE MORTGAGE OR RENT ON TIME?: NO

## 2025-08-21 SDOH — SOCIAL STABILITY: SOCIAL INSECURITY: WITHIN THE LAST YEAR, HAVE YOU BEEN HUMILIATED OR EMOTIONALLY ABUSED IN OTHER WAYS BY YOUR PARTNER OR EX-PARTNER?: NO

## 2025-08-21 SDOH — HEALTH STABILITY: PHYSICAL HEALTH: ON AVERAGE, HOW MANY DAYS PER WEEK DO YOU ENGAGE IN MODERATE TO STRENUOUS EXERCISE (LIKE A BRISK WALK)?: 0 DAYS

## 2025-08-21 SDOH — SOCIAL STABILITY: SOCIAL INSECURITY: DO YOU FEEL UNSAFE GOING BACK TO THE PLACE WHERE YOU ARE LIVING?: NO

## 2025-08-21 SDOH — SOCIAL STABILITY: SOCIAL INSECURITY: WERE YOU ABLE TO COMPLETE ALL THE BEHAVIORAL HEALTH SCREENINGS?: YES

## 2025-08-21 SDOH — SOCIAL STABILITY: SOCIAL INSECURITY: DOES ANYONE TRY TO KEEP YOU FROM HAVING/CONTACTING OTHER FRIENDS OR DOING THINGS OUTSIDE YOUR HOME?: NO

## 2025-08-21 SDOH — ECONOMIC STABILITY: TRANSPORTATION INSECURITY: IN THE PAST 12 MONTHS, HAS LACK OF TRANSPORTATION KEPT YOU FROM MEDICAL APPOINTMENTS OR FROM GETTING MEDICATIONS?: NO

## 2025-08-21 SDOH — SOCIAL STABILITY: SOCIAL INSECURITY: HAVE YOU HAD THOUGHTS OF HARMING ANYONE ELSE?: NO

## 2025-08-21 SDOH — SOCIAL STABILITY: SOCIAL INSECURITY: HAS ANYONE EVER THREATENED TO HURT YOUR FAMILY OR YOUR PETS?: NO

## 2025-08-21 ASSESSMENT — PAIN SCALES - GENERAL
PAINLEVEL_OUTOF10: 7
PAINLEVEL_OUTOF10: 0 - NO PAIN
PAINLEVEL_OUTOF10: 9
PAINLEVEL_OUTOF10: 0 - NO PAIN
PAINLEVEL_OUTOF10: 10 - WORST POSSIBLE PAIN
PAINLEVEL_OUTOF10: 10 - WORST POSSIBLE PAIN
PAIN_LEVEL: 0
PAINLEVEL_OUTOF10: 0 - NO PAIN
PAINLEVEL_OUTOF10: 0 - NO PAIN
PAINLEVEL_OUTOF10: 9
PAINLEVEL_OUTOF10: 10 - WORST POSSIBLE PAIN

## 2025-08-21 ASSESSMENT — COGNITIVE AND FUNCTIONAL STATUS - GENERAL
MOBILITY SCORE: 24
PATIENT BASELINE BEDBOUND: NO
DAILY ACTIVITIY SCORE: 24

## 2025-08-21 ASSESSMENT — ACTIVITIES OF DAILY LIVING (ADL)
LACK_OF_TRANSPORTATION: NO
JUDGMENT_ADEQUATE_SAFELY_COMPLETE_DAILY_ACTIVITIES: YES
HEARING - LEFT EAR: FUNCTIONAL
BATHING: INDEPENDENT
WALKS IN HOME: INDEPENDENT
ADEQUATE_TO_COMPLETE_ADL: YES
HEARING - RIGHT EAR: FUNCTIONAL
GROOMING: INDEPENDENT
PATIENT'S MEMORY ADEQUATE TO SAFELY COMPLETE DAILY ACTIVITIES?: YES
DRESSING YOURSELF: INDEPENDENT
FEEDING YOURSELF: INDEPENDENT
LACK_OF_TRANSPORTATION: NO
TOILETING: INDEPENDENT

## 2025-08-21 ASSESSMENT — PAIN DESCRIPTION - ORIENTATION: ORIENTATION: RIGHT

## 2025-08-21 ASSESSMENT — PAIN - FUNCTIONAL ASSESSMENT
PAIN_FUNCTIONAL_ASSESSMENT: 0-10

## 2025-08-21 ASSESSMENT — LIFESTYLE VARIABLES
SKIP TO QUESTIONS 9-10: 1
AUDIT-C TOTAL SCORE: 1
HOW OFTEN DO YOU HAVE A DRINK CONTAINING ALCOHOL: MONTHLY OR LESS
AUDIT-C TOTAL SCORE: 1
HOW MANY STANDARD DRINKS CONTAINING ALCOHOL DO YOU HAVE ON A TYPICAL DAY: 1 OR 2
HOW OFTEN DO YOU HAVE 6 OR MORE DRINKS ON ONE OCCASION: NEVER

## 2025-08-21 ASSESSMENT — PAIN DESCRIPTION - DESCRIPTORS
DESCRIPTORS: ACHING
DESCRIPTORS: ACHING;STABBING

## 2025-08-21 ASSESSMENT — PATIENT HEALTH QUESTIONNAIRE - PHQ9
2. FEELING DOWN, DEPRESSED OR HOPELESS: NOT AT ALL
SUM OF ALL RESPONSES TO PHQ9 QUESTIONS 1 & 2: 0
1. LITTLE INTEREST OR PLEASURE IN DOING THINGS: NOT AT ALL

## 2025-08-21 ASSESSMENT — PAIN DESCRIPTION - LOCATION
LOCATION: HEAD

## 2025-08-22 LAB
ATRIAL RATE: 89 BPM
P AXIS: 72 DEGREES
P OFFSET: 187 MS
P ONSET: 153 MS
PR INTERVAL: 130 MS
Q ONSET: 218 MS
QRS COUNT: 14 BEATS
QRS DURATION: 78 MS
QT INTERVAL: 364 MS
QTC CALCULATION(BAZETT): 442 MS
QTC FREDERICIA: 414 MS
R AXIS: 64 DEGREES
T AXIS: 35 DEGREES
T OFFSET: 400 MS
VENTRICULAR RATE: 89 BPM

## 2025-08-22 SDOH — SOCIAL STABILITY: SOCIAL INSECURITY: WITHIN THE LAST YEAR, HAVE YOU BEEN HUMILIATED OR EMOTIONALLY ABUSED IN OTHER WAYS BY YOUR PARTNER OR EX-PARTNER?: NO

## 2025-08-22 SDOH — SOCIAL STABILITY: SOCIAL INSECURITY: ARE YOU MARRIED, WIDOWED, DIVORCED, SEPARATED, NEVER MARRIED, OR LIVING WITH A PARTNER?: NEVER MARRIED

## 2025-08-22 SDOH — SOCIAL STABILITY: SOCIAL INSECURITY: WITHIN THE LAST YEAR, HAVE YOU BEEN AFRAID OF YOUR PARTNER OR EX-PARTNER?: NO

## 2025-08-22 SDOH — HEALTH STABILITY: PHYSICAL HEALTH
HOW OFTEN DO YOU NEED TO HAVE SOMEONE HELP YOU WHEN YOU READ INSTRUCTIONS, PAMPHLETS, OR OTHER WRITTEN MATERIAL FROM YOUR DOCTOR OR PHARMACY?: RARELY

## 2025-08-22 SDOH — HEALTH STABILITY: PHYSICAL HEALTH: ON AVERAGE, HOW MANY MINUTES DO YOU ENGAGE IN EXERCISE AT THIS LEVEL?: 0 MIN

## 2025-08-22 SDOH — ECONOMIC STABILITY: INCOME INSECURITY: IN THE PAST 12 MONTHS HAS THE ELECTRIC, GAS, OIL, OR WATER COMPANY THREATENED TO SHUT OFF SERVICES IN YOUR HOME?: NO

## 2025-08-22 SDOH — HEALTH STABILITY: MENTAL HEALTH: HOW MANY DRINKS CONTAINING ALCOHOL DO YOU HAVE ON A TYPICAL DAY WHEN YOU ARE DRINKING?: 1 OR 2

## 2025-08-22 SDOH — HEALTH STABILITY: MENTAL HEALTH: HOW OFTEN DO YOU HAVE SIX OR MORE DRINKS ON ONE OCCASION?: NEVER

## 2025-08-22 SDOH — HEALTH STABILITY: PHYSICAL HEALTH: ON AVERAGE, HOW MANY DAYS PER WEEK DO YOU ENGAGE IN MODERATE TO STRENUOUS EXERCISE (LIKE A BRISK WALK)?: 0 DAYS

## 2025-08-22 SDOH — SOCIAL STABILITY: SOCIAL NETWORK: HOW OFTEN DO YOU ATTEND MEETINGS OF THE CLUBS OR ORGANIZATIONS YOU BELONG TO?: NEVER

## 2025-08-22 SDOH — SOCIAL STABILITY: SOCIAL NETWORK: HOW OFTEN DO YOU ATTEND CHURCH OR RELIGIOUS SERVICES?: NEVER

## 2025-08-22 SDOH — ECONOMIC STABILITY: FOOD INSECURITY: WITHIN THE PAST 12 MONTHS, YOU WORRIED THAT YOUR FOOD WOULD RUN OUT BEFORE YOU GOT THE MONEY TO BUY MORE.: NEVER TRUE

## 2025-08-22 SDOH — HEALTH STABILITY: MENTAL HEALTH: HOW OFTEN DO YOU HAVE A DRINK CONTAINING ALCOHOL?: MONTHLY OR LESS

## 2025-08-22 SDOH — HEALTH STABILITY: MENTAL HEALTH
DO YOU FEEL STRESS - TENSE, RESTLESS, NERVOUS, OR ANXIOUS, OR UNABLE TO SLEEP AT NIGHT BECAUSE YOUR MIND IS TROUBLED ALL THE TIME - THESE DAYS?: ONLY A LITTLE

## 2025-08-22 SDOH — SOCIAL STABILITY: SOCIAL NETWORK
DO YOU BELONG TO ANY CLUBS OR ORGANIZATIONS SUCH AS CHURCH GROUPS, UNIONS, FRATERNAL OR ATHLETIC GROUPS, OR SCHOOL GROUPS?: NO

## 2025-08-22 SDOH — SOCIAL STABILITY: SOCIAL NETWORK: IN A TYPICAL WEEK, HOW MANY TIMES DO YOU TALK ON THE PHONE WITH FAMILY, FRIENDS, OR NEIGHBORS?: THREE TIMES A WEEK

## 2025-08-22 SDOH — SOCIAL STABILITY: SOCIAL NETWORK: HOW OFTEN DO YOU GET TOGETHER WITH FRIENDS OR RELATIVES?: TWICE A WEEK

## 2025-08-22 ASSESSMENT — PAIN - FUNCTIONAL ASSESSMENT
PAIN_FUNCTIONAL_ASSESSMENT: 0-10

## 2025-08-22 ASSESSMENT — COGNITIVE AND FUNCTIONAL STATUS - GENERAL
MOVING TO AND FROM BED TO CHAIR: A LITTLE
DAILY ACTIVITIY SCORE: 24
MOBILITY SCORE: 20
STANDING UP FROM CHAIR USING ARMS: A LITTLE
WALKING IN HOSPITAL ROOM: A LITTLE
CLIMB 3 TO 5 STEPS WITH RAILING: A LITTLE

## 2025-08-22 ASSESSMENT — PAIN SCALES - GENERAL
PAINLEVEL_OUTOF10: 0 - NO PAIN
PAINLEVEL_OUTOF10: 8
PAINLEVEL_OUTOF10: 0 - NO PAIN
PAINLEVEL_OUTOF10: 8
PAINLEVEL_OUTOF10: 0 - NO PAIN
PAINLEVEL_OUTOF10: 0 - NO PAIN
PAINLEVEL_OUTOF10: 9
PAINLEVEL_OUTOF10: 8
PAINLEVEL_OUTOF10: 6

## 2025-08-22 ASSESSMENT — PAIN DESCRIPTION - LOCATION
LOCATION: HEAD
LOCATION: HEAD

## 2025-08-22 ASSESSMENT — ACTIVITIES OF DAILY LIVING (ADL)
LACK_OF_TRANSPORTATION: NO
ADL_ASSISTANCE: INDEPENDENT
ADL_ASSISTANCE: INDEPENDENT
BATHING_ASSISTANCE: INDEPENDENT

## 2025-08-22 ASSESSMENT — PAIN DESCRIPTION - DESCRIPTORS
DESCRIPTORS: ACHING
DESCRIPTORS: ACHING

## 2025-08-22 ASSESSMENT — LIFESTYLE VARIABLES
SKIP TO QUESTIONS 9-10: 1
AUDIT-C TOTAL SCORE: 1

## 2025-08-23 ASSESSMENT — PAIN SCALES - GENERAL
PAINLEVEL_OUTOF10: 8
PAINLEVEL_OUTOF10: 5 - MODERATE PAIN
PAINLEVEL_OUTOF10: 0 - NO PAIN
PAINLEVEL_OUTOF10: 4
PAINLEVEL_OUTOF10: 10 - WORST POSSIBLE PAIN
PAINLEVEL_OUTOF10: 8
PAINLEVEL_OUTOF10: 5 - MODERATE PAIN
PAINLEVEL_OUTOF10: 8

## 2025-08-23 ASSESSMENT — PAIN - FUNCTIONAL ASSESSMENT
PAIN_FUNCTIONAL_ASSESSMENT: 0-10

## 2025-08-23 ASSESSMENT — PAIN DESCRIPTION - DESCRIPTORS
DESCRIPTORS: HEADACHE
DESCRIPTORS: HEADACHE

## 2025-08-24 ASSESSMENT — PAIN SCALES - GENERAL
PAINLEVEL_OUTOF10: 0 - NO PAIN
PAINLEVEL_OUTOF10: 4
PAINLEVEL_OUTOF10: 0 - NO PAIN
PAINLEVEL_OUTOF10: 10 - WORST POSSIBLE PAIN
PAINLEVEL_OUTOF10: 8
PAINLEVEL_OUTOF10: 0 - NO PAIN
PAINLEVEL_OUTOF10: 6
PAINLEVEL_OUTOF10: 0 - NO PAIN
PAINLEVEL_OUTOF10: 0 - NO PAIN
PAINLEVEL_OUTOF10: 5 - MODERATE PAIN

## 2025-08-24 ASSESSMENT — PAIN - FUNCTIONAL ASSESSMENT
PAIN_FUNCTIONAL_ASSESSMENT: 0-10

## 2025-08-25 ASSESSMENT — PAIN SCALES - GENERAL
PAINLEVEL_OUTOF10: 5 - MODERATE PAIN
PAINLEVEL_OUTOF10: 6
PAINLEVEL_OUTOF10: 2
PAINLEVEL_OUTOF10: 9
PAINLEVEL_OUTOF10: 6

## 2025-08-25 ASSESSMENT — PAIN - FUNCTIONAL ASSESSMENT
PAIN_FUNCTIONAL_ASSESSMENT: 0-10

## 2025-08-26 ASSESSMENT — PAIN SCALES - GENERAL
PAINLEVEL_OUTOF10: 7
PAINLEVEL_OUTOF10: 6
PAINLEVEL_OUTOF10: 6
PAINLEVEL_OUTOF10: 0 - NO PAIN
PAINLEVEL_OUTOF10: 8

## 2025-08-26 ASSESSMENT — PAIN - FUNCTIONAL ASSESSMENT
PAIN_FUNCTIONAL_ASSESSMENT: 0-10

## 2025-08-27 ASSESSMENT — PAIN SCALES - GENERAL
PAINLEVEL_OUTOF10: 0 - NO PAIN
PAINLEVEL_OUTOF10: 8
PAINLEVEL_OUTOF10: 10 - WORST POSSIBLE PAIN
PAINLEVEL_OUTOF10: 0 - NO PAIN
PAINLEVEL_OUTOF10: 0 - NO PAIN
PAINLEVEL_OUTOF10: 8

## 2025-08-27 ASSESSMENT — PAIN - FUNCTIONAL ASSESSMENT
PAIN_FUNCTIONAL_ASSESSMENT: 0-10

## 2025-08-28 ENCOUNTER — APPOINTMENT (OUTPATIENT)
Dept: RADIOLOGY | Facility: HOSPITAL | Age: 25
End: 2025-08-28
Payer: COMMERCIAL

## 2025-08-28 ASSESSMENT — PAIN SCALES - GENERAL
PAINLEVEL_OUTOF10: 3
PAINLEVEL_OUTOF10: 8
PAINLEVEL_OUTOF10: 0 - NO PAIN
PAINLEVEL_OUTOF10: 6
PAINLEVEL_OUTOF10: 0 - NO PAIN
PAINLEVEL_OUTOF10: 6

## 2025-08-28 ASSESSMENT — PAIN DESCRIPTION - LOCATION
LOCATION: HEAD
LOCATION: HEAD

## 2025-08-28 ASSESSMENT — PAIN - FUNCTIONAL ASSESSMENT
PAIN_FUNCTIONAL_ASSESSMENT: 0-10

## 2025-08-28 ASSESSMENT — PAIN DESCRIPTION - DESCRIPTORS
DESCRIPTORS: DISCOMFORT;DULL
DESCRIPTORS: ACHING

## 2025-08-29 ENCOUNTER — DOCUMENTATION (OUTPATIENT)
Dept: HOME HEALTH SERVICES | Facility: HOME HEALTH | Age: 25
End: 2025-08-29
Payer: COMMERCIAL

## 2025-08-29 ENCOUNTER — HOME INFUSION (OUTPATIENT)
Dept: INFUSION THERAPY | Age: 25
End: 2025-08-29
Payer: COMMERCIAL

## 2025-08-29 ASSESSMENT — PAIN SCALES - GENERAL
PAINLEVEL_OUTOF10: 6
PAINLEVEL_OUTOF10: 8
PAINLEVEL_OUTOF10: 0 - NO PAIN
PAINLEVEL_OUTOF10: 0 - NO PAIN

## 2025-08-29 ASSESSMENT — PAIN - FUNCTIONAL ASSESSMENT
PAIN_FUNCTIONAL_ASSESSMENT: 0-10

## 2025-08-30 ENCOUNTER — HOME CARE VISIT (OUTPATIENT)
Dept: HOME HEALTH SERVICES | Facility: HOME HEALTH | Age: 25
End: 2025-08-30
Payer: COMMERCIAL

## 2025-08-30 PROCEDURE — G0299 HHS/HOSPICE OF RN EA 15 MIN: HCPCS

## 2025-08-30 RX ADMIN — Medication 5 ML: at 10:17

## 2025-08-30 RX ADMIN — VANCOMYCIN HYDROCHLORIDE 1750 MG: 1.75 INJECTION, POWDER, LYOPHILIZED, FOR SOLUTION INTRAVENOUS at 10:17

## 2025-08-30 RX ADMIN — Medication 10 ML: at 10:17

## 2025-08-30 ASSESSMENT — ENCOUNTER SYMPTOMS
CHANGE IN APPETITE: VARYING
PAIN LOCATION - PAIN SEVERITY: 10/10
STOOL FREQUENCY: LESS THAN DAILY
LOWEST PAIN SEVERITY IN PAST 24 HOURS: 0/10
APPETITE LEVEL: POOR
PAIN LOCATION - PAIN QUALITY: CRAMP
SUBJECTIVE PAIN PROGRESSION: WAXING AND WANING
CRAMPS: 1
PERSON REPORTING PAIN: PATIENT
PAIN: 1
ANGER WITHIN DEFINED LIMITS: 1
PAIN LOCATION: ABDOMEN
SLEEP QUALITY: FAIR
DIARRHEA: 1
PAIN LOCATION - PAIN FREQUENCY: INTERMITTENT
AGGRESSION WITHIN DEFINED LIMITS: 1
PAIN SEVERITY GOAL: 0/10

## 2025-09-01 PROCEDURE — 99285 EMERGENCY DEPT VISIT HI MDM: CPT | Performed by: GENERAL PRACTICE

## 2025-09-01 ASSESSMENT — PAIN - FUNCTIONAL ASSESSMENT: PAIN_FUNCTIONAL_ASSESSMENT: 0-10

## 2025-09-01 ASSESSMENT — PAIN SCALES - GENERAL
PAINLEVEL_OUTOF10: 8
PAINLEVEL_OUTOF10: 8

## 2025-09-01 ASSESSMENT — PAIN DESCRIPTION - PROGRESSION: CLINICAL_PROGRESSION: NOT CHANGED

## 2025-09-01 ASSESSMENT — PAIN DESCRIPTION - LOCATION: LOCATION: HEAD

## 2025-09-01 ASSESSMENT — PAIN DESCRIPTION - PAIN TYPE: TYPE: ACUTE PAIN

## 2025-09-02 ENCOUNTER — HOME CARE VISIT (OUTPATIENT)
Dept: HOME HEALTH SERVICES | Facility: HOME HEALTH | Age: 25
End: 2025-09-02
Payer: COMMERCIAL

## 2025-09-02 ENCOUNTER — HOSPITAL ENCOUNTER (INPATIENT)
Facility: HOSPITAL | Age: 25
LOS: 3 days | Discharge: ANOTHER HOSPITAL | End: 2025-09-05
Attending: GENERAL PRACTICE | Admitting: INTERNAL MEDICINE
Payer: COMMERCIAL

## 2025-09-02 VITALS
TEMPERATURE: 98.8 F | SYSTOLIC BLOOD PRESSURE: 112 MMHG | OXYGEN SATURATION: 95 % | HEART RATE: 112 BPM | HEIGHT: 63 IN | BODY MASS INDEX: 24.8 KG/M2 | RESPIRATION RATE: 16 BRPM | WEIGHT: 140 LBS | DIASTOLIC BLOOD PRESSURE: 78 MMHG

## 2025-09-02 DIAGNOSIS — G96.00 CSF LEAK: ICD-10-CM

## 2025-09-02 DIAGNOSIS — C71.9 PILOCYTIC ASTROCYTOMA (MULTI): ICD-10-CM

## 2025-09-02 DIAGNOSIS — T82.9XXA COMPLICATION ASSOCIATED WITH PERIPHERALLY INSERTED CENTRAL CATHETER (PICC), INITIAL ENCOUNTER: Primary | ICD-10-CM

## 2025-09-02 LAB
ANION GAP SERPL CALC-SCNC: 11 MMOL/L (ref 10–20)
B-HCG SERPL-ACNC: <2 MIU/ML
BASOPHILS # BLD AUTO: 0.04 X10*3/UL (ref 0–0.1)
BASOPHILS NFR BLD AUTO: 0.5 %
BUN SERPL-MCNC: 7 MG/DL (ref 6–23)
CALCIUM SERPL-MCNC: 9.7 MG/DL (ref 8.6–10.3)
CHLORIDE SERPL-SCNC: 105 MMOL/L (ref 98–107)
CO2 SERPL-SCNC: 27 MMOL/L (ref 21–32)
CREAT SERPL-MCNC: 0.58 MG/DL (ref 0.5–1.05)
EGFRCR SERPLBLD CKD-EPI 2021: >90 ML/MIN/1.73M*2
EOSINOPHIL # BLD AUTO: 0.65 X10*3/UL (ref 0–0.7)
EOSINOPHIL NFR BLD AUTO: 8.7 %
ERYTHROCYTE [DISTWIDTH] IN BLOOD BY AUTOMATED COUNT: 18.9 % (ref 11.5–14.5)
GLUCOSE SERPL-MCNC: 86 MG/DL (ref 74–99)
HCT VFR BLD AUTO: 26.6 % (ref 36–46)
HGB BLD-MCNC: 7.9 G/DL (ref 12–16)
IMM GRANULOCYTES # BLD AUTO: 0.04 X10*3/UL (ref 0–0.7)
IMM GRANULOCYTES NFR BLD AUTO: 0.5 % (ref 0–0.9)
LYMPHOCYTES # BLD AUTO: 2.28 X10*3/UL (ref 1.2–4.8)
LYMPHOCYTES NFR BLD AUTO: 30.4 %
MCH RBC QN AUTO: 24.7 PG (ref 26–34)
MCHC RBC AUTO-ENTMCNC: 29.7 G/DL (ref 32–36)
MCV RBC AUTO: 83 FL (ref 80–100)
MONOCYTES # BLD AUTO: 1.29 X10*3/UL (ref 0.1–1)
MONOCYTES NFR BLD AUTO: 17.2 %
NEUTROPHILS # BLD AUTO: 3.21 X10*3/UL (ref 1.2–7.7)
NEUTROPHILS NFR BLD AUTO: 42.7 %
NRBC BLD-RTO: 0 /100 WBCS (ref 0–0)
PLATELET # BLD AUTO: 414 X10*3/UL (ref 150–450)
POTASSIUM SERPL-SCNC: 3.7 MMOL/L (ref 3.5–5.3)
RBC # BLD AUTO: 3.2 X10*6/UL (ref 4–5.2)
SODIUM SERPL-SCNC: 139 MMOL/L (ref 136–145)
VANCOMYCIN TROUGH SERPL-MCNC: 2 UG/ML (ref 5–20)
WBC # BLD AUTO: 7.5 X10*3/UL (ref 4.4–11.3)

## 2025-09-02 PROCEDURE — 99223 1ST HOSP IP/OBS HIGH 75: CPT

## 2025-09-02 PROCEDURE — 85025 COMPLETE CBC W/AUTO DIFF WBC: CPT | Performed by: PHYSICIAN ASSISTANT

## 2025-09-02 PROCEDURE — 2500000001 HC RX 250 WO HCPCS SELF ADMINISTERED DRUGS (ALT 637 FOR MEDICARE OP): Performed by: INTERNAL MEDICINE

## 2025-09-02 PROCEDURE — 2500000002 HC RX 250 W HCPCS SELF ADMINISTERED DRUGS (ALT 637 FOR MEDICARE OP, ALT 636 FOR OP/ED)

## 2025-09-02 PROCEDURE — 2500000004 HC RX 250 GENERAL PHARMACY W/ HCPCS (ALT 636 FOR OP/ED): Mod: JZ

## 2025-09-02 PROCEDURE — 99223 1ST HOSP IP/OBS HIGH 75: CPT | Performed by: INTERNAL MEDICINE

## 2025-09-02 PROCEDURE — 2500000004 HC RX 250 GENERAL PHARMACY W/ HCPCS (ALT 636 FOR OP/ED): Mod: JZ | Performed by: PHYSICIAN ASSISTANT

## 2025-09-02 PROCEDURE — 84702 CHORIONIC GONADOTROPIN TEST: CPT | Performed by: GENERAL PRACTICE

## 2025-09-02 PROCEDURE — 1200000002 HC GENERAL ROOM WITH TELEMETRY DAILY

## 2025-09-02 PROCEDURE — 80048 BASIC METABOLIC PNL TOTAL CA: CPT | Performed by: PHYSICIAN ASSISTANT

## 2025-09-02 PROCEDURE — 2500000004 HC RX 250 GENERAL PHARMACY W/ HCPCS (ALT 636 FOR OP/ED): Performed by: EMERGENCY MEDICINE

## 2025-09-02 PROCEDURE — 2500000004 HC RX 250 GENERAL PHARMACY W/ HCPCS (ALT 636 FOR OP/ED): Mod: JZ | Performed by: INTERNAL MEDICINE

## 2025-09-02 PROCEDURE — 80202 ASSAY OF VANCOMYCIN: CPT | Performed by: PHYSICIAN ASSISTANT

## 2025-09-02 PROCEDURE — 2500000004 HC RX 250 GENERAL PHARMACY W/ HCPCS (ALT 636 FOR OP/ED): Performed by: PHARMACIST

## 2025-09-02 PROCEDURE — 2500000005 HC RX 250 GENERAL PHARMACY W/O HCPCS

## 2025-09-02 PROCEDURE — 2500000005 HC RX 250 GENERAL PHARMACY W/O HCPCS: Performed by: PHARMACIST

## 2025-09-02 PROCEDURE — 2500000004 HC RX 250 GENERAL PHARMACY W/ HCPCS (ALT 636 FOR OP/ED)

## 2025-09-02 RX ORDER — OXYCODONE HYDROCHLORIDE 5 MG/1
5 TABLET ORAL
Refills: 0 | Status: DISCONTINUED | OUTPATIENT
Start: 2025-09-02 | End: 2025-09-05 | Stop reason: HOSPADM

## 2025-09-02 RX ORDER — OLANZAPINE 2.5 MG/1
10 TABLET, FILM COATED ORAL NIGHTLY
Status: DISCONTINUED | OUTPATIENT
Start: 2025-09-02 | End: 2025-09-05 | Stop reason: HOSPADM

## 2025-09-02 RX ORDER — ONDANSETRON HYDROCHLORIDE 2 MG/ML
4 INJECTION, SOLUTION INTRAVENOUS EVERY 8 HOURS PRN
Status: DISCONTINUED | OUTPATIENT
Start: 2025-09-02 | End: 2025-09-05 | Stop reason: HOSPADM

## 2025-09-02 RX ORDER — OXYCODONE HYDROCHLORIDE 5 MG/1
5 TABLET ORAL
Refills: 0 | Status: DISCONTINUED | OUTPATIENT
Start: 2025-09-02 | End: 2025-09-02

## 2025-09-02 RX ORDER — OXYCODONE HYDROCHLORIDE 5 MG/1
10 TABLET ORAL
Refills: 0 | Status: DISCONTINUED | OUTPATIENT
Start: 2025-09-02 | End: 2025-09-05 | Stop reason: HOSPADM

## 2025-09-02 RX ORDER — PANTOPRAZOLE SODIUM 40 MG/1
40 TABLET, DELAYED RELEASE ORAL
Status: DISCONTINUED | OUTPATIENT
Start: 2025-09-03 | End: 2025-09-05 | Stop reason: HOSPADM

## 2025-09-02 RX ORDER — HYDROMORPHONE HYDROCHLORIDE 1 MG/ML
1 INJECTION, SOLUTION INTRAMUSCULAR; INTRAVENOUS; SUBCUTANEOUS EVERY 8 HOURS PRN
Status: DISCONTINUED | OUTPATIENT
Start: 2025-09-02 | End: 2025-09-02

## 2025-09-02 RX ORDER — OXYCODONE HYDROCHLORIDE 5 MG/1
5 TABLET ORAL EVERY 8 HOURS PRN
Refills: 0 | Status: DISCONTINUED | OUTPATIENT
Start: 2025-09-02 | End: 2025-09-02

## 2025-09-02 RX ORDER — ALBUTEROL SULFATE 0.83 MG/ML
3 SOLUTION RESPIRATORY (INHALATION) EVERY 2 HOUR PRN
Status: DISCONTINUED | OUTPATIENT
Start: 2025-09-02 | End: 2025-09-05 | Stop reason: HOSPADM

## 2025-09-02 RX ORDER — IBUPROFEN 400 MG/1
400 TABLET, FILM COATED ORAL
Status: DISCONTINUED | OUTPATIENT
Start: 2025-09-02 | End: 2025-09-05 | Stop reason: HOSPADM

## 2025-09-02 RX ORDER — OXYCODONE HYDROCHLORIDE 5 MG/1
2.5 TABLET ORAL EVERY 8 HOURS PRN
Refills: 0 | Status: DISCONTINUED | OUTPATIENT
Start: 2025-09-02 | End: 2025-09-02

## 2025-09-02 RX ORDER — ENOXAPARIN SODIUM 100 MG/ML
40 INJECTION SUBCUTANEOUS EVERY 24 HOURS
Status: DISCONTINUED | OUTPATIENT
Start: 2025-09-02 | End: 2025-09-02

## 2025-09-02 RX ORDER — AMOXICILLIN 250 MG
1 CAPSULE ORAL NIGHTLY PRN
Status: DISCONTINUED | OUTPATIENT
Start: 2025-09-02 | End: 2025-09-05 | Stop reason: HOSPADM

## 2025-09-02 RX ORDER — DIPHENHYDRAMINE HYDROCHLORIDE 50 MG/ML
25 INJECTION, SOLUTION INTRAMUSCULAR; INTRAVENOUS ONCE
Status: COMPLETED | OUTPATIENT
Start: 2025-09-02 | End: 2025-09-02

## 2025-09-02 RX ORDER — KETOROLAC TROMETHAMINE 30 MG/ML
15 INJECTION, SOLUTION INTRAMUSCULAR; INTRAVENOUS EVERY 8 HOURS PRN
Status: DISCONTINUED | OUTPATIENT
Start: 2025-09-02 | End: 2025-09-02

## 2025-09-02 RX ORDER — HYDROXYZINE HYDROCHLORIDE 25 MG/1
25 TABLET, FILM COATED ORAL EVERY 8 HOURS PRN
Status: DISCONTINUED | OUTPATIENT
Start: 2025-09-02 | End: 2025-09-05 | Stop reason: HOSPADM

## 2025-09-02 RX ORDER — VANCOMYCIN HYDROCHLORIDE 1 G/20ML
INJECTION, POWDER, LYOPHILIZED, FOR SOLUTION INTRAVENOUS DAILY PRN
Status: DISCONTINUED | OUTPATIENT
Start: 2025-09-02 | End: 2025-09-04

## 2025-09-02 RX ORDER — IBUPROFEN 400 MG/1
400 TABLET, FILM COATED ORAL EVERY 8 HOURS PRN
Status: DISCONTINUED | OUTPATIENT
Start: 2025-09-02 | End: 2025-09-05 | Stop reason: HOSPADM

## 2025-09-02 RX ORDER — ALBUTEROL SULFATE 0.83 MG/ML
3 SOLUTION RESPIRATORY (INHALATION) EVERY 6 HOURS PRN
Status: DISCONTINUED | OUTPATIENT
Start: 2025-09-02 | End: 2025-09-02

## 2025-09-02 RX ORDER — POLYETHYLENE GLYCOL 3350 17 G/17G
17 POWDER, FOR SOLUTION ORAL DAILY PRN
Status: DISCONTINUED | OUTPATIENT
Start: 2025-09-02 | End: 2025-09-05 | Stop reason: HOSPADM

## 2025-09-02 RX ORDER — CETIRIZINE HYDROCHLORIDE 10 MG/1
10 TABLET ORAL DAILY PRN
Status: DISCONTINUED | OUTPATIENT
Start: 2025-09-02 | End: 2025-09-05 | Stop reason: HOSPADM

## 2025-09-02 RX ORDER — ACETAMINOPHEN 325 MG/1
650 TABLET ORAL EVERY 6 HOURS PRN
Status: DISCONTINUED | OUTPATIENT
Start: 2025-09-02 | End: 2025-09-05 | Stop reason: HOSPADM

## 2025-09-02 RX ORDER — PRUCALOPRIDE 2 MG/1
1 TABLET, FILM COATED ORAL DAILY PRN
Status: DISCONTINUED | OUTPATIENT
Start: 2025-09-02 | End: 2025-09-05 | Stop reason: HOSPADM

## 2025-09-02 RX ORDER — ONDANSETRON HYDROCHLORIDE 2 MG/ML
4 INJECTION, SOLUTION INTRAVENOUS ONCE
Status: COMPLETED | OUTPATIENT
Start: 2025-09-02 | End: 2025-09-02

## 2025-09-02 RX ORDER — KETOROLAC TROMETHAMINE 30 MG/ML
30 INJECTION, SOLUTION INTRAMUSCULAR; INTRAVENOUS ONCE
Status: COMPLETED | OUTPATIENT
Start: 2025-09-02 | End: 2025-09-02

## 2025-09-02 RX ORDER — HYDROMORPHONE HYDROCHLORIDE 1 MG/ML
1 INJECTION, SOLUTION INTRAMUSCULAR; INTRAVENOUS; SUBCUTANEOUS ONCE
Status: COMPLETED | OUTPATIENT
Start: 2025-09-02 | End: 2025-09-02

## 2025-09-02 RX ORDER — VANCOMYCIN HYDROCHLORIDE 1 G/20ML
INJECTION, POWDER, LYOPHILIZED, FOR SOLUTION INTRAVENOUS DAILY PRN
Status: DISCONTINUED | OUTPATIENT
Start: 2025-09-02 | End: 2025-09-02

## 2025-09-02 RX ADMIN — HYDROMORPHONE HYDROCHLORIDE 1 MG: 1 INJECTION, SOLUTION INTRAMUSCULAR; INTRAVENOUS; SUBCUTANEOUS at 14:53

## 2025-09-02 RX ADMIN — KETOROLAC TROMETHAMINE 30 MG: 30 INJECTION, SOLUTION INTRAMUSCULAR at 01:03

## 2025-09-02 RX ADMIN — WATER 1750 MG: 1 INJECTION INTRAMUSCULAR; INTRAVENOUS; SUBCUTANEOUS at 15:01

## 2025-09-02 RX ADMIN — SODIUM CHLORIDE 1000 ML: 0.9 INJECTION, SOLUTION INTRAVENOUS at 01:03

## 2025-09-02 RX ADMIN — ONDANSETRON 4 MG: 2 INJECTION, SOLUTION INTRAMUSCULAR; INTRAVENOUS at 01:03

## 2025-09-02 RX ADMIN — KETOROLAC TROMETHAMINE 15 MG: 30 INJECTION, SOLUTION INTRAMUSCULAR at 15:09

## 2025-09-02 RX ADMIN — IBUPROFEN 400 MG: 400 TABLET ORAL at 20:48

## 2025-09-02 RX ADMIN — VANCOMYCIN HYDROCHLORIDE 1750 MG: 5 INJECTION, POWDER, LYOPHILIZED, FOR SOLUTION INTRAVENOUS at 01:47

## 2025-09-02 RX ADMIN — OLANZAPINE 10 MG: 2.5 TABLET, FILM COATED ORAL at 20:47

## 2025-09-02 RX ADMIN — IBUPROFEN 400 MG: 400 TABLET ORAL at 20:47

## 2025-09-02 RX ADMIN — HYDROMORPHONE HYDROCHLORIDE 1 MG: 1 INJECTION, SOLUTION INTRAMUSCULAR; INTRAVENOUS; SUBCUTANEOUS at 01:03

## 2025-09-02 RX ADMIN — ONDANSETRON 4 MG: 2 INJECTION, SOLUTION INTRAMUSCULAR; INTRAVENOUS at 14:54

## 2025-09-02 RX ADMIN — DIPHENHYDRAMINE HYDROCHLORIDE 25 MG: 50 INJECTION, SOLUTION INTRAMUSCULAR; INTRAVENOUS at 02:10

## 2025-09-02 SDOH — SOCIAL STABILITY: SOCIAL INSECURITY: DOES ANYONE TRY TO KEEP YOU FROM HAVING/CONTACTING OTHER FRIENDS OR DOING THINGS OUTSIDE YOUR HOME?: NO

## 2025-09-02 SDOH — SOCIAL STABILITY: SOCIAL INSECURITY: WITHIN THE LAST YEAR, HAVE YOU BEEN AFRAID OF YOUR PARTNER OR EX-PARTNER?: NO

## 2025-09-02 SDOH — SOCIAL STABILITY: SOCIAL INSECURITY: WITHIN THE LAST YEAR, HAVE YOU BEEN HUMILIATED OR EMOTIONALLY ABUSED IN OTHER WAYS BY YOUR PARTNER OR EX-PARTNER?: NO

## 2025-09-02 SDOH — SOCIAL STABILITY: SOCIAL INSECURITY: DO YOU FEEL ANYONE HAS EXPLOITED OR TAKEN ADVANTAGE OF YOU FINANCIALLY OR OF YOUR PERSONAL PROPERTY?: NO

## 2025-09-02 SDOH — SOCIAL STABILITY: SOCIAL INSECURITY: HAS ANYONE EVER THREATENED TO HURT YOUR FAMILY OR YOUR PETS?: NO

## 2025-09-02 SDOH — ECONOMIC STABILITY: FOOD INSECURITY: HOW HARD IS IT FOR YOU TO PAY FOR THE VERY BASICS LIKE FOOD, HOUSING, MEDICAL CARE, AND HEATING?: VERY HARD

## 2025-09-02 SDOH — ECONOMIC STABILITY: HOUSING INSECURITY: IN THE LAST 12 MONTHS, WAS THERE A TIME WHEN YOU WERE NOT ABLE TO PAY THE MORTGAGE OR RENT ON TIME?: NO

## 2025-09-02 SDOH — ECONOMIC STABILITY: HOUSING INSECURITY: AT ANY TIME IN THE PAST 12 MONTHS, WERE YOU HOMELESS OR LIVING IN A SHELTER (INCLUDING NOW)?: NO

## 2025-09-02 SDOH — SOCIAL STABILITY: SOCIAL INSECURITY: HAVE YOU HAD ANY THOUGHTS OF HARMING ANYONE ELSE?: NO

## 2025-09-02 SDOH — ECONOMIC STABILITY: INCOME INSECURITY: IN THE PAST 12 MONTHS HAS THE ELECTRIC, GAS, OIL, OR WATER COMPANY THREATENED TO SHUT OFF SERVICES IN YOUR HOME?: YES

## 2025-09-02 SDOH — SOCIAL STABILITY: SOCIAL INSECURITY: ARE THERE ANY APPARENT SIGNS OF INJURIES/BEHAVIORS THAT COULD BE RELATED TO ABUSE/NEGLECT?: NO

## 2025-09-02 SDOH — ECONOMIC STABILITY: FOOD INSECURITY: WITHIN THE PAST 12 MONTHS, THE FOOD YOU BOUGHT JUST DIDN'T LAST AND YOU DIDN'T HAVE MONEY TO GET MORE.: SOMETIMES TRUE

## 2025-09-02 SDOH — ECONOMIC STABILITY: TRANSPORTATION INSECURITY: IN THE PAST 12 MONTHS, HAS LACK OF TRANSPORTATION KEPT YOU FROM MEDICAL APPOINTMENTS OR FROM GETTING MEDICATIONS?: NO

## 2025-09-02 SDOH — SOCIAL STABILITY: SOCIAL INSECURITY: HAVE YOU HAD THOUGHTS OF HARMING ANYONE ELSE?: NO

## 2025-09-02 SDOH — ECONOMIC STABILITY: FOOD INSECURITY
WITHIN THE PAST 12 MONTHS, YOU WORRIED THAT YOUR FOOD WOULD RUN OUT BEFORE YOU GOT THE MONEY TO BUY MORE.: SOMETIMES TRUE

## 2025-09-02 SDOH — ECONOMIC STABILITY: HOUSING INSECURITY: IN THE PAST 12 MONTHS, HOW MANY TIMES HAVE YOU MOVED WHERE YOU WERE LIVING?: 1

## 2025-09-02 SDOH — SOCIAL STABILITY: SOCIAL INSECURITY: ARE YOU OR HAVE YOU BEEN THREATENED OR ABUSED PHYSICALLY, EMOTIONALLY, OR SEXUALLY BY ANYONE?: NO

## 2025-09-02 SDOH — SOCIAL STABILITY: SOCIAL INSECURITY: DID YOU CONTACT ANYONE TO TELL THEM HOW YOU FELT: YES

## 2025-09-02 SDOH — SOCIAL STABILITY: SOCIAL INSECURITY: DO YOU FEEL UNSAFE GOING BACK TO THE PLACE WHERE YOU ARE LIVING?: NO

## 2025-09-02 SDOH — HEALTH STABILITY: MENTAL HEALTH

## 2025-09-02 SDOH — SOCIAL STABILITY: SOCIAL INSECURITY: ABUSE: ADULT

## 2025-09-02 SDOH — SOCIAL STABILITY: SOCIAL INSECURITY: WERE YOU ABLE TO COMPLETE ALL THE BEHAVIORAL HEALTH SCREENINGS?: YES

## 2025-09-02 ASSESSMENT — COGNITIVE AND FUNCTIONAL STATUS - GENERAL
DAILY ACTIVITIY SCORE: 24
MOBILITY SCORE: 24
DAILY ACTIVITIY SCORE: 24
PATIENT BASELINE BEDBOUND: NO
MOBILITY SCORE: 24

## 2025-09-02 ASSESSMENT — LIFESTYLE VARIABLES
SKIP TO QUESTIONS 9-10: 1
HOW OFTEN DO YOU HAVE 6 OR MORE DRINKS ON ONE OCCASION: NEVER
AUDIT-C TOTAL SCORE: 0
HOW OFTEN DO YOU HAVE A DRINK CONTAINING ALCOHOL: NEVER
HOW MANY STANDARD DRINKS CONTAINING ALCOHOL DO YOU HAVE ON A TYPICAL DAY: PATIENT DOES NOT DRINK
AUDIT-C TOTAL SCORE: 0

## 2025-09-02 ASSESSMENT — ACTIVITIES OF DAILY LIVING (ADL)
PATIENT'S MEMORY ADEQUATE TO SAFELY COMPLETE DAILY ACTIVITIES?: YES
LACK_OF_TRANSPORTATION: NO
AMBULATION ASSISTANCE: SUPERVISION
GROOMING: INDEPENDENT
HEARING - RIGHT EAR: FUNCTIONAL
DRESSING_UB_CURRENT_FUNCTION: SUPERVISION
TOILETING: SUPERVISION
TOILETING: INDEPENDENT
FEEDING ASSESSED: 1
FEEDING: SUPERVISION
DRESSING_LB_CURRENT_FUNCTION: SUPERVISION
TOILETING: 1
BATHING ASSESSED: 1
HEARING - LEFT EAR: FUNCTIONAL
AMBULATION ASSISTANCE: 1
GROOMING ASSESSED: 1
FEEDING YOURSELF: INDEPENDENT
BATHING: INDEPENDENT
DRESSING YOURSELF: INDEPENDENT
WALKS IN HOME: INDEPENDENT
OASIS_M1830: 02
ENTERING_EXITING_HOME: NEEDS ASSISTANCE
LACK_OF_TRANSPORTATION: NO
ADEQUATE_TO_COMPLETE_ADL: YES
BATHING_CURRENT_FUNCTION: SUPERVISION
GROOMING_CURRENT_FUNCTION: SUPERVISION
JUDGMENT_ADEQUATE_SAFELY_COMPLETE_DAILY_ACTIVITIES: YES

## 2025-09-02 ASSESSMENT — PAIN SCALES - GENERAL
PAINLEVEL_OUTOF10: 8
PAINLEVEL_OUTOF10: 0 - NO PAIN
PAINLEVEL_OUTOF10: 10 - WORST POSSIBLE PAIN
PAINLEVEL_OUTOF10: 7
PAINLEVEL_OUTOF10: 6

## 2025-09-02 ASSESSMENT — PAIN - FUNCTIONAL ASSESSMENT
PAIN_FUNCTIONAL_ASSESSMENT: 0-10

## 2025-09-02 ASSESSMENT — PAIN DESCRIPTION - LOCATION: LOCATION: HEAD

## 2025-09-02 ASSESSMENT — PATIENT HEALTH QUESTIONNAIRE - PHQ9
1. LITTLE INTEREST OR PLEASURE IN DOING THINGS: MORE THAN HALF THE DAYS
2. FEELING DOWN, DEPRESSED OR HOPELESS: MORE THAN HALF THE DAYS
SUM OF ALL RESPONSES TO PHQ9 QUESTIONS 1 & 2: 4

## 2025-09-03 LAB
ANION GAP SERPL CALC-SCNC: 10 MMOL/L (ref 10–20)
BUN SERPL-MCNC: 6 MG/DL (ref 6–23)
CALCIUM SERPL-MCNC: 8.6 MG/DL (ref 8.6–10.3)
CHLORIDE SERPL-SCNC: 107 MMOL/L (ref 98–107)
CO2 SERPL-SCNC: 25 MMOL/L (ref 21–32)
CREAT SERPL-MCNC: 0.5 MG/DL (ref 0.5–1.05)
EGFRCR SERPLBLD CKD-EPI 2021: >90 ML/MIN/1.73M*2
ERYTHROCYTE [DISTWIDTH] IN BLOOD BY AUTOMATED COUNT: 18.3 % (ref 11.5–14.5)
GLUCOSE SERPL-MCNC: 70 MG/DL (ref 74–99)
HCT VFR BLD AUTO: 26.2 % (ref 36–46)
HGB BLD-MCNC: 7.6 G/DL (ref 12–16)
MCH RBC QN AUTO: 24.1 PG (ref 26–34)
MCHC RBC AUTO-ENTMCNC: 29 G/DL (ref 32–36)
MCV RBC AUTO: 83 FL (ref 80–100)
NRBC BLD-RTO: 0 /100 WBCS (ref 0–0)
PLATELET # BLD AUTO: 392 X10*3/UL (ref 150–450)
POTASSIUM SERPL-SCNC: 3.6 MMOL/L (ref 3.5–5.3)
RBC # BLD AUTO: 3.15 X10*6/UL (ref 4–5.2)
SODIUM SERPL-SCNC: 138 MMOL/L (ref 136–145)
WBC # BLD AUTO: 4.7 X10*3/UL (ref 4.4–11.3)

## 2025-09-03 PROCEDURE — 2500000001 HC RX 250 WO HCPCS SELF ADMINISTERED DRUGS (ALT 637 FOR MEDICARE OP)

## 2025-09-03 PROCEDURE — 99233 SBSQ HOSP IP/OBS HIGH 50: CPT

## 2025-09-03 PROCEDURE — 85027 COMPLETE CBC AUTOMATED: CPT

## 2025-09-03 PROCEDURE — 1200000002 HC GENERAL ROOM WITH TELEMETRY DAILY

## 2025-09-03 PROCEDURE — 36415 COLL VENOUS BLD VENIPUNCTURE: CPT

## 2025-09-03 PROCEDURE — 2500000005 HC RX 250 GENERAL PHARMACY W/O HCPCS: Performed by: PHARMACIST

## 2025-09-03 PROCEDURE — 2500000002 HC RX 250 W HCPCS SELF ADMINISTERED DRUGS (ALT 637 FOR MEDICARE OP, ALT 636 FOR OP/ED)

## 2025-09-03 PROCEDURE — 99233 SBSQ HOSP IP/OBS HIGH 50: CPT | Performed by: INTERNAL MEDICINE

## 2025-09-03 PROCEDURE — 2500000004 HC RX 250 GENERAL PHARMACY W/ HCPCS (ALT 636 FOR OP/ED): Mod: JZ | Performed by: INTERNAL MEDICINE

## 2025-09-03 PROCEDURE — 80048 BASIC METABOLIC PNL TOTAL CA: CPT

## 2025-09-03 PROCEDURE — 2500000001 HC RX 250 WO HCPCS SELF ADMINISTERED DRUGS (ALT 637 FOR MEDICARE OP): Performed by: INTERNAL MEDICINE

## 2025-09-03 PROCEDURE — 2500000004 HC RX 250 GENERAL PHARMACY W/ HCPCS (ALT 636 FOR OP/ED): Performed by: PHARMACIST

## 2025-09-03 RX ADMIN — IBUPROFEN 400 MG: 400 TABLET ORAL at 08:00

## 2025-09-03 RX ADMIN — WATER 1750 MG: 1 INJECTION INTRAMUSCULAR; INTRAVENOUS; SUBCUTANEOUS at 14:49

## 2025-09-03 RX ADMIN — PANTOPRAZOLE SODIUM 40 MG: 40 TABLET, DELAYED RELEASE ORAL at 06:43

## 2025-09-03 RX ADMIN — IBUPROFEN 400 MG: 400 TABLET ORAL at 12:00

## 2025-09-03 RX ADMIN — HYDROMORPHONE HYDROCHLORIDE 0.5 MG: 1 INJECTION, SOLUTION INTRAMUSCULAR; INTRAVENOUS; SUBCUTANEOUS at 20:26

## 2025-09-03 RX ADMIN — OLANZAPINE 10 MG: 2.5 TABLET, FILM COATED ORAL at 20:26

## 2025-09-03 RX ADMIN — WATER 1750 MG: 1 INJECTION INTRAMUSCULAR; INTRAVENOUS; SUBCUTANEOUS at 02:27

## 2025-09-03 RX ADMIN — HYDROMORPHONE HYDROCHLORIDE 0.5 MG: 1 INJECTION, SOLUTION INTRAMUSCULAR; INTRAVENOUS; SUBCUTANEOUS at 00:31

## 2025-09-03 RX ADMIN — IBUPROFEN 400 MG: 400 TABLET ORAL at 17:00

## 2025-09-03 RX ADMIN — OXYCODONE HYDROCHLORIDE 10 MG: 5 TABLET ORAL at 06:43

## 2025-09-03 RX ADMIN — OXYCODONE HYDROCHLORIDE 10 MG: 5 TABLET ORAL at 16:08

## 2025-09-03 ASSESSMENT — PAIN - FUNCTIONAL ASSESSMENT
PAIN_FUNCTIONAL_ASSESSMENT: 0-10

## 2025-09-03 ASSESSMENT — PAIN DESCRIPTION - DESCRIPTORS
DESCRIPTORS: ACHING
DESCRIPTORS: DISCOMFORT
DESCRIPTORS: ACHING

## 2025-09-03 ASSESSMENT — PAIN DESCRIPTION - ORIENTATION: ORIENTATION: ANTERIOR

## 2025-09-03 ASSESSMENT — PAIN SCALES - GENERAL
PAINLEVEL_OUTOF10: 8
PAINLEVEL_OUTOF10: 3
PAINLEVEL_OUTOF10: 0 - NO PAIN
PAINLEVEL_OUTOF10: 0 - NO PAIN
PAINLEVEL_OUTOF10: 9

## 2025-09-03 ASSESSMENT — COGNITIVE AND FUNCTIONAL STATUS - GENERAL
MOBILITY SCORE: 24
DAILY ACTIVITIY SCORE: 24

## 2025-09-03 ASSESSMENT — PAIN DESCRIPTION - LOCATION
LOCATION: HEAD
LOCATION: HEAD

## 2025-09-04 ENCOUNTER — HOSPITAL ENCOUNTER (INPATIENT)
Facility: HOSPITAL | Age: 25
End: 2025-09-04
Attending: NEUROLOGICAL SURGERY | Admitting: NEUROLOGICAL SURGERY
Payer: COMMERCIAL

## 2025-09-04 PROBLEM — T82.9XXA COMPLICATION ASSOCIATED WITH PERIPHERALLY INSERTED CENTRAL CATHETER (PICC), INITIAL ENCOUNTER: Status: RESOLVED | Noted: 2025-09-02 | Resolved: 2025-09-04

## 2025-09-04 LAB
ERYTHROCYTE [DISTWIDTH] IN BLOOD BY AUTOMATED COUNT: 18.7 % (ref 11.5–14.5)
HCT VFR BLD AUTO: 29.8 % (ref 36–46)
HGB BLD-MCNC: 8.2 G/DL (ref 12–16)
MCH RBC QN AUTO: 24.5 PG (ref 26–34)
MCHC RBC AUTO-ENTMCNC: 27.5 G/DL (ref 32–36)
MCV RBC AUTO: 89 FL (ref 80–100)
NRBC BLD-RTO: 0 /100 WBCS (ref 0–0)
PLATELET # BLD AUTO: 348 X10*3/UL (ref 150–450)
RBC # BLD AUTO: 3.35 X10*6/UL (ref 4–5.2)
WBC # BLD AUTO: 4.7 X10*3/UL (ref 4.4–11.3)

## 2025-09-04 PROCEDURE — 2500000004 HC RX 250 GENERAL PHARMACY W/ HCPCS (ALT 636 FOR OP/ED): Performed by: PHARMACIST

## 2025-09-04 PROCEDURE — 36415 COLL VENOUS BLD VENIPUNCTURE: CPT

## 2025-09-04 PROCEDURE — 2500000004 HC RX 250 GENERAL PHARMACY W/ HCPCS (ALT 636 FOR OP/ED): Mod: JZ | Performed by: INTERNAL MEDICINE

## 2025-09-04 PROCEDURE — 99239 HOSP IP/OBS DSCHRG MGMT >30: CPT

## 2025-09-04 PROCEDURE — 99233 SBSQ HOSP IP/OBS HIGH 50: CPT | Performed by: INTERNAL MEDICINE

## 2025-09-04 PROCEDURE — 2500000001 HC RX 250 WO HCPCS SELF ADMINISTERED DRUGS (ALT 637 FOR MEDICARE OP): Performed by: INTERNAL MEDICINE

## 2025-09-04 PROCEDURE — 85027 COMPLETE CBC AUTOMATED: CPT

## 2025-09-04 PROCEDURE — 1200000002 HC GENERAL ROOM WITH TELEMETRY DAILY

## 2025-09-04 PROCEDURE — 2500000002 HC RX 250 W HCPCS SELF ADMINISTERED DRUGS (ALT 637 FOR MEDICARE OP, ALT 636 FOR OP/ED)

## 2025-09-04 PROCEDURE — 2500000001 HC RX 250 WO HCPCS SELF ADMINISTERED DRUGS (ALT 637 FOR MEDICARE OP)

## 2025-09-04 PROCEDURE — 2500000005 HC RX 250 GENERAL PHARMACY W/O HCPCS: Performed by: PHARMACIST

## 2025-09-04 RX ORDER — IBUPROFEN 400 MG/1
400 TABLET, FILM COATED ORAL EVERY 8 HOURS PRN
Status: CANCELLED | OUTPATIENT
Start: 2025-09-04

## 2025-09-04 RX ORDER — PANTOPRAZOLE SODIUM 40 MG/1
40 TABLET, DELAYED RELEASE ORAL
Status: CANCELLED | OUTPATIENT
Start: 2025-09-05

## 2025-09-04 RX ORDER — CETIRIZINE HYDROCHLORIDE 10 MG/1
10 TABLET ORAL DAILY PRN
Status: CANCELLED | OUTPATIENT
Start: 2025-09-04

## 2025-09-04 RX ORDER — OLANZAPINE 2.5 MG/1
10 TABLET, FILM COATED ORAL NIGHTLY
Status: CANCELLED | OUTPATIENT
Start: 2025-09-04

## 2025-09-04 RX ORDER — HYDROXYZINE HYDROCHLORIDE 25 MG/1
25 TABLET, FILM COATED ORAL EVERY 8 HOURS PRN
Status: CANCELLED | OUTPATIENT
Start: 2025-09-04

## 2025-09-04 RX ORDER — OXYCODONE HYDROCHLORIDE 5 MG/1
5 TABLET ORAL
Refills: 0 | Status: CANCELLED | OUTPATIENT
Start: 2025-09-04

## 2025-09-04 RX ORDER — PRUCALOPRIDE 2 MG/1
1 TABLET, FILM COATED ORAL DAILY PRN
Status: CANCELLED | OUTPATIENT
Start: 2025-09-04

## 2025-09-04 RX ORDER — IBUPROFEN 400 MG/1
400 TABLET, FILM COATED ORAL
Status: CANCELLED | OUTPATIENT
Start: 2025-09-05

## 2025-09-04 RX ORDER — ALBUTEROL SULFATE 0.83 MG/ML
3 SOLUTION RESPIRATORY (INHALATION) EVERY 2 HOUR PRN
Status: CANCELLED | OUTPATIENT
Start: 2025-09-04

## 2025-09-04 RX ORDER — POLYETHYLENE GLYCOL 3350 17 G/17G
17 POWDER, FOR SOLUTION ORAL DAILY PRN
Status: CANCELLED | OUTPATIENT
Start: 2025-09-04

## 2025-09-04 RX ORDER — AMOXICILLIN 250 MG
1 CAPSULE ORAL NIGHTLY PRN
Status: CANCELLED | OUTPATIENT
Start: 2025-09-04

## 2025-09-04 RX ORDER — OXYCODONE HYDROCHLORIDE 5 MG/1
10 TABLET ORAL
Refills: 0 | Status: CANCELLED | OUTPATIENT
Start: 2025-09-04

## 2025-09-04 RX ORDER — ACETAMINOPHEN 325 MG/1
650 TABLET ORAL EVERY 6 HOURS PRN
Status: CANCELLED | OUTPATIENT
Start: 2025-09-04

## 2025-09-04 RX ORDER — ONDANSETRON HYDROCHLORIDE 2 MG/ML
4 INJECTION, SOLUTION INTRAVENOUS EVERY 8 HOURS PRN
Status: CANCELLED | OUTPATIENT
Start: 2025-09-04

## 2025-09-04 RX ADMIN — HYDROMORPHONE HYDROCHLORIDE 0.5 MG: 1 INJECTION, SOLUTION INTRAMUSCULAR; INTRAVENOUS; SUBCUTANEOUS at 15:45

## 2025-09-04 RX ADMIN — OXYCODONE HYDROCHLORIDE 10 MG: 5 TABLET ORAL at 13:16

## 2025-09-04 RX ADMIN — IBUPROFEN 400 MG: 400 TABLET ORAL at 13:16

## 2025-09-04 RX ADMIN — IBUPROFEN 400 MG: 400 TABLET ORAL at 09:31

## 2025-09-04 RX ADMIN — HYDROMORPHONE HYDROCHLORIDE 0.5 MG: 1 INJECTION, SOLUTION INTRAMUSCULAR; INTRAVENOUS; SUBCUTANEOUS at 09:31

## 2025-09-04 RX ADMIN — PANTOPRAZOLE SODIUM 40 MG: 40 TABLET, DELAYED RELEASE ORAL at 09:31

## 2025-09-04 RX ADMIN — IBUPROFEN 400 MG: 400 TABLET ORAL at 17:38

## 2025-09-04 RX ADMIN — OLANZAPINE 10 MG: 2.5 TABLET, FILM COATED ORAL at 21:43

## 2025-09-04 RX ADMIN — WATER 1750 MG: 1 INJECTION INTRAMUSCULAR; INTRAVENOUS; SUBCUTANEOUS at 02:20

## 2025-09-04 ASSESSMENT — PAIN SCALES - GENERAL
PAINLEVEL_OUTOF10: 9
PAINLEVEL_OUTOF10: 0 - NO PAIN
PAINLEVEL_OUTOF10: 9
PAINLEVEL_OUTOF10: 6
PAINLEVEL_OUTOF10: 5 - MODERATE PAIN
PAINLEVEL_OUTOF10: 8
PAINLEVEL_OUTOF10: 8

## 2025-09-04 ASSESSMENT — PAIN - FUNCTIONAL ASSESSMENT
PAIN_FUNCTIONAL_ASSESSMENT: 0-10

## 2025-09-04 ASSESSMENT — COGNITIVE AND FUNCTIONAL STATUS - GENERAL
DAILY ACTIVITIY SCORE: 24
MOBILITY SCORE: 24

## 2025-09-04 ASSESSMENT — PAIN DESCRIPTION - LOCATION
LOCATION: HEAD

## 2025-09-04 ASSESSMENT — PAIN DESCRIPTION - DESCRIPTORS
DESCRIPTORS: ACHING

## 2025-09-05 ENCOUNTER — HOSPITAL ENCOUNTER (INPATIENT)
Facility: HOSPITAL | Age: 25
End: 2025-09-05
Attending: NEUROLOGICAL SURGERY | Admitting: NEUROLOGICAL SURGERY
Payer: COMMERCIAL

## 2025-09-05 VITALS
HEIGHT: 63 IN | TEMPERATURE: 97.2 F | HEART RATE: 92 BPM | SYSTOLIC BLOOD PRESSURE: 110 MMHG | BODY MASS INDEX: 24.8 KG/M2 | DIASTOLIC BLOOD PRESSURE: 70 MMHG | OXYGEN SATURATION: 97 % | RESPIRATION RATE: 18 BRPM | WEIGHT: 140 LBS

## 2025-09-05 DIAGNOSIS — G96.00 CSF LEAK: ICD-10-CM

## 2025-09-05 DIAGNOSIS — G91.9 HYDROCEPHALUS: Primary | ICD-10-CM

## 2025-09-05 DIAGNOSIS — C71.9 PILOCYTIC ASTROCYTOMA (MULTI): ICD-10-CM

## 2025-09-05 DIAGNOSIS — G91.0 COMMUNICATING HYDROCEPHALUS (MULTI): ICD-10-CM

## 2025-09-05 LAB
APTT PPP: 32 SECONDS (ref 26–36)
ERYTHROCYTE [DISTWIDTH] IN BLOOD BY AUTOMATED COUNT: 18 % (ref 11.5–14.5)
ERYTHROCYTE [SEDIMENTATION RATE] IN BLOOD BY WESTERGREN METHOD: 64 MM/H (ref 0–20)
HCT VFR BLD AUTO: 26.6 % (ref 36–46)
HGB BLD-MCNC: 8.3 G/DL (ref 12–16)
INR PPP: 1.2 (ref 0.9–1.1)
MCH RBC QN AUTO: 24.1 PG (ref 26–34)
MCHC RBC AUTO-ENTMCNC: 31.2 G/DL (ref 32–36)
MCV RBC AUTO: 77 FL (ref 80–100)
NRBC BLD-RTO: 0.3 /100 WBCS (ref 0–0)
PLATELET # BLD AUTO: 457 X10*3/UL (ref 150–450)
PROTHROMBIN TIME: 13.3 SECONDS (ref 9.8–12.4)
RBC # BLD AUTO: 3.44 X10*6/UL (ref 4–5.2)
WBC # BLD AUTO: 7.1 X10*3/UL (ref 4.4–11.3)

## 2025-09-05 PROCEDURE — 36415 COLL VENOUS BLD VENIPUNCTURE: CPT

## 2025-09-05 PROCEDURE — 85027 COMPLETE CBC AUTOMATED: CPT

## 2025-09-05 PROCEDURE — 2500000001 HC RX 250 WO HCPCS SELF ADMINISTERED DRUGS (ALT 637 FOR MEDICARE OP)

## 2025-09-05 PROCEDURE — 80069 RENAL FUNCTION PANEL: CPT

## 2025-09-05 PROCEDURE — 84702 CHORIONIC GONADOTROPIN TEST: CPT

## 2025-09-05 PROCEDURE — 86850 RBC ANTIBODY SCREEN: CPT

## 2025-09-05 PROCEDURE — 2500000001 HC RX 250 WO HCPCS SELF ADMINISTERED DRUGS (ALT 637 FOR MEDICARE OP): Performed by: INTERNAL MEDICINE

## 2025-09-05 PROCEDURE — 99285 EMERGENCY DEPT VISIT HI MDM: CPT | Mod: 25 | Performed by: STUDENT IN AN ORGANIZED HEALTH CARE EDUCATION/TRAINING PROGRAM

## 2025-09-05 PROCEDURE — 86140 C-REACTIVE PROTEIN: CPT

## 2025-09-05 PROCEDURE — 94664 DEMO&/EVAL PT USE INHALER: CPT

## 2025-09-05 PROCEDURE — 85610 PROTHROMBIN TIME: CPT

## 2025-09-05 PROCEDURE — 85652 RBC SED RATE AUTOMATED: CPT

## 2025-09-05 PROCEDURE — 2500000004 HC RX 250 GENERAL PHARMACY W/ HCPCS (ALT 636 FOR OP/ED): Mod: JZ | Performed by: INTERNAL MEDICINE

## 2025-09-05 RX ADMIN — HYDROMORPHONE HYDROCHLORIDE 0.5 MG: 1 INJECTION, SOLUTION INTRAMUSCULAR; INTRAVENOUS; SUBCUTANEOUS at 16:23

## 2025-09-05 RX ADMIN — PANTOPRAZOLE SODIUM 40 MG: 40 TABLET, DELAYED RELEASE ORAL at 07:09

## 2025-09-05 RX ADMIN — IBUPROFEN 400 MG: 400 TABLET ORAL at 09:39

## 2025-09-05 RX ADMIN — HYDROMORPHONE HYDROCHLORIDE 0.5 MG: 1 INJECTION, SOLUTION INTRAMUSCULAR; INTRAVENOUS; SUBCUTANEOUS at 09:39

## 2025-09-05 ASSESSMENT — COGNITIVE AND FUNCTIONAL STATUS - GENERAL
MOBILITY SCORE: 24
DAILY ACTIVITIY SCORE: 24
DAILY ACTIVITIY SCORE: 24
MOBILITY SCORE: 24

## 2025-09-05 ASSESSMENT — PAIN DESCRIPTION - LOCATION
LOCATION: HEAD
LOCATION: HEAD

## 2025-09-05 ASSESSMENT — PAIN SCALES - GENERAL
PAINLEVEL_OUTOF10: 8
PAINLEVEL_OUTOF10: 7
PAINLEVEL_OUTOF10: 6
PAINLEVEL_OUTOF10: 10 - WORST POSSIBLE PAIN
PAINLEVEL_OUTOF10: 0 - NO PAIN

## 2025-09-05 ASSESSMENT — PAIN DESCRIPTION - DESCRIPTORS
DESCRIPTORS: ACHING
DESCRIPTORS: ACHING

## 2025-09-05 ASSESSMENT — PAIN - FUNCTIONAL ASSESSMENT
PAIN_FUNCTIONAL_ASSESSMENT: 0-10

## 2025-09-06 LAB
ABO GROUP (TYPE) IN BLOOD: NORMAL
ALBUMIN SERPL BCP-MCNC: 4.1 G/DL (ref 3.4–5)
ANION GAP SERPL CALC-SCNC: 13 MMOL/L (ref 10–20)
ANTIBODY SCREEN: NORMAL
APPEARANCE CSF: ABNORMAL
ATRIAL RATE: 96 BPM
B-HCG SERPL-ACNC: <3 MIU/ML
BUN SERPL-MCNC: 11 MG/DL (ref 6–23)
CALCIUM SERPL-MCNC: 9.6 MG/DL (ref 8.6–10.6)
CHLORIDE SERPL-SCNC: 104 MMOL/L (ref 98–107)
CO2 SERPL-SCNC: 25 MMOL/L (ref 21–32)
COLOR CSF: ABNORMAL
COLOR SPUN CSF: COLORLESS
CREAT SERPL-MCNC: 0.49 MG/DL (ref 0.5–1.05)
CRP SERPL-MCNC: 4.78 MG/DL
EGFRCR SERPLBLD CKD-EPI 2021: >90 ML/MIN/1.73M*2
GLUCOSE CSF-MCNC: 41 MG/DL (ref 40–70)
GLUCOSE SERPL-MCNC: 98 MG/DL (ref 74–99)
LYMPHOCYTES NFR CSF MANUAL: 79 % (ref 28–96)
MONOS+MACROS NFR CSF MANUAL: 7 % (ref 16–56)
NEUTS SEG NFR CSF MANUAL: 14 % (ref 0–5)
P AXIS: 72 DEGREES
P OFFSET: 193 MS
P ONSET: 144 MS
PHOSPHATE SERPL-MCNC: 4 MG/DL (ref 2.5–4.9)
POTASSIUM SERPL-SCNC: 3.7 MMOL/L (ref 3.5–5.3)
PR INTERVAL: 152 MS
PROT CSF-MCNC: 80 MG/DL (ref 15–45)
Q ONSET: 220 MS
QRS COUNT: 16 BEATS
QRS DURATION: 78 MS
QT INTERVAL: 358 MS
QTC CALCULATION(BAZETT): 452 MS
QTC FREDERICIA: 418 MS
R AXIS: 62 DEGREES
RBC # CSF AUTO: 2000 /UL (ref 0–5)
RH FACTOR (ANTIGEN D): NORMAL
SODIUM SERPL-SCNC: 138 MMOL/L (ref 136–145)
T AXIS: 28 DEGREES
T OFFSET: 399 MS
TOTAL CELLS COUNTED CSF: 14
TUBE # CSF: ABNORMAL
VENTRICULAR RATE: 96 BPM
WBC # CSF AUTO: 6 /UL (ref 1–5)

## 2025-09-06 PROCEDURE — 99231 SBSQ HOSP IP/OBS SF/LOW 25: CPT | Performed by: NEUROLOGICAL SURGERY

## 2025-09-06 PROCEDURE — 89051 BODY FLUID CELL COUNT: CPT

## 2025-09-06 PROCEDURE — 99291 CRITICAL CARE FIRST HOUR: CPT

## 2025-09-06 PROCEDURE — 99254 IP/OBS CNSLTJ NEW/EST MOD 60: CPT | Performed by: INTERNAL MEDICINE

## 2025-09-06 PROCEDURE — 2500000004 HC RX 250 GENERAL PHARMACY W/ HCPCS (ALT 636 FOR OP/ED): Mod: SE | Performed by: STUDENT IN AN ORGANIZED HEALTH CARE EDUCATION/TRAINING PROGRAM

## 2025-09-06 PROCEDURE — 2500000001 HC RX 250 WO HCPCS SELF ADMINISTERED DRUGS (ALT 637 FOR MEDICARE OP): Mod: SE

## 2025-09-06 PROCEDURE — 2500000004 HC RX 250 GENERAL PHARMACY W/ HCPCS (ALT 636 FOR OP/ED): Mod: JW,SE

## 2025-09-06 PROCEDURE — 82945 GLUCOSE OTHER FLUID: CPT

## 2025-09-06 PROCEDURE — 2500000004 HC RX 250 GENERAL PHARMACY W/ HCPCS (ALT 636 FOR OP/ED): Mod: SE

## 2025-09-06 PROCEDURE — 2500000002 HC RX 250 W HCPCS SELF ADMINISTERED DRUGS (ALT 637 FOR MEDICARE OP, ALT 636 FOR OP/ED): Mod: SE | Performed by: NEUROLOGICAL SURGERY

## 2025-09-06 PROCEDURE — 87075 CULTR BACTERIA EXCEPT BLOOD: CPT

## 2025-09-06 PROCEDURE — 84157 ASSAY OF PROTEIN OTHER: CPT

## 2025-09-06 PROCEDURE — 2020000001 HC ICU ROOM DAILY

## 2025-09-06 PROCEDURE — 2500000004 HC RX 250 GENERAL PHARMACY W/ HCPCS (ALT 636 FOR OP/ED): Mod: SE | Performed by: NEUROLOGICAL SURGERY

## 2025-09-06 RX ORDER — CETIRIZINE HYDROCHLORIDE 10 MG/1
10 TABLET ORAL DAILY PRN
Status: DISPENSED | OUTPATIENT
Start: 2025-09-06

## 2025-09-06 RX ORDER — AMOXICILLIN 250 MG
1 CAPSULE ORAL NIGHTLY PRN
Status: ACTIVE | OUTPATIENT
Start: 2025-09-06

## 2025-09-06 RX ORDER — FENTANYL CITRATE 50 UG/ML
INJECTION, SOLUTION INTRAMUSCULAR; INTRAVENOUS
Status: COMPLETED
Start: 2025-09-06 | End: 2025-09-06

## 2025-09-06 RX ORDER — HYDROXYZINE HYDROCHLORIDE 25 MG/1
25 TABLET, FILM COATED ORAL EVERY 8 HOURS PRN
Status: DISPENSED | OUTPATIENT
Start: 2025-09-06

## 2025-09-06 RX ORDER — MAGNESIUM SULFATE HEPTAHYDRATE 40 MG/ML
2 INJECTION, SOLUTION INTRAVENOUS ONCE
Status: COMPLETED | OUTPATIENT
Start: 2025-09-06 | End: 2025-09-06

## 2025-09-06 RX ORDER — MIDAZOLAM HYDROCHLORIDE 2 MG/2ML
INJECTION, SOLUTION INTRAMUSCULAR; INTRAVENOUS
Status: COMPLETED
Start: 2025-09-06 | End: 2025-09-06

## 2025-09-06 RX ORDER — CEFAZOLIN SODIUM 2 G/100ML
2 INJECTION, SOLUTION INTRAVENOUS ONCE
Status: COMPLETED | OUTPATIENT
Start: 2025-09-06 | End: 2025-09-06

## 2025-09-06 RX ORDER — CEFAZOLIN SODIUM 2 G/100ML
INJECTION, SOLUTION INTRAVENOUS
Status: COMPLETED
Start: 2025-09-06 | End: 2025-09-06

## 2025-09-06 RX ORDER — PANTOPRAZOLE SODIUM 40 MG/1
40 TABLET, DELAYED RELEASE ORAL
Status: DISPENSED | OUTPATIENT
Start: 2025-09-06

## 2025-09-06 RX ORDER — ACETAMINOPHEN 325 MG/1
650 TABLET ORAL EVERY 8 HOURS PRN
Status: DISCONTINUED | OUTPATIENT
Start: 2025-09-06 | End: 2025-09-06 | Stop reason: SDUPTHER

## 2025-09-06 RX ORDER — ONDANSETRON HYDROCHLORIDE 2 MG/ML
4 INJECTION, SOLUTION INTRAVENOUS EVERY 8 HOURS PRN
Status: DISCONTINUED | OUTPATIENT
Start: 2025-09-06 | End: 2025-09-06 | Stop reason: SDUPTHER

## 2025-09-06 RX ORDER — ONDANSETRON 4 MG/1
4 TABLET, FILM COATED ORAL EVERY 8 HOURS PRN
Status: ACTIVE | OUTPATIENT
Start: 2025-09-06

## 2025-09-06 RX ORDER — OXYCODONE HYDROCHLORIDE 5 MG/1
5 TABLET ORAL
Status: ACTIVE | OUTPATIENT
Start: 2025-09-06

## 2025-09-06 RX ORDER — ALBUTEROL SULFATE 0.83 MG/ML
3 SOLUTION RESPIRATORY (INHALATION) EVERY 2 HOUR PRN
Status: ACTIVE | OUTPATIENT
Start: 2025-09-06

## 2025-09-06 RX ORDER — FENTANYL CITRATE 50 UG/ML
100 INJECTION, SOLUTION INTRAMUSCULAR; INTRAVENOUS ONCE
Status: COMPLETED | OUTPATIENT
Start: 2025-09-06 | End: 2025-09-06

## 2025-09-06 RX ORDER — OXYCODONE HYDROCHLORIDE 5 MG/1
10 TABLET ORAL
Status: DISPENSED | OUTPATIENT
Start: 2025-09-06

## 2025-09-06 RX ORDER — ONDANSETRON HYDROCHLORIDE 2 MG/ML
4 INJECTION, SOLUTION INTRAVENOUS EVERY 8 HOURS PRN
Status: DISPENSED | OUTPATIENT
Start: 2025-09-06

## 2025-09-06 RX ORDER — FENTANYL CITRATE 50 UG/ML
50 INJECTION, SOLUTION INTRAMUSCULAR; INTRAVENOUS ONCE
Status: COMPLETED | OUTPATIENT
Start: 2025-09-06 | End: 2025-09-06

## 2025-09-06 RX ORDER — OLANZAPINE 5 MG/1
10 TABLET, FILM COATED ORAL NIGHTLY
Status: DISPENSED | OUTPATIENT
Start: 2025-09-06

## 2025-09-06 RX ORDER — POLYETHYLENE GLYCOL 3350 17 G/17G
17 POWDER, FOR SOLUTION ORAL DAILY PRN
Status: DISCONTINUED | OUTPATIENT
Start: 2025-09-06 | End: 2025-09-06 | Stop reason: SDUPTHER

## 2025-09-06 RX ORDER — OLANZAPINE 5 MG/1
10 TABLET, FILM COATED ORAL NIGHTLY
Status: DISCONTINUED | OUTPATIENT
Start: 2025-09-06 | End: 2025-09-06 | Stop reason: SDUPTHER

## 2025-09-06 RX ORDER — METHYLPHENIDATE HYDROCHLORIDE 5 MG/1
5 TABLET ORAL DAILY
Status: DISPENSED | OUTPATIENT
Start: 2025-09-06

## 2025-09-06 RX ORDER — POLYETHYLENE GLYCOL 3350 17 G/17G
17 POWDER, FOR SOLUTION ORAL DAILY
Status: ACTIVE | OUTPATIENT
Start: 2025-09-06

## 2025-09-06 RX ORDER — MIDAZOLAM HYDROCHLORIDE 2 MG/2ML
2 INJECTION, SOLUTION INTRAMUSCULAR; INTRAVENOUS ONCE
Status: COMPLETED | OUTPATIENT
Start: 2025-09-06 | End: 2025-09-06

## 2025-09-06 RX ORDER — IBUPROFEN 400 MG/1
400 TABLET, FILM COATED ORAL
Status: DISCONTINUED | OUTPATIENT
Start: 2025-09-06 | End: 2025-09-06 | Stop reason: SDUPTHER

## 2025-09-06 RX ORDER — IBUPROFEN 400 MG/1
400 TABLET, FILM COATED ORAL EVERY 8 HOURS PRN
Status: ACTIVE | OUTPATIENT
Start: 2025-09-06

## 2025-09-06 RX ORDER — HYDROMORPHONE HYDROCHLORIDE 1 MG/ML
0.5 INJECTION, SOLUTION INTRAMUSCULAR; INTRAVENOUS; SUBCUTANEOUS EVERY 4 HOURS PRN
Status: DISPENSED | OUTPATIENT
Start: 2025-09-06

## 2025-09-06 RX ORDER — PRUCALOPRIDE 2 MG/1
1 TABLET, FILM COATED ORAL DAILY PRN
Status: SHIPPED | OUTPATIENT
Start: 2025-09-06

## 2025-09-06 RX ORDER — ONDANSETRON 4 MG/1
4 TABLET, FILM COATED ORAL EVERY 8 HOURS PRN
Status: DISCONTINUED | OUTPATIENT
Start: 2025-09-06 | End: 2025-09-06

## 2025-09-06 RX ORDER — DIPHENHYDRAMINE HYDROCHLORIDE 50 MG/ML
25 INJECTION, SOLUTION INTRAMUSCULAR; INTRAVENOUS ONCE
Status: COMPLETED | OUTPATIENT
Start: 2025-09-06 | End: 2025-09-06

## 2025-09-06 RX ORDER — ACETAMINOPHEN 325 MG/1
650 TABLET ORAL EVERY 6 HOURS PRN
Status: ACTIVE | OUTPATIENT
Start: 2025-09-06

## 2025-09-06 RX ORDER — PANTOPRAZOLE SODIUM 40 MG/1
40 TABLET, DELAYED RELEASE ORAL
Status: DISCONTINUED | OUTPATIENT
Start: 2025-09-06 | End: 2025-09-06 | Stop reason: SDUPTHER

## 2025-09-06 RX ADMIN — HYDROXYZINE HYDROCHLORIDE 25 MG: 25 TABLET, FILM COATED ORAL at 23:07

## 2025-09-06 RX ADMIN — HYDROMORPHONE HYDROCHLORIDE 0.5 MG: 1 INJECTION, SOLUTION INTRAMUSCULAR; INTRAVENOUS; SUBCUTANEOUS at 15:05

## 2025-09-06 RX ADMIN — DIPHENHYDRAMINE HYDROCHLORIDE 25 MG: 50 INJECTION INTRAMUSCULAR; INTRAVENOUS at 18:37

## 2025-09-06 RX ADMIN — HYDROMORPHONE HYDROCHLORIDE 0.5 MG: 1 INJECTION, SOLUTION INTRAMUSCULAR; INTRAVENOUS; SUBCUTANEOUS at 08:34

## 2025-09-06 RX ADMIN — MIDAZOLAM HYDROCHLORIDE 2 MG: 2 INJECTION, SOLUTION INTRAMUSCULAR; INTRAVENOUS at 04:48

## 2025-09-06 RX ADMIN — MIDAZOLAM HYDROCHLORIDE 2 MG: 1 INJECTION, SOLUTION INTRAMUSCULAR; INTRAVENOUS at 04:48

## 2025-09-06 RX ADMIN — HYDROMORPHONE HYDROCHLORIDE 0.5 MG: 1 INJECTION, SOLUTION INTRAMUSCULAR; INTRAVENOUS; SUBCUTANEOUS at 21:42

## 2025-09-06 RX ADMIN — CEFAZOLIN SODIUM 2 G: 2 INJECTION, SOLUTION INTRAVENOUS at 05:00

## 2025-09-06 RX ADMIN — ONDANSETRON HYDROCHLORIDE 4 MG: 4 TABLET, FILM COATED ORAL at 01:27

## 2025-09-06 RX ADMIN — FENTANYL CITRATE 100 MCG: 50 INJECTION, SOLUTION INTRAMUSCULAR; INTRAVENOUS at 05:48

## 2025-09-06 RX ADMIN — MIDAZOLAM HYDROCHLORIDE 2 MG: 1 INJECTION, SOLUTION INTRAMUSCULAR; INTRAVENOUS at 05:35

## 2025-09-06 RX ADMIN — HYDROMORPHONE HYDROCHLORIDE 0.5 MG: 0.5 INJECTION, SOLUTION INTRAMUSCULAR; INTRAVENOUS; SUBCUTANEOUS at 01:28

## 2025-09-06 RX ADMIN — OLANZAPINE 10 MG: 5 TABLET, FILM COATED ORAL at 01:27

## 2025-09-06 RX ADMIN — FENTANYL CITRATE 50 MCG: 50 INJECTION INTRAMUSCULAR; INTRAVENOUS at 06:15

## 2025-09-06 RX ADMIN — FENTANYL CITRATE 50 MCG: 50 INJECTION, SOLUTION INTRAMUSCULAR; INTRAVENOUS at 06:15

## 2025-09-06 RX ADMIN — FENTANYL CITRATE 100 MCG: 50 INJECTION INTRAMUSCULAR; INTRAVENOUS at 05:48

## 2025-09-06 RX ADMIN — MAGNESIUM SULFATE HEPTAHYDRATE 2 G: 40 INJECTION, SOLUTION INTRAVENOUS at 18:36

## 2025-09-06 RX ADMIN — ONDANSETRON 4 MG: 2 INJECTION INTRAMUSCULAR; INTRAVENOUS at 22:12

## 2025-09-06 RX ADMIN — OLANZAPINE 10 MG: 5 TABLET, FILM COATED ORAL at 20:06

## 2025-09-06 RX ADMIN — OXYCODONE HYDROCHLORIDE 10 MG: 5 TABLET ORAL at 20:42

## 2025-09-06 RX ADMIN — OXYCODONE HYDROCHLORIDE 10 MG: 5 TABLET ORAL at 17:17

## 2025-09-06 RX ADMIN — SODIUM CHLORIDE 500 ML: 0.9 INJECTION, SOLUTION INTRAVENOUS at 18:37

## 2025-09-06 SDOH — ECONOMIC STABILITY: INCOME INSECURITY: IN THE PAST 12 MONTHS HAS THE ELECTRIC, GAS, OIL, OR WATER COMPANY THREATENED TO SHUT OFF SERVICES IN YOUR HOME?: YES

## 2025-09-06 SDOH — SOCIAL STABILITY: SOCIAL INSECURITY: WITHIN THE LAST YEAR, HAVE YOU BEEN AFRAID OF YOUR PARTNER OR EX-PARTNER?: NO

## 2025-09-06 SDOH — SOCIAL STABILITY: SOCIAL NETWORK: HOW OFTEN DO YOU ATTEND MEETINGS OF THE CLUBS OR ORGANIZATIONS YOU BELONG TO?: NEVER

## 2025-09-06 SDOH — SOCIAL STABILITY: SOCIAL INSECURITY: HAS ANYONE EVER THREATENED TO HURT YOUR FAMILY OR YOUR PETS?: NO

## 2025-09-06 SDOH — HEALTH STABILITY: PHYSICAL HEALTH: ON AVERAGE, HOW MANY MINUTES DO YOU ENGAGE IN EXERCISE AT THIS LEVEL?: 0 MIN

## 2025-09-06 SDOH — SOCIAL STABILITY: SOCIAL NETWORK: HOW OFTEN DO YOU ATTEND CHURCH OR RELIGIOUS SERVICES?: NEVER

## 2025-09-06 SDOH — ECONOMIC STABILITY: FOOD INSECURITY: WITHIN THE PAST 12 MONTHS, THE FOOD YOU BOUGHT JUST DIDN'T LAST AND YOU DIDN'T HAVE MONEY TO GET MORE.: SOMETIMES TRUE

## 2025-09-06 SDOH — SOCIAL STABILITY: SOCIAL INSECURITY: WERE YOU ABLE TO COMPLETE ALL THE BEHAVIORAL HEALTH SCREENINGS?: YES

## 2025-09-06 SDOH — SOCIAL STABILITY: SOCIAL INSECURITY: ARE THERE ANY APPARENT SIGNS OF INJURIES/BEHAVIORS THAT COULD BE RELATED TO ABUSE/NEGLECT?: NO

## 2025-09-06 SDOH — SOCIAL STABILITY: SOCIAL INSECURITY: WITHIN THE LAST YEAR, HAVE YOU BEEN HUMILIATED OR EMOTIONALLY ABUSED IN OTHER WAYS BY YOUR PARTNER OR EX-PARTNER?: NO

## 2025-09-06 SDOH — SOCIAL STABILITY: SOCIAL INSECURITY: ARE YOU OR HAVE YOU BEEN THREATENED OR ABUSED PHYSICALLY, EMOTIONALLY, OR SEXUALLY BY ANYONE?: NO

## 2025-09-06 SDOH — SOCIAL STABILITY: SOCIAL INSECURITY: DOES ANYONE TRY TO KEEP YOU FROM HAVING/CONTACTING OTHER FRIENDS OR DOING THINGS OUTSIDE YOUR HOME?: NO

## 2025-09-06 SDOH — SOCIAL STABILITY: SOCIAL INSECURITY: ARE YOU MARRIED, WIDOWED, DIVORCED, SEPARATED, NEVER MARRIED, OR LIVING WITH A PARTNER?: NEVER MARRIED

## 2025-09-06 SDOH — SOCIAL STABILITY: SOCIAL NETWORK: HOW OFTEN DO YOU GET TOGETHER WITH FRIENDS OR RELATIVES?: TWICE A WEEK

## 2025-09-06 SDOH — SOCIAL STABILITY: SOCIAL INSECURITY: HAVE YOU HAD ANY THOUGHTS OF HARMING ANYONE ELSE?: NO

## 2025-09-06 SDOH — HEALTH STABILITY: PHYSICAL HEALTH: ON AVERAGE, HOW MANY DAYS PER WEEK DO YOU ENGAGE IN MODERATE TO STRENUOUS EXERCISE (LIKE A BRISK WALK)?: 0 DAYS

## 2025-09-06 SDOH — SOCIAL STABILITY: SOCIAL NETWORK: IN A TYPICAL WEEK, HOW MANY TIMES DO YOU TALK ON THE PHONE WITH FAMILY, FRIENDS, OR NEIGHBORS?: THREE TIMES A WEEK

## 2025-09-06 SDOH — SOCIAL STABILITY: SOCIAL INSECURITY: DO YOU FEEL ANYONE HAS EXPLOITED OR TAKEN ADVANTAGE OF YOU FINANCIALLY OR OF YOUR PERSONAL PROPERTY?: NO

## 2025-09-06 SDOH — SOCIAL STABILITY: SOCIAL INSECURITY: HAVE YOU HAD THOUGHTS OF HARMING ANYONE ELSE?: NO

## 2025-09-06 SDOH — SOCIAL STABILITY: SOCIAL INSECURITY: ABUSE: ADULT

## 2025-09-06 SDOH — SOCIAL STABILITY: SOCIAL INSECURITY: DO YOU FEEL UNSAFE GOING BACK TO THE PLACE WHERE YOU ARE LIVING?: NO

## 2025-09-06 ASSESSMENT — COGNITIVE AND FUNCTIONAL STATUS - GENERAL
MOBILITY SCORE: 24
DAILY ACTIVITIY SCORE: 24
PATIENT BASELINE BEDBOUND: NO
MOBILITY SCORE: 24
MOBILITY SCORE: 24
DAILY ACTIVITIY SCORE: 24
DAILY ACTIVITIY SCORE: 24

## 2025-09-06 ASSESSMENT — LIFESTYLE VARIABLES
HOW OFTEN DO YOU HAVE A DRINK CONTAINING ALCOHOL: NEVER
SKIP TO QUESTIONS 9-10: 1
HOW OFTEN DO YOU HAVE 6 OR MORE DRINKS ON ONE OCCASION: NEVER
HOW MANY STANDARD DRINKS CONTAINING ALCOHOL DO YOU HAVE ON A TYPICAL DAY: PATIENT DOES NOT DRINK
AUDIT-C TOTAL SCORE: 0
AUDIT-C TOTAL SCORE: 0

## 2025-09-06 ASSESSMENT — PAIN SCALES - GENERAL
PAINLEVEL_OUTOF10: 8
PAINLEVEL_OUTOF10: 7
PAINLEVEL_OUTOF10: 3
PAINLEVEL_OUTOF10: 8
PAINLEVEL_OUTOF10: 0 - NO PAIN
PAINLEVEL_OUTOF10: 8
PAINLEVEL_OUTOF10: 10 - WORST POSSIBLE PAIN
PAINLEVEL_OUTOF10: 4
PAINLEVEL_OUTOF10: 3

## 2025-09-06 ASSESSMENT — PAIN DESCRIPTION - LOCATION
LOCATION: HEAD
LOCATION: HEAD

## 2025-09-06 ASSESSMENT — ACTIVITIES OF DAILY LIVING (ADL)
TOILETING: INDEPENDENT
HEARING - LEFT EAR: FUNCTIONAL
DRESSING YOURSELF: INDEPENDENT
ADEQUATE_TO_COMPLETE_ADL: YES
PATIENT'S MEMORY ADEQUATE TO SAFELY COMPLETE DAILY ACTIVITIES?: YES
LACK_OF_TRANSPORTATION: NO
HEARING - RIGHT EAR: FUNCTIONAL
FEEDING YOURSELF: INDEPENDENT
BATHING: INDEPENDENT
GROOMING: INDEPENDENT
JUDGMENT_ADEQUATE_SAFELY_COMPLETE_DAILY_ACTIVITIES: YES
WALKS IN HOME: INDEPENDENT

## 2025-09-06 ASSESSMENT — PAIN - FUNCTIONAL ASSESSMENT
PAIN_FUNCTIONAL_ASSESSMENT: 0-10

## 2025-09-06 ASSESSMENT — PAIN DESCRIPTION - PAIN TYPE: TYPE: ACUTE PAIN

## 2025-09-06 ASSESSMENT — PAIN DESCRIPTION - DESCRIPTORS: DESCRIPTORS: ACHING;HEADACHE

## 2025-09-07 LAB
ALBUMIN SERPL BCP-MCNC: 4 G/DL (ref 3.4–5)
ANION GAP SERPL CALC-SCNC: 14 MMOL/L (ref 10–20)
BACTERIA CSF CULT: NORMAL
BUN SERPL-MCNC: 12 MG/DL (ref 6–23)
CALCIUM SERPL-MCNC: 9.1 MG/DL (ref 8.6–10.6)
CHLORIDE SERPL-SCNC: 103 MMOL/L (ref 98–107)
CO2 SERPL-SCNC: 24 MMOL/L (ref 21–32)
CREAT SERPL-MCNC: 0.69 MG/DL (ref 0.5–1.05)
EGFRCR SERPLBLD CKD-EPI 2021: >90 ML/MIN/1.73M*2
ERYTHROCYTE [DISTWIDTH] IN BLOOD BY AUTOMATED COUNT: 17.9 % (ref 11.5–14.5)
GLUCOSE SERPL-MCNC: 84 MG/DL (ref 74–99)
GRAM STN SPEC: NORMAL
GRAM STN SPEC: NORMAL
HCT VFR BLD AUTO: 26.2 % (ref 36–46)
HGB BLD-MCNC: 8.1 G/DL (ref 12–16)
MAGNESIUM SERPL-MCNC: 2.51 MG/DL (ref 1.6–2.4)
MCH RBC QN AUTO: 24.2 PG (ref 26–34)
MCHC RBC AUTO-ENTMCNC: 30.9 G/DL (ref 32–36)
MCV RBC AUTO: 78 FL (ref 80–100)
NRBC BLD-RTO: 0 /100 WBCS (ref 0–0)
PHOSPHATE SERPL-MCNC: 4.9 MG/DL (ref 2.5–4.9)
PLATELET # BLD AUTO: 371 X10*3/UL (ref 150–450)
POTASSIUM SERPL-SCNC: 4.7 MMOL/L (ref 3.5–5.3)
RBC # BLD AUTO: 3.35 X10*6/UL (ref 4–5.2)
SODIUM SERPL-SCNC: 136 MMOL/L (ref 136–145)
WBC # BLD AUTO: 9.5 X10*3/UL (ref 4.4–11.3)

## 2025-09-07 PROCEDURE — 83735 ASSAY OF MAGNESIUM: CPT

## 2025-09-07 PROCEDURE — 2550000001 HC RX 255 CONTRASTS: Mod: JW,SE | Performed by: NEUROLOGICAL SURGERY

## 2025-09-07 PROCEDURE — 2500000004 HC RX 250 GENERAL PHARMACY W/ HCPCS (ALT 636 FOR OP/ED): Mod: SE

## 2025-09-07 PROCEDURE — 80069 RENAL FUNCTION PANEL: CPT | Performed by: NEUROLOGICAL SURGERY

## 2025-09-07 PROCEDURE — 2500000001 HC RX 250 WO HCPCS SELF ADMINISTERED DRUGS (ALT 637 FOR MEDICARE OP): Mod: SE

## 2025-09-07 PROCEDURE — 2500000004 HC RX 250 GENERAL PHARMACY W/ HCPCS (ALT 636 FOR OP/ED): Mod: JZ,SE

## 2025-09-07 PROCEDURE — 2020000001 HC ICU ROOM DAILY

## 2025-09-07 PROCEDURE — 36415 COLL VENOUS BLD VENIPUNCTURE: CPT | Performed by: NEUROLOGICAL SURGERY

## 2025-09-07 PROCEDURE — A9575 INJ GADOTERATE MEGLUMI 0.1ML: HCPCS | Mod: JW,SE | Performed by: NEUROLOGICAL SURGERY

## 2025-09-07 PROCEDURE — 85027 COMPLETE CBC AUTOMATED: CPT | Performed by: NEUROLOGICAL SURGERY

## 2025-09-07 PROCEDURE — 2500000002 HC RX 250 W HCPCS SELF ADMINISTERED DRUGS (ALT 637 FOR MEDICARE OP, ALT 636 FOR OP/ED): Mod: SE | Performed by: NEUROLOGICAL SURGERY

## 2025-09-07 PROCEDURE — 2500000001 HC RX 250 WO HCPCS SELF ADMINISTERED DRUGS (ALT 637 FOR MEDICARE OP): Mod: SE | Performed by: NEUROLOGICAL SURGERY

## 2025-09-07 RX ORDER — DIPHENHYDRAMINE HYDROCHLORIDE 50 MG/ML
12.5 INJECTION, SOLUTION INTRAMUSCULAR; INTRAVENOUS ONCE
Status: ACTIVE | OUTPATIENT
Start: 2025-09-07

## 2025-09-07 RX ORDER — METOCLOPRAMIDE HYDROCHLORIDE 5 MG/ML
10 INJECTION INTRAMUSCULAR; INTRAVENOUS ONCE
Status: COMPLETED | OUTPATIENT
Start: 2025-09-07 | End: 2025-09-07

## 2025-09-07 RX ORDER — DIPHENHYDRAMINE HYDROCHLORIDE 50 MG/ML
25 INJECTION, SOLUTION INTRAMUSCULAR; INTRAVENOUS ONCE
Status: COMPLETED | OUTPATIENT
Start: 2025-09-07 | End: 2025-09-07

## 2025-09-07 RX ORDER — HEPARIN SODIUM 5000 [USP'U]/ML
5000 INJECTION, SOLUTION INTRAVENOUS; SUBCUTANEOUS EVERY 8 HOURS
Status: DISPENSED | OUTPATIENT
Start: 2025-09-07

## 2025-09-07 RX ORDER — GADOTERATE MEGLUMINE 376.9 MG/ML
15 INJECTION INTRAVENOUS
Status: COMPLETED | OUTPATIENT
Start: 2025-09-07 | End: 2025-09-07

## 2025-09-07 RX ORDER — MAGNESIUM SULFATE 1 G/100ML
1 INJECTION INTRAVENOUS ONCE
Status: DISCONTINUED | OUTPATIENT
Start: 2025-09-07 | End: 2025-09-07

## 2025-09-07 RX ORDER — CALCIUM CARBONATE 200(500)MG
500 TABLET,CHEWABLE ORAL DAILY
Status: DISPENSED | OUTPATIENT
Start: 2025-09-07

## 2025-09-07 RX ADMIN — HYDROMORPHONE HYDROCHLORIDE 0.5 MG: 1 INJECTION, SOLUTION INTRAMUSCULAR; INTRAVENOUS; SUBCUTANEOUS at 14:10

## 2025-09-07 RX ADMIN — SODIUM CHLORIDE 1000 ML: 0.9 INJECTION, SOLUTION INTRAVENOUS at 06:22

## 2025-09-07 RX ADMIN — OXYCODONE HYDROCHLORIDE 10 MG: 5 TABLET ORAL at 03:51

## 2025-09-07 RX ADMIN — HYDROMORPHONE HYDROCHLORIDE 0.5 MG: 1 INJECTION, SOLUTION INTRAMUSCULAR; INTRAVENOUS; SUBCUTANEOUS at 02:08

## 2025-09-07 RX ADMIN — HYDROMORPHONE HYDROCHLORIDE 0.5 MG: 1 INJECTION, SOLUTION INTRAMUSCULAR; INTRAVENOUS; SUBCUTANEOUS at 08:40

## 2025-09-07 RX ADMIN — SODIUM CHLORIDE 1000 ML: 0.9 INJECTION, SOLUTION INTRAVENOUS at 10:59

## 2025-09-07 RX ADMIN — PANTOPRAZOLE SODIUM 40 MG: 40 TABLET, DELAYED RELEASE ORAL at 06:24

## 2025-09-07 RX ADMIN — HYDROXYZINE HYDROCHLORIDE 25 MG: 25 TABLET, FILM COATED ORAL at 10:57

## 2025-09-07 RX ADMIN — LINACLOTIDE 145 MCG: 145 CAPSULE, GELATIN COATED ORAL at 07:07

## 2025-09-07 RX ADMIN — CALCIUM CARBONATE (ANTACID) CHEW TAB 500 MG 1 TABLET: 500 CHEW TAB at 12:58

## 2025-09-07 RX ADMIN — GADOTERATE MEGLUMINE 13 ML: 376.9 INJECTION INTRAVENOUS at 03:10

## 2025-09-07 RX ADMIN — METOCLOPRAMIDE 10 MG: 5 INJECTION, SOLUTION INTRAMUSCULAR; INTRAVENOUS at 06:21

## 2025-09-07 RX ADMIN — OXYCODONE HYDROCHLORIDE 10 MG: 5 TABLET ORAL at 21:11

## 2025-09-07 RX ADMIN — DIPHENHYDRAMINE HYDROCHLORIDE 25 MG: 50 INJECTION INTRAMUSCULAR; INTRAVENOUS at 06:21

## 2025-09-07 RX ADMIN — OLANZAPINE 10 MG: 5 TABLET, FILM COATED ORAL at 21:11

## 2025-09-07 RX ADMIN — HYDROMORPHONE HYDROCHLORIDE 0.5 MG: 1 INJECTION, SOLUTION INTRAMUSCULAR; INTRAVENOUS; SUBCUTANEOUS at 18:16

## 2025-09-07 ASSESSMENT — PAIN - FUNCTIONAL ASSESSMENT
PAIN_FUNCTIONAL_ASSESSMENT: 0-10

## 2025-09-07 ASSESSMENT — PAIN SCALES - GENERAL
PAINLEVEL_OUTOF10: 7
PAINLEVEL_OUTOF10: 0 - NO PAIN
PAINLEVEL_OUTOF10: 3
PAINLEVEL_OUTOF10: 8
PAINLEVEL_OUTOF10: 6
PAINLEVEL_OUTOF10: 8
PAINLEVEL_OUTOF10: 8
PAINLEVEL_OUTOF10: 7
PAINLEVEL_OUTOF10: 2
PAINLEVEL_OUTOF10: 4
PAINLEVEL_OUTOF10: 0 - NO PAIN
PAINLEVEL_OUTOF10: 8

## 2025-09-07 ASSESSMENT — PAIN DESCRIPTION - DESCRIPTORS
DESCRIPTORS: ACHING
DESCRIPTORS: ACHING

## 2025-09-07 ASSESSMENT — PAIN DESCRIPTION - LOCATION
LOCATION: HEAD

## 2025-09-07 ASSESSMENT — ACTIVITIES OF DAILY LIVING (ADL): LACK_OF_TRANSPORTATION: NO

## 2025-09-08 VITALS
OXYGEN SATURATION: 95 % | HEART RATE: 112 BPM | WEIGHT: 152.78 LBS | DIASTOLIC BLOOD PRESSURE: 72 MMHG | BODY MASS INDEX: 27.07 KG/M2 | SYSTOLIC BLOOD PRESSURE: 113 MMHG | RESPIRATION RATE: 14 BRPM | TEMPERATURE: 98.6 F | HEIGHT: 63 IN

## (undated) PROCEDURE — 00W60JZ REVISION OF SYNTHETIC SUBSTITUTE IN CEREBRAL VENTRICLE, OPEN APPROACH: ICD-10-PCS

## (undated) PROCEDURE — 8C01X6J COLLECTION OF CEREBROSPINAL FLUID FROM INDWELLING DEVICE IN NERVOUS SYSTEM: ICD-10-PCS

## (undated) DEVICE — PREP, IODOPHOR, W/ALCOHOL, DURAPREP, W/APPLICATOR, 26 CC

## (undated) DEVICE — DRESSING, GAUZE, 16 PLY, 4 X 4 IN, STERILE

## (undated) DEVICE — SUTURE, SILK, 2-0, 18 IN, BLACK

## (undated) DEVICE — COVER, CART, 45 X 27 X 48 IN, CLEAR

## (undated) DEVICE — TUBE SET, PNEUMOCLEAR, SMOKE EVACU, HIGH-FLOW

## (undated) DEVICE — ADHESIVE, SKIN, MASTISOL, 2/3 CC VIAL

## (undated) DEVICE — TROCAR, OPTICAL, BLADELESS, 12MM, THREADED, 100MM LENGTH

## (undated) DEVICE — CATHETER TRAY, SURESTEP, 16FR, URINE METER W/STATLOCK

## (undated) DEVICE — BUR, ACORN 6MM PRECISION

## (undated) DEVICE — CONTAINER, SPECIMEN, 120 ML, STERILE

## (undated) DEVICE — CATHETER, RED RUBBER 14FR

## (undated) DEVICE — TAPE, SILK, DURAPORE, 3 IN X 10 YD, LF

## (undated) DEVICE — BOOT, SUTURE-AID, YELLOW, STERILE, LF

## (undated) DEVICE — COVER, EQUIPMENT, DOME COVER, SNAP CAP, 30 IN, CLEAR, LF

## (undated) DEVICE — PACKING, IODOFORM, CURITY, 0.5 IN X 5 YD, STERILE

## (undated) DEVICE — SPONGE GAUZE, XRAY SC+RFID, 4X4 16 PLY, STERILE

## (undated) DEVICE — SUTURE, MONOCRYL, 4-0, 18 IN, PS2, UNDYED

## (undated) DEVICE — NEEDLE, HYPODERMIC, PROEDGE, 25G X 5/8, ORANGE

## (undated) DEVICE — SPONGE, HEMOSTATIC, GELATIN, SURGIFOAM, 8 X 12.5 CM X 10 MM

## (undated) DEVICE — DRAPE, TIBURON, SPLIT SHEET, REINF ADH STRIP, 77X122

## (undated) DEVICE — STAPLER, SKIN PROXIMATE, 35 WIDE

## (undated) DEVICE — Device

## (undated) DEVICE — SPONGE, HEMOSTATIC, CELLULOSE, SURGICEL, 2 X 14 IN

## (undated) DEVICE — TUBING, SUCTION, CONNECTING, STERILE 0.25 X 120 IN., LF

## (undated) DEVICE — APPLICATOR, PREP, CHLORAPREP, W/ORANGE TINT, 10.5ML

## (undated) DEVICE — BUR, PERFORATOR, CRANIAL, ACRA-CUT 14/11MM, CDM

## (undated) DEVICE — MARKER, SKIN, RULER AND LABEL PACK, CUSTOM

## (undated) DEVICE — ANTIFOG, SOLUTION, FOG-OUT

## (undated) DEVICE — DRESSING, ISLAND, TELFA, 4 X 5 IN

## (undated) DEVICE — STRIP, SKIN CLOSURE, STERI STRIP, REINFORCED, 0.5 X 4 IN

## (undated) DEVICE — SUTURE, VICRYL, 0, 27 IN, UR-6, VIOLET

## (undated) DEVICE — SEALANT, HEMOSTATIC, FLOSEAL, 10 ML

## (undated) DEVICE — APPLICATOR, CHLORAPREP, W/ORANGE TINT, 26ML

## (undated) DEVICE — CATHETER, URETHRAL, ROBNEL,  8 FR, 16 IN, LF, RED

## (undated) DEVICE — COVER, XRAY, CASSETTE, UNIVERSAL, 20 X 40 IN

## (undated) DEVICE — SOLUTION, INJECTION, USP, SODIUM CHLORIDE 0.9%, .9 NACL, 250 ML, BAG

## (undated) DEVICE — SUTURE, VICRYL, 4-0, 27IN, RB-1

## (undated) DEVICE — DRESSING, TRANSPARENT, TEGADERM, 2-3/8 X 2-3/4 IN

## (undated) DEVICE — TOWEL, SURGICAL, NEURO, O/R, 16 X 26, BLUE, STERILE

## (undated) DEVICE — GOWN, SURGICAL, SMARTGOWN, XLARGE, STERILE

## (undated) DEVICE — BANDAGE, GAUZE, 6 PLY, KERLIX, 3.5 IN X 3.5 YD, STERILE

## (undated) DEVICE — DRESSING, ADHESIVE, ISLAND, TELFA, 2 X 3.75 IN, LF

## (undated) DEVICE — SPHERE, STEALTHSTATION, 5-PK

## (undated) DEVICE — STYLET, AXIEM STEALTH NAVIGATION

## (undated) DEVICE — DRESSING, MEPILEX, BORDER, HEEL, 8.7 X 9.1 IN

## (undated) DEVICE — REST, HEAD, BAGEL, 9 IN

## (undated) DEVICE — PAD, GROUNDING, ELECTROSURGICAL, W/9 FT CABLE, POLYHESIVE II, ADULT, LF

## (undated) DEVICE — DRAPE, SHEET, UTILITY, NON ABSORBENT, 18 X 26 IN, LF

## (undated) DEVICE — DRAPE, SHEET, 17 X 23 IN

## (undated) DEVICE — PROTECTOR, NERVE, ULNAR, PINK

## (undated) DEVICE — BAG, PLASTIC, 10 X 17 IN

## (undated) DEVICE — DRAPE, INCISE, ANTIMICROBIAL, IOBAN 2, STERI DRAPE, 23 X 33 IN, DISPOSABLE, STERILE

## (undated) DEVICE — KIT, NEEDLE, PASSIVE BIOPSY, NAVIGATED

## (undated) DEVICE — INTRODUCER KIT, PERCUTANEOUS, 10 FR

## (undated) DEVICE — TROCAR, KII OPTICAL BLADELESS 5MM Z THREAD 100MM LNGTH

## (undated) DEVICE — COVER, TABLE, UHC

## (undated) DEVICE — DRAPE PACK, CRANIOTOMY, CUSTOM, UHC

## (undated) DEVICE — BAG, DECANTER

## (undated) DEVICE — MANOMETER, CENTRAL VENOUS, STOPCOCK, 4 WAY

## (undated) DEVICE — DRAPE, CRANIOTOMY

## (undated) DEVICE — DRESSING, MEPILEX, BORDER, SACRUM, 8.7 X 9.8 IN

## (undated) DEVICE — MANIFOLD, 4 PORT NEPTUNE STANDARD

## (undated) DEVICE — SPONGE, LAP, XRAY DECT, SC+RFID, 12X12, STERILE

## (undated) DEVICE — PREP, SKIN, DURAPREP, 6 CC

## (undated) DEVICE — ADAPTER, LUER STUB, 16 G

## (undated) DEVICE — BUR, STRAIGHT ROUTER

## (undated) DEVICE — TUBING, SMOKE EVAC, 3/8 X 10 FT

## (undated) DEVICE — ADHESIVE, SKIN, DERMABOND ADVANCED, 15CM, PEN-STYLE

## (undated) DEVICE — TRACKER, STINGRAY, NON-INVASIVE, AXIEM STEALTH NAVIGATION, NEURO

## (undated) DEVICE — DRAPE, IRRIGATION, W/POUCH, ADHESIVE STRIP, STERI DRAPE, 19 X 23 IN, DISPOSABLE, STERILE

## (undated) DEVICE — SUTURE, SILK, 0 PERMA HAND, BR, SUTUPAK, 30IN, BLACK